# Patient Record
Sex: FEMALE | Race: WHITE | NOT HISPANIC OR LATINO | Employment: FULL TIME | URBAN - METROPOLITAN AREA
[De-identification: names, ages, dates, MRNs, and addresses within clinical notes are randomized per-mention and may not be internally consistent; named-entity substitution may affect disease eponyms.]

---

## 2017-01-10 ENCOUNTER — ALLSCRIPTS OFFICE VISIT (OUTPATIENT)
Dept: OTHER | Facility: OTHER | Age: 52
End: 2017-01-10

## 2017-01-19 ENCOUNTER — GENERIC CONVERSION - ENCOUNTER (OUTPATIENT)
Dept: OTHER | Facility: OTHER | Age: 52
End: 2017-01-19

## 2017-02-07 ENCOUNTER — HOSPITAL ENCOUNTER (OUTPATIENT)
Dept: RADIOLOGY | Facility: MEDICAL CENTER | Age: 52
Discharge: HOME | End: 2017-02-07
Payer: COMMERCIAL

## 2017-02-07 ENCOUNTER — TRANSCRIBE ORDERS (OUTPATIENT)
Dept: URGENT CARE | Facility: MEDICAL CENTER | Age: 52
End: 2017-02-07

## 2017-02-07 DIAGNOSIS — R05.9 COUGH: ICD-10-CM

## 2017-02-07 DIAGNOSIS — R05.9 COUGH: Primary | ICD-10-CM

## 2017-02-07 PROCEDURE — 71020 HB CHEST X-RAY 2VW FRONTAL&LATL: CPT

## 2017-03-08 DIAGNOSIS — Z12.31 ENCOUNTER FOR SCREENING MAMMOGRAM FOR MALIGNANT NEOPLASM OF BREAST: ICD-10-CM

## 2017-05-31 ENCOUNTER — GENERIC CONVERSION - ENCOUNTER (OUTPATIENT)
Dept: OTHER | Facility: OTHER | Age: 52
End: 2017-05-31

## 2017-06-19 DIAGNOSIS — Z12.31 ENCOUNTER FOR SCREENING MAMMOGRAM FOR MALIGNANT NEOPLASM OF BREAST: ICD-10-CM

## 2017-06-19 DIAGNOSIS — I82.531 CHRONIC EMBOLISM AND THROMBOSIS OF RIGHT POPLITEAL VEIN (HCC): ICD-10-CM

## 2017-07-03 ENCOUNTER — APPOINTMENT (OUTPATIENT)
Dept: LAB | Facility: MEDICAL CENTER | Age: 52
End: 2017-07-03
Payer: COMMERCIAL

## 2017-07-03 DIAGNOSIS — I82.531 CHRONIC EMBOLISM AND THROMBOSIS OF RIGHT POPLITEAL VEIN (HCC): ICD-10-CM

## 2017-07-03 LAB
BASOPHILS # BLD AUTO: 0.02 THOUSANDS/ΜL (ref 0–0.1)
BASOPHILS NFR BLD AUTO: 0 % (ref 0–1)
EOSINOPHIL # BLD AUTO: 0.17 THOUSAND/ΜL (ref 0–0.61)
EOSINOPHIL NFR BLD AUTO: 2 % (ref 0–6)
ERYTHROCYTE [DISTWIDTH] IN BLOOD BY AUTOMATED COUNT: 13.2 % (ref 11.6–15.1)
HCT VFR BLD AUTO: 39.9 % (ref 34.8–46.1)
HGB BLD-MCNC: 13.3 G/DL (ref 11.5–15.4)
LYMPHOCYTES # BLD AUTO: 1.98 THOUSANDS/ΜL (ref 0.6–4.47)
LYMPHOCYTES NFR BLD AUTO: 26 % (ref 14–44)
MCH RBC QN AUTO: 31.2 PG (ref 26.8–34.3)
MCHC RBC AUTO-ENTMCNC: 33.3 G/DL (ref 31.4–37.4)
MCV RBC AUTO: 94 FL (ref 82–98)
MONOCYTES # BLD AUTO: 0.61 THOUSAND/ΜL (ref 0.17–1.22)
MONOCYTES NFR BLD AUTO: 8 % (ref 4–12)
NEUTROPHILS # BLD AUTO: 4.97 THOUSANDS/ΜL (ref 1.85–7.62)
NEUTS SEG NFR BLD AUTO: 64 % (ref 43–75)
NRBC BLD AUTO-RTO: 0 /100 WBCS
PLATELET # BLD AUTO: 286 THOUSANDS/UL (ref 149–390)
PMV BLD AUTO: 9.8 FL (ref 8.9–12.7)
RBC # BLD AUTO: 4.26 MILLION/UL (ref 3.81–5.12)
WBC # BLD AUTO: 7.76 THOUSAND/UL (ref 4.31–10.16)

## 2017-07-03 PROCEDURE — 85705 THROMBOPLASTIN INHIBITION: CPT

## 2017-07-03 PROCEDURE — 85613 RUSSELL VIPER VENOM DILUTED: CPT

## 2017-07-03 PROCEDURE — 85025 COMPLETE CBC W/AUTO DIFF WBC: CPT

## 2017-07-03 PROCEDURE — 85670 THROMBIN TIME PLASMA: CPT

## 2017-07-03 PROCEDURE — 36415 COLL VENOUS BLD VENIPUNCTURE: CPT

## 2017-07-03 PROCEDURE — 85732 THROMBOPLASTIN TIME PARTIAL: CPT

## 2017-07-06 ENCOUNTER — ALLSCRIPTS OFFICE VISIT (OUTPATIENT)
Dept: OTHER | Facility: OTHER | Age: 52
End: 2017-07-06

## 2017-07-06 ENCOUNTER — LAB REQUISITION (OUTPATIENT)
Dept: LAB | Facility: HOSPITAL | Age: 52
End: 2017-07-06
Payer: COMMERCIAL

## 2017-07-06 ENCOUNTER — TRANSCRIBE ORDERS (OUTPATIENT)
Dept: ADMINISTRATIVE | Facility: HOSPITAL | Age: 52
End: 2017-07-06

## 2017-07-06 ENCOUNTER — APPOINTMENT (OUTPATIENT)
Dept: LAB | Facility: MEDICAL CENTER | Age: 52
End: 2017-07-06
Payer: COMMERCIAL

## 2017-07-06 DIAGNOSIS — I82.531 CHRONIC EMBOLISM AND THROMBOSIS OF RIGHT POPLITEAL VEIN (HCC): ICD-10-CM

## 2017-07-06 DIAGNOSIS — Z11.51 ENCOUNTER FOR SCREENING FOR HUMAN PAPILLOMAVIRUS (HPV): ICD-10-CM

## 2017-07-06 DIAGNOSIS — I82.531 CHRONIC EMBOLISM AND THROMBOSIS OF RIGHT POPLITEAL VEIN (HCC): Primary | ICD-10-CM

## 2017-07-06 DIAGNOSIS — Z12.4 ENCOUNTER FOR SCREENING FOR MALIGNANT NEOPLASM OF CERVIX: ICD-10-CM

## 2017-07-06 LAB — DEPRECATED D DIMER PPP: 438 NG/ML (FEU) (ref 0–424)

## 2017-07-06 PROCEDURE — 85379 FIBRIN DEGRADATION QUANT: CPT

## 2017-07-06 PROCEDURE — 87624 HPV HI-RISK TYP POOLED RSLT: CPT | Performed by: NURSE PRACTITIONER

## 2017-07-06 PROCEDURE — 36415 COLL VENOUS BLD VENIPUNCTURE: CPT

## 2017-07-06 PROCEDURE — G0145 SCR C/V CYTO,THINLAYER,RESCR: HCPCS | Performed by: NURSE PRACTITIONER

## 2017-07-07 LAB
APTT SCREEN TO CONFIRM RATIO: 1.15 RATIO (ref 0–1.4)
CONFIRM APTT/NORMAL: 49.1 SEC (ref 0–55)
LA PPP-IMP: NORMAL
SCREEN APTT: 28 SEC (ref 0–51.9)
SCREEN DRVVT: 39.8 SEC (ref 0–47)
THROMBIN TIME: 19.8 SEC (ref 0–20.9)

## 2017-07-11 ENCOUNTER — ALLSCRIPTS OFFICE VISIT (OUTPATIENT)
Dept: OTHER | Facility: OTHER | Age: 52
End: 2017-07-11

## 2017-07-11 LAB — HPV RRNA GENITAL QL NAA+PROBE: NORMAL

## 2017-07-13 LAB
LAB AP GYN PRIMARY INTERPRETATION: NORMAL
LAB AP LMP: NORMAL
Lab: NORMAL

## 2017-12-07 ENCOUNTER — TRANSCRIBE ORDERS (OUTPATIENT)
Dept: ADMINISTRATIVE | Facility: HOSPITAL | Age: 52
End: 2017-12-07

## 2017-12-07 DIAGNOSIS — M79.661 PAIN AND SWELLING OF RIGHT LOWER LEG: Primary | ICD-10-CM

## 2017-12-07 DIAGNOSIS — M79.89 PAIN AND SWELLING OF RIGHT LOWER LEG: Primary | ICD-10-CM

## 2017-12-08 ENCOUNTER — HOSPITAL ENCOUNTER (OUTPATIENT)
Dept: NON INVASIVE DIAGNOSTICS | Facility: CLINIC | Age: 52
Discharge: HOME | End: 2017-12-08
Payer: COMMERCIAL

## 2017-12-08 ENCOUNTER — GENERIC CONVERSION - ENCOUNTER (OUTPATIENT)
Dept: OTHER | Facility: OTHER | Age: 52
End: 2017-12-08

## 2017-12-08 DIAGNOSIS — M79.89 PAIN AND SWELLING OF RIGHT LOWER LEG: ICD-10-CM

## 2017-12-08 DIAGNOSIS — M79.661 PAIN AND SWELLING OF RIGHT LOWER LEG: ICD-10-CM

## 2017-12-08 PROCEDURE — 93971 EXTREMITY STUDY: CPT

## 2018-01-10 NOTE — MISCELLANEOUS
Message  patient called for clearance from Dr Gianna Bassett for cool sculpting procedure by [facility]([phone])  called office to obtain details of procedure per dr Jose Ross  doctor will call dr Jose Ross to discuss       Active Problems    1  Candidiasis (112 9) (B37 9)   2  Chronic deep vein thrombosis (DVT) of popliteal vein of right lower extremity (453 51)   (I82 531)   3  Dysmenorrhea (625 3) (N94 6)   4  Encounter for gynecological examination with abnormal finding (V72 31) (Z01 411)   5  Encounter for routine gynecological examination (V72 31) (Z01 419)   6  Encounter for screening mammogram for malignant neoplasm of breast (V76 12)   (Z12 31)   7  Screening for human papillomavirus (HPV) (V73 81) (Z11 51)   8  Urinary frequency (788 41) (R35 0)   9  Urinary tract infection (599 0) (N39 0)    Current Meds   1  Aspirin 81 MG TABS; Therapy: (Recorded:25Oct2016) to Recorded   2  Naproxen 500 MG Oral Tablet; TAKE 1 TABLET EVERY 12 HOURS AS NEEDED; Therapy: 08Apr2016 to (Evaluate:07Jun2016)  Requested for: 08Apr2016; Last   Rx:08Apr2016 Ordered    Allergies    1   No Known Drug Allergies    Signatures   Electronically signed by : Gurdeep Saez, ; Nov 11 2016  3:14PM EST                       (Author)

## 2018-01-10 NOTE — MISCELLANEOUS
Message   Recorded as Task   Date: 05/30/2017 09:06 AM, Created By: Deepthi Ho   Task Name: Med Renewal Request   Assigned To: Ryan Patterson   Regarding Patient: Eleazar Recio, Status: In Progress   Comment:    Aura Zepeda - 30 May 2017 9:06 AM     TASK CREATED  Caller: Self; Renew Medication; [phone] (Work)  pt had to cx her yly today -is requesting Naproxen refill be sent to [facility]  r/s apt to 9/2 in [address] [place] [address]    call today @ work #[phone] ext [phone]  Edgar Olguin - 30 May 2017 9:50 AM     TASK EDITED  ok to refill naproxen? r/s yrly apt for sept,please advise   Hever Souza - 30 May 2017 10:23 AM     TASK REPLIED TO: Previously Assigned To Hever Souza  yes   thank you   Edgar Olguin - 30 May 2017 10:42 AM     TASK EDITED  called 1 mo to pharm, rest to mail order   Edgar Olguin - 30 May 2017 10:42 AM     TASK IN [facility] - 31 May 2017 10:56 AM     TASK EDITED  pt needed a 90 day,called to mail order        Active Problems    1  Candidiasis (112 9) (B37 9)   2  Chronic deep vein thrombosis (DVT) of popliteal vein of right lower extremity (453 51)   (I82 531)   3  Dysmenorrhea (625 3) (N94 6)   4  Encounter for gynecological examination with abnormal finding (V72 31) (Z01 411)   5  Encounter for routine gynecological examination (V72 31) (Z01 419)   6  Encounter for screening mammogram for malignant neoplasm of breast (V76 12)   (Z12 31)   7  Screening for human papillomavirus (HPV) (V73 81) (Z11 51)   8  Urinary frequency (788 41) (R35 0)   9  Urinary tract infection (599 0) (N39 0)    Current Meds   1  Aspirin 81 MG TABS; Therapy: (Recorded:25Oct2016) to Recorded   2  Magnesium Oxide 500 MG Oral Tablet; Take 1 tablet daily Recorded   3  Naproxen 500 MG Oral Tablet; TAKE 1 TABLET EVERY 12 HOURS AS NEEDED; Therapy: 08Apr2016 to (Evaluate:07Jun2016)  Requested for: 08Apr2016; Last   Rx:08Apr2016 Ordered    Allergies    1   No Known Drug Allergies    Signatures Electronically signed by : Prem Rendon, ; May 31 2017 10:56AM EST                       (Author)

## 2018-01-12 VITALS
RESPIRATION RATE: 16 BRPM | HEIGHT: 65 IN | SYSTOLIC BLOOD PRESSURE: 116 MMHG | WEIGHT: 180.31 LBS | BODY MASS INDEX: 30.04 KG/M2 | DIASTOLIC BLOOD PRESSURE: 82 MMHG | OXYGEN SATURATION: 97 % | TEMPERATURE: 98.1 F | HEART RATE: 69 BPM

## 2018-01-13 VITALS
HEIGHT: 65 IN | BODY MASS INDEX: 29.87 KG/M2 | WEIGHT: 179.31 LBS | SYSTOLIC BLOOD PRESSURE: 122 MMHG | DIASTOLIC BLOOD PRESSURE: 76 MMHG

## 2018-01-14 VITALS
BODY MASS INDEX: 29.49 KG/M2 | SYSTOLIC BLOOD PRESSURE: 122 MMHG | HEIGHT: 65 IN | WEIGHT: 177 LBS | TEMPERATURE: 97.5 F | HEART RATE: 72 BPM | OXYGEN SATURATION: 93 % | RESPIRATION RATE: 18 BRPM | DIASTOLIC BLOOD PRESSURE: 80 MMHG

## 2018-01-24 ENCOUNTER — APPOINTMENT (OUTPATIENT)
Dept: RADIOLOGY | Facility: MEDICAL CENTER | Age: 53
End: 2018-01-24
Payer: COMMERCIAL

## 2018-01-24 ENCOUNTER — TRANSCRIBE ORDERS (OUTPATIENT)
Dept: ADMINISTRATIVE | Facility: HOSPITAL | Age: 53
End: 2018-01-24

## 2018-01-24 DIAGNOSIS — R05.9 COUGH: Primary | ICD-10-CM

## 2018-01-24 DIAGNOSIS — R52 PAIN: ICD-10-CM

## 2018-01-24 PROCEDURE — 71046 X-RAY EXAM CHEST 2 VIEWS: CPT

## 2018-01-24 PROCEDURE — 70220 X-RAY EXAM OF SINUSES: CPT

## 2018-01-30 ENCOUNTER — HOSPITAL ENCOUNTER (OUTPATIENT)
Dept: NON INVASIVE DIAGNOSTICS | Facility: CLINIC | Age: 53
Discharge: HOME | End: 2018-01-30
Payer: COMMERCIAL

## 2018-01-30 DIAGNOSIS — R52 PAIN: ICD-10-CM

## 2018-01-30 PROCEDURE — 93970 EXTREMITY STUDY: CPT

## 2018-01-30 PROCEDURE — 93970 EXTREMITY STUDY: CPT | Performed by: SURGERY

## 2018-02-05 ENCOUNTER — TELEPHONE (OUTPATIENT)
Dept: HEMATOLOGY ONCOLOGY | Facility: CLINIC | Age: 53
End: 2018-02-05

## 2018-02-05 NOTE — TELEPHONE ENCOUNTER
Pt seen Dr Ilan Rose in June of 2016 due to a blood clot in her RL  Her labs were fine in 07/2017  Pt had appt for 1 yr f/u for 7/2018  Pt went to [facility]; PCP on 1/24/18  She was coughing and a head cold for 3 days  Her left calf was also bothering her with some swelling so Dr Khushboo France sent her for an ultrasound and they found her had a blood clot in her LL now  She has not been on xarelto in about 2 years  But she is currently on xarelto 15mg twice daily for 2-3 weeks, that she started on 1/30/18   She wants to know if she should f/u with us or her PCP

## 2018-02-06 NOTE — TELEPHONE ENCOUNTER
Called patient and left message that she can follow up with Leann within 4-6 weeks if she decided that's what she chose to do over following up with her PCP

## 2018-02-06 NOTE — TELEPHONE ENCOUNTER
Please call pt and inform she can follow with Dr Pretty Carter  And if she wants then schedule follow up in 4 to 6 weeks

## 2018-02-20 ENCOUNTER — TRANSCRIBE ORDERS (OUTPATIENT)
Dept: ADMINISTRATIVE | Facility: HOSPITAL | Age: 53
End: 2018-02-20

## 2018-02-20 DIAGNOSIS — M79.605 LEFT LEG PAIN: Primary | ICD-10-CM

## 2018-03-06 ENCOUNTER — HOSPITAL ENCOUNTER (OUTPATIENT)
Dept: RADIOLOGY | Facility: MEDICAL CENTER | Age: 53
Discharge: HOME | End: 2018-03-06
Payer: COMMERCIAL

## 2018-03-06 DIAGNOSIS — M79.605 LEFT LEG PAIN: ICD-10-CM

## 2018-03-06 PROCEDURE — 93971 EXTREMITY STUDY: CPT

## 2018-03-06 PROCEDURE — 93971 EXTREMITY STUDY: CPT | Performed by: SURGERY

## 2018-03-17 ENCOUNTER — TRANSCRIBE ORDERS (OUTPATIENT)
Dept: ADMINISTRATIVE | Facility: HOSPITAL | Age: 53
End: 2018-03-17

## 2018-04-10 ENCOUNTER — TELEPHONE (OUTPATIENT)
Dept: OBGYN CLINIC | Facility: CLINIC | Age: 53
End: 2018-04-10

## 2018-04-12 ENCOUNTER — TELEPHONE (OUTPATIENT)
Dept: OBGYN CLINIC | Facility: CLINIC | Age: 53
End: 2018-04-12

## 2018-04-12 NOTE — TELEPHONE ENCOUNTER
Pt had to cancel appt yesterday she was sick  She will go to any office any time of the day to be seen for consult for hysterectomy, said she's in unbearable pain  First appt is may 21 which she took but wanted to see if there's anywhere sooner you can put her  Thanks

## 2018-04-13 NOTE — TELEPHONE ENCOUNTER
Pt called again in regards to sced an appt w/ yoli for [facility] consult please advise

## 2018-04-18 ENCOUNTER — OFFICE VISIT (OUTPATIENT)
Dept: OBGYN CLINIC | Facility: CLINIC | Age: 53
End: 2018-04-18
Payer: COMMERCIAL

## 2018-04-18 ENCOUNTER — HOSPITAL ENCOUNTER (OUTPATIENT)
Dept: RADIOLOGY | Facility: MEDICAL CENTER | Age: 53
Discharge: HOME | End: 2018-04-18
Payer: COMMERCIAL

## 2018-04-18 VITALS — SYSTOLIC BLOOD PRESSURE: 126 MMHG | BODY MASS INDEX: 29.09 KG/M2 | DIASTOLIC BLOOD PRESSURE: 80 MMHG | WEIGHT: 174.8 LBS

## 2018-04-18 DIAGNOSIS — R10.2 PELVIC PAIN: ICD-10-CM

## 2018-04-18 DIAGNOSIS — N94.6 DYSMENORRHEA: Primary | ICD-10-CM

## 2018-04-18 PROCEDURE — 76830 TRANSVAGINAL US NON-OB: CPT

## 2018-04-18 PROCEDURE — 76856 US EXAM PELVIC COMPLETE: CPT

## 2018-04-18 PROCEDURE — 99214 OFFICE O/P EST MOD 30 MIN: CPT | Performed by: OBSTETRICS & GYNECOLOGY

## 2018-04-18 RX ORDER — NAPROXEN 500 MG/1
1 TABLET ORAL EVERY 12 HOURS PRN
COMMUNITY
Start: 2016-04-08 | End: 2018-05-09

## 2018-04-18 RX ORDER — RIVAROXABAN 20 MG/1
20 TABLET, FILM COATED ORAL DAILY
Refills: 2 | COMMUNITY
Start: 2018-03-23 | End: 2018-06-04 | Stop reason: SDUPTHER

## 2018-04-18 NOTE — PROGRESS NOTES
Assessment/Plan:      Diagnoses and all orders for this visit:    Pelvic pain  -     US pelvis complete non OB; Future    Dysmenorrhea    Other orders  -     naproxen (NAPROSYN) 500 mg tablet; Take 1 tablet by mouth every 12 (twelve) hours as needed  -     XARELTO 20 MG tablet; Take 20 mg by mouth daily        Discussion was had with patient regarding her options at this point  We reviewed the fact that she is not a candidate for any hormonal medications given her history of DVT as well as her current active DVT  She has already had endometrial ablation  We discussed the options of surgical intervention which the patient has presented today with desire for definitive therapy  The procedure of probable LAVH with bilateral salpingectomy was reviewed  I have scheduled the patient for a pelvic ultrasound as her uterus is top-normal in size  We discussed the need for co management with Hematology for bridging of her anticoagulation to Lovenox or heparin  The risks and benefits were fully reviewed  Including the expected outcomes  She would like to planned this surgery for May if possible  This visit was for 30 min with greater than 50% of that of forwarded to counseling and coordination of care  I have asked the patient to return to the office in 2 days after she has completed her pelvic ultrasound for further surgical planning  Subjective:     Patient ID: Eliana Mattson is a 50 y o  female  Patient returns for evaluation pelvic pain  Patient has pain primarily on day 2 and 3 of her menses  It is been debilitating such that she is unable to perform her typical daily activities  This has been getting progressively worse  Patient has a new history of lower extremity DVTs  She has had a previous right DVT and now in January was diagnosed with a left DVT  She is currently on anticoagulation and has follow-up scheduled with her hematologist   She has no other new medical or surgical history    She has tried numerous medications to help with her pain and they have been ineffective  Pelvic Pain   The patient's primary symptoms include pelvic pain  Review of Systems   Genitourinary: Positive for pelvic pain  All other systems reviewed and are negative  Objective:     Physical Exam   Constitutional: She appears well-developed and well-nourished  Neck: Neck supple  Pulmonary/Chest: Effort normal    Abdominal: Soft  She exhibits no mass  There is no tenderness     Genitourinary:   Genitourinary Comments: External genitalia normal female no lesions  Vagina parous without obvious lesions  Cervix parous and posteriorly deviated, no lesions or discharge noted  Uterus anteverted top-normal in size, slightly tender  Adnexal nontender without obvious mass

## 2018-04-18 NOTE — PROGRESS NOTES
Assessment/Plan:    No problem-specific Assessment & Plan notes found for this encounter  Diagnoses and all orders for this visit:    Dysmenorrhea    Pelvic pain  -     US pelvis complete non OB; Future    Other orders  -     naproxen (NAPROSYN) 500 mg tablet; Take 1 tablet by mouth every 12 (twelve) hours as needed  -     XARELTO 20 MG tablet; Take 20 mg by mouth daily          Subjective:      Patient ID: Jenni Kessler is a 50 y o  female      HPI    The following portions of the patient's history were reviewed and updated as appropriate: allergies, current medications, past family history, past medical history, past social history, past surgical history and problem list     Review of Systems      Objective:      /80 (BP Location: Left arm, Patient Position: Sitting, Cuff Size: Standard)   Wt 79 3 kg (174 lb 12 8 oz)   LMP 01/14/2018   BMI 29 09 kg/m²          Physical Exam

## 2018-04-20 ENCOUNTER — OFFICE VISIT (OUTPATIENT)
Dept: OBGYN CLINIC | Facility: MEDICAL CENTER | Age: 53
End: 2018-04-20
Payer: COMMERCIAL

## 2018-04-20 VITALS — WEIGHT: 174 LBS | DIASTOLIC BLOOD PRESSURE: 70 MMHG | SYSTOLIC BLOOD PRESSURE: 122 MMHG | BODY MASS INDEX: 28.96 KG/M2

## 2018-04-20 DIAGNOSIS — N94.6 DYSMENORRHEA: Primary | ICD-10-CM

## 2018-04-20 PROCEDURE — 99214 OFFICE O/P EST MOD 30 MIN: CPT | Performed by: OBSTETRICS & GYNECOLOGY

## 2018-04-20 RX ORDER — OXYCODONE HYDROCHLORIDE AND ACETAMINOPHEN 5; 325 MG/1; MG/1
1 TABLET ORAL EVERY 4 HOURS PRN
Qty: 20 TABLET | Refills: 0 | Status: SHIPPED | OUTPATIENT
Start: 2018-04-20 | End: 2018-05-09

## 2018-04-20 NOTE — LETTER
April 20, 2018     Joon Brunner MD  [address] [place] [address]    Patient: Jonah Cadena   YOB: 1965   Date of Visit: 4/20/2018       Dear Dr Perez Heads:    Thank you for referring Michelle Valdez to me for evaluation  Below are my notes for this consultation  If you have questions, please do not hesitate to call me  I look forward to following your patient along with you  Sincerely,        Godwin Wiseman DO        CC: No Recipients  Godwin Wiseman DO  4/20/2018  9:43 AM  Sign at close encounter  Assessment/Plan:      Diagnoses and all orders for this visit:    Dysmenorrhea  -     oxyCODONE-acetaminophen (PERCOCET) 5-325 mg per tablet; Take 1 tablet by mouth every 4 (four) hours as needed for moderate pain Max Daily Amount: 6 tablets        The entirety of this visit was spent on counseling and coordination of care for surgery  We will plan on a laparoscopic-assisted vaginal hysterectomy with bilateral salpingectomy  The procedure was fully reviewed in detail with the patient including the risks and benefits  Written informed consent was obtained  The patient had a prescription for perioperative Percocet sent  She was given a sample of Hibiclens scrub to be utilized preoperatively  Once we have a surgical date I have asked that she contact her hematologist to begin planning for bridging to Lovenox from her Xarelto  All of her questions were answered to her satisfaction and we will proceed with surgical planning  Total time for this visit was 35 minutes with greater than 50% of that afforded to counseling and coordination of care  Subjective:     Patient ID: Jonah Cadena is a 50 y o  female  Patient returns for surgical planning  She had ultrasound performed 2 days ago  The images were reviewed by myself demonstrating some irregular fluid collection within the endometrium as well as some irregularity to the myometrium adjacent to this    There was no significant uterine abnormality noted greatest dimension was 11 cm longitudinally  There were no adnexal abnormalities noted  Patient continues to desire surgical intervention  Review of Systems   All other systems reviewed and are negative          Objective:     Physical Exam

## 2018-04-20 NOTE — PROGRESS NOTES
Assessment/Plan:      Diagnoses and all orders for this visit:    Dysmenorrhea  -     oxyCODONE-acetaminophen (PERCOCET) 5-325 mg per tablet; Take 1 tablet by mouth every 4 (four) hours as needed for moderate pain Max Daily Amount: 6 tablets        The entirety of this visit was spent on counseling and coordination of care for surgery  We will plan on a laparoscopic-assisted vaginal hysterectomy with bilateral salpingectomy  The procedure was fully reviewed in detail with the patient including the risks and benefits  Written informed consent was obtained  The patient had a prescription for perioperative Percocet sent  She was given a sample of Hibiclens scrub to be utilized preoperatively  Once we have a surgical date I have asked that she contact her hematologist to begin planning for bridging to Lovenox from her Xarelto  All of her questions were answered to her satisfaction and we will proceed with surgical planning  Total time for this visit was 35 minutes with greater than 50% of that afforded to counseling and coordination of care  Subjective:     Patient ID: Destinee Mercer is a 50 y o  female  Patient returns for surgical planning  She had ultrasound performed 2 days ago  The images were reviewed by myself demonstrating some irregular fluid collection within the endometrium as well as some irregularity to the myometrium adjacent to this  There was no significant uterine abnormality noted greatest dimension was 11 cm longitudinally  There were no adnexal abnormalities noted  Patient continues to desire surgical intervention  Review of Systems   All other systems reviewed and are negative          Objective:     Physical Exam

## 2018-05-07 ENCOUNTER — OFFICE VISIT (OUTPATIENT)
Dept: HEMATOLOGY ONCOLOGY | Facility: CLINIC | Age: 53
End: 2018-05-07
Payer: COMMERCIAL

## 2018-05-07 VITALS
OXYGEN SATURATION: 94 % | SYSTOLIC BLOOD PRESSURE: 122 MMHG | DIASTOLIC BLOOD PRESSURE: 74 MMHG | HEART RATE: 80 BPM | BODY MASS INDEX: 28.7 KG/M2 | TEMPERATURE: 97.6 F | RESPIRATION RATE: 18 BRPM | HEIGHT: 66 IN | WEIGHT: 178.6 LBS

## 2018-05-07 DIAGNOSIS — I82.532 CHRONIC DEEP VEIN THROMBOSIS (DVT) OF POPLITEAL VEIN OF LEFT LOWER EXTREMITY (HCC): Primary | ICD-10-CM

## 2018-05-07 PROBLEM — I82.431 DEEP VEIN THROMBOSIS (DVT) OF POPLITEAL VEIN OF RIGHT LOWER EXTREMITY (HCC): Status: ACTIVE | Noted: 2018-05-07

## 2018-05-07 PROBLEM — I82.432 DEEP VEIN THROMBOSIS (DVT) OF POPLITEAL VEIN OF LEFT LOWER EXTREMITY (HCC): Status: ACTIVE | Noted: 2018-05-07

## 2018-05-07 PROCEDURE — 99214 OFFICE O/P EST MOD 30 MIN: CPT | Performed by: INTERNAL MEDICINE

## 2018-05-07 NOTE — PROGRESS NOTES
Follow-up office visit  HPI:   Claudean Robertson is a 50 y o  female with history of DVT right lower leg below the knee in May 2016 and that happened after taking hormonal therapy in April 2016  She stayed on Xarelto until end of September 2016  She had heavy periods in in January 2018 and she was in bed for 3 days and she developed a clot in the left lower leg distally below the knee  In 2016 she tested positive once for lupus anticoagulant and that was negative on repeat test   Patient will be going for hysterectomy on 05/21 /18  She will continue to take Xarelto until morning of 05/19/2018 and will start after surgery when okay with her surgeon Dr Jose Gillespie hopefully on 05/22/2018  She has instructions to report to our office and be taken to the emergency room in case of bleeding and blood clot  She is asymptomatic at present  No previous history of blood clot  prior to 2016 episode not even during pregnancy  Patient's father had blood clot post surgery  Current Outpatient Prescriptions:     naproxen (NAPROSYN) 500 mg tablet, Take 1 tablet by mouth every 12 (twelve) hours as needed, Disp: , Rfl:     oxyCODONE-acetaminophen (PERCOCET) 5-325 mg per tablet, Take 1 tablet by mouth every 4 (four) hours as needed for moderate pain Max Daily Amount: 6 tablets, Disp: 20 tablet, Rfl: 0    XARELTO 20 MG tablet, Take 20 mg by mouth daily, Disp: , Rfl: 2    No Known Allergies    ROS:  05/07/18 Reviewed 13 systems:  Presently no headaches, seizures, dizziness, diplopia, dysphagia, hoarseness, chest pain, palpitations, shortness of breath, cough, hemoptysis, abdominal pain, nausea, vomiting, change in bowel habits, melena, hematuria, fever, chills, bleeding, bone pains, skin rash, weight loss, arthritic symptoms, tiredness ,  weakness, numbness,  claudication and gait problem  No frequent infections  Not unusually sensitive to heat or cold  No swelling of the ankles  No swollen glands  Patient is anxious   Other symptoms are in HPI        /74   Pulse 80   Temp 97 6 °F (36 4 °C)   Resp 18   Ht 5' 5 8" (1 671 m)   Wt 81 kg (178 lb 9 6 oz)   SpO2 94%   BMI 29 00 kg/m²     Physical Exam:  Alert, oriented, not in distress, no icterus, no oral thrush, no palpable neck mass, clear lung fields, regular heart rate, abdomen  soft and non tender, no palpable abdominal mass, no ascites, no edema of ankles, no calf tenderness, no focal neurological deficit, no skin rash, no palpable lymphadenopathy, good arterial pulses, no clubbing  Patient is anxious  Performance status 0  IMAGING:  VAS lower limb venous duplex study, complete bilateral    (Results Pending)         Labs, Imaging, & Other studies:  Ordered blood tests and venous Doppler study          Reviewed and discussed with patient  Assessment and plan:  Kaleb Hauser is a 50 y o  female with history of DVT right lower leg below the knee in May 2016 and that happened after taking hormonal therapy in April 2016  She stayed on Xarelto until end of September 2016  She had heavy periods in in January 2018 and she was in bed for 3 days and she developed a clot in the left lower leg distally below the knee  In 2016 she tested positive once for lupus anticoagulant and that was negative on repeat test   Patient will be going for hysterectomy on 05/21 /18  She will continue to take Xarelto until morning of 05/19/2018 and will start after surgery when okay with her surgeon Dr Melisa Hensley hopefully on 05/22/2018  She has instructions to report to our office and be taken to the emergency room in case of bleeding and blood clot  She is asymptomatic at present  No previous history of blood clot  prior to 2016 episode not even during pregnancy  Patient's father had blood clot post surgery  Physical examination  as recorded and discussed  Plan is to continue her on  20 mg Xarelto daily and  instructions in relation to surgery as above   She has instructions how to avoid blood clots and she will let the attending physician know of her history of blood clot anytime she went into the hospital for admission or any procedure  She states she is up-to-date with her medical and GYN checkups and mammography  Discussed in detail  Questions answered  No more hormone replacement therapy for her  1  Chronic deep vein thrombosis (DVT) of popliteal vein of left lower extremity (HCC)    - D-dimer, quantitative; Future  - Cardiolipin antibody; Future  - CBC and differential; Future  - Comprehensive metabolic panel; Future  - VAS lower limb venous duplex study, complete bilateral; Future          Patient voiced understanding and agreement in the discussion  Counseling / Coordination of Care   Greater than 50% of total time was spent with the patient and / or family counseling and / or coordination of care

## 2018-05-08 ENCOUNTER — APPOINTMENT (OUTPATIENT)
Dept: LAB | Facility: MEDICAL CENTER | Age: 53
End: 2018-05-08
Payer: COMMERCIAL

## 2018-05-08 ENCOUNTER — APPOINTMENT (OUTPATIENT)
Dept: URGENT CARE | Facility: MEDICAL CENTER | Age: 53
End: 2018-05-08

## 2018-05-08 ENCOUNTER — TRANSCRIBE ORDERS (OUTPATIENT)
Dept: ADMINISTRATIVE | Facility: HOSPITAL | Age: 53
End: 2018-05-08

## 2018-05-08 ENCOUNTER — HOSPITAL ENCOUNTER (OUTPATIENT)
Dept: NON INVASIVE DIAGNOSTICS | Facility: CLINIC | Age: 53
Discharge: HOME | End: 2018-05-08
Payer: COMMERCIAL

## 2018-05-08 ENCOUNTER — TRANSCRIBE ORDERS (OUTPATIENT)
Dept: URGENT CARE | Facility: MEDICAL CENTER | Age: 53
End: 2018-05-08

## 2018-05-08 DIAGNOSIS — Z00.8 ENCOUNTER FOR BIOMETRIC SCREENING: Primary | ICD-10-CM

## 2018-05-08 DIAGNOSIS — Z01.818 PRE-OP TESTING: ICD-10-CM

## 2018-05-08 DIAGNOSIS — Z00.8 ENCOUNTER FOR BIOMETRIC SCREENING: ICD-10-CM

## 2018-05-08 DIAGNOSIS — Z01.818 PRE-OP TESTING: Primary | ICD-10-CM

## 2018-05-08 DIAGNOSIS — I82.532 CHRONIC DEEP VEIN THROMBOSIS (DVT) OF POPLITEAL VEIN OF LEFT LOWER EXTREMITY (HCC): ICD-10-CM

## 2018-05-08 LAB
ABO GROUP BLD: NORMAL
ALBUMIN SERPL BCP-MCNC: 3.5 G/DL (ref 3.5–5)
ALP SERPL-CCNC: 45 U/L (ref 46–116)
ALT SERPL W P-5'-P-CCNC: 20 U/L (ref 12–78)
ANION GAP SERPL CALCULATED.3IONS-SCNC: 9 MMOL/L (ref 4–13)
AST SERPL W P-5'-P-CCNC: 13 U/L (ref 5–45)
BASOPHILS # BLD AUTO: 0.03 THOUSANDS/ΜL (ref 0–0.1)
BASOPHILS NFR BLD AUTO: 1 % (ref 0–1)
BILIRUB SERPL-MCNC: 0.44 MG/DL (ref 0.2–1)
BLD GP AB SCN SERPL QL: NEGATIVE
BUN SERPL-MCNC: 9 MG/DL (ref 5–25)
CALCIUM SERPL-MCNC: 9 MG/DL (ref 8.3–10.1)
CHLORIDE SERPL-SCNC: 106 MMOL/L (ref 100–108)
CHOLEST SERPL-MCNC: 194 MG/DL (ref 50–200)
CO2 SERPL-SCNC: 24 MMOL/L (ref 21–32)
CREAT SERPL-MCNC: 0.93 MG/DL (ref 0.6–1.3)
DEPRECATED D DIMER PPP: 360 NG/ML (FEU) (ref 0–424)
EOSINOPHIL # BLD AUTO: 0.16 THOUSAND/ΜL (ref 0–0.61)
EOSINOPHIL NFR BLD AUTO: 3 % (ref 0–6)
ERYTHROCYTE [DISTWIDTH] IN BLOOD BY AUTOMATED COUNT: 12.9 % (ref 11.6–15.1)
EST. AVERAGE GLUCOSE BLD GHB EST-MCNC: 103 MG/DL
EST. AVERAGE GLUCOSE BLD GHB EST-MCNC: 117 MG/DL
GFR SERPL CREATININE-BSD FRML MDRD: 72 ML/MIN/1.73SQ M
GLUCOSE P FAST SERPL-MCNC: 93 MG/DL (ref 65–99)
HBA1C MFR BLD: 5.2 % (ref 4.2–6.3)
HBA1C MFR BLD: 5.7 % (ref 4.2–6.3)
HCT VFR BLD AUTO: 39 % (ref 34.8–46.1)
HDLC SERPL-MCNC: 63 MG/DL (ref 40–60)
HGB BLD-MCNC: 12.6 G/DL (ref 11.5–15.4)
LDLC SERPL CALC-MCNC: 117 MG/DL (ref 0–100)
LYMPHOCYTES # BLD AUTO: 1.27 THOUSANDS/ΜL (ref 0.6–4.47)
LYMPHOCYTES NFR BLD AUTO: 23 % (ref 14–44)
MCH RBC QN AUTO: 30.1 PG (ref 26.8–34.3)
MCHC RBC AUTO-ENTMCNC: 32.3 G/DL (ref 31.4–37.4)
MCV RBC AUTO: 93 FL (ref 82–98)
MONOCYTES # BLD AUTO: 0.49 THOUSAND/ΜL (ref 0.17–1.22)
MONOCYTES NFR BLD AUTO: 9 % (ref 4–12)
NEUTROPHILS # BLD AUTO: 3.55 THOUSANDS/ΜL (ref 1.85–7.62)
NEUTS SEG NFR BLD AUTO: 64 % (ref 43–75)
NONHDLC SERPL-MCNC: 131 MG/DL
NRBC BLD AUTO-RTO: 0 /100 WBCS
PLATELET # BLD AUTO: 257 THOUSANDS/UL (ref 149–390)
PMV BLD AUTO: 9.7 FL (ref 8.9–12.7)
POTASSIUM SERPL-SCNC: 4.5 MMOL/L (ref 3.5–5.3)
PROT SERPL-MCNC: 6.9 G/DL (ref 6.4–8.2)
RBC # BLD AUTO: 4.18 MILLION/UL (ref 3.81–5.12)
RH BLD: POSITIVE
SODIUM SERPL-SCNC: 139 MMOL/L (ref 136–145)
SPECIMEN EXPIRATION DATE: NORMAL
TRIGL SERPL-MCNC: 70 MG/DL
WBC # BLD AUTO: 5.51 THOUSAND/UL (ref 4.31–10.16)

## 2018-05-08 PROCEDURE — 86900 BLOOD TYPING SEROLOGIC ABO: CPT

## 2018-05-08 PROCEDURE — 93970 EXTREMITY STUDY: CPT

## 2018-05-08 PROCEDURE — 36415 COLL VENOUS BLD VENIPUNCTURE: CPT | Performed by: OBSTETRICS & GYNECOLOGY

## 2018-05-08 PROCEDURE — 86850 RBC ANTIBODY SCREEN: CPT

## 2018-05-08 PROCEDURE — 80053 COMPREHEN METABOLIC PANEL: CPT

## 2018-05-08 PROCEDURE — 85379 FIBRIN DEGRADATION QUANT: CPT

## 2018-05-08 PROCEDURE — 86147 CARDIOLIPIN ANTIBODY EA IG: CPT

## 2018-05-08 PROCEDURE — 80061 LIPID PANEL: CPT | Performed by: PHYSICIAN ASSISTANT

## 2018-05-08 PROCEDURE — 93970 EXTREMITY STUDY: CPT | Performed by: SURGERY

## 2018-05-08 PROCEDURE — 85025 COMPLETE CBC W/AUTO DIFF WBC: CPT

## 2018-05-08 PROCEDURE — 86901 BLOOD TYPING SEROLOGIC RH(D): CPT

## 2018-05-08 PROCEDURE — 83036 HEMOGLOBIN GLYCOSYLATED A1C: CPT | Performed by: PHYSICIAN ASSISTANT

## 2018-05-09 LAB
CARDIOLIPIN IGA SER IA-ACNC: <9 APL U/ML (ref 0–11)
CARDIOLIPIN IGG SER IA-ACNC: <9 GPL U/ML (ref 0–14)
CARDIOLIPIN IGM SER IA-ACNC: <9 MPL U/ML (ref 0–12)

## 2018-05-18 NOTE — H&P
H&P Exam - Gynecology   Claudean Robertson 50 y o  female MRN: [id number]  Unit/Bed#:  Encounter: [id number]    Assessment/Plan     Assessment:  Pelvic pain  Plan:  LAV-LAZARO    History of Present Illness   HPI:  Claudean Robertson is a 50 y o  female who presents with a history of pelvic pain  She has tried conservative measures and these have been ineffective  Pain is crampy and sharp at times  It has impacted her quality of life  She desires definitive surgery    Patient has active left DVT  She discontinued Xarelto 2 days pre-op  She will restart Xarelto 24 hours postop  Review of Systems   All other systems reviewed and are negative  Historical Information   Past Medical History:   Diagnosis Date    [facility]deep venous thrombosis) ([facility] [address] )     Kidney disease     Menorrhalgia     RESOLVED     Migraine     Postcoital bleeding     LAST ASSESSED [id number]     Past Surgical History:   Procedure Laterality Date    DILATION AND CURETTAGE OF UTERUS      RESOLVED     ENDOMETRIAL ABLATION      THERMAL     [place]  RESOLVED     ENDOMETRIAL BIOPSY      BY SUCTION   DONE 2008     OB/GYN History:   Family History   Problem Relation Age of Onset   Keerthi Marin Migraines Mother     Heart disease Mother     Anemia Father     Arthritis Father     Other Father      BLOOD TRANSFUSION    Migraines Daughter     Heart disease Family      Social History   History   Alcohol Use    1 8 oz/week    3 Glasses of wine per week     History   Drug Use No     History   Smoking Status    Never Smoker   Smokeless Tobacco    Never Used       Meds/Allergies     No Known Allergies    Objective   Vitals: There were no vitals taken for this visit  No intake or output data in the 24 hours ending 18    Invasive Devices: Invasive Devices          No matching active lines, drains, or airways          Physical Exam   Constitutional: She is oriented to person, place, and time   She appears well-developed and well-nourished  Neck: Neck supple  Cardiovascular: Normal rate and regular rhythm  Pulmonary/Chest: Effort normal and breath sounds normal    Abdominal: Soft  She exhibits no mass  There is no tenderness  Genitourinary: Vagina normal and uterus normal    Neurological: She is alert and oriented to person, place, and time  Skin: Skin is dry  Psychiatric: She has a normal mood and affect  Her behavior is normal  Thought content normal        Lab Results: I have personally reviewed pertinent reports  Imaging: I have personally reviewed pertinent reports  EKG, Pathology, and Other Studies: I have personally reviewed pertinent reports

## 2018-05-20 ENCOUNTER — ANESTHESIA EVENT (OUTPATIENT)
Dept: PERIOP | Facility: HOSPITAL | Age: 53
End: 2018-05-20
Payer: COMMERCIAL

## 2018-05-20 NOTE — ANESTHESIA PREPROCEDURE EVALUATION
Review of Systems/Medical History          Cardiovascular  EKG reviewed, DVT (Last Jan /18)  Comment: On Xarelto to be stopped after 5/19 dose  Off X 48 hours  ,  Pulmonary  Not a smoker , No recent URI ,        GI/Hepatic    No PUD,             Endo/Other     GYN      Comment: Active Painful Menses     Hematology   Musculoskeletal       Neurology    Headaches,   Comment: Migraines Psychology           Physical Exam    Airway    Mallampati score: I  TM Distance: >3 FB  Neck ROM: full     Dental   No notable dental hx     Cardiovascular      Pulmonary      Other Findings        Anesthesia Plan  ASA Score- 2     Anesthesia Type- general with ASA Monitors  Additional Monitors:   Airway Plan: ETT  Plan Factors-Patient not instructed to abstain from smoking on day of procedure  Patient did not smoke on day of surgery  Induction- intravenous  Postoperative Plan-     Informed Consent- Anesthetic plan and risks discussed with patient and spouse  I personally reviewed this patient with the CRNA  Discussed and agreed on the Anesthesia Plan with the CRNA             Lab Results   Component Value Date    GLUCOSE 95 12/22/2016    ALT 20 05/08/2018    AST 13 05/08/2018    BUN 9 05/08/2018    CALCIUM 9 0 05/08/2018     05/08/2018    CHOL 194 05/08/2018    CO2 24 05/08/2018    CREATININE 0 93 05/08/2018    HDL 63 (H) 05/08/2018    INR 0 98 10/06/2016    HCT 39 0 05/08/2018    HGB 12 6 05/08/2018    PROT 6 9 05/08/2018    HGBA1C 5 7 05/08/2018     05/08/2018    K 4 5 05/08/2018     05/08/2018    TRIG 70 05/08/2018    WBC 5 51 05/08/2018

## 2018-05-21 ENCOUNTER — HOSPITAL ENCOUNTER (OUTPATIENT)
Facility: HOSPITAL | Age: 53
Setting detail: OUTPATIENT SURGERY
Discharge: HOME | End: 2018-05-21
Attending: OBSTETRICS & GYNECOLOGY | Admitting: OBSTETRICS & GYNECOLOGY
Payer: COMMERCIAL

## 2018-05-21 ENCOUNTER — ANESTHESIA (OUTPATIENT)
Dept: PERIOP | Facility: HOSPITAL | Age: 53
End: 2018-05-21
Payer: COMMERCIAL

## 2018-05-21 VITALS
RESPIRATION RATE: 16 BRPM | TEMPERATURE: 100 F | OXYGEN SATURATION: 95 % | HEIGHT: 66 IN | HEART RATE: 82 BPM | BODY MASS INDEX: 27.32 KG/M2 | SYSTOLIC BLOOD PRESSURE: 143 MMHG | DIASTOLIC BLOOD PRESSURE: 74 MMHG | WEIGHT: 170 LBS

## 2018-05-21 DIAGNOSIS — N94.6 PAINFUL MENSTRUATION: ICD-10-CM

## 2018-05-21 DIAGNOSIS — R10.2 PELVIC PAIN: ICD-10-CM

## 2018-05-21 LAB
ATRIAL RATE: 80 BPM
EXT PREGNANCY TEST URINE: NEGATIVE
GLUCOSE SERPL-MCNC: 155 MG/DL (ref 65–140)
P AXIS: 50 DEGREES
PR INTERVAL: 158 MS
QRS AXIS: -24 DEGREES
QRSD INTERVAL: 82 MS
QT INTERVAL: 374 MS
QTC INTERVAL: 431 MS
T WAVE AXIS: 41 DEGREES
VENTRICULAR RATE: 80 BPM

## 2018-05-21 PROCEDURE — 58554 LAPARO-VAG HYST W/T/O COMPL: CPT | Performed by: OBSTETRICS & GYNECOLOGY

## 2018-05-21 PROCEDURE — 88342 IMHCHEM/IMCYTCHM 1ST ANTB: CPT | Performed by: PATHOLOGY

## 2018-05-21 PROCEDURE — 88361 TUMOR IMMUNOHISTOCHEM/COMPUT: CPT | Performed by: PATHOLOGY

## 2018-05-21 PROCEDURE — 82948 REAGENT STRIP/BLOOD GLUCOSE: CPT

## 2018-05-21 PROCEDURE — 93010 ELECTROCARDIOGRAM REPORT: CPT | Performed by: INTERNAL MEDICINE

## 2018-05-21 PROCEDURE — 88307 TISSUE EXAM BY PATHOLOGIST: CPT | Performed by: PATHOLOGY

## 2018-05-21 PROCEDURE — 93005 ELECTROCARDIOGRAM TRACING: CPT

## 2018-05-21 PROCEDURE — 88341 IMHCHEM/IMCYTCHM EA ADD ANTB: CPT | Performed by: PATHOLOGY

## 2018-05-21 PROCEDURE — 81025 URINE PREGNANCY TEST: CPT | Performed by: OBSTETRICS & GYNECOLOGY

## 2018-05-21 RX ORDER — OXYCODONE HYDROCHLORIDE 5 MG/1
5 TABLET ORAL EVERY 4 HOURS PRN
Status: DISCONTINUED | OUTPATIENT
Start: 2018-05-21 | End: 2018-05-21 | Stop reason: HOSPADM

## 2018-05-21 RX ORDER — GLYCOPYRROLATE 0.2 MG/ML
INJECTION INTRAMUSCULAR; INTRAVENOUS AS NEEDED
Status: DISCONTINUED | OUTPATIENT
Start: 2018-05-21 | End: 2018-05-21 | Stop reason: SURG

## 2018-05-21 RX ORDER — SODIUM CHLORIDE, SODIUM LACTATE, POTASSIUM CHLORIDE, AND CALCIUM CHLORIDE .6; .31; .03; .02 G/100ML; G/100ML; G/100ML; G/100ML
IRRIGANT IRRIGATION AS NEEDED
Status: DISCONTINUED | OUTPATIENT
Start: 2018-05-21 | End: 2018-05-21 | Stop reason: HOSPADM

## 2018-05-21 RX ORDER — LIDOCAINE HYDROCHLORIDE 10 MG/ML
INJECTION, SOLUTION INFILTRATION; PERINEURAL AS NEEDED
Status: DISCONTINUED | OUTPATIENT
Start: 2018-05-21 | End: 2018-05-21 | Stop reason: SURG

## 2018-05-21 RX ORDER — FENTANYL CITRATE/PF 50 MCG/ML
25 SYRINGE (ML) INJECTION
Status: DISCONTINUED | OUTPATIENT
Start: 2018-05-21 | End: 2018-05-21 | Stop reason: HOSPADM

## 2018-05-21 RX ORDER — OXYCODONE HYDROCHLORIDE 5 MG/1
10 TABLET ORAL EVERY 4 HOURS PRN
Status: DISCONTINUED | OUTPATIENT
Start: 2018-05-21 | End: 2018-05-21 | Stop reason: HOSPADM

## 2018-05-21 RX ORDER — FENTANYL CITRATE 50 UG/ML
INJECTION, SOLUTION INTRAMUSCULAR; INTRAVENOUS AS NEEDED
Status: DISCONTINUED | OUTPATIENT
Start: 2018-05-21 | End: 2018-05-21 | Stop reason: SURG

## 2018-05-21 RX ORDER — ONDANSETRON 2 MG/ML
4 INJECTION INTRAMUSCULAR; INTRAVENOUS ONCE AS NEEDED
Status: DISCONTINUED | OUTPATIENT
Start: 2018-05-21 | End: 2018-05-21 | Stop reason: HOSPADM

## 2018-05-21 RX ORDER — ACETAMINOPHEN 500 MG
1000 TABLET ORAL EVERY 6 HOURS PRN
COMMUNITY

## 2018-05-21 RX ORDER — SODIUM CHLORIDE, SODIUM LACTATE, POTASSIUM CHLORIDE, CALCIUM CHLORIDE 600; 310; 30; 20 MG/100ML; MG/100ML; MG/100ML; MG/100ML
250 INJECTION, SOLUTION INTRAVENOUS CONTINUOUS
Status: DISCONTINUED | OUTPATIENT
Start: 2018-05-21 | End: 2018-05-21

## 2018-05-21 RX ORDER — ONDANSETRON 2 MG/ML
4 INJECTION INTRAMUSCULAR; INTRAVENOUS EVERY 6 HOURS PRN
Status: DISCONTINUED | OUTPATIENT
Start: 2018-05-21 | End: 2018-05-21 | Stop reason: HOSPADM

## 2018-05-21 RX ORDER — ALBUTEROL SULFATE 2.5 MG/3ML
2.5 SOLUTION RESPIRATORY (INHALATION) EVERY 4 HOURS PRN
Status: DISCONTINUED | OUTPATIENT
Start: 2018-05-21 | End: 2018-05-21 | Stop reason: HOSPADM

## 2018-05-21 RX ORDER — BUPIVACAINE HYDROCHLORIDE AND EPINEPHRINE 5; 5 MG/ML; UG/ML
INJECTION, SOLUTION PERINEURAL AS NEEDED
Status: DISCONTINUED | OUTPATIENT
Start: 2018-05-21 | End: 2018-05-21 | Stop reason: HOSPADM

## 2018-05-21 RX ORDER — PROPOFOL 10 MG/ML
INJECTION, EMULSION INTRAVENOUS AS NEEDED
Status: DISCONTINUED | OUTPATIENT
Start: 2018-05-21 | End: 2018-05-21 | Stop reason: SURG

## 2018-05-21 RX ORDER — ALBUMIN, HUMAN INJ 5% 5 %
SOLUTION INTRAVENOUS CONTINUOUS PRN
Status: DISCONTINUED | OUTPATIENT
Start: 2018-05-21 | End: 2018-05-21 | Stop reason: SURG

## 2018-05-21 RX ORDER — ACETAMINOPHEN 325 MG/1
650 TABLET ORAL EVERY 6 HOURS PRN
Status: DISCONTINUED | OUTPATIENT
Start: 2018-05-21 | End: 2018-05-21 | Stop reason: HOSPADM

## 2018-05-21 RX ORDER — SODIUM CHLORIDE 9 MG/ML
125 INJECTION, SOLUTION INTRAVENOUS CONTINUOUS
Status: CANCELLED | OUTPATIENT
Start: 2018-05-21

## 2018-05-21 RX ORDER — SODIUM CHLORIDE, SODIUM LACTATE, POTASSIUM CHLORIDE, CALCIUM CHLORIDE 600; 310; 30; 20 MG/100ML; MG/100ML; MG/100ML; MG/100ML
125 INJECTION, SOLUTION INTRAVENOUS CONTINUOUS
Status: DISCONTINUED | OUTPATIENT
Start: 2018-05-21 | End: 2018-05-21 | Stop reason: HOSPADM

## 2018-05-21 RX ORDER — ONDANSETRON 2 MG/ML
INJECTION INTRAMUSCULAR; INTRAVENOUS AS NEEDED
Status: DISCONTINUED | OUTPATIENT
Start: 2018-05-21 | End: 2018-05-21 | Stop reason: SURG

## 2018-05-21 RX ORDER — EPHEDRINE SULFATE 50 MG/ML
INJECTION, SOLUTION INTRAVENOUS AS NEEDED
Status: DISCONTINUED | OUTPATIENT
Start: 2018-05-21 | End: 2018-05-21 | Stop reason: SURG

## 2018-05-21 RX ORDER — BUPIVACAINE HYDROCHLORIDE 5 MG/ML
INJECTION, SOLUTION PERINEURAL AS NEEDED
Status: DISCONTINUED | OUTPATIENT
Start: 2018-05-21 | End: 2018-05-21 | Stop reason: HOSPADM

## 2018-05-21 RX ORDER — IBUPROFEN 600 MG/1
600 TABLET ORAL EVERY 6 HOURS PRN
Status: DISCONTINUED | OUTPATIENT
Start: 2018-05-21 | End: 2018-05-21 | Stop reason: HOSPADM

## 2018-05-21 RX ORDER — SODIUM CHLORIDE 9 MG/ML
INJECTION, SOLUTION INTRAVENOUS CONTINUOUS PRN
Status: DISCONTINUED | OUTPATIENT
Start: 2018-05-21 | End: 2018-05-21 | Stop reason: SURG

## 2018-05-21 RX ORDER — ROCURONIUM BROMIDE 10 MG/ML
INJECTION, SOLUTION INTRAVENOUS AS NEEDED
Status: DISCONTINUED | OUTPATIENT
Start: 2018-05-21 | End: 2018-05-21 | Stop reason: SURG

## 2018-05-21 RX ORDER — MIDAZOLAM HYDROCHLORIDE 1 MG/ML
INJECTION INTRAMUSCULAR; INTRAVENOUS AS NEEDED
Status: DISCONTINUED | OUTPATIENT
Start: 2018-05-21 | End: 2018-05-21 | Stop reason: SURG

## 2018-05-21 RX ADMIN — FENTANYL CITRATE 25 MCG: 50 INJECTION INTRAMUSCULAR; INTRAVENOUS at 15:06

## 2018-05-21 RX ADMIN — GLYCOPYRROLATE 0.4 MG: 0.2 INJECTION, SOLUTION INTRAMUSCULAR; INTRAVENOUS at 14:09

## 2018-05-21 RX ADMIN — ROCURONIUM BROMIDE 40 MG: 10 INJECTION INTRAVENOUS at 11:53

## 2018-05-21 RX ADMIN — EPHEDRINE SULFATE 5 MG: 50 INJECTION, SOLUTION INTRAMUSCULAR; INTRAVENOUS; SUBCUTANEOUS at 13:25

## 2018-05-21 RX ADMIN — SODIUM CHLORIDE, SODIUM LACTATE, POTASSIUM CHLORIDE, AND CALCIUM CHLORIDE 125 ML/HR: .6; .31; .03; .02 INJECTION, SOLUTION INTRAVENOUS at 11:09

## 2018-05-21 RX ADMIN — LIDOCAINE HYDROCHLORIDE 50 MG: 10 INJECTION, SOLUTION INFILTRATION; PERINEURAL at 11:53

## 2018-05-21 RX ADMIN — SODIUM CHLORIDE, SODIUM LACTATE, POTASSIUM CHLORIDE, AND CALCIUM CHLORIDE 125 ML/HR: .6; .31; .03; .02 INJECTION, SOLUTION INTRAVENOUS at 15:18

## 2018-05-21 RX ADMIN — SODIUM CHLORIDE, SODIUM LACTATE, POTASSIUM CHLORIDE, AND CALCIUM CHLORIDE: .6; .31; .03; .02 INJECTION, SOLUTION INTRAVENOUS at 11:14

## 2018-05-21 RX ADMIN — Medication 2000 MG: at 11:45

## 2018-05-21 RX ADMIN — DEXAMETHASONE SODIUM PHOSPHATE 10 MG: 10 INJECTION INTRAMUSCULAR; INTRAVENOUS at 12:02

## 2018-05-21 RX ADMIN — NEOSTIGMINE METHYLSULFATE 2 MG: 1 INJECTION, SOLUTION INTRAMUSCULAR; INTRAVENOUS; SUBCUTANEOUS at 14:09

## 2018-05-21 RX ADMIN — FENTANYL CITRATE 50 MCG: 50 INJECTION, SOLUTION INTRAMUSCULAR; INTRAVENOUS at 12:05

## 2018-05-21 RX ADMIN — FENTANYL CITRATE 50 MCG: 50 INJECTION, SOLUTION INTRAMUSCULAR; INTRAVENOUS at 12:22

## 2018-05-21 RX ADMIN — MIDAZOLAM 2 MG: 1 INJECTION INTRAMUSCULAR; INTRAVENOUS at 11:41

## 2018-05-21 RX ADMIN — ONDANSETRON 4 MG: 2 INJECTION INTRAMUSCULAR; INTRAVENOUS at 11:52

## 2018-05-21 RX ADMIN — SODIUM CHLORIDE, SODIUM LACTATE, POTASSIUM CHLORIDE, AND CALCIUM CHLORIDE: .6; .31; .03; .02 INJECTION, SOLUTION INTRAVENOUS at 13:41

## 2018-05-21 RX ADMIN — FENTANYL CITRATE 25 MCG: 50 INJECTION INTRAMUSCULAR; INTRAVENOUS at 14:50

## 2018-05-21 RX ADMIN — FENTANYL CITRATE 25 MCG: 50 INJECTION INTRAMUSCULAR; INTRAVENOUS at 15:00

## 2018-05-21 RX ADMIN — FENTANYL CITRATE 25 MCG: 50 INJECTION INTRAMUSCULAR; INTRAVENOUS at 14:31

## 2018-05-21 RX ADMIN — FENTANYL CITRATE 25 MCG: 50 INJECTION INTRAMUSCULAR; INTRAVENOUS at 15:14

## 2018-05-21 RX ADMIN — PROPOFOL 170 MG: 10 INJECTION, EMULSION INTRAVENOUS at 11:53

## 2018-05-21 RX ADMIN — FENTANYL CITRATE 25 MCG: 50 INJECTION INTRAMUSCULAR; INTRAVENOUS at 14:40

## 2018-05-21 RX ADMIN — FENTANYL CITRATE 50 MCG: 50 INJECTION, SOLUTION INTRAMUSCULAR; INTRAVENOUS at 13:11

## 2018-05-21 RX ADMIN — SODIUM CHLORIDE: 0.9 INJECTION, SOLUTION INTRAVENOUS at 11:57

## 2018-05-21 RX ADMIN — EPHEDRINE SULFATE 5 MG: 50 INJECTION, SOLUTION INTRAMUSCULAR; INTRAVENOUS; SUBCUTANEOUS at 13:24

## 2018-05-21 RX ADMIN — ALBUMIN HUMAN: 0.05 INJECTION, SOLUTION INTRAVENOUS at 13:52

## 2018-05-21 RX ADMIN — FENTANYL CITRATE 50 MCG: 50 INJECTION, SOLUTION INTRAMUSCULAR; INTRAVENOUS at 11:53

## 2018-05-21 NOTE — OP NOTE
OPERATIVE REPORT  PATIENT NAME: Isaiah Wade    :  1965  MRN: [id number]  Pt Location: [facility] OR ROOM 03    SURGERY DATE: 2018    Surgeon(s) and Role:     * Kaelyn Yoder DO - Primary     * Donita Alejo - Assisting    Preop Diagnosis:  Pelvic pain [R10 2]  Painful menstruation [N94 6]    Post-Op Diagnosis Codes:     * Pelvic pain [R10 2]     * Painful menstruation [N94 6]    Procedure(s) (LRB):  HYSTERECTOMY LAPAROSCOPIC ASSISTED VAGINAL (LAVH) , BILATERAL SALPINGECTOMY (Bilateral)    Specimen(s):  ID Type Source Tests Collected by Time Destination   1 : with cervix, bilateral fallopian tubes Tissue Uterus TISSUE EXAM Kaelyn Yoder DO 2018 1337        Estimated Blood Loss:   700 mL    Drains:  [REMOVED] Urethral Catheter Latex 16 Fr  (Removed)   Number of days: 0       Anesthesia Type:   General    Operative Indications:  Pelvic pain [R10 2]  Painful menstruation [N94 6]      Operative Findings:  Hyperemic pelvis with enlarged uterus, dense adhesions bilateral parametrial region  Normal tubes and ovaries bilateral    Complications:   None    Procedure and Technique:  Patient was identified in the preoperative holding area as well as the operating suite by myself and the anesthesia team   We reviewed the procedure once again  Patient has active left lower extremity DVT and had withheld Xarelto x2 days  Patient desired to proceed with surgery  Her most recent weekend she had a significant increase in her pelvic discomfort  Patient was taken the operating suite after successful induction of general endotracheal anesthesia patient was placed in the dorsal lithotomy position using Yellofin stirrups  Pneumatic compression boots placed on the right lower extremity  Arms were padded and tucked to the sides  She underwent chlorhexidine prep in the vagina and ChloraPrep on the abdomen  A Humphries catheter was aseptically placed  A operative time-out was accomplished    The patient had received Ancef 2 g intravenous  Exam under anesthesia revealed a parous vagina as well as parous cervix with an enlarged uterus that was mobile  No obvious adnexal masses were noted  A single-tooth tenaculum and Chambers dilator were placed into the cervix  Gloves were then changed and attention was drawn to the patient's abdomen  Infraumbilical skin fold was infiltrated using Marcaine and a 10 mm stab incision was then made  A 10 mm laparoscoped was then placed under direct visualization with trocar and sheath  Intraperitoneal placement was confirmed  Bilateral mid abdominal port sites were then localized and infiltrated and 5 mm port sites were placed in the bilateral mid abdomen  Inspection of the abdomen and pelvis was undertaken demonstrating the upper abdomen to be free of disease the liver edge was noted be normal gallbladder was not visualized greater curvature of the stomach was visualized noted be normal   Visualized intestinal and colonic surfaces appeared normal   The appendix was visualized noted be normal there were some periappendiceal adhesions  The uterus was noted be globular and enlarged to approximately 8 week size  There were some thin filmy adhesions adherent to the left adnexal region and the sigmoid colon  The right and left parametrial tissues had dense adhesions as well  Anterior cul-de-sac was free of disease  The pelvis overall appeared generally hyperemic with peritoneal pseudocyst throughout  With this the EnSeal device was then utilized to take a line of dissection through the bilateral mesial salpinx  This line of dissection was then carried through the utero-ovarian ligaments bilaterally the broad ligament was then  and the uterine vascular pedicle was identified bilaterally  This pedicle was then doubly cauterized and transected  The vesicouterine flexion was easily dissected and the endopelvic fascia anteriorly was readily identifiable    There were dense adhesions at the level of the parametrium and the uterus sacral ligaments bilaterally therefore decision was made to proceed to the vaginal portion at this time  A weighted speculum was placed in the vagina  The cervix was grasped using double tooth tenacula  And the cervical vaginal junction was then infiltrated using 0 5% Marcaine with epinephrine  A circumferential incision was then made using lateral cautery and these tissues were then dissected off of the cervix  Anteriorly was entered easily with sharp dissection  There was initial challenge with entering posteriorly given some of the dense tissue  The posterior cul-de-sac was then eventually identified  She had a moderate amount of oozing from this dissection  The uterosacral ligaments were identified bilaterally these were then clamped cut and suture ligated  Serial bites were then taken in the cardinal ligament complex bilaterally and we then proceeded in the parametrial tissues  The uterine fundus was able to be delivered into the vagina identifying to remaining pedicles each of which were then clamped and cut and suture ligated  The specimen was then removed consisting of uterine corpus cervix as well as bilateral fallopian tubes  It was sent for routine pathology  With this the vaginal cuff was approximated in interrupted fashion using 0 Vicryl suture  Humphries catheter was draining clear urine at this time  Gloves were changed and attention was drawn back to the patient's abdomen  The pneumoperitoneum was recreated  And the pelvis was Re visualize  The tissue pedicles were noted be hemostatic  There was some oozing that was noted at the level of the posterior peritoneum and posterior cul-de-sac and this was controlled with pressure  The pelvis was copiously irrigated using warm saline solution and no further active bleeding was seen  With this the procedure was ended    The instruments were removed as were the trocars after the pneumoperitoneum had been completely desufflated  Skin incisions were then closed using 4 0 Monocryl and Histoacryl was then applied  Patient was cleansed all sponge needle and instrument counts found to be correct x2  The Wand was also utilized and there was no evidence of response retained sponges x2  The patient was taken to the recovery room in satisfactory condition       I was present for the entire procedure    Patient Disposition:  PACU     SIGNATURE: Kayla Merino DO  DATE: May 21, 2018  TIME: 2:12 PM

## 2018-05-21 NOTE — DISCHARGE INSTRUCTIONS
DO NOT START Ramu Matthews Wednesday  Deep Venous Thrombosis   WHAT YOU NEED TO KNOW:   Deep venous thrombosis (DVT) is a blood clot that forms in a deep vein of the body  The deep veins in the legs, thighs, and hips are the most common sites for DVT  DVT can also occur in a deep vein within your arms  The clot prevents the normal flow of blood in the vein  The blood backs up and causes pain and swelling  The DVT can break into smaller pieces and travel to your lungs and cause a blockage called a pulmonary embolism  A pulmonary embolism can become life-threatening  DISCHARGE INSTRUCTIONS:   Call 911 for any of the following:   · You feel lightheaded, short of breath, and have chest pain  · You cough up blood  Seek care immediately if:   · Your symptoms get worse  · You develop new DVT symptoms in another leg or arm  Contact your healthcare provider if:   · Your gums or nose bleed  · You see blood in your urine or bowel movements  · Your bowel movements are black or darker than normal     · You have questions or concerns about your conditions or care  Medicines:   · Blood thinners  help prevent the DVT from getting bigger and prevent new clots from forming  Examples of blood thinners include heparin, rivaroxaban, apixiban, and warfarin  The following are general safety guidelines to follow while you are taking a blood thinner:     ¨ Watch for bleeding and bruising  Watch for bleeding from your gums or nose  Watch for blood in your urine and bowel movements  Use a soft washcloth on your skin, and a soft toothbrush to brush your teeth  This can keep your skin and gums from bleeding  If you shave, use an electric shaver  Do not play contact sports  ¨ Tell your dentist and other healthcare providers that you take a blood thinner  Wear a bracelet or necklace that says you take this medicine  ¨ Do not start or stop any medicines unless your healthcare provider tells you to   Many medicines cannot be used with blood thinners  ¨ Tell your healthcare provider right away if you forget to take the blood thinner , or if you take too much  ¨ Warfarin  is a blood thinner that you may need to take  The following are additional things you should be aware of if you take warfarin:    § Foods and medicines can affect the amount of warfarin in your blood  Do not make major changes to your diet  Warfarin works best when you eat about the same amount of vitamin K every day  Vitamin K is found in green leafy vegetables and certain other foods  Ask for more information about what to eat or not to eat  § You will need to see your healthcare provider for follow-up visits  You will need regular blood tests to decide how much warfarin you need  · Take your medicine as directed  Contact your healthcare provider if you think your medicine is not helping or if you have side effects  Tell him or her if you are allergic to any medicine  Keep a list of the medicines, vitamins, and herbs you take  Include the amounts, and when and why you take them  Bring the list or the pill bottles to follow-up visits  Carry your medicine list with you in case of an emergency  Manage your DVT:   · Wear pressure stockings  The stockings are tight and put pressure on your legs  This improves blood flow and helps prevent clots  Wear the stockings during the day  Do not wear them when you sleep  · Elevate your legs  above the level of your heart as often as you can  This will help decrease swelling and pain  Prop your legs on pillows or blankets to keep them elevated comfortably  · Exercise regularly  to help increase your blood flow  Walking is a good low-impact exercise  Talk to your healthcare provider about the best exercise plan for you  · Change body positions often  If you travel by car or work at a desk, move and stretch in your seat several times each hour  In an airplane, get up and walk every hour   If you are bedridden, ask for help to change your position every 1 to 2 hours  · Maintain a healthy weight  Ask your healthcare provider how much you should weigh  Ask him to help you create a weight loss plan if you are overweight  · Do not smoke  Nicotine and other chemicals in cigarettes and cigars can damage blood vessels and make it more difficult to manage your DVT  Ask your healthcare provider for information if you currently smoke and need help to quit  E-cigarettes or smokeless tobacco still contain nicotine  Talk to your healthcare provider before you use these products  Follow up with your healthcare provider as directed:  Write down your questions so you remember to ask them during your visits  © 2017 [address] Information is for End User's use only and may not be sold, redistributed or otherwise used for commercial purposes  All illustrations and images included in CareNotes® are the copyrighted property of A D [employer] M [employer]  or Angelito Kerr  The above information is an  only  It is not intended as medical advice for individual conditions or treatments  Talk to your doctor, nurse or pharmacist before following any medical regimen to see if it is safe and effective for you  You will have pain post-operatively while you recover  Please take the pain meds as needed  Nothing in the vagina for 4-6 weeks until cleared by your physician  This includes no intercourse or sexual activity  Do not carry anything heavier than a gallon of a milk for 1-2 weeks  No driving while requiring pain meds  You may have light spotting, but any heavy bleeding requiring 1 pad/hour is not normal   You have multiple small incisions on your abdomen  Daily soap and water will suffice for cleaning  Please monitor signs of infection like redness, pain, white discharge, bleeding from incision sites    Please call your Ob/Gyn for any worsening pain, fevers, nausea/vomiting, or signs of infection  Thank you  Laparoscopically Assisted Vaginal Hysterectomy   WHAT YOU SHOULD KNOW:   Laparoscopically assisted vaginal hysterectomy (LAVH) is surgery to remove your uterus  Other organs, such as your ovaries and fallopian tubes, may also be removed  AFTER YOU LEAVE:   Medicines:   · NSAIDs  help decrease swelling, pain, and fever  This medicine is available with or without a doctor's order  NSAIDs can cause stomach bleeding or kidney problems in certain people  If you take blood thinner medicine, always ask your primary healthcare provider (PHP) if NSAIDs are safe for you  Always read the medicine label and follow directions  · Acetaminophen  decreases pain and fever  It is available without a doctor's order  Ask how much to take and how often to take it  Follow directions  Acetaminophen can cause liver damage if not taken correctly  · Prescription pain medicine  may be given  Ask your PHP how to take this medicine safely  · Take your medicine as directed  Contact your PHP if you think your medicine is not helping or if you have side effects  Tell him if you are allergic to any medicine  Keep a list of the medicines, vitamins, and herbs you take  Include the amounts, and when and why you take them  Bring the list or the pill bottles to follow-up visits  Carry your medicine list with you in case of an emergency  Follow up with your PHP or gynecologist as directed: You may need to return for more tests  Write down your questions so you remember to ask them during your visits  Rest as needed: You may feel like resting more after surgery  Slowly start to do more each day  Ask when you can return to your usual activities  Contact your PHP or gynecologist if:   · You have heavy vaginal bleeding that fills 1 or more sanitary pads in 1 hour  · You have a fever  · You have new or increasing bright red blood coming from your vagina or your incisions      · You have trouble urinating, burning when you urinate, or feel a need to urinate often  · You have trouble having a bowel movement  · You have yellow, green, or foul-smelling discharge coming from your vagina  · Your incision is red, swollen, or has pus or foul-smelling drainage coming from it  · You have pain that gets worse instead of better, or that is not controlled with your pain medicine  · Your skin is itchy, swollen, or has a rash  · You have questions or concerns about your condition or care  Seek care immediately or call 911 if:   · Your arm or leg feels warm, tender, and painful  It may look swollen and red  · You feel lightheaded, short of breath, and have chest pain  · You cough up blood  © 2014 [address] is for End User's use only and may not be sold, redistributed or otherwise used for commercial purposes  All illustrations and images included in CareNotes® are the copyrighted property of A D A M , Inc  or Angelito Kerr  The above information is an  only  It is not intended as medical advice for individual conditions or treatments  Talk to your doctor, nurse or pharmacist before following any medical regimen to see if it is safe and effective for you  Laparoscopic Hysterectomy   WHAT YOU NEED TO KNOW:   A hysterectomy is surgery to remove your uterus  Your ovaries, fallopian tubes, cervix, or part of your vagina may also need to be removed  The organs and tissue that will be removed depends on your medical condition  DISCHARGE INSTRUCTIONS:   Call 911 for any of the following:   · You feel lightheaded, short of breath, and have chest pain  · You cough up blood  Seek care immediately:   · Your arm or leg feels warm, tender, and painful  It may look swollen and red  · You have increasing abdominal or pelvic pain  · You have heavy vaginal bleeding that fills 1 or more sanitary pads in 1 hour    Contact your healthcare provider or gynecologist if:   · You have a fever  · You have nausea or are vomiting  · You feel pain or burning when you urinate, or you have trouble urinating  · You have pus or a foul-smelling odor coming from your vagina  · Your wound is red, swollen, or draining pus  · You feel pressure in your rectum  · You have questions or concerns about your condition or care  Medicines: You may  need any of the following:  · Prescription pain medicine  may be given  Ask your healthcare provider how to take this medicine safely  · NSAIDs , such as ibuprofen, help decrease swelling, pain, and fever  NSAIDs can cause stomach bleeding or kidney problems in certain people  If you take blood thinner medicine, always ask your healthcare provider if NSAIDs are safe for you  Always read the medicine label and follow directions  · Stool softeners  help treat or prevent constipation  · Take your medicine as directed  Contact your healthcare provider if you think your medicine is not helping or if you have side effects  Tell him or her if you are allergic to any medicine  Keep a list of the medicines, vitamins, and herbs you take  Include the amounts, and when and why you take them  Bring the list or the pill bottles to follow-up visits  Carry your medicine list with you in case of an emergency  Activity:   · Wear an abdominal binder as directed  An abdominal binder will decrease pain when you move or cough  · Rest as needed  Get up and move around as directed to help prevent blood clots  Start with short walks and slowly increase the distance every day  Limit the number of times you climb stairs to 2 times each day  Plan most of your daily activities on one level of your home  · Do not lift objects heavier than 10 pounds for 6 weeks  Avoid strenuous activity for 2 weeks  · Do not strain during bowel movements   High-fiber foods and extra liquids can help you prevent constipation  Examples of high-fiber foods are fruit and bran  Prune juice and water are good liquids to drink  · Do not have sex, use tampons, or douche for up to 8 weeks  Ask your healthcare provider if it is okay to take a tub bath  · Do not go into pools or hot tubs for 6 weeks or as directed  · Ask when it is safe for you to drive, return to work, and return to other regular activities  Wound care:  Care for your abdominal incisions as directed  Carefully wash around the wound with soap and water  It is okay to let the soap and water run over your incision  Do not  scrub your incision  Dry the area and put on new, clean bandages as directed  Change your bandages when they get wet or dirty  If you have strips of medical tape, let them fall off on their own  It may take 7 to 14 days for them to fall off  Check your incision every day for redness, swelling, or pus  Deep breathing:  Take deep breaths and cough 10 times each hour  This will decrease your risk for a lung infection  Take a deep breath and hold it for as long as you can  Let the air out and then cough strongly  Deep breaths help open your airway  You may be given an incentive spirometer to help you take deep breaths  Put the plastic piece in your mouth and take a slow, deep breath, then let the air out and cough  Repeat these steps 10 times every hour  Get support: This surgery may be life-changing for you and your family  You will no longer be able to get pregnant  Sudden changes in the levels of your hormones may occur and cause mood swings and depression  You may feel angry, sad, or frightened, or cry frequently and unexpectedly  These feelings are normal  Talk to your healthcare provider about where you can get support  You can also ask if hormone replacement medicine is right for you  Follow up with your healthcare provider or gynecologist as directed: You may need to return to have stitches removed, and for other tests   Write down your questions so you remember to ask them during your visits  © 2017 [address] Information is for End User's use only and may not be sold, redistributed or otherwise used for commercial purposes  All illustrations and images included in CareNotes® are the copyrighted property of [employer] , [employer]  or Angelito Kerr  The above information is an  only  It is not intended as medical advice for individual conditions or treatments  Talk to your doctor, nurse or pharmacist before following any medical regimen to see if it is safe and effective for you

## 2018-05-22 ENCOUNTER — TELEPHONE (OUTPATIENT)
Dept: OBGYN CLINIC | Facility: CLINIC | Age: 53
End: 2018-05-22

## 2018-05-22 NOTE — TELEPHONE ENCOUNTER
Spoke to the patient this evening  She was discharged by 5:00 p m  last night  She required some narcotic pain medication through the night  She describes only pelvic pressure today she has no other discomfort  She is eating regular food and has been ambulatory  She has been walking outside shopping with her  today  She notes scant vaginal spotting  She noted some mild burning with urination but this has resolved  Patient is anxious to increase her activity and is curious about when she can resume driving  We discussed her intraoperative findings and events  The patient was in contact with her hematologist and will restart the Xarelto tomorrow  We discussed that she may resume driving when she is using nothing for pain  The patient will schedule postoperative visit in 10 days from today

## 2018-05-23 ENCOUNTER — TELEPHONE (OUTPATIENT)
Dept: OBGYN CLINIC | Facility: CLINIC | Age: 53
End: 2018-05-23

## 2018-05-23 NOTE — TELEPHONE ENCOUNTER
YONG CALLED    SHE SAID SHE HAS AN APPOINTMENT THIS Friday IN [facility] 5/25  Gerard Medrano SAID [address] 88 HER LATE Tuesday EVENING AND TOLD HER TO COME IN THE FOLLOWING Friday IN [place] 6/1    Gerard Medrano COULD YOU PLEASE CALL HER AND RESCHEDULE HER APPOINTMENT     THANKS

## 2018-05-24 NOTE — TELEPHONE ENCOUNTER
Called pt today to see how she was feeling since her surgery on Monday 5/21/18  Pt is sore, she thinks she might have done to much yesterday  She is taking pain meds as needed through out the day but only using tylenol at night  She is drinking plenty of fluids, urinating ok  No BM yet but pt isn't eating much   Post op appt given to the pt for 6/1/18 at 12:00 in [place]  Pt to call with any questions or problems

## 2018-06-01 ENCOUNTER — OFFICE VISIT (OUTPATIENT)
Dept: OBGYN CLINIC | Facility: MEDICAL CENTER | Age: 53
End: 2018-06-01

## 2018-06-01 VITALS — BODY MASS INDEX: 26.5 KG/M2 | WEIGHT: 164.2 LBS | SYSTOLIC BLOOD PRESSURE: 106 MMHG | DIASTOLIC BLOOD PRESSURE: 62 MMHG

## 2018-06-01 DIAGNOSIS — N94.6 DYSMENORRHEA: Primary | ICD-10-CM

## 2018-06-01 PROCEDURE — 99024 POSTOP FOLLOW-UP VISIT: CPT | Performed by: OBSTETRICS & GYNECOLOGY

## 2018-06-01 NOTE — PROGRESS NOTES
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  We discussed the intraoperative findings as well as her pathology report and potential etiologies including the endometrium and colon as sites of origin  While we await the final pathology report I have taken the liberty of referring the patient to gyn Oncology for further consultation  Patient was provided a copy of her pathology report for review and she was cautioned that this is preliminary and final treatment plan will be dependent upon the completed pathology report  Patient had her questions answered and voiced understanding  She was doing well emotionally at the end of the visit  I will see her in follow-up on Friday July 6  for her final postoperative visit  We have decided that we will extend her postoperative recovery to this time line as we are uncertain as to what additional evaluations and consultations will be necessary  Subjective:     Patient ID: Silverio Loera is a 50 y o  female  Patient returns 11 days status post LAVH and bilateral salpingectomy  She had uneventful postoperative course  She relates that she has been using nothing for pain over this last week  She relates scant vaginal spotting  She denies any fever or chills  She did have some mild dysuria which is resolving  She continues on her anticoagulation  Review of Systems   All other systems reviewed and are negative  Objective:     Physical Exam   Constitutional: She appears well-developed and well-nourished  Pulmonary/Chest: Effort normal    Abdominal: Soft     Incisions clean and dry; no erythema or discharge noted  No tenderness or rebound   Skin: Skin is warm and dry  Psychiatric: She has a normal mood and affect   Her behavior is normal  Judgment and thought content normal

## 2018-06-03 ENCOUNTER — TELEPHONE (OUTPATIENT)
Dept: OBGYN CLINIC | Facility: CLINIC | Age: 53
End: 2018-06-03

## 2018-06-03 NOTE — TELEPHONE ENCOUNTER
Called patient to see how she was doing since her visit on Friday  She is doing well physically but has her ups and downs emotionally  We discussed her pathology report in further detail  And it does appear that   [facility] has rendered a final report  We discussed the concern that it is unlikely that the adenocarcinoma is arising from female reproductive organs and that there may be a gastrointestinal site of origin  I have advised the patient to keep her visit with Dr Patti Trejo of gyn Oncology, but did caution her that imaging studies and referral to other surgical oncologist may be the next prudent step  Patient's questions were answered  I will continue to follow her clinical progress

## 2018-06-04 ENCOUNTER — TELEPHONE (OUTPATIENT)
Dept: GYNECOLOGIC ONCOLOGY | Facility: CLINIC | Age: 53
End: 2018-06-04

## 2018-06-04 ENCOUNTER — TELEPHONE (OUTPATIENT)
Dept: OBGYN CLINIC | Facility: CLINIC | Age: 53
End: 2018-06-04

## 2018-06-04 ENCOUNTER — TRANSCRIBE ORDERS (OUTPATIENT)
Dept: ADMINISTRATIVE | Facility: HOSPITAL | Age: 53
End: 2018-06-04

## 2018-06-04 ENCOUNTER — LAB (OUTPATIENT)
Dept: LAB | Facility: MEDICAL CENTER | Age: 53
End: 2018-06-04
Payer: COMMERCIAL

## 2018-06-04 DIAGNOSIS — C80.1 ADENOCARCINOMA OF UNKNOWN PRIMARY (HCC): ICD-10-CM

## 2018-06-04 DIAGNOSIS — I82.4Y1 DEEP VEIN THROMBOSIS (DVT) OF PROXIMAL VEIN OF RIGHT LOWER EXTREMITY, UNSPECIFIED CHRONICITY (HCC): Primary | ICD-10-CM

## 2018-06-04 DIAGNOSIS — C80.1 ADENOCARCINOMA OF UNKNOWN PRIMARY (HCC): Primary | ICD-10-CM

## 2018-06-04 LAB
CANCER AG125 SERPL-ACNC: 34.1 U/ML (ref 0–30)
CEA SERPL-MCNC: 2.5 NG/ML (ref 0–3)

## 2018-06-04 PROCEDURE — 36415 COLL VENOUS BLD VENIPUNCTURE: CPT

## 2018-06-04 PROCEDURE — 86301 IMMUNOASSAY TUMOR CA 19-9: CPT

## 2018-06-04 PROCEDURE — 86304 IMMUNOASSAY TUMOR CA 125: CPT

## 2018-06-04 PROCEDURE — 82378 CARCINOEMBRYONIC ANTIGEN: CPT

## 2018-06-04 RX ORDER — RIVAROXABAN 20 MG/1
20 TABLET, FILM COATED ORAL DAILY
Qty: 30 TABLET | Refills: 2 | Status: SHIPPED | OUTPATIENT
Start: 2018-06-04 | End: 2018-08-16 | Stop reason: SDUPTHER

## 2018-06-04 NOTE — TELEPHONE ENCOUNTER
Pt wants to let you know the oncology office could not get her in w/Dr Genny Rivera  She is scheduled 6/7 with  Dr Enoch Styles  Can you do anything or is this ok for her to see Dr Enoch Styles?

## 2018-06-04 NOTE — TELEPHONE ENCOUNTER
Calling saying she needs refill of Xarelto  She is not sure if she is to stay on 20 mg dose or if she is to drop to 10 mg dose  She did have her hysterectomy last week

## 2018-06-04 NOTE — TELEPHONE ENCOUNTER
fyi-pt states Dr Ashley Pritchard called her and ordered her some tests and cancelled her pending appt there for now

## 2018-06-04 NOTE — TELEPHONE ENCOUNTER
Called patient and her aware that Dr Terrance Campos wants the patient to continue taking 20 mg Xarelto daily  Script to be sent to [facility] in [place]  Patient stated that she restarted her Xarelto 2 days after her hysterectomy

## 2018-06-04 NOTE — TELEPHONE ENCOUNTER
I discussed that IHC supports a GI primary and may not be related to Gyn  I have forwarded her information to our GI nurse navigator, ordered a CT of the chest, abdomen, pelvis, ordered tumor markers including CEA, CA-125, , and referred her for colonoscopy +/- EGD  Her appointment at our office will be deferred until this initial workup is completed  The patient's questions were answered and she is in agreement with this plan

## 2018-06-05 LAB — CANCER AG19-9 SERPL-ACNC: 17 U/ML (ref 0–35)

## 2018-06-06 ENCOUNTER — HOSPITAL ENCOUNTER (OUTPATIENT)
Dept: RADIOLOGY | Facility: MEDICAL CENTER | Age: 53
Discharge: HOME | End: 2018-06-06
Payer: COMMERCIAL

## 2018-06-06 DIAGNOSIS — C80.1 ADENOCARCINOMA OF UNKNOWN PRIMARY (HCC): ICD-10-CM

## 2018-06-06 PROCEDURE — 71260 CT THORAX DX C+: CPT

## 2018-06-06 PROCEDURE — 74177 CT ABD & PELVIS W/CONTRAST: CPT

## 2018-06-06 RX ADMIN — IOHEXOL 100 ML: 350 INJECTION, SOLUTION INTRAVENOUS at 16:43

## 2018-06-12 ENCOUNTER — ANESTHESIA EVENT (OUTPATIENT)
Dept: PERIOP | Facility: AMBULARY SURGERY CENTER | Age: 53
End: 2018-06-12
Payer: COMMERCIAL

## 2018-06-12 ENCOUNTER — PREP FOR PROCEDURE (OUTPATIENT)
Dept: GASTROENTEROLOGY | Facility: AMBULARY SURGERY CENTER | Age: 53
End: 2018-06-12

## 2018-06-12 DIAGNOSIS — R93.5 ABNORMAL FINDINGS ON DIAGNOSTIC IMAGING OF OTHER ABDOMINAL REGIONS, INCLUDING RETROPERITONEUM: Primary | ICD-10-CM

## 2018-06-13 ENCOUNTER — ANESTHESIA EVENT (OUTPATIENT)
Dept: PERIOP | Facility: HOSPITAL | Age: 53
DRG: 330 | End: 2018-06-13
Payer: COMMERCIAL

## 2018-06-13 ENCOUNTER — ANESTHESIA (OUTPATIENT)
Dept: PERIOP | Facility: AMBULARY SURGERY CENTER | Age: 53
End: 2018-06-13
Payer: COMMERCIAL

## 2018-06-13 ENCOUNTER — HOSPITAL ENCOUNTER (OUTPATIENT)
Facility: AMBULARY SURGERY CENTER | Age: 53
Setting detail: OUTPATIENT SURGERY
Discharge: HOME | End: 2018-06-13
Attending: COLON & RECTAL SURGERY | Admitting: COLON & RECTAL SURGERY
Payer: COMMERCIAL

## 2018-06-13 VITALS
RESPIRATION RATE: 16 BRPM | TEMPERATURE: 96 F | DIASTOLIC BLOOD PRESSURE: 58 MMHG | BODY MASS INDEX: 26.16 KG/M2 | HEART RATE: 83 BPM | HEIGHT: 65 IN | OXYGEN SATURATION: 99 % | SYSTOLIC BLOOD PRESSURE: 97 MMHG | WEIGHT: 157 LBS

## 2018-06-13 DIAGNOSIS — L29.0 PRURITUS ANI: ICD-10-CM

## 2018-06-13 DIAGNOSIS — R93.5 ABNORMAL FINDINGS ON DIAGNOSTIC IMAGING OF OTHER ABDOMINAL REGIONS, INCLUDING RETROPERITONEUM: ICD-10-CM

## 2018-06-13 DIAGNOSIS — C80.1 PRIMARY MALIGNANT NEOPLASM (HCC): ICD-10-CM

## 2018-06-13 PROCEDURE — 88305 TISSUE EXAM BY PATHOLOGIST: CPT | Performed by: PATHOLOGY

## 2018-06-13 PROCEDURE — 43235 EGD DIAGNOSTIC BRUSH WASH: CPT | Performed by: INTERNAL MEDICINE

## 2018-06-13 RX ORDER — SODIUM CHLORIDE 9 MG/ML
125 INJECTION, SOLUTION INTRAVENOUS CONTINUOUS
Status: DISCONTINUED | OUTPATIENT
Start: 2018-06-13 | End: 2018-06-13 | Stop reason: HOSPADM

## 2018-06-13 RX ORDER — PROPOFOL 10 MG/ML
INJECTION, EMULSION INTRAVENOUS CONTINUOUS PRN
Status: DISCONTINUED | OUTPATIENT
Start: 2018-06-13 | End: 2018-06-13 | Stop reason: SURG

## 2018-06-13 RX ORDER — PROPOFOL 10 MG/ML
INJECTION, EMULSION INTRAVENOUS AS NEEDED
Status: DISCONTINUED | OUTPATIENT
Start: 2018-06-13 | End: 2018-06-13 | Stop reason: SURG

## 2018-06-13 RX ADMIN — PROPOFOL 100 MCG/KG/MIN: 10 INJECTION, EMULSION INTRAVENOUS at 09:00

## 2018-06-13 RX ADMIN — PROPOFOL 50 MG: 10 INJECTION, EMULSION INTRAVENOUS at 08:54

## 2018-06-13 RX ADMIN — SODIUM CHLORIDE 125 ML/HR: 0.9 INJECTION, SOLUTION INTRAVENOUS at 07:58

## 2018-06-13 RX ADMIN — PROPOFOL 50 MG: 10 INJECTION, EMULSION INTRAVENOUS at 08:56

## 2018-06-13 RX ADMIN — PROPOFOL 50 MG: 10 INJECTION, EMULSION INTRAVENOUS at 09:05

## 2018-06-13 RX ADMIN — PROPOFOL 50 MG: 10 INJECTION, EMULSION INTRAVENOUS at 09:11

## 2018-06-13 RX ADMIN — PROPOFOL 50 MG: 10 INJECTION, EMULSION INTRAVENOUS at 08:58

## 2018-06-13 RX ADMIN — PROPOFOL 50 MG: 10 INJECTION, EMULSION INTRAVENOUS at 09:00

## 2018-06-13 NOTE — OP NOTE
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  CONSENT:  Informed consent was obtained for the procedure, including sedation after explaining the risks and benefits of the procedure  Risks including but not limited to bleeding, perforation, infection, and missed lesion  PREPARATION:   Telemetry, pulse oximetry, blood pressure were monitored throughout the procedure  Patient was identified by myself both verbally and by visual inspection of ID band  DESCRIPTION:   Patient was placed in the left lateral decubitus position and was sedated with the above medication  The gastroscope was introduced in to the oropharynx and the esophagus was intubated under direct visualization  Scope was passed down the esophagus up to 2nd part of the duodenum  A careful inspection was made as the gastroscope was withdrawn, including a retroflexed view of the stomach; findings and interventions are described below  FINDINGS:    #1  Esophagus-normal appearing esophageal mucosa noted, regular Z-line was noted at 38 cm  There was a diminutive hiatal hernia below  #2  Stomach-gastric mucosa appeared unremarkable within the fundus, body, antrum and pylorus  Retroflexed view revealed diminutive sliding hiatal hernia  #3  Duodenum-small bowel was examined upto the proximal jejunum and no luminal lesions were noted, small bowel mucosa appeared unremarkable within the duodenum and proximal portion of the jejunum  IMPRESSIONS:      1  See above  RECOMMENDATIONS:     1  Will proceed with colonoscopy  COMPLICATIONS:  None; patient tolerated the procedure well            DISPOSITION: PACU           CONDITION: Stable

## 2018-06-13 NOTE — ANESTHESIA PREPROCEDURE EVALUATION
Review of Systems/Medical History  Patient summary reviewed  Chart reviewed  No history of anesthetic complications     Cardiovascular  DVT (on xarelto stopped 6/10; last DVT 1/2018)   Pulmonary  Negative pulmonary ROS        GI/Hepatic  Negative GI/hepatic ROS          Negative  ROS        Endo/Other  Negative endo/other ROS      GYN    Hysterectomy,        Hematology  Negative hematology ROS      Musculoskeletal  Negative musculoskeletal ROS        Neurology    Headaches,    Psychology   Negative psychology ROS              Physical Exam    Airway    Mallampati score: II  TM Distance: >3 FB  Neck ROM: full     Dental   No notable dental hx     Cardiovascular      Pulmonary      Other Findings        Anesthesia Plan  ASA Score- 2     Anesthesia Type- IV sedation with anesthesia with ASA Monitors  Additional Monitors:   Airway Plan:         Plan Factors-    Induction- intravenous  Postoperative Plan-     Informed Consent- Anesthetic plan and risks discussed with patient  I personally reviewed this patient with the CRNA  Discussed and agreed on the Anesthesia Plan with the CRNA  Marissa Salgado

## 2018-06-13 NOTE — ANESTHESIA PREPROCEDURE EVALUATION
Review of Systems/Medical History  Patient summary reviewed  Chart reviewed  No history of anesthetic complications     Cardiovascular  DVT (Bilat LE)  Comment: EKG -SR 80 (5/2018),  Pulmonary  Negative pulmonary ROS        GI/Hepatic      Comment: CT scan/Colonoscopy--transverse colon tumor     Negative  ROS        Endo/Other  Negative endo/other ROS      GYN      Comment: 5/20/2018--s/p Lap vag hysterectomy and BSO--initial path report positive for adenocarcinoma,primary unknown--CT scan--transverse colon tumor     Hematology  Negative hematology ROS      Musculoskeletal  Negative musculoskeletal ROS        Neurology    Headaches (MIgraine),    Psychology   Negative psychology ROS              Physical Exam    Airway    Mallampati score: II  TM Distance: >3 FB       Dental   No notable dental hx     Cardiovascular  Rhythm: regular,     Pulmonary  Breath sounds clear to auscultation,     Other Findings        Anesthesia Plan  ASA Score- 2     Anesthesia Type- general with ASA Monitors  Additional Monitors:   Airway Plan: ETT  Plan Factors-    Induction- intravenous  Postoperative Plan- Plan for postoperative opioid use  Planned trial extubation    Informed Consent- Anesthetic plan and risks discussed with patient  I personally reviewed this patient with the CRNA  Discussed and agreed on the Anesthesia Plan with the CRNA  Toño Deleon

## 2018-06-13 NOTE — OP NOTE
OPERATIVE REPORT  PATIENT NAME: Criss Cespedes    :  1965  MRN: [id number]  Pt Location: [facility] ROOM 01    SURGERY DATE: 2018    Operative Findings:  Lumen occupying transverse colon bulky tumor, multiple cold biopsies, not passable by colonoscope given bulk, pinpoint lumen though clinically no obstructive symptoms  Recommendations: Will schedule for laparoscopic possible open extended right hemicolectomy(possible left pending location at surgery) within the coming week  Surgeon(s) and Role:  Panel 1:     * Aliya Tran MD - Primary    Panel 2:     * Cyndy Vee MD - Primary    Preop Diagnosis:  Abnormal findings on diagnostic imaging of other abdominal regions, including retroperitoneum [R93 5, no prior screening  Pruritus ani [L29 0]  Primary malignant neoplasm ([address] ) [C80 1]    Post-Op Diagnosis Codes:     * Abnormal findings on diagnostic imaging of other abdominal regions, including retroperitoneum [R93 5]     * Primary malignant neoplasm (HCC) [C80 1]    Procedure(s) (LRB):  COLONOSCOPY (N/A)  ESOPHAGOGASTRODUODENOSCOPY (EGD) (N/A)    Specimen(s):  ID Type Source Tests Collected by Time Destination   1 : transverse colon mass Tissue Colon TISSUE EXAM Aliya Tran MD 2018 [id number]        Estimated Blood Loss:   Minimal    Anesthesia Type:   IV Sedation with Anesthesia    Operative Indications:  Abnormal findings on diagnostic imaging of other abdominal regions, including retroperitoneum [R93 5]  Pruritus ani [L29 0]  Primary malignant neoplasm ([address] ) [R34 8]    Complications:   None    Procedure and Technique:  After appropriate identification, patient was placed in left lateral decubitus position, timeout was taken per protocol and after mac sedation was induced digital rectal examination revealed normal rectal examination  Pediatric colonoscope was introduced and advanced without difficulty to transverse colon tumor  Scope was then removed with careful mucosal evaluation and visualization  Prep was adequate with moderate amounts of suctionable fluid   I was present for the entire procedure     I was present for the entire procedure    Patient Disposition:  PACU     SIGNATURE: Milan Hoskins MD  DATE: June 13, 2018  TIME: 9:24 AM

## 2018-06-13 NOTE — ANESTHESIA POSTPROCEDURE EVALUATION
Post-Op Assessment Note      CV Status:  Stable    Mental Status:  Awake    Hydration Status:  Euvolemic    PONV Controlled:  Controlled    Airway Patency:  Patent    Post Op Vitals Reviewed: Yes          Staff: CRNA           BP   102/64   Temp     Pulse     Resp   21   SpO2   96 RA

## 2018-06-13 NOTE — H&P
Discussed transverse colon tumor, near obstructing and need for resection, already on Xarelto hold and bowel prepped, will plan for laparoscopic possible open right hemicolectomy, discussed possible left if more distal transverse, possible liver biopsy  Discussed benefits, alternatives, risks including not limited to bleeding, infection, risks of anesthesia, open surgery, DVT/PE, heart attack, stroke, death, damage to local structures including ureter and duodenum, and anastomotic leak requiring reoperation, temporary versus permanent stoma  She understood these risks, wished to proceed  [address] [place] [address]  Procedure and/or Co-Surgery: laparoscopic possible open right hemicolectomy, possible liver biopsy  Diagnosis: near obstructing colon mass  Position: Modified Lithotomy  Surgery Date: 06/14/2018  Admission Type: Inpatient  Admit On: Day of Surgery  Anesthesia: General  Allotted Surgery Time: 2 hours  Antibiotics Flagyl  mg, Cefazolin IV 1 gr (less then 176 lbs), Bilateral Calf/Foot Compression Pump, Heparin 5000 units SC, Flagyl 500 mg PO day prior to surgery, Neomycin 1 gr PO  Other: staying on clears after day prior prep  add PO abx         6/8/18 H&P Reviewed updated, no changes to plan as below  Vitals:    06/13/18 0750   BP: 120/77   Pulse: 99   Resp: 18   Temp: (!) 97 °F (36 1 °C)   SpO2: 99%             Allergy Medication Problem Directive Form     Allergies   Allergies Reviewed  Medications   Medications Reviewed   PRENATAL VITAMIN TABLET (PRENATAL VIT-FE FUMARATE-FA TABS)   XARELTO 20 MG ORAL TABLET (RIVAROXABAN)       Problems   Problems Reviewed   Abnormal abdominal imaging (ICD-793 6) (DFG32-S09 5)  Anal itching (ICD-698 0) (IYZ82-N21 0)  Hx of DVT (ICD-V12 51) (ISO48-H86 718)  Adenocarcinoma (ICD-199 1) (TES00-D62 1)      Directives   Directives Reviewed   Vital Signs   Ht: 65 5 in  Wt: 161 lbs    BP: 152/ 80    Calculations   Body Mass Index: 26 48  Medications:   06/08/2018 PRENATAL VITAMIN TABLET (PRENATAL VIT-FE FUMARATE-FA TABS)   06/08/2018 XARELTO 20 MG ORAL TABLET (RIVAROXABAN)     Allergies:   NKA  Problems:   Abnormal abdominal imaging (ICD-793 6) (ZKN88-G12 5)  Anal itching (ICD-698 0) (XWS81-J49 0)  Hx of DVT (ICD-V12 51) (LFD23-P72 568)  Adenocarcinoma (ICD-199 1) (BVM07-C74 1)    Signed by: Marie Jordan [facility]  June 8, 2018 1:46 PM      History from: patient  Chief Complaint:   Phoebe Neves is here today for consultation and evaluation of a colon mass  History of Present Illness   Navarro Hare is a pleasant 56yo female, history of DVT the past 2 years treated with xarelto  She is status post laparoscopic assisted vaginal hysterectomy, bilateral salpingectomy by Dr Tanja Gallardo on 5/21/2018  Pathology showed invasive adenocarcinoma, of enteric immunophenotype, present in both fallopian tubes referred on pathology/CT results by Dr Destinee Hess  She had a CT scan on 6/6/2018 which showed a suspicious lesion in the right abdomen, most likely colon malignancy, proximal transverse colon, with less likely possibilities including colon malignancy with fistula to the small bowel or even less likely, small bowel malignancy with fistula to the colon, multiple hepatic lesions compatible with metastasis, mild left iliac adenopathy and borderline interaortocaval adenopathy, no evidence of metastatic disease within the chest      Her CEA level on 6/4/2018 was 2 5, CA-125 34 1  She has not had a screening colonoscopy and does not have any GI symptoms  Denies nausea/vomiting/abdominal pain or rectal bleeding, stools have been more frequent since surgery however previously typically with every other day bowel movement  Review of Systems   General         Complains of: Sweats  Denies: Fever, Chills, Fatigue, Unexpected Weight Loss, Weakness     Eyes         Denies: Blurring, Double Vision, Irritation, Discharge, Vision Loss, Eye Pain, Intolerance to Light, Blindness  Ears/Nose/Throat         Denies: Earache, Ear Discharge, Ringing in Ears, Decreased Hearing, Nasal Congestion, Nosebleeds, Sore Throat, Hoarseness, Difficulty Swallowing, Nasal Drainage  Cardiovascular         Denies: Chest Pains, Palpitations, Fainting, Swollen Ankles  Respiratory         Denies: Cough, Difficulty Breathing, Excessive Sputum, Wheezing, Shortness of Breath, Awakening Short of Breath, Cannot Breathe When Lying Flat, Cough Up Blood, Painful Breathing  Gastrointestinal         Complains of: Anal Itching  Denies: Nausea, Vomiting, Diarrhea, Constipation, Change in Bowel Habits, Abdominal Pain, Stool w/Bright Red Blood, Tarry Bowel Movements, Anal Burning, Anal Pain, Vomit Blood, Frequent Heartburn, Heartburn Awakens You, Rectal Bleeding, Loss of Bowel Control/Soiling, Unpredictable Bowel Habits  Genitourinary         Denies: Discharge, Urinary Hesitancy, Incontinence, Genital Sores, Urgent Urination, Frequent Urination, Urinate During the Night, Blood in Urine, Painful Uriniation, Leakage of Urine, Pelvic Pain, Planning Pregnancy, Nipple Discharge, Lump in Breast, Vaginal Discharge, Non-Menstrual Bleeding, Painful [place], Decreased Libido  Musculoskeletal         Denies: Back Pain, Joint Pain, Joint Swelling, Muscle Cramps, Muscle Weakness, Stiffness, Arthritis  Skin         Denies: Rash, Itching, Dryness, Suspicious Lesions  Neurologic         Denies: Weakness, Seizures, Tremors, Dizziness, Fainting   Psychiatric         Denies: Depression, Anxiety, Memory Loss, Mental Disturbance, Suicidal Ideation, Hallucinations, Sudden Agitation, Lethargy  Endocrine         Denies: Cold Intolerance, Heat Intolerance, Eating Abnormal Amounts of Food, Excessive Urination, Weight Change, Flushing, Swollen Glands, Excessive Thirst    Heme/Lymphatic         Denies: Abnormal Bruising, Bleeding     Allergic/Immunologic         Denies: Sudden Eruption of Blisters, Persistent Infections, HIV Exposure, Severe Itching / Hives  Past History  Past Medical History (reviewed - no changes required): Patient indicates medical history of 3 pregnancy(s), 2 vaginal delivery(s)  Other medical history includes: DVT  Patient denies any medical history of colon cancer, lung cancer, pancreatic cancer, breast cancer, arthritis, chronic back pain, development/growth disorders, ovarian cancer, brain cancer, cancerous growth/tumor/cyst, chemotherapy, endocrine disorders, diabetes, autoimmune disorders, hypertension, hyperlipidemia, heart attack (MI), heart disease, stroke, respiratory disease, chronic lung disease, tuberculosis,  conditions, Kidney stones,  Female,  disease, asthma, bleeding disease, thyroid disease, anemia, osteoporosis, ENT disease, eye problems, hearing impaired, GI conditions, GI disease, neurological disorders, epilepsy, chronic headaches, psychiatric illness, depression, suicide attempts, blood transfusion  Surgical History (reviewed - no changes required): Patient reports surgical history to include: hysterectomy/b-l salpingectomy  Patient denies any surgical history of  Gu Surgery, GI surgery, GYN surgery, ENT surgery, cardiovascular surgery,  hematologic-oncologic surgery, hernia surgery, lumbar laminectomy, neurologic surgery, orthopedic surgery,  peripheral vascular surgery, arthroscopic shoulder surgery, back surgery, cateract surgery, cervical laminectomy, eye surgery, foot surgery, knee surgery, lung surgery, septoplasty, thyroid surgery, uterine surgery, sinus surgery, renal surgery, ulcer surgery, appendectomy, toncillectomy, valvular surgery, hemorrhoidectomy, colectomy, cholecystectomy, lumpectomy, mastectomy, breast reduction, ovary removal, tubal ligation, heart valve replacement, lobectomy, hip surgery, anal surgery, rectal surgery, pacemaker surgery, colon surgery    Family History: (reviewed - no changes required)Father: Other Medical Problems-muscular dystrophy (6/8/2018)Social History (reviewed - no changes required): Gualberto Quintero has never smoked  She uses about 2 caffeine drinks per day  She drinks alcohol  Gualberto Quintero does not exercise on a regular basis  She has never experienced a reaction to anesthesia  She had a flu shot this season  She did not have a mammogram   She had a pap smear        Physical Exam   General appearance: well nourished, well hydrated, no acute distress    Eyes   External: conjunctivae and lids normal  Pupils: equal, round, reactive to light and accommodation    Ears, Nose and Throat   Dental: good dentition  Pharynx: tongue normal, posterior pharynx without erythema or exudate    Neck   Neck: supple, no masses, trachea midline  Thyroid: no nodules, masses, tenderness, or enlargement    Respiratory   Respiratory effort: lungs clear bilateral  Auscultation: no rales, rhonchi, or wheezes    Cardiovascular   Auscultation: sinus rhythm  Periph  circulation: no cyanosis, clubbing, edema, or varicosities    Gastrointestinal   Abdomen: soft, non-tender,mild RUQ fullness  Liver and spleen: no enlargement or nodularity  Rectal: deferred until colonoscopy     Lymphatic   Neck: no cervical adenopathy  Groin: no inguinal adenopathy    Musculoskeletal   Gait and station: normal  Head and neck: normal alignment and mobility    Skin   Inspection: no rashes, lesions, or ulcerations  Palpation: no subcutaneous nodules or induration    Mental Status Exam   Judgment, insight: intact  Orientation: oriented to time, place, and person  Memory: intact for recent and remote events  Mood and affect: no depression, anxiety, or agitation        Medical Decision-Making     Amount/complexity of data to be reviewed   * review/order other diagnostic or tx interventions  * discussion of test results with performing MD  * review and summarization of old records    Risk of complications: high      Assessment   Problems updated this visit:   Added new problem of Adenocarcinoma (ICD-199 1) (EHJ15-D82 1)  Added new problem of Hx of DVT (ICD-V12 51) (MIW58-N32 718)  Added new problem of Anal itching (ICD-698 0) (ZCP68-P28 0)  Added new problem of Abnormal abdominal imaging (ICD-793 6) (PMH85-Q51 7)  Additional Assessment Comments: Denis Lainez is a pleasant 54yo female, history of DVT the past 2 years treated with xarelto  She is status post laparoscopic assisted vaginal hysterectomy, bilateral salpingectomy by Dr Juhi Escobedo on 5/21/2018  Pathology showed invasive adenocarcinoma, of enteric immunophenotype, present in both fallopian tubes referred on pathology/CT results by Dr Addi Carrion  She had a CT scan on 6/6/2018 which showed a suspicious lesion in the right abdomen, most likely colon malignancy, proximal transverse colon, with less likely possibilities including colon malignancy with fistula to the small bowel or even less likely, small bowel malignancy with fistula to the colon, multiple hepatic lesions compatible with metastasis, mild left iliac adenopathy and borderline interaortocaval adenopathy, no evidence of metastatic disease within the chest      Her CEA level on 6/4/2018 was 2 5, CA-125 34 1  She has not had a screening colonoscopy and does not have any GI symptoms  Denies nausea/vomiting/abdominal pain or rectal bleeding, stools have been more frequent since surgery however previously typically with every other day bowel movement  We discussed concern for a liver metastatic GI malignancy given, yet to be confirmed, and workup for tissue biopsy and possibility of upfront operation versus chemotherapy pending luminal compromise, which I also discussed today by phone with Que Carrion and Leann  We discussed EGD/colonoscopy as well as risks including not limited to bleeding,missed lesion, perforation requiring surgery, she understood these risks and wishes to proceed      Plan   Medications updated this visit:   Added new medication of Elois Littler 20 MG ORAL TABLET (RIVAROXABAN); Route: ORAL  Added new medication of PRENATAL VITAMIN TABLET (PRENATAL VIT-FE FUMARATE-FA TABS)  Orders for today's visit:   Added new Service order of SNOMED-CT: [id number] Current Medications Documented ([id number]) - Signed  Added new Service order of [address], Level V ([id number]) - Signed  Added new Test order of Colonoscopy 1 Week ([id number]) - Signed  Added new Test order of EGD ([id number]) - Signed  Additional Plan: Plan:  -EGD/Colonoscopy next week scheduled, surgery versus liver biopsy and initial chemotherapy decisions pending scope findings    CC:  Dr Elgin Powell

## 2018-06-14 ENCOUNTER — ANESTHESIA (OUTPATIENT)
Dept: PERIOP | Facility: HOSPITAL | Age: 53
DRG: 330 | End: 2018-06-14
Payer: COMMERCIAL

## 2018-06-14 ENCOUNTER — HOSPITAL ENCOUNTER (INPATIENT)
Facility: HOSPITAL | Age: 53
LOS: 4 days | Discharge: HOME | DRG: 330 | End: 2018-06-18
Attending: COLON & RECTAL SURGERY | Admitting: COLON & RECTAL SURGERY
Payer: COMMERCIAL

## 2018-06-14 DIAGNOSIS — C79.9 ADENOCARCINOMA, METASTATIC (HCC): Primary | ICD-10-CM

## 2018-06-14 DIAGNOSIS — K63.89 OTHER SPECIFIED DISEASES OF INTESTINE: ICD-10-CM

## 2018-06-14 DIAGNOSIS — C80.1 PRIMARY MALIGNANT NEOPLASM (HCC): ICD-10-CM

## 2018-06-14 LAB
ABO GROUP BLD: NORMAL
BLD GP AB SCN SERPL QL: NEGATIVE
RH BLD: POSITIVE
SPECIMEN EXPIRATION DATE: NORMAL

## 2018-06-14 PROCEDURE — 88331 PATH CONSLTJ SURG 1 BLK 1SPC: CPT | Performed by: PATHOLOGY

## 2018-06-14 PROCEDURE — 0DNL4ZZ RELEASE TRANSVERSE COLON, PERCUTANEOUS ENDOSCOPIC APPROACH: ICD-10-PCS | Performed by: COLON & RECTAL SURGERY

## 2018-06-14 PROCEDURE — 0DBU4ZZ EXCISION OF OMENTUM, PERCUTANEOUS ENDOSCOPIC APPROACH: ICD-10-PCS | Performed by: COLON & RECTAL SURGERY

## 2018-06-14 PROCEDURE — 82948 REAGENT STRIP/BLOOD GLUCOSE: CPT

## 2018-06-14 PROCEDURE — 88305 TISSUE EXAM BY PATHOLOGIST: CPT | Performed by: PATHOLOGY

## 2018-06-14 PROCEDURE — 0DTF4ZZ RESECTION OF RIGHT LARGE INTESTINE, PERCUTANEOUS ENDOSCOPIC APPROACH: ICD-10-PCS | Performed by: COLON & RECTAL SURGERY

## 2018-06-14 PROCEDURE — 86900 BLOOD TYPING SEROLOGIC ABO: CPT | Performed by: COLON & RECTAL SURGERY

## 2018-06-14 PROCEDURE — 88341 IMHCHEM/IMCYTCHM EA ADD ANTB: CPT | Performed by: PATHOLOGY

## 2018-06-14 PROCEDURE — 88342 IMHCHEM/IMCYTCHM 1ST ANTB: CPT | Performed by: PATHOLOGY

## 2018-06-14 PROCEDURE — 86901 BLOOD TYPING SEROLOGIC RH(D): CPT | Performed by: COLON & RECTAL SURGERY

## 2018-06-14 PROCEDURE — 88309 TISSUE EXAM BY PATHOLOGIST: CPT | Performed by: PATHOLOGY

## 2018-06-14 PROCEDURE — 88363 XM ARCHIVE TISSUE MOLEC ANAL: CPT | Performed by: PATHOLOGY

## 2018-06-14 PROCEDURE — 86850 RBC ANTIBODY SCREEN: CPT | Performed by: COLON & RECTAL SURGERY

## 2018-06-14 PROCEDURE — 94762 N-INVAS EAR/PLS OXIMTRY CONT: CPT

## 2018-06-14 PROCEDURE — 0WBH4ZX EXCISION OF RETROPERITONEUM, PERCUTANEOUS ENDOSCOPIC APPROACH, DIAGNOSTIC: ICD-10-PCS | Performed by: COLON & RECTAL SURGERY

## 2018-06-14 RX ORDER — PROPOFOL 10 MG/ML
INJECTION, EMULSION INTRAVENOUS AS NEEDED
Status: DISCONTINUED | OUTPATIENT
Start: 2018-06-14 | End: 2018-06-14 | Stop reason: SURG

## 2018-06-14 RX ORDER — SODIUM CHLORIDE, SODIUM LACTATE, POTASSIUM CHLORIDE, CALCIUM CHLORIDE 600; 310; 30; 20 MG/100ML; MG/100ML; MG/100ML; MG/100ML
125 INJECTION, SOLUTION INTRAVENOUS CONTINUOUS
Status: DISCONTINUED | OUTPATIENT
Start: 2018-06-14 | End: 2018-06-14

## 2018-06-14 RX ORDER — SODIUM CHLORIDE 9 MG/ML
INJECTION, SOLUTION INTRAVENOUS CONTINUOUS PRN
Status: DISCONTINUED | OUTPATIENT
Start: 2018-06-14 | End: 2018-06-14 | Stop reason: SURG

## 2018-06-14 RX ORDER — DEXMEDETOMIDINE HYDROCHLORIDE 100 UG/ML
INJECTION, SOLUTION INTRAVENOUS AS NEEDED
Status: DISCONTINUED | OUTPATIENT
Start: 2018-06-14 | End: 2018-06-14 | Stop reason: SURG

## 2018-06-14 RX ORDER — METOCLOPRAMIDE HYDROCHLORIDE 5 MG/ML
10 INJECTION INTRAMUSCULAR; INTRAVENOUS ONCE AS NEEDED
Status: DISCONTINUED | OUTPATIENT
Start: 2018-06-14 | End: 2018-06-14 | Stop reason: HOSPADM

## 2018-06-14 RX ORDER — MIDAZOLAM HYDROCHLORIDE 1 MG/ML
INJECTION INTRAMUSCULAR; INTRAVENOUS AS NEEDED
Status: DISCONTINUED | OUTPATIENT
Start: 2018-06-14 | End: 2018-06-14 | Stop reason: SURG

## 2018-06-14 RX ORDER — ACETAMINOPHEN 325 MG/1
650 TABLET ORAL EVERY 6 HOURS PRN
Status: DISCONTINUED | OUTPATIENT
Start: 2018-06-14 | End: 2018-06-18 | Stop reason: HOSPADM

## 2018-06-14 RX ORDER — SODIUM CHLORIDE 9 MG/ML
125 INJECTION, SOLUTION INTRAVENOUS CONTINUOUS
Status: DISCONTINUED | OUTPATIENT
Start: 2018-06-14 | End: 2018-06-15

## 2018-06-14 RX ORDER — ONDANSETRON 2 MG/ML
4 INJECTION INTRAMUSCULAR; INTRAVENOUS ONCE AS NEEDED
Status: DISCONTINUED | OUTPATIENT
Start: 2018-06-14 | End: 2018-06-14 | Stop reason: HOSPADM

## 2018-06-14 RX ORDER — ONDANSETRON 2 MG/ML
INJECTION INTRAMUSCULAR; INTRAVENOUS AS NEEDED
Status: DISCONTINUED | OUTPATIENT
Start: 2018-06-14 | End: 2018-06-14 | Stop reason: SURG

## 2018-06-14 RX ORDER — GLYCOPYRROLATE 0.2 MG/ML
INJECTION INTRAMUSCULAR; INTRAVENOUS AS NEEDED
Status: DISCONTINUED | OUTPATIENT
Start: 2018-06-14 | End: 2018-06-14 | Stop reason: SURG

## 2018-06-14 RX ORDER — ONDANSETRON 2 MG/ML
4 INJECTION INTRAMUSCULAR; INTRAVENOUS EVERY 4 HOURS PRN
Status: DISCONTINUED | OUTPATIENT
Start: 2018-06-14 | End: 2018-06-18 | Stop reason: HOSPADM

## 2018-06-14 RX ORDER — ROCURONIUM BROMIDE 10 MG/ML
INJECTION, SOLUTION INTRAVENOUS AS NEEDED
Status: DISCONTINUED | OUTPATIENT
Start: 2018-06-14 | End: 2018-06-14 | Stop reason: SURG

## 2018-06-14 RX ORDER — SODIUM CHLORIDE, SODIUM LACTATE, POTASSIUM CHLORIDE, CALCIUM CHLORIDE 600; 310; 30; 20 MG/100ML; MG/100ML; MG/100ML; MG/100ML
50 INJECTION, SOLUTION INTRAVENOUS CONTINUOUS
Status: DISCONTINUED | OUTPATIENT
Start: 2018-06-14 | End: 2018-06-14

## 2018-06-14 RX ORDER — DOCUSATE SODIUM 100 MG/1
100 CAPSULE, LIQUID FILLED ORAL 2 TIMES DAILY PRN
Status: DISCONTINUED | OUTPATIENT
Start: 2018-06-14 | End: 2018-06-18 | Stop reason: HOSPADM

## 2018-06-14 RX ORDER — ALBUMIN, HUMAN INJ 5% 5 %
SOLUTION INTRAVENOUS CONTINUOUS PRN
Status: DISCONTINUED | OUTPATIENT
Start: 2018-06-14 | End: 2018-06-14 | Stop reason: SURG

## 2018-06-14 RX ORDER — FENTANYL CITRATE/PF 50 MCG/ML
50 SYRINGE (ML) INJECTION
Status: DISCONTINUED | OUTPATIENT
Start: 2018-06-14 | End: 2018-06-14 | Stop reason: HOSPADM

## 2018-06-14 RX ORDER — OXYCODONE HYDROCHLORIDE AND ACETAMINOPHEN 5; 325 MG/1; MG/1
1 TABLET ORAL EVERY 4 HOURS PRN
Status: CANCELLED | OUTPATIENT
Start: 2018-06-14

## 2018-06-14 RX ORDER — FENTANYL CITRATE 50 UG/ML
INJECTION, SOLUTION INTRAMUSCULAR; INTRAVENOUS AS NEEDED
Status: DISCONTINUED | OUTPATIENT
Start: 2018-06-14 | End: 2018-06-14 | Stop reason: SURG

## 2018-06-14 RX ORDER — MEPERIDINE HYDROCHLORIDE 25 MG/ML
12.5 INJECTION INTRAMUSCULAR; INTRAVENOUS; SUBCUTANEOUS ONCE AS NEEDED
Status: DISCONTINUED | OUTPATIENT
Start: 2018-06-14 | End: 2018-06-14 | Stop reason: HOSPADM

## 2018-06-14 RX ORDER — HYDROMORPHONE HYDROCHLORIDE 2 MG/ML
INJECTION, SOLUTION INTRAMUSCULAR; INTRAVENOUS; SUBCUTANEOUS AS NEEDED
Status: DISCONTINUED | OUTPATIENT
Start: 2018-06-14 | End: 2018-06-14 | Stop reason: SURG

## 2018-06-14 RX ORDER — OXYCODONE HYDROCHLORIDE AND ACETAMINOPHEN 5; 325 MG/1; MG/1
2 TABLET ORAL EVERY 4 HOURS PRN
Status: CANCELLED | OUTPATIENT
Start: 2018-06-14

## 2018-06-14 RX ORDER — HEPARIN SODIUM 5000 [USP'U]/ML
5000 INJECTION, SOLUTION INTRAVENOUS; SUBCUTANEOUS EVERY 8 HOURS SCHEDULED
Status: DISCONTINUED | OUTPATIENT
Start: 2018-06-15 | End: 2018-06-18 | Stop reason: HOSPADM

## 2018-06-14 RX ADMIN — ROCURONIUM BROMIDE 20 MG: 10 INJECTION INTRAVENOUS at 17:07

## 2018-06-14 RX ADMIN — FENTANYL CITRATE 50 MCG: 50 INJECTION, SOLUTION INTRAMUSCULAR; INTRAVENOUS at 16:00

## 2018-06-14 RX ADMIN — FENTANYL CITRATE 50 MCG: 50 INJECTION, SOLUTION INTRAMUSCULAR; INTRAVENOUS at 16:35

## 2018-06-14 RX ADMIN — SODIUM CHLORIDE: 0.9 INJECTION, SOLUTION INTRAVENOUS at 17:03

## 2018-06-14 RX ADMIN — MIDAZOLAM 2 MG: 1 INJECTION INTRAMUSCULAR; INTRAVENOUS at 14:53

## 2018-06-14 RX ADMIN — SODIUM CHLORIDE, SODIUM LACTATE, POTASSIUM CHLORIDE, AND CALCIUM CHLORIDE 125 ML/HR: .6; .31; .03; .02 INJECTION, SOLUTION INTRAVENOUS at 11:48

## 2018-06-14 RX ADMIN — SODIUM CHLORIDE, SODIUM LACTATE, POTASSIUM CHLORIDE, AND CALCIUM CHLORIDE: .6; .31; .03; .02 INJECTION, SOLUTION INTRAVENOUS at 15:53

## 2018-06-14 RX ADMIN — GLYCOPYRROLATE 0.3 MG: 0.2 INJECTION, SOLUTION INTRAMUSCULAR; INTRAVENOUS at 17:50

## 2018-06-14 RX ADMIN — ALBUMIN (HUMAN): 12.5 SOLUTION INTRAVENOUS at 16:27

## 2018-06-14 RX ADMIN — METRONIDAZOLE 500 MG: 500 INJECTION, SOLUTION INTRAVENOUS at 15:15

## 2018-06-14 RX ADMIN — ROCURONIUM BROMIDE 10 MG: 10 INJECTION INTRAVENOUS at 15:55

## 2018-06-14 RX ADMIN — HYDROMORPHONE HYDROCHLORIDE 0.5 MG: 2 INJECTION, SOLUTION INTRAMUSCULAR; INTRAVENOUS; SUBCUTANEOUS at 17:09

## 2018-06-14 RX ADMIN — HYDROMORPHONE HYDROCHLORIDE 0.2 MG: 1 INJECTION, SOLUTION INTRAMUSCULAR; INTRAVENOUS; SUBCUTANEOUS at 18:33

## 2018-06-14 RX ADMIN — ALBUMIN (HUMAN): 12.5 SOLUTION INTRAVENOUS at 15:55

## 2018-06-14 RX ADMIN — HYDROMORPHONE HYDROCHLORIDE: 10 INJECTION INTRAMUSCULAR; INTRAVENOUS; SUBCUTANEOUS at 18:45

## 2018-06-14 RX ADMIN — LIDOCAINE HYDROCHLORIDE 100 MG: 20 INJECTION, SOLUTION INTRAVENOUS at 15:03

## 2018-06-14 RX ADMIN — FENTANYL CITRATE 50 MCG: 50 INJECTION, SOLUTION INTRAMUSCULAR; INTRAVENOUS at 15:20

## 2018-06-14 RX ADMIN — ONDANSETRON 4 MG: 2 INJECTION INTRAMUSCULAR; INTRAVENOUS at 15:03

## 2018-06-14 RX ADMIN — ROCURONIUM BROMIDE 10 MG: 10 INJECTION INTRAVENOUS at 16:35

## 2018-06-14 RX ADMIN — NEOSTIGMINE METHYLSULFATE 4 MG: 1 INJECTION, SOLUTION INTRAMUSCULAR; INTRAVENOUS; SUBCUTANEOUS at 17:50

## 2018-06-14 RX ADMIN — SODIUM CHLORIDE, SODIUM LACTATE, POTASSIUM CHLORIDE, AND CALCIUM CHLORIDE: .6; .31; .03; .02 INJECTION, SOLUTION INTRAVENOUS at 17:03

## 2018-06-14 RX ADMIN — PROPOFOL 200 MG: 10 INJECTION, EMULSION INTRAVENOUS at 15:03

## 2018-06-14 RX ADMIN — HYDROMORPHONE HYDROCHLORIDE 0.5 MG: 2 INJECTION, SOLUTION INTRAMUSCULAR; INTRAVENOUS; SUBCUTANEOUS at 18:02

## 2018-06-14 RX ADMIN — DEXMEDETOMIDINE HYDROCHLORIDE 12 MCG: 100 INJECTION, SOLUTION INTRAVENOUS at 15:12

## 2018-06-14 RX ADMIN — HYDROMORPHONE HYDROCHLORIDE 0.2 MG: 1 INJECTION, SOLUTION INTRAMUSCULAR; INTRAVENOUS; SUBCUTANEOUS at 18:20

## 2018-06-14 RX ADMIN — HYDROMORPHONE HYDROCHLORIDE 0.5 MG: 2 INJECTION, SOLUTION INTRAMUSCULAR; INTRAVENOUS; SUBCUTANEOUS at 17:40

## 2018-06-14 RX ADMIN — HYDROMORPHONE HYDROCHLORIDE 0.2 MG: 1 INJECTION, SOLUTION INTRAMUSCULAR; INTRAVENOUS; SUBCUTANEOUS at 18:46

## 2018-06-14 RX ADMIN — HYDROMORPHONE HYDROCHLORIDE 0.2 MG: 1 INJECTION, SOLUTION INTRAMUSCULAR; INTRAVENOUS; SUBCUTANEOUS at 18:40

## 2018-06-14 RX ADMIN — ROCURONIUM BROMIDE 50 MG: 10 INJECTION INTRAVENOUS at 15:03

## 2018-06-14 RX ADMIN — DEXAMETHASONE SODIUM PHOSPHATE 5 MG: 10 INJECTION INTRAMUSCULAR; INTRAVENOUS at 15:03

## 2018-06-14 RX ADMIN — DEXMEDETOMIDINE HYDROCHLORIDE 8 MCG: 100 INJECTION, SOLUTION INTRAVENOUS at 15:30

## 2018-06-14 RX ADMIN — SODIUM CHLORIDE 125 ML/HR: 0.9 INJECTION, SOLUTION INTRAVENOUS at 19:00

## 2018-06-14 RX ADMIN — Medication 2000 MG: at 15:10

## 2018-06-14 RX ADMIN — HYDROMORPHONE HYDROCHLORIDE 0.2 MG: 1 INJECTION, SOLUTION INTRAMUSCULAR; INTRAVENOUS; SUBCUTANEOUS at 18:25

## 2018-06-14 RX ADMIN — FENTANYL CITRATE 50 MCG: 50 INJECTION, SOLUTION INTRAMUSCULAR; INTRAVENOUS at 15:03

## 2018-06-14 NOTE — H&P
H&P Copied/reviewed from 6/13, no changes to plans as below  H&P        []Hide copied text  Discussed transverse colon tumor, near obstructing and need for resection, already on Xarelto hold and bowel prepped, will plan for laparoscopic possible open right hemicolectomy, discussed possible left if more distal transverse, possible liver biopsy      Discussed benefits, alternatives, risks including not limited to bleeding, infection, risks of anesthesia, open surgery, DVT/PE, heart attack, stroke, death, damage to local structures including ureter and duodenum, and anastomotic leak requiring reoperation, temporary versus permanent stoma  She understood these risks, wished to proceed         Surgery [address] [place] [address]  Procedure and/or Co-Surgery: laparoscopic possible open right hemicolectomy, possible liver biopsy  Diagnosis: near obstructing colon mass  Position: Modified Lithotomy  Surgery Date: 06/14/2018  Admission Type: Inpatient  Admit On: Day of Surgery  Anesthesia: General  Allotted Surgery Time: 2 hours  Antibiotics Flagyl  mg, Cefazolin IV 1 gr (less then 176 lbs), Bilateral Calf/Foot Compression Pump, Heparin 5000 units SC, Flagyl 500 mg PO day prior to surgery, Neomycin 1 gr PO  Other: staying on clears after day prior prep  add PO abx           6/8/18 H&P Reviewed updated, no changes to plan as below        Vitals:     06/13/18 0750   BP: 120/77   Pulse: 99   Resp: 18   Temp: (!) 97 °F (36 1 °C)   SpO2: 99%                  Allergy Medication Problem Directive Form      Allergies   Allergies Reviewed  Medications   Medications Reviewed   PRENATAL VITAMIN TABLET (PRENATAL VIT-FE FUMARATE-FA TABS)   XARELTO 20 MG ORAL TABLET (RIVAROXABAN)         Problems   Problems Reviewed   Abnormal abdominal imaging (ICD-793 6) (XEX62-O92 5)  Anal itching (ICD-698 0) (DOF78-L20 0)  Hx of DVT (ICD-V12 51) (QIO68-Y50 718)  Adenocarcinoma (ICD-199 1) (ZLH15-U86 1)        Directives   Directives Reviewed   Vital Signs   Ht: 65 5 in  Wt: 161 lbs  BP: 152/ 80     Calculations   Body Mass Index: 26 48  Medications:   06/08/2018 PRENATAL VITAMIN TABLET (PRENATAL VIT-FE FUMARATE-FA TABS)   06/08/2018 XARELTO 20 MG ORAL TABLET (RIVAROXABAN)      Allergies:   NKA  Problems:   Abnormal abdominal imaging (ICD-793 6) (SBX88-B87 5)  Anal itching (ICD-698 0) (BIY35-W78 0)  Hx of DVT (ICD-V12 51) (KPY43-Q64 963)  Adenocarcinoma (ICD-199 1) (AHX61-B56 1)     Signed by: Daly Perkins CMA  June 8, 2018 1:46 PM        History from: patient  Chief Complaint:   Michelle Valdez is here today for consultation and evaluation of a colon mass        History of Present Illness   Roxy Wren is a pleasant 56yo female, history of DVT the past 2 years treated with xarelto  She is status post laparoscopic assisted vaginal hysterectomy, bilateral salpingectomy by Dr Shaista Ng on 5/21/2018  Pathology showed invasive adenocarcinoma, of enteric immunophenotype, present in both fallopian tubes referred on pathology/CT results by Dr Adrián Holly      She had a CT scan on 6/6/2018 which showed a suspicious lesion in the right abdomen, most likely colon malignancy, proximal transverse colon, with less likely possibilities including colon malignancy with fistula to the small bowel or even less likely, small bowel malignancy with fistula to the colon, multiple hepatic lesions compatible with metastasis, mild left iliac adenopathy and borderline interaortocaval adenopathy, no evidence of metastatic disease within the chest       Her CEA level on 6/4/2018 was 2 5, CA-125 34  1      She has not had a screening colonoscopy and does not have any GI symptoms       Denies nausea/vomiting/abdominal pain or rectal bleeding, stools have been more frequent since surgery however previously typically with every other day bowel movement      Review of Systems   General         Complains of: Sweats           Denies: Fever, Chills, Fatigue, Unexpected Weight Loss, Weakness  Eyes         Denies: Blurring, Double Vision, Irritation, Discharge, Vision Loss, Eye Pain, Intolerance to Light, Blindness  Ears/Nose/Throat         Denies: Earache, Ear Discharge, Ringing in Ears, Decreased Hearing, Nasal Congestion, Nosebleeds, Sore Throat, Hoarseness, Difficulty Swallowing, Nasal Drainage  Cardiovascular         Denies: Chest Pains, Palpitations, Fainting, Swollen Ankles  Respiratory         Denies: Cough, Difficulty Breathing, Excessive Sputum, Wheezing, Shortness of Breath, Awakening Short of Breath, Cannot Breathe When Lying Flat, Cough Up Blood, Painful Breathing  Gastrointestinal         Complains of: Anal Itching  Denies: Nausea, Vomiting, Diarrhea, Constipation, Change in Bowel Habits, Abdominal Pain, Stool w/Bright Red Blood, Tarry Bowel Movements, Anal Burning, Anal Pain, Vomit Blood, Frequent Heartburn, Heartburn Awakens You, Rectal Bleeding, Loss of Bowel Control/Soiling, Unpredictable Bowel Habits  Genitourinary         Denies: Discharge, Urinary Hesitancy, Incontinence, Genital Sores, Urgent Urination, Frequent Urination, Urinate During the Night, Blood in Urine, Painful Uriniation, Leakage of Urine, Pelvic Pain, Planning Pregnancy, Nipple Discharge, Lump in Breast, Vaginal Discharge, Non-Menstrual Bleeding, Painful [place], Decreased Libido  Musculoskeletal         Denies: Back Pain, Joint Pain, Joint Swelling, Muscle Cramps, Muscle Weakness, Stiffness, Arthritis  Skin         Denies: Rash, Itching, Dryness, Suspicious Lesions  Neurologic         Denies: Weakness, Seizures, Tremors, Dizziness, Fainting   Psychiatric         Denies: Depression, Anxiety, Memory Loss, Mental Disturbance, Suicidal Ideation, Hallucinations, Sudden Agitation, Lethargy     Endocrine         Denies: Cold Intolerance, Heat Intolerance, Eating Abnormal Amounts of Food, Excessive Urination, Weight Change, Flushing, Swollen Glands, Excessive Thirst    Heme/Lymphatic         Denies: Abnormal Bruising, Bleeding  Allergic/Immunologic         Denies: Sudden Eruption of Blisters, Persistent Infections, HIV Exposure, Severe Itching / Hives       Past History  Past Medical History (reviewed - no changes required): Patient indicates medical history of 3 pregnancy(s), 2 vaginal delivery(s)  Other medical history includes: DVT  Patient denies any medical history of colon cancer, lung cancer, pancreatic cancer, breast cancer, arthritis, chronic back pain, development/growth disorders, ovarian cancer, brain cancer, cancerous growth/tumor/cyst, chemotherapy, endocrine disorders, diabetes, autoimmune disorders, hypertension, hyperlipidemia, heart attack (MI), heart disease, stroke, respiratory disease, chronic lung disease, tuberculosis,  conditions, Kidney stones,  Female,  disease, asthma, bleeding disease, thyroid disease, anemia, osteoporosis, ENT disease, eye problems, hearing impaired, GI conditions, GI disease, neurological disorders, epilepsy, chronic headaches, psychiatric illness, depression, suicide attempts, blood transfusion  Surgical History (reviewed - no changes required): Patient reports surgical history to include: hysterectomy/b-l salpingectomy     Patient denies any surgical history of  Gu Surgery, GI surgery, GYN surgery, ENT surgery, cardiovascular surgery,  hematologic-oncologic surgery, hernia surgery, lumbar laminectomy, neurologic surgery, orthopedic surgery,  peripheral vascular surgery, arthroscopic shoulder surgery, back surgery, cateract surgery, cervical laminectomy, eye surgery, foot surgery, knee surgery, lung surgery, septoplasty, thyroid surgery, uterine surgery, sinus surgery, renal surgery, ulcer surgery, appendectomy, toncillectomy, valvular surgery, hemorrhoidectomy, colectomy, cholecystectomy, lumpectomy, mastectomy, breast reduction, ovary removal, tubal ligation, heart valve replacement, lobectomy, hip surgery, anal surgery, rectal surgery, pacemaker surgery, colon surgery  Family History: (reviewed - no changes required)Father: Other Medical Problems-muscular dystrophy (6/8/2018)Social History (reviewed - no changes required): Abimbola Mason has never smoked  She uses about 2 caffeine drinks per day  She drinks alcohol  Abimbola Mason does not exercise on a regular basis  She has never experienced a reaction to anesthesia  She had a flu shot this season    She did not have a mammogram   She had a pap smear        Physical Exam   General appearance: well nourished, well hydrated, no acute distress     Eyes   External: conjunctivae and lids normal  Pupils: equal, round, reactive to light and accommodation     Ears, Nose and Throat   Dental: good dentition  Pharynx: tongue normal, posterior pharynx without erythema or exudate     Neck   Neck: supple, no masses, trachea midline  Thyroid: no nodules, masses, tenderness, or enlargement     Respiratory   Respiratory effort: lungs clear bilateral  Auscultation: no rales, rhonchi, or wheezes     Cardiovascular   Auscultation: sinus rhythm  Periph  circulation: no cyanosis, clubbing, edema, or varicosities     Gastrointestinal   Abdomen: soft, non-tender,mild RUQ fullness  Liver and spleen: no enlargement or nodularity  Rectal: deferred until colonoscopy      Lymphatic   Neck: no cervical adenopathy  Groin: no inguinal adenopathy     Musculoskeletal   Gait and station: normal  Head and neck: normal alignment and mobility     Skin   Inspection: no rashes, lesions, or ulcerations  Palpation: no subcutaneous nodules or induration     Mental Status Exam   Judgment, insight: intact  Orientation: oriented to time, place, and person  Memory: intact for recent and remote events  Mood and affect: no depression, anxiety, or agitation           Medical Decision-Making      Amount/complexity of data to be reviewed   * review/order other diagnostic or tx interventions  * discussion of test results with performing MD  * review and summarization of old records     Risk of complications: high        Assessment   Problems updated this visit:   Added new problem of Adenocarcinoma (ICD-199 1) (OTP26-H95 1)  Added new problem of Hx of DVT (ICD-V12 51) (BGT74-W83 718)  Added new problem of Anal itching (ICD-698 0) (DIS26-Y93 0)  Added new problem of Abnormal abdominal imaging (ICD-793 6) (ODM66-F48 7)  Additional Assessment Comments: Liz Harrell is a pleasant 56yo female, history of DVT the past 2 years treated with xarelto  She is status post laparoscopic assisted vaginal hysterectomy, bilateral salpingectomy by Dr Shabnam Montaño on 5/21/2018  Pathology showed invasive adenocarcinoma, of enteric immunophenotype, present in both fallopian tubes referred on pathology/CT results by Dr José Luis Campbell      She had a CT scan on 6/6/2018 which showed a suspicious lesion in the right abdomen, most likely colon malignancy, proximal transverse colon, with less likely possibilities including colon malignancy with fistula to the small bowel or even less likely, small bowel malignancy with fistula to the colon, multiple hepatic lesions compatible with metastasis, mild left iliac adenopathy and borderline interaortocaval adenopathy, no evidence of metastatic disease within the chest       Her CEA level on 6/4/2018 was 2 5, CA-125 34  1      She has not had a screening colonoscopy and does not have any GI symptoms       Denies nausea/vomiting/abdominal pain or rectal bleeding, stools have been more frequent since surgery however previously typically with every other day bowel movement      We discussed concern for a liver metastatic GI malignancy given, yet to be confirmed, and workup for tissue biopsy and possibility of upfront operation versus chemotherapy pending luminal compromise, which I also discussed today by phone with Que Campbell and Leann      We discussed EGD/colonoscopy as well as risks including not limited to bleeding,missed lesion, perforation requiring surgery, she understood these risks and wishes to proceed      Plan   Medications updated this visit:   Added new medication of XARELTO 20 MG ORAL TABLET (RIVAROXABAN); Route: ORAL  Added new medication of PRENATAL VITAMIN TABLET (PRENATAL VIT-FE FUMARATE-FA TABS)  Orders for today's visit:   Added new Service order of SNOMED-CT: [id number] Current Medications Documented ([id number]) - Signed  Added new Service order of [address], Level V ([id number]) - Signed  Added new Test order of Colonoscopy 1 Week ([id number]) - Signed  Added new Test order of EGD ([id number]) - Signed  Additional Plan: Plan:  -EGD/Colonoscopy next week scheduled, surgery versus liver biopsy and initial chemotherapy decisions pending scope findings    CC:  Dr Benny Christensen

## 2018-06-14 NOTE — ANESTHESIA POSTPROCEDURE EVALUATION
Post-Op Assessment Note      CV Status:  Stable    Mental Status:  Alert and awake    Hydration Status:  Euvolemic    PONV Controlled:  Controlled    Airway Patency:  Patent    Post Op Vitals Reviewed: Yes          Staff: Anesthesiologist           /76 (06/14/18 1811)    Temp 99 2 °F (37 3 °C) (06/14/18 1811)    Pulse 70 (06/14/18 1811)   Resp 16 (06/14/18 1808)    SpO2 100 % (06/14/18 1811)

## 2018-06-14 NOTE — OP NOTE
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    Procedure(s) (LRB):  LAPAROSCOPIC EXTENDED RIGHT HEMICOLECTOMY ; PERITONEAL BX (Right)  LYSIS ADHESIONS OF COLON (N/A)  LAPAROSCOPY DIAGNOSTIC (N/A)  PARTIAL OMENTECTOMY (Right)    Specimen(s):  ID Type Source Tests Collected by Time Destination   1 : Peritoneal Deposit  Tissue Peritoneum TISSUE EXAM Jorje Limon MD 2018 1540    2 : extended right hemicolectomy  Tissue Colon TISSUE EXAM Jorje Limon MD 2018 1732        Estimated Blood Loss:   125 mL    Drains:  Urethral Catheter Latex 16 Fr  (Active)   Number of days: 0       Anesthesia Type:   General    Operative Indications:  Primary malignant neoplasm ([facility] [address] ) [C80 1]  Other specified diseases of intestine [K63 89]  *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 disease  Bilateral fallopian tubes:   * invasive adenocarcinoma, of enteric immunophenotype, present in both fallopian tubes     Operative Findings:  -Proximal transverse colon tumor, miliary peritoneal deposits pelvic and right diaphragm, palpable liver metastatic disease diffuse, peritoneal biopsies +adenocarcinoma frozen  -Extended right hemicolectomy with ileocolic anastomosis, stapled side to side functional end to end  Transverse colon tumor under omentum    Palpable liver metastatic disease    Miliary peritoneal deposits, +frozen adenoca          Complications:   None    Procedure and Technique:  Danielle Gallego is a 62-year-old female with recent hysterectomy and incidental pathology Bilateral fallopian tubes invasive adenocarcinoma, of enteric immunophenotype, CT scan workup showing liver metastatic disease with bulky transverse colon tumor, confirmed by colonoscopy, negative EGD  We discussed laparoscopic right hemicolectomy and risks including not limited to bleeding, infection, risks of anesthesia, open surgery, DVT/PE, heart attack, stroke, death, damage to local structures including ureter and duodenum, and anastomotic leak requiring reoperation, temporary versus permanent stoma  She understood these risks today, signed informed consent wished to proceed  She was brought to the operating room where general endotracheal anesthesia was induced without event  Humphries was placed under sterile conditions, placed in modified lithotomy position with attention to joints and bony extremities, received 5000 units of subcutaneous heparin prior to operation, Venodynes were on and running throughout the procedure on RLE given active left DVT  She received cefazolin and Flagyl prior to skin incision  She was chlorhexidine sterile prepped and after appropriate drying time Ioban and sterile draped  After timeout was taken per protocol procedure began  Incision was made for a 12 mm Chavira trocar infraumbilical  This was carried through with electrocautery and using S retractors the fascia was exposed and incised  Once the peritoneal cavity was entered 0 Vicryl UR 6 sutures were placed on either side and secured the 12 mm Chavira trocar, 15 mm CO2 pneumoperitoneum was introduced  Diagnostic laparoscopy revealed miliary peritoneal deposits, less than 20, pelvic and right pericolic gutter and over right diaphragm, grasper used for biopsy sent for frozen, positive for adenocarcinoma  Raising the omental cephalad showed a transverse colon puckering/tumor that was covered under the omental flap      2 additional 5 mm trocars were placed in the left upper quadrant and left lower quadrant     The ileocolic pedicle was easily visualized and skeletonized sweeping down the retroperitoneal structures  This was ligated high between triple burns of the Enseal energy device ×2  The medial lateral dissection was then continued in this plane sweeping all retroperitoneal structures down and away until hepatic flexure was encountered and the duodenum was swept laterally  The mesentery to this area was also taken between double burns of the Enseal energy device  The lateral dissection was undertaken on the white line of [place]t and medializing the entire right colon until it was ready for anastomosis  The omentum that was adherent to the colonic tumor was kept on the specimen side while leaving the left-sided omentum free with EnSeal energy device dissection  The cecum was tethered to vaginal cuff after hysterectomy, intraoperatively I discussed with Dr Elma Crespo who placed sponge stick to help identify the planes, and cold Metzenbaum scissors were used to easily dissect free the cecum leaving the vaginal cuff intact  We had discussion regarding ovaries given dropped metastasis risk and given the recent hysterectomy, inflammation and scarring of the adnexal were left in situ to avoid risk of vaginal cuff leak or other injury  A 6cm extraction incision was made extending from the 12 mm trocar  An Rene wound retractor was immediately placed to protect the wound and the previously grasped and marked small bowel was brought into the extraction incision  The then grasped cecum was brought into the incision and inspected  The ileum and transverse colon were lined up on the antimesenteric side of the small bowel and on the antimesenteric taenia of the large bowel  With a 3-0 Vicryl suture  Enterotomies were made and HERBERT blue load 100 was placed and after orientation and appropriate compression time fired   This was opened showing good common channel and no hemorrhage  The Allis clamps were then used to close this open end and bring it through a TA 90 stapler for firing after appropriate compression time  This was removed passed off as specimen the corners and crossing staple lines and crotch stitch were also placed in the similar fashion with 3-0 Vicryl suture on this anastomosis  Irrigation was undertaken and ran clean  The colon bundle closing technique was used to change all gloves and provide a clean field for closure which was undertaken with fascial #1 PDS, again glove changed and skin was closed after irrigation with 4-0 Monocryl and Histoacryl glue at the incision and the 2 remaining 5 mm port sites      All sponge needle instrument, RF Wand counts were correct I was present and scrubbed for the entirety of the procedure    Patient Disposition:  PACU    SIGNATURE: Vanessa Terrell MD  DATE: June 14, 2018  TIME: 6:05 PM

## 2018-06-15 LAB
ANION GAP SERPL CALCULATED.3IONS-SCNC: 7 MMOL/L (ref 4–13)
BASOPHILS # BLD AUTO: 0 THOUSANDS/ΜL (ref 0–0.1)
BASOPHILS NFR BLD AUTO: 0 % (ref 0–1)
BUN SERPL-MCNC: 6 MG/DL (ref 5–25)
CALCIUM SERPL-MCNC: 8.4 MG/DL (ref 8.3–10.1)
CHLORIDE SERPL-SCNC: 107 MMOL/L (ref 100–108)
CO2 SERPL-SCNC: 25 MMOL/L (ref 21–32)
CREAT SERPL-MCNC: 0.76 MG/DL (ref 0.6–1.3)
EOSINOPHIL # BLD AUTO: 0 THOUSAND/ΜL (ref 0–0.61)
EOSINOPHIL NFR BLD AUTO: 0 % (ref 0–6)
ERYTHROCYTE [DISTWIDTH] IN BLOOD BY AUTOMATED COUNT: 12.5 % (ref 11.6–15.1)
GFR SERPL CREATININE-BSD FRML MDRD: 91 ML/MIN/1.73SQ M
GLUCOSE SERPL-MCNC: 126 MG/DL (ref 65–140)
GLUCOSE SERPL-MCNC: 144 MG/DL (ref 65–140)
HCT VFR BLD AUTO: 31.5 % (ref 34.8–46.1)
HGB BLD-MCNC: 9.6 G/DL (ref 11.5–15.4)
IMM GRANULOCYTES # BLD AUTO: 0.03 THOUSAND/UL (ref 0–0.2)
IMM GRANULOCYTES NFR BLD AUTO: 0 % (ref 0–2)
LYMPHOCYTES # BLD AUTO: 0.34 THOUSANDS/ΜL (ref 0.6–4.47)
LYMPHOCYTES NFR BLD AUTO: 4 % (ref 14–44)
MAGNESIUM SERPL-MCNC: 2 MG/DL (ref 1.6–2.6)
MCH RBC QN AUTO: 27.8 PG (ref 26.8–34.3)
MCHC RBC AUTO-ENTMCNC: 30.5 G/DL (ref 31.4–37.4)
MCV RBC AUTO: 91 FL (ref 82–98)
MONOCYTES # BLD AUTO: 0.83 THOUSAND/ΜL (ref 0.17–1.22)
MONOCYTES NFR BLD AUTO: 9 % (ref 4–12)
NEUTROPHILS # BLD AUTO: 7.62 THOUSANDS/ΜL (ref 1.85–7.62)
NEUTS SEG NFR BLD AUTO: 87 % (ref 43–75)
NRBC BLD AUTO-RTO: 0 /100 WBCS
PLATELET # BLD AUTO: 310 THOUSANDS/UL (ref 149–390)
PMV BLD AUTO: 8.9 FL (ref 8.9–12.7)
POTASSIUM SERPL-SCNC: 4.6 MMOL/L (ref 3.5–5.3)
RBC # BLD AUTO: 3.45 MILLION/UL (ref 3.81–5.12)
SODIUM SERPL-SCNC: 139 MMOL/L (ref 136–145)
WBC # BLD AUTO: 8.82 THOUSAND/UL (ref 4.31–10.16)

## 2018-06-15 PROCEDURE — 83735 ASSAY OF MAGNESIUM: CPT | Performed by: SURGERY

## 2018-06-15 PROCEDURE — 85025 COMPLETE CBC W/AUTO DIFF WBC: CPT | Performed by: SURGERY

## 2018-06-15 PROCEDURE — 94760 N-INVAS EAR/PLS OXIMETRY 1: CPT

## 2018-06-15 PROCEDURE — G8989 SELF CARE D/C STATUS: HCPCS

## 2018-06-15 PROCEDURE — 80048 BASIC METABOLIC PNL TOTAL CA: CPT | Performed by: SURGERY

## 2018-06-15 PROCEDURE — 97166 OT EVAL MOD COMPLEX 45 MIN: CPT

## 2018-06-15 PROCEDURE — G8988 SELF CARE GOAL STATUS: HCPCS

## 2018-06-15 PROCEDURE — 97163 PT EVAL HIGH COMPLEX 45 MIN: CPT

## 2018-06-15 PROCEDURE — G8979 MOBILITY GOAL STATUS: HCPCS

## 2018-06-15 PROCEDURE — G8978 MOBILITY CURRENT STATUS: HCPCS

## 2018-06-15 PROCEDURE — G8987 SELF CARE CURRENT STATUS: HCPCS

## 2018-06-15 RX ORDER — DEXTROSE, SODIUM CHLORIDE, AND POTASSIUM CHLORIDE 5; .45; .15 G/100ML; G/100ML; G/100ML
100 INJECTION INTRAVENOUS CONTINUOUS
Status: DISCONTINUED | OUTPATIENT
Start: 2018-06-15 | End: 2018-06-16

## 2018-06-15 RX ADMIN — DEXTROSE, SODIUM CHLORIDE, AND POTASSIUM CHLORIDE 100 ML/HR: 5; .45; .15 INJECTION INTRAVENOUS at 06:43

## 2018-06-15 RX ADMIN — HEPARIN SODIUM 5000 UNITS: 5000 INJECTION, SOLUTION INTRAVENOUS; SUBCUTANEOUS at 16:57

## 2018-06-15 RX ADMIN — HEPARIN SODIUM 5000 UNITS: 5000 INJECTION, SOLUTION INTRAVENOUS; SUBCUTANEOUS at 10:00

## 2018-06-15 RX ADMIN — DEXTROSE, SODIUM CHLORIDE, AND POTASSIUM CHLORIDE 100 ML/HR: 5; .45; .15 INJECTION INTRAVENOUS at 16:57

## 2018-06-15 RX ADMIN — SODIUM CHLORIDE 125 ML/HR: 0.9 INJECTION, SOLUTION INTRAVENOUS at 03:13

## 2018-06-15 NOTE — PLAN OF CARE
Problem: DISCHARGE PLANNING - CARE MANAGEMENT  Goal: Discharge to post-acute care or home with appropriate resources  INTERVENTIONS:  - Conduct assessment to determine patient/family and health care team treatment goals, and need for post-acute services based on payer coverage, community resources, and patient preferences, and barriers to discharge  - Address psychosocial, clinical, and financial barriers to discharge as identified in assessment in conjunction with the patient/family and health care team  - Arrange appropriate level of post-acute services according to patient's   needs and preference and payer coverage in collaboration with the physician and health care team  - Communicate with and update the patient/family, physician, and health care team regarding progress on the discharge plan  - Arrange appropriate transportation to post-acute venues  Home when medically clear     Outcome: Progressing

## 2018-06-15 NOTE — SOCIAL WORK
CM met with Pt  And aware cm role at discharge   Pt reported residing with her  and kids in a bilevel   home    Pt denies having any  DME's Pt reported being independent with ADLs, pt denies any DME's  with no hx of VNA, SNF, mental health or drug/alcohol placements  Pt denied having a living will and reported using [facility]  For all medication      When medically clear family will transport home  CM reviewed d/c planning process including the following: identifying help at home, patient preference for d/c planning needs, Discharge Lounge, [facility] Meds to Bed program, availability of treatment team to discuss questions or concerns patient and/or family may have regarding understanding medications and recognizing signs and symptoms once discharged   CM also encouraged patient to follow up with all recommended appointments after discharge  Patient advised of importance for patient and family to participate in managing patients medical well being  Discharge checklist discussed with patient and family

## 2018-06-15 NOTE — CASE MANAGEMENT
[address] [place] [address] in the [facility] for 2017  Network Utilization Review Department  Phone: [phone]; Fax [phone]  ATTENTION: The Network Utilization Review Department is now centralized for our 7 Facilities  Please call with any questions or concerns to [phone] and carefully follow the prompts so that you are directed to the right person  All voicemails are confidential  Fax any determinations, approvals, denials, and requests for initial or continue stay review clinical to [phone]  Due to HIGH CALL volume, it would be easier if you could please send faxed requests to expedite your requests and in part, help us provide discharge notifications faster   /////////////////////////////////////////////////////////////////////////////////////////////////////////////////    Initial Clinical Review    Age/Sex: 48 y o  female    Surgery Date:  6/14    Procedure: Diagnostic laparoscopy, extended right hemicolectomy, partial omentectomy, lysis of adhesions, peritoneal biopsy     Operative Findings:  -Proximal transverse colon tumor, miliary peritoneal deposits pelvic and right diaphragm, palpable liver metastatic disease diffuse, peritoneal biopsies +adenocarcinoma frozen    -Extended right hemicolectomy with ileocolic anastomosis, stapled side to side functional end to end    Anesthesia: general    Admission Orders: Date/Time/Statement: 6/14/18 @ 1815     Orders Placed This Encounter   Procedures    Inpatient Admission     Standing Status:   Standing     Number of Occurrences:   1     Order Specific Question:   Admitting Physician     Answer:   Ebony Coleman [[id number]]     Order Specific Question:   Level of Care     Answer:   Med Surg [[id number]]     Order Specific Question:   Estimated length of stay     Answer:   More than 2 Midnights     Order Specific Question:   Certification     Answer:   I certify that inpatient services are medically necessary for this patient for a duration of greater than two midnights  See H&P and MD Progress Notes for additional information about the patient's course of treatment  Vital Signs: /75 (BP Location: Right arm)   Pulse 68   Temp 98 1 °F (36 7 °C) (Oral)   Resp 14   Ht 5' 5 5" (1 664 m)   Wt 72 1 kg (159 lb)   LMP 05/16/2018 (Exact Date)   SpO2 100%   BMI 26 06 kg/m²     Diet:        Diet Orders            Start     Ordered    06/15/18 0811  Diet Surgical; Clear Liquid; No Carbonation  Diet effective now     Comments:  May have coffee w/ cream/sugar   Question Answer Comment   Diet Type Surgical    Surgical Clear Liquid    Other Restriction(s): No Carbonation    RD to adjust diet per protocol? Yes        06/15/18 0811          Mobility:  ambulate    DVT Prophylaxis:  Sequential compression device to b/l LE     Pain Control:   Pain Medications             acetaminophen (TYLENOL) 500 mg tablet Take 1,000 mg by mouth every 6 (six) hours as needed for mild pain        POSTOP NOTE  Plan:  NPO  Clears tomorrow  PCA-D  [email or web address]  xeralto on hold  IS/OOB/ambulate  SQH/SCD    6/15/2018  98 1   68    14    142/75    Sat 100% on 2L oxygen per nc  Doing extremely well post operative day one  No nausea, some incisional pain, no emesis, using PCA of dilaudid  Hungry  No flatus  795 agarwal   Plan:  1  OOB today and ambulating halls  2  D/C agarwal catheter today  3  Clears/[place]  may have coffee  4  Incentive spirometry  5  Continue to monitor urine output  6   PT/OT

## 2018-06-15 NOTE — PHYSICAL THERAPY NOTE
Physical Therapy Evaluation     06/15/18 0850   Note Type   Note type Eval only   Pain Assessment   Pain Assessment 0-10   Pain Score 3   Pain Type Surgical pain   Pain Location Abdomen   Hospital Pain Intervention(s) Repositioned   Response to Interventions tolerated   Home Living   Type of Home House   Home Layout Multi-level  (3 levels w/ bedroom on the 3rd floor)   Additional Comments Pt currently lives with her  and children in a multi-level home  Prior Function   Level of [place] Independent with ADLs and functional mobility   Lives With Spouse;Son;Daughter   ADL Assistance Independent   IADLs Independent   Falls in the last 6 months 0   Vocational On disability   Comments PTA, pt was indepedent with all ADL's and IADL's   works from home and will be around for assistance at d/c  Restrictions/Precautions   Weight Bearing Precautions Per Order No   Other Precautions Chair Alarm; Bed Alarm;Multiple lines;Telemetry;O2;Fall Risk;Pain   General   Family/Caregiver Present No   Cognition   Overall Cognitive Status WFL   Arousal/Participation Cooperative   Attention Within functional limits   Orientation Level Oriented X4   Memory Unable to assess   Following Commands Follows all commands and directions without difficulty   Comments Patient was pleasant and willing to participate in therapy  RLE Assessment   RLE Assessment (strength grossly assessed with mobility 4+/5)   LLE Assessment   LLE Assessment (strength grossly assessed with mobility 4+/5)   Coordination   Movements are Fluid and Coordinated 1   Bed Mobility   Rolling R 4  Minimal assistance   Additional items Assist x 1;Verbal cues   Supine to Sit 5  Supervision   Additional items Assist x 1;Verbal cues   Additional items (for hand placement)   Additional Comments Patient left in chair with chair alarm on, all lines and tubes connected and call bell in reach      Transfers   Sit to Stand 5  Supervision   Additional items Assist x 1   Stand to Sit 5  Supervision   Additional items Assist x 1;Verbal cues  (for hand placement)   Ambulation/Elevation   Gait pattern Short stride; Antalgic   Gait Assistance 5  Supervision   Additional items Assist x 1   Assistive Device None   Distance 5' to BR; 30'x2   Balance   Static Sitting Good   Dynamic Sitting Fair +   Static Standing Good   Dynamic Standing Fair +   Ambulatory Fair +   Endurance Deficit   Endurance Deficit No   Activity Tolerance   Activity Tolerance Patient tolerated treatment well;Patient limited by fatigue;Treatment limited secondary to medical complications (Comment)   Nurse Made Aware Yes, RN Prince Haley   Assessment   Prognosis Good   Problem List Decreased strength; Impaired balance;Decreased mobility;Pain   Assessment Patient was seen today for a high complexity physical therapy evaluation  Patient is a 48year old female with a PMH including cancer, DVT, kidney disease, and migraines who was admitted to [facility] on 6/14/18 to undergo a laparoscopic procedure to remove a malignant neoplasm of the colon  Physical therapy was consulted to assess mobility and discharge recommendation  Upon arrival, patient was p/o day 1 and complained of 3/10 in the abdomen  Patient agreed to therapy session and stated she would like to get up to go to the bathroom  Supine to sit transfer completed with min A x1 for education using log roll technique  Sit to stand transfer required S x1  Patient ambulated 30'x2 with S  Patient presents with decreased strength, balance and functional mobility due to pain and recent surgery  Due to current medical status and decline in functional mobility, patient will benefit from skilled physical therapy to address these problems and ensure patient can mobilize in a safe manner  Recommend d/c to home with family when medically stable      Goals   Patient Goals to go to the bathroom   STG Expiration Date 06/29/18   Short Term Goal #1 1-2 wks: (1) bed mobility and transfers with indepenence  (2) improve dynamic sitting/standing balance to good  (3) ambulate 300-400 ft  with independence (4) ascend/descend 1 flight of stairs with independence (5) improve BLE strength by 1/2-1 grade   Treatment Day 0   Plan   Treatment/Interventions Functional transfer training;LE strengthening/ROM; Elevations; Therapeutic exercise; Endurance training;Bed mobility;Gait training   PT Frequency Other (Comment)  (3-5x/wk)   Recommendation   Recommendation Home with family support   PT - OK to Discharge Yes  (to home when medically stable )   Modified [place] Scale   Modified [place] Scale 2   Barthel Index   Feeding 10   Bathing 5   Grooming Score 5   Dressing Score 5   Bladder Score 10   Bowels Score 10   Toilet Use Score 10   Transfers (Bed/Chair) Score 10   Mobility (Level Surface) Score 0   Stairs Score 0   Barthel Index Score 65     Eunice Aguilar, SPT

## 2018-06-15 NOTE — POST OP PROGRESS NOTES
Progress Note - Colorectal   Toshia Stai 48 y o  female MRN: [id number]  Unit/Bed#: [facility] 813-01 Encounter: [id number]      Objective: Doing extremely well post operative day one  No nausea, some incisional pain, no emesis, using PCA of dilaudid  Hungry, coffee drinker  Venadyne on right leg and functioning  No flatus  795 agarwal    Blood pressure 144/83, pulse 70, temperature 98 °F (36 7 °C), temperature source Oral, resp  rate 14, height 5' 5 5" (1 664 m), weight 72 1 kg (159 lb), last menstrual period 05/16/2018, SpO2 100 %  ,Body mass index is 26 06 kg/m²  Intake/Output Summary (Last 24 hours) at 06/15/18 0530  Last data filed at 06/15/18 2935   Gross per 24 hour   Intake           4152 2 ml   Output              995 ml   Net           3157 2 ml       Invasive Devices     Peripheral Intravenous Line            Peripheral IV 06/06/18 Right Antecubital 8 days    Peripheral IV 06/14/18 Left Antecubital less than 1 day          Drain            Urethral Catheter Latex 16 Fr  less than 1 day                Physical Exam:   Abdomen: soft, non distended, midline wound and trochar sites clean and dry, incisional tenderness  Extremities: no calf tenderness, no pedal edema    Lab, Imaging and other studies:  Hgb 9 6; Creat 0 76; Mag 2 0  VTE Pharmacologic Prophylaxis: Heparin  VTE Mechanical Prophylaxis: sequential compression device  Right leg      Assessment:  POD # 1 Diagnostic laparoscopy, extended right hemicolectomy, partial omentectomy, lysis of adhesions, peritoneal biopsy    Plan:  1  OOB today and ambulating halls  2  D/C agarwal catheter today  3  Clears/[place]  may have coffee  4  Incentive spirometry  5  Continue to monitor urine output  6   PT/OT

## 2018-06-15 NOTE — PLAN OF CARE
Problem: PHYSICAL THERAPY ADULT  Goal: Performs mobility at highest level of function for planned discharge setting  See evaluation for individualized goals  Treatment/Interventions: Functional transfer training, LE strengthening/ROM, Elevations, Therapeutic exercise, Endurance training, Bed mobility, Gait training          See flowsheet documentation for full assessment, interventions and recommendations  Prognosis: Good  Problem List: Decreased strength, Impaired balance, Decreased mobility, Pain  Assessment: Patient was seen today for a high complexity physical therapy evaluation  Patient is a 48year old female with a PMH including cancer, DVT, kidney disease, and migraines who was admitted to [facility] on 6/14/18 to undergo a laparoscopic procedure to remove a malignant neoplasm of the colon  Physical therapy was consulted to assess mobility and discharge recommendation  Upon arrival, patient was p/o day 1 and complained of 3/10 in the abdomen  Patient agreed to therapy session and stated she would like to get up to go to the bathroom  Supine to sit transfer completed with min A x1 for education using log roll technique  Sit to stand transfer required S x1  Patient ambulated 30'x2 with S  Patient presents with decreased strength, balance and functional mobility due to pain and recent surgery  Due to current medical status and decline in functional mobility, patient will benefit from skilled physical therapy to address these problems and ensure patient can mobilize in a safe manner  Recommend d/c to home with family when medically stable  Recommendation: Home with family support     PT - OK to Discharge: (S) Yes (to home when medically stable )    See flowsheet documentation for full assessment

## 2018-06-15 NOTE — RESPIRATORY THERAPY NOTE
RT Protocol Note  Marilee Stapleton 48 y o  female MRN: [id number]  Unit/Bed#: [facility] 813-01 Encounter: [id number]    Assessment    Principal Problem:    Adenocarcinoma, metastatic ([facility] )      Home Pulmonary Medications:  none       Past Medical History:   Diagnosis Date    Cancer ([facility] )     DVT (deep venous thrombosis) ([place] [address] )     Kidney disease     Menorrhalgia     RESOLVED 2008    Migraine     Postcoital bleeding     LAST ASSESSED [id number]     Social History     Social History    Marital status: /Civil Union     Spouse name: N/A    Number of children: 2    Years of education: N/A     Social History Main Topics    Smoking status: Never Smoker    Smokeless tobacco: Never Used    Alcohol use 1 8 oz/week     3 Glasses of wine per week    Drug use: No    Sexual activity: Yes     Partners: Male      Comment: PARTNER HAD VASECTOMY      Other Topics Concern    None     Social History Narrative    ALWAYS USES [employer]     CAFFEINE USE     CURRENTLY WORKS FULL TIME     DAILY COFFEE CONSUMPTION    EXERCISE SPORADICALLY     FEELS SAFE AT HOME     LIVES WITH SPOUSE            Subjective         Objective    Physical Exam:   Assessment Type: (P) Assess only  General Appearance: (P) Drowsy  Respiratory Pattern: (P) Normal  Chest Assessment: (P) Chest expansion symmetrical  Bilateral Breath Sounds: (P) Clear  O2 Device: (P) NC    Vitals:  Blood pressure 134/75, pulse 76, temperature 97 9 °F (36 6 °C), temperature source Oral, resp  rate 14, height 5' 5 5" (1 664 m), weight 72 1 kg (159 lb), last menstrual period 05/16/2018, SpO2 99 %  Imaging and other studies: I have personally reviewed pertinent reports  O2 Device: (P) NC     Plan    Respiratory Plan: (P) Discontinue Protocol        Resp Comments: (P) Patient was assessed for Respiratory protocol  Patient has no pulmonary history and takes no respiratory medications at home  BS are clear  I S  is ordered Q1 HR WA per nursing protocol    Respiratory protocol will be D/C'd at this time as no further respiratory intervention is needed

## 2018-06-15 NOTE — OCCUPATIONAL THERAPY NOTE
[address] Evaluation     Patient Name: Stanley Saint Today's Date: 6/15/2018  Problem List  Patient Active Problem List   Diagnosis    Dysmenorrhea    Deep vein thrombosis (DVT) of popliteal vein of right lower extremity (HCC)    Abnormal findings on diagnostic imaging of other abdominal regions, including retroperitoneum    Adenocarcinoma, metastatic ([facility] )     Past Medical History  Past Medical History:   Diagnosis Date    Cancer ([facility] )     DVT (deep venous thrombosis) ([facility] )     Kidney disease     Menorrhalgia     RESOLVED 2008    Migraine     Postcoital bleeding     LAST ASSESSED [id number]     Past Surgical History  Past Surgical History:   Procedure Laterality Date    DILATION AND CURETTAGE OF UTERUS      RESOLVED 2008    ENDOMETRIAL ABLATION      THERMAL     NOVASURE  RESOLVED 2008    ENDOMETRIAL BIOPSY      BY SUCTION   DONE 12/13/2008    OH COLONOSCOPY FLX DX W/COLLJ SPEC WHEN PFRMD N/A 6/13/2018    Procedure: COLONOSCOPY;  Surgeon: Michael Klein MD;  [facility]; Service: Colorectal    OH ESOPHAGOGASTRODUODENOSCOPY TRANSORAL DIAGNOSTIC N/A 6/13/2018    Procedure: ESOPHAGOGASTRODUODENOSCOPY (EGD); Surgeon: Sp Solitario MD;  [facility];   Service: Gastroenterology    OH LAP,VAG HYST,UTERUS 250GMS/<,SALP-OOPH Bilateral 5/21/2018    Procedure: HYSTERECTOMY LAPAROSCOPIC ASSISTED VAGINAL (LAVH) , BILATERAL SALPINGECTOMY;  Surgeon: Salvatore Back DO;  Location:  MAIN OR;  Service: Gynecology         06/15/18 0848   Note Type   Note type Eval only   Restrictions/Precautions   Weight Bearing Precautions Per Order No   Other Precautions Multiple lines;O2;Telemetry  (PCA, 2L O2, No lifting)   Pain Assessment   Pain Score 3   Pain Type Surgical pain   Pain Location Abdomen   Home Living   Type of Home House   Home Layout Multi-level  (bed/master bath 3rd fl, 8 steps to main fl, another 8 to 3rd)   Bathroom Shower/Tub Walk-in shower   Bathroom Toilet Standard   Bathroom Equipment Built-in shower seat   Bathroom Accessibility Accessible   Home Equipment (none)   Additional Comments Able to make accomodations main fl with bed and bath prn   Prior Function   Level of [place] Independent with ADLs and functional mobility   Lives With Spouse  (2 HS aged children)   ADL Assistance Independent   IADLs Independent   Falls in the last 6 months 0   Vocational On disability   Lifestyle   Autonomy I ADL, IADL, no AD, DME needs s/p recent hysterectomy  Psychosocial   Psychosocial (WDL) WDL   ADL   Where Assessed Chair   Eating Assistance (NPO,  no concerns for function)   Grooming Assistance 5  Supervision/Setup  (stand at sink )   [address] 5  Supervision/Setup   LB Bathing Assistance 5  Supervision/Setup   UB Dressing Assistance 5  Supervision/Setup   LB Dressing Assistance 5  [address]  5  Supervision/Setup   Additional Comments overall S and setup primarily due to multiple lines   Bed Mobility   Additional Comments NT  Pt verbalizes roll to sit  Educated pillow support for pain management prn   Transfers   Sit to Stand 5  Supervision   Stand to Sit 5  Supervision   Stand pivot 5  Supervision   Additional Comments No device, lines limit I   Functional Mobility   Functional Mobility 5  Supervision   Additional Comments no device   Balance   Static Sitting Good   Dynamic Sitting Good   Static Standing Fair +  (unsupported)   Dynamic Standing Fair  (unsupported)   Activity Tolerance   Activity Tolerance Patient tolerated treatment well   Nurse Made Aware yes   RUE Assessment   RUE Assessment WNL   LUE Assessment   LUE Assessment WNL   Hand Function   Gross Motor Coordination Functional   Fine Motor Coordination Functional   Sensation   Light Touch No apparent deficits   Vision-Basic Assessment   Current Vision No visual deficits   Cognition   Overall Cognitive Status WFL   Arousal/Participation Alert; Cooperative   Attention Within functional limits   Orientation Level Oriented X4   Memory Within functional limits   Following Commands Follows all commands and directions without difficulty   Comments pleasant, receptive, good understanding precautions, activity guidelines    Assessment   Assessment Pt is a 48 y o  female seen for OT evaluation s/p admit to [facility] on 6/14/2018 w/ Adenocarcinoma, metastatic ([facility] )  Pt is s/p hysterectomy in May, imaging suspicious for abdominal lesion  S/P elective hemicolectomy and peritoneal bx on 6/14  Comorbidities affecting pt's functional performance at time of assessment include: dymenorrhea s/p hysterectomy, recent R DVT  Personal factors affecting pt at time of IE include:steps to enter environment and difficulty performing IADLS   Prior to admission, pt was I ADL, IADL no DME, equip needs s/p hysterectomy  Upon evaluation: Pt requires S and setup ADL and associated functional mobility primarily due to limit of multiple lines  On PCA with good pain control and activity tolerance  Vitals stable, remains on 2 L O2 with plan to wean  Good understanding precautions, activity guidelines  Educated in body mechanics for pain management, lifting restrictions  Recommend LBS for shower, demonstrates safe standing shower with simulation  No further equip needs  From OT standpoint, recommendation at time of d/c would be home Independent  No further OT needs   DC OT     Goals   Patient Goals home   Plan   OT Frequency Eval only   Recommendation   OT Discharge Recommendation Home independent   OT - OK to Discharge Yes  (when medically stable)   Barthel Index   Feeding 10   Bathing 5   Grooming Score 5   Dressing Score 10   Bladder Score 10   Bowels Score 0   Toilet Use Score 10   Transfers (Bed/Chair) Score 15   Mobility (Level Surface) Score 10   Stairs Score 0   Barthel Index Score 75   Modified [place] Scale   Modified [place] Scale 2     MICHELLE Young/ENMANUEL

## 2018-06-15 NOTE — PROGRESS NOTES
Post OP Check - Colorectal Surgery   Doris Meier 48 y o  female MRN: [id number]  Unit/Bed#: [facility] 813-01 Encounter: [id number]    Assessment:  Pt is a 53y o  F with metastatic colon cancer s/p lap extended right hemicolectomy     Plan:  NPO  Clears tomorrow  PCA-D  [email or web address]  xeralto on hold  IS/OOB/ambulate  SQH/SCD    Subjective/Objective   Chief Complaint:     Subjective: Pain controlled  No N/V  Incision c/d/i    Objective:     Blood pressure 121/75, pulse 82, temperature 97 9 °F (36 6 °C), temperature source Oral, resp  rate 14, height 5' 5 5" (1 664 m), weight 72 1 kg (159 lb), last menstrual period 05/16/2018, SpO2 100 %  ,Body mass index is 26 06 kg/m²  Intake/Output Summary (Last 24 hours) at 06/14/18 2238  Last data filed at 06/14/18 2212   Gross per 24 hour   Intake           3152 2 ml   Output              695 ml   Net           2457 2 ml       Invasive Devices     Peripheral Intravenous Line            Peripheral IV 06/06/18 Right Antecubital 8 days    Peripheral IV 06/14/18 Left Antecubital less than 1 day          Drain            Urethral Catheter Latex 16 Fr  less than 1 day                Physical Exam: NAD  AAOx3  Normal respiratory effort  Soft, TTP over incision, ND  No c/c/e    Lab, Imaging and other studies:I have personally reviewed pertinent lab results    , CBC: No results found for: WBC, HGB, HCT, MCV, PLT, ADJUSTEDWBC, MCH, MCHC, RDW, MPV, NRBC, CMP: No results found for: NA, K, CL, CO2, ANIONGAP, BUN, CREATININE, GLUCOSE, CALCIUM, AST, ALT, ALKPHOS, PROT, ALBUMIN, BILITOT, EGFR  VTE Pharmacologic Prophylaxis: Heparin  VTE Mechanical Prophylaxis: sequential compression device

## 2018-06-15 NOTE — PLAN OF CARE
DISCHARGE PLANNING     Discharge to home or other facility with appropriate resources Progressing        INFECTION - ADULT     Absence or prevention of progression during hospitalization Progressing        PAIN - ADULT     Verbalizes/displays adequate comfort level or baseline comfort level Progressing        Potential for Falls     Patient will remain free of falls Progressing        SAFETY ADULT     Maintain or return to baseline ADL function Progressing     Maintain or return mobility status to optimal level Progressing     Patient will remain free of falls Progressing

## 2018-06-16 LAB
ANION GAP SERPL CALCULATED.3IONS-SCNC: 6 MMOL/L (ref 4–13)
BASOPHILS # BLD AUTO: 0.03 THOUSANDS/ΜL (ref 0–0.1)
BASOPHILS NFR BLD AUTO: 0 % (ref 0–1)
BUN SERPL-MCNC: 2 MG/DL (ref 5–25)
CALCIUM SERPL-MCNC: 8.1 MG/DL (ref 8.3–10.1)
CHLORIDE SERPL-SCNC: 106 MMOL/L (ref 100–108)
CO2 SERPL-SCNC: 28 MMOL/L (ref 21–32)
CREAT SERPL-MCNC: 0.71 MG/DL (ref 0.6–1.3)
EOSINOPHIL # BLD AUTO: 0.06 THOUSAND/ΜL (ref 0–0.61)
EOSINOPHIL NFR BLD AUTO: 1 % (ref 0–6)
ERYTHROCYTE [DISTWIDTH] IN BLOOD BY AUTOMATED COUNT: 12.8 % (ref 11.6–15.1)
GFR SERPL CREATININE-BSD FRML MDRD: 99 ML/MIN/1.73SQ M
GLUCOSE SERPL-MCNC: 122 MG/DL (ref 65–140)
HCT VFR BLD AUTO: 26.9 % (ref 34.8–46.1)
HGB BLD-MCNC: 8.3 G/DL (ref 11.5–15.4)
IMM GRANULOCYTES # BLD AUTO: 0.04 THOUSAND/UL (ref 0–0.2)
IMM GRANULOCYTES NFR BLD AUTO: 1 % (ref 0–2)
LYMPHOCYTES # BLD AUTO: 0.94 THOUSANDS/ΜL (ref 0.6–4.47)
LYMPHOCYTES NFR BLD AUTO: 13 % (ref 14–44)
MCH RBC QN AUTO: 28.4 PG (ref 26.8–34.3)
MCHC RBC AUTO-ENTMCNC: 30.9 G/DL (ref 31.4–37.4)
MCV RBC AUTO: 92 FL (ref 82–98)
MONOCYTES # BLD AUTO: 0.84 THOUSAND/ΜL (ref 0.17–1.22)
MONOCYTES NFR BLD AUTO: 12 % (ref 4–12)
NEUTROPHILS # BLD AUTO: 5.42 THOUSANDS/ΜL (ref 1.85–7.62)
NEUTS SEG NFR BLD AUTO: 73 % (ref 43–75)
NRBC BLD AUTO-RTO: 0 /100 WBCS
PLATELET # BLD AUTO: 269 THOUSANDS/UL (ref 149–390)
PMV BLD AUTO: 9.3 FL (ref 8.9–12.7)
POTASSIUM SERPL-SCNC: 3.7 MMOL/L (ref 3.5–5.3)
RBC # BLD AUTO: 2.92 MILLION/UL (ref 3.81–5.12)
SODIUM SERPL-SCNC: 140 MMOL/L (ref 136–145)
WBC # BLD AUTO: 7.33 THOUSAND/UL (ref 4.31–10.16)

## 2018-06-16 PROCEDURE — 80048 BASIC METABOLIC PNL TOTAL CA: CPT | Performed by: PHYSICIAN ASSISTANT

## 2018-06-16 PROCEDURE — 85025 COMPLETE CBC W/AUTO DIFF WBC: CPT | Performed by: PHYSICIAN ASSISTANT

## 2018-06-16 RX ORDER — OXYCODONE HYDROCHLORIDE 10 MG/1
10 TABLET ORAL EVERY 4 HOURS PRN
Status: DISCONTINUED | OUTPATIENT
Start: 2018-06-16 | End: 2018-06-18 | Stop reason: HOSPADM

## 2018-06-16 RX ORDER — OXYCODONE HYDROCHLORIDE 5 MG/1
5 TABLET ORAL EVERY 4 HOURS PRN
Status: DISCONTINUED | OUTPATIENT
Start: 2018-06-16 | End: 2018-06-18 | Stop reason: HOSPADM

## 2018-06-16 RX ADMIN — HEPARIN SODIUM 5000 UNITS: 5000 INJECTION, SOLUTION INTRAVENOUS; SUBCUTANEOUS at 17:46

## 2018-06-16 RX ADMIN — HEPARIN SODIUM 5000 UNITS: 5000 INJECTION, SOLUTION INTRAVENOUS; SUBCUTANEOUS at 00:33

## 2018-06-16 RX ADMIN — OXYCODONE HYDROCHLORIDE 10 MG: 10 TABLET ORAL at 17:44

## 2018-06-16 RX ADMIN — OXYCODONE HYDROCHLORIDE 10 MG: 10 TABLET ORAL at 21:48

## 2018-06-16 RX ADMIN — OXYCODONE HYDROCHLORIDE 10 MG: 10 TABLET ORAL at 11:53

## 2018-06-16 RX ADMIN — DEXTROSE, SODIUM CHLORIDE, AND POTASSIUM CHLORIDE 100 ML/HR: 5; .45; .15 INJECTION INTRAVENOUS at 03:02

## 2018-06-16 RX ADMIN — HEPARIN SODIUM 5000 UNITS: 5000 INJECTION, SOLUTION INTRAVENOUS; SUBCUTANEOUS at 08:22

## 2018-06-16 NOTE — PROGRESS NOTES
Post OP Check - Colorectal Surgery   Stanley Saint 48 y o  female MRN: [id number]  Unit/Bed#: [facility] 813-01 Encounter: [id number]    Assessment:  Pt is a 53y o  F with metastatic colon cancer s/p lap extended right hemicolectomy      - Tolerated clears  No bowel function  No nausea    Plan:  Cont clears  D/c IVF  D/c PCA  xeralto on hold - will consider restarting tomorrow  IS/OOB/ambulate  SQH/SCD    Subjective/Objective   Chief Complaint:     Subjective: No acute events  Objective:     Blood pressure 122/75, pulse (!) 111, temperature 99 6 °F (37 6 °C), temperature source Oral, resp  rate 16, height 5' 5 5" (1 664 m), weight 72 1 kg (159 lb), last menstrual period 05/16/2018, SpO2 93 %  ,Body mass index is 26 06 kg/m²  Intake/Output Summary (Last 24 hours) at 06/16/18 0848  Last data filed at 06/16/18 0745   Gross per 24 hour   Intake           1756 2 ml   Output             3525 ml   Net          -1768 8 ml       Invasive Devices     Peripheral Intravenous Line            Peripheral IV 06/14/18 Left Antecubital 1 day                Physical Exam:   Gen: NAD, AAOx3  CV: RRR  Pulm: no resp distress  Abd: Soft, non-distended, appropriately tender    Lab, Imaging and other studies:I have personally reviewed pertinent lab results    , CBC:   Lab Results   Component Value Date    WBC 7 33 06/16/2018    HGB 8 3 (L) 06/16/2018    HCT 26 9 (L) 06/16/2018    MCV 92 06/16/2018     06/16/2018    MCH 28 4 06/16/2018    MCHC 30 9 (L) 06/16/2018    RDW 12 8 06/16/2018    MPV 9 3 06/16/2018    NRBC 0 06/16/2018   , CMP:   Lab Results   Component Value Date     06/16/2018    K 3 7 06/16/2018     06/16/2018    CO2 28 06/16/2018    ANIONGAP 6 06/16/2018    BUN 2 (L) 06/16/2018    CREATININE 0 71 06/16/2018    GLUCOSE 122 06/16/2018    CALCIUM 8 1 (L) 06/16/2018    EGFR 99 06/16/2018     VTE Pharmacologic Prophylaxis: Heparin  VTE Mechanical Prophylaxis: sequential compression device

## 2018-06-16 NOTE — PLAN OF CARE
DISCHARGE PLANNING     Discharge to home or other facility with appropriate resources Progressing        DISCHARGE PLANNING - CARE MANAGEMENT     Discharge to post-acute care or home with appropriate resources Progressing        INFECTION - ADULT     Absence or prevention of progression during hospitalization Progressing        PAIN - ADULT     Verbalizes/displays adequate comfort level or baseline comfort level Progressing        Potential for Falls     Patient will remain free of falls Progressing        SAFETY ADULT     Maintain or return to baseline ADL function Progressing     Maintain or return mobility status to optimal level Progressing     Patient will remain free of falls Progressing

## 2018-06-17 RX ADMIN — HEPARIN SODIUM 5000 UNITS: 5000 INJECTION, SOLUTION INTRAVENOUS; SUBCUTANEOUS at 08:07

## 2018-06-17 RX ADMIN — OXYCODONE HYDROCHLORIDE 5 MG: 5 TABLET ORAL at 05:54

## 2018-06-17 RX ADMIN — OXYCODONE HYDROCHLORIDE 5 MG: 5 TABLET ORAL at 02:06

## 2018-06-17 RX ADMIN — HEPARIN SODIUM 5000 UNITS: 5000 INJECTION, SOLUTION INTRAVENOUS; SUBCUTANEOUS at 18:38

## 2018-06-17 RX ADMIN — ACETAMINOPHEN 650 MG: 325 TABLET, FILM COATED ORAL at 16:24

## 2018-06-17 RX ADMIN — HEPARIN SODIUM 5000 UNITS: 5000 INJECTION, SOLUTION INTRAVENOUS; SUBCUTANEOUS at 00:13

## 2018-06-17 NOTE — DISCHARGE SUMMARY
Discharge Summary - Colorectal Surgery   Paresh Hauser 48 y o  female MRN: [id number]  Unit/Bed#: [facility] 597-55 Encounter: [id number]    Admission Date: 6/14/18    Admitting Diagnosis: Primary malignant neoplasm ([address] ) [C80 1]  Other specified diseases of intestine [K63 89]    HPI: Francis Styles is a pleasant 56yo female, history of DVT the past 2 years treated with xarelto  She is status post laparoscopic assisted vaginal hysterectomy, bilateral salpingectomy by Dr Katharine Vaca on 5/21/2018  Pathology showed invasive adenocarcinoma, of enteric immunophenotype, present in both fallopian tubes referred on pathology/CT results by Dr Gilbert Osborne      She had a CT scan on 6/6/2018 which showed a suspicious lesion in the right abdomen, most likely colon malignancy, proximal transverse colon, with less likely possibilities including colon malignancy with fistula to the small bowel or even less likely, small bowel malignancy with fistula to the colon, multiple hepatic lesions compatible with metastasis, mild left iliac adenopathy and borderline interaortocaval adenopathy, no evidence of metastatic disease within the chest       Her CEA level on 6/4/2018 was 2 5, CA-125 34  1      She has not had a screening colonoscopy and does not have any GI symptoms       Denies nausea/vomiting/abdominal pain or rectal bleeding, stools have been more frequent since surgery however previously typically with every other day bowel movement  48year old female with 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    Procedures Performed: 6/14/18 Extended right hemicolectomy, peritoneal implant biopsy -   [facility] Course: The patient was brought into the hospital on 6/14 and underwent a diagnostic laparoscopy followed by right hemicolectomy with biopsy of peritoneal metastasis  Her liver had palpable metastatic disease  Her diet was advanced which she tolerated   Her Xarelto was held during her admission and restarted on discharge  Patient's abdominal wound has stayed clean and dry  She has been pain controlled throughout the admission  Patient is tolerating a house diet  She is ambulating the halls well  Dr Kristan Bishop is aware of the peritoneal biopsy result  She will follow with them on discharge  Patient will be discharged home today and Dr Inge Garland will schedule the patient for a chanelle-cath insertion 6/28/18 Patient to hold her Xarelto after 6/25/18 dose  Complications: None    Discharge Diagnosis: Transverse colon cancer    Condition at Discharge: Good    Discharge instructions/Information to patient and family:   See after visit summary for information provided to patient and family  Provisions for Follow-Up Care:  See after visit summary for information related to follow-up care and any pertinent home health orders  Disposition: Home    Planned Readmission: No    Discharge Statement   I spent 30 minutes discharging the patient  This time was spent on the day of discharge  I had direct contact with the patient on the day of discharge  Additional documentation is required if more than 30 minutes were spent on discharge  Discharge Medications:  See after visit summary for reconciled discharge medications provided to patient and family

## 2018-06-17 NOTE — PROGRESS NOTES
Progress Note - Colorectal Surgery   Sheela Grandchild 48 y o  female MRN: [id number]  Unit/Bed#: [facility] 813-01 Encounter: [id number]    Assessment:  Pt is a 53y o  F with metastatic colon cancer s/p lap extended right hemicolectomy     Plan:  Regular diet as tolerated  Encourage ambulation  Analgesia  Pulm toilet/IS  DVT ppx  Dispo: Home tomorrow  Resume xarelto today  Will need outpatient follow up for port placement    Subjective/Objective   Chief Complaint:     Subjective: No acute events  Tolerating diet  Denies flatus    Objective:     Blood pressure 108/65, pulse 95, temperature 98 6 °F (37 °C), temperature source Oral, resp  rate 16, height 5' 5 5" (1 664 m), weight 72 1 kg (159 lb), last menstrual period 05/16/2018, SpO2 95 %  ,Body mass index is 26 06 kg/m²  Intake/Output Summary (Last 24 hours) at 06/17/18 0447  Last data filed at 06/16/18 2043   Gross per 24 hour   Intake          1298 67 ml   Output             3925 ml   Net         -2626 33 ml       Invasive Devices     Peripheral Intravenous Line            Peripheral IV 06/14/18 Left Antecubital 2 days                Physical Exam:   Gen: NAD, AAOx3  CV: RRR  Pulm: nonlabored respirations  Abd: Soft, non-distended, appropriately tender  Incision c/d/i    Lab, Imaging and other studies:I have personally reviewed pertinent lab results    , CBC:   Lab Results   Component Value Date    WBC 7 33 06/16/2018    HGB 8 3 (L) 06/16/2018    HCT 26 9 (L) 06/16/2018    MCV 92 06/16/2018     06/16/2018    MCH 28 4 06/16/2018    MCHC 30 9 (L) 06/16/2018    RDW 12 8 06/16/2018    MPV 9 3 06/16/2018    NRBC 0 06/16/2018   , CMP:   Lab Results   Component Value Date     06/16/2018    K 3 7 06/16/2018     06/16/2018    CO2 28 06/16/2018    ANIONGAP 6 06/16/2018    BUN 2 (L) 06/16/2018    CREATININE 0 71 06/16/2018    GLUCOSE 122 06/16/2018    CALCIUM 8 1 (L) 06/16/2018    EGFR 99 06/16/2018     VTE Pharmacologic Prophylaxis: Heparin  VTE Mechanical Prophylaxis: sequential compression device

## 2018-06-18 VITALS
WEIGHT: 159 LBS | RESPIRATION RATE: 16 BRPM | TEMPERATURE: 97.9 F | BODY MASS INDEX: 25.55 KG/M2 | HEART RATE: 99 BPM | OXYGEN SATURATION: 97 % | DIASTOLIC BLOOD PRESSURE: 71 MMHG | SYSTOLIC BLOOD PRESSURE: 125 MMHG | HEIGHT: 66 IN

## 2018-06-18 LAB
ABO GROUP BLD: NORMAL
ANION GAP SERPL CALCULATED.3IONS-SCNC: 9 MMOL/L (ref 4–13)
BASOPHILS # BLD AUTO: 0.03 THOUSANDS/ΜL (ref 0–0.1)
BASOPHILS NFR BLD AUTO: 1 % (ref 0–1)
BLD GP AB SCN SERPL QL: NEGATIVE
BUN SERPL-MCNC: 6 MG/DL (ref 5–25)
CALCIUM SERPL-MCNC: 8.7 MG/DL (ref 8.3–10.1)
CHLORIDE SERPL-SCNC: 104 MMOL/L (ref 100–108)
CO2 SERPL-SCNC: 26 MMOL/L (ref 21–32)
CREAT SERPL-MCNC: 0.67 MG/DL (ref 0.6–1.3)
EOSINOPHIL # BLD AUTO: 0.26 THOUSAND/ΜL (ref 0–0.61)
EOSINOPHIL NFR BLD AUTO: 4 % (ref 0–6)
ERYTHROCYTE [DISTWIDTH] IN BLOOD BY AUTOMATED COUNT: 13.1 % (ref 11.6–15.1)
GFR SERPL CREATININE-BSD FRML MDRD: 102 ML/MIN/1.73SQ M
GLUCOSE SERPL-MCNC: 96 MG/DL (ref 65–140)
HCT VFR BLD AUTO: 31.1 % (ref 34.8–46.1)
HGB BLD-MCNC: 9.7 G/DL (ref 11.5–15.4)
IMM GRANULOCYTES # BLD AUTO: 0.01 THOUSAND/UL (ref 0–0.2)
IMM GRANULOCYTES NFR BLD AUTO: 0 % (ref 0–2)
LYMPHOCYTES # BLD AUTO: 1.14 THOUSANDS/ΜL (ref 0.6–4.47)
LYMPHOCYTES NFR BLD AUTO: 19 % (ref 14–44)
MCH RBC QN AUTO: 28.2 PG (ref 26.8–34.3)
MCHC RBC AUTO-ENTMCNC: 31.2 G/DL (ref 31.4–37.4)
MCV RBC AUTO: 90 FL (ref 82–98)
MONOCYTES # BLD AUTO: 0.64 THOUSAND/ΜL (ref 0.17–1.22)
MONOCYTES NFR BLD AUTO: 11 % (ref 4–12)
NEUTROPHILS # BLD AUTO: 3.85 THOUSANDS/ΜL (ref 1.85–7.62)
NEUTS SEG NFR BLD AUTO: 65 % (ref 43–75)
NRBC BLD AUTO-RTO: 0 /100 WBCS
PLATELET # BLD AUTO: 297 THOUSANDS/UL (ref 149–390)
PMV BLD AUTO: 9.5 FL (ref 8.9–12.7)
POTASSIUM SERPL-SCNC: 3.5 MMOL/L (ref 3.5–5.3)
RBC # BLD AUTO: 3.44 MILLION/UL (ref 3.81–5.12)
RH BLD: POSITIVE
SODIUM SERPL-SCNC: 139 MMOL/L (ref 136–145)
SPECIMEN EXPIRATION DATE: NORMAL
WBC # BLD AUTO: 5.93 THOUSAND/UL (ref 4.31–10.16)

## 2018-06-18 PROCEDURE — 86850 RBC ANTIBODY SCREEN: CPT | Performed by: SURGERY

## 2018-06-18 PROCEDURE — 80048 BASIC METABOLIC PNL TOTAL CA: CPT | Performed by: SURGERY

## 2018-06-18 PROCEDURE — 85025 COMPLETE CBC W/AUTO DIFF WBC: CPT | Performed by: SURGERY

## 2018-06-18 PROCEDURE — 86900 BLOOD TYPING SEROLOGIC ABO: CPT | Performed by: SURGERY

## 2018-06-18 PROCEDURE — 86901 BLOOD TYPING SEROLOGIC RH(D): CPT | Performed by: SURGERY

## 2018-06-18 RX ORDER — POTASSIUM CHLORIDE 14.9 MG/ML
20 INJECTION INTRAVENOUS ONCE
Status: DISCONTINUED | OUTPATIENT
Start: 2018-06-18 | End: 2018-06-18

## 2018-06-18 RX ORDER — POTASSIUM CHLORIDE 20 MEQ/1
20 TABLET, EXTENDED RELEASE ORAL ONCE
Status: COMPLETED | OUTPATIENT
Start: 2018-06-18 | End: 2018-06-18

## 2018-06-18 RX ORDER — POTASSIUM CHLORIDE 14.9 MG/ML
20 INJECTION INTRAVENOUS ONCE
Status: COMPLETED | OUTPATIENT
Start: 2018-06-18 | End: 2018-06-18

## 2018-06-18 RX ORDER — OXYCODONE HYDROCHLORIDE 5 MG/1
5 TABLET ORAL EVERY 4 HOURS PRN
Qty: 20 TABLET | Refills: 0 | Status: SHIPPED | OUTPATIENT
Start: 2018-06-18 | End: 2018-06-28

## 2018-06-18 RX ADMIN — POTASSIUM CHLORIDE 20 MEQ: 1500 TABLET, EXTENDED RELEASE ORAL at 10:13

## 2018-06-18 RX ADMIN — POTASSIUM CHLORIDE 20 MEQ: 200 INJECTION, SOLUTION INTRAVENOUS at 08:15

## 2018-06-18 RX ADMIN — HEPARIN SODIUM 5000 UNITS: 5000 INJECTION, SOLUTION INTRAVENOUS; SUBCUTANEOUS at 02:30

## 2018-06-18 RX ADMIN — ACETAMINOPHEN 650 MG: 325 TABLET, FILM COATED ORAL at 06:25

## 2018-06-18 NOTE — CASE MANAGEMENT
Continued Stay Review    Date/POD#: 6/15/18,   POD #1     Vital Signs:      06/15/18  0700    Temp   98 1 °F (36 7  °C)    Temp src   Oral    Pulse   68    Heart Rate Source       Resp   14    BP   142/75    BP Location   Right arm    BP Method   Automatic    Patient Position - Orthostatic VS   Lying      Medication:   Scheduled Meds:   Current Facility-Administered Medications:  acetaminophen 650 mg Oral Q6H PRN   docusate sodium 100 mg Oral BID PRN   heparin (porcine) 5,000 Units Subcutaneous Q8H [facility]   HYDROmorphone 0 5 mg Intravenous Q3H PRN   ondansetron 4 mg Intravenous Q4H PRN   oxyCODONE 10 mg Oral Q4H PRN   oxyCODONE 5 mg Oral Q4H PRN     Continuous Infusions:  Hydrocodone PCA in place until stop on 6/16 @ 0927    PRN Meds:   Acetaminophen x2    docusate sodium    HYDROmorphone    ondansetron    oxyCODONE  5 mg x2 6/17    oxyCODONE  10 mg x 3     Abnormal Labs/Diagnostic Results:   RBC 2 92, 3 44  H/H 8 3/26 9, 9 7/31 1  WBC WNL       Age/Sex: 48 y o  female     Assessment/Plan:   6/15 Colorectal Surgery Progress Note  POD # 1 Diagnostic laparoscopy, extended right hemicolectomy, partial omentectomy, lysis of adhesions, peritoneal biopsy     Plan:  1  OOB today and ambulating halls  2  D/C agarwal catheter today  3  Clears/Caban  may have coffee  4  Incentive spirometry  5  Continue to monitor urine output  6  PT/OT  _________________  6/16 Colorectal Surgery Progress Note  Pt is a 53y o  F with metastatic colon cancer s/p lap extended right hemicolectomy       - Tolerated clears  No bowel function  No nausea     Plan:  Cont clears  D/c IVF  D/c PCA  xeralto on hold - will consider restarting tomorrow  IS/OOB/ambulate  SQH/SCD  ___________________  6/17 Colorectal Surgery Progress Note  Pt is a 53y o  F with metastatic colon cancer s/p lap extended right hemicolectomy      Plan:  Regular diet as tolerated  Encourage ambulation  Analgesia  Pulm toilet/IS  DVT ppx  Dispo: Home tomorrow   Resume xarelto today  Will need outpatient follow up for port placement      Discharge Plan: home           Thank you,  [address] [facility] in the [facility] by Angelito Kerr for 2017  Network Utilization Review Department  Phone: [phone]; Fax [phone]  ATTENTION: The Network Utilization Review Department is now centralized for our 7 Facilities  Make a note that we have a new phone and fax numbers for our Department  Please call with any questions or concerns to [phone] and carefully follow the prompts so that you are directed to the right person  All voicemails are confidential  Fax any determinations, approvals, denials, and requests for initial or continue stay review clinical to [phone]  Due to HIGH CALL volume, it would be easier if you could please send faxed requests to expedite your requests and in part, help us provide discharge notifications faster

## 2018-06-18 NOTE — PLAN OF CARE
DISCHARGE PLANNING     Discharge to home or other facility with appropriate resources Progressing        DISCHARGE PLANNING - CARE MANAGEMENT     Discharge to post-acute care or home with appropriate resources Progressing        GASTROINTESTINAL - ADULT     Maintains or returns to baseline bowel function Progressing     Maintains adequate nutritional intake Progressing        INFECTION - ADULT     Absence or prevention of progression during hospitalization Progressing        PAIN - ADULT     Verbalizes/displays adequate comfort level or baseline comfort level Progressing        Potential for Falls     Patient will remain free of falls Progressing        SAFETY ADULT     Maintain or return to baseline ADL function Progressing     Maintain or return mobility status to optimal level Progressing     Patient will remain free of falls Progressing

## 2018-06-18 NOTE — POST OP PROGRESS NOTES
Progress Note - Colorectal   Sheela Grandchild 48 y o  female MRN: [id number]  Unit/Bed#: [facility] 813-01 Encounter: [id number]      Objective: Doing well, tolerating small amounts of a house diet  HAving bowel movements   questioning why chanelle-cath before official pathology back  Patient not on her Xarelto yet  No nausea, no vomiting  Ambulating halls well  Incisional pain controlled  2975 UA  2 BM's    Blood pressure 135/81, pulse (!) 106, temperature 98 7 °F (37 1 °C), temperature source Oral, resp  rate 16, height 5' 5 5" (1 664 m), weight 72 1 kg (159 lb), last menstrual period 05/16/2018, SpO2 99 %  ,Body mass index is 26 06 kg/m²  Intake/Output Summary (Last 24 hours) at 06/18/18 0536  Last data filed at 06/17/18 2018   Gross per 24 hour   Intake              210 ml   Output             3825 ml   Net            -3615 ml       Invasive Devices     Peripheral Intravenous Line            Peripheral IV 06/14/18 Left Antecubital 3 days                Physical Exam:   Abdomen: soft, non distended, midline wound clean and dry, incisional tenderness  Extremities: no pedal edema, no calf tenderness, venadyne on right LE    Lab, Imaging and other studies:    VTE Pharmacologic Prophylaxis: Heparin  VTE Mechanical Prophylaxis: sequential compression device    Assessment:  POD # 4 Diagnostic laparoscopy, extended right hemicolectomy, partial omentectomy, lysis of adhesions, peritoneal biopsy    Plan:  1  Patient is NPO for possible chanelle-cath insertion today  2   Home after chanelle-cath or home today

## 2018-06-21 ENCOUNTER — TELEPHONE (OUTPATIENT)
Dept: HEMATOLOGY ONCOLOGY | Facility: CLINIC | Age: 53
End: 2018-06-21

## 2018-06-21 NOTE — TELEPHONE ENCOUNTER
PT called and wanted to talk to the nurse she had a hysterectomy on 5/21/18 also had Pathology report that came back abnormal   Last Thursday they took out a mass out of the colon    And she has a port placement for 06/28/18   She has appt with us 07/10/18 and wanted to know if she has to come in at a earlier date which was suggested by her

## 2018-06-21 NOTE — TELEPHONE ENCOUNTER
Called patient and made her aware that she was rescheduled for 6/26/18 @ [id number] at our [facility] office  Patient was agreeable

## 2018-06-26 ENCOUNTER — OFFICE VISIT (OUTPATIENT)
Dept: HEMATOLOGY ONCOLOGY | Facility: CLINIC | Age: 53
End: 2018-06-26
Payer: COMMERCIAL

## 2018-06-26 VITALS
HEIGHT: 66 IN | DIASTOLIC BLOOD PRESSURE: 70 MMHG | SYSTOLIC BLOOD PRESSURE: 108 MMHG | RESPIRATION RATE: 16 BRPM | TEMPERATURE: 98.3 F | BODY MASS INDEX: 24.91 KG/M2 | WEIGHT: 155 LBS | OXYGEN SATURATION: 92 % | HEART RATE: 102 BPM

## 2018-06-26 DIAGNOSIS — C77.2 METASTASIS TO RETROPERITONEAL LYMPH NODE (HCC): ICD-10-CM

## 2018-06-26 DIAGNOSIS — C78.6 PERITONEAL METASTASES (HCC): ICD-10-CM

## 2018-06-26 DIAGNOSIS — C78.7 LIVER METASTASES (HCC): ICD-10-CM

## 2018-06-26 DIAGNOSIS — C79.9 ADENOCARCINOMA, METASTATIC (HCC): ICD-10-CM

## 2018-06-26 DIAGNOSIS — T45.1X5A CHEMOTHERAPY INDUCED NAUSEA AND VOMITING: Primary | ICD-10-CM

## 2018-06-26 DIAGNOSIS — C18.2 PRIMARY ADENOCARCINOMA OF ASCENDING COLON (HCC): Primary | ICD-10-CM

## 2018-06-26 DIAGNOSIS — R11.2 CHEMOTHERAPY INDUCED NAUSEA AND VOMITING: Primary | ICD-10-CM

## 2018-06-26 DIAGNOSIS — C18.2 MALIGNANT NEOPLASM OF ASCENDING COLON (HCC): ICD-10-CM

## 2018-06-26 PROCEDURE — 99215 OFFICE O/P EST HI 40 MIN: CPT | Performed by: INTERNAL MEDICINE

## 2018-06-26 RX ORDER — LIDOCAINE AND PRILOCAINE 25; 25 MG/G; MG/G
CREAM TOPICAL
Qty: 30 G | Refills: 0 | Status: SHIPPED | OUTPATIENT
Start: 2018-06-26 | End: 2019-03-07 | Stop reason: SDUPTHER

## 2018-06-26 RX ORDER — ONDANSETRON HYDROCHLORIDE 8 MG/1
8 TABLET, FILM COATED ORAL EVERY 8 HOURS PRN
Qty: 20 TABLET | Refills: 1 | Status: SHIPPED | OUTPATIENT
Start: 2018-06-26 | End: 2018-11-09 | Stop reason: ALTCHOICE

## 2018-06-26 NOTE — LETTER
June 27, 2018     Tacho Rader MD  [address] [facility]  [address]  [address] [place] [address] [facility]    Patient: Kahlil Jeter   YOB: 1965   Date of Visit: 6/26/2018       Dear Dr Inocencia Aleman: Thank you for referring Patria Galdamez to me for evaluation  Below are my notes for this consultation  If you have questions, please do not hesitate to call me  I look forward to following your patient along with you  Sincerely,        Linnea Ramirez MD        CC: No Recipients  Linnea Ramirez MD  6/27/2018 12:14 AM  Sign at close encounter    HPI:   Kahlil Jeter is a 48 y o  female She is here with her   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 and patient has been on Xarelto  1 time she had positive lupus anticoagulant but that was negative the next time  Patient had DVT right lower leg below the knee in May 2016 and that happened after taking hormonal therapy in April 2016  She stayed on Xarelto until end of September 2016  She had heavy periods in  January 2018 and she was in bed for 3 days and she developed a clot in the left lower leg distally below the knee  In 2016 she tested positive once for lupus anticoagulant and that was negative on repeat test   Patient  underwent  hysterectomy, GALI and BSO on 05/21 /18  There were cancer cells and fallopian tubes  Further workup showed mass in the right colon and liver metastasis  Also Abdominal/retroperitoneal lymphadenopathy on CT scan  On 6/14/18 patient underwent extended right hemicolectomy, partial omentectomy and biopsy of the peritoneal nodule  Peritoneal nodule came back  positive for metastatic adenocarcinoma from colon  Patient has almost recovered from surgery     Has some tiredness  She has regular bowel movements now  She will be going for Port-A-Cath this week    She wants chemotherapy to be started on 07/16/2018 to  schedule in relation to her vacations in August 6 cm T3 G2 right colon cancer with positive margins, lymphovascular invasion and perineural invasion and positive 3 of 27 lymph nodes with extranodal extension and positive peritoneal nodule biopsy  Stage IV disease because of liver metastases  Current Outpatient Prescriptions:     acetaminophen (TYLENOL) 500 mg tablet, Take 1,000 mg by mouth every 6 (six) hours as needed for mild pain, Disp: , Rfl:     lidocaine-prilocaine (EMLA) cream, Apply 1 hour prior to chemo, cover in wrap, Disp: 30 g, Rfl: 0    ondansetron (ZOFRAN) 8 mg tablet, Take 1 tablet (8 mg total) by mouth every 8 (eight) hours as needed for nausea or vomiting, Disp: 20 tablet, Rfl: 1    oxyCODONE (ROXICODONE) 5 mg immediate release tablet, Take 1 tablet (5 mg total) by mouth every 4 (four) hours as needed for moderate pain for up to 10 days Earliest Fill Date: 6/18/18 Max Daily Amount: 30 mg, Disp: 20 tablet, Rfl: 0    XARELTO 20 MG tablet, Take 1 tablet (20 mg total) by mouth daily, Disp: 30 tablet, Rfl: 2    No Known Allergies     No history exists  ROS:  06/26/18 Reviewed 13 systems:  Presently no headaches, seizures, dizziness, diplopia, dysphagia, hoarseness, chest pain, palpitations, shortness of breath, cough, hemoptysis, abdominal pain, nausea, vomiting, change in bowel habits, melena, hematuria, fever, chills, bleeding, bone pains, skin rash, weight loss, arthritic symptoms,  weakness, numbness,  claudication and gait problem  No frequent infections  Not unusually sensitive to heat or cold  No swelling of the ankles  No swollen glands  Patient is anxious   Other symptoms are in HPI        /70 (BP Location: Right arm, Cuff Size: Adult)   Pulse 102   Temp 98 3 °F (36 8 °C) (Oral)   Resp 16   Ht 5' 5 5" (1 664 m)   Wt 70 3 kg (155 lb)   LMP 05/16/2018 (Exact Date)   SpO2 92%   BMI 25 40 kg/m²      Physical Exam:  Alert, oriented, not in distress, no icterus, no oral thrush, no palpable neck mass, clear lung fields, regular heart rate, abdomen soft and non tender, healing midline scar , no palpable abdominal mass, no ascites, no edema of ankles, no calf tenderness, no focal neurological deficit, no skin rash, no palpable lymphadenopathy, good arterial pulses, no clubbing  Patient is anxious  Performance status 1      IMAGING:  Follow-up CT scan findings as in HPI    LABS:  Results for orders placed or performed during the hospital encounter of 06/14/18   CBC and differential   Result Value Ref Range    WBC 8 82 4 31 - 10 16 Thousand/uL    RBC 3 45 (L) 3 81 - 5 12 Million/uL    Hemoglobin 9 6 (L) 11 5 - 15 4 g/dL    Hematocrit 31 5 (L) 34 8 - 46 1 %    MCV 91 82 - 98 fL    MCH 27 8 26 8 - 34 3 pg    MCHC 30 5 (L) 31 4 - 37 4 g/dL    RDW 12 5 11 6 - 15 1 %    MPV 8 9 8 9 - 12 7 fL    Platelets 827 743 - 677 Thousands/uL    nRBC 0 /100 WBCs    Neutrophils Relative 87 (H) 43 - 75 %    Immat GRANS % 0 0 - 2 %    Lymphocytes Relative 4 (L) 14 - 44 %    Monocytes Relative 9 4 - 12 %    Eosinophils Relative 0 0 - 6 %    Basophils Relative 0 0 - 1 %    Neutrophils Absolute 7 62 1 85 - 7 62 Thousands/µL    Immature Grans Absolute 0 03 0 00 - 0 20 Thousand/uL    Lymphocytes Absolute 0 34 (L) 0 60 - 4 47 Thousands/µL    Monocytes Absolute 0 83 0 17 - 1 22 Thousand/µL    Eosinophils Absolute 0 00 0 00 - 0 61 Thousand/µL    Basophils Absolute 0 00 0 00 - 0 10 Thousands/µL   Basic metabolic panel   Result Value Ref Range    Sodium 139 136 - 145 mmol/L    Potassium 4 6 3 5 - 5 3 mmol/L    Chloride 107 100 - 108 mmol/L    CO2 25 21 - 32 mmol/L    Anion Gap 7 4 - 13 mmol/L    BUN 6 5 - 25 mg/dL    Creatinine 0 76 0 60 - 1 30 mg/dL    Glucose 126 65 - 140 mg/dL    Calcium 8 4 8 3 - 10 1 mg/dL    eGFR 91 ml/min/1 73sq m   Magnesium   Result Value Ref Range    Magnesium 2 0 1 6 - 2 6 mg/dL   CBC and differential   Result Value Ref Range    WBC 7 33 4 31 - 10 16 Thousand/uL    RBC 2 92 (L) 3 81 - 5 12 Million/uL    Hemoglobin 8 3 (L) 11 5 - 15 4 g/dL    Hematocrit 26 9 (L) 34 8 - 46 1 %    MCV 92 82 - 98 fL    MCH 28 4 26 8 - 34 3 pg    MCHC 30 9 (L) 31 4 - 37 4 g/dL    RDW 12 8 11 6 - 15 1 %    MPV 9 3 8 9 - 12 7 fL    Platelets 824 226 - 026 Thousands/uL    nRBC 0 /100 WBCs    Neutrophils Relative 73 43 - 75 %    Immat GRANS % 1 0 - 2 %    Lymphocytes Relative 13 (L) 14 - 44 %    Monocytes Relative 12 4 - 12 %    Eosinophils Relative 1 0 - 6 %    Basophils Relative 0 0 - 1 %    Neutrophils Absolute 5 42 1 85 - 7 62 Thousands/µL    Immature Grans Absolute 0 04 0 00 - 0 20 Thousand/uL    Lymphocytes Absolute 0 94 0 60 - 4 47 Thousands/µL    Monocytes Absolute 0 84 0 17 - 1 22 Thousand/µL    Eosinophils Absolute 0 06 0 00 - 0 61 Thousand/µL    Basophils Absolute 0 03 0 00 - 0 10 Thousands/µL   Basic metabolic panel   Result Value Ref Range    Sodium 140 136 - 145 mmol/L    Potassium 3 7 3 5 - 5 3 mmol/L    Chloride 106 100 - 108 mmol/L    CO2 28 21 - 32 mmol/L    Anion Gap 6 4 - 13 mmol/L    BUN 2 (L) 5 - 25 mg/dL    Creatinine 0 71 0 60 - 1 30 mg/dL    Glucose 122 65 - 140 mg/dL    Calcium 8 1 (L) 8 3 - 10 1 mg/dL    eGFR 99 ml/min/1 73sq m   Basic metabolic panel   Result Value Ref Range    Sodium 139 136 - 145 mmol/L    Potassium 3 5 3 5 - 5 3 mmol/L    Chloride 104 100 - 108 mmol/L    CO2 26 21 - 32 mmol/L    Anion Gap 9 4 - 13 mmol/L    BUN 6 5 - 25 mg/dL    Creatinine 0 67 0 60 - 1 30 mg/dL    Glucose 96 65 - 140 mg/dL    Calcium 8 7 8 3 - 10 1 mg/dL    eGFR 102 ml/min/1 73sq m   CBC and differential   Result Value Ref Range    WBC 5 93 4 31 - 10 16 Thousand/uL    RBC 3 44 (L) 3 81 - 5 12 Million/uL    Hemoglobin 9 7 (L) 11 5 - 15 4 g/dL    Hematocrit 31 1 (L) 34 8 - 46 1 %    MCV 90 82 - 98 fL    MCH 28 2 26 8 - 34 3 pg    MCHC 31 2 (L) 31 4 - 37 4 g/dL    RDW 13 1 11 6 - 15 1 %    MPV 9 5 8 9 - 12 7 fL    Platelets 240 864 - 074 Thousands/uL    nRBC 0 /100 WBCs    Neutrophils Relative 65 43 - 75 %    Immat GRANS % 0 0 - 2 %    Lymphocytes Relative 19 14 - 44 %    Monocytes Relative 11 4 - 12 %    Eosinophils Relative 4 0 - 6 %    Basophils Relative 1 0 - 1 %    Neutrophils Absolute 3 85 1 85 - 7 62 Thousands/µL    Immature Grans Absolute 0 01 0 00 - 0 20 Thousand/uL    Lymphocytes Absolute 1 14 0 60 - 4 47 Thousands/µL    Monocytes Absolute 0 64 0 17 - 1 22 Thousand/µL    Eosinophils Absolute 0 26 0 00 - 0 61 Thousand/µL    Basophils Absolute 0 03 0 00 - 0 10 Thousands/µL   Type and screen   Result Value Ref Range    ABO Grouping A     Rh Factor Positive     Antibody Screen Negative     Specimen Expiration Date [id number]    Type and screen   Result Value Ref Range    ABO Grouping A     Rh Factor Positive     Antibody Screen Negative     Specimen Expiration Date [id number]    Tissue Exam   Result Value Ref Range    Case Report       Surgical Pathology Report                         Case: [id number]                                   Authorizing Provider:  Dylon Chavez MD      Collected:           06/14/2018 1540              Ordering Location:     [address] [place] [address]      Received:            06/14/2018 SYLVIA Kevin [facility] Operating Room                                                      Pathologist:           Rosemary Diaz MD                                                               Intraop:               Rafael Rodriguez MD                                                         Specimens:   A) - Peritoneum, Peritoneal Deposit                                                                 B) - Colon, extended right hemicolectomy                                                   Addendum       RESULTS OF IMMUNOHISTOCHEMICAL ANALYSIS FOR MISMATCH REPAIR PROTEIN LOSS     INTERPRETATION: No loss of nuclear expression of MMR proteins: Low probability of MSI-H    Note: Background non-neoplastic tissue and/or internal control with intact nuclear expression     RESULTS:  Antibody          Clone               Description                           Results  MLH1               [id number]        Mismatch repair protein       Intact nuclear expression  MSH2              [id number]       Mismatch repair protein       Intact nuclear expression  XTH4              38                     Mismatch repair protein       Intact nuclear expression  PMS2              MRQ-28           Mismatch repair protein       Intact nuclear expression     Comment:   A negative control and a positive control for each antibody have been reviewed and accepted      [facility] Specimen ID: [id number]  Evaluator: Vincenzo Marino MD     These tests were developed and their performance characteristics determined by [facility]  [facility] may not be cleared or approved by the [place]  Food and Drug Administration   The FDA determined that such clearance or approval is not necessary   These tests are used for clinical purposes  [facility] should not be regarded as investigational or for research   This laboratory has been approved by [place] [address], designated as a high-complexity laboratory and is qualified to perform these tests  Comments: Patients whose tumors demonstrate lack of expression of one or more DNA mismatch repair proteins might be at risk for Elkins Syndrome  This cancer susceptibility syndrome greatly increases the risk of synchronous and/or metachronous cancers in the affected patients and their family members  Normal expression of all proteins does not completely rule out familial cancer predisposition      The [facility] Elkins Syndrome Surveillance Program Task Forces recommends that all patients with lack of expression of one or more DNA mismatch repair proteins and those with concerning personal or family history should undergo thorough evaluation, counseling and possibly genetic testing    Please contact Moustapha Quiroz, [facility] Counts include 234 beds at the [facility] ([facility] Certified Genetic Counselor) at [phone] if you have questions or wish to schedule an appointment for this patient  Final Diagnosis       COLON CANCER STAGING REPORT     1  Specimen identification:     - Specimen: Terminal ileum, appendix, cecum, right colon and proximal transverse colon     - Procedure: Right hemicolectomy    2  Tumor:     - Tumor site: Proximal transverse colon      - Tumor size: Greatest dimension: 6 5cm     - Macroscopic tumor perforation: Not identified      - Tumor location:   Not applicable      - Macroscopic intactness of mesorectum:  Not applicable      - Histologic Type:  Adenocarcinoma      - Histologic Grade: G2: Moderately differentiated      - Microscopic Tumor Extension (pT3): Tumor invades through the muscularis propria into pericolorectal tissues     3  Margins: All margins are uninvolved by invasive carcinoma    4  Treatment effect (if neoadjuvant therapy): No known presurgical therapy   5  Lymph-vascular invasion: Present    6  Perineural invasion: Present    7  Tumor deposits (discontinuous extramural extension): Present, four (4) deposits   8  Type of polyp in which invasive carcinoma arose: Tubular adenoma    9  Regional lymph nodes (pN1b): Metastatic carcinoma involving three (3) of twenty-seven (27) lymph nodes (3/27)   - Extranodal extension is present, 1mm  10  Additional pathologic findings: Sessile serrated adenoma, no high grade dysplasia   11  Ancillary Studies: MMR IHC is ordered on block B5 and results will be reported in an addendum  12  8th Ed AJCC Stage:  at least Stage IVC - pT3, pN1b, pM1c, G2    A  Peritoneum (biopsy):  - Metastatic adenocarcinoma    B  Terminal ileum, appendix, cecum, ascending and transverse colon (right hemicolectomy):  - Moderately differentiated adenocarcinoma (6 5 cm)  - See synoptic report above ([id number])    Comment:   - One (1) lymph node contains intracapsular lymph-vascular invasion which is not included in the total positive lymph node count     - Intradepartmental consultation concurs with the diagnosis of adenocarcinoma  MOLECULAR TESTING AND CONSULT SLIDES:     Molecular testing:  Blocks B5-B10 are available for molecular testing  Block B1 contains normal tissue for MSI testing  Consult slides:  Slides A1, B5, B16, B34, B36 are available for consultation  Additional Information       All controls performed with the immunohistochemical stains reported above reacted appropriately  These tests were developed and their performance characteristics determined by [facility] or [facility]  They may not be cleared or approved by the [place]  Food and Drug Administration  The FDA has determined that such clearance or approval is not necessary  These tests are used for clinical purposes  They should not be regarded as investigational or for research  This laboratory has been approved by [place] [address], designated as a high-complexity laboratory and is qualified to perform these tests  Intraoperative Consultation       A  FSDx A: Adenocarcinoma  FSDx A called by Dr Marylene Alstrom to Dr Tiesha Lala @ 4:00 PM, 6/14/2018  *Electronic SignatureNicolette Lax  Marylene Alstrom, MD       Gross Description       A  Received fresh for frozen section, labeled with patient's name and medical record number, and designated "peritoneal deposit" are three small nodules of light tan soft tissue, varying between 0 2 and 0 3 cm diameter, entirely submitted in A1 for frozen section   dwa  B  The specimen is received in formalin, labeled with the patient's name and medical record number, and is designated "extended right hemicolectomy, is an ileocolectomy specimen including 6 8 cm of terminal ileum and 34 6 cm of cecum, ascending colon, and proximal transverse colon  The margin circumferences are 4 3 cm proximally and 6 5 cm distally  There is an abundant amount of attached mesenteric fat  The appendix is present and measures 4 6 cm in length by 0 6 cm in diameter    The appendix, which is uninvolved by tumor, has a tan, smooth, glistening, and focally hemorrhagic serosal surface and patent lumen  The serosa of the bowel is tan gray, smooth, glistening, and contains a focally puckered area located 5 0 cm away from the distal margin  There is a 6 5 x 6 5 x 1 5 cm tan-brown, polypoid, and centrally necrotic tumor mass located in the proximal transverse colon, 3 5 cm away from the distal margin, 25 0 cm away from proximal margin and 19 5 cm away from the ileocecal valve  The mass correlates with the focally puckered area on the serosal surface  The mass is circumferential and there is no dilation of the adjacent proximal bowel  The mass invades into the bowel wall and is possibly present in the pericolonic adipose tissue  The mass comes to within 4 7 cm of the mesenteric/vascular margin which appear to be the same  In addition, there is a tan polypoid structure (1 2 cm in greatest dimension), located 14 1 cm proximal to the mass  The uninvolved mucosa is tan and glistening with normal-appearing folds  32 possible lymph nodes are identified in the pericolonic adipose tissue, ranging in size from 0 1 to 1 5 cm in maximal dimension  Photographs are taken  Representative sections are submitted as follows:    1:  Proximal ileal margin, shaved  2:  Distal margin, shaved  3:  Mesenteric/vascular margin, shaved  4:  Appendix  5-9:  Mass to include greatest depth of invasion  10:   Mass and adjacent mucosa  11:  Polypoid structure, bisected  12:  Representative section of ileum  13:  Ileocecal valve, perpendicular section  14:  Representative sections of large bowel  15-18:  Each cassette contains 4 possible lymph nodes, entirely submitted  19-30:  Each cassette contains 1 possible lymph node, bisected, entirely submitted  31-32:  Each cassette contains 1 possible lymph node, trisected, entirely submitted  33-34:  1 possible lymph node, trisected, entirely submitted  35-36:  1 possible lymph node, quadrisected, entirely submitted    Note: The estimated total formalin fixation time based upon information provided by the submitting clinician and the standard processing schedule is over 72 hours  RRavotti          Fingerstick Glucose (POCT)   Result Value Ref Range    POC Glucose 144 (H) 65 - 140 mg/dl     Labs, Imaging, & Other studies:   All pertinent labs and imaging studies were personally reviewed    Lab Results   Component Value Date     06/18/2018    K 3 5 06/18/2018     06/18/2018    CO2 26 06/18/2018    ANIONGAP 9 06/18/2018    BUN 6 06/18/2018    CREATININE 0 67 06/18/2018    GLUCOSE 96 06/18/2018    GLUF 93 05/08/2018    CALCIUM 8 7 06/18/2018    AST 13 05/08/2018    ALT 20 05/08/2018    ALKPHOS 45 (L) 05/08/2018    PROT 6 9 05/08/2018    BILITOT 0 44 05/08/2018    EGFR 102 06/18/2018     Lab Results   Component Value Date    WBC 5 93 06/18/2018    HGB 9 7 (L) 06/18/2018    HCT 31 1 (L) 06/18/2018    MCV 90 06/18/2018     06/18/2018       Reviewed and discussed with patient  Assessment and plan:  Ely Santos is a 48 y o  female She is here with her   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 and patient has been on Xarelto  1 time she had positive lupus anticoagulant but that was negative the next time  Patient had DVT right lower leg below the knee in May 2016 and that happened after taking hormonal therapy in April 2016  She stayed on Xarelto until end of September 2016  She had heavy periods in  January 2018 and she was in bed for 3 days and she developed a clot in the left lower leg distally below the knee  In 2016 she tested positive once for lupus anticoagulant and that was negative on repeat test   Patient  underwent  hysterectomy, GALI and BSO on 05/21 /18  There were cancer cells and fallopian tubes  Further workup showed mass in the right colon and liver metastasis  Also Abdominal/retroperitoneal lymphadenopathy on CT scan     On 6/14/18 patient underwent extended right hemicolectomy, partial omentectomy and biopsy of the peritoneal nodule  Peritoneal nodule came back  positive for metastatic adenocarcinoma from colon  Patient has almost recovered from surgery     Has some tiredness  She has regular bowel movements now  She will be going for Port-A-Cath this week  She wants chemotherapy to be started on 07/16/2018 to  schedule in relation to her vacations in August   6 cm T3 G2 right colon cancer with positive margins, lymphovascular invasion and perineural invasion and positive 3 of 27 lymph nodes with extranodal extension and positive peritoneal nodule biopsy  Stage IV disease because of liver metastases  Physical examination and test results especially pathological diagnosis and CT scan findings are as recorded and discussed in very much detail  Suggested chemotherapy, combination of Avastin plus either FOLFIRI or FOLFOX  Also to send specimen for JANE family and B Kirby     Myself and my nurse discussed chemotherapy plus Avastin with patient and her  in detail  Discussed these chemotherapy medications in detail with patient and her   She was leaning towards Avastin plus FOLFIRI to start with  Patient signed informed consent for Avastin plus FOLFIRI  See orders below  1  Primary adenocarcinoma of ascending colon ([facility] [address] )     - MRI brain w wo contrast; Future  - CBC and differential; Standing  - Comprehensive metabolic panel; Standing  - UA (URINE) with reflex to Microscopic; Standing  - CBC and differential  - Comprehensive metabolic panel  - UA (URINE) with reflex to Microscopic  - MISCELLANEOUS LAB TEST; Future    2   Adenocarcinoma, metastatic ([facility] [address] )    - MRI brain w wo contrast; Future  - CBC and differential; Standing  - Comprehensive metabolic panel; Standing  - UA (URINE) with reflex to Microscopic; Standing  - CBC and differential  - Comprehensive metabolic panel  - UA (URINE) with reflex to Microscopic  - MISCELLANEOUS LAB TEST; Future    3  Liver metastases ([facility] [address] )    - MRI brain w wo contrast; Future  - CBC and differential; Standing  - Comprehensive metabolic panel; Standing  - UA (URINE) with reflex to Microscopic; Standing  - CBC and differential  - Comprehensive metabolic panel  - UA (URINE) with reflex to Microscopic  - MISCELLANEOUS LAB TEST; Future    4  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[facility] [address] )    - MRI brain w wo contrast; Future  - CBC and differential; Standing  - Comprehensive metabolic panel; Standing  - UA (URINE) with reflex to Microscopic; Standing  - CBC and differential  - Comprehensive metabolic panel  - UA (URINE) with reflex to Microscopic  - MISCELLANEOUS LAB TEST; Future    5  Peritoneal metastases ([facility] [address] )    - MRI brain w wo contrast; Future  - CBC and differential; Standing  - Comprehensive metabolic panel; Standing  - UA (URINE) with reflex to Microscopic; Standing  - CBC and differential  - Comprehensive metabolic panel  - UA (URINE) with reflex to Microscopic  - MISCELLANEOUS LAB TEST; Future            Patient voiced understanding and agreement in the discussion  Counseling / Coordination of Care:  50 min   Greater than 50% of total time was spent with the patient and / or family counseling and / or coordination of care

## 2018-06-27 NOTE — PROGRESS NOTES
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 and patient has been on Xarelto  1 time she had positive lupus anticoagulant but that was negative the next time  Patient had DVT right lower leg below the knee in May 2016 and that happened after taking hormonal therapy in April 2016  She stayed on Xarelto until end of September 2016  She had heavy periods in  January 2018 and she was in bed for 3 days and she developed a clot in the left lower leg distally below the knee  In 2016 she tested positive once for lupus anticoagulant and that was negative on repeat test   Patient underwent  hysterectomy, GALI and BSO on 05/21 /18  There were cancer cells and fallopian tubes  Further workup showed mass in the right colon and liver metastasis  Also Abdominal/retroperitoneal lymphadenopathy on CT scan  On 6/14/18 patient underwent extended right hemicolectomy, partial omentectomy and biopsy of the peritoneal nodule  Peritoneal nodule came back  positive for metastatic adenocarcinoma from colon  Patient has almost recovered from surgery     Has some tiredness  She has regular bowel movements now  She will be going for Port-A-Cath this week  She wants chemotherapy to be started on 07/16/2018 to  schedule in relation to her vacations in August   6 cm T3 G2 right colon cancer with positive margins, lymphovascular invasion and perineural invasion and positive 3 of 27 lymph nodes with extranodal extension and positive peritoneal nodule biopsy  Stage IV disease because of liver metastases      Current Outpatient Prescriptions:     acetaminophen (TYLENOL) 500 mg tablet, Take 1,000 mg by mouth every 6 (six) hours as needed for mild pain, Disp: , Rfl:     lidocaine-prilocaine (EMLA) cream, Apply 1 hour prior to chemo, cover in wrap, Disp: 30 g, Rfl: 0    ondansetron (ZOFRAN) 8 mg tablet, Take 1 tablet (8 mg total) by mouth every 8 (eight) hours as needed for nausea or vomiting, Disp: 20 tablet, Rfl: 1    oxyCODONE (ROXICODONE) 5 mg immediate release tablet, Take 1 tablet (5 mg total) by mouth every 4 (four) hours as needed for moderate pain for up to 10 days Earliest Fill Date: 6/18/18 Max Daily Amount: 30 mg, Disp: 20 tablet, Rfl: 0    XARELTO 20 MG tablet, Take 1 tablet (20 mg total) by mouth daily, Disp: 30 tablet, Rfl: 2    No Known Allergies     No history exists  ROS:  06/26/18 Reviewed 13 systems:  Presently no headaches, seizures, dizziness, diplopia, dysphagia, hoarseness, chest pain, palpitations, shortness of breath, cough, hemoptysis, abdominal pain, nausea, vomiting, change in bowel habits, melena, hematuria, fever, chills, bleeding, bone pains, skin rash, weight loss, arthritic symptoms,  weakness, numbness,  claudication and gait problem  No frequent infections  Not unusually sensitive to heat or cold  No swelling of the ankles  No swollen glands  Patient is anxious  Other symptoms are in HPI        /70 (BP Location: Right arm, Cuff Size: Adult)   Pulse 102   Temp 98 3 °F (36 8 °C) (Oral)   Resp 16   Ht 5' 5 5" (1 664 m)   Wt 70 3 kg (155 lb)   LMP 05/16/2018 (Exact Date)   SpO2 92%   BMI 25 40 kg/m²     Physical Exam:  Alert, oriented, not in distress, no icterus, no oral thrush, no palpable neck mass, clear lung fields, regular heart rate, abdomen  soft and non tender, healing midline scar , no palpable abdominal mass, no ascites, no edema of ankles, no calf tenderness, no focal neurological deficit, no skin rash, no palpable lymphadenopathy, good arterial pulses, no clubbing  Patient is anxious  Performance status 1      IMAGING:  Follow-up CT scan findings as in HPI    LABS:  Results for orders placed or performed during the hospital encounter of 06/14/18   CBC and differential   Result Value Ref Range    WBC 8 82 4 31 - 10 16 Thousand/uL    RBC 3 45 (L) 3 81 - 5 12 Million/uL    Hemoglobin 9 6 (L) 11 5 - 15 4 g/dL    Hematocrit 31 5 (L) 34 8 - 46 1 %    MCV 91 82 - 98 fL    MCH 27 8 26 8 - 34 3 pg    MCHC 30 5 (L) 31 4 - 37 4 g/dL    RDW 12 5 11 6 - 15 1 %    MPV 8 9 8 9 - 12 7 fL    Platelets 343 441 - 612 Thousands/uL    nRBC 0 /100 WBCs    Neutrophils Relative 87 (H) 43 - 75 %    Immat GRANS % 0 0 - 2 %    Lymphocytes Relative 4 (L) 14 - 44 %    Monocytes Relative 9 4 - 12 %    Eosinophils Relative 0 0 - 6 %    Basophils Relative 0 0 - 1 %    Neutrophils Absolute 7 62 1 85 - 7 62 Thousands/µL    Immature Grans Absolute 0 03 0 00 - 0 20 Thousand/uL    Lymphocytes Absolute 0 34 (L) 0 60 - 4 47 Thousands/µL    Monocytes Absolute 0 83 0 17 - 1 22 Thousand/µL    Eosinophils Absolute 0 00 0 00 - 0 61 Thousand/µL    Basophils Absolute 0 00 0 00 - 0 10 Thousands/µL   Basic metabolic panel   Result Value Ref Range    Sodium 139 136 - 145 mmol/L    Potassium 4 6 3 5 - 5 3 mmol/L    Chloride 107 100 - 108 mmol/L    CO2 25 21 - 32 mmol/L    Anion Gap 7 4 - 13 mmol/L    BUN 6 5 - 25 mg/dL    Creatinine 0 76 0 60 - 1 30 mg/dL    Glucose 126 65 - 140 mg/dL    Calcium 8 4 8 3 - 10 1 mg/dL    eGFR 91 ml/min/1 73sq m   Magnesium   Result Value Ref Range    Magnesium 2 0 1 6 - 2 6 mg/dL   CBC and differential   Result Value Ref Range    WBC 7 33 4 31 - 10 16 Thousand/uL    RBC 2 92 (L) 3 81 - 5 12 Million/uL    Hemoglobin 8 3 (L) 11 5 - 15 4 g/dL    Hematocrit 26 9 (L) 34 8 - 46 1 %    MCV 92 82 - 98 fL    MCH 28 4 26 8 - 34 3 pg    MCHC 30 9 (L) 31 4 - 37 4 g/dL    RDW 12 8 11 6 - 15 1 %    MPV 9 3 8 9 - 12 7 fL    Platelets 667 113 - 238 Thousands/uL    nRBC 0 /100 WBCs    Neutrophils Relative 73 43 - 75 %    Immat GRANS % 1 0 - 2 %    Lymphocytes Relative 13 (L) 14 - 44 %    Monocytes Relative 12 4 - 12 %    Eosinophils Relative 1 0 - 6 %    Basophils Relative 0 0 - 1 %    Neutrophils Absolute 5 42 1 85 - 7 62 Thousands/µL    Immature Grans Absolute 0 04 0 00 - 0 20 Thousand/uL    Lymphocytes Absolute 0 94 0 60 - 4 47 Thousands/µL    Monocytes Absolute 0 84 0 17 - 1 22 Thousand/µL    Eosinophils Absolute 0 06 0 00 - 0 61 Thousand/µL    Basophils Absolute 0 03 0 00 - 0 10 Thousands/µL   Basic metabolic panel   Result Value Ref Range    Sodium 140 136 - 145 mmol/L    Potassium 3 7 3 5 - 5 3 mmol/L    Chloride 106 100 - 108 mmol/L    CO2 28 21 - 32 mmol/L    Anion Gap 6 4 - 13 mmol/L    BUN 2 (L) 5 - 25 mg/dL    Creatinine 0 71 0 60 - 1 30 mg/dL    Glucose 122 65 - 140 mg/dL    Calcium 8 1 (L) 8 3 - 10 1 mg/dL    eGFR 99 ml/min/1 73sq m   Basic metabolic panel   Result Value Ref Range    Sodium 139 136 - 145 mmol/L    Potassium 3 5 3 5 - 5 3 mmol/L    Chloride 104 100 - 108 mmol/L    CO2 26 21 - 32 mmol/L    Anion Gap 9 4 - 13 mmol/L    BUN 6 5 - 25 mg/dL    Creatinine 0 67 0 60 - 1 30 mg/dL    Glucose 96 65 - 140 mg/dL    Calcium 8 7 8 3 - 10 1 mg/dL    eGFR 102 ml/min/1 73sq m   CBC and differential   Result Value Ref Range    WBC 5 93 4 31 - 10 16 Thousand/uL    RBC 3 44 (L) 3 81 - 5 12 Million/uL    Hemoglobin 9 7 (L) 11 5 - 15 4 g/dL    Hematocrit 31 1 (L) 34 8 - 46 1 %    MCV 90 82 - 98 fL    MCH 28 2 26 8 - 34 3 pg    MCHC 31 2 (L) 31 4 - 37 4 g/dL    RDW 13 1 11 6 - 15 1 %    MPV 9 5 8 9 - 12 7 fL    Platelets 962 205 - 943 Thousands/uL    nRBC 0 /100 WBCs    Neutrophils Relative 65 43 - 75 %    Immat GRANS % 0 0 - 2 %    Lymphocytes Relative 19 14 - 44 %    Monocytes Relative 11 4 - 12 %    Eosinophils Relative 4 0 - 6 %    Basophils Relative 1 0 - 1 %    Neutrophils Absolute 3 85 1 85 - 7 62 Thousands/µL    Immature Grans Absolute 0 01 0 00 - 0 20 Thousand/uL    Lymphocytes Absolute 1 14 0 60 - 4 47 Thousands/µL    Monocytes Absolute 0 64 0 17 - 1 22 Thousand/µL    Eosinophils Absolute 0 26 0 00 - 0 61 Thousand/µL    Basophils Absolute 0 03 0 00 - 0 10 Thousands/µL   Type and screen   Result Value Ref Range    ABO Grouping A     Rh Factor Positive     Antibody Screen Negative     Specimen Expiration Date [id number]    Type and screen   Result Value Ref Range    ABO Grouping A     Rh Factor Positive     Antibody Screen Negative     Specimen Expiration Date [id number]    Tissue Exam   Result Value Ref Range    Case Report       Surgical Pathology Report                         Case: [id number]                                   Authorizing Provider:  Nathaniel Mariano MD      Collected:           06/14/2018 1540              Ordering Location:     [address] [place] [address]      Received:            06/14/2018 [address]  [facility]                                                      Pathologist:           Faby Wells MD                                                               Intraop:               Alexis Powell MD                                                         Specimens:   A) - Peritoneum, Peritoneal Deposit                                                                 B) - Colon, extended right hemicolectomy                                                   Addendum       RESULTS OF IMMUNOHISTOCHEMICAL ANALYSIS FOR MISMATCH REPAIR PROTEIN LOSS     INTERPRETATION: No loss of nuclear expression of MMR proteins: Low probability of MSI-H    Note: Background non-neoplastic tissue and/or internal control with intact nuclear expression       RESULTS:  Antibody          Clone               Description                           Results  MLH1               [id number]        Mismatch repair protein       Intact nuclear expression  MSH2              [id number]       Mismatch repair protein       Intact nuclear expression  MWI1              94                     Mismatch repair protein       Intact nuclear expression  PMS2              MRQ-28           Mismatch repair protein       Intact nuclear expression     Comment:   A negative control and a positive control for each antibody have been reviewed and accepted      [facility] Specimen ID: [id number]  Evaluator: Charlott Hodgkins, MD     These tests were developed and their performance characteristics determined by [facility]  [facility] may not be cleared or approved by the [place]  Food and Drug Administration   The FDA determined that such clearance or approval is not necessary   These tests are used for clinical purposes  [facility] should not be regarded as investigational or for research   This laboratory has been approved by [place] 88, designated as a high-complexity laboratory and is qualified to perform these tests  Comments: Patients whose tumors demonstrate lack of expression of one or more DNA mismatch repair proteins might be at risk for Elkins Syndrome  This cancer susceptibility syndrome greatly increases the risk of synchronous and/or metachronous cancers in the affected patients and their family members  Normal expression of all proteins does not completely rule out familial cancer predisposition      The [facility]'s Elkins Syndrome Surveillance Program Task Forces recommends that all patients with lack of expression of one or more DNA mismatch repair proteins and those with concerning personal or family history should undergo thorough evaluation, counseling and possibly genetic testing  Please contact Stacie Eason, [address] [place] [address] ([address]'s Certified Genetic Counselor) at [phone] if you have questions or wish to schedule an appointment for this patient  Final Diagnosis       COLON CANCER STAGING REPORT     1  Specimen identification:     - Specimen: Terminal ileum, appendix, cecum, right colon and proximal transverse colon     - Procedure: Right hemicolectomy    2   Tumor:     - Tumor site: Proximal transverse colon      - Tumor size: Greatest dimension: 6 5cm     - Macroscopic tumor perforation: Not identified      - Tumor location:   Not applicable      - Macroscopic intactness of mesorectum:  Not applicable      - Histologic Type:  Adenocarcinoma      - Histologic Grade: G2: Moderately differentiated      - Microscopic Tumor Extension (pT3): Tumor invades through the muscularis propria into pericolorectal tissues     3  Margins: All margins are uninvolved by invasive carcinoma    4  Treatment effect (if neoadjuvant therapy): No known presurgical therapy   5  Lymph-vascular invasion: Present    6  Perineural invasion: Present    7  Tumor deposits (discontinuous extramural extension): Present, four (4) deposits   8  Type of polyp in which invasive carcinoma arose: Tubular adenoma    9  Regional lymph nodes (pN1b): Metastatic carcinoma involving three (3) of twenty-seven (27) lymph nodes (3/27)   - Extranodal extension is present, 1mm  10  Additional pathologic findings: Sessile serrated adenoma, no high grade dysplasia   11  Ancillary Studies: MMR IHC is ordered on block B5 and results will be reported in an addendum  12  8th Ed AJCC Stage:  at least Stage IVC - pT3, pN1b, pM1c, G2    A  Peritoneum (biopsy):  - Metastatic adenocarcinoma    B  Terminal ileum, appendix, cecum, ascending and transverse colon (right hemicolectomy):  - Moderately differentiated adenocarcinoma (6 5 cm)  - See synoptic report above ([id number])    Comment:   - One (1) lymph node contains intracapsular lymph-vascular invasion which is not included in the total positive lymph node count  - Intradepartmental consultation concurs with the diagnosis of adenocarcinoma  MOLECULAR TESTING AND CONSULT SLIDES:     Molecular testing:  Blocks B5-B10 are available for molecular testing  Block B1 contains normal tissue for MSI testing  Consult slides:  Slides A1, B5, B16, B34, B36 are available for consultation  Additional Information       All controls performed with the immunohistochemical stains reported above reacted appropriately  These tests were developed and their performance characteristics determined by [facility] or [facility]  They may not be cleared or approved by the [place]  Food and Drug Administration   The FDA has determined that such clearance or approval is not necessary  These tests are used for clinical purposes  They should not be regarded as investigational or for research  This laboratory has been approved by [place] 88, designated as a high-complexity laboratory and is qualified to perform these tests  Intraoperative Consultation       A  FSDx A: Adenocarcinoma  FSDx A called by Dr García to Dr Etienne Gould @ 4:00 PM, 6/14/2018  *Electronic SignatureDesma Crosser  MD Ricky       Gross Description       A  Received fresh for frozen section, labeled with patient's name and medical record number, and designated "peritoneal deposit" are three small nodules of light tan soft tissue, varying between 0 2 and 0 3 cm diameter, entirely submitted in A1 for frozen section   dwa  B  The specimen is received in formalin, labeled with the patient's name and medical record number, and is designated "extended right hemicolectomy, is an ileocolectomy specimen including 6 8 cm of terminal ileum and 34 6 cm of cecum, ascending colon, and proximal transverse colon  The margin circumferences are 4 3 cm proximally and 6 5 cm distally  There is an abundant amount of attached mesenteric fat  The appendix is present and measures 4 6 cm in length by 0 6 cm in diameter  The appendix, which is uninvolved by tumor, has a tan, smooth, glistening, and focally hemorrhagic serosal surface and patent lumen  The serosa of the bowel is tan gray, smooth, glistening, and contains a focally puckered area located 5 0 cm away from the distal margin  There is a 6 5 x 6 5 x 1 5 cm tan-brown, polypoid, and centrally necrotic tumor mass located in the proximal transverse colon, 3 5 cm away from the distal margin, 25 0 cm away from proximal margin and 19 5 cm away from the ileocecal valve  The mass correlates with the focally puckered area on the serosal surface    The mass is circumferential and there is no dilation of the adjacent proximal bowel   The mass invades into the bowel wall and is possibly present in the pericolonic adipose tissue  The mass comes to within 4 7 cm of the mesenteric/vascular margin which appear to be the same  In addition, there is a tan polypoid structure (1 2 cm in greatest dimension), located 14 1 cm proximal to the mass  The uninvolved mucosa is tan and glistening with normal-appearing folds  32 possible lymph nodes are identified in the pericolonic adipose tissue, ranging in size from 0 1 to 1 5 cm in maximal dimension  Photographs are taken  Representative sections are submitted as follows:    1:  Proximal ileal margin, shaved  2:  Distal margin, shaved  3:  Mesenteric/vascular margin, shaved  4:  Appendix  5-9:  Mass to include greatest depth of invasion  10: Mass and adjacent mucosa  11:  Polypoid structure, bisected  12:  Representative section of ileum  13:  Ileocecal valve, perpendicular section  14:  Representative sections of large bowel  15-18:  Each cassette contains 4 possible lymph nodes, entirely submitted  19-30:  Each cassette contains 1 possible lymph node, bisected, entirely submitted  31-32:  Each cassette contains 1 possible lymph node, trisected, entirely submitted  33-34:  1 possible lymph node, trisected, entirely submitted  35-36:  1 possible lymph node, quadrisected, entirely submitted    Note: The estimated total formalin fixation time based upon information provided by the submitting clinician and the standard processing schedule is over 72 hours    RRavotti          Fingerstick Glucose (POCT)   Result Value Ref Range    POC Glucose 144 (H) 65 - 140 mg/dl     Labs, Imaging, & Other studies:   All pertinent labs and imaging studies were personally reviewed    Lab Results   Component Value Date     06/18/2018    K 3 5 06/18/2018     06/18/2018    CO2 26 06/18/2018    ANIONGAP 9 06/18/2018    BUN 6 06/18/2018    CREATININE 0 67 06/18/2018    GLUCOSE 96 06/18/2018    GLUF 93 05/08/2018    CALCIUM 8 7 06/18/2018    AST 13 05/08/2018    ALT 20 05/08/2018    ALKPHOS 45 (L) 05/08/2018    PROT 6 9 05/08/2018    BILITOT 0 44 05/08/2018    EGFR 102 06/18/2018     Lab Results   Component Value Date    WBC 5 93 06/18/2018    HGB 9 7 (L) 06/18/2018    HCT 31 1 (L) 06/18/2018    MCV 90 06/18/2018     06/18/2018       Reviewed and discussed with patient  Assessment and plan:  Eliana Mattson is a 48 y o  female She is here with her   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 and patient has been on Xarelto  1 time she had positive lupus anticoagulant but that was negative the next time  Patient had DVT right lower leg below the knee in May 2016 and that happened after taking hormonal therapy in April 2016  She stayed on Xarelto until end of September 2016  She had heavy periods in  January 2018 and she was in bed for 3 days and she developed a clot in the left lower leg distally below the knee  In 2016 she tested positive once for lupus anticoagulant and that was negative on repeat test   Patient underwent  hysterectomy, GALI and BSO on 05/21 /18  There were cancer cells and fallopian tubes  Further workup showed mass in the right colon and liver metastasis  Also Abdominal/retroperitoneal lymphadenopathy on CT scan  On 6/14/18 patient underwent extended right hemicolectomy, partial omentectomy and biopsy of the peritoneal nodule  Peritoneal nodule came back  positive for metastatic adenocarcinoma from colon  Patient has almost recovered from surgery     Has some tiredness  She has regular bowel movements now  She will be going for Port-A-Cath this week  She wants chemotherapy to be started on 07/16/2018 to  schedule in relation to her vacations in August   6 cm T3 G2 right colon cancer with positive margins, lymphovascular invasion and perineural invasion and positive 3 of 27 lymph nodes with extranodal extension and positive peritoneal nodule biopsy  Stage IV disease because of liver metastases  Physical examination and test results especially pathological diagnosis and CT scan findings are as recorded and discussed in very much detail  Suggested chemotherapy, combination of Avastin plus either FOLFIRI or FOLFOX  Also to send specimen for JANE family and B Kirby     Myself and my nurse discussed chemotherapy plus Avastin with patient and her  in detail  Discussed these chemotherapy medications in detail with patient and her   She was leaning towards Avastin plus FOLFIRI to start with  Patient signed informed consent for Avastin plus FOLFIRI  See orders below  1  Primary adenocarcinoma of ascending colon ([facility] )     - MRI brain w wo contrast; Future  - CBC and differential; Standing  - Comprehensive metabolic panel; Standing  - UA (URINE) with reflex to Microscopic; Standing  - CBC and differential  - Comprehensive metabolic panel  - UA (URINE) with reflex to Microscopic  - MISCELLANEOUS LAB TEST; Future    2  Adenocarcinoma, metastatic ([facility] )    - MRI brain w wo contrast; Future  - CBC and differential; Standing  - Comprehensive metabolic panel; Standing  - UA (URINE) with reflex to Microscopic; Standing  - CBC and differential  - Comprehensive metabolic panel  - UA (URINE) with reflex to Microscopic  - MISCELLANEOUS LAB TEST; Future    3  Liver metastases ([facility] )    - MRI brain w wo contrast; Future  - CBC and differential; Standing  - Comprehensive metabolic panel; Standing  - UA (URINE) with reflex to Microscopic; Standing  - CBC and differential  - Comprehensive metabolic panel  - UA (URINE) with reflex to Microscopic  - MISCELLANEOUS LAB TEST; Future    4   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[facility] )    - MRI brain w wo contrast; Future  - CBC and differential; Standing  - Comprehensive metabolic panel; Standing  - UA (URINE) with reflex to Microscopic; Standing  - CBC and differential  - Comprehensive metabolic panel  - UA (URINE) with reflex to Microscopic  - MISCELLANEOUS LAB TEST; Future    5  Peritoneal metastases ([address] )    - MRI brain w wo contrast; Future  - CBC and differential; Standing  - Comprehensive metabolic panel; Standing  - UA (URINE) with reflex to Microscopic; Standing  - CBC and differential  - Comprehensive metabolic panel  - UA (URINE) with reflex to Microscopic  - MISCELLANEOUS LAB TEST; Future            Patient voiced understanding and agreement in the discussion  Counseling / Coordination of Care:  50 min   Greater than 50% of total time was spent with the patient and / or family counseling and / or coordination of care

## 2018-06-28 ENCOUNTER — HOSPITAL ENCOUNTER (OUTPATIENT)
Dept: RADIOLOGY | Facility: HOSPITAL | Age: 53
Setting detail: OUTPATIENT SURGERY
Discharge: HOME | End: 2018-06-28
Payer: COMMERCIAL

## 2018-06-28 ENCOUNTER — ANESTHESIA EVENT (OUTPATIENT)
Dept: PERIOP | Facility: HOSPITAL | Age: 53
End: 2018-06-28
Payer: COMMERCIAL

## 2018-06-28 ENCOUNTER — ANESTHESIA (OUTPATIENT)
Dept: PERIOP | Facility: HOSPITAL | Age: 53
End: 2018-06-28
Payer: COMMERCIAL

## 2018-06-28 ENCOUNTER — HOSPITAL ENCOUNTER (OUTPATIENT)
Facility: HOSPITAL | Age: 53
Setting detail: OUTPATIENT SURGERY
Discharge: HOME | End: 2018-06-28
Attending: COLON & RECTAL SURGERY | Admitting: COLON & RECTAL SURGERY
Payer: COMMERCIAL

## 2018-06-28 ENCOUNTER — APPOINTMENT (OUTPATIENT)
Dept: RADIOLOGY | Facility: HOSPITAL | Age: 53
End: 2018-06-28
Attending: COLON & RECTAL SURGERY
Payer: COMMERCIAL

## 2018-06-28 VITALS
DIASTOLIC BLOOD PRESSURE: 64 MMHG | HEART RATE: 81 BPM | SYSTOLIC BLOOD PRESSURE: 116 MMHG | TEMPERATURE: 99.1 F | WEIGHT: 155 LBS | BODY MASS INDEX: 24.91 KG/M2 | HEIGHT: 66 IN | RESPIRATION RATE: 16 BRPM | OXYGEN SATURATION: 97 %

## 2018-06-28 DIAGNOSIS — C80.1 PRIMARY MALIGNANT NEOPLASM (HCC): ICD-10-CM

## 2018-06-28 PROCEDURE — 77001 FLUOROGUIDE FOR VEIN DEVICE: CPT

## 2018-06-28 PROCEDURE — C1769 GUIDE WIRE: HCPCS | Performed by: COLON & RECTAL SURGERY

## 2018-06-28 PROCEDURE — C1894 INTRO/SHEATH, NON-LASER: HCPCS | Performed by: COLON & RECTAL SURGERY

## 2018-06-28 PROCEDURE — 71045 X-RAY EXAM CHEST 1 VIEW: CPT

## 2018-06-28 PROCEDURE — C1788 PORT, INDWELLING, IMP: HCPCS | Performed by: COLON & RECTAL SURGERY

## 2018-06-28 DEVICE — PORT HEMODIAL INTERMED PROFILE 8FR KIT: Type: IMPLANTABLE DEVICE | Site: VEIN | Status: FUNCTIONAL

## 2018-06-28 RX ORDER — FENTANYL CITRATE/PF 50 MCG/ML
25 SYRINGE (ML) INJECTION
Status: DISCONTINUED | OUTPATIENT
Start: 2018-06-28 | End: 2018-06-28 | Stop reason: HOSPADM

## 2018-06-28 RX ORDER — SODIUM CHLORIDE, SODIUM LACTATE, POTASSIUM CHLORIDE, CALCIUM CHLORIDE 600; 310; 30; 20 MG/100ML; MG/100ML; MG/100ML; MG/100ML
75 INJECTION, SOLUTION INTRAVENOUS CONTINUOUS
Status: DISCONTINUED | OUTPATIENT
Start: 2018-06-28 | End: 2018-06-28 | Stop reason: HOSPADM

## 2018-06-28 RX ORDER — SODIUM CHLORIDE, SODIUM LACTATE, POTASSIUM CHLORIDE, CALCIUM CHLORIDE 600; 310; 30; 20 MG/100ML; MG/100ML; MG/100ML; MG/100ML
125 INJECTION, SOLUTION INTRAVENOUS CONTINUOUS
Status: DISCONTINUED | OUTPATIENT
Start: 2018-06-28 | End: 2018-06-28

## 2018-06-28 RX ORDER — MIDAZOLAM HYDROCHLORIDE 1 MG/ML
INJECTION INTRAMUSCULAR; INTRAVENOUS AS NEEDED
Status: DISCONTINUED | OUTPATIENT
Start: 2018-06-28 | End: 2018-06-28 | Stop reason: SURG

## 2018-06-28 RX ORDER — MAGNESIUM HYDROXIDE 1200 MG/15ML
LIQUID ORAL AS NEEDED
Status: DISCONTINUED | OUTPATIENT
Start: 2018-06-28 | End: 2018-06-28 | Stop reason: HOSPADM

## 2018-06-28 RX ORDER — SODIUM CHLORIDE 9 MG/ML
INJECTION, SOLUTION INTRAVENOUS
Status: COMPLETED | OUTPATIENT
Start: 2018-06-28 | End: 2018-06-28

## 2018-06-28 RX ORDER — LIDOCAINE HYDROCHLORIDE 10 MG/ML
INJECTION, SOLUTION INFILTRATION; PERINEURAL AS NEEDED
Status: DISCONTINUED | OUTPATIENT
Start: 2018-06-28 | End: 2018-06-28 | Stop reason: HOSPADM

## 2018-06-28 RX ORDER — DIPHENHYDRAMINE HYDROCHLORIDE 50 MG/ML
12.5 INJECTION INTRAMUSCULAR; INTRAVENOUS ONCE AS NEEDED
Status: DISCONTINUED | OUTPATIENT
Start: 2018-06-28 | End: 2018-06-28 | Stop reason: HOSPADM

## 2018-06-28 RX ORDER — ONDANSETRON 2 MG/ML
INJECTION INTRAMUSCULAR; INTRAVENOUS AS NEEDED
Status: DISCONTINUED | OUTPATIENT
Start: 2018-06-28 | End: 2018-06-28 | Stop reason: SURG

## 2018-06-28 RX ORDER — FENTANYL CITRATE 50 UG/ML
INJECTION, SOLUTION INTRAMUSCULAR; INTRAVENOUS AS NEEDED
Status: DISCONTINUED | OUTPATIENT
Start: 2018-06-28 | End: 2018-06-28 | Stop reason: SURG

## 2018-06-28 RX ORDER — ONDANSETRON 2 MG/ML
4 INJECTION INTRAMUSCULAR; INTRAVENOUS ONCE AS NEEDED
Status: DISCONTINUED | OUTPATIENT
Start: 2018-06-28 | End: 2018-06-28 | Stop reason: HOSPADM

## 2018-06-28 RX ORDER — OXYCODONE HYDROCHLORIDE AND ACETAMINOPHEN 5; 325 MG/1; MG/1
1 TABLET ORAL EVERY 4 HOURS PRN
Status: DISCONTINUED | OUTPATIENT
Start: 2018-06-28 | End: 2018-06-28 | Stop reason: HOSPADM

## 2018-06-28 RX ORDER — PROPOFOL 10 MG/ML
INJECTION, EMULSION INTRAVENOUS CONTINUOUS PRN
Status: DISCONTINUED | OUTPATIENT
Start: 2018-06-28 | End: 2018-06-28 | Stop reason: SURG

## 2018-06-28 RX ADMIN — OXYCODONE HYDROCHLORIDE AND ACETAMINOPHEN 1 TABLET: 5; 325 TABLET ORAL at 15:36

## 2018-06-28 RX ADMIN — SODIUM CHLORIDE, SODIUM LACTATE, POTASSIUM CHLORIDE, AND CALCIUM CHLORIDE 125 ML/HR: .6; .31; .03; .02 INJECTION, SOLUTION INTRAVENOUS at 09:05

## 2018-06-28 RX ADMIN — SODIUM CHLORIDE, SODIUM LACTATE, POTASSIUM CHLORIDE, AND CALCIUM CHLORIDE 75 ML/HR: .6; .31; .03; .02 INJECTION, SOLUTION INTRAVENOUS at 12:55

## 2018-06-28 RX ADMIN — ONDANSETRON 4 MG: 2 INJECTION INTRAMUSCULAR; INTRAVENOUS at 12:07

## 2018-06-28 RX ADMIN — HYDROMORPHONE HYDROCHLORIDE 0.5 MG: 1 INJECTION, SOLUTION INTRAMUSCULAR; INTRAVENOUS; SUBCUTANEOUS at 14:07

## 2018-06-28 RX ADMIN — FENTANYL CITRATE 50 MCG: 50 INJECTION, SOLUTION INTRAMUSCULAR; INTRAVENOUS at 10:05

## 2018-06-28 RX ADMIN — MIDAZOLAM 2 MG: 1 INJECTION INTRAMUSCULAR; INTRAVENOUS at 09:59

## 2018-06-28 RX ADMIN — FENTANYL CITRATE 25 MCG: 50 INJECTION INTRAMUSCULAR; INTRAVENOUS at 12:52

## 2018-06-28 RX ADMIN — SODIUM CHLORIDE, SODIUM LACTATE, POTASSIUM CHLORIDE, AND CALCIUM CHLORIDE: .6; .31; .03; .02 INJECTION, SOLUTION INTRAVENOUS at 09:59

## 2018-06-28 RX ADMIN — FENTANYL CITRATE 25 MCG: 50 INJECTION INTRAMUSCULAR; INTRAVENOUS at 12:46

## 2018-06-28 RX ADMIN — Medication 2000 MG: at 10:06

## 2018-06-28 RX ADMIN — PROPOFOL 120 MCG/KG/MIN: 10 INJECTION, EMULSION INTRAVENOUS at 10:05

## 2018-06-28 RX ADMIN — FENTANYL CITRATE 25 MCG: 50 INJECTION, SOLUTION INTRAMUSCULAR; INTRAVENOUS at 11:20

## 2018-06-28 RX ADMIN — FENTANYL CITRATE 25 MCG: 50 INJECTION, SOLUTION INTRAMUSCULAR; INTRAVENOUS at 10:35

## 2018-06-28 NOTE — OP NOTE
OPERATIVE REPORT  PATIENT NAME: Washington Bae    :  1965  MRN: [id number]  Pt Location: [facility] OR ROOM 06    SURGERY DATE: 2018    Surgeon(s) and Role:     * Jorje Limon MD - Primary     * Nallely Harmon MD - Assisting    Preop Diagnosis:  Primary malignant neoplasm ([facility] [address] ) [C80 1]  Stage IV colon cancer  Post-Op Diagnosis Codes:     * Primary malignant neoplasm ([facility] [address] ) [C80 1]  Stage IV colon cancer  Procedure(s) (LRB):  INSERTION VENOUS PORT (PORT-A-CATH) VIA RIGHT SUBCLAVIAN VEIN (Right)   Fluoroscopy for performance/interpretation    Specimen(s):  * No specimens in log *    Estimated Blood Loss:   25 mL    Anesthesia Type:   IV Sedation with Anesthesia    Operative Indications:  Primary malignant neoplasm ([facility] [address] ) [C80 1]  Stage IV colon cancer    Operative Findings:  8F Dignity mid-sized CT port right subclavian access  Requested intraop assistance Dr Krystian Geogre IR given difficult access despite ultrasound guidance at IJ and venous sticks at both positions that failed to thread  Complications:   None    Procedure and Technique:  Steve Kc is a 51-year-old female recent diagnosis stage IV colon cancer, she discussed with Dr Chioma Evans therapy choices and has elected for infusional chemotherapy requiring Port-A-Cath placement  We discussed Port-A-Cath and risks including not limited to bleeding, infection/bacteremia requiring removal/delay of therapy, risks of anesthesia, damage to great vessels, pneumothorax requiring chest tube she understood these risks and wishes to proceed  She signed informed consent  She was brought to the operating room where Mac anesthesia was induced without event  She received cefazolin prior to skin incision  She was placed in Trendelenburg position with right arm tucked and additional skin folds taped down and away  She was chlorhexidine sterile prepped  After a full and appropriate drying time she was sterile draped and with [place] as well   After timeout was taken per protocol procedure began with local infiltration with 1% lidocaine at the periosteum of the right clavicle as well as over the proposed port site and tunneling area  The large gauge finder needle was then used to access the right subclavian vein at the lateral genu of clavicle towards the sternal notch and a single venous flash was appreciated on multiple sticks however neither of these would thread, additional stick yielded arterial blood and finger clamp was held on clavicle for 2 minutes time, no hematoma obvious  Switched to IJ technique, with ultrasound guidance IJ venous flash without arterial blood, was accessed however would not thread  Requested intraop assistance Dr Kristine Garcia IR given difficult access despite ultrasound guidance at IJ and venous sticks at both positions that failed to thread, he using advanced ultrasound machine/guidance was able to obtain subclavian venous access and wire placement confirmed by fluoroscopy,I continued the case after this  After this the wire was taped off to the side the sternal angle had been previously marked with x-ray for the length of tubing  The proposed Port-A-Cath site over the right chest wall was incised with 15 blade and using electrocautery to proceed through the subcutaneous tissues to the clavipectoral fascia  Weitlaner retraction was used for exposure  Hemostasis was assured  3 x 2-0 Prolene sutures were placed for later securing of the port  The tubing was tunneled from that pocket to the site of stick  Immediately prior to this a dilator had been placed over top with dilating sheath which was easily placed and dilating the vein under fluoroscopic views  The tubing was measured out to approximately 22 cm  The proximal end of the tubing was attached with locking clip to port and the port was secured with the Prolene sutures and secured off to the side prior to tying them down  The port was flushed through to eliminate any bubbles  The wire was removed engaging the one-way valve to prevent any air embolus  The tubing was then placed with 2 DeBakey forceps through the crack away sheath and once this was through without redundancy the crack away sheath was then engaged and pulled away  There was no kinking or additional slack in the tubing  A final shot showed good placement without any kinking of the tube and the distal end at the cavoatrial junction  The Prolene sutures were tied down a final aspiration of venous blood and easy flush through a 10 mL of saline was performed with Linh Bingham needle  Wound was irrigated hemostasis was assured and the wound was closed with interrupted 3-0 Vicryl ×3 followed by 4-0 Monocryl and histocryl glue  The stick site was also closed with a U stitch 4-0 Monocryl avoiding the tubing  This was also dressed with histocryl glue and both of these incisions were covered with Tegaderm  All sponge needle and instrument counts were correct I was present and scrubbed for the entirety of the procedure  Patient was awakened and returned to ambulatory surgery with chest x-ray pending prior to discharge      I was present for the entire procedure    Patient Disposition:  PACU     SIGNATURE: Liza Martini MD  DATE: June 28, 2018  TIME: 12:42 PM

## 2018-06-28 NOTE — INTERVAL H&P NOTE
H&P reviewed  After examining the patient I find no changes in the patients condition since the H&P had been written  Port risks discussed in office visit last week We discussed Port-A-Cath and in a personal,face to face, informed consent discussion the benefits,alternatives,risks including not limited to bleeding infection/bacteremia requiring removal/delay of therapy, risks of anesthesia, damage to great vessels, pneumothorax requiring chest tube she understood these risks and wishes to proceed  She signed informed consent

## 2018-06-28 NOTE — DISCHARGE INSTRUCTIONS
May shower beginning Sunday, leave plastic dressings on until curl up or fall off, no tub bathing  Call if any fevers, redness, increasing pain, drainage or other concerns  Pain medication as prescribed for pain not to be combined with Tylenol  Continue oncology follow-up as instructed  Office followup Dr Deepthi Leon [phone], 6 weeks  May begin Xarelto Sunday evening  How to Care for Your Implanted Venous Access Port   WHAT YOU NEED TO KNOW:   An implanted venous access port is a device used to give treatments and take blood  It may also be called a central venous access device (CVAD)  The port is a small container that is placed under your skin, usually in your upper chest  A port can also be placed in your arm or abdomen  The port is attached to a catheter that enters a large vein  DISCHARGE INSTRUCTIONS:   Prevent an infection:   · Wash your hands often  Use soap and water  Clean your hands before and after you care for your port  Remind everyone who cares for your port to wash their hands  · Wear clean medical gloves when you care for your port  Do not touch or handle your port unless you need to care for it  · Clean the skin around your port every day  Ask your healthcare provider what to use to clean your skin  · Check your skin for infection every day  Look for redness, swelling, or fluid oozing from the port site  Medicines:   · Topical medicine  may be needed to numb your skin before your port is accessed with a needle  Ask your healthcare provider if and when you should use the medicine  · Take your medicine as directed  Contact your healthcare provider if you think your medicine is not helping or if you have side effects  Tell him if you are allergic to any medicine  Keep a list of the medicines, vitamins, and herbs you take  Include the amounts, and when and why you take them  Bring the list or the pill bottles to follow-up visits   Carry your medicine list with you in case of an emergency  How to access your port: Your healthcare provider will show you or a family member how to access your port with a needle  Never  try to use your port without proper training  To access your port:  · Gather your supplies  If you have a cough, wear a mask while you prepare and access your port  · You may need to attach tubing to the needle  The tubing has a clamp that must stay closed when not in use  You will attach a syringe that contains saline to the tubing  Open the clamp and slowly push saline through the tubing and needle  Then close the clamp to remove air from the tubing before you access your port  Leave the syringe attached to the tubing  · Clean your port site and the skin around it  Ask your healthcare provider what solution to use  Clean your skin for 90 seconds or as directed  Allow the  to dry completely  Do not blow on the site to dry the area  · You may need to apply topical medicine to numb the port area  Use the numbing medicine as directed  The more your port is used, the less it will hurt  · With one hand, feel for the edges of your port  Use this hand to stretch the skin across your port, to help hold the port in place  With your other hand, insert the needle through your skin and into the center of the port  Push the needle in until you hit the back wall of the port  Once the needle is in place, open the tubing clamp, and slowly pull back on the syringe  If blood flows back into the tubing and syringe, the needle is in the proper place  If you do not see blood, you will need to change the position of the needle  Close the clamp on the tubing  Ask your healthcare provider how to throw away the blood-filled syringe correctly  · You may need to cover the site with a gauze bandage while the needle is in place  This keeps air, dirt, and germs from entering the port   Ask your healthcare provider how often you should change the bandage if you leave the needle in place  When to flush your port:  Flush your port with saline (salt water) before, after, and between medicines and treatments  Flush your port with heparin (a blood thinner) between each port use  Your port also needs to be flushed with heparin every 4 weeks when it is not being used regularly  You will use a syringe to push a small amount of saline or heparin into the port and catheter  Flush your port to keep the catheter from getting blocked and to prevent medicines from mixing in the tubing  How to give medicines through your port: Your healthcare provider will show you or a family member how to give medicines or liquids through your port  Medicines and treatments enter your port through tubing attached to the needle  How to remove the needle from your port: Wash your hands and put on clean medical gloves  Flush your port as directed  Remove the needle  Ask how to dispose of your needles if you do not have a needle container  You may need to cover your port site for a short time after you remove the needle  Implanted venous access information card: Your healthcare provider will give you a card with information about your port type  Keep this information in a safe place that is easy to find  Activity:  When your port is not being used, you can do your usual daily activities  You will be able to bathe, shower, or swim  Follow up with your healthcare provider as directed:  Write down your questions so you remember to ask them during your visits  Contact your healthcare provider if:   · You have a fever  · You run out of supplies to care for your skin or port  · Your port site is red, swollen, or draining pus  · Your port site turns cold, changes color, or you cannot feel it  · The veins in your neck or chest bulge  · You have trouble using your port  · You have questions or concerns about your condition or care    Seek care immediately or call 911 if:   · Blood soaks through your bandage  · You hear a bubbling noise when your port is flushed  · The skin over or around your port breaks open  · Your heart is jumping or fluttering  · You have a headache, blurred vision, and feel confused  · You have pain in your arm, neck, shoulder, or chest     · You have trouble breathing that is getting worse over time  © 2017 [facility] Information is for End User's use only and may not be sold, redistributed or otherwise used for commercial purposes  All illustrations and images included in CareNotes® are the copyrighted property of [employer]  or Angelito Kerr  The above information is an  only  It is not intended as medical advice for individual conditions or treatments  Talk to your doctor, nurse or pharmacist before following any medical regimen to see if it is safe and effective for you

## 2018-06-28 NOTE — H&P (VIEW-ONLY)
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 and patient has been on Xarelto  1 time she had positive lupus anticoagulant but that was negative the next time  Patient had DVT right lower leg below the knee in May 2016 and that happened after taking hormonal therapy in April 2016  She stayed on Xarelto until end of September 2016  She had heavy periods in  January 2018 and she was in bed for 3 days and she developed a clot in the left lower leg distally below the knee  In 2016 she tested positive once for lupus anticoagulant and that was negative on repeat test   Patient underwent  hysterectomy, GALI and BSO on 05/21 /18  There were cancer cells and fallopian tubes  Further workup showed mass in the right colon and liver metastasis  Also Abdominal/retroperitoneal lymphadenopathy on CT scan  On 6/14/18 patient underwent extended right hemicolectomy, partial omentectomy and biopsy of the peritoneal nodule  Peritoneal nodule came back  positive for metastatic adenocarcinoma from colon  Patient has almost recovered from surgery     Has some tiredness  She has regular bowel movements now  She will be going for Port-A-Cath this week  She wants chemotherapy to be started on 07/16/2018 to  schedule in relation to her vacations in August   6 cm T3 G2 right colon cancer with positive margins, lymphovascular invasion and perineural invasion and positive 3 of 27 lymph nodes with extranodal extension and positive peritoneal nodule biopsy  Stage IV disease because of liver metastases      Current Outpatient Prescriptions:     acetaminophen (TYLENOL) 500 mg tablet, Take 1,000 mg by mouth every 6 (six) hours as needed for mild pain, Disp: , Rfl:     lidocaine-prilocaine (EMLA) cream, Apply 1 hour prior to chemo, cover in wrap, Disp: 30 g, Rfl: 0    ondansetron (ZOFRAN) 8 mg tablet, Take 1 tablet (8 mg total) by mouth every 8 (eight) hours as needed for nausea or vomiting, Disp: 20 tablet, Rfl: 1    oxyCODONE (ROXICODONE) 5 mg immediate release tablet, Take 1 tablet (5 mg total) by mouth every 4 (four) hours as needed for moderate pain for up to 10 days Earliest Fill Date: 6/18/18 Max Daily Amount: 30 mg, Disp: 20 tablet, Rfl: 0    XARELTO 20 MG tablet, Take 1 tablet (20 mg total) by mouth daily, Disp: 30 tablet, Rfl: 2    No Known Allergies     No history exists  ROS:  06/26/18 Reviewed 13 systems:  Presently no headaches, seizures, dizziness, diplopia, dysphagia, hoarseness, chest pain, palpitations, shortness of breath, cough, hemoptysis, abdominal pain, nausea, vomiting, change in bowel habits, melena, hematuria, fever, chills, bleeding, bone pains, skin rash, weight loss, arthritic symptoms,  weakness, numbness,  claudication and gait problem  No frequent infections  Not unusually sensitive to heat or cold  No swelling of the ankles  No swollen glands  Patient is anxious  Other symptoms are in HPI        /70 (BP Location: Right arm, Cuff Size: Adult)   Pulse 102   Temp 98 3 °F (36 8 °C) (Oral)   Resp 16   Ht 5' 5 5" (1 664 m)   Wt 70 3 kg (155 lb)   LMP 05/16/2018 (Exact Date)   SpO2 92%   BMI 25 40 kg/m²     Physical Exam:  Alert, oriented, not in distress, no icterus, no oral thrush, no palpable neck mass, clear lung fields, regular heart rate, abdomen  soft and non tender, healing midline scar , no palpable abdominal mass, no ascites, no edema of ankles, no calf tenderness, no focal neurological deficit, no skin rash, no palpable lymphadenopathy, good arterial pulses, no clubbing  Patient is anxious  Performance status 1      IMAGING:  Follow-up CT scan findings as in HPI    LABS:  Results for orders placed or performed during the hospital encounter of 06/14/18   CBC and differential   Result Value Ref Range    WBC 8 82 4 31 - 10 16 Thousand/uL    RBC 3 45 (L) 3 81 - 5 12 Million/uL    Hemoglobin 9 6 (L) 11 5 - 15 4 g/dL    Hematocrit 31 5 (L) 34 8 - 46 1 %    MCV 91 82 - 98 fL    MCH 27 8 26 8 - 34 3 pg    MCHC 30 5 (L) 31 4 - 37 4 g/dL    RDW 12 5 11 6 - 15 1 %    MPV 8 9 8 9 - 12 7 fL    Platelets 819 063 - 550 Thousands/uL    nRBC 0 /100 WBCs    Neutrophils Relative 87 (H) 43 - 75 %    Immat GRANS % 0 0 - 2 %    Lymphocytes Relative 4 (L) 14 - 44 %    Monocytes Relative 9 4 - 12 %    Eosinophils Relative 0 0 - 6 %    Basophils Relative 0 0 - 1 %    Neutrophils Absolute 7 62 1 85 - 7 62 Thousands/µL    Immature Grans Absolute 0 03 0 00 - 0 20 Thousand/uL    Lymphocytes Absolute 0 34 (L) 0 60 - 4 47 Thousands/µL    Monocytes Absolute 0 83 0 17 - 1 22 Thousand/µL    Eosinophils Absolute 0 00 0 00 - 0 61 Thousand/µL    Basophils Absolute 0 00 0 00 - 0 10 Thousands/µL   Basic metabolic panel   Result Value Ref Range    Sodium 139 136 - 145 mmol/L    Potassium 4 6 3 5 - 5 3 mmol/L    Chloride 107 100 - 108 mmol/L    CO2 25 21 - 32 mmol/L    Anion Gap 7 4 - 13 mmol/L    BUN 6 5 - 25 mg/dL    Creatinine 0 76 0 60 - 1 30 mg/dL    Glucose 126 65 - 140 mg/dL    Calcium 8 4 8 3 - 10 1 mg/dL    eGFR 91 ml/min/1 73sq m   Magnesium   Result Value Ref Range    Magnesium 2 0 1 6 - 2 6 mg/dL   CBC and differential   Result Value Ref Range    WBC 7 33 4 31 - 10 16 Thousand/uL    RBC 2 92 (L) 3 81 - 5 12 Million/uL    Hemoglobin 8 3 (L) 11 5 - 15 4 g/dL    Hematocrit 26 9 (L) 34 8 - 46 1 %    MCV 92 82 - 98 fL    MCH 28 4 26 8 - 34 3 pg    MCHC 30 9 (L) 31 4 - 37 4 g/dL    RDW 12 8 11 6 - 15 1 %    MPV 9 3 8 9 - 12 7 fL    Platelets 290 460 - 492 Thousands/uL    nRBC 0 /100 WBCs    Neutrophils Relative 73 43 - 75 %    Immat GRANS % 1 0 - 2 %    Lymphocytes Relative 13 (L) 14 - 44 %    Monocytes Relative 12 4 - 12 %    Eosinophils Relative 1 0 - 6 %    Basophils Relative 0 0 - 1 %    Neutrophils Absolute 5 42 1 85 - 7 62 Thousands/µL    Immature Grans Absolute 0 04 0 00 - 0 20 Thousand/uL    Lymphocytes Absolute 0 94 0 60 - 4 47 Thousands/µL    Monocytes Absolute 0 84 0 17 - 1 22 Thousand/µL    Eosinophils Absolute 0 06 0 00 - 0 61 Thousand/µL    Basophils Absolute 0 03 0 00 - 0 10 Thousands/µL   Basic metabolic panel   Result Value Ref Range    Sodium 140 136 - 145 mmol/L    Potassium 3 7 3 5 - 5 3 mmol/L    Chloride 106 100 - 108 mmol/L    CO2 28 21 - 32 mmol/L    Anion Gap 6 4 - 13 mmol/L    BUN 2 (L) 5 - 25 mg/dL    Creatinine 0 71 0 60 - 1 30 mg/dL    Glucose 122 65 - 140 mg/dL    Calcium 8 1 (L) 8 3 - 10 1 mg/dL    eGFR 99 ml/min/1 73sq m   Basic metabolic panel   Result Value Ref Range    Sodium 139 136 - 145 mmol/L    Potassium 3 5 3 5 - 5 3 mmol/L    Chloride 104 100 - 108 mmol/L    CO2 26 21 - 32 mmol/L    Anion Gap 9 4 - 13 mmol/L    BUN 6 5 - 25 mg/dL    Creatinine 0 67 0 60 - 1 30 mg/dL    Glucose 96 65 - 140 mg/dL    Calcium 8 7 8 3 - 10 1 mg/dL    eGFR 102 ml/min/1 73sq m   CBC and differential   Result Value Ref Range    WBC 5 93 4 31 - 10 16 Thousand/uL    RBC 3 44 (L) 3 81 - 5 12 Million/uL    Hemoglobin 9 7 (L) 11 5 - 15 4 g/dL    Hematocrit 31 1 (L) 34 8 - 46 1 %    MCV 90 82 - 98 fL    MCH 28 2 26 8 - 34 3 pg    MCHC 31 2 (L) 31 4 - 37 4 g/dL    RDW 13 1 11 6 - 15 1 %    MPV 9 5 8 9 - 12 7 fL    Platelets 011 363 - 278 Thousands/uL    nRBC 0 /100 WBCs    Neutrophils Relative 65 43 - 75 %    Immat GRANS % 0 0 - 2 %    Lymphocytes Relative 19 14 - 44 %    Monocytes Relative 11 4 - 12 %    Eosinophils Relative 4 0 - 6 %    Basophils Relative 1 0 - 1 %    Neutrophils Absolute 3 85 1 85 - 7 62 Thousands/µL    Immature Grans Absolute 0 01 0 00 - 0 20 Thousand/uL    Lymphocytes Absolute 1 14 0 60 - 4 47 Thousands/µL    Monocytes Absolute 0 64 0 17 - 1 22 Thousand/µL    Eosinophils Absolute 0 26 0 00 - 0 61 Thousand/µL    Basophils Absolute 0 03 0 00 - 0 10 Thousands/µL   Type and screen   Result Value Ref Range    ABO Grouping A     Rh Factor Positive     Antibody Screen Negative     Specimen Expiration Date [id number]    Type and screen   Result Value Ref Range    ABO Grouping A     Rh Factor Positive     Antibody Screen Negative     Specimen Expiration Date [id number]    Tissue Exam   Result Value Ref Range    Case Report       Surgical Pathology Report                         Case: [id number]                                   Authorizing Provider:  Kaleigh Rodriguez MD      Collected:           06/14/2018 1540              Ordering Location:     [address] [place] [address]      Received:            06/14/2018 [facility] Operating Room                                                      Pathologist:           Poa Patel MD                                                               Intraop:               Tabatha Brown MD                                                         Specimens:   A) - Peritoneum, Peritoneal Deposit                                                                 B) - Colon, extended right hemicolectomy                                                   Addendum       RESULTS OF IMMUNOHISTOCHEMICAL ANALYSIS FOR MISMATCH REPAIR PROTEIN LOSS     INTERPRETATION: No loss of nuclear expression of MMR proteins: Low probability of MSI-H    Note: Background non-neoplastic tissue and/or internal control with intact nuclear expression       RESULTS:  Antibody          Clone               Description                           Results  MLH1               [id number]        Mismatch repair protein       Intact nuclear expression  MSH2              [id number]       Mismatch repair protein       Intact nuclear expression  IOU3              27                     Mismatch repair protein       Intact nuclear expression  PMS2              MRQ-28           Mismatch repair protein       Intact nuclear expression     Comment:   A negative control and a positive control for each antibody have been reviewed and accepted      [facility] Specimen ID: [id number]  Evaluator: Piotr Trevino MD     These tests were developed and their performance characteristics determined by [facility]  Chavo Yanes may not be cleared or approved by the [place]  Food and Drug Administration   The FDA determined that such clearance or approval is not necessary   These tests are used for clinical purposes  Chavo Yanes should not be regarded as investigational or for research   This laboratory has been approved by [place] 88, designated as a high-complexity laboratory and is qualified to perform these tests  Comments: Patients whose tumors demonstrate lack of expression of one or more DNA mismatch repair proteins might be at risk for Elkins Syndrome  This cancer susceptibility syndrome greatly increases the risk of synchronous and/or metachronous cancers in the affected patients and their family members  Normal expression of all proteins does not completely rule out familial cancer predisposition      The [facility] Elkins Syndrome Surveillance Program Task Forces recommends that all patients with lack of expression of one or more DNA mismatch repair proteins and those with concerning personal or family history should undergo thorough evaluation, counseling and possibly genetic testing  Please contact Rex Moore, [address] [place] [address] ([facility] Certified Genetic Counselor) at [phone] if you have questions or wish to schedule an appointment for this patient  Final Diagnosis       COLON CANCER STAGING REPORT     1  Specimen identification:     - Specimen: Terminal ileum, appendix, cecum, right colon and proximal transverse colon     - Procedure: Right hemicolectomy    2   Tumor:     - Tumor site: Proximal transverse colon      - Tumor size: Greatest dimension: 6 5cm     - Macroscopic tumor perforation: Not identified      - Tumor location:   Not applicable      - Macroscopic intactness of mesorectum:  Not applicable      - Histologic Type:  Adenocarcinoma      - Histologic Grade: G2: Moderately differentiated      - Microscopic Tumor Extension (pT3): Tumor invades through the muscularis propria into pericolorectal tissues     3  Margins: All margins are uninvolved by invasive carcinoma    4  Treatment effect (if neoadjuvant therapy): No known presurgical therapy   5  Lymph-vascular invasion: Present    6  Perineural invasion: Present    7  Tumor deposits (discontinuous extramural extension): Present, four (4) deposits   8  Type of polyp in which invasive carcinoma arose: Tubular adenoma    9  Regional lymph nodes (pN1b): Metastatic carcinoma involving three (3) of twenty-seven (27) lymph nodes (3/27)   - Extranodal extension is present, 1mm  10  Additional pathologic findings: Sessile serrated adenoma, no high grade dysplasia   11  Ancillary Studies: MMR IHC is ordered on block B5 and results will be reported in an addendum  12  8th Ed AJCC Stage:  at least Stage IVC - pT3, pN1b, pM1c, G2    A  Peritoneum (biopsy):  - Metastatic adenocarcinoma    B  Terminal ileum, appendix, cecum, ascending and transverse colon (right hemicolectomy):  - Moderately differentiated adenocarcinoma (6 5 cm)  - See synoptic report above ([id number])    Comment:   - One (1) lymph node contains intracapsular lymph-vascular invasion which is not included in the total positive lymph node count  - Intradepartmental consultation concurs with the diagnosis of adenocarcinoma  MOLECULAR TESTING AND CONSULT SLIDES:     Molecular testing:  Blocks B5-B10 are available for molecular testing  Block B1 contains normal tissue for MSI testing  Consult slides:  Slides A1, B5, B16, B34, B36 are available for consultation  Additional Information       All controls performed with the immunohistochemical stains reported above reacted appropriately  These tests were developed and their performance characteristics determined by [facility] or [facility]  They may not be cleared or approved by the [place]  Food and Drug Administration   The FDA has determined that such clearance or approval is not necessary  These tests are used for clinical purposes  They should not be regarded as investigational or for research  This laboratory has been approved by CLIA 88, designated as a high-complexity laboratory and is qualified to perform these tests  Intraoperative Consultation       A  FSDx A: Adenocarcinoma  FSDx A called by Dr [place] Clair to Dr Olimpia Trinidad @ 4:00 PM, 6/14/2018  *Electronic SignatureMarinus Eisenmenger [place], MD       Gross Description       A  Received fresh for frozen section, labeled with patient's name and medical record number, and designated "peritoneal deposit" are three small nodules of light tan soft tissue, varying between 0 2 and 0 3 cm diameter, entirely submitted in A1 for frozen section   dwa  B  The specimen is received in formalin, labeled with the patient's name and medical record number, and is designated "extended right hemicolectomy, is an ileocolectomy specimen including 6 8 cm of terminal ileum and 34 6 cm of cecum, ascending colon, and proximal transverse colon  The margin circumferences are 4 3 cm proximally and 6 5 cm distally  There is an abundant amount of attached mesenteric fat  The appendix is present and measures 4 6 cm in length by 0 6 cm in diameter  The appendix, which is uninvolved by tumor, has a tan, smooth, glistening, and focally hemorrhagic serosal surface and patent lumen  The serosa of the bowel is tan gray, smooth, glistening, and contains a focally puckered area located 5 0 cm away from the distal margin  There is a 6 5 x 6 5 x 1 5 cm tan-brown, polypoid, and centrally necrotic tumor mass located in the proximal transverse colon, 3 5 cm away from the distal margin, 25 0 cm away from proximal margin and 19 5 cm away from the ileocecal valve  The mass correlates with the focally puckered area on the serosal surface    The mass is circumferential and there is no dilation of the adjacent proximal bowel   The mass invades into the bowel wall and is possibly present in the pericolonic adipose tissue  The mass comes to within 4 7 cm of the mesenteric/vascular margin which appear to be the same  In addition, there is a tan polypoid structure (1 2 cm in greatest dimension), located 14 1 cm proximal to the mass  The uninvolved mucosa is tan and glistening with normal-appearing folds  32 possible lymph nodes are identified in the pericolonic adipose tissue, ranging in size from 0 1 to 1 5 cm in maximal dimension  Photographs are taken  Representative sections are submitted as follows:    1:  Proximal ileal margin, shaved  2:  Distal margin, shaved  3:  Mesenteric/vascular margin, shaved  4:  Appendix  5-9:  Mass to include greatest depth of invasion  10: Mass and adjacent mucosa  11:  Polypoid structure, bisected  12:  Representative section of ileum  13:  Ileocecal valve, perpendicular section  14:  Representative sections of large bowel  15-18:  Each cassette contains 4 possible lymph nodes, entirely submitted  19-30:  Each cassette contains 1 possible lymph node, bisected, entirely submitted  31-32:  Each cassette contains 1 possible lymph node, trisected, entirely submitted  33-34:  1 possible lymph node, trisected, entirely submitted  35-36:  1 possible lymph node, quadrisected, entirely submitted    Note: The estimated total formalin fixation time based upon information provided by the submitting clinician and the standard processing schedule is over 72 hours    RRavotti          Fingerstick Glucose (POCT)   Result Value Ref Range    POC Glucose 144 (H) 65 - 140 mg/dl     Labs, Imaging, & Other studies:   All pertinent labs and imaging studies were personally reviewed    Lab Results   Component Value Date     06/18/2018    K 3 5 06/18/2018     06/18/2018    CO2 26 06/18/2018    ANIONGAP 9 06/18/2018    BUN 6 06/18/2018    CREATININE 0 67 06/18/2018    GLUCOSE 96 06/18/2018    GLUF 93 05/08/2018    CALCIUM 8 7 06/18/2018    AST 13 05/08/2018    ALT 20 05/08/2018    ALKPHOS 45 (L) 05/08/2018    PROT 6 9 05/08/2018    BILITOT 0 44 05/08/2018    EGFR 102 06/18/2018     Lab Results   Component Value Date    WBC 5 93 06/18/2018    HGB 9 7 (L) 06/18/2018    HCT 31 1 (L) 06/18/2018    MCV 90 06/18/2018     06/18/2018       Reviewed and discussed with patient  Assessment and plan:  Tony Butt is a 48 y o  female She is here with her   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 and patient has been on Xarelto  1 time she had positive lupus anticoagulant but that was negative the next time  Patient had DVT right lower leg below the knee in May 2016 and that happened after taking hormonal therapy in April 2016  She stayed on Xarelto until end of September 2016  She had heavy periods in  January 2018 and she was in bed for 3 days and she developed a clot in the left lower leg distally below the knee  In 2016 she tested positive once for lupus anticoagulant and that was negative on repeat test   Patient underwent  hysterectomy, GALI and BSO on 05/21 /18  There were cancer cells and fallopian tubes  Further workup showed mass in the right colon and liver metastasis  Also Abdominal/retroperitoneal lymphadenopathy on CT scan  On 6/14/18 patient underwent extended right hemicolectomy, partial omentectomy and biopsy of the peritoneal nodule  Peritoneal nodule came back  positive for metastatic adenocarcinoma from colon  Patient has almost recovered from surgery     Has some tiredness  She has regular bowel movements now  She will be going for Port-A-Cath this week  She wants chemotherapy to be started on 07/16/2018 to  schedule in relation to her vacations in August   6 cm T3 G2 right colon cancer with positive margins, lymphovascular invasion and perineural invasion and positive 3 of 27 lymph nodes with extranodal extension and positive peritoneal nodule biopsy  Stage IV disease because of liver metastases  Physical examination and test results especially pathological diagnosis and CT scan findings are as recorded and discussed in very much detail  Suggested chemotherapy, combination of Avastin plus either FOLFIRI or FOLFOX  Also to send specimen for JANE family and B Kirby     Myself and my nurse discussed chemotherapy plus Avastin with patient and her  in detail  Discussed these chemotherapy medications in detail with patient and her   She was leaning towards Avastin plus FOLFIRI to start with  Patient signed informed consent for Avastin plus FOLFIRI  See orders below  1  Primary adenocarcinoma of ascending colon ([facility] )     - MRI brain w wo contrast; Future  - CBC and differential; Standing  - Comprehensive metabolic panel; Standing  - UA (URINE) with reflex to Microscopic; Standing  - CBC and differential  - Comprehensive metabolic panel  - UA (URINE) with reflex to Microscopic  - MISCELLANEOUS LAB TEST; Future    2  Adenocarcinoma, metastatic ([facility] )    - MRI brain w wo contrast; Future  - CBC and differential; Standing  - Comprehensive metabolic panel; Standing  - UA (URINE) with reflex to Microscopic; Standing  - CBC and differential  - Comprehensive metabolic panel  - UA (URINE) with reflex to Microscopic  - MISCELLANEOUS LAB TEST; Future    3  Liver metastases ([facility] )    - MRI brain w wo contrast; Future  - CBC and differential; Standing  - Comprehensive metabolic panel; Standing  - UA (URINE) with reflex to Microscopic; Standing  - CBC and differential  - Comprehensive metabolic panel  - UA (URINE) with reflex to Microscopic  - MISCELLANEOUS LAB TEST; Future    4   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[facility] )    - MRI brain w wo contrast; Future  - CBC and differential; Standing  - Comprehensive metabolic panel; Standing  - UA (URINE) with reflex to Microscopic; Standing  - CBC and differential  - Comprehensive metabolic panel  - UA (URINE) with reflex to Microscopic  - MISCELLANEOUS LAB TEST; Future    5  Peritoneal metastases ([address] )    - MRI brain w wo contrast; Future  - CBC and differential; Standing  - Comprehensive metabolic panel; Standing  - UA (URINE) with reflex to Microscopic; Standing  - CBC and differential  - Comprehensive metabolic panel  - UA (URINE) with reflex to Microscopic  - MISCELLANEOUS LAB TEST; Future            Patient voiced understanding and agreement in the discussion  Counseling / Coordination of Care:  50 min   Greater than 50% of total time was spent with the patient and / or family counseling and / or coordination of care

## 2018-06-28 NOTE — ANESTHESIA POSTPROCEDURE EVALUATION
Post-Op Assessment Note      CV Status:  Stable    Mental Status:  Alert and awake    Hydration Status:  Euvolemic    PONV Controlled:  Controlled    Airway Patency:  Patent    Post Op Vitals Reviewed: Yes          Staff: Anesthesiologist, CRNA           BP   130/79   Temp   97 2   Pulse  80   Resp   16   SpO2   97

## 2018-06-28 NOTE — NURSING NOTE
Dr Albert Castle in to see patient , chest xray confirmed placement per Dr Albert Castle confirmed    Okay to proceed with discharge when ready

## 2018-07-02 DIAGNOSIS — I82.531 CHRONIC EMBOLISM AND THROMBOSIS OF RIGHT POPLITEAL VEIN (HCC): ICD-10-CM

## 2018-07-06 ENCOUNTER — OFFICE VISIT (OUTPATIENT)
Dept: OBGYN CLINIC | Facility: MEDICAL CENTER | Age: 53
End: 2018-07-06

## 2018-07-06 VITALS
WEIGHT: 154 LBS | BODY MASS INDEX: 24.75 KG/M2 | DIASTOLIC BLOOD PRESSURE: 60 MMHG | HEIGHT: 66 IN | SYSTOLIC BLOOD PRESSURE: 100 MMHG

## 2018-07-06 DIAGNOSIS — B37.3 YEAST VAGINITIS: Primary | ICD-10-CM

## 2018-07-06 PROCEDURE — 99024 POSTOP FOLLOW-UP VISIT: CPT | Performed by: OBSTETRICS & GYNECOLOGY

## 2018-07-06 RX ORDER — OXYCODONE HYDROCHLORIDE 5 MG/1
TABLET ORAL
Refills: 0 | COMMUNITY
Start: 2018-06-29 | End: 2018-10-23

## 2018-07-06 NOTE — PROGRESS NOTES
Assessment/Plan:      Diagnoses and all orders for this visit:    Yeast vaginitis  -     terconazole (TERAZOL 3) 0 8 % vaginal cream; Insert 1 applicator into the vagina daily at bedtime    Other orders  -     oxyCODONE (ROXICODONE) 5 mg immediate release tablet; TAKE 1 TABLET BY MOUTH EVERY 4 HOURS AS NEEDED FOR PAIN UP TO 10 DAYS        Patient is released from her post gynecologic surgical care  A prescription for terconazole was sent for her yeast vaginitis  My support was provided as relates to her new diagnosis and her upcoming care  The patient will return in 4 months for annual gynecologic visit  Subjective:     Patient ID: Leila Neville is a 48 y o  female  Patient returns status post LAVH with bilateral salpingectomy  Patient was found to have an incidental metastatic colonic are adenocarcinoma and has subsequently undergone hemicolectomy  She recently had a Port-A-Cath placed and is scheduled to begin chemotherapy for metastatic disease  Patient is overall in good spirits  She has a good support system  She has no complaints referable to her gynecologic surgery or gynecologic structures  Review of Systems   All other systems reviewed and are negative  Objective:     Physical Exam   Constitutional: She appears well-developed and well-nourished  Abdominal: Soft  Bowel sounds are normal  She exhibits no distension and no mass  There is no tenderness  Healing surgical wounds, no evidence of infection   Genitourinary:   Genitourinary Comments: External genitalia normal  Vagina demonstrates thick white discharge consistent with yeast; upper vaginal cuff well supported and well approximated    Bimanual no obvious mass or tenderness

## 2018-07-07 ENCOUNTER — HOSPITAL ENCOUNTER (OUTPATIENT)
Dept: MRI IMAGING | Facility: HOSPITAL | Age: 53
Discharge: HOME | End: 2018-07-07
Attending: INTERNAL MEDICINE
Payer: COMMERCIAL

## 2018-07-07 DIAGNOSIS — C78.6 PERITONEAL METASTASES (HCC): ICD-10-CM

## 2018-07-07 DIAGNOSIS — C77.2 METASTASIS TO RETROPERITONEAL LYMPH NODE (HCC): ICD-10-CM

## 2018-07-07 DIAGNOSIS — C18.2 PRIMARY ADENOCARCINOMA OF ASCENDING COLON (HCC): ICD-10-CM

## 2018-07-07 DIAGNOSIS — C78.7 LIVER METASTASES (HCC): ICD-10-CM

## 2018-07-07 DIAGNOSIS — C79.9 ADENOCARCINOMA, METASTATIC (HCC): ICD-10-CM

## 2018-07-07 PROCEDURE — 70553 MRI BRAIN STEM W/O & W/DYE: CPT

## 2018-07-07 PROCEDURE — A9585 GADOBUTROL INJECTION: HCPCS | Performed by: INTERNAL MEDICINE

## 2018-07-07 RX ADMIN — GADOBUTROL 7 ML: 604.72 INJECTION INTRAVENOUS at 14:26

## 2018-07-09 ENCOUNTER — TELEPHONE (OUTPATIENT)
Dept: HEMATOLOGY ONCOLOGY | Facility: CLINIC | Age: 53
End: 2018-07-09

## 2018-07-09 ENCOUNTER — APPOINTMENT (OUTPATIENT)
Dept: LAB | Facility: MEDICAL CENTER | Age: 53
End: 2018-07-09
Payer: COMMERCIAL

## 2018-07-09 DIAGNOSIS — C77.2 METASTASIS TO RETROPERITONEAL LYMPH NODE (HCC): ICD-10-CM

## 2018-07-09 DIAGNOSIS — C18.2 PRIMARY ADENOCARCINOMA OF ASCENDING COLON (HCC): ICD-10-CM

## 2018-07-09 DIAGNOSIS — C79.9 ADENOCARCINOMA, METASTATIC (HCC): ICD-10-CM

## 2018-07-09 DIAGNOSIS — C78.7 LIVER METASTASES (HCC): ICD-10-CM

## 2018-07-09 DIAGNOSIS — C78.6 PERITONEAL METASTASES (HCC): ICD-10-CM

## 2018-07-09 LAB
BASOPHILS # BLD AUTO: 0.03 THOUSANDS/ΜL (ref 0–0.1)
BASOPHILS NFR BLD AUTO: 1 % (ref 0–1)
EOSINOPHIL # BLD AUTO: 0.14 THOUSAND/ΜL (ref 0–0.61)
EOSINOPHIL NFR BLD AUTO: 3 % (ref 0–6)
ERYTHROCYTE [DISTWIDTH] IN BLOOD BY AUTOMATED COUNT: 13.3 % (ref 11.6–15.1)
HCT VFR BLD AUTO: 31.2 % (ref 34.8–46.1)
HGB BLD-MCNC: 9.4 G/DL (ref 11.5–15.4)
IMM GRANULOCYTES # BLD AUTO: 0.01 THOUSAND/UL (ref 0–0.2)
IMM GRANULOCYTES NFR BLD AUTO: 0 % (ref 0–2)
LYMPHOCYTES # BLD AUTO: 1.28 THOUSANDS/ΜL (ref 0.6–4.47)
LYMPHOCYTES NFR BLD AUTO: 27 % (ref 14–44)
MCH RBC QN AUTO: 26.3 PG (ref 26.8–34.3)
MCHC RBC AUTO-ENTMCNC: 30.1 G/DL (ref 31.4–37.4)
MCV RBC AUTO: 87 FL (ref 82–98)
MONOCYTES # BLD AUTO: 0.43 THOUSAND/ΜL (ref 0.17–1.22)
MONOCYTES NFR BLD AUTO: 9 % (ref 4–12)
NEUTROPHILS # BLD AUTO: 2.83 THOUSANDS/ΜL (ref 1.85–7.62)
NEUTS SEG NFR BLD AUTO: 60 % (ref 43–75)
NRBC BLD AUTO-RTO: 0 /100 WBCS
PLATELET # BLD AUTO: 386 THOUSANDS/UL (ref 149–390)
PMV BLD AUTO: 9.5 FL (ref 8.9–12.7)
RBC # BLD AUTO: 3.57 MILLION/UL (ref 3.81–5.12)
WBC # BLD AUTO: 4.72 THOUSAND/UL (ref 4.31–10.16)

## 2018-07-09 PROCEDURE — 81350 UGT1A1 GENE COMMON VARIANTS: CPT

## 2018-07-09 PROCEDURE — 80053 COMPREHEN METABOLIC PANEL: CPT | Performed by: INTERNAL MEDICINE

## 2018-07-09 PROCEDURE — 85025 COMPLETE CBC W/AUTO DIFF WBC: CPT | Performed by: INTERNAL MEDICINE

## 2018-07-09 PROCEDURE — 81003 URINALYSIS AUTO W/O SCOPE: CPT | Performed by: INTERNAL MEDICINE

## 2018-07-09 PROCEDURE — 36415 COLL VENOUS BLD VENIPUNCTURE: CPT | Performed by: INTERNAL MEDICINE

## 2018-07-09 NOTE — TELEPHONE ENCOUNTER
Called patient left a message inquiring if she had any labs done for her visit the office number was left if she has any questions or needs to reschedule

## 2018-07-09 NOTE — ANESTHESIA PREPROCEDURE EVALUATION
Review of Systems/Medical History  Patient summary reviewed        Cardiovascular   Pulmonary       GI/Hepatic            Endo/Other     GYN       Hematology   Musculoskeletal       Neurology   Psychology                Anesthesia Plan  ASA Score- 2     Anesthesia Type- IV sedation with anesthesia and general with ASA Monitors  Additional Monitors:   Airway Plan:         Plan Factors-    Induction- intravenous  Postoperative Plan-     Informed Consent- Anesthetic plan and risks discussed with patient

## 2018-07-10 ENCOUNTER — OFFICE VISIT (OUTPATIENT)
Dept: HEMATOLOGY ONCOLOGY | Facility: CLINIC | Age: 53
End: 2018-07-10
Payer: COMMERCIAL

## 2018-07-10 VITALS
HEART RATE: 91 BPM | OXYGEN SATURATION: 97 % | HEIGHT: 66 IN | TEMPERATURE: 97.9 F | DIASTOLIC BLOOD PRESSURE: 68 MMHG | SYSTOLIC BLOOD PRESSURE: 112 MMHG | WEIGHT: 154 LBS | BODY MASS INDEX: 24.75 KG/M2 | RESPIRATION RATE: 16 BRPM

## 2018-07-10 DIAGNOSIS — C18.2 PRIMARY ADENOCARCINOMA OF ASCENDING COLON (HCC): Primary | ICD-10-CM

## 2018-07-10 DIAGNOSIS — C78.7 LIVER METASTASES (HCC): ICD-10-CM

## 2018-07-10 DIAGNOSIS — D50.0 IRON DEFICIENCY ANEMIA DUE TO CHRONIC BLOOD LOSS: ICD-10-CM

## 2018-07-10 DIAGNOSIS — C77.2 METASTASIS TO RETROPERITONEAL LYMPH NODE (HCC): ICD-10-CM

## 2018-07-10 DIAGNOSIS — I82.531 CHRONIC DEEP VEIN THROMBOSIS (DVT) OF POPLITEAL VEIN OF RIGHT LOWER EXTREMITY (HCC): ICD-10-CM

## 2018-07-10 LAB
ALBUMIN SERPL BCP-MCNC: 3.8 G/DL (ref 3.5–5)
ALP SERPL-CCNC: 70 U/L (ref 46–116)
ALT SERPL W P-5'-P-CCNC: 27 U/L (ref 12–78)
ANION GAP SERPL CALCULATED.3IONS-SCNC: 8 MMOL/L (ref 4–13)
AST SERPL W P-5'-P-CCNC: 19 U/L (ref 5–45)
BILIRUB SERPL-MCNC: 0.33 MG/DL (ref 0.2–1)
BILIRUB UR QL STRIP: NEGATIVE
BUN SERPL-MCNC: 10 MG/DL (ref 5–25)
CALCIUM SERPL-MCNC: 9.2 MG/DL (ref 8.3–10.1)
CHLORIDE SERPL-SCNC: 106 MMOL/L (ref 100–108)
CLARITY UR: CLEAR
CO2 SERPL-SCNC: 27 MMOL/L (ref 21–32)
COLOR UR: YELLOW
CREAT SERPL-MCNC: 0.84 MG/DL (ref 0.6–1.3)
GFR SERPL CREATININE-BSD FRML MDRD: 81 ML/MIN/1.73SQ M
GLUCOSE SERPL-MCNC: 109 MG/DL (ref 65–140)
GLUCOSE UR STRIP-MCNC: NEGATIVE MG/DL
HGB UR QL STRIP.AUTO: NEGATIVE
KETONES UR STRIP-MCNC: NEGATIVE MG/DL
LEUKOCYTE ESTERASE UR QL STRIP: NEGATIVE
NITRITE UR QL STRIP: NEGATIVE
PH UR STRIP.AUTO: 7 [PH] (ref 4.5–8)
POTASSIUM SERPL-SCNC: 3.8 MMOL/L (ref 3.5–5.3)
PROT SERPL-MCNC: 7.1 G/DL (ref 6.4–8.2)
PROT UR STRIP-MCNC: NEGATIVE MG/DL
SODIUM SERPL-SCNC: 141 MMOL/L (ref 136–145)
SP GR UR STRIP.AUTO: 1.01 (ref 1–1.03)
UROBILINOGEN UR QL STRIP.AUTO: 0.2 E.U./DL

## 2018-07-10 PROCEDURE — 99214 OFFICE O/P EST MOD 30 MIN: CPT | Performed by: INTERNAL MEDICINE

## 2018-07-10 NOTE — PROGRESS NOTES
HPI:      Abimbola Mason  is here with her   On 05/21/2018 patient underwent GALI and BSO and there were cancer cells in the fallopian tubes  In June 2018 patient   underwent extended right hemicolectomy, partial omentectomy and biopsy of the peritoneal nodule  Peritoneal nodule came back  positive for metastatic adenocarcinoma from colon  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 and metastatic disease to liver  6 cm T3 G2 right colon cancer with positive margins, lymphovascular invasion and perineural invasion and positive 3 of 27 lymph nodes with extranodal extension and positive peritoneal nodule biopsy  Stage IV disease because of liver and peritoneal metastases  Patient has recovered from surgery  She has Port-A-Cath  She noticed  small amount of blood with bowel movement today and if that happened again she is going to call our office and also that her surgeon know  Patient will skip Xarelto today and discuss with me tomorrow  Patient has been on Xarelto for  recurrent DVTs in the legs and one time she had positive lupus anticoagulant but that was negative the next time  She wants chemotherapy to be started on 07/16/2018  and that will be in relation to her  vacations in August  Patient has some weakness and tiredness but is improving overall from her recent surgery          Current Outpatient Prescriptions:     acetaminophen (TYLENOL) 500 mg tablet, Take 1,000 mg by mouth every 6 (six) hours as needed for mild pain, Disp: , Rfl:     lidocaine-prilocaine (EMLA) cream, Apply 1 hour prior to chemo, cover in wrap, Disp: 30 g, Rfl: 0    ondansetron (ZOFRAN) 8 mg tablet, Take 1 tablet (8 mg total) by mouth every 8 (eight) hours as needed for nausea or vomiting, Disp: 20 tablet, Rfl: 1    oxyCODONE (ROXICODONE) 5 mg immediate release tablet, TAKE 1 TABLET BY MOUTH EVERY 4 HOURS AS NEEDED FOR PAIN UP TO 10 DAYS, Disp: , Rfl: 0    XARELTO 20 MG tablet, Take 1 tablet (20 mg total) by mouth daily, Disp: 30 tablet, Rfl: 2    terconazole (TERAZOL 3) 0 8 % vaginal cream, Insert 1 applicator into the vagina daily at bedtime, Disp: 20 g, Rfl: 1    No Known Allergies     No history exists  ROS:  07/10/18 Reviewed 13 systems:  Presently no headaches, seizures, dizziness, diplopia, dysphagia, hoarseness, chest pain, palpitations, shortness of breath, cough, hemoptysis, abdominal pain, nausea, vomiting, change in bowel habits, melena, hematuria, fever, chills,  bone pains, skin rash, weight loss, arthritic symptoms,  numbness,  claudication and gait problem  No frequent infections  Not unusually sensitive to heat or cold  No swelling of the ankles  No swollen glands  Patient is anxious  Other symptoms are in HPI        /68 (BP Location: Right arm, Cuff Size: Adult)   Pulse 91   Temp 97 9 °F (36 6 °C) (Tympanic)   Resp 16   Ht 5' 5 5" (1 664 m)   Wt 69 9 kg (154 lb)   LMP 05/16/2018 (Exact Date)   SpO2 97%   BMI 25 24 kg/m²     Physical Exam:  Alert, oriented, not in distress, no icterus, no oral thrush, no palpable neck mass, clear lung fields, regular heart rate, abdomen  soft and non tender, no palpable abdominal mass, no ascites, no edema of ankles, no calf tenderness, no focal neurological deficit, no skin rash, no palpable lymphadenopathy, good arterial pulses, no clubbing  Patient is anxious  Performance status 1      IMAGING:  No orders to display       LABS:  Results for orders placed or performed in visit on 06/26/18   CBC and differential   Result Value Ref Range    WBC 4 72 4 31 - 10 16 Thousand/uL    RBC 3 57 (L) 3 81 - 5 12 Million/uL    Hemoglobin 9 4 (L) 11 5 - 15 4 g/dL    Hematocrit 31 2 (L) 34 8 - 46 1 %    MCV 87 82 - 98 fL    MCH 26 3 (L) 26 8 - 34 3 pg    MCHC 30 1 (L) 31 4 - 37 4 g/dL    RDW 13 3 11 6 - 15 1 %    MPV 9 5 8 9 - 12 7 fL    Platelets 703 130 - 034 Thousands/uL    nRBC 0 /100 WBCs    Neutrophils Relative 60 43 - 75 %    Immat GRANS % 0 0 - 2 % Lymphocytes Relative 27 14 - 44 %    Monocytes Relative 9 4 - 12 %    Eosinophils Relative 3 0 - 6 %    Basophils Relative 1 0 - 1 %    Neutrophils Absolute 2 83 1 85 - 7 62 Thousands/µL    Immature Grans Absolute 0 01 0 00 - 0 20 Thousand/uL    Lymphocytes Absolute 1 28 0 60 - 4 47 Thousands/µL    Monocytes Absolute 0 43 0 17 - 1 22 Thousand/µL    Eosinophils Absolute 0 14 0 00 - 0 61 Thousand/µL    Basophils Absolute 0 03 0 00 - 0 10 Thousands/µL   Comprehensive metabolic panel   Result Value Ref Range    Sodium 141 136 - 145 mmol/L    Potassium 3 8 3 5 - 5 3 mmol/L    Chloride 106 100 - 108 mmol/L    CO2 27 21 - 32 mmol/L    Anion Gap 8 4 - 13 mmol/L    BUN 10 5 - 25 mg/dL    Creatinine 0 84 0 60 - 1 30 mg/dL    Glucose 109 65 - 140 mg/dL    Calcium 9 2 8 3 - 10 1 mg/dL    AST 19 5 - 45 U/L    ALT 27 12 - 78 U/L    Alkaline Phosphatase 70 46 - 116 U/L    Total Protein 7 1 6 4 - 8 2 g/dL    Albumin 3 8 3 5 - 5 0 g/dL    Total Bilirubin 0 33 0 20 - 1 00 mg/dL    eGFR 81 ml/min/1 73sq m   UA (URINE) with reflex to Microscopic   Result Value Ref Range    Color, UA Yellow     Clarity, UA Clear     Specific Gravity, UA 1 007 1 003 - 1 030    pH, UA 7 0 4 5 - 8 0    Leukocytes, UA Negative Negative    Nitrite, UA Negative Negative    Protein, UA Negative Negative mg/dl    Glucose, UA Negative Negative mg/dl    Ketones, UA Negative Negative mg/dl    Urobilinogen, UA 0 2 0 2, 1 0 E U /dl E U /dl    Bilirubin, UA Negative Negative    Blood, UA Negative Negative     Labs, Imaging, & Other studies:   All pertinent labs and imaging studies were personally reviewed    Lab Results   Component Value Date     07/09/2018    K 3 8 07/09/2018     07/09/2018    CO2 27 07/09/2018    ANIONGAP 8 07/09/2018    BUN 10 07/09/2018    CREATININE 0 84 07/09/2018    GLUCOSE 109 07/09/2018    GLUF 93 05/08/2018    CALCIUM 9 2 07/09/2018    AST 19 07/09/2018    ALT 27 07/09/2018    ALKPHOS 70 07/09/2018    PROT 7 1 07/09/2018 BILITOT 0 33 07/09/2018    EGFR 81 07/09/2018     Lab Results   Component Value Date    WBC 4 72 07/09/2018    HGB 9 4 (L) 07/09/2018    HCT 31 2 (L) 07/09/2018    MCV 87 07/09/2018     07/09/2018   No results found for: [address] [place] [address] [place] and discussed with patient  Assessment and plan:  Dano Parent  is here with her   On 05/21/2018 patient underwent GALI and BSO and there were cancer cells in the fallopian tubes  In June 2018 patient   underwent extended right hemicolectomy, partial omentectomy and biopsy of the peritoneal nodule  Peritoneal nodule came back  positive for metastatic adenocarcinoma from colon  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 and metastatic disease to liver  6 cm T3 G2 right colon cancer with positive margins, lymphovascular invasion and perineural invasion and positive 3 of 27 lymph nodes with extranodal extension and positive peritoneal nodule biopsy  Stage IV disease because of liver and peritoneal metastases  Patient has recovered from surgery  She has Port-A-Cath  She noticed  small amount of blood with bowel movement today and if that happened again she is going to call our office and also that her surgeon know  Patient will skip Xarelto today and discuss with me tomorrow  Patient has been on Xarelto for  recurrent DVTs in the legs and one time she had positive lupus anticoagulant but that was negative the next time  She wants chemotherapy to be started on 07/16/2018  and that will be in relation to her  vacations in August      Physical examination and test results are as recorded and discussed  Plan is as above   1St 2 treatments will be with FOLFIRI without Avastin and there is no further bleeding Avastin will be added to FOLFIRI  She will stay in touch  All discussed in detail  Questions answered  Goal is response and prolongation of survival    1  Primary adenocarcinoma of ascending colon ([facility] [address] )      2   Liver metastases ([facility] [address] )      3  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

## 2018-07-10 NOTE — LETTER
July 10, 2018     Dale Meza MD  [facility]  [address]  [address] [place] [address] [facility]    Patient: Bonnie rBiceno   YOB: 1965   Date of Visit: 7/10/2018       Dear Dr Vides Greek: Thank you for referring Juan Huang to me for evaluation  Below are my notes for this consultation  If you have questions, please do not hesitate to call me  I look forward to following your patient along with you  Sincerely,        Jayla Orosco MD        CC: No Recipients  Jayla Orosco MD  7/10/2018  6:14 PM  Sign at close encounter    HPI:      Suze Kang  is here with her   On 05/21/2018 patient underwent GALI and BSO and there were cancer cells in the fallopian tubes  In June 2018 patient   underwent extended right hemicolectomy, partial omentectomy and biopsy of the peritoneal nodule  Peritoneal nodule came back  positive for metastatic adenocarcinoma from colon  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 and metastatic disease to liver  6 cm T3 G2 right colon cancer with positive margins, lymphovascular invasion and perineural invasion and positive 3 of 27 lymph nodes with extranodal extension and positive peritoneal nodule biopsy  Stage IV disease because of liver and peritoneal metastases  Patient has recovered from surgery  She has Port-A-Cath  She noticed  small amount of blood with bowel movement today and if that happened again she is going to call our office and also that her surgeon know  Patient will skip Xarelto today and discuss with me tomorrow  Patient has been on Xarelto for  recurrent DVTs in the legs and one time she had positive lupus anticoagulant but that was negative the next time  She wants chemotherapy to be started on 07/16/2018  and that will be in relation to her  vacations in August  Patient has some weakness and tiredness but is improving overall from her recent surgery          Current Outpatient Prescriptions:    acetaminophen (TYLENOL) 500 mg tablet, Take 1,000 mg by mouth every 6 (six) hours as needed for mild pain, Disp: , Rfl:     lidocaine-prilocaine (EMLA) cream, Apply 1 hour prior to chemo, cover in wrap, Disp: 30 g, Rfl: 0    ondansetron (ZOFRAN) 8 mg tablet, Take 1 tablet (8 mg total) by mouth every 8 (eight) hours as needed for nausea or vomiting, Disp: 20 tablet, Rfl: 1    oxyCODONE (ROXICODONE) 5 mg immediate release tablet, TAKE 1 TABLET BY MOUTH EVERY 4 HOURS AS NEEDED FOR PAIN UP TO 10 DAYS, Disp: , Rfl: 0    XARELTO 20 MG tablet, Take 1 tablet (20 mg total) by mouth daily, Disp: 30 tablet, Rfl: 2    terconazole (TERAZOL 3) 0 8 % vaginal cream, Insert 1 applicator into the vagina daily at bedtime, Disp: 20 g, Rfl: 1    No Known Allergies     No history exists  ROS:  07/10/18 Reviewed 13 systems:  Presently no headaches, seizures, dizziness, diplopia, dysphagia, hoarseness, chest pain, palpitations, shortness of breath, cough, hemoptysis, abdominal pain, nausea, vomiting, change in bowel habits, melena, hematuria, fever, chills,  bone pains, skin rash, weight loss, arthritic symptoms,  numbness,  claudication and gait problem  No frequent infections  Not unusually sensitive to heat or cold  No swelling of the ankles  No swollen glands  Patient is anxious  Other symptoms are in HPI        /68 (BP Location: Right arm, Cuff Size: Adult)   Pulse 91   Temp 97 9 °F (36 6 °C) (Tympanic)   Resp 16   Ht 5' 5 5" (1 664 m)   Wt 69 9 kg (154 lb)   LMP 05/16/2018 (Exact Date)   SpO2 97%   BMI 25 24 kg/m²      Physical Exam:  Alert, oriented, not in distress, no icterus, no oral thrush, no palpable neck mass, clear lung fields, regular heart rate, abdomen  soft and non tender, no palpable abdominal mass, no ascites, no edema of ankles, no calf tenderness, no focal neurological deficit, no skin rash, no palpable lymphadenopathy, good arterial pulses, no clubbing  Patient is anxious    Performance status 1      IMAGING:  No orders to display       LABS:  Results for orders placed or performed in visit on 06/26/18   CBC and differential   Result Value Ref Range    WBC 4 72 4 31 - 10 16 Thousand/uL    RBC 3 57 (L) 3 81 - 5 12 Million/uL    Hemoglobin 9 4 (L) 11 5 - 15 4 g/dL    Hematocrit 31 2 (L) 34 8 - 46 1 %    MCV 87 82 - 98 fL    MCH 26 3 (L) 26 8 - 34 3 pg    MCHC 30 1 (L) 31 4 - 37 4 g/dL    RDW 13 3 11 6 - 15 1 %    MPV 9 5 8 9 - 12 7 fL    Platelets 583 512 - 403 Thousands/uL    nRBC 0 /100 WBCs    Neutrophils Relative 60 43 - 75 %    Immat GRANS % 0 0 - 2 %    Lymphocytes Relative 27 14 - 44 %    Monocytes Relative 9 4 - 12 %    Eosinophils Relative 3 0 - 6 %    Basophils Relative 1 0 - 1 %    Neutrophils Absolute 2 83 1 85 - 7 62 Thousands/µL    Immature Grans Absolute 0 01 0 00 - 0 20 Thousand/uL    Lymphocytes Absolute 1 28 0 60 - 4 47 Thousands/µL    Monocytes Absolute 0 43 0 17 - 1 22 Thousand/µL    Eosinophils Absolute 0 14 0 00 - 0 61 Thousand/µL    Basophils Absolute 0 03 0 00 - 0 10 Thousands/µL   Comprehensive metabolic panel   Result Value Ref Range    Sodium 141 136 - 145 mmol/L    Potassium 3 8 3 5 - 5 3 mmol/L    Chloride 106 100 - 108 mmol/L    CO2 27 21 - 32 mmol/L    Anion Gap 8 4 - 13 mmol/L    BUN 10 5 - 25 mg/dL    Creatinine 0 84 0 60 - 1 30 mg/dL    Glucose 109 65 - 140 mg/dL    Calcium 9 2 8 3 - 10 1 mg/dL    AST 19 5 - 45 U/L    ALT 27 12 - 78 U/L    Alkaline Phosphatase 70 46 - 116 U/L    Total Protein 7 1 6 4 - 8 2 g/dL    Albumin 3 8 3 5 - 5 0 g/dL    Total Bilirubin 0 33 0 20 - 1 00 mg/dL    eGFR 81 ml/min/1 73sq m   UA (URINE) with reflex to Microscopic   Result Value Ref Range    Color, UA Yellow     Clarity, UA Clear     Specific Gravity, UA 1 007 1 003 - 1 030    pH, UA 7 0 4 5 - 8 0    Leukocytes, UA Negative Negative    Nitrite, UA Negative Negative    Protein, UA Negative Negative mg/dl    Glucose, UA Negative Negative mg/dl    Ketones, UA Negative Negative mg/dl Urobilinogen, UA 0 2 0 2, 1 0 E U /dl E U /dl    Bilirubin, UA Negative Negative    Blood, UA Negative Negative     Labs, Imaging, & Other studies:   All pertinent labs and imaging studies were personally reviewed    Lab Results   Component Value Date     07/09/2018    K 3 8 07/09/2018     07/09/2018    CO2 27 07/09/2018    ANIONGAP 8 07/09/2018    BUN 10 07/09/2018    CREATININE 0 84 07/09/2018    GLUCOSE 109 07/09/2018    GLUF 93 05/08/2018    CALCIUM 9 2 07/09/2018    AST 19 07/09/2018    ALT 27 07/09/2018    ALKPHOS 70 07/09/2018    PROT 7 1 07/09/2018    BILITOT 0 33 07/09/2018    EGFR 81 07/09/2018     Lab Results   Component Value Date    WBC 4 72 07/09/2018    HGB 9 4 (L) 07/09/2018    HCT 31 2 (L) 07/09/2018    MCV 87 07/09/2018     07/09/2018   No results found for: SEDRATE    Reviewed and discussed with patient  Assessment and plan:  Jesse Recio  is here with her   On 05/21/2018 patient underwent GALI and BSO and there were cancer cells in the fallopian tubes  In June 2018 patient   underwent extended right hemicolectomy, partial omentectomy and biopsy of the peritoneal nodule  Peritoneal nodule came back  positive for metastatic adenocarcinoma from colon  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 and metastatic disease to liver  6 cm T3 G2 right colon cancer with positive margins, lymphovascular invasion and perineural invasion and positive 3 of 27 lymph nodes with extranodal extension and positive peritoneal nodule biopsy  Stage IV disease because of liver and peritoneal metastases  Patient has recovered from surgery  She has Port-A-Cath  She noticed  small amount of blood with bowel movement today and if that happened again she is going to call our office and also that her surgeon know  Patient will skip Xarelto today and discuss with me tomorrow    Patient has been on Xarelto for  recurrent DVTs in the legs and one time she had positive lupus anticoagulant but that was negative the next time  She wants chemotherapy to be started on 07/16/2018  and that will be in relation to her  vacations in August      Physical examination and test results are as recorded and discussed  Plan is as above   1St 2 treatments will be with FOLFIRI without Avastin and there is no further bleeding Avastin will be added to FOLFIRI  She will stay in touch  All discussed in detail  Questions answered  Goal is response and prolongation of survival    1  Primary adenocarcinoma of ascending colon ([facility] [address] )      2  Liver metastases ([facility] [address] )      3  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

## 2018-07-10 NOTE — PATIENT INSTRUCTIONS
Please do not take Xarelto today and call me tomorrow  Please call me sooner and also call your surgeon if you see any more bleeding with bowel movement  Yes

## 2018-07-16 ENCOUNTER — TELEPHONE (OUTPATIENT)
Dept: HEMATOLOGY ONCOLOGY | Facility: CLINIC | Age: 53
End: 2018-07-16

## 2018-07-16 ENCOUNTER — HOSPITAL ENCOUNTER (OUTPATIENT)
Dept: INFUSION CENTER | Facility: CLINIC | Age: 53
Discharge: HOME | End: 2018-07-16
Payer: COMMERCIAL

## 2018-07-16 DIAGNOSIS — C78.6 PERITONEAL METASTASES (HCC): ICD-10-CM

## 2018-07-16 DIAGNOSIS — C77.2 METASTASIS TO RETROPERITONEAL LYMPH NODE (HCC): ICD-10-CM

## 2018-07-16 DIAGNOSIS — C78.7 LIVER METASTASES (HCC): ICD-10-CM

## 2018-07-16 DIAGNOSIS — D50.0 IRON DEFICIENCY ANEMIA DUE TO CHRONIC BLOOD LOSS: ICD-10-CM

## 2018-07-16 DIAGNOSIS — C79.9 ADENOCARCINOMA, METASTATIC (HCC): ICD-10-CM

## 2018-07-16 DIAGNOSIS — C18.2 PRIMARY ADENOCARCINOMA OF ASCENDING COLON (HCC): ICD-10-CM

## 2018-07-16 LAB
ALBUMIN SERPL BCP-MCNC: 3.5 G/DL (ref 3.5–5)
ALP SERPL-CCNC: 76 U/L (ref 46–116)
ALT SERPL W P-5'-P-CCNC: 27 U/L (ref 12–78)
ANION GAP SERPL CALCULATED.3IONS-SCNC: 6 MMOL/L (ref 4–13)
AST SERPL W P-5'-P-CCNC: 24 U/L (ref 5–45)
BASOPHILS # BLD AUTO: 0.04 THOUSANDS/ΜL (ref 0–0.1)
BASOPHILS NFR BLD AUTO: 1 % (ref 0–1)
BILIRUB SERPL-MCNC: 0.5 MG/DL (ref 0.2–1)
BUN SERPL-MCNC: 9 MG/DL (ref 5–25)
CALCIUM SERPL-MCNC: 8.9 MG/DL (ref 8.3–10.1)
CHLORIDE SERPL-SCNC: 105 MMOL/L (ref 100–108)
CO2 SERPL-SCNC: 28 MMOL/L (ref 21–32)
CREAT SERPL-MCNC: 0.82 MG/DL (ref 0.6–1.3)
EOSINOPHIL # BLD AUTO: 0.14 THOUSAND/ΜL (ref 0–0.61)
EOSINOPHIL NFR BLD AUTO: 3 % (ref 0–6)
ERYTHROCYTE [DISTWIDTH] IN BLOOD BY AUTOMATED COUNT: 13.7 % (ref 11.6–15.1)
FERRITIN SERPL-MCNC: 7 NG/ML (ref 8–388)
GFR SERPL CREATININE-BSD FRML MDRD: 83 ML/MIN/1.73SQ M
GLUCOSE SERPL-MCNC: 94 MG/DL (ref 65–140)
HCT VFR BLD AUTO: 28.8 % (ref 34.8–46.1)
HGB BLD-MCNC: 8.8 G/DL (ref 11.5–15.4)
IRON SATN MFR SERPL: 6 %
IRON SERPL-MCNC: 20 UG/DL (ref 50–170)
LYMPHOCYTES # BLD AUTO: 1.15 THOUSANDS/ΜL (ref 0.6–4.47)
LYMPHOCYTES NFR BLD AUTO: 23 % (ref 14–44)
MCH RBC QN AUTO: 25.7 PG (ref 26.8–34.3)
MCHC RBC AUTO-ENTMCNC: 30.6 G/DL (ref 31.4–37.4)
MCV RBC AUTO: 84 FL (ref 82–98)
MONOCYTES # BLD AUTO: 0.58 THOUSAND/ΜL (ref 0.17–1.22)
MONOCYTES NFR BLD AUTO: 11 % (ref 4–12)
NEUTROPHILS # BLD AUTO: 3.21 THOUSANDS/ΜL (ref 1.85–7.62)
NEUTS SEG NFR BLD AUTO: 63 % (ref 43–75)
PLATELET # BLD AUTO: 333 THOUSANDS/UL (ref 149–390)
PMV BLD AUTO: 8.7 FL (ref 8.9–12.7)
POTASSIUM SERPL-SCNC: 3.8 MMOL/L (ref 3.5–5.3)
PROT SERPL-MCNC: 6.8 G/DL (ref 6.4–8.2)
RBC # BLD AUTO: 3.42 MILLION/UL (ref 3.81–5.12)
SODIUM SERPL-SCNC: 139 MMOL/L (ref 136–145)
TIBC SERPL-MCNC: 352 UG/DL (ref 250–450)
WBC # BLD AUTO: 5.12 THOUSAND/UL (ref 4.31–10.16)

## 2018-07-16 PROCEDURE — 85025 COMPLETE CBC W/AUTO DIFF WBC: CPT

## 2018-07-16 PROCEDURE — 82728 ASSAY OF FERRITIN: CPT

## 2018-07-16 PROCEDURE — 80053 COMPREHEN METABOLIC PANEL: CPT

## 2018-07-16 PROCEDURE — 83540 ASSAY OF IRON: CPT

## 2018-07-16 PROCEDURE — 83550 IRON BINDING TEST: CPT

## 2018-07-16 RX ORDER — FLUOROURACIL 50 MG/ML
716 INJECTION, SOLUTION INTRAVENOUS ONCE
Status: COMPLETED | OUTPATIENT
Start: 2018-07-17 | End: 2018-07-17

## 2018-07-16 RX ORDER — SODIUM CHLORIDE 9 MG/ML
20 INJECTION, SOLUTION INTRAVENOUS CONTINUOUS
Status: DISCONTINUED | OUTPATIENT
Start: 2018-07-17 | End: 2018-07-20 | Stop reason: HOSPADM

## 2018-07-16 RX ORDER — ATROPINE SULFATE 0.4 MG/ML
0.25 INJECTION, SOLUTION ENDOTRACHEAL; INTRAMEDULLARY; INTRAMUSCULAR; INTRAVENOUS; SUBCUTANEOUS ONCE
Status: COMPLETED | OUTPATIENT
Start: 2018-07-17 | End: 2018-07-17

## 2018-07-16 RX ADMIN — Medication 300 UNITS: at 09:32

## 2018-07-17 ENCOUNTER — DOCUMENTATION (OUTPATIENT)
Dept: HEMATOLOGY ONCOLOGY | Facility: CLINIC | Age: 53
End: 2018-07-17

## 2018-07-17 ENCOUNTER — HOSPITAL ENCOUNTER (OUTPATIENT)
Dept: INFUSION CENTER | Facility: CLINIC | Age: 53
Discharge: HOME | End: 2018-07-17
Payer: COMMERCIAL

## 2018-07-17 VITALS
HEART RATE: 68 BPM | RESPIRATION RATE: 20 BRPM | DIASTOLIC BLOOD PRESSURE: 68 MMHG | SYSTOLIC BLOOD PRESSURE: 112 MMHG | HEIGHT: 66 IN | OXYGEN SATURATION: 97 % | BODY MASS INDEX: 24.91 KG/M2 | WEIGHT: 155 LBS | TEMPERATURE: 97.9 F

## 2018-07-17 PROCEDURE — 96368 THER/DIAG CONCURRENT INF: CPT

## 2018-07-17 PROCEDURE — 96413 CHEMO IV INFUSION 1 HR: CPT

## 2018-07-17 PROCEDURE — G0498 CHEMO EXTEND IV INFUS W/PUMP: HCPCS

## 2018-07-17 PROCEDURE — 96415 CHEMO IV INFUSION ADDL HR: CPT

## 2018-07-17 PROCEDURE — 96375 TX/PRO/DX INJ NEW DRUG ADDON: CPT

## 2018-07-17 PROCEDURE — 96411 CHEMO IV PUSH ADDL DRUG: CPT

## 2018-07-17 PROCEDURE — 96367 TX/PROPH/DG ADDL SEQ IV INF: CPT

## 2018-07-17 RX ADMIN — SODIUM CHLORIDE 20 ML/HR: 0.9 INJECTION, SOLUTION INTRAVENOUS at 09:06

## 2018-07-17 RX ADMIN — FLUOROURACIL 716 MG: 50 INJECTION, SOLUTION INTRAVENOUS at 12:34

## 2018-07-17 RX ADMIN — IRINOTECAN HYDROCHLORIDE 320 MG: 100 INJECTION, SOLUTION INTRAVENOUS at 10:53

## 2018-07-17 RX ADMIN — ATROPINE SULFATE 0.25 MG: 0.4 INJECTION INTRAMUSCULAR; INTRAVENOUS; SUBCUTANEOUS at 10:24

## 2018-07-17 RX ADMIN — LEUCOVORIN CALCIUM 700 MG: 500 INJECTION, POWDER, LYOPHILIZED, FOR SOLUTION INTRAMUSCULAR; INTRAVENOUS at 10:50

## 2018-07-17 RX ADMIN — IRON SUCROSE 100 MG: 20 INJECTION, SOLUTION INTRAVENOUS at 09:23

## 2018-07-17 RX ADMIN — DEXAMETHASONE SODIUM PHOSPHATE: 10 INJECTION, SOLUTION INTRAMUSCULAR; INTRAVENOUS at 10:26

## 2018-07-17 NOTE — PROGRESS NOTES
Pt to clinic for first dose of folfiri and venofer, pt offers no complaints at this time, aware of next appointment,

## 2018-07-17 NOTE — PROGRESS NOTES
GI Oncology Nurse Navigator Note    Met with patient and significant other in [address] [place] [address] today for patients first infusion, introduced myself and gave patient information on side effects of chemotherapy, support groups, and various other information, along with business card, reviewed chemotherapy regimen with patient, how to take anti emetic, when to call the dr and got to know pt, instructed patient to call with any questions or concerns, informed pt this RN will call periodically to check in on her, pt agreeable

## 2018-07-19 ENCOUNTER — HOSPITAL ENCOUNTER (OUTPATIENT)
Dept: INFUSION CENTER | Facility: CLINIC | Age: 53
Discharge: HOME | End: 2018-07-19
Payer: COMMERCIAL

## 2018-07-19 RX ADMIN — HEPARIN 300 UNITS: 100 SYRINGE at 10:54

## 2018-07-19 NOTE — PROGRESS NOTES
Patient arrived for CADD pump D/C  Tolerated 46 hour infusion of 5FU without difficulty  Port flushed per protocol  Patient aware of upcoming appointments   Declined AVS

## 2018-07-23 ENCOUNTER — TELEPHONE (OUTPATIENT)
Dept: HEMATOLOGY ONCOLOGY | Facility: CLINIC | Age: 53
End: 2018-07-23

## 2018-07-24 ENCOUNTER — TELEPHONE (OUTPATIENT)
Dept: HEMATOLOGY ONCOLOGY | Facility: CLINIC | Age: 53
End: 2018-07-24

## 2018-07-24 NOTE — TELEPHONE ENCOUNTER
Called and spoke to the patient regarding constipation and fatigue she has been having since her first chemo treatment on 7/17/18  Patient instructed to try taking a Colace and Senna in the morning and at night for the constipation  If she gets no relief from the Colace and the Senna, she was instructed to drink a half bottle of magnesium citrate daily  Patient cautioned to be aware that the magnesium citrate can act quickly when it works  In regards to her fatigue, patient will call the office back if she becomes SOB or her fatigue does not resolve by the end of the week  Looked in the patient's chart and saw no record of Dr Erica Powell ordering a PET scan

## 2018-07-26 ENCOUNTER — TELEPHONE (OUTPATIENT)
Dept: HEMATOLOGY ONCOLOGY | Facility: CLINIC | Age: 53
End: 2018-07-26

## 2018-07-26 ENCOUNTER — DOCUMENTATION (OUTPATIENT)
Dept: HEMATOLOGY ONCOLOGY | Facility: CLINIC | Age: 53
End: 2018-07-26

## 2018-07-26 NOTE — PROGRESS NOTES
GI Oncology Nurse Navigator Note    Spoke to [facility] today to see how she was feeling, stated she was feeling a little better today, stated she was having some problems with constipation and she spoke to [employer] (Dr Colby Lira) who instructed her to start stool softeners (note in chart) and she did have a BM but there was some blood so she spoke to GI Dr and per pt was not concerned, pt stated she has not had any BMs or blood since that one episode, pt stated after her infusion she was taking the zofran around the clock for a few days, reviewed the side effects of zofran and constipation being one of them, pt also stated she took the zofran prior to coming in for infusion, informed her she gets zofran IV prior to her infusion and she did not have to take the PO at home before she gets to the infusion center, pt verbalized understanding, spoke to the patient about possibly keeping a stool softener on board for now to keep her from getting constipated again as pt stated she had to strain (explained to patient how that is unhealthy), told patient if she starts to have diarrhea to cut back on stool softeners to only a few times a week or discontinue, pt verbalized understanding, pt otherwise feeling well, no nausea or fevers, instructed pt to call with any questions or concerns, informed her this RN will call in a few weeks to check in with her, pt agreeable

## 2018-07-26 NOTE — TELEPHONE ENCOUNTER
Patient took a Senna and Colace yesterday  Later in the afternoon the patient had a formed stool followed by a loose stool  Patient states that the loose stool had blood in it the consistently of "ketchup"  Patient has also been experiencing mild abd pain similar to what she felt when she has had fissures in the past  The patient was instructed to call her GI doctor for further evaluation

## 2018-07-30 LAB — MISCELLANEOUS LAB TEST RESULT: NORMAL

## 2018-07-30 NOTE — PROGRESS NOTES
Verified with Camille Estrada RN, per Dr Tawana Maurice, ok to give Avastin with cycle 2, 8/1/18 as ordered

## 2018-07-31 ENCOUNTER — HOSPITAL ENCOUNTER (OUTPATIENT)
Dept: INFUSION CENTER | Facility: CLINIC | Age: 53
Discharge: HOME | End: 2018-07-31
Payer: COMMERCIAL

## 2018-07-31 DIAGNOSIS — C77.2 METASTASIS TO RETROPERITONEAL LYMPH NODE (HCC): ICD-10-CM

## 2018-07-31 DIAGNOSIS — C18.2 PRIMARY ADENOCARCINOMA OF ASCENDING COLON (HCC): ICD-10-CM

## 2018-07-31 DIAGNOSIS — C78.6 PERITONEAL METASTASES (HCC): ICD-10-CM

## 2018-07-31 DIAGNOSIS — C79.9 ADENOCARCINOMA, METASTATIC (HCC): ICD-10-CM

## 2018-07-31 DIAGNOSIS — C78.7 LIVER METASTASES (HCC): ICD-10-CM

## 2018-07-31 LAB
ALBUMIN SERPL BCP-MCNC: 3.8 G/DL (ref 3.5–5)
ALP SERPL-CCNC: 83 U/L (ref 46–116)
ALT SERPL W P-5'-P-CCNC: 22 U/L (ref 12–78)
ANION GAP SERPL CALCULATED.3IONS-SCNC: 9 MMOL/L (ref 4–13)
AST SERPL W P-5'-P-CCNC: 14 U/L (ref 5–45)
BASOPHILS # BLD AUTO: 0.02 THOUSANDS/ΜL (ref 0–0.1)
BASOPHILS NFR BLD AUTO: 1 % (ref 0–1)
BILIRUB SERPL-MCNC: 0.4 MG/DL (ref 0.2–1)
BUN SERPL-MCNC: 10 MG/DL (ref 5–25)
CALCIUM SERPL-MCNC: 9.3 MG/DL (ref 8.3–10.1)
CHLORIDE SERPL-SCNC: 104 MMOL/L (ref 100–108)
CO2 SERPL-SCNC: 26 MMOL/L (ref 21–32)
CREAT SERPL-MCNC: 0.92 MG/DL (ref 0.6–1.3)
EOSINOPHIL # BLD AUTO: 0.14 THOUSAND/ΜL (ref 0–0.61)
EOSINOPHIL NFR BLD AUTO: 5 % (ref 0–6)
ERYTHROCYTE [DISTWIDTH] IN BLOOD BY AUTOMATED COUNT: 14.8 % (ref 11.6–15.1)
GFR SERPL CREATININE-BSD FRML MDRD: 72 ML/MIN/1.73SQ M
GLUCOSE SERPL-MCNC: 113 MG/DL (ref 65–140)
HCT VFR BLD AUTO: 30.9 % (ref 34.8–46.1)
HGB BLD-MCNC: 9.5 G/DL (ref 11.5–15.4)
IMM GRANULOCYTES # BLD AUTO: 0 THOUSAND/UL (ref 0–0.2)
IMM GRANULOCYTES NFR BLD AUTO: 0 % (ref 0–2)
LYMPHOCYTES # BLD AUTO: 1.3 THOUSANDS/ΜL (ref 0.6–4.47)
LYMPHOCYTES NFR BLD AUTO: 46 % (ref 14–44)
MCH RBC QN AUTO: 25.7 PG (ref 26.8–34.3)
MCHC RBC AUTO-ENTMCNC: 30.7 G/DL (ref 31.4–37.4)
MCV RBC AUTO: 84 FL (ref 82–98)
MONOCYTES # BLD AUTO: 0.3 THOUSAND/ΜL (ref 0.17–1.22)
MONOCYTES NFR BLD AUTO: 11 % (ref 4–12)
NEUTROPHILS # BLD AUTO: 1.01 THOUSANDS/ΜL (ref 1.85–7.62)
NEUTS SEG NFR BLD AUTO: 37 % (ref 43–75)
NRBC BLD AUTO-RTO: 0 /100 WBCS
PLATELET # BLD AUTO: 320 THOUSANDS/UL (ref 149–390)
PMV BLD AUTO: 9.1 FL (ref 8.9–12.7)
POTASSIUM SERPL-SCNC: 3.8 MMOL/L (ref 3.5–5.3)
PROT SERPL-MCNC: 7.2 G/DL (ref 6.4–8.2)
RBC # BLD AUTO: 3.7 MILLION/UL (ref 3.81–5.12)
SODIUM SERPL-SCNC: 139 MMOL/L (ref 136–145)
WBC # BLD AUTO: 2.77 THOUSAND/UL (ref 4.31–10.16)

## 2018-07-31 PROCEDURE — 80053 COMPREHEN METABOLIC PANEL: CPT

## 2018-07-31 PROCEDURE — 85025 COMPLETE CBC W/AUTO DIFF WBC: CPT

## 2018-07-31 RX ORDER — ATROPINE SULFATE 0.4 MG/ML
0.25 INJECTION, SOLUTION ENDOTRACHEAL; INTRAMEDULLARY; INTRAMUSCULAR; INTRAVENOUS; SUBCUTANEOUS ONCE
Status: DISCONTINUED | OUTPATIENT
Start: 2018-08-01 | End: 2018-08-01

## 2018-07-31 RX ORDER — SODIUM CHLORIDE 9 MG/ML
20 INJECTION, SOLUTION INTRAVENOUS CONTINUOUS
Status: DISCONTINUED | OUTPATIENT
Start: 2018-08-01 | End: 2018-08-04 | Stop reason: HOSPADM

## 2018-07-31 RX ORDER — FLUOROURACIL 50 MG/ML
716 INJECTION, SOLUTION INTRAVENOUS ONCE
Status: DISCONTINUED | OUTPATIENT
Start: 2018-08-01 | End: 2018-08-01

## 2018-07-31 RX ADMIN — Medication 300 UNITS: at 12:42

## 2018-07-31 NOTE — PROGRESS NOTES
To clinic for port a cath lab draw in prep for cycle 2 FOLFIRI scheduled for 8/1  Labs drawn as per orders  Pt given urine cup to collect urine specimen, as she says she can not produce a sample at the time of her visit

## 2018-08-01 ENCOUNTER — DOCUMENTATION (OUTPATIENT)
Dept: HEMATOLOGY ONCOLOGY | Facility: CLINIC | Age: 53
End: 2018-08-01

## 2018-08-01 ENCOUNTER — HOSPITAL ENCOUNTER (OUTPATIENT)
Dept: INFUSION CENTER | Facility: CLINIC | Age: 53
Discharge: HOME | End: 2018-08-01
Payer: COMMERCIAL

## 2018-08-01 VITALS
HEART RATE: 89 BPM | RESPIRATION RATE: 18 BRPM | TEMPERATURE: 97.6 F | WEIGHT: 153 LBS | BODY MASS INDEX: 24.59 KG/M2 | HEIGHT: 66 IN | DIASTOLIC BLOOD PRESSURE: 62 MMHG | OXYGEN SATURATION: 99 % | SYSTOLIC BLOOD PRESSURE: 104 MMHG

## 2018-08-01 DIAGNOSIS — C18.2 PRIMARY ADENOCARCINOMA OF ASCENDING COLON (HCC): ICD-10-CM

## 2018-08-01 DIAGNOSIS — C77.2 METASTASIS TO RETROPERITONEAL LYMPH NODE (HCC): ICD-10-CM

## 2018-08-01 DIAGNOSIS — C79.9 ADENOCARCINOMA, METASTATIC (HCC): ICD-10-CM

## 2018-08-01 DIAGNOSIS — C78.6 PERITONEAL METASTASES (HCC): ICD-10-CM

## 2018-08-01 DIAGNOSIS — C78.7 LIVER METASTASES (HCC): ICD-10-CM

## 2018-08-01 LAB
BILIRUB UR QL STRIP: NEGATIVE
CLARITY UR: CLEAR
COLOR UR: YELLOW
GLUCOSE UR STRIP-MCNC: NEGATIVE MG/DL
HGB UR QL STRIP.AUTO: NEGATIVE
KETONES UR STRIP-MCNC: NEGATIVE MG/DL
LEUKOCYTE ESTERASE UR QL STRIP: NEGATIVE
NITRITE UR QL STRIP: NEGATIVE
PH UR STRIP.AUTO: 5.5 [PH] (ref 4.5–8)
PROT UR STRIP-MCNC: NEGATIVE MG/DL
SP GR UR STRIP.AUTO: <=1.005 (ref 1–1.03)
UROBILINOGEN UR QL STRIP.AUTO: 0.2 E.U./DL

## 2018-08-01 PROCEDURE — 81003 URINALYSIS AUTO W/O SCOPE: CPT

## 2018-08-01 PROCEDURE — 96372 THER/PROPH/DIAG INJ SC/IM: CPT

## 2018-08-01 PROCEDURE — 96365 THER/PROPH/DIAG IV INF INIT: CPT

## 2018-08-01 RX ADMIN — SODIUM CHLORIDE 20 ML/HR: 0.9 INJECTION, SOLUTION INTRAVENOUS at 09:45

## 2018-08-01 RX ADMIN — IRON SUCROSE: 20 INJECTION, SOLUTION INTRAVENOUS at 09:46

## 2018-08-01 RX ADMIN — TBO-FILGRASTIM 300 MCG: 300 INJECTION, SOLUTION SUBCUTANEOUS at 11:16

## 2018-08-01 NOTE — PROGRESS NOTES
Pt teaching and video reviewed with patient on neulasta injection and granix  Neutropenic precautions reviewed with patient, pt verbalized understanding

## 2018-08-01 NOTE — PROGRESS NOTES
Pt is here for chemotherapy infusion, ANC below perimeter, Allen Leigh made aware and chemotherapy infusions on hold for this week  See timeout order for further details  Pt made aware and her and spouse desire to cancel trip planned for next week  to [place] secondary to low anc level

## 2018-08-01 NOTE — PROGRESS NOTES
GI Oncology Nurse Navigator Note    Went to see patient in infusion center today, when RN arrived patient was upset and crying, sat with patient and asked her what was wrong, patient was upset because her counts were low and she could not receive her chemotherapy treatment today, her treatment dates were then moved and her vacation would have to be cancelled, consoled patient and listened to her, spoke to her about low ANC and WBC count, printed information for her on this and reviewed it with her in great detail and when to call the office, pt verbalized understanding, reviewed neulasta and granix with patient, explained why it is given, what it does and side effects, instructed pt to take claritin for a few days to help with bone pain, after spending time with the patient, call made to Kilo Godwin RN, informed her how upset patient was about schedule change and patient having to miss her second vacation this year, asked if patients schedule could be changed so patient could make her vacation, Maru Mayes stated we could definitely look into this once she is given the dates, informed the patient of this information and  stated he had just cancelled the room but not the flight, stated he was going to call to try to get the room back, will inform Maru Mayes of dates

## 2018-08-03 ENCOUNTER — DOCUMENTATION (OUTPATIENT)
Dept: HEMATOLOGY ONCOLOGY | Facility: CLINIC | Age: 53
End: 2018-08-03

## 2018-08-03 NOTE — PROGRESS NOTES
GI Oncology Nurse Navigator Note    Received a call from [facility] [place] asking if she could take [employer] because of the traveling she will be doing and going on the plane, message sent to Tito George RN asking if they were ok with that, she stated yes, called [facility] [place] and told her yes, she also stated she was having back pain yesterday (pt received granix), but she took Claritin and tylenol and it is feeling much better, instructed her to continue to take the Claritin and to take the Tylenol as needed, reviewed some important items for her to take along on vacation (thermometer, hand , anti emetics, phone numbers, etc) instructed patient to call the office if she should run temperature >100 4, pt verbalized understanding

## 2018-08-13 ENCOUNTER — APPOINTMENT (OUTPATIENT)
Dept: LAB | Facility: MEDICAL CENTER | Age: 53
End: 2018-08-13
Payer: COMMERCIAL

## 2018-08-13 DIAGNOSIS — C77.2 METASTASIS TO RETROPERITONEAL LYMPH NODE (HCC): ICD-10-CM

## 2018-08-13 DIAGNOSIS — C79.9 ADENOCARCINOMA, METASTATIC (HCC): ICD-10-CM

## 2018-08-13 DIAGNOSIS — C78.6 PERITONEAL METASTASES (HCC): ICD-10-CM

## 2018-08-13 DIAGNOSIS — C18.2 PRIMARY ADENOCARCINOMA OF ASCENDING COLON (HCC): ICD-10-CM

## 2018-08-13 DIAGNOSIS — C78.7 LIVER METASTASES (HCC): ICD-10-CM

## 2018-08-13 LAB
ALBUMIN SERPL BCP-MCNC: 3.7 G/DL (ref 3.5–5)
ALP SERPL-CCNC: 74 U/L (ref 46–116)
ALT SERPL W P-5'-P-CCNC: 24 U/L (ref 12–78)
ANION GAP SERPL CALCULATED.3IONS-SCNC: 5 MMOL/L (ref 4–13)
AST SERPL W P-5'-P-CCNC: 26 U/L (ref 5–45)
BASOPHILS # BLD AUTO: 0.05 THOUSANDS/ΜL (ref 0–0.1)
BASOPHILS NFR BLD AUTO: 1 % (ref 0–1)
BILIRUB SERPL-MCNC: 0.82 MG/DL (ref 0.2–1)
BUN SERPL-MCNC: 11 MG/DL (ref 5–25)
CALCIUM SERPL-MCNC: 9.1 MG/DL (ref 8.3–10.1)
CHLORIDE SERPL-SCNC: 108 MMOL/L (ref 100–108)
CO2 SERPL-SCNC: 27 MMOL/L (ref 21–32)
CREAT SERPL-MCNC: 0.84 MG/DL (ref 0.6–1.3)
EOSINOPHIL # BLD AUTO: 0.06 THOUSAND/ΜL (ref 0–0.61)
EOSINOPHIL NFR BLD AUTO: 1 % (ref 0–6)
ERYTHROCYTE [DISTWIDTH] IN BLOOD BY AUTOMATED COUNT: 17.4 % (ref 11.6–15.1)
GFR SERPL CREATININE-BSD FRML MDRD: 81 ML/MIN/1.73SQ M
GLUCOSE SERPL-MCNC: 84 MG/DL (ref 65–140)
HCT VFR BLD AUTO: 34.1 % (ref 34.8–46.1)
HGB BLD-MCNC: 10.2 G/DL (ref 11.5–15.4)
IMM GRANULOCYTES # BLD AUTO: 0.02 THOUSAND/UL (ref 0–0.2)
IMM GRANULOCYTES NFR BLD AUTO: 0 % (ref 0–2)
LYMPHOCYTES # BLD AUTO: 1.3 THOUSANDS/ΜL (ref 0.6–4.47)
LYMPHOCYTES NFR BLD AUTO: 19 % (ref 14–44)
MCH RBC QN AUTO: 25.6 PG (ref 26.8–34.3)
MCHC RBC AUTO-ENTMCNC: 29.9 G/DL (ref 31.4–37.4)
MCV RBC AUTO: 86 FL (ref 82–98)
MONOCYTES # BLD AUTO: 0.41 THOUSAND/ΜL (ref 0.17–1.22)
MONOCYTES NFR BLD AUTO: 6 % (ref 4–12)
NEUTROPHILS # BLD AUTO: 4.93 THOUSANDS/ΜL (ref 1.85–7.62)
NEUTS SEG NFR BLD AUTO: 73 % (ref 43–75)
NRBC BLD AUTO-RTO: 0 /100 WBCS
PLATELET # BLD AUTO: 325 THOUSANDS/UL (ref 149–390)
PMV BLD AUTO: 10.5 FL (ref 8.9–12.7)
POTASSIUM SERPL-SCNC: 3.9 MMOL/L (ref 3.5–5.3)
PROT SERPL-MCNC: 7 G/DL (ref 6.4–8.2)
RBC # BLD AUTO: 3.98 MILLION/UL (ref 3.81–5.12)
SODIUM SERPL-SCNC: 140 MMOL/L (ref 136–145)
WBC # BLD AUTO: 6.77 THOUSAND/UL (ref 4.31–10.16)

## 2018-08-13 PROCEDURE — 36415 COLL VENOUS BLD VENIPUNCTURE: CPT

## 2018-08-13 PROCEDURE — 80053 COMPREHEN METABOLIC PANEL: CPT

## 2018-08-13 PROCEDURE — 85025 COMPLETE CBC W/AUTO DIFF WBC: CPT

## 2018-08-13 RX ORDER — ATROPINE SULFATE 0.4 MG/ML
0.25 VIAL (ML) INJECTION ONCE
Status: COMPLETED | OUTPATIENT
Start: 2018-08-14 | End: 2018-08-14

## 2018-08-13 RX ORDER — FLUOROURACIL 50 MG/ML
716 INJECTION, SOLUTION INTRAVENOUS ONCE
Status: COMPLETED | OUTPATIENT
Start: 2018-08-14 | End: 2018-08-14

## 2018-08-13 RX ORDER — ATROPINE SULFATE 0.4 MG/ML
0.25 VIAL (ML) INJECTION ONCE
Status: DISCONTINUED | OUTPATIENT
Start: 2018-08-14 | End: 2018-08-13

## 2018-08-13 RX ORDER — SODIUM CHLORIDE 9 MG/ML
20 INJECTION, SOLUTION INTRAVENOUS CONTINUOUS
Status: DISCONTINUED | OUTPATIENT
Start: 2018-08-14 | End: 2018-08-17 | Stop reason: HOSPADM

## 2018-08-14 ENCOUNTER — TELEPHONE (OUTPATIENT)
Dept: HEMATOLOGY ONCOLOGY | Facility: CLINIC | Age: 53
End: 2018-08-14

## 2018-08-14 ENCOUNTER — DOCUMENTATION (OUTPATIENT)
Dept: HEMATOLOGY ONCOLOGY | Facility: CLINIC | Age: 53
End: 2018-08-14

## 2018-08-14 ENCOUNTER — OFFICE VISIT (OUTPATIENT)
Dept: HEMATOLOGY ONCOLOGY | Facility: CLINIC | Age: 53
End: 2018-08-14
Payer: COMMERCIAL

## 2018-08-14 ENCOUNTER — HOSPITAL ENCOUNTER (OUTPATIENT)
Dept: INFUSION CENTER | Facility: CLINIC | Age: 53
Discharge: HOME | End: 2018-08-14
Payer: COMMERCIAL

## 2018-08-14 VITALS
DIASTOLIC BLOOD PRESSURE: 78 MMHG | HEIGHT: 66 IN | RESPIRATION RATE: 18 BRPM | SYSTOLIC BLOOD PRESSURE: 106 MMHG | TEMPERATURE: 98 F | BODY MASS INDEX: 25.23 KG/M2 | OXYGEN SATURATION: 99 % | WEIGHT: 157 LBS | HEART RATE: 93 BPM

## 2018-08-14 VITALS
SYSTOLIC BLOOD PRESSURE: 136 MMHG | RESPIRATION RATE: 18 BRPM | HEART RATE: 80 BPM | WEIGHT: 156.5 LBS | DIASTOLIC BLOOD PRESSURE: 78 MMHG | OXYGEN SATURATION: 100 % | TEMPERATURE: 97.6 F | HEIGHT: 66 IN | BODY MASS INDEX: 25.15 KG/M2

## 2018-08-14 DIAGNOSIS — C18.2 PRIMARY ADENOCARCINOMA OF ASCENDING COLON (HCC): Primary | ICD-10-CM

## 2018-08-14 DIAGNOSIS — C78.7 LIVER METASTASES (HCC): ICD-10-CM

## 2018-08-14 PROCEDURE — 96413 CHEMO IV INFUSION 1 HR: CPT

## 2018-08-14 PROCEDURE — 96415 CHEMO IV INFUSION ADDL HR: CPT

## 2018-08-14 PROCEDURE — 96375 TX/PRO/DX INJ NEW DRUG ADDON: CPT

## 2018-08-14 PROCEDURE — 96411 CHEMO IV PUSH ADDL DRUG: CPT

## 2018-08-14 PROCEDURE — 96368 THER/DIAG CONCURRENT INF: CPT

## 2018-08-14 PROCEDURE — 96367 TX/PROPH/DG ADDL SEQ IV INF: CPT

## 2018-08-14 PROCEDURE — 96417 CHEMO IV INFUS EACH ADDL SEQ: CPT

## 2018-08-14 PROCEDURE — 99214 OFFICE O/P EST MOD 30 MIN: CPT | Performed by: PHYSICIAN ASSISTANT

## 2018-08-14 PROCEDURE — G0498 CHEMO EXTEND IV INFUS W/PUMP: HCPCS

## 2018-08-14 RX ADMIN — FLUOROURACIL 716 MG: 50 INJECTION, SOLUTION INTRAVENOUS at 16:58

## 2018-08-14 RX ADMIN — LEUCOVORIN CALCIUM 700 MG: 500 INJECTION, POWDER, LYOPHILIZED, FOR SOLUTION INTRAMUSCULAR; INTRAVENOUS at 15:18

## 2018-08-14 RX ADMIN — BEVACIZUMAB 352 MG: 400 INJECTION, SOLUTION INTRAVENOUS at 13:27

## 2018-08-14 RX ADMIN — ATROPINE SULFATE 0.25 MG: 0.4 INJECTION INTRAMUSCULAR; INTRAVENOUS; SUBCUTANEOUS at 13:25

## 2018-08-14 RX ADMIN — IRINOTECAN HYDROCHLORIDE 322 MG: 100 INJECTION, SOLUTION INTRAVENOUS at 15:16

## 2018-08-14 RX ADMIN — SODIUM CHLORIDE 20 ML/HR: 0.9 INJECTION, SOLUTION INTRAVENOUS at 12:00

## 2018-08-14 RX ADMIN — IRON SUCROSE 100 MG: 20 INJECTION, SOLUTION INTRAVENOUS at 12:02

## 2018-08-14 RX ADMIN — DEXAMETHASONE SODIUM PHOSPHATE: 10 INJECTION, SOLUTION INTRAMUSCULAR; INTRAVENOUS at 13:01

## 2018-08-14 NOTE — PROGRESS NOTES
Pt to clinic for folfuri, avastin, venofer, 5 FU push and pump, pt offers no complaints at this time, aware of next appointment, declines avs

## 2018-08-14 NOTE — PROGRESS NOTES
Hematology/Oncology Outpatient Follow-up  Mike Kong 48 y o  female 1965 [id number]    Date:  8/14/2018      Assessment and Plan:  1  Primary adenocarcinoma of ascending colon ([facility] [address] ), 2  Liver metastases [facility])   79-year-old female with metastatic adenocarcinoma  of the colon  Patient is presently receiving chemotherapy  She is tolerating well  She did have decreased neutrophils following 1st treatment  She was given a granix and counts recovered  Due to this however, patient will be receiving Neulasta with each subsequent cycle of chemotherapy  She did have mild pain  Of the lower back following gram next  She took Claritin and Tylenol with benefit  Recommended patient to take Claritin today and for the next few days to prevent bone pain from Neulasta  Also use Tylenol p r n  If this is not beneficial, call our office for strep group pain medication  She is due for cycle 2 of therapy today  She had some mild constipation following 1st chemotherapy  Emphasized importance of keeping bowels regular  She is aware of bowel regimens to use  HPI:       Primary adenocarcinoma of ascending colon ([facility] [address] )    6/14/2018 Initial Diagnosis     Primary adenocarcinoma of ascending colon ([facility] [address] )         7/17/2018 - 8/1/2018 Chemotherapy     camptosar 180 mg/m2 day 1  5  mg/m2 day 1  5 FU 1200 mg/m2 day 1-2   Leucovorin 400 mg/m2     Venofer 100 mg (first 10 treatments) -- all every 2 weeks             8/1/2018 Adverse Reaction     Needed granix 300 mcg due to [address] of 1 01          8/14/2018 -  Chemotherapy     camptosar 180 mg/m2 day 1  5  mg/m2 day 1  5 FU 1200 mg/m2 day 1-2   Leucovorin 400 mg/m2  Avastin 5 mg/kg day 1   Venofer 100 mg (first 10 treatments)  Neulasta on Pro 6 mg day 3      -- every 2 weeks             48year old female with history of DVT right lower leg below the knee in May 2016 and that happened after taking hormonal therapy in April 2016   In 2016 she tested positive once for lupus anticoagulant and that was negative on repeat test     She stayed on Xarelto until end of September 2016  She had heavy periods in in January 2018 and she was in bed for 3 days and she developed a clot in the left lower leg distally below the knee  She had GALI, BSO in May 2016  Pathology of bilateral fallopian tubes showed invasive adenocarcinoma    She had a CT of the chest, abdomen and pelvis on 06/06/2018 which showed a possible lesion on the right abdomen,  Suspicious for underlying colon mass  Also, multiple hepatic metastases were noted  Also, scattered retroperitoneal nodes also noted; Largest measuring 1 1 x 0 9 cm  Patient brought to the OR on 06/14/2018  Patient was found to have a proximal transverse colon tumor with peritoneal deposits pelvic and right diaphragm, palpable liver metastases  she had a right hemicolectomy with ileocolonic anastomosis  Final pathology showed stage IVC- pT3, pN1b, pM1c, G2        ROS: Review of Systems   Constitutional: Negative for appetite change, chills, fever and unexpected weight change  HENT: Negative for mouth sores and nosebleeds  Respiratory: Negative for cough, chest tightness, shortness of breath and wheezing  Cardiovascular: Negative for chest pain and leg swelling  Gastrointestinal: Positive for constipation (following chemotherapy )  Negative for abdominal pain, blood in stool, diarrhea, nausea and vomiting  Genitourinary: Negative for difficulty urinating, dysuria and hematuria  Musculoskeletal: Negative for arthralgias, joint swelling and myalgias  Skin: Negative  Neurological: Positive for numbness (of right hand 2/2 nerve affected with port placement; improving )  Negative for dizziness, weakness, light-headedness and headaches  Hematological: Negative  Psychiatric/Behavioral: Negative          Past Medical History:   Diagnosis Date    Cancer [facility])     DVT (deep venous thrombosis) (HCC)     Kidney disease  Menorrhalgia     RESOLVED 2008    Migraine     Postcoital bleeding     LAST ASSESSED [id number]       Past Surgical History:   Procedure Laterality Date    ABDOMINAL ADHESION SURGERY N/A 6/14/2018    Procedure: LYSIS ADHESIONS OF COLON;  Surgeon: Nathaniel Mariano MD;  [facility];  Service: Colorectal    DILATION AND CURETTAGE OF UTERUS      RESOLVED 2008    ENDOMETRIAL ABLATION      THERMAL     NOVASURE  RESOLVED 2008    ENDOMETRIAL BIOPSY      BY SUCTION   DONE 12/13/2008    OMENTECTOMY Right 6/14/2018    Procedure: PARTIAL OMENTECTOMY;  Surgeon: Nathaniel Mariano MD;  [facility];  Service: Colorectal    WV COLONOSCOPY FLX DX W/COLLJ SPEC WHEN PFRMD N/A 6/13/2018    Procedure: COLONOSCOPY;  Surgeon: Nathaniel Mariano MD;  [facility]; Service: Colorectal    WV ESOPHAGOGASTRODUODENOSCOPY TRANSORAL DIAGNOSTIC N/A 6/13/2018    Procedure: ESOPHAGOGASTRODUODENOSCOPY (EGD); Surgeon: Nathan Balbuena MD;  [facility];   Service: Gastroenterology    WV INSERT PICC W/ SUB-Q PORT Right 6/28/2018    Procedure: INSERTION VENOUS PORT (PORT-A-CATH) VIA RIGHT SUBCLAVIAN VEIN;  Surgeon: Nathaniel Mariano MD;  [facility];  Service: Colorectal    WV LAP,DIAGNOSTIC ABDOMEN N/A 6/14/2018    Procedure: LAPAROSCOPY DIAGNOSTIC;  Surgeon: Nathaniel Mariano MD;  [facility];  Service: Colorectal    WV LAP,SURG,COLECTOMY, PARTIAL, W/ANAST Right 6/14/2018    Procedure: LAPAROSCOPIC EXTENDED RIGHT HEMICOLECTOMY ; PERITONEAL BX;  Surgeon: Nathaniel Mariano MD;  [facility];  Service: Colorectal    WV LAP,VAG HYST,UTERUS 250GMS/<,SALP-OOPH Bilateral 5/21/2018    Procedure: HYSTERECTOMY LAPAROSCOPIC ASSISTED VAGINAL (LAVH) , BILATERAL SALPINGECTOMY;  Surgeon: Gianna Negro DO;  [facility];  Service: Gynecology       Social History     Social History    Marital status: /Civil Union     Spouse name: N/A    Number of children: 2    Years of education: N/A     Social History Main Topics    Smoking status: Never Smoker    Smokeless tobacco: Never Used    Alcohol use No    Drug use: No    Sexual activity: Yes     Partners: Male      Comment: PARTNER HAD VASECTOMY      Other Topics Concern    None     Social History Narrative    ALWAYS USES SE[employer]ELTS     CAFFEINE USE     CURRENTLY WORKS FULL TIME     DAILY COFFEE CONSUMPTION    EXERCISE SPORADICALLY     FEELS SAFE AT HOME     LIVES WITH SPOUSE            Family History   Problem Relation Age of Onset   [de-identified] Migraines Mother     Heart disease Mother     Anemia Father     Arthritis Father     Other Father         BLOOD TRANSFUSION    Migraines Daughter     Heart disease Family        No Known Allergies      Current Outpatient Prescriptions:     lidocaine-prilocaine (EMLA) cream, Apply 1 hour prior to chemo, cover in wrap, Disp: 30 g, Rfl: 0    ondansetron (ZOFRAN) 8 mg tablet, Take 1 tablet (8 mg total) by mouth every 8 (eight) hours as needed for nausea or vomiting, Disp: 20 tablet, Rfl: 1    XARELTO 20 MG tablet, Take 1 tablet (20 mg total) by mouth daily, Disp: 30 tablet, Rfl: 2    acetaminophen (TYLENOL) 500 mg tablet, Take 1,000 mg by mouth every 6 (six) hours as needed for mild pain, Disp: , Rfl:     oxyCODONE (ROXICODONE) 5 mg immediate release tablet, TAKE 1 TABLET BY MOUTH EVERY 4 HOURS AS NEEDED FOR PAIN UP TO 10 DAYS, Disp: , Rfl: 0  No current facility-administered medications for this visit       Facility-Administered Medications Ordered in Other Visits:     alteplase (CATHFLO) injection 2 mg, 2 mg, Intracatheter, PRN, Gerda Conteh MD    fluorouracil (ADRUCIL) injection 716 mg, 716 mg, Intravenous, Once, Gerda Conteh MD    heparin lock flush 100 units/mL injection 300 Units, 300 Units, Intracatheter, PRN, Gerda Conteh MD    irinotecan (CAMPTOSAR) 322 mg in sodium chloride 0 9 % 500 mL chemo infusion, 322 mg, Intravenous, Once, MD Marely Trevizo leucovorin 700 mg in sodium chloride 0 9 % 250 mL IVPB, 700 mg, Intravenous, Once, Anamika Rod MD    sodium chloride 0 9 % infusion, 20 mL/hr, Intravenous, Continuous, Anamika Rod MD      Physical Exam:  /78 (BP Location: Right arm, Patient Position: Sitting, Cuff Size: Adult)   Pulse 93   Temp 98 °F (36 7 °C)   Resp 18   Ht 5' 6 14" (1 68 m)   Wt 71 2 kg (157 lb)   LMP 05/16/2018 (Exact Date)   SpO2 99%   BMI 25 23 kg/m²     Physical Exam   Constitutional: She is oriented to person, place, and time  She appears well-developed and well-nourished  No distress  HENT:   Head: Normocephalic and atraumatic  Eyes: Conjunctivae are normal  No scleral icterus  Neck: Normal range of motion  Neck supple  Cardiovascular: Normal rate, regular rhythm and normal heart sounds  No murmur heard  Pulmonary/Chest: Effort normal and breath sounds normal  No respiratory distress  Port a cath present   Abdominal: Soft  There is no tenderness  Musculoskeletal: Normal range of motion  She exhibits no edema or tenderness  Lymphadenopathy:     She has no cervical adenopathy  Neurological: She is alert and oriented to person, place, and time  No cranial nerve deficit  Skin: Skin is warm and dry  Psychiatric: She has a normal mood and affect           Labs:  Lab Results   Component Value Date    WBC 6 77 08/13/2018    HGB 10 2 (L) 08/13/2018    HCT 34 1 (L) 08/13/2018    MCV 86 08/13/2018     08/13/2018     Lab Results   Component Value Date     08/13/2018    K 3 9 08/13/2018     08/13/2018    CO2 27 08/13/2018    ANIONGAP 5 08/13/2018    BUN 11 08/13/2018    CREATININE 0 84 08/13/2018    GLUCOSE 84 08/13/2018    GLUF 93 05/08/2018    CALCIUM 9 1 08/13/2018    AST 26 08/13/2018    ALT 24 08/13/2018    ALKPHOS 74 08/13/2018    PROT 7 0 08/13/2018    BILITOT 0 82 08/13/2018    EGFR 81 08/13/2018     @RESULTFAST(TSH)@    Patient voiced understanding and agreement in the above discussion  Aware to contact our office with questions/symptoms in the interim

## 2018-08-14 NOTE — PROGRESS NOTES
Hematology/Oncology Outpatient Follow-up  Yahir Jon 48 y o  female 1965 [id number]    Date:  8/14/2018      Assessment and Plan:      HPI:  48year old female with history of DVT right lower leg below the knee in May 2016 and that happened after taking hormonal therapy in April 2016  In 2016 she tested positive once for lupus anticoagulant and that was negative on repeat test     She stayed on Xarelto until end of September 2016  She had heavy periods in in January 2018 and she was in bed for 3 days and she developed a clot in the left lower leg distally below the knee  She had GALI, BSO in May 2016  Pathology of   Bilateral fallopian tubes showed invasive adenocarcinoma    She had a CT of the chest, abdomen and pelvis on 06/06/2018 which showed a possible lesion on the right abdomen,  Suspicious for underlying colon mass  Also, multiple hepatic metastases were noted  Also, scattered retroperitoneal nodes also noted; Largest measuring 1 1 x 0 9 cm  Patient brought to the OR on 06/14/2018  Patient was found to have a proximal transverse colon tumor with peritoneal deposits pelvic and right diaphragm, palpable liver metastases  she had a right hemicolectomy with ileocolonic anastomosis  Final pathology showed stage IVC- pT3, pN1b, pM1c, G2  Interval history:    ROS: Review of Systems   Constitutional: Negative for appetite change, chills, fever and unexpected weight change  HENT: Negative for mouth sores and nosebleeds  Respiratory: Negative for cough, chest tightness, shortness of breath and wheezing  Cardiovascular: Negative for chest pain, palpitations and leg swelling  Gastrointestinal: Negative for abdominal pain, blood in stool, constipation, diarrhea, nausea and vomiting  Genitourinary: Negative for difficulty urinating, dysuria and hematuria  Musculoskeletal: Negative for arthralgias, joint swelling and myalgias  Skin: Negative      Neurological: Negative for dizziness, weakness, light-headedness, numbness and headaches  Hematological: Negative  Psychiatric/Behavioral: Negative  Past Medical History:   Diagnosis Date    Cancer [facility])     DVT [facility]deep venous thrombosis) ([facility] [address] )     Kidney disease     Menorrhalgia     RESOLVED 2008    Migraine     Postcoital bleeding     LAST ASSESSED [id number]       Past Surgical History:   Procedure Laterality Date    ABDOMINAL ADHESION SURGERY N/A 6/14/2018    Procedure: LYSIS ADHESIONS OF COLON;  Surgeon: Celia Pelaez MD;  [facility];  Service: Colorectal    DILATION AND CURETTAGE OF UTERUS      RESOLVED 2008    ENDOMETRIAL ABLATION      THERMAL     NOVASURE  RESOLVED 2008    ENDOMETRIAL BIOPSY      BY SUCTION   DONE 12/13/2008    OMENTECTOMY Right 6/14/2018    Procedure: PARTIAL OMENTECTOMY;  Surgeon: Celia Pelaez MD;  [facility];  Service: Colorectal    RI COLONOSCOPY FLX DX W/COLLJ SPEC WHEN PFRMD N/A 6/13/2018    Procedure: COLONOSCOPY;  Surgeon: Celia Pelaez MD;  [facility]; Service: Colorectal    RI ESOPHAGOGASTRODUODENOSCOPY TRANSORAL DIAGNOSTIC N/A 6/13/2018    Procedure: ESOPHAGOGASTRODUODENOSCOPY (EGD); Surgeon: Vinnie Torres MD;  [facility];   Service: Gastroenterology    RI INSERT PICC W/ SUB-Q PORT Right 6/28/2018    Procedure: INSERTION VENOUS PORT (PORT-A-CATH) VIA RIGHT SUBCLAVIAN VEIN;  Surgeon: Celia Pelaez MD;  [facility];  Service: Colorectal    RI LAP,DIAGNOSTIC ABDOMEN N/A 6/14/2018    Procedure: LAPAROSCOPY DIAGNOSTIC;  Surgeon: Celia Pelaez MD;  [facility];  Service: Colorectal    RI LAP,SURG,COLECTOMY, PARTIAL, W/ANAST Right 6/14/2018    Procedure: LAPAROSCOPIC EXTENDED RIGHT HEMICOLECTOMY ; PERITONEAL BX;  Surgeon: Celia Pelaez MD;  [facility];  Service: Colorectal    RI LAP,VAG HYST,UTERUS 250GMS/<,SALP-OOPH Bilateral 5/21/2018    Procedure: HYSTERECTOMY LAPAROSCOPIC ASSISTED VAGINAL (LAVH) , BILATERAL SALPINGECTOMY;  Surgeon: Kaelyn Yoder DO;  [facility];  Service: Gynecology       Social History     Social History    Marital status: /Civil Union     Spouse name: N/A    Number of children: 2    Years of education: N/A     Social History Main Topics    Smoking status: Never Smoker    Smokeless tobacco: Never Used    Alcohol use No    Drug use: No    Sexual activity: Yes     Partners: Male      Comment: PARTNER HAD VASECTOMY      Other Topics Concern    Not on file     Social History Narrative    ALWAYS USES [employer]     CAFFEINE USE     CURRENTLY WORKS FULL TIME     DAILY COFFEE CONSUMPTION    EXERCISE SPORADICALLY     FEELS SAFE AT HOME     LIVES WITH SPOUSE            Family History   Problem Relation Age of Onset   [facility] Migraines Mother     Heart disease Mother     Anemia Father     Arthritis Father     Other Father         BLOOD TRANSFUSION    Migraines Daughter     Heart disease Family        No Known Allergies      Current Outpatient Prescriptions:     acetaminophen (TYLENOL) 500 mg tablet, Take 1,000 mg by mouth every 6 (six) hours as needed for mild pain, Disp: , Rfl:     lidocaine-prilocaine (EMLA) cream, Apply 1 hour prior to chemo, cover in wrap, Disp: 30 g, Rfl: 0    ondansetron (ZOFRAN) 8 mg tablet, Take 1 tablet (8 mg total) by mouth every 8 (eight) hours as needed for nausea or vomiting, Disp: 20 tablet, Rfl: 1    oxyCODONE (ROXICODONE) 5 mg immediate release tablet, TAKE 1 TABLET BY MOUTH EVERY 4 HOURS AS NEEDED FOR PAIN UP TO 10 DAYS, Disp: , Rfl: 0    XARELTO 20 MG tablet, Take 1 tablet (20 mg total) by mouth daily, Disp: 30 tablet, Rfl: 2  No current facility-administered medications for this visit       Facility-Administered Medications Ordered in Other Visits:     alteplase (CATHFLO) injection 2 mg, 2 mg, Intracatheter, PRN, Victor Hugo Gomes MD    atropine injection 0 25 mg, 0 25 mg, Intravenous, Once, James Proclement, MD    bevacizumab (AVASTIN) 352 mg in sodium chloride 0 9 % 100 mL IVPB, 352 mg, Intravenous, Once, Jack Woods MD    fluorouracil (ADRUCIL) injection 716 mg, 716 mg, Intravenous, Once, Jack Woods MD    heparin lock flush 100 units/mL injection 300 Units, 300 Units, Intracatheter, PRN, Jack Woods MD    irinotecan (CAMPTOSAR) 322 mg in sodium chloride 0 9 % 500 mL chemo infusion, 322 mg, Intravenous, Once, Jack Woods MD    iron sucrose (VENOFER) 100 mg in sodium chloride 0 9% 100 ml IVPB 100 mg, 100 mg, Intravenous, Once, Jack Woods MD    leucovorin 700 mg in sodium chloride 0 9 % 250 mL IVPB, 700 mg, Intravenous, Once, Jack Woods MD    ondansetron (ZOFRAN) 16 mg, dexamethasone (DECADRON) 10 mg in sodium chloride 0 9 % 50 mL IVPB, , Intravenous, Once, Jack Woods MD    sodium chloride 0 9 % infusion, 20 mL/hr, Intravenous, Continuous, Jack Woods MD      Physical Exam:  LMP 05/16/2018 (Exact Date)     Physical Exam      Labs:  Lab Results   Component Value Date    WBC 6 77 08/13/2018    HGB 10 2 (L) 08/13/2018    HCT 34 1 (L) 08/13/2018    MCV 86 08/13/2018     08/13/2018     Lab Results   Component Value Date     08/13/2018    K 3 9 08/13/2018     08/13/2018    CO2 27 08/13/2018    ANIONGAP 5 08/13/2018    BUN 11 08/13/2018    CREATININE 0 84 08/13/2018    GLUCOSE 84 08/13/2018    GLUF 93 05/08/2018    CALCIUM 9 1 08/13/2018    AST 26 08/13/2018    ALT 24 08/13/2018    ALKPHOS 74 08/13/2018    PROT 7 0 08/13/2018    BILITOT 0 82 08/13/2018    EGFR 81 08/13/2018     @RESULTFAST(TSH)@    Patient voiced understanding and agreement in the above discussion  Aware to contact our office with questions/symptoms in the interim

## 2018-08-14 NOTE — TELEPHONE ENCOUNTER
Pt would prefer to get her treatment at [facility] on 8 28 18 instead of [facility]  Pt is scheduled at [facility] for now  I will follow up with infusion next week to see if there is anything available in [facility]

## 2018-08-14 NOTE — PROGRESS NOTES
GI Oncology Nurse Navigator Note    Saw patient and  in infusion center today, pt stated she is feeling great today, gave this RN a letter she received from her insurance company regarding her granix injection from her last appointment, reviewed this with Denise Vega (financial), it is being worked on, will take it to [facility] ([employer]) to make her aware as well, ensured the patient it is being looked into, pt verbalized understanding, no other questions or concerns at this time

## 2018-08-16 ENCOUNTER — HOSPITAL ENCOUNTER (OUTPATIENT)
Dept: INFUSION CENTER | Facility: CLINIC | Age: 53
Discharge: HOME | End: 2018-08-16
Payer: COMMERCIAL

## 2018-08-16 DIAGNOSIS — I82.4Y1 DEEP VEIN THROMBOSIS (DVT) OF PROXIMAL VEIN OF RIGHT LOWER EXTREMITY, UNSPECIFIED CHRONICITY (HCC): ICD-10-CM

## 2018-08-16 PROCEDURE — 96372 THER/PROPH/DIAG INJ SC/IM: CPT

## 2018-08-16 RX ORDER — RIVAROXABAN 20 MG/1
20 TABLET, FILM COATED ORAL DAILY
Qty: 90 TABLET | Refills: 2 | Status: SHIPPED | OUTPATIENT
Start: 2018-08-16 | End: 2018-11-13 | Stop reason: DRUGHIGH

## 2018-08-16 RX ADMIN — PEGFILGRASTIM 6 MG: KIT SUBCUTANEOUS at 15:22

## 2018-08-16 RX ADMIN — HEPARIN 300 UNITS: 100 SYRINGE at 15:19

## 2018-08-16 NOTE — PROGRESS NOTES
To clinic for CADD pump d/c and neulasta on-pro placement  Pt and  viewed educational video on the on pro and state understanding and comfort with the injector

## 2018-08-17 NOTE — TELEPHONE ENCOUNTER
I spoke with Diana Linares at [facility] which keep an eye out in case there is an opening for the patient in [facility]

## 2018-08-20 ENCOUNTER — TELEPHONE (OUTPATIENT)
Dept: HEMATOLOGY ONCOLOGY | Facility: CLINIC | Age: 53
End: 2018-08-20

## 2018-08-20 NOTE — TELEPHONE ENCOUNTER
Pt needs her chemo appt for 8 28 18 moved to [place]  Tried calling [facility] again to see if there are any cancellations & there is nothing available  Left vm for the pt making her aware    I will call again end of wk

## 2018-08-20 NOTE — TELEPHONE ENCOUNTER
Patient calling back about her CT scheduling  She was told to call her today to see if there were any openings for her CT to be done at [facility]   She can be reached at [phone]

## 2018-08-24 RX ORDER — SODIUM CHLORIDE 9 MG/ML
20 INJECTION, SOLUTION INTRAVENOUS CONTINUOUS
Status: DISCONTINUED | OUTPATIENT
Start: 2018-08-28 | End: 2018-08-31 | Stop reason: HOSPADM

## 2018-08-24 RX ORDER — FLUOROURACIL 50 MG/ML
720 INJECTION, SOLUTION INTRAVENOUS ONCE
Status: COMPLETED | OUTPATIENT
Start: 2018-08-28 | End: 2018-08-28

## 2018-08-24 RX ORDER — ATROPINE SULFATE 0.4 MG/ML
0.25 INJECTION, SOLUTION ENDOTRACHEAL; INTRAMEDULLARY; INTRAMUSCULAR; INTRAVENOUS; SUBCUTANEOUS ONCE
Status: COMPLETED | OUTPATIENT
Start: 2018-08-28 | End: 2018-08-28

## 2018-08-27 ENCOUNTER — HOSPITAL ENCOUNTER (OUTPATIENT)
Dept: INFUSION CENTER | Facility: CLINIC | Age: 53
Discharge: HOME | End: 2018-08-27
Payer: COMMERCIAL

## 2018-08-27 DIAGNOSIS — C79.9 ADENOCARCINOMA, METASTATIC (HCC): ICD-10-CM

## 2018-08-27 DIAGNOSIS — C18.2 PRIMARY ADENOCARCINOMA OF ASCENDING COLON (HCC): ICD-10-CM

## 2018-08-27 DIAGNOSIS — C78.7 LIVER METASTASES (HCC): ICD-10-CM

## 2018-08-27 DIAGNOSIS — C78.6 PERITONEAL METASTASES (HCC): ICD-10-CM

## 2018-08-27 DIAGNOSIS — C77.2 METASTASIS TO RETROPERITONEAL LYMPH NODE (HCC): ICD-10-CM

## 2018-08-27 LAB
ALBUMIN SERPL BCP-MCNC: 3.8 G/DL (ref 3.5–5)
ALP SERPL-CCNC: 119 U/L (ref 46–116)
ALT SERPL W P-5'-P-CCNC: 33 U/L (ref 12–78)
ANION GAP SERPL CALCULATED.3IONS-SCNC: 5 MMOL/L (ref 4–13)
ANISOCYTOSIS BLD QL SMEAR: PRESENT
AST SERPL W P-5'-P-CCNC: 45 U/L (ref 5–45)
BASOPHILS # BLD MANUAL: 0 THOUSAND/UL (ref 0–0.1)
BASOPHILS NFR MAR MANUAL: 0 % (ref 0–1)
BILIRUB SERPL-MCNC: 0.4 MG/DL (ref 0.2–1)
BILIRUB UR QL STRIP: NEGATIVE
BUN SERPL-MCNC: 11 MG/DL (ref 5–25)
CALCIUM SERPL-MCNC: 9.2 MG/DL (ref 8.3–10.1)
CHLORIDE SERPL-SCNC: 106 MMOL/L (ref 100–108)
CLARITY UR: CLEAR
CO2 SERPL-SCNC: 28 MMOL/L (ref 21–32)
COLOR UR: YELLOW
CREAT SERPL-MCNC: 0.93 MG/DL (ref 0.6–1.3)
EOSINOPHIL # BLD MANUAL: 0.25 THOUSAND/UL (ref 0–0.4)
EOSINOPHIL NFR BLD MANUAL: 2 % (ref 0–6)
ERYTHROCYTE [DISTWIDTH] IN BLOOD BY AUTOMATED COUNT: 19.7 % (ref 11.6–15.1)
GFR SERPL CREATININE-BSD FRML MDRD: 71 ML/MIN/1.73SQ M
GLUCOSE SERPL-MCNC: 90 MG/DL (ref 65–140)
GLUCOSE UR STRIP-MCNC: NEGATIVE MG/DL
HCT VFR BLD AUTO: 34.4 % (ref 34.8–46.1)
HGB BLD-MCNC: 10.5 G/DL (ref 11.5–15.4)
HGB UR QL STRIP.AUTO: NEGATIVE
HYPERCHROMIA BLD QL SMEAR: PRESENT
KETONES UR STRIP-MCNC: NEGATIVE MG/DL
LEUKOCYTE ESTERASE UR QL STRIP: NEGATIVE
LYMPHOCYTES # BLD AUTO: 2.77 THOUSAND/UL (ref 0.6–4.47)
LYMPHOCYTES # BLD AUTO: 22 % (ref 14–44)
MCH RBC QN AUTO: 25.8 PG (ref 26.8–34.3)
MCHC RBC AUTO-ENTMCNC: 30.5 G/DL (ref 31.4–37.4)
MCV RBC AUTO: 85 FL (ref 82–98)
METAMYELOCYTES NFR BLD MANUAL: 1 % (ref 0–1)
MONOCYTES # BLD AUTO: 0.25 THOUSAND/UL (ref 0–1.22)
MONOCYTES NFR BLD: 2 % (ref 4–12)
NEUTROPHILS # BLD MANUAL: 9.19 THOUSAND/UL (ref 1.85–7.62)
NEUTS BAND NFR BLD MANUAL: 11 % (ref 0–8)
NEUTS SEG NFR BLD AUTO: 62 % (ref 43–75)
NITRITE UR QL STRIP: NEGATIVE
NRBC BLD AUTO-RTO: 1 /100 WBC (ref 0–2)
NRBC BLD AUTO-RTO: 1 /100 WBCS
PH UR STRIP.AUTO: 5.5 [PH] (ref 4.5–8)
PLATELET # BLD AUTO: 302 THOUSANDS/UL (ref 149–390)
PLATELET BLD QL SMEAR: ADEQUATE
PMV BLD AUTO: 10.2 FL (ref 8.9–12.7)
POIKILOCYTOSIS BLD QL SMEAR: PRESENT
POLYCHROMASIA BLD QL SMEAR: PRESENT
POTASSIUM SERPL-SCNC: 5 MMOL/L (ref 3.5–5.3)
PROT SERPL-MCNC: 7.3 G/DL (ref 6.4–8.2)
PROT UR STRIP-MCNC: NEGATIVE MG/DL
RBC # BLD AUTO: 4.07 MILLION/UL (ref 3.81–5.12)
SODIUM SERPL-SCNC: 139 MMOL/L (ref 136–145)
SP GR UR STRIP.AUTO: <=1.005 (ref 1–1.03)
TOTAL CELLS COUNTED SPEC: 100
UROBILINOGEN UR QL STRIP.AUTO: 0.2 E.U./DL
WBC # BLD AUTO: 12.59 THOUSAND/UL (ref 4.31–10.16)

## 2018-08-27 PROCEDURE — 81003 URINALYSIS AUTO W/O SCOPE: CPT

## 2018-08-27 PROCEDURE — 85027 COMPLETE CBC AUTOMATED: CPT

## 2018-08-27 PROCEDURE — 85007 BL SMEAR W/DIFF WBC COUNT: CPT

## 2018-08-27 PROCEDURE — 80053 COMPREHEN METABOLIC PANEL: CPT

## 2018-08-27 RX ADMIN — HEPARIN 300 UNITS: 100 SYRINGE at 11:19

## 2018-08-27 NOTE — PROGRESS NOTES
Pt tolerated lab draw well without any complaints  Results are pending of urine and blood work   Pt declined avs

## 2018-08-28 ENCOUNTER — HOSPITAL ENCOUNTER (OUTPATIENT)
Dept: INFUSION CENTER | Facility: HOSPITAL | Age: 53
End: 2018-08-28

## 2018-08-28 ENCOUNTER — HOSPITAL ENCOUNTER (OUTPATIENT)
Dept: INFUSION CENTER | Facility: CLINIC | Age: 53
Discharge: HOME | End: 2018-08-28
Payer: COMMERCIAL

## 2018-08-28 VITALS
WEIGHT: 155 LBS | SYSTOLIC BLOOD PRESSURE: 116 MMHG | OXYGEN SATURATION: 97 % | RESPIRATION RATE: 18 BRPM | HEIGHT: 66 IN | BODY MASS INDEX: 24.91 KG/M2 | TEMPERATURE: 97.6 F | DIASTOLIC BLOOD PRESSURE: 86 MMHG | HEART RATE: 67 BPM

## 2018-08-28 PROCEDURE — G0498 CHEMO EXTEND IV INFUS W/PUMP: HCPCS

## 2018-08-28 PROCEDURE — 96368 THER/DIAG CONCURRENT INF: CPT

## 2018-08-28 PROCEDURE — 96415 CHEMO IV INFUSION ADDL HR: CPT

## 2018-08-28 PROCEDURE — 96411 CHEMO IV PUSH ADDL DRUG: CPT

## 2018-08-28 PROCEDURE — 96417 CHEMO IV INFUS EACH ADDL SEQ: CPT

## 2018-08-28 PROCEDURE — 96375 TX/PRO/DX INJ NEW DRUG ADDON: CPT

## 2018-08-28 PROCEDURE — 96367 TX/PROPH/DG ADDL SEQ IV INF: CPT

## 2018-08-28 PROCEDURE — 96413 CHEMO IV INFUSION 1 HR: CPT

## 2018-08-28 RX ADMIN — LEUCOVORIN CALCIUM 700 MG: 500 INJECTION, POWDER, LYOPHILIZED, FOR SOLUTION INTRAMUSCULAR; INTRAVENOUS at 11:54

## 2018-08-28 RX ADMIN — BEVACIZUMAB 355 MG: 400 INJECTION, SOLUTION INTRAVENOUS at 10:27

## 2018-08-28 RX ADMIN — FLUOROURACIL 720 MG: 50 INJECTION, SOLUTION INTRAVENOUS at 13:42

## 2018-08-28 RX ADMIN — DEXAMETHASONE SODIUM PHOSPHATE: 10 INJECTION, SOLUTION INTRAMUSCULAR; INTRAVENOUS at 10:00

## 2018-08-28 RX ADMIN — SODIUM CHLORIDE 20 ML/HR: 0.9 INJECTION, SOLUTION INTRAVENOUS at 08:52

## 2018-08-28 RX ADMIN — IRINOTECAN HYDROCHLORIDE 340 MG: 100 INJECTION, SOLUTION INTRAVENOUS at 11:54

## 2018-08-28 RX ADMIN — IRON SUCROSE 100 MG: 20 INJECTION, SOLUTION INTRAVENOUS at 08:57

## 2018-08-28 RX ADMIN — ATROPINE SULFATE 0.25 MG: 0.4 INJECTION INTRAMUSCULAR; INTRAVENOUS; SUBCUTANEOUS at 11:47

## 2018-08-28 NOTE — PROGRESS NOTES
Pt  Tolerated treatment w/out adverse reaction  Nurse connected pt  To cadd pump & pt  Scheduled for pump disconnect 8/30/18 @ 1150  Pt  Is aware & verbalized understanding  Confirmed pts  Next appt   Pt  Declined AVS

## 2018-08-30 ENCOUNTER — HOSPITAL ENCOUNTER (OUTPATIENT)
Dept: INFUSION CENTER | Facility: CLINIC | Age: 53
Discharge: HOME | End: 2018-08-30
Payer: COMMERCIAL

## 2018-08-30 PROCEDURE — 96372 THER/PROPH/DIAG INJ SC/IM: CPT

## 2018-08-30 RX ADMIN — PEGFILGRASTIM 6 MG: KIT SUBCUTANEOUS at 12:09

## 2018-08-30 RX ADMIN — Medication 300 UNITS: at 12:07

## 2018-08-30 NOTE — PROGRESS NOTES
Nurse disconnected pt  From cadd pump/res vol=0  Nurse applied Neulasta SC onpro to pts  HERNAN & instructed on what time to remove  Pt  Aware & verbalized understanding   Pt  Declined AVS

## 2018-08-30 NOTE — PLAN OF CARE
Problem: Potential for Falls  Goal: Patient will remain free of falls  INTERVENTIONS:  - Assess patient frequently for physical needs  -  Identify cognitive and physical deficits and behaviors that affect risk of falls    -  [place] fall precautions as indicated by assessment   - Educate patient/family on patient safety including physical limitations  - Instruct patient to call for assistance with activity based on assessment  - Modify environment to reduce risk of injury  - Consider OT/PT consult to assist with strengthening/mobility   Outcome: Progressing

## 2018-09-07 ENCOUNTER — TELEPHONE (OUTPATIENT)
Dept: HEMATOLOGY ONCOLOGY | Facility: CLINIC | Age: 53
End: 2018-09-07

## 2018-09-07 NOTE — TELEPHONE ENCOUNTER
Called and spoke with the patient regarding her nasal congestion, and sore throat  Patient states that she does have some post nasal drip and that this could be causing the sore throat  Patient denies fevers or chills  Advised the patient to call her PCP for a sick visit today and make her PCP aware that she is getting chemotherapy  Patient verbally understood

## 2018-09-07 NOTE — TELEPHONE ENCOUNTER
Carmen calls to report that late yesterday afternoon she started with a sore throat  Denies fever  Her nose is stuffy  Her glands in neck feel swollen into ears  She used Tylenol sinus and that helped a little  She wondered if it was allergies--but the sore throat is a little more than she gets with allergies  Her next chemo is Tuesday  Should she see PCP for this?

## 2018-09-10 ENCOUNTER — TELEPHONE (OUTPATIENT)
Dept: HEMATOLOGY ONCOLOGY | Facility: CLINIC | Age: 53
End: 2018-09-10

## 2018-09-10 ENCOUNTER — APPOINTMENT (OUTPATIENT)
Dept: LAB | Facility: MEDICAL CENTER | Age: 53
End: 2018-09-10
Payer: COMMERCIAL

## 2018-09-10 DIAGNOSIS — C18.2 PRIMARY ADENOCARCINOMA OF ASCENDING COLON (HCC): ICD-10-CM

## 2018-09-10 DIAGNOSIS — C77.2 METASTASIS TO RETROPERITONEAL LYMPH NODE (HCC): ICD-10-CM

## 2018-09-10 DIAGNOSIS — C78.7 LIVER METASTASES (HCC): ICD-10-CM

## 2018-09-10 DIAGNOSIS — C79.9 ADENOCARCINOMA, METASTATIC (HCC): ICD-10-CM

## 2018-09-10 DIAGNOSIS — C78.6 PERITONEAL METASTASES (HCC): ICD-10-CM

## 2018-09-10 DIAGNOSIS — C18.2 MALIGNANT NEOPLASM OF ASCENDING COLON (HCC): Primary | ICD-10-CM

## 2018-09-10 LAB
ALBUMIN SERPL BCP-MCNC: 3.9 G/DL (ref 3.5–5)
ALP SERPL-CCNC: 124 U/L (ref 46–116)
ALT SERPL W P-5'-P-CCNC: 49 U/L (ref 12–78)
ANION GAP SERPL CALCULATED.3IONS-SCNC: 8 MMOL/L (ref 4–13)
ANISOCYTOSIS BLD QL SMEAR: PRESENT
AST SERPL W P-5'-P-CCNC: 16 U/L (ref 5–45)
BASOPHILS # BLD MANUAL: 0 THOUSAND/UL (ref 0–0.1)
BASOPHILS NFR MAR MANUAL: 0 % (ref 0–1)
BILIRUB SERPL-MCNC: 0.37 MG/DL (ref 0.2–1)
BILIRUB UR QL STRIP: NEGATIVE
BUN SERPL-MCNC: 12 MG/DL (ref 5–25)
CALCIUM SERPL-MCNC: 9.1 MG/DL (ref 8.3–10.1)
CHLORIDE SERPL-SCNC: 106 MMOL/L (ref 100–108)
CLARITY UR: CLEAR
CO2 SERPL-SCNC: 26 MMOL/L (ref 21–32)
COLOR UR: YELLOW
CREAT SERPL-MCNC: 0.89 MG/DL (ref 0.6–1.3)
EOSINOPHIL # BLD MANUAL: 0 THOUSAND/UL (ref 0–0.4)
EOSINOPHIL NFR BLD MANUAL: 0 % (ref 0–6)
ERYTHROCYTE [DISTWIDTH] IN BLOOD BY AUTOMATED COUNT: 21.7 % (ref 11.6–15.1)
GFR SERPL CREATININE-BSD FRML MDRD: 75 ML/MIN/1.73SQ M
GLUCOSE SERPL-MCNC: 87 MG/DL (ref 65–140)
GLUCOSE UR STRIP-MCNC: NEGATIVE MG/DL
HCT VFR BLD AUTO: 38 % (ref 34.8–46.1)
HGB BLD-MCNC: 11.3 G/DL (ref 11.5–15.4)
HGB UR QL STRIP.AUTO: NEGATIVE
KETONES UR STRIP-MCNC: NEGATIVE MG/DL
LEUKOCYTE ESTERASE UR QL STRIP: NEGATIVE
LYMPHOCYTES # BLD AUTO: 13 % (ref 14–44)
LYMPHOCYTES # BLD AUTO: 2.06 THOUSAND/UL (ref 0.6–4.47)
MCH RBC QN AUTO: 25.9 PG (ref 26.8–34.3)
MCHC RBC AUTO-ENTMCNC: 29.7 G/DL (ref 31.4–37.4)
MCV RBC AUTO: 87 FL (ref 82–98)
METAMYELOCYTES NFR BLD MANUAL: 2 % (ref 0–1)
MONOCYTES # BLD AUTO: 0.63 THOUSAND/UL (ref 0–1.22)
MONOCYTES NFR BLD: 4 % (ref 4–12)
NEUTROPHILS # BLD MANUAL: 12.68 THOUSAND/UL (ref 1.85–7.62)
NEUTS SEG NFR BLD AUTO: 80 % (ref 43–75)
NITRITE UR QL STRIP: NEGATIVE
NRBC BLD AUTO-RTO: 0 /100 WBCS
PH UR STRIP.AUTO: 6.5 [PH] (ref 4.5–8)
PLATELET # BLD AUTO: 347 THOUSANDS/UL (ref 149–390)
PLATELET BLD QL SMEAR: ADEQUATE
PMV BLD AUTO: 9.9 FL (ref 8.9–12.7)
POLYCHROMASIA BLD QL SMEAR: PRESENT
POTASSIUM SERPL-SCNC: 3.4 MMOL/L (ref 3.5–5.3)
PROMYELOCYTES NFR BLD MANUAL: 1 % (ref 0–0)
PROT SERPL-MCNC: 7.2 G/DL (ref 6.4–8.2)
PROT UR STRIP-MCNC: NEGATIVE MG/DL
RBC # BLD AUTO: 4.36 MILLION/UL (ref 3.81–5.12)
RBC MORPH BLD: PRESENT
SODIUM SERPL-SCNC: 140 MMOL/L (ref 136–145)
SP GR UR STRIP.AUTO: 1 (ref 1–1.03)
UROBILINOGEN UR QL STRIP.AUTO: 0.2 E.U./DL
WBC # BLD AUTO: 15.85 THOUSAND/UL (ref 4.31–10.16)

## 2018-09-10 PROCEDURE — 80053 COMPREHEN METABOLIC PANEL: CPT

## 2018-09-10 PROCEDURE — 85007 BL SMEAR W/DIFF WBC COUNT: CPT

## 2018-09-10 PROCEDURE — 85027 COMPLETE CBC AUTOMATED: CPT

## 2018-09-10 PROCEDURE — 81003 URINALYSIS AUTO W/O SCOPE: CPT

## 2018-09-10 PROCEDURE — 36415 COLL VENOUS BLD VENIPUNCTURE: CPT

## 2018-09-10 RX ORDER — FLUOROURACIL 50 MG/ML
716 INJECTION, SOLUTION INTRAVENOUS ONCE
Status: COMPLETED | OUTPATIENT
Start: 2018-09-11 | End: 2018-09-11

## 2018-09-10 RX ORDER — ATROPINE SULFATE 0.4 MG/ML
0.25 INJECTION, SOLUTION ENDOTRACHEAL; INTRAMEDULLARY; INTRAMUSCULAR; INTRAVENOUS; SUBCUTANEOUS ONCE
Status: COMPLETED | OUTPATIENT
Start: 2018-09-11 | End: 2018-09-11

## 2018-09-10 RX ORDER — SODIUM CHLORIDE 9 MG/ML
20 INJECTION, SOLUTION INTRAVENOUS CONTINUOUS
Status: DISCONTINUED | OUTPATIENT
Start: 2018-09-11 | End: 2018-09-14 | Stop reason: HOSPADM

## 2018-09-11 ENCOUNTER — HOSPITAL ENCOUNTER (OUTPATIENT)
Dept: INFUSION CENTER | Facility: CLINIC | Age: 53
Discharge: HOME | End: 2018-09-11
Payer: COMMERCIAL

## 2018-09-11 VITALS
TEMPERATURE: 97.6 F | DIASTOLIC BLOOD PRESSURE: 82 MMHG | OXYGEN SATURATION: 98 % | BODY MASS INDEX: 24.75 KG/M2 | HEIGHT: 66 IN | HEART RATE: 70 BPM | SYSTOLIC BLOOD PRESSURE: 138 MMHG | RESPIRATION RATE: 18 BRPM | WEIGHT: 154 LBS

## 2018-09-11 PROCEDURE — 96417 CHEMO IV INFUS EACH ADDL SEQ: CPT

## 2018-09-11 PROCEDURE — 96411 CHEMO IV PUSH ADDL DRUG: CPT

## 2018-09-11 PROCEDURE — 96367 TX/PROPH/DG ADDL SEQ IV INF: CPT

## 2018-09-11 PROCEDURE — 96375 TX/PRO/DX INJ NEW DRUG ADDON: CPT

## 2018-09-11 PROCEDURE — 96415 CHEMO IV INFUSION ADDL HR: CPT

## 2018-09-11 PROCEDURE — 96368 THER/DIAG CONCURRENT INF: CPT

## 2018-09-11 PROCEDURE — 96413 CHEMO IV INFUSION 1 HR: CPT

## 2018-09-11 PROCEDURE — G0498 CHEMO EXTEND IV INFUS W/PUMP: HCPCS

## 2018-09-11 RX ADMIN — BEVACIZUMAB 352 MG: 400 INJECTION, SOLUTION INTRAVENOUS at 10:37

## 2018-09-11 RX ADMIN — FLUOROURACIL 716 MG: 50 INJECTION, SOLUTION INTRAVENOUS at 13:06

## 2018-09-11 RX ADMIN — IRON SUCROSE 100 MG: 20 INJECTION, SOLUTION INTRAVENOUS at 09:03

## 2018-09-11 RX ADMIN — SODIUM CHLORIDE 20 ML/HR: 0.9 INJECTION, SOLUTION INTRAVENOUS at 09:00

## 2018-09-11 RX ADMIN — IRINOTECAN HYDROCHLORIDE 320 MG: 100 INJECTION, SOLUTION INTRAVENOUS at 11:27

## 2018-09-11 RX ADMIN — ATROPINE SULFATE 0.25 MG: 0.4 INJECTION INTRAMUSCULAR; INTRAVENOUS; SUBCUTANEOUS at 11:20

## 2018-09-11 RX ADMIN — LEUCOVORIN CALCIUM 700 MG: 500 INJECTION, POWDER, LYOPHILIZED, FOR SOLUTION INTRAMUSCULAR; INTRAVENOUS at 11:27

## 2018-09-11 RX ADMIN — DEXAMETHASONE SODIUM PHOSPHATE: 10 INJECTION, SOLUTION INTRAMUSCULAR; INTRAVENOUS at 10:05

## 2018-09-11 NOTE — PROGRESS NOTES
Nurse spoke with Mary Ann Mckinney RN & informed her of pt  Going to PCP for possible throat/sinus infection & pt  Was put on Zpak which last dose is today & prednisone which pt  Will complete tomorrow  Pt  States she feels fine  Mark Reis stated per Dr Hernandez Vera pt  Is ok  To treat today  Nurse informed the pt  Of the same, pt  Agreeable & verbalized understanding

## 2018-09-11 NOTE — PROGRESS NOTES
Pt  Tolerated treatment w/out adverse reaction  Nurse connected pt  To cadd pump & pt  Scheduled for pump disconnect on 9/13/18 @ 1120  Pt  Aware & verbalized understanding  Confirmed pts  Next appt   Pt  Declined AVS

## 2018-09-13 ENCOUNTER — HOSPITAL ENCOUNTER (OUTPATIENT)
Dept: INFUSION CENTER | Facility: CLINIC | Age: 53
Discharge: HOME | End: 2018-09-13
Payer: COMMERCIAL

## 2018-09-13 VITALS — TEMPERATURE: 98.2 F

## 2018-09-13 PROCEDURE — 96372 THER/PROPH/DIAG INJ SC/IM: CPT

## 2018-09-13 RX ADMIN — HEPARIN 300 UNITS: 100 SYRINGE at 11:30

## 2018-09-13 RX ADMIN — PEGFILGRASTIM 6 MG: KIT SUBCUTANEOUS at 11:33

## 2018-09-13 NOTE — PROGRESS NOTES
Patient is here for a CADD pump disconnect and Neulasta onpro  Patient is afebrile  Patient complained of having diarrhea  She stated it started the first day of her chemo and she did go this am  She feels it is from the avastin  She states this treatment was the first time it was given over 30 minutes  She doesn't fell its related to the 5 FU  She states she has an appointment with Dr Zita Jon next week and she will mention this to him  CADD pump disconnected per protocol with res vol of 0ml  neulasta onpro placed on right arm and she verbalized understanding of how it works   Next appointment confirmed and she declined avs

## 2018-09-18 ENCOUNTER — OFFICE VISIT (OUTPATIENT)
Dept: HEMATOLOGY ONCOLOGY | Facility: CLINIC | Age: 53
End: 2018-09-18
Payer: COMMERCIAL

## 2018-09-18 VITALS
OXYGEN SATURATION: 98 % | HEART RATE: 89 BPM | SYSTOLIC BLOOD PRESSURE: 114 MMHG | TEMPERATURE: 97.5 F | DIASTOLIC BLOOD PRESSURE: 80 MMHG

## 2018-09-18 DIAGNOSIS — I82.531 CHRONIC DEEP VEIN THROMBOSIS (DVT) OF POPLITEAL VEIN OF RIGHT LOWER EXTREMITY (HCC): ICD-10-CM

## 2018-09-18 DIAGNOSIS — E87.6 HYPOKALEMIA: ICD-10-CM

## 2018-09-18 DIAGNOSIS — C77.2 METASTASIS TO RETROPERITONEAL LYMPH NODE (HCC): ICD-10-CM

## 2018-09-18 DIAGNOSIS — C78.7 LIVER METASTASES (HCC): ICD-10-CM

## 2018-09-18 DIAGNOSIS — D50.0 IRON DEFICIENCY ANEMIA DUE TO CHRONIC BLOOD LOSS: ICD-10-CM

## 2018-09-18 DIAGNOSIS — C18.2 PRIMARY ADENOCARCINOMA OF ASCENDING COLON (HCC): Primary | ICD-10-CM

## 2018-09-18 PROCEDURE — 99214 OFFICE O/P EST MOD 30 MIN: CPT | Performed by: INTERNAL MEDICINE

## 2018-09-18 RX ORDER — POTASSIUM CHLORIDE 750 MG/1
10 CAPSULE, EXTENDED RELEASE ORAL DAILY
Qty: 30 CAPSULE | Refills: 0 | Status: SHIPPED | OUTPATIENT
Start: 2018-09-18 | End: 2018-11-09 | Stop reason: ALTCHOICE

## 2018-09-18 NOTE — LETTER
September 18, 2018     Igor Rodriguez MD  [address] [facility]  [address]  [address] [place] [address] Located within [facility]    Patient: Paresh Hauser   YOB: 1965   Date of Visit: 9/18/2018       Dear Dr Ricki Alonzo: Thank you for referring Edita Ho to me for evaluation  Below are my notes for this consultation  If you have questions, please do not hesitate to call me  I look forward to following your patient along with you  Sincerely,        Zenaida Santos MD        CC: No Recipients  Zenaida Santos MD  9/18/2018  5:02 PM  Sign at close encounter    HPI: Francis Styles  is here with her   Follow-up visit for adenocarcinoma of ascending colon with liver metastasis and 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 and also history of DVT in right lower leg and iron deficiency anemia secondary to chronic blood loss  Patient had DVT right lower leg below the knee in May 2016 and that happened after taking hormonal therapy in April 2016  In 2016 she tested positive once for lupus anticoagulant and that was negative on repeat test     She stayed on Xarelto until end of September 2016  She had heavy periods in in January 2018 and she was in bed for 3 days and she developed a clot in the left lower leg distally below the knee    On 05/21/2018 patient underwent GALI and BSO and there were cancer cells in the fallopian tubes  In June 2018 patient   underwent extended right hemicolectomy, partial omentectomy and biopsy of the peritoneal nodule   Peritoneal nodule came back  positive for metastatic adenocarcinoma from colon    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 and metastatic disease to liver    6 cm T3 G2 right colon cancer with positive margins, lymphovascular invasion and perineural invasion and positive 3 of 27 lymph nodes with extranodal extension and positive peritoneal nodule biopsy   Stage IV disease because of liver and peritoneal metastases      on 07/16/2018 patient was started on FOLFIRI chemotherapy and to that Avastin was added after 1 cycle  Patient has completed 4 treatments  After 6 treatments she will be going for PET-CT scan  She had some diarrhea post cycle 4 and she thought that was  because Avastin was given over 30 minutes instead of 60 minutes  Avastin is being changed back to 60 minutes  If she had significant diarrhea again 5 FU bolus  could be removed from the program   Both 5 FU and irinotecan could give diarrhea  She had 1 large very  loose bowel movement for 2 days post chemotherapy but not multiple bowel movements  Patient has been on Xarelto for  recurrent DVTs in the legs and one time she had positive lupus anticoagulant but that was negative the next time  Patient is receiving intravenous Venofer with chemotherapy for iron deficiency  Less tiredness            She had GALI, BSO in May 2016  Pathology of   Bilateral fallopian tubes showed invasive adenocarcinoma     She had a CT of the chest, abdomen and pelvis on 06/06/2018 which showed a possible lesion on the right abdomen,  Suspicious for underlying colon mass  Also, multiple hepatic metastases were noted  Also, scattered retroperitoneal nodes also noted; Largest measuring 1 1 x 0 9 cm      Patient brought to the OR on 06/14/2018  Patient was found to have a proximal transverse colon tumor with peritoneal deposits pelvic and right diaphragm, palpable liver metastases     she had a right hemicolectomy with ileocolonic anastomosis      Final pathology showed stage IVC- pT3, pN1b, pM1c, G2             Current Outpatient Prescriptions:     acetaminophen (TYLENOL) 500 mg tablet, Take 1,000 mg by mouth every 6 (six) hours as needed for mild pain, Disp: , Rfl:     lidocaine-prilocaine (EMLA) cream, Apply 1 hour prior to chemo, cover in wrap, Disp: 30 g, Rfl: 0    ondansetron (ZOFRAN) 8 mg tablet, Take 1 tablet (8 mg total) by mouth every 8 (eight) hours as needed for nausea or vomiting, Disp: 20 tablet, Rfl: 1    oxyCODONE (ROXICODONE) 5 mg immediate release tablet, TAKE 1 TABLET BY MOUTH EVERY 4 HOURS AS NEEDED FOR PAIN UP TO 10 DAYS, Disp: , Rfl: 0    XARELTO 20 MG tablet, Take 1 tablet (20 mg total) by mouth daily, Disp: 90 tablet, Rfl: 2    No Known Allergies       Primary adenocarcinoma of ascending colon (HCC)    6/14/2018 Initial Diagnosis     Primary adenocarcinoma of ascending colon (HCC)         7/17/2018 - 8/1/2018 Chemotherapy     camptosar 180 mg/m2 day 1  5  mg/m2 day 1  5 FU 1200 mg/m2 day 1-2   Leucovorin 400 mg/m2     Venofer 100 mg (first 10 treatments) -- all every 2 weeks             8/1/2018 Adverse Reaction     Needed granix 300 mcg due to [address] of 1 01 8/14/2018 -  Chemotherapy     camptosar 180 mg/m2 day 1  5  mg/m2 day 1  5 FU 1200 mg/m2 day 1-2   Leucovorin 400 mg/m2  Avastin 5 mg/kg day 1   Venofer 100 mg (first 10 treatments)  Neulasta on Pro 6 mg day 3      -- every 2 weeks                 ROS:  09/18/18 Reviewed 13 systems:  Presently no headaches, seizures, dizziness, diplopia, dysphagia, hoarseness, chest pain, palpitations, shortness of breath, cough, hemoptysis, abdominal pain, nausea, vomiting,  melena, hematuria, fever, chills, bleeding, bone pains, skin rash, weight loss, arthritic symptoms,  weakness, numbness,  claudication and gait problem  No frequent infections  Not unusually sensitive to heat or cold  No swelling of the ankles  No swollen glands  Patient is anxious    No GYN symptoms Other symptoms are in HPI        /80 (BP Location: Left arm, Patient Position: Sitting, Cuff Size: Adult)   Pulse 89   Temp 97 5 °F (36 4 °C)   LMP 05/16/2018 (Exact Date)   SpO2 98%     Physical Exam:  Alert, oriented, not in distress, no icterus, no oral thrush, no palpable neck mass, clear lung fields, regular heart rate, abdomen  soft and non tender, no palpable abdominal mass, no ascites, no edema of ankles, no calf tenderness, no focal neurological deficit, no skin rash, no palpable lymphadenopathy, good arterial pulses, no clubbing  Patient is anxious  Performance status 1      IMAGING:      LABS:  Results for orders placed or performed in visit on 09/10/18   Comprehensive metabolic panel   Result Value Ref Range    Sodium 140 136 - 145 mmol/L    Potassium 3 4 (L) 3 5 - 5 3 mmol/L    Chloride 106 100 - 108 mmol/L    CO2 26 21 - 32 mmol/L    ANION GAP 8 4 - 13 mmol/L    BUN 12 5 - 25 mg/dL    Creatinine 0 89 0 60 - 1 30 mg/dL    Glucose 87 65 - 140 mg/dL    Calcium 9 1 8 3 - 10 1 mg/dL    AST 16 5 - 45 U/L    ALT 49 12 - 78 U/L    Alkaline Phosphatase 124 (H) 46 - 116 U/L    Total Protein 7 2 6 4 - 8 2 g/dL    Albumin 3 9 3 5 - 5 0 g/dL    Total Bilirubin 0 37 0 20 - 1 00 mg/dL    eGFR 75 ml/min/1 73sq m   UA (URINE) with reflex to Microscopic   Result Value Ref Range    Color, UA Yellow     Clarity, UA Clear     Specific [place], UA 1 004 1 003 - 1 030    pH, UA 6 5 4 5 - 8 0    Leukocytes, UA Negative Negative    Nitrite, UA Negative Negative    Protein, UA Negative Negative mg/dl    Glucose, UA Negative Negative mg/dl    Ketones, UA Negative Negative mg/dl    Urobilinogen, UA 0 2 0 2, 1 0 E U /dl E U /dl    Bilirubin, UA Negative Negative    Blood, UA Negative Negative     Labs, Imaging, & Other studies:   All pertinent labs and imaging studies were personally reviewed    Lab Results   Component Value Date     09/10/2018    K 3 4 (L) 09/10/2018     09/10/2018    CO2 26 09/10/2018    ANIONGAP 9 03/18/2015    BUN 12 09/10/2018    CREATININE 0 89 09/10/2018    GLUCOSE 100 03/18/2015    GLUF 93 05/08/2018    CALCIUM 9 1 09/10/2018    AST 16 09/10/2018    ALT 49 09/10/2018    ALKPHOS 124 (H) 09/10/2018    PROT 6 6 03/18/2015    BILITOT 0 65 03/18/2015    EGFR 75 09/10/2018     Lab Results   Component Value Date    WBC 15 85 (H) 09/10/2018    HGB 11 3 (L) 09/10/2018    HCT 38 0 09/10/2018    MCV 87 09/10/2018     09/10/2018 Reviewed and discussed with patient  Assessment and plan:  Naif Beltran  is here with her   Follow-up visit for adenocarcinoma of ascending colon with liver metastasis and 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 and also history of DVT in right lower leg and iron deficiency anemia secondary to chronic blood loss  Patient had DVT right lower leg below the knee in May 2016 and that happened after taking hormonal therapy in April 2016  In 2016 she tested positive once for lupus anticoagulant and that was negative on repeat test     She stayed on Xarelto until end of September 2016  She had heavy periods in in January 2018 and she was in bed for 3 days and she developed a clot in the left lower leg distally below the knee    On 05/21/2018 patient underwent GALI and BSO and there were cancer cells in the fallopian tubes  In June 2018 patient   underwent extended right hemicolectomy, partial omentectomy and biopsy of the peritoneal nodule   Peritoneal nodule came back  positive for metastatic adenocarcinoma from colon    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 and metastatic disease to liver    6 cm T3 G2 right colon cancer with positive margins, lymphovascular invasion and perineural invasion and positive 3 of 27 lymph nodes with extranodal extension and positive peritoneal nodule biopsy   Stage IV disease because of liver and peritoneal metastases  on 07/16/2018 patient was started on FOLFIRI chemotherapy and to that Avastin was added after 1 cycle  Patient has completed 4 treatments  After 6 treatments she will be going for PET-CT scan  She had some diarrhea post cycle 4 and she thought that was  because Avastin was given over 30 minutes instead of 60 minutes  Avastin is being changed back to 60 minutes  If she had significant diarrhea again 5 FU bolus  could be removed from the program   Both 5 FU and irinotecan could give diarrhea    She had 1 large very  loose bowel movement for 2 days post chemotherapy but not multiple bowel movements  Patient has been on Xarelto for  recurrent DVTs in the legs and one time she had positive lupus anticoagulant but that was negative the next time  Patient is receiving intravenous Venofer with chemotherapy for iron deficiency  Less tiredness     Physical examination and test results are as recorded and discussed  Plan is as above  Condition discussed and explained  Questions answered  Also prescribed potassium 10 mg tablet 1 a day for 7 days and she was save the rests for future use  Discussed the importance of self-breast examination, eating healthy foods, staying active and health screening tests  Self care  Goal is prolongation of survival from colon cancer  1  [facility]    - NM PET CT skull base to mid thigh; Future    2  Liver metastases ([facility] )    - NM PET CT skull base to mid thigh; Future    3  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[facility] )      4  Chronic deep vein thrombosis (DVT) of popliteal vein of right lower extremity (HCC)      5  Iron deficiency anemia due to chronic blood loss      6  Hypokalemia    - potassium chloride (MICRO-K) 10 MEQ CR capsule; Take 1 capsule (10 mEq total) by mouth daily Please take for 7 days and save the rest  Dispense: 30 capsule; Refill: 0      Patient voiced understanding and agreement in the discussion  Counseling / Coordination of Care   Greater than 50% of total time was spent with the patient and / or family counseling and / or coordination of care

## 2018-09-18 NOTE — PROGRESS NOTES
HPI: Saint Crandall  is here with her   Follow-up visit for adenocarcinoma of ascending colon with liver metastasis and 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 and also history of DVT in right lower leg and iron deficiency anemia secondary to chronic blood loss  Patient had DVT right lower leg below the knee in May 2016 and that happened after taking hormonal therapy in April 2016  In 2016 she tested positive once for lupus anticoagulant and that was negative on repeat test     She stayed on Xarelto until end of September 2016  She had heavy periods in in January 2018 and she was in bed for 3 days and she developed a clot in the left lower leg distally below the knee    On 05/21/2018 patient underwent GALI and BSO and there were cancer cells in the fallopian tubes  In June 2018 patient   underwent extended right hemicolectomy, partial omentectomy and biopsy of the peritoneal nodule   Peritoneal nodule came back  positive for metastatic adenocarcinoma from colon    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 and metastatic disease to liver    6 cm T3 G2 right colon cancer with positive margins, lymphovascular invasion and perineural invasion and positive 3 of 27 lymph nodes with extranodal extension and positive peritoneal nodule biopsy   Stage IV disease because of liver and peritoneal metastases  on 07/16/2018 patient was started on FOLFIRI chemotherapy and to that Avastin was added after 1 cycle  Patient has completed 4 treatments  After 6 treatments she will be going for PET-CT scan  She had some diarrhea post cycle 4 and she thought that was  because Avastin was given over 30 minutes instead of 60 minutes  Avastin is being changed back to 60 minutes  If she had significant diarrhea again 5 FU bolus  could be removed from the program   Both 5 FU and irinotecan could give diarrhea    She had 1 large very  loose bowel movement for 2 days post chemotherapy but not multiple bowel movements  Patient has been on Xarelto for  recurrent DVTs in the legs and one time she had positive lupus anticoagulant but that was negative the next time  Patient is receiving intravenous Venofer with chemotherapy for iron deficiency  Less tiredness            She had GALI, BSO in May 2016  Pathology of   Bilateral fallopian tubes showed invasive adenocarcinoma     She had a CT of the chest, abdomen and pelvis on 06/06/2018 which showed a possible lesion on the right abdomen,  Suspicious for underlying colon mass  Also, multiple hepatic metastases were noted  Also, scattered retroperitoneal nodes also noted; Largest measuring 1 1 x 0 9 cm      Patient brought to the OR on 06/14/2018  Patient was found to have a proximal transverse colon tumor with peritoneal deposits pelvic and right diaphragm, palpable liver metastases     she had a right hemicolectomy with ileocolonic anastomosis      Final pathology showed stage IVC- pT3, pN1b, pM1c, G2             Current Outpatient Prescriptions:     acetaminophen (TYLENOL) 500 mg tablet, Take 1,000 mg by mouth every 6 (six) hours as needed for mild pain, Disp: , Rfl:     lidocaine-prilocaine (EMLA) cream, Apply 1 hour prior to chemo, cover in wrap, Disp: 30 g, Rfl: 0    ondansetron (ZOFRAN) 8 mg tablet, Take 1 tablet (8 mg total) by mouth every 8 (eight) hours as needed for nausea or vomiting, Disp: 20 tablet, Rfl: 1    oxyCODONE (ROXICODONE) 5 mg immediate release tablet, TAKE 1 TABLET BY MOUTH EVERY 4 HOURS AS NEEDED FOR PAIN UP TO 10 DAYS, Disp: , Rfl: 0    XARELTO 20 MG tablet, Take 1 tablet (20 mg total) by mouth daily, Disp: 90 tablet, Rfl: 2    No Known Allergies       [facility] adenocarcinoma [facility] colon [facility] )    6/14/2018 Initial Diagnosis     Primary adenocarcinoma of ascending colon (HCC)         7/17/2018 - 8/1/2018 Chemotherapy     camptosar 180 mg/m2 day 1  5  mg/m2 day 1  5 FU 1200 mg/m2 day 1-2   Leucovorin 400 mg/m2     Venofer 100 mg (first 10 treatments) -- all every 2 weeks             8/1/2018 Adverse Reaction     Needed granix 300 mcg due to [address] of 1 01 8/14/2018 -  Chemotherapy     camptosar 180 mg/m2 day 1  5  mg/m2 day 1  5 FU 1200 mg/m2 day 1-2   Leucovorin 400 mg/m2  Avastin 5 mg/kg day 1   Venofer 100 mg (first 10 treatments)  Neulasta on Pro 6 mg day 3      -- every 2 weeks                 ROS:  09/18/18 Reviewed 13 systems:  Presently no headaches, seizures, dizziness, diplopia, dysphagia, hoarseness, chest pain, palpitations, shortness of breath, cough, hemoptysis, abdominal pain, nausea, vomiting,  melena, hematuria, fever, chills, bleeding, bone pains, skin rash, weight loss, arthritic symptoms,  weakness, numbness,  claudication and gait problem  No frequent infections  Not unusually sensitive to heat or cold  No swelling of the ankles  No swollen glands  Patient is anxious  No GYN symptoms Other symptoms are in HPI        /80 (BP Location: Left arm, Patient Position: Sitting, Cuff Size: Adult)   Pulse 89   Temp 97 5 °F (36 4 °C)   LMP 05/16/2018 (Exact Date)   SpO2 98%     Physical Exam:  Alert, oriented, not in distress, no icterus, no oral thrush, no palpable neck mass, clear lung fields, regular heart rate, abdomen  soft and non tender, no palpable abdominal mass, no ascites, no edema of ankles, no calf tenderness, no focal neurological deficit, no skin rash, no palpable lymphadenopathy, good arterial pulses, no clubbing  Patient is anxious  Performance status 1      IMAGING:      LABS:  Results for orders placed or performed in visit on 09/10/18   Comprehensive metabolic panel   Result Value Ref Range    Sodium 140 136 - 145 mmol/L    Potassium 3 4 (L) 3 5 - 5 3 mmol/L    Chloride 106 100 - 108 mmol/L    CO2 26 21 - 32 mmol/L    ANION GAP 8 4 - 13 mmol/L    BUN 12 5 - 25 mg/dL    Creatinine 0 89 0 60 - 1 30 mg/dL    Glucose 87 65 - 140 mg/dL    Calcium 9 1 8 3 - 10 1 mg/dL    AST 16 5 - 45 U/L    ALT 49 12 - 78 U/L    Alkaline Phosphatase 124 (H) 46 - 116 U/L    Total Protein 7 2 6 4 - 8 2 g/dL    Albumin 3 9 3 5 - 5 0 g/dL    Total Bilirubin 0 37 0 20 - 1 00 mg/dL    eGFR 75 ml/min/1 73sq m   UA (URINE) with reflex to Microscopic   Result Value Ref Range    Color, UA Yellow     Clarity, UA Clear     Specific [place], UA 1 004 1 003 - 1 030    pH, UA 6 5 4 5 - 8 0    Leukocytes, UA Negative Negative    Nitrite, UA Negative Negative    Protein, UA Negative Negative mg/dl    Glucose, UA Negative Negative mg/dl    Ketones, UA Negative Negative mg/dl    Urobilinogen, UA 0 2 0 2, 1 0 E U /dl E U /dl    Bilirubin, UA Negative Negative    Blood, UA Negative Negative     Labs, Imaging, & Other studies:   All pertinent labs and imaging studies were personally reviewed    Lab Results   Component Value Date     09/10/2018    K 3 4 (L) 09/10/2018     09/10/2018    CO2 26 09/10/2018    ANIONGAP 9 03/18/2015    BUN 12 09/10/2018    CREATININE 0 89 09/10/2018    GLUCOSE 100 03/18/2015    GLUF 93 05/08/2018    CALCIUM 9 1 09/10/2018    AST 16 09/10/2018    ALT 49 09/10/2018    ALKPHOS 124 (H) 09/10/2018    PROT 6 6 03/18/2015    BILITOT 0 65 03/18/2015    EGFR 75 09/10/2018     Lab Results   Component Value Date    WBC 15 85 (H) 09/10/2018    HGB 11 3 (L) 09/10/2018    HCT 38 0 09/10/2018    MCV 87 09/10/2018     09/10/2018       Reviewed and discussed with patient  Assessment and plan:  Naif Beltran  is here with her   Follow-up visit for adenocarcinoma of ascending colon with liver metastasis and 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 and also history of DVT in right lower leg and iron deficiency anemia secondary to chronic blood loss  Patient had DVT right lower leg below the knee in May 2016 and that happened after taking hormonal therapy in April 2016   In 2016 she tested positive once for lupus anticoagulant and that was negative on repeat test     She stayed on Xarelto until end of September 2016  She had heavy periods in in January 2018 and she was in bed for 3 days and she developed a clot in the left lower leg distally below the knee    On 05/21/2018 patient underwent GALI and BSO and there were cancer cells in the fallopian tubes  In June 2018 patient   underwent extended right hemicolectomy, partial omentectomy and biopsy of the peritoneal nodule   Peritoneal nodule came back  positive for metastatic adenocarcinoma from colon    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 and metastatic disease to liver    6 cm T3 G2 right colon cancer with positive margins, lymphovascular invasion and perineural invasion and positive 3 of 27 lymph nodes with extranodal extension and positive peritoneal nodule biopsy   Stage IV disease because of liver and peritoneal metastases  on 07/16/2018 patient was started on FOLFIRI chemotherapy and to that Avastin was added after 1 cycle  Patient has completed 4 treatments  After 6 treatments she will be going for PET-CT scan  She had some diarrhea post cycle 4 and she thought that was  because Avastin was given over 30 minutes instead of 60 minutes  Avastin is being changed back to 60 minutes  If she had significant diarrhea again 5 FU bolus  could be removed from the program   Both 5 FU and irinotecan could give diarrhea  She had 1 large very  loose bowel movement for 2 days post chemotherapy but not multiple bowel movements  Patient has been on Xarelto for  recurrent DVTs in the legs and one time she had positive lupus anticoagulant but that was negative the next time  Patient is receiving intravenous Venofer with chemotherapy for iron deficiency  Less tiredness     Physical examination and test results are as recorded and discussed  Plan is as above  Condition discussed and explained  Questions answered  Also prescribed potassium 10 mg tablet 1 a day for 7 days and she was save the rests for future use  Discussed the importance of self-breast examination, eating healthy foods, staying active and health screening tests  Self care  Goal is prolongation of survival from colon cancer  1  [facility]    - NM PET CT skull base to mid thigh; Future    2  Liver metastases ([facility] )    - NM PET CT skull base to mid thigh; Future    3  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[facility] )      4  Chronic deep vein thrombosis (DVT) of popliteal vein of right lower extremity (HCC)      5  Iron deficiency anemia due to chronic blood loss      6  Hypokalemia    - potassium chloride (MICRO-K) 10 MEQ CR capsule; Take 1 capsule (10 mEq total) by mouth daily Please take for 7 days and save the rest  Dispense: 30 capsule; Refill: 0      Patient voiced understanding and agreement in the discussion  Counseling / Coordination of Care   Greater than 50% of total time was spent with the patient and / or family counseling and / or coordination of care

## 2018-09-24 ENCOUNTER — APPOINTMENT (OUTPATIENT)
Dept: LAB | Facility: MEDICAL CENTER | Age: 53
End: 2018-09-24
Payer: COMMERCIAL

## 2018-09-24 ENCOUNTER — TRANSCRIBE ORDERS (OUTPATIENT)
Dept: ADMINISTRATIVE | Facility: HOSPITAL | Age: 53
End: 2018-09-24

## 2018-09-24 DIAGNOSIS — I82.532 CHRONIC DEEP VEIN THROMBOSIS (DVT) OF POPLITEAL VEIN OF LEFT LOWER EXTREMITY (HCC): Primary | ICD-10-CM

## 2018-09-24 DIAGNOSIS — C77.2 METASTASIS TO RETROPERITONEAL LYMPH NODE (HCC): ICD-10-CM

## 2018-09-24 DIAGNOSIS — C18.2 PRIMARY ADENOCARCINOMA OF ASCENDING COLON (HCC): ICD-10-CM

## 2018-09-24 DIAGNOSIS — C78.7 LIVER METASTASES (HCC): ICD-10-CM

## 2018-09-24 DIAGNOSIS — C79.9 ADENOCARCINOMA, METASTATIC (HCC): ICD-10-CM

## 2018-09-24 DIAGNOSIS — C78.6 PERITONEAL METASTASES (HCC): ICD-10-CM

## 2018-09-24 LAB
ALBUMIN SERPL BCP-MCNC: 3.7 G/DL (ref 3.5–5)
ALP SERPL-CCNC: 129 U/L (ref 46–116)
ALT SERPL W P-5'-P-CCNC: 49 U/L (ref 12–78)
ANION GAP SERPL CALCULATED.3IONS-SCNC: 3 MMOL/L (ref 4–13)
AST SERPL W P-5'-P-CCNC: 18 U/L (ref 5–45)
BILIRUB SERPL-MCNC: 0.47 MG/DL (ref 0.2–1)
BILIRUB UR QL STRIP: NEGATIVE
BUN SERPL-MCNC: 10 MG/DL (ref 5–25)
CALCIUM SERPL-MCNC: 8.9 MG/DL (ref 8.3–10.1)
CHLORIDE SERPL-SCNC: 105 MMOL/L (ref 100–108)
CLARITY UR: NORMAL
CO2 SERPL-SCNC: 28 MMOL/L (ref 21–32)
COLOR UR: NORMAL
CREAT SERPL-MCNC: 1.14 MG/DL (ref 0.6–1.3)
GFR SERPL CREATININE-BSD FRML MDRD: 56 ML/MIN/1.73SQ M
GLUCOSE P FAST SERPL-MCNC: 94 MG/DL (ref 65–99)
GLUCOSE UR STRIP-MCNC: NEGATIVE MG/DL
HGB UR QL STRIP.AUTO: NEGATIVE
KETONES UR STRIP-MCNC: NEGATIVE MG/DL
LEUKOCYTE ESTERASE UR QL STRIP: NEGATIVE
NITRITE UR QL STRIP: NEGATIVE
PH UR STRIP.AUTO: 5.5 [PH] (ref 4.5–8)
POTASSIUM SERPL-SCNC: 3.9 MMOL/L (ref 3.5–5.3)
PROT SERPL-MCNC: 6.9 G/DL (ref 6.4–8.2)
PROT UR STRIP-MCNC: NEGATIVE MG/DL
SODIUM SERPL-SCNC: 136 MMOL/L (ref 136–145)
SP GR UR STRIP.AUTO: 1.02 (ref 1–1.03)
UROBILINOGEN UR QL STRIP.AUTO: 0.2 E.U./DL

## 2018-09-24 PROCEDURE — 81003 URINALYSIS AUTO W/O SCOPE: CPT

## 2018-09-24 PROCEDURE — 80053 COMPREHEN METABOLIC PANEL: CPT | Performed by: INTERNAL MEDICINE

## 2018-09-24 RX ORDER — FLUOROURACIL 50 MG/ML
716 INJECTION, SOLUTION INTRAVENOUS ONCE
Status: COMPLETED | OUTPATIENT
Start: 2018-09-25 | End: 2018-09-25

## 2018-09-24 RX ORDER — SODIUM CHLORIDE 9 MG/ML
20 INJECTION, SOLUTION INTRAVENOUS CONTINUOUS
Status: DISCONTINUED | OUTPATIENT
Start: 2018-09-25 | End: 2018-09-28 | Stop reason: HOSPADM

## 2018-09-24 RX ORDER — DEXTROSE MONOHYDRATE 50 MG/ML
20 INJECTION, SOLUTION INTRAVENOUS CONTINUOUS
Status: DISCONTINUED | OUTPATIENT
Start: 2018-09-25 | End: 2018-09-28 | Stop reason: HOSPADM

## 2018-09-24 RX ORDER — ATROPINE SULFATE 0.4 MG/ML
0.25 INJECTION, SOLUTION ENDOTRACHEAL; INTRAMEDULLARY; INTRAMUSCULAR; INTRAVENOUS; SUBCUTANEOUS ONCE
Status: COMPLETED | OUTPATIENT
Start: 2018-09-25 | End: 2018-09-25

## 2018-09-25 ENCOUNTER — HOSPITAL ENCOUNTER (OUTPATIENT)
Dept: INFUSION CENTER | Facility: CLINIC | Age: 53
Discharge: HOME | End: 2018-09-25
Payer: COMMERCIAL

## 2018-09-25 VITALS
TEMPERATURE: 98.3 F | HEART RATE: 76 BPM | HEIGHT: 66 IN | SYSTOLIC BLOOD PRESSURE: 118 MMHG | DIASTOLIC BLOOD PRESSURE: 74 MMHG | RESPIRATION RATE: 18 BRPM | BODY MASS INDEX: 24.67 KG/M2 | OXYGEN SATURATION: 94 % | WEIGHT: 153.5 LBS

## 2018-09-25 PROCEDURE — 96411 CHEMO IV PUSH ADDL DRUG: CPT

## 2018-09-25 PROCEDURE — 96415 CHEMO IV INFUSION ADDL HR: CPT

## 2018-09-25 PROCEDURE — 96417 CHEMO IV INFUS EACH ADDL SEQ: CPT

## 2018-09-25 PROCEDURE — G0498 CHEMO EXTEND IV INFUS W/PUMP: HCPCS

## 2018-09-25 PROCEDURE — 96368 THER/DIAG CONCURRENT INF: CPT

## 2018-09-25 PROCEDURE — 96375 TX/PRO/DX INJ NEW DRUG ADDON: CPT

## 2018-09-25 PROCEDURE — 96367 TX/PROPH/DG ADDL SEQ IV INF: CPT

## 2018-09-25 PROCEDURE — 96413 CHEMO IV INFUSION 1 HR: CPT

## 2018-09-25 RX ADMIN — LEUCOVORIN CALCIUM 700 MG: 500 INJECTION, POWDER, LYOPHILIZED, FOR SOLUTION INTRAMUSCULAR; INTRAVENOUS at 11:42

## 2018-09-25 RX ADMIN — IRINOTECAN HYDROCHLORIDE 320 MG: 100 INJECTION, SOLUTION INTRAVENOUS at 11:42

## 2018-09-25 RX ADMIN — ATROPINE SULFATE 0.25 MG: 0.4 INJECTION INTRAMUSCULAR; INTRAVENOUS; SUBCUTANEOUS at 11:35

## 2018-09-25 RX ADMIN — DEXAMETHASONE SODIUM PHOSPHATE: 10 INJECTION, SOLUTION INTRAMUSCULAR; INTRAVENOUS at 09:56

## 2018-09-25 RX ADMIN — SODIUM CHLORIDE 20 ML/HR: 0.9 INJECTION, SOLUTION INTRAVENOUS at 08:59

## 2018-09-25 RX ADMIN — BEVACIZUMAB 350 MG: 400 INJECTION, SOLUTION INTRAVENOUS at 10:22

## 2018-09-25 RX ADMIN — IRON SUCROSE 100 MG: 20 INJECTION, SOLUTION INTRAVENOUS at 09:00

## 2018-09-25 RX ADMIN — FLUOROURACIL 716 MG: 50 INJECTION, SOLUTION INTRAVENOUS at 13:23

## 2018-09-25 NOTE — PROGRESS NOTES
Pt to clinic for venofer, leucovorin, campotar, avastin, 5 FU push and pump, pt avastin to be run over 60 min per Dr Marilyn Shepard note, Call Erik Leonardo RN to clarify, pt offers no complaints at this time, aware of next appointment, declines avs

## 2018-09-27 ENCOUNTER — HOSPITAL ENCOUNTER (OUTPATIENT)
Dept: INFUSION CENTER | Facility: CLINIC | Age: 53
Discharge: HOME | End: 2018-09-27
Payer: COMMERCIAL

## 2018-09-27 PROCEDURE — 96372 THER/PROPH/DIAG INJ SC/IM: CPT

## 2018-09-27 RX ADMIN — PEGFILGRASTIM 6 MG: KIT SUBCUTANEOUS at 11:56

## 2018-09-27 RX ADMIN — HEPARIN 300 UNITS: 100 SYRINGE at 11:53

## 2018-09-27 NOTE — PROGRESS NOTES
Pt to clinic for [address] and neulasta on pro,Pt tolerated infusion with complications, aware of next appointment, declines avs

## 2018-10-08 ENCOUNTER — TELEPHONE (OUTPATIENT)
Dept: HEMATOLOGY ONCOLOGY | Facility: CLINIC | Age: 53
End: 2018-10-08

## 2018-10-08 ENCOUNTER — APPOINTMENT (OUTPATIENT)
Dept: LAB | Facility: MEDICAL CENTER | Age: 53
End: 2018-10-08
Payer: COMMERCIAL

## 2018-10-08 ENCOUNTER — TRANSCRIBE ORDERS (OUTPATIENT)
Dept: ADMINISTRATIVE | Facility: HOSPITAL | Age: 53
End: 2018-10-08

## 2018-10-08 DIAGNOSIS — C78.7 LIVER METASTASES (HCC): ICD-10-CM

## 2018-10-08 DIAGNOSIS — C18.2 MALIGNANT NEOPLASM OF ASCENDING COLON (HCC): Primary | ICD-10-CM

## 2018-10-08 DIAGNOSIS — C79.9 ADENOCARCINOMA, METASTATIC (HCC): ICD-10-CM

## 2018-10-08 DIAGNOSIS — C18.2 PRIMARY ADENOCARCINOMA OF ASCENDING COLON (HCC): ICD-10-CM

## 2018-10-08 DIAGNOSIS — C78.6 PERITONEAL METASTASES (HCC): ICD-10-CM

## 2018-10-08 DIAGNOSIS — C77.2 METASTASIS TO RETROPERITONEAL LYMPH NODE (HCC): ICD-10-CM

## 2018-10-08 LAB
ALBUMIN SERPL BCP-MCNC: 3.4 G/DL (ref 3.5–5)
ALP SERPL-CCNC: 107 U/L (ref 46–116)
ALT SERPL W P-5'-P-CCNC: 41 U/L (ref 12–78)
ANION GAP SERPL CALCULATED.3IONS-SCNC: 6 MMOL/L (ref 4–13)
AST SERPL W P-5'-P-CCNC: 23 U/L (ref 5–45)
BILIRUB SERPL-MCNC: 0.27 MG/DL (ref 0.2–1)
BILIRUB UR QL STRIP: NEGATIVE
BUN SERPL-MCNC: 9 MG/DL (ref 5–25)
CALCIUM SERPL-MCNC: 8 MG/DL (ref 8.3–10.1)
CHLORIDE SERPL-SCNC: 105 MMOL/L (ref 100–108)
CLARITY UR: CLEAR
CO2 SERPL-SCNC: 25 MMOL/L (ref 21–32)
COLOR UR: YELLOW
CREAT SERPL-MCNC: 0.84 MG/DL (ref 0.6–1.3)
GFR SERPL CREATININE-BSD FRML MDRD: 81 ML/MIN/1.73SQ M
GLUCOSE SERPL-MCNC: 87 MG/DL (ref 65–140)
GLUCOSE UR STRIP-MCNC: NEGATIVE MG/DL
HGB UR QL STRIP.AUTO: NEGATIVE
KETONES UR STRIP-MCNC: NEGATIVE MG/DL
LEUKOCYTE ESTERASE UR QL STRIP: NEGATIVE
NITRITE UR QL STRIP: NEGATIVE
PH UR STRIP.AUTO: 6.5 [PH] (ref 4.5–8)
POTASSIUM SERPL-SCNC: 3.6 MMOL/L (ref 3.5–5.3)
PROT SERPL-MCNC: 6.6 G/DL (ref 6.4–8.2)
PROT UR STRIP-MCNC: NEGATIVE MG/DL
SODIUM SERPL-SCNC: 136 MMOL/L (ref 136–145)
SP GR UR STRIP.AUTO: 1 (ref 1–1.03)
UROBILINOGEN UR QL STRIP.AUTO: 0.2 E.U./DL

## 2018-10-08 PROCEDURE — 80053 COMPREHEN METABOLIC PANEL: CPT

## 2018-10-08 PROCEDURE — 81003 URINALYSIS AUTO W/O SCOPE: CPT

## 2018-10-08 RX ORDER — ATROPINE SULFATE 0.4 MG/ML
0.25 INJECTION, SOLUTION ENDOTRACHEAL; INTRAMEDULLARY; INTRAMUSCULAR; INTRAVENOUS; SUBCUTANEOUS ONCE
Status: COMPLETED | OUTPATIENT
Start: 2018-10-09 | End: 2018-10-09

## 2018-10-08 RX ORDER — SODIUM CHLORIDE 9 MG/ML
20 INJECTION, SOLUTION INTRAVENOUS CONTINUOUS
Status: DISCONTINUED | OUTPATIENT
Start: 2018-10-09 | End: 2018-10-12 | Stop reason: HOSPADM

## 2018-10-08 RX ORDER — FLUOROURACIL 50 MG/ML
712 INJECTION, SOLUTION INTRAVENOUS ONCE
Status: COMPLETED | OUTPATIENT
Start: 2018-10-09 | End: 2018-10-09

## 2018-10-09 ENCOUNTER — HOSPITAL ENCOUNTER (OUTPATIENT)
Dept: INFUSION CENTER | Facility: CLINIC | Age: 53
Discharge: HOME | End: 2018-10-09
Payer: COMMERCIAL

## 2018-10-09 VITALS
OXYGEN SATURATION: 95 % | BODY MASS INDEX: 24.83 KG/M2 | WEIGHT: 154.5 LBS | HEIGHT: 66 IN | HEART RATE: 81 BPM | DIASTOLIC BLOOD PRESSURE: 60 MMHG | SYSTOLIC BLOOD PRESSURE: 116 MMHG | RESPIRATION RATE: 14 BRPM | TEMPERATURE: 97.5 F

## 2018-10-09 PROCEDURE — 96368 THER/DIAG CONCURRENT INF: CPT

## 2018-10-09 PROCEDURE — 96417 CHEMO IV INFUS EACH ADDL SEQ: CPT

## 2018-10-09 PROCEDURE — 96375 TX/PRO/DX INJ NEW DRUG ADDON: CPT

## 2018-10-09 PROCEDURE — 96411 CHEMO IV PUSH ADDL DRUG: CPT

## 2018-10-09 PROCEDURE — 96413 CHEMO IV INFUSION 1 HR: CPT

## 2018-10-09 PROCEDURE — 96367 TX/PROPH/DG ADDL SEQ IV INF: CPT

## 2018-10-09 PROCEDURE — G0498 CHEMO EXTEND IV INFUS W/PUMP: HCPCS

## 2018-10-09 RX ADMIN — ATROPINE SULFATE 0.25 MG: 0.4 INJECTION INTRAMUSCULAR; INTRAVENOUS; SUBCUTANEOUS at 11:43

## 2018-10-09 RX ADMIN — IRON SUCROSE 100 MG: 20 INJECTION, SOLUTION INTRAVENOUS at 08:57

## 2018-10-09 RX ADMIN — LEUCOVORIN CALCIUM 700 MG: 200 INJECTION, POWDER, LYOPHILIZED, FOR SOLUTION INTRAMUSCULAR; INTRAVENOUS at 11:51

## 2018-10-09 RX ADMIN — IRINOTECAN HYDROCHLORIDE 320 MG: 100 INJECTION, SOLUTION INTRAVENOUS at 11:50

## 2018-10-09 RX ADMIN — BEVACIZUMAB 348 MG: 400 INJECTION, SOLUTION INTRAVENOUS at 10:34

## 2018-10-09 RX ADMIN — DEXAMETHASONE SODIUM PHOSPHATE: 10 INJECTION, SOLUTION INTRAMUSCULAR; INTRAVENOUS at 09:55

## 2018-10-09 RX ADMIN — SODIUM CHLORIDE 20 ML/HR: 0.9 INJECTION, SOLUTION INTRAVENOUS at 08:53

## 2018-10-09 RX ADMIN — FLUOROURACIL 712 MG: 50 INJECTION, SOLUTION INTRAVENOUS at 13:28

## 2018-10-09 NOTE — PROGRESS NOTES
Pt  Tolerated treatment w/out adverse reaction  Nurse connected pt  To cadd pump & pt  Scheduled for pump disconnect on 10/11/18 @ 1140  Pt  Is aware & verbalized understanding  Confirmed pts  Next appt   Pt  Declined AVS

## 2018-10-09 NOTE — PLAN OF CARE
Problem: Potential for Falls  Goal: Patient will remain free of falls  INTERVENTIONS:  - Assess patient frequently for physical needs  -  Identify cognitive and physical deficits and behaviors that affect risk of falls    -  [place] fall precautions as indicated by assessment   - Educate patient/family on patient safety including physical limitations  - Instruct patient to call for assistance with activity based on assessment  - Modify environment to reduce risk of injury  - Consider OT/PT consult to assist with strengthening/mobility   Outcome: Progressing

## 2018-10-11 ENCOUNTER — HOSPITAL ENCOUNTER (OUTPATIENT)
Dept: INFUSION CENTER | Facility: CLINIC | Age: 53
Discharge: HOME | End: 2018-10-11
Payer: COMMERCIAL

## 2018-10-11 PROCEDURE — 96372 THER/PROPH/DIAG INJ SC/IM: CPT

## 2018-10-11 RX ADMIN — Medication 300 UNITS: at 11:56

## 2018-10-11 RX ADMIN — PEGFILGRASTIM 6 MG: KIT SUBCUTANEOUS at 11:52

## 2018-10-19 ENCOUNTER — HOSPITAL ENCOUNTER (OUTPATIENT)
Dept: RADIOLOGY | Age: 53
Discharge: HOME | End: 2018-10-19
Payer: COMMERCIAL

## 2018-10-19 ENCOUNTER — TELEPHONE (OUTPATIENT)
Dept: HEMATOLOGY ONCOLOGY | Facility: CLINIC | Age: 53
End: 2018-10-19

## 2018-10-19 VITALS — BODY MASS INDEX: 24.58 KG/M2 | WEIGHT: 150 LBS

## 2018-10-19 DIAGNOSIS — C18.2 PRIMARY ADENOCARCINOMA OF ASCENDING COLON (HCC): ICD-10-CM

## 2018-10-19 DIAGNOSIS — C78.7 LIVER METASTASES (HCC): ICD-10-CM

## 2018-10-19 DIAGNOSIS — R93.5 ABNORMAL FINDINGS ON DIAGNOSTIC IMAGING OF ABDOMEN: ICD-10-CM

## 2018-10-19 DIAGNOSIS — C18.2 PRIMARY ADENOCARCINOMA OF ASCENDING COLON (HCC): Primary | ICD-10-CM

## 2018-10-19 DIAGNOSIS — R93.5 ABNORMAL FINDINGS ON DIAGNOSTIC IMAGING OF OTHER ABDOMINAL REGIONS, INCLUDING RETROPERITONEUM: ICD-10-CM

## 2018-10-19 LAB — GLUCOSE SERPL-MCNC: 99 MG/DL (ref 65–140)

## 2018-10-19 PROCEDURE — 82948 REAGENT STRIP/BLOOD GLUCOSE: CPT

## 2018-10-19 PROCEDURE — 78815 PET IMAGE W/CT SKULL-THIGH: CPT

## 2018-10-19 PROCEDURE — A9552 F18 FDG: HCPCS

## 2018-10-19 NOTE — TELEPHONE ENCOUNTER
Called and spoke to the patient regarding her 10/19/18 PET scan results  There were a few areas of FDG uptake posterior to the bladder, near the uterus  Findings were inconclusive if this represents metastatic disease  An MRI was recommended by the radiologist  Dr Ramirez Field wants the patient to have her chemotherapy treatment next week  He also wants her to have a abd/pelvis MRI done within the next 2 weeks

## 2018-10-22 ENCOUNTER — APPOINTMENT (OUTPATIENT)
Dept: LAB | Facility: MEDICAL CENTER | Age: 53
End: 2018-10-22
Payer: COMMERCIAL

## 2018-10-22 DIAGNOSIS — C78.6 PERITONEAL METASTASES (HCC): ICD-10-CM

## 2018-10-22 DIAGNOSIS — C79.9 ADENOCARCINOMA, METASTATIC (HCC): ICD-10-CM

## 2018-10-22 DIAGNOSIS — C18.2 PRIMARY ADENOCARCINOMA OF ASCENDING COLON (HCC): ICD-10-CM

## 2018-10-22 DIAGNOSIS — C77.2 METASTASIS TO RETROPERITONEAL LYMPH NODE (HCC): ICD-10-CM

## 2018-10-22 DIAGNOSIS — C78.7 LIVER METASTASES (HCC): ICD-10-CM

## 2018-10-22 LAB
ALBUMIN SERPL BCP-MCNC: 3.8 G/DL (ref 3.5–5)
ALP SERPL-CCNC: 107 U/L (ref 46–116)
ALT SERPL W P-5'-P-CCNC: 50 U/L (ref 12–78)
ANION GAP SERPL CALCULATED.3IONS-SCNC: 8 MMOL/L (ref 4–13)
AST SERPL W P-5'-P-CCNC: 18 U/L (ref 5–45)
BILIRUB SERPL-MCNC: 0.38 MG/DL (ref 0.2–1)
BILIRUB UR QL STRIP: NEGATIVE
BUN SERPL-MCNC: 8 MG/DL (ref 5–25)
CALCIUM SERPL-MCNC: 8.5 MG/DL (ref 8.3–10.1)
CHLORIDE SERPL-SCNC: 106 MMOL/L (ref 100–108)
CLARITY UR: CLEAR
CO2 SERPL-SCNC: 24 MMOL/L (ref 21–32)
COLOR UR: YELLOW
CREAT SERPL-MCNC: 0.96 MG/DL (ref 0.6–1.3)
GFR SERPL CREATININE-BSD FRML MDRD: 69 ML/MIN/1.73SQ M
GLUCOSE P FAST SERPL-MCNC: 69 MG/DL (ref 65–99)
GLUCOSE UR STRIP-MCNC: NEGATIVE MG/DL
HGB UR QL STRIP.AUTO: NEGATIVE
KETONES UR STRIP-MCNC: NEGATIVE MG/DL
LEUKOCYTE ESTERASE UR QL STRIP: NEGATIVE
NITRITE UR QL STRIP: NEGATIVE
PH UR STRIP.AUTO: 6 [PH] (ref 4.5–8)
POTASSIUM SERPL-SCNC: 3.8 MMOL/L (ref 3.5–5.3)
PROT SERPL-MCNC: 6.5 G/DL (ref 6.4–8.2)
PROT UR STRIP-MCNC: NEGATIVE MG/DL
SODIUM SERPL-SCNC: 138 MMOL/L (ref 136–145)
SP GR UR STRIP.AUTO: 1.01 (ref 1–1.03)
UROBILINOGEN UR QL STRIP.AUTO: 0.2 E.U./DL

## 2018-10-22 PROCEDURE — 80053 COMPREHEN METABOLIC PANEL: CPT | Performed by: INTERNAL MEDICINE

## 2018-10-22 PROCEDURE — 81003 URINALYSIS AUTO W/O SCOPE: CPT

## 2018-10-22 PROCEDURE — 36415 COLL VENOUS BLD VENIPUNCTURE: CPT | Performed by: INTERNAL MEDICINE

## 2018-10-22 RX ORDER — SODIUM CHLORIDE 9 MG/ML
20 INJECTION, SOLUTION INTRAVENOUS CONTINUOUS
Status: DISCONTINUED | OUTPATIENT
Start: 2018-10-23 | End: 2018-10-26 | Stop reason: HOSPADM

## 2018-10-22 RX ORDER — FLUOROURACIL 50 MG/ML
716 INJECTION, SOLUTION INTRAVENOUS ONCE
Status: COMPLETED | OUTPATIENT
Start: 2018-10-23 | End: 2018-10-23

## 2018-10-22 RX ORDER — ATROPINE SULFATE 0.4 MG/ML
0.25 INJECTION, SOLUTION ENDOTRACHEAL; INTRAMEDULLARY; INTRAMUSCULAR; INTRAVENOUS; SUBCUTANEOUS ONCE
Status: COMPLETED | OUTPATIENT
Start: 2018-10-23 | End: 2018-10-23

## 2018-10-23 ENCOUNTER — HOSPITAL ENCOUNTER (OUTPATIENT)
Dept: INFUSION CENTER | Facility: CLINIC | Age: 53
Discharge: HOME | End: 2018-10-23
Payer: COMMERCIAL

## 2018-10-23 ENCOUNTER — OFFICE VISIT (OUTPATIENT)
Dept: HEMATOLOGY ONCOLOGY | Facility: CLINIC | Age: 53
End: 2018-10-23
Payer: COMMERCIAL

## 2018-10-23 VITALS
WEIGHT: 153 LBS | BODY MASS INDEX: 24.59 KG/M2 | OXYGEN SATURATION: 99 % | HEART RATE: 88 BPM | HEIGHT: 66 IN | SYSTOLIC BLOOD PRESSURE: 128 MMHG | TEMPERATURE: 96.3 F | RESPIRATION RATE: 16 BRPM | DIASTOLIC BLOOD PRESSURE: 80 MMHG

## 2018-10-23 VITALS
DIASTOLIC BLOOD PRESSURE: 62 MMHG | HEIGHT: 66 IN | WEIGHT: 153 LBS | SYSTOLIC BLOOD PRESSURE: 118 MMHG | TEMPERATURE: 97.2 F | BODY MASS INDEX: 24.59 KG/M2 | HEART RATE: 76 BPM | RESPIRATION RATE: 18 BRPM

## 2018-10-23 DIAGNOSIS — C18.2 PRIMARY ADENOCARCINOMA OF ASCENDING COLON (HCC): Primary | ICD-10-CM

## 2018-10-23 DIAGNOSIS — D50.0 IRON DEFICIENCY ANEMIA DUE TO CHRONIC BLOOD LOSS: ICD-10-CM

## 2018-10-23 DIAGNOSIS — C78.6 PERITONEAL METASTASES (HCC): ICD-10-CM

## 2018-10-23 DIAGNOSIS — I82.531 CHRONIC DEEP VEIN THROMBOSIS (DVT) OF POPLITEAL VEIN OF RIGHT LOWER EXTREMITY (HCC): ICD-10-CM

## 2018-10-23 DIAGNOSIS — I82.531 CHRONIC DEEP VEIN THROMBOSIS (DVT) OF POPLITEAL VEIN OF RIGHT LOWER EXTREMITY (HCC): Primary | ICD-10-CM

## 2018-10-23 DIAGNOSIS — C77.2 METASTASIS TO RETROPERITONEAL LYMPH NODE (HCC): ICD-10-CM

## 2018-10-23 DIAGNOSIS — C78.7 LIVER METASTASES (HCC): ICD-10-CM

## 2018-10-23 LAB
DEPRECATED D DIMER PPP: <270 NG/ML (FEU) (ref 0–424)
FERRITIN SERPL-MCNC: 122 NG/ML (ref 8–388)

## 2018-10-23 PROCEDURE — 99214 OFFICE O/P EST MOD 30 MIN: CPT | Performed by: INTERNAL MEDICINE

## 2018-10-23 PROCEDURE — 96375 TX/PRO/DX INJ NEW DRUG ADDON: CPT

## 2018-10-23 PROCEDURE — 96413 CHEMO IV INFUSION 1 HR: CPT

## 2018-10-23 PROCEDURE — 85379 FIBRIN DEGRADATION QUANT: CPT

## 2018-10-23 PROCEDURE — 96411 CHEMO IV PUSH ADDL DRUG: CPT

## 2018-10-23 PROCEDURE — G0498 CHEMO EXTEND IV INFUS W/PUMP: HCPCS

## 2018-10-23 PROCEDURE — 82728 ASSAY OF FERRITIN: CPT

## 2018-10-23 PROCEDURE — 96367 TX/PROPH/DG ADDL SEQ IV INF: CPT

## 2018-10-23 PROCEDURE — 96368 THER/DIAG CONCURRENT INF: CPT

## 2018-10-23 PROCEDURE — 96415 CHEMO IV INFUSION ADDL HR: CPT

## 2018-10-23 PROCEDURE — 96417 CHEMO IV INFUS EACH ADDL SEQ: CPT

## 2018-10-23 RX ADMIN — IRON SUCROSE 100 MG: 20 INJECTION, SOLUTION INTRAVENOUS at 09:30

## 2018-10-23 RX ADMIN — IRINOTECAN HYDROCHLORIDE 320 MG: 100 INJECTION, SOLUTION INTRAVENOUS at 11:59

## 2018-10-23 RX ADMIN — ATROPINE SULFATE 0.25 MG: 0.4 INJECTION INTRAMUSCULAR; INTRAVENOUS; SUBCUTANEOUS at 10:58

## 2018-10-23 RX ADMIN — SODIUM CHLORIDE 20 ML/HR: 0.9 INJECTION, SOLUTION INTRAVENOUS at 09:28

## 2018-10-23 RX ADMIN — FLUOROURACIL 716 MG: 50 INJECTION, SOLUTION INTRAVENOUS at 13:46

## 2018-10-23 RX ADMIN — DEXAMETHASONE SODIUM PHOSPHATE: 10 INJECTION, SOLUTION INTRAMUSCULAR; INTRAVENOUS at 10:31

## 2018-10-23 RX ADMIN — BEVACIZUMAB 350 MG: 400 INJECTION, SOLUTION INTRAVENOUS at 11:08

## 2018-10-23 RX ADMIN — LEUCOVORIN CALCIUM 700 MG: 500 INJECTION, POWDER, LYOPHILIZED, FOR SOLUTION INTRAMUSCULAR; INTRAVENOUS at 11:59

## 2018-10-23 NOTE — PROGRESS NOTES
Pt to clinic for leucovorin, campotar, 5 FU push and pump, avastn and venofer infusions, pt offers no complaints at this time, pt agrees to run avastin over 30n min   Today, aware of next appointment, declines avs

## 2018-10-23 NOTE — LETTER
October 23, 2018     Alonzo Sorensen MD  [address] [facility]  [address]  [address] [place] [address] [facility]    Patient: Madie Cortes   YOB: 1965   Date of Visit: 10/23/2018       Dear Dr Linden Knowles: Thank you for referring Ilana Luna to me for evaluation  Below are my notes for this consultation  If you have questions, please do not hesitate to call me  I look forward to following your patient along with you  Sincerely,        Evie Gold MD        CC: MD Evie Galo MD  10/23/2018  9:03 AM  Sign at close encounter    HPI:   Meryle Ditto here with her   Patient is being treated for   adenocarcinoma of ascending colon with liver metastasis/abdominal metastases and 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  She is on FOLFIRI plus Avastin  History of   DVT in right lower leg and iron deficiency anemia secondary to chronic blood loss  Patient had DVT right lower leg below the knee in May 2016 and that happened after taking hormonal therapy in April 2016  In 2016 she tested positive once for lupus anticoagulant and that was negative on repeat test   Patient is on Xarelto    She stayed on Xarelto until end of September 2016  She had heavy periods in in January 2018 and she was in bed for 3 days and she developed a clot in the left lower leg distally below the knee    On 05/21/2018 patient underwent GALI and BSO and there were cancer cells in the fallopian tubes   In June 2018 patient   underwent extended right hemicolectomy, partial omentectomy and biopsy of the peritoneal nodule   Peritoneal nodule came back  positive for metastatic adenocarcinoma from colon    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 and metastatic disease to liver    6 cm T3 G2 right colon cancer with positive margins, lymphovascular invasion and perineural invasion and positive 3 of 27 lymph nodes with extranodal extension and positive peritoneal nodule biopsy   Stage IV disease because of liver and peritoneal metastases     on 07/16/2018 patient was started on FOLFIRI chemotherapy and to that Avastin was added after 1 cycle  Patient has completed 6 treatments  Patient had PET-CT scan on 10/19/2018  See report  She will have MRI scan of the pelvis  She has anal fissure that hurts and occasionally bleeds slightly  She is being referred to rectal surgeon for medical management and if needed surgery Avastin will be held  No more diarrhea     Patient is receiving intravenous Venofer with chemotherapy for iron deficiency  Less tiredness     Physical examination and test results are as recorded and discussed  Plan is as above  Condition discussed and explained  Questions answered  Also prescribed potassium 10 mg tablet 1 a day for 7 days and she was save the rests for future use  Discussed the importance of self-breast examination, eating healthy foods, staying active and health screening tests  Self care  Goal is prolongation of survival from colon cancer      1  Primary adenocarcinoma [facility] ascending colon ([facility] )     - NM PET CT skull base to mid thigh; Future     2  Liver metastases ([facility] )     - NM PET CT skull base to mid thigh; Future     3  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)        4  Chronic deep vein thrombosis (DVT) of popliteal vein of right lower extremity (HCC)        5  Iron deficiency anemia due to chronic blood loss        6   Hypokalemia         Current Outpatient Prescriptions:     acetaminophen (TYLENOL) 500 mg tablet, Take 1,000 mg by mouth every 6 (six) hours as needed for mild pain, Disp: , Rfl:     lidocaine-prilocaine (EMLA) cream, Apply 1 hour prior to chemo, cover in wrap, Disp: 30 g, Rfl: 0    ondansetron (ZOFRAN) 8 mg tablet, Take 1 tablet (8 mg total) by mouth every 8 (eight) hours as needed for nausea or vomiting, Disp: 20 tablet, Rfl: 1    potassium chloride (MICRO-K) 10 MEQ CR capsule, Take 1 capsule (10 mEq total) by mouth daily Please take for 7 days and save the rest, Disp: 30 capsule, Rfl: 0    XARELTO 20 MG tablet, Take 1 tablet (20 mg total) by mouth daily, Disp: 90 tablet, Rfl: 2  No current facility-administered medications for this visit       Facility-Administered Medications Ordered in Other Visits:     alteplase (CATHFLO) injection 2 mg, 2 mg, Intracatheter, PRN, Christo Tomlin MD    atropine injection 0 25 mg, 0 25 mg, Intravenous, Once, Christo Tomlin MD    bevacizumab (AVASTIN) 350 mg in sodium chloride 0 9 % 100 mL IVPB, 350 mg, Intravenous, Once, Christo Tomlin MD    fluorouracil (ADRUCIL) injection 716 mg, 716 mg, Intravenous, Once, Christo Tomlin MD    heparin lock flush 100 units/mL injection 300 Units, 300 Units, Intracatheter, PRN, Christo Tomlin MD    irinotecan (CAMPTOSAR) 320 mg in sodium chloride 0 9 % 500 mL chemo infusion, 320 mg, Intravenous, Once, Christo Tomlin MD    iron sucrose (VENOFER) 100 mg in sodium chloride 0 9% 100 ml IVPB 100 mg, 100 mg, Intravenous, Once, Christo Tomlin MD    leucovorin 700 mg in sodium chloride 0 9 % 250 mL IVPB, 700 mg, Intravenous, Once, Christo Tomlin MD    ondansetron (ZOFRAN) 16 mg, dexamethasone (DECADRON) 10 mg in sodium chloride 0 9 % 50 mL IVPB, , Intravenous, Once, Christo Tomlin MD    sodium chloride 0 9 % infusion, 20 mL/hr, Intravenous, Continuous, Christo Tomlin MD    No Known Allergies       [facility] )    6/14/2018 Initial Diagnosis     Primary adenocarcinoma of ascending colon ([facility] )         7/17/2018 - 8/1/2018 Chemotherapy     camptosar 180 mg/m2 day 1  5  mg/m2 day 1  5 FU 1200 mg/m2 day 1-2   Leucovorin 400 mg/m2     Venofer 100 mg (first 10 treatments) -- all every 2 weeks             8/1/2018 Adverse Reaction     Needed granix 300 mcg due to [address] of 1 01          8/14/2018 -  Chemotherapy     camptosar 180 mg/m2 day 1  5  mg/m2 day 1  5 FU 1200 mg/m2 day 1-2   Leucovorin 400 mg/m2  Avastin 5 mg/kg day 1   Venofer 100 mg (first 10 treatments)  Neulasta on Pro 6 mg day 3      -- every 2 weeks                 ROS:  10/23/18 Reviewed 13 systems:  Presently no headaches, seizures, dizziness, diplopia, dysphagia, hoarseness, chest pain, palpitations, shortness of breath, cough, hemoptysis, abdominal pain, nausea, vomiting, hematuria, fever, chills,  bone pains, skin rash, weight loss, arthritic symptoms, tiredness ,  weakness, numbness,  claudication and gait problem  No frequent infections  Not unusually sensitive to heat or cold  No swelling of the ankles  No swollen glands  Patient is anxious     No GYN symptoms Other symptoms are in HPI        /80 (BP Location: Left arm, Cuff Size: Standard)   Pulse 88   Temp (!) 96 3 °F (35 7 °C) (Tympanic)   Resp 16   Ht 5' 5 5" (1 664 m)   Wt 69 4 kg (153 lb)   LMP 05/16/2018 (Exact Date)   SpO2 99%   BMI 25 07 kg/m²      Physical Exam:  Alert, oriented, not in distress, no icterus, no oral thrush, no palpable neck mass, clear lung fields, regular heart rate, abdomen  soft and non tender, no palpable abdominal mass, no ascites, no edema of ankles, no calf tenderness, no focal neurological deficit, no skin rash, no palpable lymphadenopathy in the neck and axillary areas, good arterial pulses, no clubbing  Patient is anxious  Performance status 1  IMAGING:  IMPRESSION:     1  Patchy heterogeneous FDG uptake seen in a few of the known hepatic masses compatible with residual disease  2  A few foci of FDG uptake noted posterior to the bladder in the region of the uterine bed/vaginal cuff  Findings may represent metastatic disease given the history    Dedicated pelvic MRI may be helpful for further evaluation      The study was marked in [facility] for significant notification      Workstation performed: [id number]      Imaging     NM PET CT skull base to mid thigh (Order #[id number]) on 10/19/2018 -       LABS:  Results for orders placed or performed in visit on 10/22/18   UA (URINE) with reflex to Microscopic   Result Value Ref Range    Color, UA Yellow     Clarity, UA Clear     Specific Joiner, UA 1 010 1 003 - 1 030    pH, UA 6 0 4 5 - 8 0    Leukocytes, UA Negative Negative    Nitrite, UA Negative Negative    Protein, UA Negative Negative mg/dl    Glucose, UA Negative Negative mg/dl    Ketones, UA Negative Negative mg/dl    Urobilinogen, UA 0 2 0 2, 1 0 E U /dl E U /dl    Bilirubin, UA Negative Negative    Blood, UA Negative Negative     Labs, Imaging, & Other studies:   All pertinent labs and imaging studies were personally reviewed    Lab Results   Component Value Date     10/22/2018    K 3 8 10/22/2018     10/22/2018    CO2 24 10/22/2018    ANIONGAP 9 03/18/2015    BUN 8 10/22/2018    CREATININE 0 96 10/22/2018    GLUCOSE 100 03/18/2015    GLUF 69 10/22/2018    CALCIUM 8 5 10/22/2018    AST 18 10/22/2018    ALT 50 10/22/2018    ALKPHOS 107 10/22/2018    PROT 6 6 03/18/2015    BILITOT 0 65 03/18/2015    EGFR 69 10/22/2018       Reviewed and discussed with patient  Assessment and plan:  Eugene Mai here with her   Patient is being treated for   adenocarcinoma of ascending colon with liver metastasis/abdominal metastases and 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  She is on FOLFIRI plus Avastin  History of   DVT in right lower leg and iron deficiency anemia secondary to chronic blood loss  Patient had DVT right lower leg below the knee in May 2016 and that happened after taking hormonal therapy in April 2016  In 2016 she tested positive once for lupus anticoagulant and that was negative on repeat test   Patient is on Xarelto    She stayed on Xarelto until end of September 2016   She had heavy periods in in January 2018 and she was in bed for 3 days and she developed a clot in the left lower leg distally below the knee    On 05/21/2018 patient underwent GALI and BSO and there were cancer cells in the fallopian tubes   In June 2018 patient   underwent extended right hemicolectomy, partial omentectomy and biopsy of the peritoneal nodule   Peritoneal nodule came back  positive for metastatic adenocarcinoma from colon    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 and metastatic disease to liver    6 cm T3 G2 right colon cancer with positive margins, lymphovascular invasion and perineural invasion and positive 3 of 27 lymph nodes with extranodal extension and positive peritoneal nodule biopsy   Stage IV disease because of liver and peritoneal metastases     on 07/16/2018 patient was started on FOLFIRI chemotherapy and to that Avastin was added after 1 cycle  Patient has completed 6 treatments  Patient had PET-CT scan on 10/19/2018  See report  She will have MRI scan of the pelvis  She has anal fissure that hurts and occasionally bleeds slightly  She is being referred to rectal surgeon for medical management and if needed surgery Avastin will be held  No more diarrhea     Patient is receiving intravenous Venofer with chemotherapy for iron deficiency  Less tiredness     Physical examination and test results are as recorded and discussed in detail especially PET-CT scan report  Plan is as above, no change  Condition discussed and explained  Questions answered  Discussed the importance of self-breast examination, eating healthy foods, staying active and health screening tests  Self care  Goal is prolongation of survival from colon cancer  I sent a text message to patient's rectal surgeon to evaluate the patient and advise on anal fissure  His office will call the patient with appointment   1  Primary adenocarcinoma of ascending colon ([address] )      2  Chronic deep vein thrombosis (DVT) of popliteal vein of right lower extremity (HCC)      3  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

## 2018-10-23 NOTE — PROGRESS NOTES
HPI:   Lynn Reyes here with her   Patient is being treated for   adenocarcinoma of ascending colon with liver metastasis/abdominal metastases and 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  She is on FOLFIRI plus Avastin  History of   DVT in right lower leg and iron deficiency anemia secondary to chronic blood loss  Patient had DVT right lower leg below the knee in May 2016 and that happened after taking hormonal therapy in April 2016  In 2016 she tested positive once for lupus anticoagulant and that was negative on repeat test   Patient is on Xarelto    She stayed on Xarelto until end of September 2016  She had heavy periods in in January 2018 and she was in bed for 3 days and she developed a clot in the left lower leg distally below the knee    On 05/21/2018 patient underwent GAIL and BSO and there were cancer cells in the fallopian tubes   In June 2018 patient   underwent extended right hemicolectomy, partial omentectomy and biopsy of the peritoneal nodule   Peritoneal nodule came back  positive for metastatic adenocarcinoma from colon    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 and metastatic disease to liver    6 cm T3 G2 right colon cancer with positive margins, lymphovascular invasion and perineural invasion and positive 3 of 27 lymph nodes with extranodal extension and positive peritoneal nodule biopsy   Stage IV disease because of liver and peritoneal metastases     on 07/16/2018 patient was started on FOLFIRI chemotherapy and to that Avastin was added after 1 cycle  Patient has completed 6 treatments  Patient had PET-CT scan on 10/19/2018  See report  She will have MRI scan of the pelvis  She has anal fissure that hurts and occasionally bleeds slightly  She is being referred to rectal surgeon for medical management and if needed surgery Avastin will be held  No more diarrhea     Patient is receiving intravenous Venofer with chemotherapy for iron deficiency    Less tiredness     Physical examination and test results are as recorded and discussed  Plan is as above  Condition discussed and explained  Questions answered  Also prescribed potassium 10 mg tablet 1 a day for 7 days and she was save the rests for future use  Discussed the importance of self-breast examination, eating healthy foods, staying active and health screening tests  Self care  Goal is prolongation of survival from colon cancer      1  Primary adenocarcinoma of ascending colon ([facility] )     - NM PET CT skull base to mid thigh; Future     2  [facility]     - NM PET CT skull base to mid thigh; Future     3  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)        4  Chronic deep vein thrombosis (DVT) of popliteal vein of right lower extremity (HCC)        5  Iron deficiency anemia due to chronic blood loss        6  Hypokalemia         Current Outpatient Prescriptions:     acetaminophen (TYLENOL) 500 mg tablet, Take 1,000 mg by mouth every 6 (six) hours as needed for mild pain, Disp: , Rfl:     lidocaine-prilocaine (EMLA) cream, Apply 1 hour prior to chemo, cover in wrap, Disp: 30 g, Rfl: 0    ondansetron (ZOFRAN) 8 mg tablet, Take 1 tablet (8 mg total) by mouth every 8 (eight) hours as needed for nausea or vomiting, Disp: 20 tablet, Rfl: 1    potassium chloride (MICRO-K) 10 MEQ CR capsule, Take 1 capsule (10 mEq total) by mouth daily Please take for 7 days and save the rest, Disp: 30 capsule, Rfl: 0    XARELTO 20 MG tablet, Take 1 tablet (20 mg total) by mouth daily, Disp: 90 tablet, Rfl: 2  No current facility-administered medications for this visit       Facility-Administered Medications Ordered in Other Visits:     alteplase (CATHFLO) injection 2 mg, 2 mg, Intracatheter, PRN, Sohail Negro MD    atropine injection 0 25 mg, 0 25 mg, Intravenous, Once, Sohail Negro MD    bevacizumab (AVASTIN) 350 mg in sodium chloride 0 9 % 100 mL IVPB, 350 mg, Intravenous, Once, James Proothi, MD    fluorouracil (ADRUCIL) injection 716 mg, 716 mg, Intravenous, Once, Everton Watt MD    heparin lock flush 100 units/mL injection 300 Units, 300 Units, Intracatheter, PRN, Everton Watt MD    irinotecan (CAMPTOSAR) 320 mg in sodium chloride 0 9 % 500 mL chemo infusion, 320 mg, Intravenous, Once, Everton Watt MD    iron sucrose (VENOFER) 100 mg in sodium chloride 0 9% 100 ml IVPB 100 mg, 100 mg, Intravenous, Once, Everton Watt MD    leucovorin 700 mg in sodium chloride 0 9 % 250 mL IVPB, 700 mg, Intravenous, Once, Everton Watt MD    ondansetron (ZOFRAN) 16 mg, dexamethasone (DECADRON) 10 mg in sodium chloride 0 9 % 50 mL IVPB, , Intravenous, Once, Everton Watt MD    sodium chloride 0 9 % infusion, 20 mL/hr, Intravenous, Continuous, Everton Watt MD    No Known Allergies       Primary adenocarcinoma of ascending colon (HCC)    6/14/2018 Initial Diagnosis     [facility]         7/17/2018 - 8/1/2018 Chemotherapy     camptosar 180 mg/m2 day 1  5  mg/m2 day 1  5 FU 1200 mg/m2 day 1-2   Leucovorin 400 mg/m2     Venofer 100 mg (first 10 treatments) -- all every 2 weeks             8/1/2018 Adverse Reaction     Needed granix 300 mcg due to [address] of 1 01          8/14/2018 -  Chemotherapy     camptosar 180 mg/m2 day 1  5  mg/m2 day 1  5 FU 1200 mg/m2 day 1-2   Leucovorin 400 mg/m2  Avastin 5 mg/kg day 1   Venofer 100 mg (first 10 treatments)  Neulasta on Pro 6 mg day 3      -- every 2 weeks                 ROS:  10/23/18 Reviewed 13 systems:  Presently no headaches, seizures, dizziness, diplopia, dysphagia, hoarseness, chest pain, palpitations, shortness of breath, cough, hemoptysis, abdominal pain, nausea, vomiting, hematuria, fever, chills,  bone pains, skin rash, weight loss, arthritic symptoms, tiredness ,  weakness, numbness,  claudication and gait problem  No frequent infections  Not unusually sensitive to heat or cold   No swelling of the ankles  No swollen glands  Patient is anxious     No GYN symptoms Other symptoms are in HPI        /80 (BP Location: Left arm, Cuff Size: Standard)   Pulse 88   Temp (!) 96 3 °F (35 7 °C) (Tympanic)   Resp 16   Ht 5' 5 5" (1 664 m)   Wt 69 4 kg (153 lb)   LMP 05/16/2018 (Exact Date)   SpO2 99%   BMI 25 07 kg/m²     Physical Exam:  Alert, oriented, not in distress, no icterus, no oral thrush, no palpable neck mass, clear lung fields, regular heart rate, abdomen  soft and non tender, no palpable abdominal mass, no ascites, no edema of ankles, no calf tenderness, no focal neurological deficit, no skin rash, no palpable lymphadenopathy in the neck and axillary areas, good arterial pulses, no clubbing  Patient is anxious  Performance status 1  IMAGING:  IMPRESSION:     1  Patchy heterogeneous FDG uptake seen in a few of the known hepatic masses compatible with residual disease  2  A few foci of FDG uptake noted posterior to the bladder in the region of the uterine bed/vaginal cuff  Findings may represent metastatic disease given the history    Dedicated pelvic MRI may be helpful for further evaluation      The study was marked in EPIC for significant notification      Workstation performed: [id number]      Imaging     NM PET CT skull base to mid thigh (Order #[id number]) on 10/19/2018 -       LABS:  Results for orders placed or performed in visit on 10/22/18   UA (URINE) with reflex to Microscopic   Result Value Ref Range    Color, UA Yellow     Clarity, UA Clear     Specific [place], UA 1 010 1 003 - 1 030    pH, UA 6 0 4 5 - 8 0    Leukocytes, UA Negative Negative    Nitrite, UA Negative Negative    Protein, UA Negative Negative mg/dl    Glucose, UA Negative Negative mg/dl    Ketones, UA Negative Negative mg/dl    Urobilinogen, UA 0 2 0 2, 1 0 E U /dl E U /dl    Bilirubin, UA Negative Negative    Blood, UA Negative Negative     Labs, Imaging, & Other studies:   All pertinent labs and imaging studies were personally reviewed    Lab Results   Component Value Date     10/22/2018    K 3 8 10/22/2018     10/22/2018    CO2 24 10/22/2018    ANIONGAP 9 03/18/2015    BUN 8 10/22/2018    CREATININE 0 96 10/22/2018    GLUCOSE 100 03/18/2015    GLUF 69 10/22/2018    CALCIUM 8 5 10/22/2018    AST 18 10/22/2018    ALT 50 10/22/2018    ALKPHOS 107 10/22/2018    PROT 6 6 03/18/2015    BILITOT 0 65 03/18/2015    EGFR 69 10/22/2018       Reviewed and discussed with patient  Assessment and plan:  Meryle Ditto here with her   Patient is being treated for   adenocarcinoma of ascending colon with liver metastasis/abdominal metastases and 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  She is on FOLFIRI plus Avastin  History of   DVT in right lower leg and iron deficiency anemia secondary to chronic blood loss  Patient had DVT right lower leg below the knee in May 2016 and that happened after taking hormonal therapy in April 2016  In 2016 she tested positive once for lupus anticoagulant and that was negative on repeat test   Patient is on Xarelto    She stayed on Xarelto until end of September 2016   She had heavy periods in in January 2018 and she was in bed for 3 days and she developed a clot in the left lower leg distally below the knee    On 05/21/2018 patient underwent GALI and BSO and there were cancer cells in the fallopian tubes   In June 2018 patient   underwent extended right hemicolectomy, partial omentectomy and biopsy of the peritoneal nodule   Peritoneal nodule came back  positive for metastatic adenocarcinoma from colon    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 and metastatic disease to liver    6 cm T3 G2 right colon cancer with positive margins, lymphovascular invasion and perineural invasion and positive 3 of 27 lymph nodes with extranodal extension and positive peritoneal nodule biopsy   Stage IV disease because of liver and peritoneal metastases     on 07/16/2018 patient was started on FOLFIRI chemotherapy and to that Avastin was added after 1 cycle  Patient has completed 6 treatments  Patient had PET-CT scan on 10/19/2018  See report  She will have MRI scan of the pelvis  She has anal fissure that hurts and occasionally bleeds slightly  She is being referred to rectal surgeon for medical management and if needed surgery Avastin will be held  No more diarrhea     Patient is receiving intravenous Venofer with chemotherapy for iron deficiency  Less tiredness     Physical examination and test results are as recorded and discussed in detail especially PET-CT scan report  Plan is as above, no change  Condition discussed and explained  Questions answered  Discussed the importance of self-breast examination, eating healthy foods, staying active and health screening tests  Self care  Goal is prolongation of survival from colon cancer  I sent a text message to patient's rectal surgeon to evaluate the patient and advise on anal fissure  His office will call the patient with appointment   1  Primary adenocarcinoma of ascending colon ([facility] [address] )      2  Chronic deep vein thrombosis (DVT) of popliteal vein of right lower extremity (HCC)      3  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 )      4  Liver metastases ([facility] )      5  Iron deficiency anemia due to chronic blood loss      6  Peritoneal metastases ([facility] )      Addendum:  Checking ferritin  Checking D-dimer and may order venous Doppler study to see if Xarelto dose could be lowered to 10 mg daily  Discussed with patient and her   Patient voiced understanding and agreement in the discussion  Counseling / Coordination of Care   Greater than 50% of total time was spent with the patient and / or family counseling and / or coordination of care

## 2018-10-25 ENCOUNTER — HOSPITAL ENCOUNTER (OUTPATIENT)
Dept: INFUSION CENTER | Facility: CLINIC | Age: 53
Discharge: HOME | End: 2018-10-25
Payer: COMMERCIAL

## 2018-10-25 PROCEDURE — 96372 THER/PROPH/DIAG INJ SC/IM: CPT

## 2018-10-25 RX ADMIN — PEGFILGRASTIM 6 MG: KIT SUBCUTANEOUS at 12:05

## 2018-10-25 RX ADMIN — HEPARIN 300 UNITS: 100 SYRINGE at 11:59

## 2018-10-25 NOTE — PROGRESS NOTES
Patient here for CADD d/c and tolerated procedure well  She reports fatigue but no other c/o offered  Neulasta onpro placed to ANGE as ordered for injection tomorrow 1505  Patient is familiar with injection and removal of onpro  She was discharged after Neulasta activated and will RTO 11/6/18 for next cycle

## 2018-10-29 ENCOUNTER — HOSPITAL ENCOUNTER (OUTPATIENT)
Dept: ULTRASOUND IMAGING | Facility: HOSPITAL | Age: 53
Discharge: HOME | End: 2018-10-29
Attending: INTERNAL MEDICINE
Payer: COMMERCIAL

## 2018-10-29 DIAGNOSIS — I82.531 CHRONIC DEEP VEIN THROMBOSIS (DVT) OF POPLITEAL VEIN OF RIGHT LOWER EXTREMITY (HCC): ICD-10-CM

## 2018-10-29 PROCEDURE — 93970 EXTREMITY STUDY: CPT

## 2018-10-29 PROCEDURE — 93970 EXTREMITY STUDY: CPT | Performed by: SURGERY

## 2018-11-01 ENCOUNTER — HOSPITAL ENCOUNTER (OUTPATIENT)
Dept: MRI IMAGING | Facility: HOSPITAL | Age: 53
Discharge: HOME | End: 2018-11-01
Attending: INTERNAL MEDICINE
Payer: COMMERCIAL

## 2018-11-01 DIAGNOSIS — C18.2 PRIMARY ADENOCARCINOMA OF ASCENDING COLON (HCC): ICD-10-CM

## 2018-11-01 DIAGNOSIS — R93.5 ABNORMAL FINDINGS ON DIAGNOSTIC IMAGING OF OTHER ABDOMINAL REGIONS, INCLUDING RETROPERITONEUM: ICD-10-CM

## 2018-11-01 DIAGNOSIS — R93.5 ABNORMAL FINDINGS ON DIAGNOSTIC IMAGING OF ABDOMEN: ICD-10-CM

## 2018-11-01 DIAGNOSIS — C78.7 LIVER METASTASES (HCC): ICD-10-CM

## 2018-11-01 PROCEDURE — 72197 MRI PELVIS W/O & W/DYE: CPT

## 2018-11-01 PROCEDURE — A9585 GADOBUTROL INJECTION: HCPCS | Performed by: INTERNAL MEDICINE

## 2018-11-01 RX ADMIN — GADOBUTROL 7 ML: 604.72 INJECTION INTRAVENOUS at 19:39

## 2018-11-04 ENCOUNTER — HOSPITAL ENCOUNTER (OUTPATIENT)
Dept: MRI IMAGING | Facility: HOSPITAL | Age: 53
Discharge: HOME | End: 2018-11-04
Payer: COMMERCIAL

## 2018-11-04 PROCEDURE — 74183 MRI ABD W/O CNTR FLWD CNTR: CPT

## 2018-11-04 PROCEDURE — A9581 GADOXETATE DISODIUM INJ: HCPCS | Performed by: INTERNAL MEDICINE

## 2018-11-04 RX ADMIN — GADOXETATE DISODIUM 10 ML: 181.43 INJECTION, SOLUTION INTRAVENOUS at 17:45

## 2018-11-05 ENCOUNTER — APPOINTMENT (OUTPATIENT)
Dept: LAB | Facility: MEDICAL CENTER | Age: 53
End: 2018-11-05
Payer: COMMERCIAL

## 2018-11-05 ENCOUNTER — DOCUMENTATION (OUTPATIENT)
Dept: HEMATOLOGY ONCOLOGY | Facility: CLINIC | Age: 53
End: 2018-11-05

## 2018-11-05 DIAGNOSIS — C18.2 PRIMARY ADENOCARCINOMA OF ASCENDING COLON (HCC): ICD-10-CM

## 2018-11-05 DIAGNOSIS — C79.9 ADENOCARCINOMA, METASTATIC (HCC): ICD-10-CM

## 2018-11-05 DIAGNOSIS — C77.2 METASTASIS TO RETROPERITONEAL LYMPH NODE (HCC): ICD-10-CM

## 2018-11-05 DIAGNOSIS — C78.7 LIVER METASTASES (HCC): ICD-10-CM

## 2018-11-05 DIAGNOSIS — C78.6 PERITONEAL METASTASES (HCC): ICD-10-CM

## 2018-11-05 LAB
BILIRUB UR QL STRIP: NEGATIVE
CLARITY UR: NORMAL
COLOR UR: YELLOW
GLUCOSE UR STRIP-MCNC: NEGATIVE MG/DL
HGB UR QL STRIP.AUTO: NEGATIVE
KETONES UR STRIP-MCNC: NEGATIVE MG/DL
LEUKOCYTE ESTERASE UR QL STRIP: NEGATIVE
NITRITE UR QL STRIP: NEGATIVE
PH UR STRIP.AUTO: 6 [PH] (ref 4.5–8)
PROT UR STRIP-MCNC: NEGATIVE MG/DL
SP GR UR STRIP.AUTO: 1.01 (ref 1–1.03)
UROBILINOGEN UR QL STRIP.AUTO: 0.2 E.U./DL

## 2018-11-05 PROCEDURE — 81003 URINALYSIS AUTO W/O SCOPE: CPT

## 2018-11-05 RX ORDER — ATROPINE SULFATE 1 MG/ML
0.25 INJECTION, SOLUTION INTRAMUSCULAR; INTRAVENOUS; SUBCUTANEOUS ONCE
Status: DISCONTINUED | OUTPATIENT
Start: 2018-11-06 | End: 2018-11-05

## 2018-11-05 RX ORDER — ATROPINE SULFATE 1 MG/ML
0.25 INJECTION, SOLUTION INTRAMUSCULAR; INTRAVENOUS; SUBCUTANEOUS ONCE
Status: COMPLETED | OUTPATIENT
Start: 2018-11-06 | End: 2018-11-06

## 2018-11-05 RX ORDER — SODIUM CHLORIDE 9 MG/ML
20 INJECTION, SOLUTION INTRAVENOUS CONTINUOUS
Status: DISCONTINUED | OUTPATIENT
Start: 2018-11-06 | End: 2018-11-09 | Stop reason: HOSPADM

## 2018-11-05 RX ORDER — FLUOROURACIL 50 MG/ML
720 INJECTION, SOLUTION INTRAVENOUS ONCE
Status: COMPLETED | OUTPATIENT
Start: 2018-11-06 | End: 2018-11-06

## 2018-11-05 NOTE — PROGRESS NOTES
GI Oncology Nurse Navigator Note    Received a call from Kerline Lowe, she said she got a bill from her insurance company for a neulasta injection because there was some information missing, called her back, there was no answer, left her a message and asked her to bring in the bill so I could copy it and take it to our financial counselors to look at it and they can help figure out what needs to be done for it

## 2018-11-06 ENCOUNTER — TELEPHONE (OUTPATIENT)
Dept: HEMATOLOGY ONCOLOGY | Facility: CLINIC | Age: 53
End: 2018-11-06

## 2018-11-06 ENCOUNTER — HOSPITAL ENCOUNTER (OUTPATIENT)
Dept: INFUSION CENTER | Facility: CLINIC | Age: 53
Discharge: HOME | End: 2018-11-06
Payer: COMMERCIAL

## 2018-11-06 VITALS
TEMPERATURE: 97.9 F | OXYGEN SATURATION: 98 % | RESPIRATION RATE: 18 BRPM | BODY MASS INDEX: 24.59 KG/M2 | DIASTOLIC BLOOD PRESSURE: 74 MMHG | HEART RATE: 79 BPM | SYSTOLIC BLOOD PRESSURE: 124 MMHG | HEIGHT: 66 IN | WEIGHT: 153 LBS

## 2018-11-06 PROCEDURE — 96375 TX/PRO/DX INJ NEW DRUG ADDON: CPT

## 2018-11-06 PROCEDURE — G0498 CHEMO EXTEND IV INFUS W/PUMP: HCPCS

## 2018-11-06 PROCEDURE — 96415 CHEMO IV INFUSION ADDL HR: CPT

## 2018-11-06 PROCEDURE — 96367 TX/PROPH/DG ADDL SEQ IV INF: CPT

## 2018-11-06 PROCEDURE — 96417 CHEMO IV INFUS EACH ADDL SEQ: CPT

## 2018-11-06 PROCEDURE — 96368 THER/DIAG CONCURRENT INF: CPT

## 2018-11-06 PROCEDURE — 96413 CHEMO IV INFUSION 1 HR: CPT

## 2018-11-06 PROCEDURE — 96411 CHEMO IV PUSH ADDL DRUG: CPT

## 2018-11-06 RX ADMIN — ATROPINE SULFATE 0.25 MG: 1 INJECTION, SOLUTION INTRAMUSCULAR; INTRAVENOUS; SUBCUTANEOUS at 10:22

## 2018-11-06 RX ADMIN — DEXAMETHASONE SODIUM PHOSPHATE: 10 INJECTION, SOLUTION INTRAMUSCULAR; INTRAVENOUS at 09:56

## 2018-11-06 RX ADMIN — IRON SUCROSE 100 MG: 20 INJECTION, SOLUTION INTRAVENOUS at 08:58

## 2018-11-06 RX ADMIN — LEUCOVORIN CALCIUM 700 MG: 200 INJECTION, POWDER, LYOPHILIZED, FOR SOLUTION INTRAMUSCULAR; INTRAVENOUS at 11:16

## 2018-11-06 RX ADMIN — SODIUM CHLORIDE 20 ML/HR: 0.9 INJECTION, SOLUTION INTRAVENOUS at 09:01

## 2018-11-06 RX ADMIN — BEVACIZUMAB 354 MG: 400 INJECTION, SOLUTION INTRAVENOUS at 10:27

## 2018-11-06 RX ADMIN — IRINOTECAN HYDROCHLORIDE 320 MG: 100 INJECTION, SOLUTION INTRAVENOUS at 11:09

## 2018-11-06 RX ADMIN — FLUOROURACIL 720 MG: 50 INJECTION, SOLUTION INTRAVENOUS at 12:55

## 2018-11-06 NOTE — TELEPHONE ENCOUNTER
Hi,  I will forward this to the oncology finance group  I did not send anything  I may have been Cori Armenta

## 2018-11-06 NOTE — TELEPHONE ENCOUNTER
I don't have anything for this patient   Perhaps send an e-mail to the oncology finance e-mail so everyone can see it and respond

## 2018-11-06 NOTE — TELEPHONE ENCOUNTER
Please call Dalia at [employer]  They received a fax but only got half the fax  Dalia was just looking to see what medication was needed either Novaste or Avastin   Please call her at [phone]

## 2018-11-06 NOTE — PROGRESS NOTES
Pt to clinic for venofer, avastin, leucovorin, irinotecan, 5 fu push and pump, pt offers no complaints at this time, aware of next appointment, declines avs

## 2018-11-08 ENCOUNTER — HOSPITAL ENCOUNTER (OUTPATIENT)
Dept: INFUSION CENTER | Facility: CLINIC | Age: 53
Discharge: HOME | End: 2018-11-08
Payer: COMMERCIAL

## 2018-11-08 PROCEDURE — 96372 THER/PROPH/DIAG INJ SC/IM: CPT

## 2018-11-08 RX ADMIN — HEPARIN 300 UNITS: 100 SYRINGE at 11:20

## 2018-11-08 RX ADMIN — PEGFILGRASTIM 6 MG: KIT SUBCUTANEOUS at 11:20

## 2018-11-08 NOTE — PROGRESS NOTES
Pt tolerated cadd pump disconnection well, neulasta onpro applied to right arm and patent  Pt aware of d/c time

## 2018-11-09 ENCOUNTER — TELEPHONE (OUTPATIENT)
Dept: HEMATOLOGY ONCOLOGY | Facility: CLINIC | Age: 53
End: 2018-11-09

## 2018-11-09 ENCOUNTER — ANNUAL EXAM (OUTPATIENT)
Dept: OBGYN CLINIC | Facility: MEDICAL CENTER | Age: 53
End: 2018-11-09
Payer: COMMERCIAL

## 2018-11-09 ENCOUNTER — DOCUMENTATION (OUTPATIENT)
Dept: HEMATOLOGY ONCOLOGY | Facility: CLINIC | Age: 53
End: 2018-11-09

## 2018-11-09 VITALS
BODY MASS INDEX: 25.02 KG/M2 | DIASTOLIC BLOOD PRESSURE: 72 MMHG | WEIGHT: 150.2 LBS | SYSTOLIC BLOOD PRESSURE: 130 MMHG | HEIGHT: 65 IN

## 2018-11-09 DIAGNOSIS — Z12.39 SCREENING FOR MALIGNANT NEOPLASM OF BREAST: ICD-10-CM

## 2018-11-09 DIAGNOSIS — R92.2 DENSE BREAST: ICD-10-CM

## 2018-11-09 DIAGNOSIS — Z01.419 ENCOUNTER FOR GYNECOLOGICAL EXAMINATION WITHOUT ABNORMAL FINDING: Primary | ICD-10-CM

## 2018-11-09 PROCEDURE — S0612 ANNUAL GYNECOLOGICAL EXAMINA: HCPCS | Performed by: OBSTETRICS & GYNECOLOGY

## 2018-11-09 RX ORDER — INFLUENZA A VIRUS A/SINGAPORE/GP1908/2015 IVR-180A (H1N1) ANTIGEN (PROPIOLACTONE INACTIVATED), INFLUENZA A VIRUS A/HONG KONG/4801/2014 X-263B (H3N2) ANTIGEN (PROPIOLACTONE INACTIVATED), INFLUENZA B VIRUS B/BRISBANE/46/2015 ANTIGEN (PROPIOLACTONE INACTIVATED), AND INFLUENZA B VIRUS B/PHUKET/3073/2013 BVR-1B ANTIGEN (PROPIOLACTONE INACTIVATED) 15; 15; 15; 15 UG/.5ML; UG/.5ML; UG/.5ML; UG/.5ML
INJECTION, SUSPENSION INTRAMUSCULAR
Refills: 0 | COMMUNITY
Start: 2018-11-02 | End: 2019-05-22 | Stop reason: ALTCHOICE

## 2018-11-09 NOTE — PROGRESS NOTES
GI Oncology Nurse Navigator Note    Received a voicemail from [facility], she said she her hands were red, very swollen and she could barely bend her fingers and it hurt when she did bend them, she said it was painful when she touched hot water, called her back and she denied any fevers, denied any peeling to her hands or feet, asked her if the bottom of her feet were red, she said they were slightly red on her heels, she stated her feet did not hurt, informed Julio Cesar Moser RN of this, she stated they would make an office visit appointment for her, Carmen and I discussed hand/foot syndrome from the 5FU and I emailed her information from [facility] on this as well, discussed elevating her hands and feet to try to decrease the swelling, using moisturizers (such as udderly smooth cream) and apply often to her hands and feet, wearing comfortable footwear, not to pop blisters if they were to form, using cool compresses but not longer than 15 minutes at a time, also told her if the pain becomes too bad she could use some Tylenol but very sparingly as that can mask a fever so check temperature first and only use if necessary, if she would start to run a fever go to ED, if anything else would come up to call the office as there is a physician on call 24/7, she verbalized understanding, instructed her to call with any questions or concerns

## 2018-11-09 NOTE — PROGRESS NOTES
Assessment/Plan:    No problem-specific Assessment & Plan notes found for this encounter  Diagnoses and all orders for this visit:    Screening for malignant neoplasm of breast  -     Mammo screening bilateral w 3d & cad; Future    Dense breast  -     Mammo screening bilateral w 3d & cad; Future    Encounter for gynecological examination without abnormal finding    Other orders  -     AFLURIA QUADRIVALENT 0 5 ML LEATHA; TO BE ADMINISTERED BY PHARMACIST FOR IMMUNIZATION        Annual examination was completed  We discussed performance of mammogram   Patient did have some concerns relative to her Port-A-Cath and the ability to have this performed  I suggested that she proceed with scheduling and the mammography Center can typically work around her port  Patient did inquire regarding the results of her MRI of the abdomen and pelvis that had been performed in October  We did take the opportunity to review those findings that demonstrated her metastatic disease of the liver to be slightly decreased in size however, I did point out to the patient that there remains a soft tissue mass in the anterior rectum low in the pelvis that is unchanged in size since initial CT in June  Patient does have a follow-up scheduled with Medical Oncology at the beginning of December and will discuss these findings further with them  The patient to return to the office in 1 year or as necessary  Subjective:      Patient ID: Rosalie Dang is a 48 y o  female  Patient returns for annual gyn visit  Patient has now completed 8 cycles of chemotherapy for her stage IV colon cancer  Her only complaint is that of fatigue  She has no vaginal bleeding  She has occasional constipation and has been bothered by a rectal fissure  Patient is doing well emotionally and has good support        Gynecologic Exam         The following portions of the patient's history were reviewed and updated as appropriate:   She  has a past medical history of Cancer [facility]); DVT (deep venous thrombosis) ([facility] ); Kidney disease; Menorrhalgia; Migraine; and Postcoital bleeding  She   Patient Active Problem List    Diagnosis Date Noted    Screening for malignant neoplasm of breast 11/09/2018    Dense breast 11/09/2018    Encounter for gynecological examination without abnormal finding 11/09/2018    Hypokalemia 09/18/2018    Iron deficiency anemia due to chronic blood loss 07/10/2018    [facility] 06[facility]26/2018    Liver metastases [facility] 06/26/2018    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[facility] 06/26/2018    Peritoneal metastases [facility] 06/26/2018    Adenocarcinoma, metastatic ([facility] ) 06/14/2018    Abnormal findings on diagnostic imaging of other abdominal regions, including retroperitoneum 06/12/2018    Deep vein thrombosis (DVT) of popliteal vein of right lower extremity ([facility] 05/07/2018    Dysmenorrhea 04/18/2018     She  has a past surgical history that includes Dilation and curettage of uterus; Endometrial biopsy; Endometrial ablation; pr lap,vag hyst,uterus 250gms/<,salp-ooph (Bilateral, 5/21/2018); pr colonoscopy flx dx w/collj spec when pfrmd (N/A, 6/13/2018); pr esophagogastroduodenoscopy transoral diagnostic (N/A, 6/13/2018); pr lap,surg,colectomy, partial, w/anast (Right, 6/14/2018); pr lap,diagnostic abdomen (N/A, 6/14/2018); Abdominal adhesion surgery (N/A, 6/14/2018); Omentectomy (Right, 6/14/2018); and pr insert picc w/ sub-q port (Right, 6/28/2018)  Her family history includes Anemia in her father; Arthritis in her father; Heart disease in her family and mother; Migraines in her daughter and mother; Other in her father  She  reports that she has never smoked  She has never used smokeless tobacco  She reports that she does not drink alcohol or use drugs    Current Outpatient Prescriptions   Medication Sig Dispense Refill    acetaminophen (TYLENOL) 500 mg tablet Take 1,000 mg by mouth every 6 (six) hours as needed for mild pain      AFLURIA QUADRIVALENT 0 5 ML LEATHA TO BE ADMINISTERED BY PHARMACIST FOR IMMUNIZATION  0    lidocaine-prilocaine (EMLA) cream Apply 1 hour prior to chemo, cover in wrap 30 g 0    nifedipine 0 2 % OINT Apply topically every 4 (four) hours 0 3% ointment as typical(disregard 0 2% as auto order)  1 application to anal opening 4-6 times daily 1 Tube 0    XARELTO 20 MG tablet Take 1 tablet (20 mg total) by mouth daily 90 tablet 2     No current facility-administered medications for this visit        Facility-Administered Medications Ordered in Other Visits   Medication Dose Route Frequency Provider Last Rate Last Dose    alteplase (CATHFLO) injection 2 mg  2 mg Intracatheter PRN Betty Taylor MD        heparin lock flush 100 units/mL injection 300 Units  300 Units Intracatheter PRN Betty Taylor MD   300 Units at 11/08/18 1120     Current Outpatient Prescriptions on File Prior to Visit   Medication Sig    acetaminophen (TYLENOL) 500 mg tablet Take 1,000 mg by mouth every 6 (six) hours as needed for mild pain    lidocaine-prilocaine (EMLA) cream Apply 1 hour prior to chemo, cover in wrap    nifedipine 0 2 % OINT Apply topically every 4 (four) hours 0 3% ointment as typical(disregard 0 2% as auto order)  1 application to anal opening 4-6 times daily    XARELTO 20 MG tablet Take 1 tablet (20 mg total) by mouth daily    [DISCONTINUED] ondansetron (ZOFRAN) 8 mg tablet Take 1 tablet (8 mg total) by mouth every 8 (eight) hours as needed for nausea or vomiting    [DISCONTINUED] potassium chloride (MICRO-K) 10 MEQ CR capsule Take 1 capsule (10 mEq total) by mouth daily Please take for 7 days and save the rest (Patient not taking: Reported on 10/26/2018 )     Current Facility-Administered Medications on File Prior to Visit   Medication    alteplase (CATHFLO) injection 2 mg    heparin lock flush 100 units/mL injection 300 Units    [DISCONTINUED] alteplase (CATHFLO) injection 2 mg    [DISCONTINUED] heparin lock flush 100 units/mL injection 300 Units    [DISCONTINUED] sodium chloride 0 9 % infusion     She has No Known Allergies       Review of Systems   All other systems reviewed and are negative  Objective:      /72 (BP Location: Left arm, Patient Position: Sitting, Cuff Size: Standard)   Ht 5' 5" (1 651 m)   Wt 68 1 kg (150 lb 3 2 oz)   LMP 05/16/2018 (Exact Date)   BMI 24 99 kg/m²          Physical Exam   Constitutional: She is oriented to person, place, and time  She appears well-developed and well-nourished  HENT:   Head: Normocephalic and atraumatic  Neck: Normal range of motion  Neck supple  No thyromegaly present  Cardiovascular: Normal rate, regular rhythm and normal heart sounds  Pulmonary/Chest: Effort normal and breath sounds normal  No respiratory distress  Breasts no masses or skin changes  Chemotherapy port palpable in the right upper chest without abnormality  Abdominal: Soft  Bowel sounds are normal  She exhibits no distension and no mass  There is no tenderness  Genitourinary:   Genitourinary Comments: Ext genitalia nl, no lesions  Vagina healthy w/o lesions or discharge; cuff intact and well-supported  Adnexa no masses or tenderness  Rectal no masses   Musculoskeletal: She exhibits no edema or deformity  Neurological: She is alert and oriented to person, place, and time  She has normal reflexes  Skin: Skin is warm and dry  No rash noted  Psychiatric: She has a normal mood and affect  Her behavior is normal    Nursing note and vitals reviewed

## 2018-11-09 NOTE — TELEPHONE ENCOUNTER
Per Dr Anum Nguyen he wanted to see her in office on 11/13/18  Called and LVM informing patient that I scheduled her for 11/13/18 at 2:20 pm  I instructed her to call our office if she cannot make that appt  But Dr Anum Nguyen needs to see her sooner than later  Please R/S her appt  If she cannot make it

## 2018-11-13 ENCOUNTER — OFFICE VISIT (OUTPATIENT)
Dept: HEMATOLOGY ONCOLOGY | Facility: CLINIC | Age: 53
End: 2018-11-13
Payer: COMMERCIAL

## 2018-11-13 VITALS
OXYGEN SATURATION: 99 % | WEIGHT: 149 LBS | HEART RATE: 94 BPM | RESPIRATION RATE: 18 BRPM | TEMPERATURE: 97.2 F | BODY MASS INDEX: 23.95 KG/M2 | SYSTOLIC BLOOD PRESSURE: 130 MMHG | DIASTOLIC BLOOD PRESSURE: 80 MMHG | HEIGHT: 66 IN

## 2018-11-13 DIAGNOSIS — C78.6 PERITONEAL METASTASES (HCC): ICD-10-CM

## 2018-11-13 DIAGNOSIS — C77.2 METASTASIS TO RETROPERITONEAL LYMPH NODE (HCC): ICD-10-CM

## 2018-11-13 DIAGNOSIS — I82.531 CHRONIC DEEP VEIN THROMBOSIS (DVT) OF POPLITEAL VEIN OF RIGHT LOWER EXTREMITY (HCC): ICD-10-CM

## 2018-11-13 DIAGNOSIS — D50.0 IRON DEFICIENCY ANEMIA DUE TO CHRONIC BLOOD LOSS: ICD-10-CM

## 2018-11-13 DIAGNOSIS — C78.7 LIVER METASTASES (HCC): ICD-10-CM

## 2018-11-13 DIAGNOSIS — I82.4Y1 DEEP VEIN THROMBOSIS (DVT) OF PROXIMAL VEIN OF RIGHT LOWER EXTREMITY, UNSPECIFIED CHRONICITY (HCC): ICD-10-CM

## 2018-11-13 DIAGNOSIS — C18.2 PRIMARY ADENOCARCINOMA OF ASCENDING COLON (HCC): Primary | ICD-10-CM

## 2018-11-13 DIAGNOSIS — C79.9 ADENOCARCINOMA, METASTATIC (HCC): ICD-10-CM

## 2018-11-13 PROCEDURE — 99214 OFFICE O/P EST MOD 30 MIN: CPT | Performed by: INTERNAL MEDICINE

## 2018-11-13 NOTE — LETTER
November 14, 2018     Marco Antonio Garcia MD  [facility]  [address]  [address] [facility] [address]     Patient: Rosalie Dang   YOB: 1965   Date of Visit: 11/13/2018       Dear Dr Solomon Primer: Thank you for referring Aiden Goel to me for evaluation  Below are my notes for this consultation  If you have questions, please do not hesitate to call me  I look forward to following your patient along with you  Sincerely,        Ashley Anderson MD        CC: No Recipients  Ashley Anderson MD  11/14/2018  7:14 AM  Sign at close encounter    HPI:   Follow-up visit  Patient is here with her   She is on FOLFIRI plus Avastin for abdominal carcinomatosis and metastatic disease to liver and abdominal lymph nodes and mass in the pelvis from adenocarcinoma of ascending colon  She has P PE in her hands  5 FU bolus will be discontinued and if needed 5 FU infusion dose could be lowered later on  She feels tired  No diarrhea     History of   DVT in right lower leg and iron deficiency anemia secondary to chronic blood loss  Patient had DVT right lower leg below the knee in May 2016 and that happened after taking hormonal therapy in April 2016  In 2016 she tested positive once for lupus anticoagulant and that was negative on repeat test    She stayed on Xarelto until end of September 2016  She had heavy periods in in January 2018 and she was in bed for 3 days and she developed a clot in the left lower leg distally below the knee  She was restarted on Xarelto 20 mg daily  Xarelto dose is    being lowered to 10 mg daily because most recent Doppler study does not show DVT in the legs  She has Xarelto samples and prescription  Sample 1 8   Expiry November 2020          On 05/21/2018 patient underwent GALI and BSO and there were cancer cells in the fallopian tubes   In June 2018 patient   underwent extended right hemicolectomy, partial omentectomy and biopsy of the peritoneal nodule   Peritoneal nodule came back  positive for metastatic adenocarcinoma from colon    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 and metastatic disease to liver    6 cm T3 G2 right colon cancer with positive margins, lymphovascular invasion and perineural invasion and positive 3 of 27 lymph nodes with extranodal extension and positive peritoneal nodule biopsy   Stage IV disease because of liver and peritoneal metastases     on 07/16/2018 patient was started on FOLFIRI chemotherapy and to that Avastin was added after 1 cycle  Patient had PET-CT scan on 10/19/2018 followed by MRI scan of abdomen and pelvis  See reports  Improvement in liver lesions  She has a pelvic mass  She had anal fissure that was hurting and occasionally that would bleed  She was seen by rectal surgeon  Now she does not have pain  Bleeding is very small and rare  She is keeping her bowel movements soft  For iron deficiency patient  is receiving intravenous Venofer with chemotherapy     Hemoglobin is improving       Less tiredness            Current Outpatient Prescriptions:     acetaminophen (TYLENOL) 500 mg tablet, Take 1,000 mg by mouth every 6 (six) hours as needed for mild pain, Disp: , Rfl:     AFLURIA QUADRIVALENT 0 5 ML LEATHA, TO BE ADMINISTERED BY PHARMACIST FOR IMMUNIZATION, Disp: , Rfl: 0    lidocaine-prilocaine (EMLA) cream, Apply 1 hour prior to chemo, cover in wrap, Disp: 30 g, Rfl: 0    nifedipine 0 2 % OINT, Apply topically every 4 (four) hours 0 3% ointment as typical(disregard 0 2% as auto order) 1 application to anal opening 4-6 times daily, Disp: 1 Tube, Rfl: 0    XARELTO 20 MG tablet, Take 1 tablet (20 mg total) by mouth daily, Disp: 90 tablet, Rfl: 2    No Known Allergies       Primary adenocarcinoma of ascending colon ([facility] [address] )    6/14/2018 Initial Diagnosis     Primary adenocarcinoma of ascending colon (HCC)         7/17/2018 - 8/1/2018 Chemotherapy     camptosar 180 mg/m2 day 1  5  mg/m2 day 1  5 FU 1200 mg/m2 day 1-2   Leucovorin 400 mg/m2     Venofer 100 mg (first 10 treatments) -- all every 2 weeks             8/1/2018 Adverse Reaction     Needed granix 300 mcg due to [address] of 1 01 8/14/2018 -  Chemotherapy     camptosar 180 mg/m2 day 1  5  mg/m2 day 1  5 FU 1200 mg/m2 day 1-2   Leucovorin 400 mg/m2  Avastin 5 mg/kg day 1   Venofer 100 mg (first 10 treatments)  Neulasta on Pro 6 mg day 3      -- every 2 weeks                 ROS:  11/13/18 Reviewed 13 systems:  Presently patient does not have any other neurologic, cardiac, pulmonary, GI and  symptoms other than mentioned in HPI  No dizziness, diplopia, dysphagia, hoarseness, fever, chills,  bone pains, her weight loss, arthritic symptoms,  weakness, numbness,  claudication and gait problem  No frequent infections  Not unusually sensitive to heat or cold  No swelling of the ankles  No swollen glands  Patient is anxious  No GYN symptoms  Other symptoms are in HPI        /80 (BP Location: Left arm, Patient Position: Sitting, Cuff Size: Adult)   Pulse 94   Temp (!) 97 2 °F (36 2 °C)   Resp 18   Ht 5' 5 5" (1 664 m)   Wt 67 6 kg (149 lb)   LMP 05/16/2018 (Exact Date)   SpO2 99%   BMI 24 42 kg/m²      Physical Exam:  Presently patient is alert and oriented  She is not in distress  Vital signs are stable  There is no icterus, no oral thrush, no palpable neck mass, clear lung fields, regular heart rate, abdomen  soft and non tender, no palpable abdominal mass, no ascites, no edema of ankles, no calf tenderness, no focal neurological deficit, no skin rash except for redness of the palms of the hands, no palpable lymphadenopathy in the neck and axillary areas, good arterial pulses, no clubbing  Patient is anxious  Performance status 1      IMAGING:  IMPRESSION:  Multiple liver lesions compatible with metastatic disease, the larger lesions are each slightly smaller in size when compared to the prior CT in June but continue to slightly peripherally enhance suggesting residual disease (agree with PET/CT)  There   are smaller lesions scattered throughout the liver which probably only visible by Eovist/MRI which would explain why they were not visible by prior CT or PET scan and are not necessarily new           Workstation performed: [id number]      Imaging     MRI abdomen w wo contrast (Order #[id number]) on 11/1/2018 - Imaging Information   IMPRESSION:     5 7 cm ill-defined enhancing soft tissue mass in the pelvis near the vaginal cuff, corresponding to the area of FDG avidity on the prior PET/CT, in keeping with metastatic tumor deposit    The lesion abuts and probably involves the anterior wall of the   rectum      The study was marked in EPIC for significant notification            Workstation performed: [id number]      Imaging     MRI pelvis female wo and w contrast (Order #[id number]) on 11/1/2018 - Imaging Information      LABS:  Results for orders placed or performed in visit on 11/05/18   UA (URINE) with reflex to Microscopic   Result Value Ref Range    Color, UA Yellow     Clarity, UA Cloudy     Specific [place], UA 1 012 1 003 - 1 030    pH, UA 6 0 4 5 - 8 0    Leukocytes, UA Negative Negative    Nitrite, UA Negative Negative    Protein, UA Negative Negative mg/dl    Glucose, UA Negative Negative mg/dl    Ketones, UA Negative Negative mg/dl    Urobilinogen, UA 0 2 0 2, 1 0 E U /dl E U /dl    Bilirubin, UA Negative Negative    Blood, UA Negative Negative     Labs, Imaging, & Other studies:   All pertinent labs and imaging studies were personally reviewed    Lab Results   Component Value Date     03/18/2015    K 4 0 11/05/2018     11/05/2018    CO2 27 11/05/2018    ANIONGAP 9 03/18/2015    BUN 8 11/05/2018    CREATININE 0 87 11/05/2018    GLUCOSE 100 03/18/2015    GLUF 91 11/05/2018    CALCIUM 9 1 11/05/2018    AST 18 11/05/2018    ALT 52 11/05/2018    ALKPHOS 112 11/05/2018    PROT 6 6 03/18/2015    BILITOT 0 65 03/18/2015    EGFR 77 11/05/2018     Lab Results   Component Value Date    WBC 5 60 11/05/2018    HGB 10 9 (L) 11/05/2018    HCT 36 6 11/05/2018    MCV 97 11/05/2018     11/05/2018   No results found for: [address] [place] [address] [place] and discussed with patient  Assessment and plan: Follow-up visit  Patient is here with her   She is on FOLFIRI plus Avastin for abdominal carcinomatosis and metastatic disease to liver and abdominal lymph nodes and mass in the pelvis from adenocarcinoma of ascending colon  She has P PE in her hands  5 FU bolus will be discontinued and if needed 5 FU infusion dose could be lowered later on  She feels tired  No diarrhea     History of   DVT in right lower leg and iron deficiency anemia secondary to chronic blood loss  Patient had DVT right lower leg below the knee in May 2016 and that happened after taking hormonal therapy in April 2016  In 2016 she tested positive once for lupus anticoagulant and that was negative on repeat test    She stayed on Xarelto until end of September 2016  She had heavy periods in in January 2018 and she was in bed for 3 days and she developed a clot in the left lower leg distally below the knee  She was restarted on Xarelto 20 mg daily  Xarelto dose is    being lowered to 10 mg daily because most recent Doppler study does not show DVT in the legs  She has Xarelto samples and prescription  Sample 1 8     Expiry November 2020          On 05/21/2018 patient underwent GALI and BSO and there were cancer cells in the fallopian tubes   In June 2018 patient   underwent extended right hemicolectomy, partial omentectomy and biopsy of the peritoneal nodule   Peritoneal nodule came back  positive for metastatic adenocarcinoma from colon    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 and metastatic disease to liver    6 cm T3 G2 right colon cancer with positive margins, lymphovascular invasion and perineural invasion and positive 3 of 27 lymph nodes with extranodal extension and positive peritoneal nodule biopsy   Stage IV disease because of liver and peritoneal metastases     on 07/16/2018 patient was started on FOLFIRI chemotherapy and to that Avastin was added after 1 cycle  Patient had PET-CT scan on 10/19/2018 followed by MRI scan of abdomen and pelvis  See reports  Improvement in liver lesions  She has a pelvic mass  She had anal fissure that was hurting and occasionally that would bleed  She was seen by rectal surgeon  Now she does not have pain  Bleeding is very small and rare  She is keeping her bowel movements soft  For iron deficiency patient  is receiving intravenous Venofer with chemotherapy   Hemoglobin is improving       Less tiredness      Physical examination and test results are as recorded and discussed in detail especially results of MRI scans  As mentioned above she will not be getting 5 FU bolus and Xarelto dose is being decreased to 10 mg daily  No other change  Additional changes as needed  All discussed in detail  Questions answered                        Discussed the importance of self-breast examination, eating healthy foods, staying active and health screening tests     Patient is capable of Self care   Alondra Blue is prolongation of survival from colon cancer and no more blood clot and bleeding  1  Primary adenocarcinoma of ascending colon ([address] )      2  Peritoneal metastases ([facility] [address] )      3  Adenocarcinoma, metastatic ([facility] [address] )      4  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[facility] [address] )      5  Liver metastases ([address] )      6  Chronic deep vein thrombosis (DVT) of popliteal vein of right lower extremity (HCC)      7  Iron deficiency anemia due to chronic blood loss      8  Deep vein thrombosis (DVT) of proximal vein of right lower extremity, unspecified chronicity (HCC)    - rivaroxaban (XARELTO) 10 mg tablet; Take 1 tablet (10 mg total) by mouth daily  Dispense: 90 tablet;  Refill: 1      Patient voiced understanding and agreement in the discussion  Counseling / Coordination of Care   Greater than 50% of total time was spent with the patient and / or family counseling and / or coordination of care

## 2018-11-13 NOTE — PROGRESS NOTES
HPI:   Follow-up visit  Patient is here with her   She is on FOLFIRI plus Avastin for abdominal carcinomatosis and metastatic disease to liver and abdominal lymph nodes and mass in the pelvis from adenocarcinoma of ascending colon  She has P PE in her hands  5 FU bolus will be discontinued and if needed 5 FU infusion dose could be lowered later on  She feels tired  No diarrhea     History of   DVT in right lower leg and iron deficiency anemia secondary to chronic blood loss  Patient had DVT right lower leg below the knee in May 2016 and that happened after taking hormonal therapy in April 2016  In 2016 she tested positive once for lupus anticoagulant and that was negative on repeat test    She stayed on Xarelto until end of September 2016  She had heavy periods in in January 2018 and she was in bed for 3 days and she developed a clot in the left lower leg distally below the knee  She was restarted on Xarelto 20 mg daily  Xarelto dose is    being lowered to 10 mg daily because most recent Doppler study does not show DVT in the legs  She has Xarelto samples and prescription  Sample 1 8   Expiry November 2020          On 05/21/2018 patient underwent GALI and BSO and there were cancer cells in the fallopian tubes   In June 2018 patient   underwent extended right hemicolectomy, partial omentectomy and biopsy of the peritoneal nodule   Peritoneal nodule came back  positive for metastatic adenocarcinoma from colon    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 and metastatic disease to liver    6 cm T3 G2 right colon cancer with positive margins, lymphovascular invasion and perineural invasion and positive 3 of 27 lymph nodes with extranodal extension and positive peritoneal nodule biopsy   Stage IV disease because of liver and peritoneal metastases     on 07/16/2018 patient was started on FOLFIRI chemotherapy and to that Avastin was added after 1 cycle    Patient had PET-CT scan on 10/19/2018 followed by MRI scan of abdomen and pelvis  See reports  Improvement in liver lesions  She has a pelvic mass  She had anal fissure that was hurting and occasionally that would bleed  She was seen by rectal surgeon  Now she does not have pain  Bleeding is very small and rare  She is keeping her bowel movements soft  For iron deficiency patient  is receiving intravenous Venofer with chemotherapy     Hemoglobin is improving       Less tiredness            Current Outpatient Prescriptions:     acetaminophen (TYLENOL) 500 mg tablet, Take 1,000 mg by mouth every 6 (six) hours as needed for mild pain, Disp: , Rfl:     AFLURIA QUADRIVALENT 0 5 ML LEATHA, TO BE ADMINISTERED BY PHARMACIST FOR IMMUNIZATION, Disp: , Rfl: 0    lidocaine-prilocaine (EMLA) cream, Apply 1 hour prior to chemo, cover in wrap, Disp: 30 g, Rfl: 0    nifedipine 0 2 % OINT, Apply topically every 4 (four) hours 0 3% ointment as typical(disregard 0 2% as auto order) 1 application to anal opening 4-6 times daily, Disp: 1 Tube, Rfl: 0    XARELTO 20 MG tablet, Take 1 tablet (20 mg total) by mouth daily, Disp: 90 tablet, Rfl: 2    No Known Allergies       Primary adenocarcinoma of ascending colon (HCC)    6/14/2018 Initial Diagnosis     Primary adenocarcinoma of ascending colon (HCC)         7/17/2018 - 8/1/2018 Chemotherapy     camptosar 180 mg/m2 day 1  5  mg/m2 day 1  5 FU 1200 mg/m2 day 1-2   Leucovorin 400 mg/m2     Venofer 100 mg (first 10 treatments) -- all every 2 weeks             8/1/2018 Adverse Reaction     Needed granix 300 mcg due to [address] of 1 01 8/14/2018 -  Chemotherapy     camptosar 180 mg/m2 day 1  5  mg/m2 day 1  5 FU 1200 mg/m2 day 1-2   Leucovorin 400 mg/m2  Avastin 5 mg/kg day 1   Venofer 100 mg (first 10 treatments)  Neulasta on Pro 6 mg day 3      -- every 2 weeks                 ROS:  11/13/18 Reviewed 13 systems:  Presently patient does not have any other neurologic, cardiac, pulmonary, GI and  symptoms other than mentioned in HPI  No dizziness, diplopia, dysphagia, hoarseness, fever, chills,  bone pains, her weight loss, arthritic symptoms,  weakness, numbness,  claudication and gait problem  No frequent infections  Not unusually sensitive to heat or cold  No swelling of the ankles  No swollen glands  Patient is anxious  No GYN symptoms  Other symptoms are in HPI        /80 (BP Location: Left arm, Patient Position: Sitting, Cuff Size: Adult)   Pulse 94   Temp (!) 97 2 °F (36 2 °C)   Resp 18   Ht 5' 5 5" (1 664 m)   Wt 67 6 kg (149 lb)   LMP 05/16/2018 (Exact Date)   SpO2 99%   BMI 24 42 kg/m²     Physical Exam:  Presently patient is alert and oriented  She is not in distress  Vital signs are stable  There is no icterus, no oral thrush, no palpable neck mass, clear lung fields, regular heart rate, abdomen  soft and non tender, no palpable abdominal mass, no ascites, no edema of ankles, no calf tenderness, no focal neurological deficit, no skin rash except for redness of the palms of the hands, no palpable lymphadenopathy in the neck and axillary areas, good arterial pulses, no clubbing  Patient is anxious  Performance status 1  IMAGING:  IMPRESSION:  Multiple liver lesions compatible with metastatic disease, the larger lesions are each slightly smaller in size when compared to the prior CT in June but continue to slightly peripherally enhance suggesting residual disease (agree with PET/CT)    There   are smaller lesions scattered throughout the liver which probably only visible by Eovist/MRI which would explain why they were not visible by prior CT or PET scan and are not necessarily new           Workstation performed: [id number]      Imaging     MRI abdomen w wo contrast (Order #[id number]) on 11/1/2018 - Imaging Information   IMPRESSION:     5 7 cm ill-defined enhancing soft tissue mass in the pelvis near the vaginal cuff, corresponding to the area of FDG avidity on the prior PET/CT, in keeping with metastatic tumor deposit  The lesion abuts and probably involves the anterior wall of the   rectum      The study was marked in EPIC for significant notification            Workstation performed: [id number]      Imaging     MRI pelvis female wo and w contrast (Order #[id number]) on 11/1/2018 - Imaging Information      LABS:  Results for orders placed or performed in visit on 11/05/18   UA (URINE) with reflex to Microscopic   Result Value Ref Range    Color, UA Yellow     Clarity, UA Cloudy     Specific [place], UA 1 012 1 003 - 1 030    pH, UA 6 0 4 5 - 8 0    Leukocytes, UA Negative Negative    Nitrite, UA Negative Negative    Protein, UA Negative Negative mg/dl    Glucose, UA Negative Negative mg/dl    Ketones, UA Negative Negative mg/dl    Urobilinogen, UA 0 2 0 2, 1 0 E U /dl E U /dl    Bilirubin, UA Negative Negative    Blood, UA Negative Negative     Labs, Imaging, & Other studies:   All pertinent labs and imaging studies were personally reviewed    Lab Results   Component Value Date     03/18/2015    K 4 0 11/05/2018     11/05/2018    CO2 27 11/05/2018    ANIONGAP 9 03/18/2015    BUN 8 11/05/2018    CREATININE 0 87 11/05/2018    GLUCOSE 100 03/18/2015    GLUF 91 11/05/2018    CALCIUM 9 1 11/05/2018    AST 18 11/05/2018    ALT 52 11/05/2018    ALKPHOS 112 11/05/2018    PROT 6 6 03/18/2015    BILITOT 0 65 03/18/2015    EGFR 77 11/05/2018     Lab Results   Component Value Date    WBC 5 60 11/05/2018    HGB 10 9 (L) 11/05/2018    HCT 36 6 11/05/2018    MCV 97 11/05/2018     11/05/2018   No results found for: SEDRATE    Reviewed and discussed with patient  Assessment and plan: Follow-up visit  Patient is here with her   She is on FOLFIRI plus Avastin for abdominal carcinomatosis and metastatic disease to liver and abdominal lymph nodes and mass in the pelvis from adenocarcinoma of ascending colon  She has P PE in her hands    5 FU bolus will be discontinued and if needed 5 FU infusion dose could be lowered later on  She feels tired  No diarrhea     History of   DVT in right lower leg and iron deficiency anemia secondary to chronic blood loss  Patient had DVT right lower leg below the knee in May 2016 and that happened after taking hormonal therapy in April 2016  In 2016 she tested positive once for lupus anticoagulant and that was negative on repeat test    She stayed on Xarelto until end of September 2016  She had heavy periods in in January 2018 and she was in bed for 3 days and she developed a clot in the left lower leg distally below the knee  She was restarted on Xarelto 20 mg daily  Xarelto dose is    being lowered to 10 mg daily because most recent Doppler study does not show DVT in the legs  She has Xarelto samples and prescription  Sample 1 8   Expiry November 2020          On 05/21/2018 patient underwent GALI and BSO and there were cancer cells in the fallopian tubes   In June 2018 patient   underwent extended right hemicolectomy, partial omentectomy and biopsy of the peritoneal nodule   Peritoneal nodule came back  positive for metastatic adenocarcinoma from colon    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 and metastatic disease to liver    6 cm T3 G2 right colon cancer with positive margins, lymphovascular invasion and perineural invasion and positive 3 of 27 lymph nodes with extranodal extension and positive peritoneal nodule biopsy   Stage IV disease because of liver and peritoneal metastases     on 07/16/2018 patient was started on FOLFIRI chemotherapy and to that Avastin was added after 1 cycle  Patient had PET-CT scan on 10/19/2018 followed by MRI scan of abdomen and pelvis  See reports  Improvement in liver lesions  She has a pelvic mass  She had anal fissure that was hurting and occasionally that would bleed  She was seen by rectal surgeon  Now she does not have pain  Bleeding is very small and rare  She is keeping her bowel movements soft  For iron deficiency patient  is receiving intravenous Venofer with chemotherapy   Hemoglobin is improving       Less tiredness      Physical examination and test results are as recorded and discussed in detail especially results of MRI scans  As mentioned above she will not be getting 5 FU bolus and Xarelto dose is being decreased to 10 mg daily  No other change  Additional changes as needed  All discussed in detail  Questions answered                        Discussed the importance of self-breast examination, eating healthy foods, staying active and health screening tests     Patient is capable of Self care   Reba Xiong is prolongation of survival from colon cancer and no more blood clot and bleeding  1  Primary adenocarcinoma of ascending colon ([facility] [address] )      2  Peritoneal metastases ([facility] )      3  Adenocarcinoma, metastatic ([facility] )      4  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[facility] )      5  Liver metastases ([facility] )      6  Chronic deep vein thrombosis (DVT) of popliteal vein of right lower extremity (HCC)      7  Iron deficiency anemia due to chronic blood loss      8  Deep vein thrombosis (DVT) of proximal vein of right lower extremity, unspecified chronicity (HCC)    - rivaroxaban (XARELTO) 10 mg tablet; Take 1 tablet (10 mg total) by mouth daily  Dispense: 90 tablet; Refill: 1      Patient voiced understanding and agreement in the discussion  Counseling / Coordination of Care   Greater than 50% of total time was spent with the patient and / or family counseling and / or coordination of care

## 2018-11-16 RX ORDER — SODIUM CHLORIDE 9 MG/ML
20 INJECTION, SOLUTION INTRAVENOUS CONTINUOUS
Status: DISCONTINUED | OUTPATIENT
Start: 2018-11-19 | End: 2018-11-22 | Stop reason: HOSPADM

## 2018-11-16 RX ORDER — ATROPINE SULFATE 1 MG/ML
0.25 INJECTION, SOLUTION INTRAMUSCULAR; INTRAVENOUS; SUBCUTANEOUS ONCE
Status: COMPLETED | OUTPATIENT
Start: 2018-11-19 | End: 2018-11-19

## 2018-11-16 RX ORDER — ATROPINE SULFATE 0.4 MG/ML
0.25 INJECTION, SOLUTION ENDOTRACHEAL; INTRAMEDULLARY; INTRAMUSCULAR; INTRAVENOUS; SUBCUTANEOUS ONCE
Status: DISCONTINUED | OUTPATIENT
Start: 2018-11-19 | End: 2018-11-16

## 2018-11-16 NOTE — PROGRESS NOTES
Nurse spoke with Natalie White RN & timeout done to add one more treatment of Venofer which is scheduled to be given with treatment on 11/19/18  New orders to follow  Nurse will fax this note to pharmacy to make them aware as well

## 2018-11-17 ENCOUNTER — APPOINTMENT (OUTPATIENT)
Dept: LAB | Facility: MEDICAL CENTER | Age: 53
End: 2018-11-17
Payer: COMMERCIAL

## 2018-11-17 DIAGNOSIS — C78.6 PERITONEAL METASTASES (HCC): ICD-10-CM

## 2018-11-17 DIAGNOSIS — C78.7 LIVER METASTASES (HCC): ICD-10-CM

## 2018-11-17 DIAGNOSIS — C79.9 ADENOCARCINOMA, METASTATIC (HCC): ICD-10-CM

## 2018-11-17 DIAGNOSIS — C18.2 PRIMARY ADENOCARCINOMA OF ASCENDING COLON (HCC): ICD-10-CM

## 2018-11-17 DIAGNOSIS — C77.2 METASTASIS TO RETROPERITONEAL LYMPH NODE (HCC): ICD-10-CM

## 2018-11-17 LAB
BILIRUB UR QL STRIP: NEGATIVE
CLARITY UR: NORMAL
COLOR UR: YELLOW
GLUCOSE UR STRIP-MCNC: NEGATIVE MG/DL
HGB UR QL STRIP.AUTO: NEGATIVE
KETONES UR STRIP-MCNC: NEGATIVE MG/DL
LEUKOCYTE ESTERASE UR QL STRIP: NEGATIVE
NITRITE UR QL STRIP: NEGATIVE
PH UR STRIP.AUTO: 5.5 [PH] (ref 4.5–8)
PROT UR STRIP-MCNC: NEGATIVE MG/DL
SP GR UR STRIP.AUTO: 1.02 (ref 1–1.03)
UROBILINOGEN UR QL STRIP.AUTO: 0.2 E.U./DL

## 2018-11-17 PROCEDURE — 81003 URINALYSIS AUTO W/O SCOPE: CPT

## 2018-11-19 ENCOUNTER — HOSPITAL ENCOUNTER (OUTPATIENT)
Dept: INFUSION CENTER | Facility: CLINIC | Age: 53
Discharge: HOME | End: 2018-11-19
Payer: COMMERCIAL

## 2018-11-19 VITALS
SYSTOLIC BLOOD PRESSURE: 122 MMHG | WEIGHT: 150.5 LBS | TEMPERATURE: 97.9 F | DIASTOLIC BLOOD PRESSURE: 71 MMHG | HEART RATE: 74 BPM | BODY MASS INDEX: 24.19 KG/M2 | RESPIRATION RATE: 18 BRPM | HEIGHT: 66 IN | OXYGEN SATURATION: 100 %

## 2018-11-19 PROCEDURE — 96375 TX/PRO/DX INJ NEW DRUG ADDON: CPT

## 2018-11-19 PROCEDURE — 96417 CHEMO IV INFUS EACH ADDL SEQ: CPT

## 2018-11-19 PROCEDURE — 96415 CHEMO IV INFUSION ADDL HR: CPT

## 2018-11-19 PROCEDURE — 96367 TX/PROPH/DG ADDL SEQ IV INF: CPT

## 2018-11-19 PROCEDURE — 96413 CHEMO IV INFUSION 1 HR: CPT

## 2018-11-19 PROCEDURE — 96368 THER/DIAG CONCURRENT INF: CPT

## 2018-11-19 PROCEDURE — G0498 CHEMO EXTEND IV INFUS W/PUMP: HCPCS

## 2018-11-19 RX ADMIN — ATROPINE SULFATE 0.25 MG: 1 INJECTION, SOLUTION INTRAMUSCULAR; INTRAVENOUS; SUBCUTANEOUS at 10:43

## 2018-11-19 RX ADMIN — LEUCOVORIN CALCIUM 700 MG: 500 INJECTION, POWDER, LYOPHILIZED, FOR SOLUTION INTRAMUSCULAR; INTRAVENOUS at 11:24

## 2018-11-19 RX ADMIN — DEXAMETHASONE SODIUM PHOSPHATE: 10 INJECTION, SOLUTION INTRAMUSCULAR; INTRAVENOUS at 10:22

## 2018-11-19 RX ADMIN — IRINOTECAN HYDROCHLORIDE 320 MG: 20 INJECTION, SOLUTION INTRAVENOUS at 11:24

## 2018-11-19 RX ADMIN — IRON SUCROSE 100 MG: 20 INJECTION, SOLUTION INTRAVENOUS at 09:26

## 2018-11-19 RX ADMIN — SODIUM CHLORIDE 20 ML/HR: 0.9 INJECTION, SOLUTION INTRAVENOUS at 09:18

## 2018-11-19 RX ADMIN — BEVACIZUMAB 338 MG: 100 INJECTION, SOLUTION INTRAVENOUS at 10:46

## 2018-11-19 NOTE — PROGRESS NOTES
Pt to clinic for folfiri, avastin, venofer infusion, pt offers no complaints at this time, will continue to monitor, aware of next appointment, declines avs

## 2018-11-21 ENCOUNTER — HOSPITAL ENCOUNTER (OUTPATIENT)
Dept: INFUSION CENTER | Facility: CLINIC | Age: 53
Discharge: HOME | End: 2018-11-21
Payer: COMMERCIAL

## 2018-11-21 PROCEDURE — 96372 THER/PROPH/DIAG INJ SC/IM: CPT

## 2018-11-21 RX ADMIN — HEPARIN 300 UNITS: 100 SYRINGE at 11:28

## 2018-11-21 RX ADMIN — PEGFILGRASTIM 6 MG: KIT SUBCUTANEOUS at 11:28

## 2018-11-21 NOTE — PROGRESS NOTES
Pt tolerated treatment well  [place] volume 0  Cadd pump disconnected  Aware of next appt  AVS declined

## 2018-12-03 ENCOUNTER — TRANSCRIBE ORDERS (OUTPATIENT)
Dept: ADMINISTRATIVE | Facility: HOSPITAL | Age: 53
End: 2018-12-03

## 2018-12-03 ENCOUNTER — APPOINTMENT (OUTPATIENT)
Dept: LAB | Facility: MEDICAL CENTER | Age: 53
End: 2018-12-03
Payer: COMMERCIAL

## 2018-12-03 DIAGNOSIS — C78.7 LIVER METASTASES (HCC): ICD-10-CM

## 2018-12-03 DIAGNOSIS — C18.2 MALIGNANT NEOPLASM OF ASCENDING COLON (HCC): Primary | ICD-10-CM

## 2018-12-03 DIAGNOSIS — C78.6 PERITONEAL METASTASES (HCC): ICD-10-CM

## 2018-12-03 DIAGNOSIS — C77.2 SECONDARY MALIGNANT NEOPLASM OF RETROPERITONEAL LYMPH NODES (HCC): ICD-10-CM

## 2018-12-03 DIAGNOSIS — C79.9 ADENOCARCINOMA, METASTATIC (HCC): ICD-10-CM

## 2018-12-03 LAB
BILIRUB UR QL STRIP: NEGATIVE
CLARITY UR: NORMAL
COLOR UR: YELLOW
GLUCOSE UR STRIP-MCNC: NEGATIVE MG/DL
HGB UR QL STRIP.AUTO: NEGATIVE
KETONES UR STRIP-MCNC: NEGATIVE MG/DL
LEUKOCYTE ESTERASE UR QL STRIP: NEGATIVE
NITRITE UR QL STRIP: NEGATIVE
PH UR STRIP.AUTO: 6 [PH] (ref 4.5–8)
PROT UR STRIP-MCNC: NEGATIVE MG/DL
SP GR UR STRIP.AUTO: 1.02 (ref 1–1.03)
UROBILINOGEN UR QL STRIP.AUTO: 0.2 E.U./DL

## 2018-12-03 PROCEDURE — 81003 URINALYSIS AUTO W/O SCOPE: CPT | Performed by: INTERNAL MEDICINE

## 2018-12-03 RX ORDER — ATROPINE SULFATE 1 MG/ML
0.25 INJECTION, SOLUTION INTRAMUSCULAR; INTRAVENOUS; SUBCUTANEOUS ONCE
Status: COMPLETED | OUTPATIENT
Start: 2018-12-04 | End: 2018-12-04

## 2018-12-03 RX ORDER — SODIUM CHLORIDE 9 MG/ML
20 INJECTION, SOLUTION INTRAVENOUS CONTINUOUS
Status: DISCONTINUED | OUTPATIENT
Start: 2018-12-04 | End: 2018-12-07 | Stop reason: HOSPADM

## 2018-12-04 ENCOUNTER — HOSPITAL ENCOUNTER (OUTPATIENT)
Dept: INFUSION CENTER | Facility: CLINIC | Age: 53
Discharge: HOME | End: 2018-12-04
Payer: COMMERCIAL

## 2018-12-04 VITALS
WEIGHT: 150 LBS | DIASTOLIC BLOOD PRESSURE: 85 MMHG | TEMPERATURE: 96.4 F | SYSTOLIC BLOOD PRESSURE: 132 MMHG | HEIGHT: 66 IN | OXYGEN SATURATION: 98 % | BODY MASS INDEX: 24.11 KG/M2 | HEART RATE: 84 BPM | RESPIRATION RATE: 18 BRPM

## 2018-12-04 PROCEDURE — 96413 CHEMO IV INFUSION 1 HR: CPT

## 2018-12-04 PROCEDURE — 96367 TX/PROPH/DG ADDL SEQ IV INF: CPT

## 2018-12-04 PROCEDURE — 96417 CHEMO IV INFUS EACH ADDL SEQ: CPT

## 2018-12-04 PROCEDURE — 96375 TX/PRO/DX INJ NEW DRUG ADDON: CPT

## 2018-12-04 PROCEDURE — 96368 THER/DIAG CONCURRENT INF: CPT

## 2018-12-04 PROCEDURE — 96415 CHEMO IV INFUSION ADDL HR: CPT

## 2018-12-04 RX ADMIN — LEUCOVORIN CALCIUM 700 MG: 500 INJECTION, POWDER, LYOPHILIZED, FOR SOLUTION INTRAMUSCULAR; INTRAVENOUS at 10:38

## 2018-12-04 RX ADMIN — SODIUM CHLORIDE 20 ML/HR: 0.9 INJECTION, SOLUTION INTRAVENOUS at 09:01

## 2018-12-04 RX ADMIN — BEVACIZUMAB 338 MG: 400 INJECTION, SOLUTION INTRAVENOUS at 09:55

## 2018-12-04 RX ADMIN — IRINOTECAN HYDROCHLORIDE 320 MG: 20 INJECTION, SOLUTION INTRAVENOUS at 10:38

## 2018-12-04 RX ADMIN — DEXAMETHASONE SODIUM PHOSPHATE: 10 INJECTION, SOLUTION INTRAMUSCULAR; INTRAVENOUS at 09:08

## 2018-12-04 RX ADMIN — ATROPINE SULFATE 0.25 MG: 1 INJECTION, SOLUTION INTRAMUSCULAR; INTRAVENOUS; SUBCUTANEOUS at 09:53

## 2018-12-04 NOTE — PROGRESS NOTES
Pt to clinic for leucovrin, camptosar, 5 FU pump, avastin, pt offers no complaints at this time, will continue to monitor, aware of next appointment, declines avs

## 2018-12-06 ENCOUNTER — HOSPITAL ENCOUNTER (OUTPATIENT)
Dept: INFUSION CENTER | Facility: CLINIC | Age: 53
Discharge: HOME | End: 2018-12-06
Payer: COMMERCIAL

## 2018-12-06 PROCEDURE — 96372 THER/PROPH/DIAG INJ SC/IM: CPT

## 2018-12-06 RX ADMIN — PEGFILGRASTIM 6 MG: KIT SUBCUTANEOUS at 10:49

## 2018-12-06 RX ADMIN — HEPARIN 300 UNITS: 100 SYRINGE at 10:47

## 2018-12-17 ENCOUNTER — APPOINTMENT (OUTPATIENT)
Dept: LAB | Facility: MEDICAL CENTER | Age: 53
End: 2018-12-17
Payer: COMMERCIAL

## 2018-12-17 RX ORDER — SODIUM CHLORIDE 9 MG/ML
20 INJECTION, SOLUTION INTRAVENOUS CONTINUOUS
Status: DISCONTINUED | OUTPATIENT
Start: 2018-12-18 | End: 2018-12-21 | Stop reason: HOSPADM

## 2018-12-17 RX ORDER — ATROPINE SULFATE 1 MG/ML
0.25 INJECTION, SOLUTION INTRAMUSCULAR; INTRAVENOUS; SUBCUTANEOUS ONCE
Status: COMPLETED | OUTPATIENT
Start: 2018-12-18 | End: 2018-12-18

## 2018-12-18 ENCOUNTER — HOSPITAL ENCOUNTER (OUTPATIENT)
Dept: INFUSION CENTER | Facility: CLINIC | Age: 53
Discharge: HOME | End: 2018-12-18
Payer: COMMERCIAL

## 2018-12-18 ENCOUNTER — DOCUMENTATION (OUTPATIENT)
Dept: HEMATOLOGY ONCOLOGY | Facility: CLINIC | Age: 53
End: 2018-12-18

## 2018-12-18 VITALS
SYSTOLIC BLOOD PRESSURE: 136 MMHG | BODY MASS INDEX: 24.43 KG/M2 | RESPIRATION RATE: 16 BRPM | TEMPERATURE: 97.3 F | DIASTOLIC BLOOD PRESSURE: 78 MMHG | WEIGHT: 152 LBS | HEIGHT: 66 IN | HEART RATE: 71 BPM

## 2018-12-18 PROCEDURE — 96417 CHEMO IV INFUS EACH ADDL SEQ: CPT

## 2018-12-18 PROCEDURE — 96368 THER/DIAG CONCURRENT INF: CPT

## 2018-12-18 PROCEDURE — 96415 CHEMO IV INFUSION ADDL HR: CPT

## 2018-12-18 PROCEDURE — 96367 TX/PROPH/DG ADDL SEQ IV INF: CPT

## 2018-12-18 PROCEDURE — 96375 TX/PRO/DX INJ NEW DRUG ADDON: CPT

## 2018-12-18 PROCEDURE — 96413 CHEMO IV INFUSION 1 HR: CPT

## 2018-12-18 RX ADMIN — IRINOTECAN HYDROCHLORIDE 320 MG: 20 INJECTION, SOLUTION INTRAVENOUS at 10:54

## 2018-12-18 RX ADMIN — DEXAMETHASONE SODIUM PHOSPHATE: 10 INJECTION, SOLUTION INTRAMUSCULAR; INTRAVENOUS at 09:17

## 2018-12-18 RX ADMIN — SODIUM CHLORIDE 20 ML/HR: 0.9 INJECTION, SOLUTION INTRAVENOUS at 08:57

## 2018-12-18 RX ADMIN — ATROPINE SULFATE 0.25 MG: 1 INJECTION, SOLUTION INTRAMUSCULAR; INTRAVENOUS; SUBCUTANEOUS at 09:55

## 2018-12-18 RX ADMIN — LEUCOVORIN CALCIUM 700 MG: 500 INJECTION, POWDER, LYOPHILIZED, FOR SOLUTION INTRAMUSCULAR; INTRAVENOUS at 10:54

## 2018-12-18 RX ADMIN — BEVACIZUMAB 340 MG: 400 INJECTION, SOLUTION INTRAVENOUS at 09:59

## 2018-12-18 NOTE — PROGRESS NOTES
12/18/2018  Received notification from cover my meds that the xarelto 10 mg needs authorization  [facility] retail pharmacy ([phone]) started the auth process    Completed the auth request and submitted it through cover my meds  Pt has [employer]  ID #[id number]    Received approval from [employer]  Cs ID #[id number] is valid from 11/18/2018 through 12/18/2019  Called [facility] pharmacy @ 12:27 ([phone]) spoke with the pharmacist who stated that he still can't the claim to go through  I told him on the auth statement  There is a code "76 " call the help desk for the pharmacy  Called [facility] @ 12:31 ([phone]) spoke with Silvina Isaac who stated the [facility]  He also stated that there is a limit of 3 refills which the pt has met  Therefore the copay amount will now be $420 00 per month  It does not reset in the new year  Silvina Isaac stated that it would be cheaper if the pt would go through [facility] [employer] home delivery      Notified clinical and financial

## 2018-12-18 NOTE — PROGRESS NOTES
Pt to clinic for leucovorin, camptosar, avastin and 5 FU CADD, pt offers no complaints at this time, will continue to monitor, aware of next appointment, declines avs

## 2018-12-20 ENCOUNTER — HOSPITAL ENCOUNTER (OUTPATIENT)
Dept: INFUSION CENTER | Facility: CLINIC | Age: 53
Discharge: HOME | End: 2018-12-20
Payer: COMMERCIAL

## 2018-12-20 ENCOUNTER — DOCUMENTATION (OUTPATIENT)
Dept: HEMATOLOGY ONCOLOGY | Facility: CLINIC | Age: 53
End: 2018-12-20

## 2018-12-20 PROCEDURE — 96372 THER/PROPH/DIAG INJ SC/IM: CPT

## 2018-12-20 RX ADMIN — PEGFILGRASTIM 6 MG: KIT SUBCUTANEOUS at 10:57

## 2018-12-20 RX ADMIN — Medication 300 UNITS: at 10:57

## 2018-12-20 NOTE — PROGRESS NOTES
GI Oncology Nurse Navigator Note    Received a request from [address] [place], [place] [address]anna Garibay to have her scans and pathology forwarded to another physician, contacted medical records department with request

## 2018-12-21 LAB — SCAN RESULT: NORMAL

## 2018-12-28 RX ORDER — ATROPINE SULFATE 1 MG/ML
0.25 INJECTION, SOLUTION INTRAMUSCULAR; INTRAVENOUS; SUBCUTANEOUS ONCE
Status: COMPLETED | OUTPATIENT
Start: 2018-12-31 | End: 2018-12-31

## 2018-12-28 RX ORDER — SODIUM CHLORIDE 9 MG/ML
20 INJECTION, SOLUTION INTRAVENOUS CONTINUOUS
Status: DISCONTINUED | OUTPATIENT
Start: 2018-12-31 | End: 2019-01-03 | Stop reason: HOSPADM

## 2018-12-29 ENCOUNTER — TRANSCRIBE ORDERS (OUTPATIENT)
Dept: ADMINISTRATIVE | Facility: HOSPITAL | Age: 53
End: 2018-12-29

## 2018-12-29 ENCOUNTER — APPOINTMENT (OUTPATIENT)
Dept: LAB | Facility: MEDICAL CENTER | Age: 53
End: 2018-12-29
Payer: COMMERCIAL

## 2018-12-29 DIAGNOSIS — C18.2 MALIGNANT NEOPLASM OF ASCENDING COLON (HCC): Primary | ICD-10-CM

## 2018-12-29 DIAGNOSIS — I82.531 CHRONIC EMBOLISM AND THROMBOSIS OF RIGHT POPLITEAL VEIN (HCC): ICD-10-CM

## 2018-12-29 LAB
ALBUMIN SERPL BCP-MCNC: 3.6 G/DL (ref 3.5–5)
ALP SERPL-CCNC: 140 U/L (ref 46–116)
ALT SERPL W P-5'-P-CCNC: 52 U/L (ref 12–78)
ANION GAP SERPL CALCULATED.3IONS-SCNC: 6 MMOL/L (ref 4–13)
AST SERPL W P-5'-P-CCNC: 18 U/L (ref 5–45)
BASOPHILS # BLD AUTO: 0.07 THOUSANDS/ΜL (ref 0–0.1)
BASOPHILS NFR BLD AUTO: 1 % (ref 0–1)
BILIRUB SERPL-MCNC: 0.22 MG/DL (ref 0.2–1)
BUN SERPL-MCNC: 9 MG/DL (ref 5–25)
CALCIUM SERPL-MCNC: 9 MG/DL (ref 8.3–10.1)
CHLORIDE SERPL-SCNC: 106 MMOL/L (ref 100–108)
CO2 SERPL-SCNC: 26 MMOL/L (ref 21–32)
CREAT SERPL-MCNC: 0.87 MG/DL (ref 0.6–1.3)
EOSINOPHIL # BLD AUTO: 0.17 THOUSAND/ΜL (ref 0–0.61)
EOSINOPHIL NFR BLD AUTO: 2 % (ref 0–6)
ERYTHROCYTE [DISTWIDTH] IN BLOOD BY AUTOMATED COUNT: 17 % (ref 11.6–15.1)
GFR SERPL CREATININE-BSD FRML MDRD: 77 ML/MIN/1.73SQ M
GLUCOSE SERPL-MCNC: 95 MG/DL (ref 65–140)
HCT VFR BLD AUTO: 39.3 % (ref 34.8–46.1)
HGB BLD-MCNC: 11.6 G/DL (ref 11.5–15.4)
IMM GRANULOCYTES # BLD AUTO: 0.06 THOUSAND/UL (ref 0–0.2)
IMM GRANULOCYTES NFR BLD AUTO: 1 % (ref 0–2)
LYMPHOCYTES # BLD AUTO: 1.18 THOUSANDS/ΜL (ref 0.6–4.47)
LYMPHOCYTES NFR BLD AUTO: 16 % (ref 14–44)
MCH RBC QN AUTO: 31.4 PG (ref 26.8–34.3)
MCHC RBC AUTO-ENTMCNC: 29.5 G/DL (ref 31.4–37.4)
MCV RBC AUTO: 106 FL (ref 82–98)
MONOCYTES # BLD AUTO: 0.75 THOUSAND/ΜL (ref 0.17–1.22)
MONOCYTES NFR BLD AUTO: 10 % (ref 4–12)
NEUTROPHILS # BLD AUTO: 5.09 THOUSANDS/ΜL (ref 1.85–7.62)
NEUTS SEG NFR BLD AUTO: 70 % (ref 43–75)
NRBC BLD AUTO-RTO: 0 /100 WBCS
PLATELET # BLD AUTO: 258 THOUSANDS/UL (ref 149–390)
PMV BLD AUTO: 10 FL (ref 8.9–12.7)
POTASSIUM SERPL-SCNC: 3.8 MMOL/L (ref 3.5–5.3)
PROT SERPL-MCNC: 7 G/DL (ref 6.4–8.2)
RBC # BLD AUTO: 3.7 MILLION/UL (ref 3.81–5.12)
SODIUM SERPL-SCNC: 138 MMOL/L (ref 136–145)
WBC # BLD AUTO: 7.32 THOUSAND/UL (ref 4.31–10.16)

## 2018-12-29 PROCEDURE — 80053 COMPREHEN METABOLIC PANEL: CPT | Performed by: INTERNAL MEDICINE

## 2018-12-29 PROCEDURE — 85025 COMPLETE CBC W/AUTO DIFF WBC: CPT

## 2018-12-29 PROCEDURE — 36415 COLL VENOUS BLD VENIPUNCTURE: CPT | Performed by: INTERNAL MEDICINE

## 2018-12-31 ENCOUNTER — HOSPITAL ENCOUNTER (OUTPATIENT)
Dept: INFUSION CENTER | Facility: CLINIC | Age: 53
Discharge: HOME | End: 2018-12-31
Payer: COMMERCIAL

## 2018-12-31 VITALS
SYSTOLIC BLOOD PRESSURE: 130 MMHG | BODY MASS INDEX: 22.91 KG/M2 | HEIGHT: 67 IN | TEMPERATURE: 98.2 F | RESPIRATION RATE: 16 BRPM | HEART RATE: 75 BPM | WEIGHT: 146 LBS | OXYGEN SATURATION: 97 % | DIASTOLIC BLOOD PRESSURE: 70 MMHG

## 2018-12-31 DIAGNOSIS — C18.2 MALIGNANT NEOPLASM OF ASCENDING COLON (HCC): Primary | ICD-10-CM

## 2018-12-31 PROCEDURE — 96417 CHEMO IV INFUS EACH ADDL SEQ: CPT

## 2018-12-31 PROCEDURE — 96367 TX/PROPH/DG ADDL SEQ IV INF: CPT

## 2018-12-31 PROCEDURE — 96375 TX/PRO/DX INJ NEW DRUG ADDON: CPT

## 2018-12-31 PROCEDURE — 96413 CHEMO IV INFUSION 1 HR: CPT

## 2018-12-31 PROCEDURE — G0498 CHEMO EXTEND IV INFUS W/PUMP: HCPCS

## 2018-12-31 PROCEDURE — 96368 THER/DIAG CONCURRENT INF: CPT

## 2018-12-31 PROCEDURE — 96415 CHEMO IV INFUSION ADDL HR: CPT

## 2018-12-31 RX ADMIN — LEUCOVORIN CALCIUM 700 MG: 500 INJECTION, POWDER, LYOPHILIZED, FOR SOLUTION INTRAMUSCULAR; INTRAVENOUS at 10:07

## 2018-12-31 RX ADMIN — SODIUM CHLORIDE 20 ML/HR: 0.9 INJECTION, SOLUTION INTRAVENOUS at 08:38

## 2018-12-31 RX ADMIN — ATROPINE SULFATE 0.25 MG: 1 INJECTION, SOLUTION INTRAMUSCULAR; INTRAVENOUS; SUBCUTANEOUS at 10:02

## 2018-12-31 RX ADMIN — IRINOTECAN HYDROCHLORIDE 320 MG: 20 INJECTION, SOLUTION INTRAVENOUS at 10:09

## 2018-12-31 RX ADMIN — BEVACIZUMAB 340 MG: 400 INJECTION, SOLUTION INTRAVENOUS at 09:13

## 2018-12-31 RX ADMIN — DEXAMETHASONE SODIUM PHOSPHATE: 10 INJECTION, SOLUTION INTRAMUSCULAR; INTRAVENOUS at 08:49

## 2018-12-31 NOTE — PROGRESS NOTES
Infusion therapy completed and CADD pump connected as per protocol  Pt/ aware to return to clinic in 46 hours for CADD d/c and neulasta on body injector placement

## 2018-12-31 NOTE — PROGRESS NOTES
To clinic for FOLFIRI plus Avastin  Pt reports that she has had upper respiratory symptoms of runny nose and scratchy throat for about a week  She denies any fevers, her drainage is clear--no yellow or green expectoration  Lab values and Blood Pressure WNL for tx today

## 2019-01-02 ENCOUNTER — DOCUMENTATION (OUTPATIENT)
Dept: HEMATOLOGY ONCOLOGY | Facility: CLINIC | Age: 54
End: 2019-01-02

## 2019-01-02 ENCOUNTER — HOSPITAL ENCOUNTER (OUTPATIENT)
Dept: INFUSION CENTER | Facility: CLINIC | Age: 54
Discharge: HOME | End: 2019-01-02
Payer: COMMERCIAL

## 2019-01-02 PROCEDURE — 96372 THER/PROPH/DIAG INJ SC/IM: CPT

## 2019-01-02 RX ADMIN — PEGFILGRASTIM 6 MG: KIT SUBCUTANEOUS at 10:05

## 2019-01-02 NOTE — PROGRESS NOTES
GI Oncology Nurse Navigator Note    Received a call from [facility] asking if the medical records and scans she requested to be sent to another facility were sent, informed her I had received a tracking number from medical records and looked it up and it showed the other facility had received the records on 12/24, she also asked about 2 bills she had received and asked if I could help with that, asked her to email me the claim numbers and I will look into them for her, she was agreeable to this

## 2019-01-02 NOTE — PROGRESS NOTES
Patient here for CADD D/C  Offers no complaints, denies any side effects at this time  Patient CADD removed per protocol and Nulasta on pro placed on right arm  Patient verbalized next apt date and time

## 2019-01-11 ENCOUNTER — TELEPHONE (OUTPATIENT)
Dept: HEMATOLOGY ONCOLOGY | Facility: CLINIC | Age: 54
End: 2019-01-11

## 2019-01-11 RX ORDER — ATROPINE SULFATE 1 MG/ML
0.25 INJECTION, SOLUTION INTRAMUSCULAR; INTRAVENOUS; SUBCUTANEOUS ONCE
Status: COMPLETED | OUTPATIENT
Start: 2019-01-14 | End: 2019-01-14

## 2019-01-11 RX ORDER — SODIUM CHLORIDE 9 MG/ML
20 INJECTION, SOLUTION INTRAVENOUS CONTINUOUS
Status: DISCONTINUED | OUTPATIENT
Start: 2019-01-14 | End: 2019-01-17 | Stop reason: HOSPADM

## 2019-01-11 NOTE — TELEPHONE ENCOUNTER
Call from patient confirming standing lab orders are in stating she will go for blood work tomorrow for chemo on Monday  I only saw order for CMP  Can you please add CBC w/Diff and if UA is needed  Call patient if needed  She will be going to [facility]

## 2019-01-12 ENCOUNTER — TRANSCRIBE ORDERS (OUTPATIENT)
Dept: ADMINISTRATIVE | Facility: HOSPITAL | Age: 54
End: 2019-01-12

## 2019-01-12 ENCOUNTER — APPOINTMENT (OUTPATIENT)
Dept: LAB | Facility: MEDICAL CENTER | Age: 54
End: 2019-01-12
Payer: COMMERCIAL

## 2019-01-12 DIAGNOSIS — C18.2 MALIGNANT NEOPLASM OF ASCENDING COLON (HCC): Primary | ICD-10-CM

## 2019-01-12 DIAGNOSIS — C18.2 MALIGNANT NEOPLASM OF ASCENDING COLON (HCC): ICD-10-CM

## 2019-01-12 LAB
ALBUMIN SERPL BCP-MCNC: 3.8 G/DL (ref 3.5–5)
ALP SERPL-CCNC: 117 U/L (ref 46–116)
ALT SERPL W P-5'-P-CCNC: 39 U/L (ref 12–78)
ANION GAP SERPL CALCULATED.3IONS-SCNC: 9 MMOL/L (ref 4–13)
AST SERPL W P-5'-P-CCNC: 17 U/L (ref 5–45)
BASOPHILS # BLD AUTO: 0.06 THOUSANDS/ΜL (ref 0–0.1)
BASOPHILS NFR BLD AUTO: 1 % (ref 0–1)
BILIRUB SERPL-MCNC: 0.27 MG/DL (ref 0.2–1)
BUN SERPL-MCNC: 9 MG/DL (ref 5–25)
CALCIUM SERPL-MCNC: 9.1 MG/DL (ref 8.3–10.1)
CHLORIDE SERPL-SCNC: 106 MMOL/L (ref 100–108)
CO2 SERPL-SCNC: 25 MMOL/L (ref 21–32)
CREAT SERPL-MCNC: 0.91 MG/DL (ref 0.6–1.3)
EOSINOPHIL # BLD AUTO: 0.19 THOUSAND/ΜL (ref 0–0.61)
EOSINOPHIL NFR BLD AUTO: 3 % (ref 0–6)
ERYTHROCYTE [DISTWIDTH] IN BLOOD BY AUTOMATED COUNT: 16 % (ref 11.6–15.1)
GFR SERPL CREATININE-BSD FRML MDRD: 73 ML/MIN/1.73SQ M
GLUCOSE SERPL-MCNC: 111 MG/DL (ref 65–140)
HCT VFR BLD AUTO: 44.5 % (ref 34.8–46.1)
HGB BLD-MCNC: 13.6 G/DL (ref 11.5–15.4)
IMM GRANULOCYTES # BLD AUTO: 0.04 THOUSAND/UL (ref 0–0.2)
IMM GRANULOCYTES NFR BLD AUTO: 1 % (ref 0–2)
LYMPHOCYTES # BLD AUTO: 1.84 THOUSANDS/ΜL (ref 0.6–4.47)
LYMPHOCYTES NFR BLD AUTO: 32 % (ref 14–44)
MCH RBC QN AUTO: 32 PG (ref 26.8–34.3)
MCHC RBC AUTO-ENTMCNC: 30.6 G/DL (ref 31.4–37.4)
MCV RBC AUTO: 105 FL (ref 82–98)
MONOCYTES # BLD AUTO: 0.48 THOUSAND/ΜL (ref 0.17–1.22)
MONOCYTES NFR BLD AUTO: 8 % (ref 4–12)
NEUTROPHILS # BLD AUTO: 3.2 THOUSANDS/ΜL (ref 1.85–7.62)
NEUTS SEG NFR BLD AUTO: 55 % (ref 43–75)
NRBC BLD AUTO-RTO: 0 /100 WBCS
PLATELET # BLD AUTO: 281 THOUSANDS/UL (ref 149–390)
PMV BLD AUTO: 10 FL (ref 8.9–12.7)
POTASSIUM SERPL-SCNC: 3.8 MMOL/L (ref 3.5–5.3)
PROT SERPL-MCNC: 7.2 G/DL (ref 6.4–8.2)
RBC # BLD AUTO: 4.25 MILLION/UL (ref 3.81–5.12)
SODIUM SERPL-SCNC: 140 MMOL/L (ref 136–145)
WBC # BLD AUTO: 5.81 THOUSAND/UL (ref 4.31–10.16)

## 2019-01-12 PROCEDURE — 85025 COMPLETE CBC W/AUTO DIFF WBC: CPT

## 2019-01-12 PROCEDURE — 36415 COLL VENOUS BLD VENIPUNCTURE: CPT

## 2019-01-12 PROCEDURE — 80053 COMPREHEN METABOLIC PANEL: CPT

## 2019-01-14 ENCOUNTER — HOSPITAL ENCOUNTER (OUTPATIENT)
Dept: INFUSION CENTER | Facility: CLINIC | Age: 54
Discharge: HOME | End: 2019-01-14
Payer: COMMERCIAL

## 2019-01-14 VITALS
HEIGHT: 66 IN | OXYGEN SATURATION: 99 % | DIASTOLIC BLOOD PRESSURE: 70 MMHG | WEIGHT: 149.5 LBS | SYSTOLIC BLOOD PRESSURE: 102 MMHG | RESPIRATION RATE: 14 BRPM | TEMPERATURE: 98.2 F | HEART RATE: 74 BPM | BODY MASS INDEX: 24.03 KG/M2

## 2019-01-14 PROCEDURE — 96413 CHEMO IV INFUSION 1 HR: CPT

## 2019-01-14 PROCEDURE — 96375 TX/PRO/DX INJ NEW DRUG ADDON: CPT

## 2019-01-14 PROCEDURE — G0498 CHEMO EXTEND IV INFUS W/PUMP: HCPCS

## 2019-01-14 PROCEDURE — 96368 THER/DIAG CONCURRENT INF: CPT

## 2019-01-14 PROCEDURE — 96417 CHEMO IV INFUS EACH ADDL SEQ: CPT

## 2019-01-14 PROCEDURE — 96367 TX/PROPH/DG ADDL SEQ IV INF: CPT

## 2019-01-14 PROCEDURE — 96415 CHEMO IV INFUSION ADDL HR: CPT

## 2019-01-14 RX ADMIN — LEUCOVORIN CALCIUM 700 MG: 500 INJECTION, POWDER, LYOPHILIZED, FOR SOLUTION INTRAMUSCULAR; INTRAVENOUS at 10:18

## 2019-01-14 RX ADMIN — SODIUM CHLORIDE 20 ML/HR: 0.9 INJECTION, SOLUTION INTRAVENOUS at 08:49

## 2019-01-14 RX ADMIN — DEXAMETHASONE SODIUM PHOSPHATE: 10 INJECTION, SOLUTION INTRAMUSCULAR; INTRAVENOUS at 08:49

## 2019-01-14 RX ADMIN — IRINOTECAN HYDROCHLORIDE 320 MG: 20 INJECTION, SOLUTION INTRAVENOUS at 10:22

## 2019-01-14 RX ADMIN — ATROPINE SULFATE 0.25 MG: 1 INJECTION, SOLUTION INTRAMUSCULAR; INTRAVENOUS; SUBCUTANEOUS at 09:26

## 2019-01-14 RX ADMIN — BEVACIZUMAB 340 MG: 400 INJECTION, SOLUTION INTRAVENOUS at 09:33

## 2019-01-14 NOTE — PROGRESS NOTES
Pt to clinic for leucovorin, camptosar, avastin, 5 FU Pump connect, pt offers no complaints, will continue to monitor, aware of next appointment, declines avs

## 2019-01-15 ENCOUNTER — OFFICE VISIT (OUTPATIENT)
Dept: HEMATOLOGY ONCOLOGY | Facility: CLINIC | Age: 54
End: 2019-01-15
Payer: COMMERCIAL

## 2019-01-15 ENCOUNTER — TELEPHONE (OUTPATIENT)
Dept: HEMATOLOGY ONCOLOGY | Facility: CLINIC | Age: 54
End: 2019-01-15

## 2019-01-15 VITALS
BODY MASS INDEX: 24.11 KG/M2 | TEMPERATURE: 97.8 F | HEIGHT: 66 IN | DIASTOLIC BLOOD PRESSURE: 72 MMHG | HEART RATE: 89 BPM | SYSTOLIC BLOOD PRESSURE: 112 MMHG | RESPIRATION RATE: 18 BRPM | WEIGHT: 150 LBS

## 2019-01-15 DIAGNOSIS — C78.7 LIVER METASTASES (HCC): ICD-10-CM

## 2019-01-15 DIAGNOSIS — I82.531 CHRONIC DEEP VEIN THROMBOSIS (DVT) OF POPLITEAL VEIN OF RIGHT LOWER EXTREMITY (HCC): ICD-10-CM

## 2019-01-15 DIAGNOSIS — C78.6 PERITONEAL METASTASES (HCC): ICD-10-CM

## 2019-01-15 DIAGNOSIS — C18.2 PRIMARY ADENOCARCINOMA OF ASCENDING COLON (HCC): Primary | ICD-10-CM

## 2019-01-15 DIAGNOSIS — C77.2 METASTASIS TO RETROPERITONEAL LYMPH NODE (HCC): ICD-10-CM

## 2019-01-15 PROCEDURE — 99214 OFFICE O/P EST MOD 30 MIN: CPT | Performed by: INTERNAL MEDICINE

## 2019-01-15 NOTE — TELEPHONE ENCOUNTER
Patient called and states she went to see Dr Maral Callejas for a second opinion and had a CT Scan done  She asked if you had gotten the results of that for her appointment today         She asked to be called back at [phone]

## 2019-01-15 NOTE — LETTER
January 16, 2019     Dima Lieberman MD  [address] [facility]  [address]  [address] [facility]    Patient: Savannah Sandy   YOB: 1965   Date of Visit: 1/15/2019       Dear Dr Terrance Spatz: Thank you for referring Tess Caro to me for evaluation  Below are my notes for this consultation  If you have questions, please do not hesitate to call me  I look forward to following your patient along with you  Sincerely,        Ceiclia Duggan MD        CC: No Recipients  Cecilia Duggan MD  1/16/2019  1:03 AM  Sign at close encounter    HPI:   Patient is here with her   She is being treated for abdominal carcinomatosis and metastatic disease to liver and abdomeninal lymph nodes and also disease in the pelvis from cancer of ascending colon  Patient  is responding to modified FOLFIRI plus Avastin  She had PPE in her hands and bolus 5 FU was discontinued  She also had iron deficiency and had Venofer intravenously  She is not on Venofer anymore  She was recently seen by liver specialist at at [facility] and was not felt to be a surgical candidate  She had CT scans in [place] that showed continuous improvement in her metastatic disease   Patient had DVT right lower leg below the knee in May 2016 and that happened after taking hormonal therapy in April 2016  In 2016 she tested positive once for lupus anticoagulant and that was negative on repeat test    She stayed on Xarelto until end of September 2016  She had heavy periods in in January 2018 and she was in bed for 3 days and she developed a clot in the left lower leg distally below the knee  She was restarted on Xarelto 20 mg daily   Xarelto dose is    being lowered to 10 mg daily because most recent Doppler study did not show DVT in the legs             On 05/21/2018 patient underwent GALI and BSO and there were cancer cells in the fallopian tubes   In June 2018 patient   underwent extended right hemicolectomy, partial omentectomy and biopsy of the peritoneal nodule   Peritoneal nodule came back  positive for metastatic adenocarcinoma from colon    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 and metastatic disease to liver    6 cm T3 G2 right colon cancer with positive margins, lymphovascular invasion and perineural invasion and positive 3 of 27 lymph nodes with extranodal extension and positive peritoneal nodule biopsy   Stage IV disease because of liver and peritoneal metastases     on 07/16/2018 patient was started on FOLFIRI chemotherapy and to that Avastin was added after 1 cycle  She had anal fissure that was hurting and occasionally that would bleed  She was seen by rectal surgeon  Now she does not have pain  Bleeding is very small and rare  She is keeping her bowel movements soft  Tiredness is less     Physical examination and test results are as recorded and discussed in detail especially results of MRI scans  As mentioned above she will not be getting 5 FU bolus and Xarelto dose is being decreased to 10 mg daily  No other change  Additional changes as needed  All discussed in detail  Questions answered                        Discussed the importance of self-breast examination, eating healthy foods, staying active and health screening tests     Patient is capable of Self care   Karriemargaret Watsonon is prolongation of survival from colon cancer and no more blood clot and bleeding      Current Outpatient Prescriptions:     acetaminophen (TYLENOL) 500 mg tablet, Take 1,000 mg by mouth every 6 (six) hours as needed for mild pain, Disp: , Rfl:     AFLURIA QUADRIVALENT 0 5 ML LEATHA, TO BE ADMINISTERED BY PHARMACIST FOR IMMUNIZATION, Disp: , Rfl: 0    lidocaine-prilocaine (EMLA) cream, Apply 1 hour prior to chemo, cover in wrap, Disp: 30 g, Rfl: 0    nifedipine 0 2 % OINT, Apply topically every 4 (four) hours 0 3% ointment as typical(disregard 0 2% as auto order) 1 application to anal opening 4-6 times daily, Disp: 1 Tube, Rfl: 0    rivaroxaban (XARELTO) 10 mg tablet, Take 1 tablet (10 mg total) by mouth daily, Disp: 90 tablet, Rfl: 1  No current facility-administered medications for this visit  Facility-Administered Medications Ordered in Other Visits:     alteplase (CATHFLO) injection 2 mg, 2 mg, Intracatheter, PRN, Marcella Alicia MD    [START ON 1/16/2019] alteplase (CATHFLO) injection 2 mg, 2 mg, Intracatheter, PRN, Marcella Alicia MD    [START ON 1/16/2019] pegfilgrastim (NEULASTA ONPRO) subcutaneous injection kit 6 mg, 6 mg, Subcutaneous, Once, Marcella Alicia MD    sodium chloride 0 9 % infusion, 20 mL/hr, Intravenous, Continuous, Marcella Alicia MD, Stopped at 01/14/19 1200    No Known Allergies       Primary adenocarcinoma of ascending colon ([facility] )    6/14/2018 Initial Diagnosis     [facility] adenocarcinoma [facility] ascending [facility] ([facility] )         7/17/2018 - 8/1/2018 Chemotherapy     camptosar 180 mg/m2 day 1  5  mg/m2 day 1  5 FU 1200 mg/m2 day 1-2   Leucovorin 400 mg/m2     Venofer 100 mg (first 10 treatments) -- all every 2 weeks             8/1/2018 Adverse Reaction     Needed granix 300 mcg due to [address] of 1 01 8/14/2018 -  Chemotherapy     camptosar 180 mg/m2 day 1  5  mg/m2 day 1  5 FU 1200 mg/m2 day 1-2   Leucovorin 400 mg/m2  Avastin 5 mg/kg day 1   Venofer 100 mg (first 10 treatments)  Neulasta on Pro 6 mg day 3      -- every 2 weeks                 ROS:  01/15/19 Reviewed 13 systems:  Presently no headaches, seizures, dizziness, diplopia, dysphagia, hoarseness, chest pain, palpitations, shortness of breath, cough, hemoptysis, abdominal pain, nausea, vomiting,  melena, hematuria, fever, chills,  bone pains, skin rash, weight loss, arthritic symptoms,  weakness, numbness,  claudication and gait problem  No frequent infections  Not unusually sensitive to heat or cold  No swelling of the ankles  No swollen glands  Patient is anxious    No GYN symptoms Other symptoms are in HPI        /72 (BP Location: Left arm, Patient Position: Sitting, Cuff Size: Adult)   Pulse 89   Temp 97 8 °F (36 6 °C)   Resp 18   Ht 5' 6" (1 676 m)   Wt 68 kg (150 lb)   LMP 05/16/2018 (LMP Unknown)   BMI 24 21 kg/m²      Physical Exam:  Alert, oriented, not in distress, no icterus, no oral thrush, no palpable neck mass, clear lung fields, regular heart rate, abdomen  soft and non tender, no palpable abdominal mass, no ascites, no edema of ankles, no calf tenderness, no focal neurological deficit, no skin rash, no palpable lymphadenopathy in the neck and axillary areas, good arterial pulses, no clubbing  Patient is anxious  Performance status 1      IMAGING:      LABS:  Results for orders placed or performed in visit on 01/12/19   CBC and differential   Result Value Ref Range    WBC 5 81 4 31 - 10 16 Thousand/uL    RBC 4 25 3 81 - 5 12 Million/uL    Hemoglobin 13 6 11 5 - 15 4 g/dL    Hematocrit 44 5 34 8 - 46 1 %     (H) 82 - 98 fL    MCH 32 0 26 8 - 34 3 pg    MCHC 30 6 (L) 31 4 - 37 4 g/dL    RDW 16 0 (H) 11 6 - 15 1 %    MPV 10 0 8 9 - 12 7 fL    Platelets 170 429 - 385 Thousands/uL    nRBC 0 /100 WBCs    Neutrophils Relative 55 43 - 75 %    Immat GRANS % 1 0 - 2 %    Lymphocytes Relative 32 14 - 44 %    Monocytes Relative 8 4 - 12 %    Eosinophils Relative 3 0 - 6 %    Basophils Relative 1 0 - 1 %    Neutrophils Absolute 3 20 1 85 - 7 62 Thousands/µL    Immature Grans Absolute 0 04 0 00 - 0 20 Thousand/uL    Lymphocytes Absolute 1 84 0 60 - 4 47 Thousands/µL    Monocytes Absolute 0 48 0 17 - 1 22 Thousand/µL    Eosinophils Absolute 0 19 0 00 - 0 61 Thousand/µL    Basophils Absolute 0 06 0 00 - 0 10 Thousands/µL     Labs, Imaging, & Other studies:   All pertinent labs and imaging studies were personally reviewed    Lab Results   Component Value Date     03/18/2015    K 3 8 01/12/2019     01/12/2019    CO2 25 01/12/2019    ANIONGAP 9 03/18/2015    BUN 9 01/12/2019    CREATININE 0 91 01/12/2019    GLUCOSE 100 03/18/2015    GLUF 91 11/05/2018    CALCIUM 9 1 01/12/2019    AST 17 01/12/2019    ALT 39 01/12/2019    ALKPHOS 117 (H) 01/12/2019    PROT 6 6 03/18/2015    BILITOT 0 65 03/18/2015    EGFR 73 01/12/2019     Lab Results   Component Value Date    WBC 5 81 01/12/2019    HGB 13 6 01/12/2019    HCT 44 5 01/12/2019     (H) 01/12/2019     01/12/2019   No results found for: [address] [facility] and discussed with patient  Assessment and plan:   Patient is here with her   She is being treated for abdominal carcinomatosis and metastatic disease to liver and abdomeninal lymph nodes and also disease in the pelvis from cancer of ascending colon  Patient  is responding to modified FOLFIRI plus Avastin  She had PPE in her hands and bolus 5 FU was discontinued  She also had iron deficiency and had Venofer intravenously  She is not on Venofer anymore  She was recently seen by liver specialist at at [facility] and was not felt to be a surgical candidate  She had CT scans in [place] that showed continuous improvement in her metastatic disease   Patient had DVT right lower leg below the knee in May 2016 and that happened after taking hormonal therapy in April 2016  In 2016 she tested positive once for lupus anticoagulant and that was negative on repeat test    She stayed on Xarelto until end of September 2016  She had heavy periods in in January 2018 and she was in bed for 3 days and she developed a clot in the left lower leg distally below the knee  She was restarted on Xarelto 20 mg daily   Xarelto dose is    being lowered to 10 mg daily because most recent Doppler study did not show DVT in the legs             On 05/21/2018 patient underwent GALI and BSO and there were cancer cells in the fallopian tubes   In June 2018 patient   underwent extended right hemicolectomy, partial omentectomy and biopsy of the peritoneal nodule   Peritoneal nodule came back  positive for metastatic adenocarcinoma from colon    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 and metastatic disease to liver    6 cm T3 G2 right colon cancer with positive margins, lymphovascular invasion and perineural invasion and positive 3 of 27 lymph nodes with extranodal extension and positive peritoneal nodule biopsy   Stage IV disease because of liver and peritoneal metastases     on 07/16/2018 patient was started on FOLFIRI chemotherapy and to that Avastin was added after 1 cycle  She had anal fissure that was hurting and occasionally that would bleed  She was seen by rectal surgeon  Now she does not have pain  Bleeding is very small and rare  She is keeping her bowel movements soft  Tiredness is less     Physical examination and test results are as recorded and discussed in detail especially results of CT scans in [place] and I have the report  No change in therapy  Ordered follow-up MRI scans  All discussed in detail  Questions answered                        Discussed the importance of self-breast examination, eating healthy foods, staying active and health screening tests     Patient is capable of Self care   Karrie Cruz is prolongation of survival from colon cancer and no more blood clot and bleeding  1  Primary adenocarcinoma of ascending colon (HCC)    - CBC and differential; Standing  - Comprehensive metabolic panel; Standing  - CBC and differential  - Comprehensive metabolic panel  - MRI abdomen w wo contrast; Future  - MRI pelvis w wo contrast; Future    2  Peritoneal metastases ([address] )    - MRI abdomen w wo contrast; Future  - MRI pelvis w wo contrast; Future    3  Liver metastases ([address] )    - MRI abdomen w wo contrast; Future    4  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

## 2019-01-15 NOTE — PROGRESS NOTES
HPI:   Patient is here with her   She is being treated for abdominal carcinomatosis and metastatic disease to liver and abdomeninal lymph nodes and also disease in the pelvis from cancer of ascending colon  Patient  is responding to modified FOLFIRI plus Avastin  She had PPE in her hands and bolus 5 FU was discontinued  She also had iron deficiency and had Venofer intravenously  She is not on Venofer anymore  She was recently seen by liver specialist at at [facility] and was not felt to be a surgical candidate  She had CT scans in [place] that showed continuous improvement in her metastatic disease   Patient had DVT right lower leg below the knee in May 2016 and that happened after taking hormonal therapy in April 2016  In 2016 she tested positive once for lupus anticoagulant and that was negative on repeat test    She stayed on Xarelto until end of September 2016  She had heavy periods in in January 2018 and she was in bed for 3 days and she developed a clot in the left lower leg distally below the knee  She was restarted on Xarelto 20 mg daily   Xarelto dose is    being lowered to 10 mg daily because most recent Doppler study did not show DVT in the legs             On 05/21/2018 patient underwent GALI and BSO and there were cancer cells in the fallopian tubes   In June 2018 patient   underwent extended right hemicolectomy, partial omentectomy and biopsy of the peritoneal nodule   Peritoneal nodule came back  positive for metastatic adenocarcinoma from colon    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 and metastatic disease to liver    6 cm T3 G2 right colon cancer with positive margins, lymphovascular invasion and perineural invasion and positive 3 of 27 lymph nodes with extranodal extension and positive peritoneal nodule biopsy   Stage IV disease because of liver and peritoneal metastases     on 07/16/2018 patient was started on FOLFIRI chemotherapy and to that Avastin was added after 1 cycle  She had anal fissure that was hurting and occasionally that would bleed  She was seen by rectal surgeon  Now she does not have pain  Bleeding is very small and rare  She is keeping her bowel movements soft  Tiredness is less     Physical examination and test results are as recorded and discussed in detail especially results of MRI scans  As mentioned above she will not be getting 5 FU bolus and Xarelto dose is being decreased to 10 mg daily  No other change  Additional changes as needed  All discussed in detail  Questions answered                        Discussed the importance of self-breast examination, eating healthy foods, staying active and health screening tests     Patient is capable of Self care   Alejandro Mates is prolongation of survival from colon cancer and no more blood clot and bleeding      Current Outpatient Prescriptions:     acetaminophen (TYLENOL) 500 mg tablet, Take 1,000 mg by mouth every 6 (six) hours as needed for mild pain, Disp: , Rfl:     AFLURIA QUADRIVALENT 0 5 ML LEATHA, TO BE ADMINISTERED BY PHARMACIST FOR IMMUNIZATION, Disp: , Rfl: 0    lidocaine-prilocaine (EMLA) cream, Apply 1 hour prior to chemo, cover in wrap, Disp: 30 g, Rfl: 0    nifedipine 0 2 % OINT, Apply topically every 4 (four) hours 0 3% ointment as typical(disregard 0 2% as auto order) 1 application to anal opening 4-6 times daily, Disp: 1 Tube, Rfl: 0    rivaroxaban (XARELTO) 10 mg tablet, Take 1 tablet (10 mg total) by mouth daily, Disp: 90 tablet, Rfl: 1  No current facility-administered medications for this visit       Facility-Administered Medications Ordered in Other Visits:     alteplase (CATHFLO) injection 2 mg, 2 mg, Intracatheter, PRN, Nicci Tse MD    [START ON 1/16/2019] alteplase (CATHFLO) injection 2 mg, 2 mg, Intracatheter, PRN, Nicci Tse MD    [START ON 1/16/2019] pegfilgrastim (NEULASTA ONPRO) subcutaneous injection kit 6 mg, 6 mg, Subcutaneous, Once, Hakeem Perrin MD    sodium chloride 0 9 % infusion, 20 mL/hr, Intravenous, Continuous, Hakeem Perrin MD, Stopped at 01/14/19 1200    No Known Allergies       Primary adenocarcinoma of ascending colon (HCC)    6/14/2018 Initial Diagnosis     Primary adenocarcinoma of ascending colon (HCC)         7/17/2018 - 8/1/2018 Chemotherapy     camptosar 180 mg/m2 day 1  5  mg/m2 day 1  5 FU 1200 mg/m2 day 1-2   Leucovorin 400 mg/m2     Venofer 100 mg (first 10 treatments) -- all every 2 weeks             8/1/2018 Adverse Reaction     Needed granix 300 mcg due to [address] of 1 01 8/14/2018 -  Chemotherapy     camptosar 180 mg/m2 day 1  5  mg/m2 day 1  5 FU 1200 mg/m2 day 1-2   Leucovorin 400 mg/m2  Avastin 5 mg/kg day 1   Venofer 100 mg (first 10 treatments)  Neulasta on Pro 6 mg day 3      -- every 2 weeks                 ROS:  01/15/19 Reviewed 13 systems:  Presently no headaches, seizures, dizziness, diplopia, dysphagia, hoarseness, chest pain, palpitations, shortness of breath, cough, hemoptysis, abdominal pain, nausea, vomiting,  melena, hematuria, fever, chills,  bone pains, skin rash, weight loss, arthritic symptoms,  weakness, numbness,  claudication and gait problem  No frequent infections  Not unusually sensitive to heat or cold  No swelling of the ankles  No swollen glands  Patient is anxious    No GYN symptoms Other symptoms are in HPI        /72 (BP Location: Left arm, Patient Position: Sitting, Cuff Size: Adult)   Pulse 89   Temp 97 8 °F (36 6 °C)   Resp 18   Ht 5' 6" (1 676 m)   Wt 68 kg (150 lb)   LMP 05/16/2018 (LMP Unknown)   BMI 24 21 kg/m²     Physical Exam:  Alert, oriented, not in distress, no icterus, no oral thrush, no palpable neck mass, clear lung fields, regular heart rate, abdomen  soft and non tender, no palpable abdominal mass, no ascites, no edema of ankles, no calf tenderness, no focal neurological deficit, no skin rash, no palpable lymphadenopathy in the neck and axillary areas, good arterial pulses, no clubbing  Patient is anxious  Performance status 1  IMAGING:      LABS:  Results for orders placed or performed in visit on 01/12/19   CBC and differential   Result Value Ref Range    WBC 5 81 4 31 - 10 16 Thousand/uL    RBC 4 25 3 81 - 5 12 Million/uL    Hemoglobin 13 6 11 5 - 15 4 g/dL    Hematocrit 44 5 34 8 - 46 1 %     (H) 82 - 98 fL    MCH 32 0 26 8 - 34 3 pg    MCHC 30 6 (L) 31 4 - 37 4 g/dL    RDW 16 0 (H) 11 6 - 15 1 %    MPV 10 0 8 9 - 12 7 fL    Platelets 825 323 - 989 Thousands/uL    nRBC 0 /100 WBCs    Neutrophils Relative 55 43 - 75 %    Immat GRANS % 1 0 - 2 %    Lymphocytes Relative 32 14 - 44 %    Monocytes Relative 8 4 - 12 %    Eosinophils Relative 3 0 - 6 %    Basophils Relative 1 0 - 1 %    Neutrophils Absolute 3 20 1 85 - 7 62 Thousands/µL    Immature Grans Absolute 0 04 0 00 - 0 20 Thousand/uL    Lymphocytes Absolute 1 84 0 60 - 4 47 Thousands/µL    Monocytes Absolute 0 48 0 17 - 1 22 Thousand/µL    Eosinophils Absolute 0 19 0 00 - 0 61 Thousand/µL    Basophils Absolute 0 06 0 00 - 0 10 Thousands/µL     Labs, Imaging, & Other studies:   All pertinent labs and imaging studies were personally reviewed    Lab Results   Component Value Date     03/18/2015    K 3 8 01/12/2019     01/12/2019    CO2 25 01/12/2019    ANIONGAP 9 03/18/2015    BUN 9 01/12/2019    CREATININE 0 91 01/12/2019    GLUCOSE 100 03/18/2015    GLUF 91 11/05/2018    CALCIUM 9 1 01/12/2019    AST 17 01/12/2019    ALT 39 01/12/2019    ALKPHOS 117 (H) 01/12/2019    PROT 6 6 03/18/2015    BILITOT 0 65 03/18/2015    EGFR 73 01/12/2019     Lab Results   Component Value Date    WBC 5 81 01/12/2019    HGB 13 6 01/12/2019    HCT 44 5 01/12/2019     (H) 01/12/2019     01/12/2019   No results found for: SEDRATE    Reviewed and discussed with patient  Assessment and plan:   Patient is here with her     She is being treated for abdominal carcinomatosis and metastatic disease to liver and abdomeninal lymph nodes and also disease in the pelvis from cancer of ascending colon  Patient  is responding to modified FOLFIRI plus Avastin  She had PPE in her hands and bolus 5 FU was discontinued  She also had iron deficiency and had Venofer intravenously  She is not on Venofer anymore  She was recently seen by liver specialist at at [facility] and was not felt to be a surgical candidate  She had CT scans in [place] that showed continuous improvement in her metastatic disease   Patient had DVT right lower leg below the knee in May 2016 and that happened after taking hormonal therapy in April 2016  In 2016 she tested positive once for lupus anticoagulant and that was negative on repeat test    She stayed on Xarelto until end of September 2016  She had heavy periods in in January 2018 and she was in bed for 3 days and she developed a clot in the left lower leg distally below the knee  She was restarted on Xarelto 20 mg daily  Xarelto dose is    being lowered to 10 mg daily because most recent Doppler study did not show DVT in the legs             On 05/21/2018 patient underwent GALI and BSO and there were cancer cells in the fallopian tubes   In June 2018 patient   underwent extended right hemicolectomy, partial omentectomy and biopsy of the peritoneal nodule   Peritoneal nodule came back  positive for metastatic adenocarcinoma from colon    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 and metastatic disease to liver    6 cm T3 G2 right colon cancer with positive margins, lymphovascular invasion and perineural invasion and positive 3 of 27 lymph nodes with extranodal extension and positive peritoneal nodule biopsy   Stage IV disease because of liver and peritoneal metastases     on 07/16/2018 patient was started on FOLFIRI chemotherapy and to that Avastin was added after 1 cycle     She had anal fissure that was hurting and occasionally that would bleed  She was seen by rectal surgeon  Now she does not have pain  Bleeding is very small and rare  She is keeping her bowel movements soft  Tiredness is less     Physical examination and test results are as recorded and discussed in detail especially results of CT scans in [place] and I have the report  No change in therapy  Ordered follow-up MRI scans  All discussed in detail  Questions answered                        Discussed the importance of self-breast examination, eating healthy foods, staying active and health screening tests     Patient is capable of Self care   Gail Mccabe is prolongation of survival from colon cancer and no more blood clot and bleeding  1  Primary adenocarcinoma of ascending colon (HCC)    - CBC and differential; Standing  - Comprehensive metabolic panel; Standing  - CBC and differential  - Comprehensive metabolic panel  - MRI abdomen w wo contrast; Future  - MRI pelvis w wo contrast; Future    2  Peritoneal metastases ([address] )    - MRI abdomen w wo contrast; Future  - MRI pelvis w wo contrast; Future    3  Liver metastases ([address] )    - MRI abdomen w wo contrast; Future    4  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

## 2019-01-16 ENCOUNTER — HOSPITAL ENCOUNTER (OUTPATIENT)
Dept: INFUSION CENTER | Facility: CLINIC | Age: 54
Discharge: HOME | End: 2019-01-16
Payer: COMMERCIAL

## 2019-01-16 PROCEDURE — 96372 THER/PROPH/DIAG INJ SC/IM: CPT

## 2019-01-16 RX ADMIN — PEGFILGRASTIM 6 MG: KIT SUBCUTANEOUS at 10:24

## 2019-01-16 NOTE — PROGRESS NOTES
Pt  offers no complaints  CADD pump disconnected,  res  Vol  0 mL , good blood return noted  Neulasta Onpro applied to [facility]  AVS declined  Next appt  confirmed

## 2019-01-25 RX ORDER — ATROPINE SULFATE 1 MG/ML
0.25 INJECTION, SOLUTION INTRAMUSCULAR; INTRAVENOUS; SUBCUTANEOUS ONCE
Status: COMPLETED | OUTPATIENT
Start: 2019-01-28 | End: 2019-01-28

## 2019-01-25 RX ORDER — SODIUM CHLORIDE 9 MG/ML
20 INJECTION, SOLUTION INTRAVENOUS CONTINUOUS
Status: DISCONTINUED | OUTPATIENT
Start: 2019-01-28 | End: 2019-01-31 | Stop reason: HOSPADM

## 2019-01-26 ENCOUNTER — APPOINTMENT (OUTPATIENT)
Dept: LAB | Facility: MEDICAL CENTER | Age: 54
End: 2019-01-26
Payer: COMMERCIAL

## 2019-01-28 ENCOUNTER — HOSPITAL ENCOUNTER (OUTPATIENT)
Dept: INFUSION CENTER | Facility: CLINIC | Age: 54
Discharge: HOME | End: 2019-01-28
Payer: COMMERCIAL

## 2019-01-28 VITALS
HEART RATE: 73 BPM | OXYGEN SATURATION: 96 % | WEIGHT: 151 LBS | TEMPERATURE: 98.3 F | SYSTOLIC BLOOD PRESSURE: 131 MMHG | DIASTOLIC BLOOD PRESSURE: 73 MMHG | BODY MASS INDEX: 24.27 KG/M2 | RESPIRATION RATE: 18 BRPM | HEIGHT: 66 IN

## 2019-01-28 PROCEDURE — 96375 TX/PRO/DX INJ NEW DRUG ADDON: CPT

## 2019-01-28 PROCEDURE — 96413 CHEMO IV INFUSION 1 HR: CPT

## 2019-01-28 PROCEDURE — 96368 THER/DIAG CONCURRENT INF: CPT

## 2019-01-28 PROCEDURE — 96417 CHEMO IV INFUS EACH ADDL SEQ: CPT

## 2019-01-28 PROCEDURE — G0498 CHEMO EXTEND IV INFUS W/PUMP: HCPCS

## 2019-01-28 PROCEDURE — 96367 TX/PROPH/DG ADDL SEQ IV INF: CPT

## 2019-01-28 RX ADMIN — SODIUM CHLORIDE 20 ML/HR: 0.9 INJECTION, SOLUTION INTRAVENOUS at 09:00

## 2019-01-28 RX ADMIN — ATROPINE SULFATE 0.25 MG: 1 INJECTION, SOLUTION INTRAMUSCULAR; INTRAVENOUS; SUBCUTANEOUS at 09:46

## 2019-01-28 RX ADMIN — BEVACIZUMAB 340 MG: 400 INJECTION, SOLUTION INTRAVENOUS at 09:49

## 2019-01-28 RX ADMIN — IRINOTECAN HYDROCHLORIDE 320 MG: 20 INJECTION, SOLUTION INTRAVENOUS at 10:36

## 2019-01-28 RX ADMIN — DEXAMETHASONE SODIUM PHOSPHATE: 10 INJECTION, SOLUTION INTRAMUSCULAR; INTRAVENOUS at 09:19

## 2019-01-28 RX ADMIN — LEUCOVORIN CALCIUM 700 MG: 500 INJECTION, POWDER, LYOPHILIZED, FOR SOLUTION INTRAMUSCULAR; INTRAVENOUS at 10:34

## 2019-01-28 NOTE — PROGRESS NOTES
Pt to clinic for avastin, leucovorin, camptosar, 5 fu pump   Pt offers no complaints at this time, will continue to monitor, aware of next appointment, declines avs

## 2019-01-30 ENCOUNTER — HOSPITAL ENCOUNTER (OUTPATIENT)
Dept: INFUSION CENTER | Facility: CLINIC | Age: 54
Discharge: HOME | End: 2019-01-30
Payer: COMMERCIAL

## 2019-01-30 PROCEDURE — 96372 THER/PROPH/DIAG INJ SC/IM: CPT

## 2019-01-30 RX ADMIN — PEGFILGRASTIM 6 MG: KIT SUBCUTANEOUS at 10:29

## 2019-01-30 NOTE — PROGRESS NOTES
Pt to clinic for cadd dc and neulasta on body, pt offers no complaints at this time, aware of next appointment, declines avs

## 2019-02-08 RX ORDER — ATROPINE SULFATE 1 MG/ML
0.25 INJECTION, SOLUTION INTRAMUSCULAR; INTRAVENOUS; SUBCUTANEOUS ONCE
Status: COMPLETED | OUTPATIENT
Start: 2019-02-11 | End: 2019-02-11

## 2019-02-08 RX ORDER — SODIUM CHLORIDE 9 MG/ML
20 INJECTION, SOLUTION INTRAVENOUS CONTINUOUS
Status: DISCONTINUED | OUTPATIENT
Start: 2019-02-11 | End: 2019-02-14 | Stop reason: HOSPADM

## 2019-02-09 ENCOUNTER — APPOINTMENT (OUTPATIENT)
Dept: LAB | Facility: MEDICAL CENTER | Age: 54
End: 2019-02-09
Payer: COMMERCIAL

## 2019-02-09 LAB
ALBUMIN SERPL BCP-MCNC: 3.7 G/DL (ref 3.5–5)
ALP SERPL-CCNC: 112 U/L (ref 46–116)
ALT SERPL W P-5'-P-CCNC: 35 U/L (ref 12–78)
ANION GAP SERPL CALCULATED.3IONS-SCNC: 5 MMOL/L (ref 4–13)
AST SERPL W P-5'-P-CCNC: 14 U/L (ref 5–45)
BASOPHILS # BLD AUTO: 0.05 THOUSANDS/ΜL (ref 0–0.1)
BASOPHILS NFR BLD AUTO: 1 % (ref 0–1)
BILIRUB SERPL-MCNC: 0.29 MG/DL (ref 0.2–1)
BUN SERPL-MCNC: 12 MG/DL (ref 5–25)
CALCIUM SERPL-MCNC: 8.9 MG/DL (ref 8.3–10.1)
CHLORIDE SERPL-SCNC: 107 MMOL/L (ref 100–108)
CO2 SERPL-SCNC: 27 MMOL/L (ref 21–32)
CREAT SERPL-MCNC: 0.85 MG/DL (ref 0.6–1.3)
EOSINOPHIL # BLD AUTO: 0.14 THOUSAND/ΜL (ref 0–0.61)
EOSINOPHIL NFR BLD AUTO: 2 % (ref 0–6)
ERYTHROCYTE [DISTWIDTH] IN BLOOD BY AUTOMATED COUNT: 15.9 % (ref 11.6–15.1)
GFR SERPL CREATININE-BSD FRML MDRD: 80 ML/MIN/1.73SQ M
GLUCOSE P FAST SERPL-MCNC: 88 MG/DL (ref 65–99)
HCT VFR BLD AUTO: 38.5 % (ref 34.8–46.1)
HGB BLD-MCNC: 11.8 G/DL (ref 11.5–15.4)
IMM GRANULOCYTES # BLD AUTO: 0.13 THOUSAND/UL (ref 0–0.2)
IMM GRANULOCYTES NFR BLD AUTO: 2 % (ref 0–2)
LYMPHOCYTES # BLD AUTO: 1.67 THOUSANDS/ΜL (ref 0.6–4.47)
LYMPHOCYTES NFR BLD AUTO: 24 % (ref 14–44)
MCH RBC QN AUTO: 32.3 PG (ref 26.8–34.3)
MCHC RBC AUTO-ENTMCNC: 30.6 G/DL (ref 31.4–37.4)
MCV RBC AUTO: 106 FL (ref 82–98)
MONOCYTES # BLD AUTO: 0.65 THOUSAND/ΜL (ref 0.17–1.22)
MONOCYTES NFR BLD AUTO: 10 % (ref 4–12)
NEUTROPHILS # BLD AUTO: 4.2 THOUSANDS/ΜL (ref 1.85–7.62)
NEUTS SEG NFR BLD AUTO: 61 % (ref 43–75)
NRBC BLD AUTO-RTO: 0 /100 WBCS
PLATELET # BLD AUTO: 217 THOUSANDS/UL (ref 149–390)
PMV BLD AUTO: 10.5 FL (ref 8.9–12.7)
POTASSIUM SERPL-SCNC: 3.9 MMOL/L (ref 3.5–5.3)
PROT SERPL-MCNC: 6.7 G/DL (ref 6.4–8.2)
RBC # BLD AUTO: 3.65 MILLION/UL (ref 3.81–5.12)
SODIUM SERPL-SCNC: 139 MMOL/L (ref 136–145)
WBC # BLD AUTO: 6.84 THOUSAND/UL (ref 4.31–10.16)

## 2019-02-09 PROCEDURE — 85025 COMPLETE CBC W/AUTO DIFF WBC: CPT | Performed by: INTERNAL MEDICINE

## 2019-02-09 PROCEDURE — 36415 COLL VENOUS BLD VENIPUNCTURE: CPT | Performed by: INTERNAL MEDICINE

## 2019-02-09 PROCEDURE — 80053 COMPREHEN METABOLIC PANEL: CPT | Performed by: INTERNAL MEDICINE

## 2019-02-11 ENCOUNTER — HOSPITAL ENCOUNTER (OUTPATIENT)
Dept: INFUSION CENTER | Facility: CLINIC | Age: 54
Discharge: HOME | End: 2019-02-11
Payer: COMMERCIAL

## 2019-02-11 VITALS
OXYGEN SATURATION: 96 % | WEIGHT: 151 LBS | DIASTOLIC BLOOD PRESSURE: 70 MMHG | BODY MASS INDEX: 25.16 KG/M2 | RESPIRATION RATE: 18 BRPM | HEART RATE: 86 BPM | HEIGHT: 65 IN | TEMPERATURE: 97.8 F | SYSTOLIC BLOOD PRESSURE: 100 MMHG

## 2019-02-11 PROCEDURE — 96415 CHEMO IV INFUSION ADDL HR: CPT

## 2019-02-11 PROCEDURE — 96413 CHEMO IV INFUSION 1 HR: CPT

## 2019-02-11 PROCEDURE — 96368 THER/DIAG CONCURRENT INF: CPT

## 2019-02-11 PROCEDURE — 96375 TX/PRO/DX INJ NEW DRUG ADDON: CPT

## 2019-02-11 PROCEDURE — 96417 CHEMO IV INFUS EACH ADDL SEQ: CPT

## 2019-02-11 PROCEDURE — 96367 TX/PROPH/DG ADDL SEQ IV INF: CPT

## 2019-02-11 PROCEDURE — G0498 CHEMO EXTEND IV INFUS W/PUMP: HCPCS

## 2019-02-11 RX ADMIN — ATROPINE SULFATE 0.25 MG: 1 INJECTION, SOLUTION INTRAMUSCULAR; INTRAVENOUS; SUBCUTANEOUS at 10:23

## 2019-02-11 RX ADMIN — SODIUM CHLORIDE 20 ML/HR: 0.9 INJECTION, SOLUTION INTRAVENOUS at 09:00

## 2019-02-11 RX ADMIN — DEXAMETHASONE SODIUM PHOSPHATE: 10 INJECTION, SOLUTION INTRAMUSCULAR; INTRAVENOUS at 09:17

## 2019-02-11 RX ADMIN — LEUCOVORIN CALCIUM 700 MG: 500 INJECTION, POWDER, LYOPHILIZED, FOR SOLUTION INTRAMUSCULAR; INTRAVENOUS at 10:29

## 2019-02-11 RX ADMIN — IRINOTECAN HYDROCHLORIDE 320 MG: 20 INJECTION, SOLUTION INTRAVENOUS at 10:29

## 2019-02-11 RX ADMIN — BEVACIZUMAB 342 MG: 400 INJECTION, SOLUTION INTRAVENOUS at 09:44

## 2019-02-11 NOTE — PROGRESS NOTES
Patient tolerated treatment today without issue  Patient connected to CADD pump for 46 hour infusion of 5FU  All connections secured with tape  Green indicator light verified flashing before d/c  Patient aware to return on Wednesday at 1005 for disconnect   Patient declined AVS

## 2019-02-13 ENCOUNTER — HOSPITAL ENCOUNTER (OUTPATIENT)
Dept: INFUSION CENTER | Facility: CLINIC | Age: 54
Discharge: HOME | End: 2019-02-13
Payer: COMMERCIAL

## 2019-02-13 PROCEDURE — 96372 THER/PROPH/DIAG INJ SC/IM: CPT

## 2019-02-13 RX ADMIN — PEGFILGRASTIM 6 MG: KIT SUBCUTANEOUS at 10:28

## 2019-02-13 NOTE — PROGRESS NOTES
Patient here for CADD d/c and neulasta onpro placement  She is doing well  She does report her mouth feeling, dry, swollen and sore but this is nothing new and is not worse from previous  She denies any mouth sores  She uses biotene and a colgate rinse that she believes is alcohol free  I encouraged her to keep us informed if this worsens  She did tolerate CADD d/c well  Neulasta onpro then placed to ANGE for injection tomorrow at 1330  Patient is familiar with injection and removal of onpro  She was discharged post procedure and will RTO 2/25/19 for next cycle

## 2019-02-22 RX ORDER — ATROPINE SULFATE 1 MG/ML
0.25 INJECTION, SOLUTION INTRAMUSCULAR; INTRAVENOUS; SUBCUTANEOUS ONCE
Status: COMPLETED | OUTPATIENT
Start: 2019-02-25 | End: 2019-02-25

## 2019-02-22 RX ORDER — SODIUM CHLORIDE 9 MG/ML
20 INJECTION, SOLUTION INTRAVENOUS CONTINUOUS
Status: DISCONTINUED | OUTPATIENT
Start: 2019-02-23 | End: 2019-02-28 | Stop reason: HOSPADM

## 2019-02-23 ENCOUNTER — APPOINTMENT (OUTPATIENT)
Dept: LAB | Facility: MEDICAL CENTER | Age: 54
End: 2019-02-23
Payer: COMMERCIAL

## 2019-02-25 ENCOUNTER — TELEPHONE (OUTPATIENT)
Dept: HEMATOLOGY ONCOLOGY | Facility: CLINIC | Age: 54
End: 2019-02-25

## 2019-02-25 ENCOUNTER — HOSPITAL ENCOUNTER (OUTPATIENT)
Dept: INFUSION CENTER | Facility: CLINIC | Age: 54
Discharge: HOME | End: 2019-02-25
Payer: COMMERCIAL

## 2019-02-25 VITALS
BODY MASS INDEX: 24.67 KG/M2 | HEART RATE: 74 BPM | SYSTOLIC BLOOD PRESSURE: 129 MMHG | TEMPERATURE: 98 F | RESPIRATION RATE: 18 BRPM | WEIGHT: 153.5 LBS | HEIGHT: 66 IN | OXYGEN SATURATION: 98 % | DIASTOLIC BLOOD PRESSURE: 70 MMHG

## 2019-02-25 PROCEDURE — 96368 THER/DIAG CONCURRENT INF: CPT

## 2019-02-25 PROCEDURE — 96417 CHEMO IV INFUS EACH ADDL SEQ: CPT

## 2019-02-25 PROCEDURE — 96413 CHEMO IV INFUSION 1 HR: CPT

## 2019-02-25 PROCEDURE — 96367 TX/PROPH/DG ADDL SEQ IV INF: CPT

## 2019-02-25 PROCEDURE — 96375 TX/PRO/DX INJ NEW DRUG ADDON: CPT

## 2019-02-25 RX ADMIN — LEUCOVORIN CALCIUM 700 MG: 500 INJECTION, POWDER, LYOPHILIZED, FOR SOLUTION INTRAMUSCULAR; INTRAVENOUS at 10:18

## 2019-02-25 RX ADMIN — IRINOTECAN HYDROCHLORIDE 320 MG: 20 INJECTION, SOLUTION INTRAVENOUS at 10:20

## 2019-02-25 RX ADMIN — SODIUM CHLORIDE 20 ML/HR: 0.9 INJECTION, SOLUTION INTRAVENOUS at 08:34

## 2019-02-25 RX ADMIN — ATROPINE SULFATE 0.25 MG: 1 INJECTION, SOLUTION INTRAMUSCULAR; INTRAVENOUS; SUBCUTANEOUS at 09:27

## 2019-02-25 RX ADMIN — BEVACIZUMAB 342 MG: 400 INJECTION, SOLUTION INTRAVENOUS at 09:31

## 2019-02-25 RX ADMIN — DEXAMETHASONE SODIUM PHOSPHATE: 10 INJECTION, SOLUTION INTRAMUSCULAR; INTRAVENOUS at 08:37

## 2019-02-25 NOTE — TELEPHONE ENCOUNTER
The patient called in and stated that her MRI for the pelvis and abdomen was denial due to not having the proper documentation  The insurance would need all the clinical documents and medical necessity in order to approved the MRI  she is requesting a call back      She can be reached at [phone]

## 2019-02-25 NOTE — PLAN OF CARE
Problem: Potential for Falls  Goal: Patient will remain free of falls  Description  INTERVENTIONS:  - Assess patient frequently for physical needs  -  Identify cognitive and physical deficits and behaviors that affect risk of falls  -  [place] fall precautions as indicated by assessment   - Educate patient/family on patient safety including physical limitations  - Instruct patient to call for assistance with activity based on assessment  - Modify environment to reduce risk of injury  - Consider OT/PT consult to assist with strengthening/mobility  Outcome: Progressing     Problem: Knowledge Deficit  Goal: Patient/family/caregiver demonstrates understanding of disease process, treatment plan, medications, and discharge instructions  Description  Complete learning assessment and assess knowledge base    Interventions:  - Provide teaching at level of understanding  - Provide teaching via preferred learning methods  Outcome: Progressing

## 2019-02-25 NOTE — PROGRESS NOTES
Pt tolerated treatment well  Good blood return noted before, during and after chemo  CADD pump connected per protocol after 2 RN double checks  Pt knows to return 2/27/19 at 1  Aware of next appt  AVS declined

## 2019-02-26 NOTE — TELEPHONE ENCOUNTER
Per FELY Ibrahim, she would like to review the MRI that was done at [facility] 1/7/19 before making the decision to have additional CT testing  I contacted the patient and informed her of this information  I instructed the patient to call the office if she had any additional questions

## 2019-02-27 ENCOUNTER — HOSPITAL ENCOUNTER (OUTPATIENT)
Dept: INFUSION CENTER | Facility: CLINIC | Age: 54
Discharge: HOME | End: 2019-02-27
Payer: COMMERCIAL

## 2019-02-27 PROCEDURE — 96372 THER/PROPH/DIAG INJ SC/IM: CPT

## 2019-02-27 RX ADMIN — PEGFILGRASTIM 6 MG: KIT SUBCUTANEOUS at 10:20

## 2019-02-27 NOTE — PROGRESS NOTES
Pt arrived for cadd d/c, res vol=0  Good blood return noted  Neulasta on-pro placed on ANGE   Offers no complaints

## 2019-03-04 ENCOUNTER — TELEPHONE (OUTPATIENT)
Dept: HEMATOLOGY ONCOLOGY | Facility: CLINIC | Age: 54
End: 2019-03-04

## 2019-03-04 NOTE — TELEPHONE ENCOUNTER
Called and informed patient that we have not received the results of the CT from [facility] yet  I informed her that a 2nd request was sent to them and we are waiting for the results to be faxed over  Once FELY Daugheryt reviews the results we will contact her to informed her if the MRI needs to be completed

## 2019-03-04 NOTE — TELEPHONE ENCOUNTER
The patient called in about the MRI, she was informed about what the prior note stated   She is requesting a call back at [phone]

## 2019-03-06 ENCOUNTER — TELEPHONE (OUTPATIENT)
Dept: HEMATOLOGY ONCOLOGY | Facility: CLINIC | Age: 54
End: 2019-03-06

## 2019-03-07 DIAGNOSIS — C18.2 MALIGNANT NEOPLASM OF ASCENDING COLON (HCC): ICD-10-CM

## 2019-03-07 RX ORDER — ATROPINE SULFATE 1 MG/ML
0.25 INJECTION, SOLUTION INTRAMUSCULAR; INTRAVENOUS; SUBCUTANEOUS ONCE
Status: COMPLETED | OUTPATIENT
Start: 2019-03-11 | End: 2019-03-11

## 2019-03-07 RX ORDER — SODIUM CHLORIDE 9 MG/ML
20 INJECTION, SOLUTION INTRAVENOUS CONTINUOUS
Status: DISCONTINUED | OUTPATIENT
Start: 2019-03-11 | End: 2019-03-14 | Stop reason: HOSPADM

## 2019-03-07 RX ORDER — LIDOCAINE AND PRILOCAINE 25; 25 MG/G; MG/G
CREAM TOPICAL
Qty: 30 G | Refills: 0 | Status: SHIPPED | OUTPATIENT
Start: 2019-03-07 | End: 2020-05-07 | Stop reason: HOSPADM

## 2019-03-09 ENCOUNTER — APPOINTMENT (OUTPATIENT)
Dept: LAB | Facility: MEDICAL CENTER | Age: 54
End: 2019-03-09
Payer: COMMERCIAL

## 2019-03-11 ENCOUNTER — DOCUMENTATION (OUTPATIENT)
Dept: HEMATOLOGY ONCOLOGY | Facility: CLINIC | Age: 54
End: 2019-03-11

## 2019-03-11 ENCOUNTER — HOSPITAL ENCOUNTER (OUTPATIENT)
Dept: INFUSION CENTER | Facility: CLINIC | Age: 54
Discharge: HOME | End: 2019-03-11
Payer: COMMERCIAL

## 2019-03-11 VITALS
OXYGEN SATURATION: 100 % | DIASTOLIC BLOOD PRESSURE: 76 MMHG | BODY MASS INDEX: 24.75 KG/M2 | HEART RATE: 80 BPM | RESPIRATION RATE: 16 BRPM | WEIGHT: 154 LBS | SYSTOLIC BLOOD PRESSURE: 127 MMHG | HEIGHT: 66 IN | TEMPERATURE: 97.6 F

## 2019-03-11 PROCEDURE — 96415 CHEMO IV INFUSION ADDL HR: CPT

## 2019-03-11 PROCEDURE — 96413 CHEMO IV INFUSION 1 HR: CPT

## 2019-03-11 PROCEDURE — G0498 CHEMO EXTEND IV INFUS W/PUMP: HCPCS

## 2019-03-11 PROCEDURE — 96368 THER/DIAG CONCURRENT INF: CPT

## 2019-03-11 PROCEDURE — 96367 TX/PROPH/DG ADDL SEQ IV INF: CPT

## 2019-03-11 PROCEDURE — 96375 TX/PRO/DX INJ NEW DRUG ADDON: CPT

## 2019-03-11 PROCEDURE — 96417 CHEMO IV INFUS EACH ADDL SEQ: CPT

## 2019-03-11 RX ADMIN — SODIUM CHLORIDE 20 ML/HR: 0.9 INJECTION, SOLUTION INTRAVENOUS at 08:38

## 2019-03-11 RX ADMIN — DEXAMETHASONE SODIUM PHOSPHATE: 10 INJECTION, SOLUTION INTRAMUSCULAR; INTRAVENOUS at 08:42

## 2019-03-11 RX ADMIN — BEVACIZUMAB 342 MG: 400 INJECTION, SOLUTION INTRAVENOUS at 09:25

## 2019-03-11 RX ADMIN — ATROPINE SULFATE 0.25 MG: 1 INJECTION, SOLUTION INTRAMUSCULAR; INTRAVENOUS; SUBCUTANEOUS at 09:21

## 2019-03-11 RX ADMIN — IRINOTECAN HYDROCHLORIDE 320 MG: 20 INJECTION, SOLUTION INTRAVENOUS at 10:12

## 2019-03-11 RX ADMIN — LEUCOVORIN CALCIUM 700 MG: 500 INJECTION, POWDER, LYOPHILIZED, FOR SOLUTION INTRAMUSCULAR; INTRAVENOUS at 10:09

## 2019-03-11 NOTE — PLAN OF CARE
Problem: Potential for Falls  Goal: Patient will remain free of falls  Description  INTERVENTIONS:  - Assess patient frequently for physical needs  -  Identify cognitive and physical deficits and behaviors that affect risk of falls  -  [place] fall precautions as indicated by assessment   - Educate patient/family on patient safety including physical limitations  - Instruct patient to call for assistance with activity based on assessment  - Modify environment to reduce risk of injury  - Consider OT/PT consult to assist with strengthening/mobility  Outcome: Progressing     Problem: Knowledge Deficit  Goal: Patient/family/caregiver demonstrates understanding of disease process, treatment plan, medications, and discharge instructions  Description  Complete learning assessment and assess knowledge base    Interventions:  - Provide teaching at level of understanding  - Provide teaching via preferred learning methods  Outcome: Progressing

## 2019-03-11 NOTE — PROGRESS NOTES
GI Oncology Nurse Navigator Note    Received a message from [facility] regarding an MRI she was scheduled for but she stated her insurance was denying it, she asked if anything further was heard about it, she also was inquiring about her next appointment with Dr Bert Crystal on 3/14, she was interested in rescheduling, inbox message sent to [facility] regarding these issues, she will get back to [facility] and help with them, called and left a message for Carmen that Silvina Eubanks will be in touch and she should call with any other questions or concerns

## 2019-03-11 NOTE — PROGRESS NOTES
Pt to clinic for leucovorin, camptosar, avastin, 5 FU pump, pt offers no complaints at this time, will continue to monitor, aware of next appointment, declines avs

## 2019-03-12 ENCOUNTER — TELEPHONE (OUTPATIENT)
Dept: HEMATOLOGY ONCOLOGY | Facility: CLINIC | Age: 54
End: 2019-03-12

## 2019-03-13 ENCOUNTER — HOSPITAL ENCOUNTER (OUTPATIENT)
Dept: INFUSION CENTER | Facility: CLINIC | Age: 54
Discharge: HOME | End: 2019-03-13
Payer: COMMERCIAL

## 2019-03-13 VITALS — TEMPERATURE: 97.6 F

## 2019-03-13 PROCEDURE — 96372 THER/PROPH/DIAG INJ SC/IM: CPT

## 2019-03-13 RX ADMIN — PEGFILGRASTIM 6 MG: KIT SUBCUTANEOUS at 10:15

## 2019-03-13 NOTE — PROGRESS NOTES
Patient to [facility] for [facility] [place] maintenance / Neulasta On Pro: Offers no complaints at present time: Tolerated infusion without incident: No adverse reactions noted: Res volume - 0 0 ml: Neulasta On Pro per MD order: Applied to ANGE: Verified follow up appt with patient: Declined AVS

## 2019-03-14 ENCOUNTER — OFFICE VISIT (OUTPATIENT)
Dept: HEMATOLOGY ONCOLOGY | Facility: CLINIC | Age: 54
End: 2019-03-14
Payer: COMMERCIAL

## 2019-03-14 VITALS
TEMPERATURE: 97 F | HEIGHT: 66 IN | HEART RATE: 100 BPM | BODY MASS INDEX: 24.59 KG/M2 | DIASTOLIC BLOOD PRESSURE: 76 MMHG | WEIGHT: 153 LBS | SYSTOLIC BLOOD PRESSURE: 124 MMHG

## 2019-03-14 DIAGNOSIS — I82.531 CHRONIC DEEP VEIN THROMBOSIS (DVT) OF POPLITEAL VEIN OF RIGHT LOWER EXTREMITY (HCC): ICD-10-CM

## 2019-03-14 DIAGNOSIS — C77.2 METASTASIS TO RETROPERITONEAL LYMPH NODE (HCC): ICD-10-CM

## 2019-03-14 DIAGNOSIS — C78.6 PERITONEAL METASTASES (HCC): ICD-10-CM

## 2019-03-14 DIAGNOSIS — C18.2 PRIMARY ADENOCARCINOMA OF ASCENDING COLON (HCC): Primary | ICD-10-CM

## 2019-03-14 DIAGNOSIS — C78.7 LIVER METASTASES (HCC): ICD-10-CM

## 2019-03-14 PROCEDURE — 99214 OFFICE O/P EST MOD 30 MIN: CPT | Performed by: INTERNAL MEDICINE

## 2019-03-14 NOTE — LETTER
March 14, 2019     Ania Pena MD  [address] [facility]  [address]  [address] [facility]    Patient: Godfrey Che   YOB: 1965   Date of Visit: 3/14/2019       Dear Dr Deborah Aguilar: Thank you for referring Juan Gordillo to me for evaluation  Below are my notes for this consultation  If you have questions, please do not hesitate to call me  I look forward to following your patient along with you  Sincerely,        Joon Beck MD        CC: No Recipients  Joon Beck MD  3/14/2019  5:55 PM  Sign at close encounter    HPI:   Patient is here with her      Since July 2018 patient has been on modified FOLFIRI plus Avastin for  abdominal carcinomatosis and metastatic disease to liver and abdomeninal lymph nodes and also disease in the pelvis from cancer of ascending colon  She has responded to treatments  She will have follow-up CT scans  This all started when she   underwent GALI and BSO in May 2018 and there were cancer cells in the fallopian tubes   In June 2018 patient   underwent extended right hemicolectomy, partial omentectomy and biopsy of the peritoneal nodule   Peritoneal nodule came back  positive for metastatic adenocarcinoma from colon    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 and metastatic disease to liver    6 cm T3 G2 right colon cancer with positive margins, lymphovascular invasion and perineural invasion and positive 3 of 27 lymph nodes with extranodal extension and positive peritoneal nodule biopsy   Stage IV disease because of liver and peritoneal metastases   She had PPE in her hands and bolus 5 FU was discontinued  She also had iron deficiency and had Venofer intravenously  She is not on Venofer anymore  She was recently seen by liver specialist at at [facility] and was not felt to be a surgical candidate    She had CT scans in [place] that showed continuous improvement in her metastatic disease   Patient had DVT right lower leg below the knee in May 2016 and that happened after taking hormonal therapy in April 2016  In 2016 she tested positive once for lupus anticoagulant and that was negative on repeat test    She stayed on Xarelto until end of September 2016  She had heavy periods in in January 2018 and she was in bed for 3 days and she developed a clot in the left lower leg distally below the knee   She was restarted on Xarelto 20 mg daily  Xarelto dose is being lowered to 10 mg daily because most recent Doppler study did not show DVT in the legs     Patient has some tiredness  Has hot flashes  Some soreness in the mouth but no sores           Current Outpatient Medications:     acetaminophen (TYLENOL) 500 mg tablet, Take 1,000 mg by mouth every 6 (six) hours as needed for mild pain, Disp: , Rfl:     AFLURIA QUADRIVALENT 0 5 ML LEATHA, TO BE ADMINISTERED BY PHARMACIST FOR IMMUNIZATION, Disp: , Rfl: 0    lidocaine-prilocaine (EMLA) cream, APPLY 1 HOUR PRIOR TO CHEMO, COVER IN WRAP, Disp: 30 g, Rfl: 0    nifedipine 0 2 % OINT, Apply topically every 4 (four) hours 0 3% ointment as typical(disregard 0 2% as auto order) 1 application to anal opening 4-6 times daily, Disp: 1 Tube, Rfl: 0    rivaroxaban (XARELTO) 10 mg tablet, Take 1 tablet (10 mg total) by mouth daily, Disp: 90 tablet, Rfl: 1  No current facility-administered medications for this visit       Facility-Administered Medications Ordered in Other Visits:     alteplase (CATHFLO) injection 2 mg, 2 mg, Intracatheter, PRN, Michael Syed MD    No Known Allergies       Primary adenocarcinoma of ascending colon ([facility] [address] )    6/14/2018 Initial Diagnosis     Primary adenocarcinoma of ascending colon ([facility] [address] )         7/17/2018 - 8/1/2018 Chemotherapy     camptosar 180 mg/m2 day 1  5  mg/m2 day 1  5 FU 1200 mg/m2 day 1-2   Leucovorin 400 mg/m2     Venofer 100 mg (first 10 treatments) -- all every 2 weeks             8/1/2018 Adverse Reaction Needed granix 300 mcg due to [address] of 1 01 8/14/2018 -  Chemotherapy     camptosar 180 mg/m2 day 1  5  mg/m2 day 1  5 FU 1200 mg/m2 day 1-2   Leucovorin 400 mg/m2  Avastin 5 mg/kg day 1   Venofer 100 mg (first 10 treatments)  Neulasta on Pro 6 mg day 3      -- every 2 weeks                 ROS:  03/14/19 Reviewed 13 systems:  Presently no neurological, cardiac, pulmonary, GI and  symptoms  No other symptoms like  fever, chills, bleeding, bone pains, skin rash, weight loss, arthritic symptoms,  weakness, numbness,  claudication and gait problem  No frequent infections  Not unusually sensitive to heat or cold  No swelling of the ankles  No swollen glands  Patient is anxious  Other symptoms are in HPI        /76 (BP Location: Left arm, Patient Position: Sitting, Cuff Size: Adult)   Pulse 100   Temp (!) 97 °F (36 1 °C) (Temporal)   Ht 5' 5 5" (1 664 m)   Wt 69 4 kg (153 lb)   LMP 05/16/2018 (LMP Unknown)   BMI 25 07 kg/m²      Physical Exam:    Patient is alert and oriented  She is not in distress  Vital signs are stable  There is no icterus     No oral thrush  There is no palpable neck mass  Lung fields are clear to percussion and auscultation  Heart rate is regular  Abdomen is soft and non tender, no palpable abdominal mass, no ascites, no edema of ankles, no calf tenderness, no focal neurological deficit, no skin rash, no palpable lymphadenopathy in the neck and axillary areas, good arterial pulses, no clubbing  Patient is anxious  Performance status 1      IMAGING:      LABS:  Results for orders placed or performed in visit on 03/08/19   Comprehensive metabolic panel   Result Value Ref Range    Sodium 141 136 - 145 mmol/L    Potassium 4 2 3 5 - 5 3 mmol/L    Chloride 107 100 - 108 mmol/L    CO2 28 21 - 32 mmol/L    ANION GAP 6 4 - 13 mmol/L    BUN 10 5 - 25 mg/dL    Creatinine 0 99 0 60 - 1 30 mg/dL    Glucose 107 65 - 140 mg/dL    Calcium 8 5 8 3 - 10 1 mg/dL    AST 11 5 - 45 U/L    ALT 33 12 - 78 U/L    Alkaline Phosphatase 104 46 - 116 U/L    Total Protein 6 6 6 4 - 8 2 g/dL    Albumin 3 8 3 5 - 5 0 g/dL    Total Bilirubin 0 33 0 20 - 1 00 mg/dL    eGFR 66 ml/min/1 73sq m     Labs, Imaging, & Other studies:   All pertinent labs and imaging studies were personally reviewed    Lab Results   Component Value Date     03/18/2015    K 4 2 03/09/2019     03/09/2019    CO2 28 03/09/2019    ANIONGAP 9 03/18/2015    BUN 10 03/09/2019    CREATININE 0 99 03/09/2019    GLUCOSE 100 03/18/2015    GLUF 85 02/23/2019    CALCIUM 8 5 03/09/2019    AST 11 03/09/2019    ALT 33 03/09/2019    ALKPHOS 104 03/09/2019    PROT 6 6 03/18/2015    BILITOT 0 65 03/18/2015    EGFR 66 03/09/2019     Lab Results   Component Value Date    WBC 5 37 03/09/2019    HGB 12 9 03/09/2019    HCT 41 5 03/09/2019     (H) 03/09/2019     03/09/2019       Reviewed and discussed with patient  Assessment and plan:  Patient is here with her      Since July 2018 patient has been on modified FOLFIRI plus Avastin for  abdominal carcinomatosis and metastatic disease to liver and abdomeninal lymph nodes and also disease in the pelvis from cancer of ascending colon  She has responded to treatments  She will have follow-up CT scans    This all started when she   underwent GALI and BSO in May 2018 and there were cancer cells in the fallopian tubes   In June 2018 patient   underwent extended right hemicolectomy, partial omentectomy and biopsy of the peritoneal nodule   Peritoneal nodule came back  positive for metastatic adenocarcinoma from colon    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 and metastatic disease to liver    6 cm T3 G2 right colon cancer with positive margins, lymphovascular invasion and perineural invasion and positive 3 of 27 lymph nodes with extranodal extension and positive peritoneal nodule biopsy   Stage IV disease because of liver and peritoneal metastases   She had PPE in her hands and bolus 5 FU was discontinued  She also had iron deficiency and had Venofer intravenously  She is not on Venofer anymore  She was recently seen by liver specialist at at [facility] and was not felt to be a surgical candidate  She had CT scans in [place] that showed continuous improvement in her metastatic disease   Patient had DVT right lower leg below the knee in May 2016 and that happened after taking hormonal therapy in April 2016  In 2016 she tested positive once for lupus anticoagulant and that was negative on repeat test    She stayed on Xarelto until end of September 2016  She had heavy periods in in January 2018 and she was in bed for 3 days and she developed a clot in the left lower leg distally below the knee   She was restarted on Xarelto 20 mg daily  Xarelto dose is being lowered to 10 mg daily because most recent Doppler study did not show DVT in the legs     Patient has some tiredness  Has hot flashes  Some soreness in the mouth but no sores      Physical examination and test results are as recorded and discussed in detail  Patient will have follow-up CT scans  Patient's medical insurance coverage did not cover for MRI scans of abdomen and pelvis  She is being continued on present treatment plan  Condition discussed and explained  Questions answered  Chavez Lim is prolongation of survival from colon cancer and no more blood clot and bleeding                       Discussed the importance of self-breast examination, eating healthy foods, staying active and health screening tests  Artem Walker is capable of Self care  1  Primary adenocarcinoma of ascending colon ([facility] [address] )    - CT chest abdomen pelvis w contrast; Future  - UA (URINE) with reflex to Microscopic; Standing  - UA (URINE) with reflex to Microscopic    2   Liver metastases ([facility] [address] )    - CT chest abdomen pelvis w contrast; Future  - UA (URINE) with reflex to Microscopic; Standing  - UA (URINE) with reflex to Microscopic    3  Peritoneal metastases ([address] )    - CT chest abdomen pelvis w contrast; Future  - UA (URINE) with reflex to Microscopic; Standing  - UA (URINE) with reflex to Microscopic    4  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

## 2019-03-14 NOTE — PROGRESS NOTES
HPI:   Patient is here with her      Since July 2018 patient has been on modified FOLFIRI plus Avastin for  abdominal carcinomatosis and metastatic disease to liver and abdomeninal lymph nodes and also disease in the pelvis from cancer of ascending colon  She has responded to treatments  She will have follow-up CT scans  This all started when she   underwent GALI and BSO in May 2018 and there were cancer cells in the fallopian tubes   In June 2018 patient   underwent extended right hemicolectomy, partial omentectomy and biopsy of the peritoneal nodule   Peritoneal nodule came back  positive for metastatic adenocarcinoma from colon    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 and metastatic disease to liver    6 cm T3 G2 right colon cancer with positive margins, lymphovascular invasion and perineural invasion and positive 3 of 27 lymph nodes with extranodal extension and positive peritoneal nodule biopsy   Stage IV disease because of liver and peritoneal metastases   She had PPE in her hands and bolus 5 FU was discontinued  She also had iron deficiency and had Venofer intravenously  She is not on Venofer anymore  She was recently seen by liver specialist at at [facility] and was not felt to be a surgical candidate  She had CT scans in [place] that showed continuous improvement in her metastatic disease   Patient had DVT right lower leg below the knee in May 2016 and that happened after taking hormonal therapy in April 2016  In 2016 she tested positive once for lupus anticoagulant and that was negative on repeat test    She stayed on Xarelto until end of September 2016  She had heavy periods in in January 2018 and she was in bed for 3 days and she developed a clot in the left lower leg distally below the knee   She was restarted on Xarelto 20 mg daily  Xarelto dose is being lowered to 10 mg daily because most recent Doppler study did not show DVT in the legs     Patient has some tiredness  Has hot flashes  Some soreness in the mouth but no sores           Current Outpatient Medications:     acetaminophen (TYLENOL) 500 mg tablet, Take 1,000 mg by mouth every 6 (six) hours as needed for mild pain, Disp: , Rfl:     AFLURIA QUADRIVALENT 0 5 ML LEATHA, TO BE ADMINISTERED BY PHARMACIST FOR IMMUNIZATION, Disp: , Rfl: 0    lidocaine-prilocaine (EMLA) cream, APPLY 1 HOUR PRIOR TO CHEMO, COVER IN WRAP, Disp: 30 g, Rfl: 0    nifedipine 0 2 % OINT, Apply topically every 4 (four) hours 0 3% ointment as typical(disregard 0 2% as auto order) 1 application to anal opening 4-6 times daily, Disp: 1 Tube, Rfl: 0    rivaroxaban (XARELTO) 10 mg tablet, Take 1 tablet (10 mg total) by mouth daily, Disp: 90 tablet, Rfl: 1  No current facility-administered medications for this visit  Facility-Administered Medications Ordered in Other Visits:     alteplase (CATHFLO) injection 2 mg, 2 mg, Intracatheter, PRN, Latonia King MD    No Known Allergies       Primary adenocarcinoma of ascending colon ([facility] [address] )    6/14/2018 Initial Diagnosis     Primary adenocarcinoma of ascending colon ([facility] [address] )         7/17/2018 - 8/1/2018 Chemotherapy     camptosar 180 mg/m2 day 1  5  mg/m2 day 1  5 FU 1200 mg/m2 day 1-2   Leucovorin 400 mg/m2     Venofer 100 mg (first 10 treatments) -- all every 2 weeks             8/1/2018 Adverse Reaction     Needed granix 300 mcg due to [address] of 1 01 8/14/2018 -  Chemotherapy     camptosar 180 mg/m2 day 1  5  mg/m2 day 1  5 FU 1200 mg/m2 day 1-2   Leucovorin 400 mg/m2  Avastin 5 mg/kg day 1   Venofer 100 mg (first 10 treatments)  Neulasta on Pro 6 mg day 3      -- every 2 weeks                 ROS:  03/14/19 Reviewed 13 systems:  Presently no neurological, cardiac, pulmonary, GI and  symptoms  No other symptoms like  fever, chills, bleeding, bone pains, skin rash, weight loss, arthritic symptoms,  weakness, numbness,  claudication and gait problem   No frequent infections  Not unusually sensitive to heat or cold  No swelling of the ankles  No swollen glands  Patient is anxious  Other symptoms are in HPI        /76 (BP Location: Left arm, Patient Position: Sitting, Cuff Size: Adult)   Pulse 100   Temp (!) 97 °F (36 1 °C) (Temporal)   Ht 5' 5 5" (1 664 m)   Wt 69 4 kg (153 lb)   LMP 05/16/2018 (LMP Unknown)   BMI 25 07 kg/m²     Physical Exam:    Patient is alert and oriented  She is not in distress  Vital signs are stable  There is no icterus     No oral thrush  There is no palpable neck mass  Lung fields are clear to percussion and auscultation  Heart rate is regular  Abdomen is soft and non tender, no palpable abdominal mass, no ascites, no edema of ankles, no calf tenderness, no focal neurological deficit, no skin rash, no palpable lymphadenopathy in the neck and axillary areas, good arterial pulses, no clubbing  Patient is anxious  Performance status 1      IMAGING:      LABS:  Results for orders placed or performed in visit on 03/08/19   Comprehensive metabolic panel   Result Value Ref Range    Sodium 141 136 - 145 mmol/L    Potassium 4 2 3 5 - 5 3 mmol/L    Chloride 107 100 - 108 mmol/L    CO2 28 21 - 32 mmol/L    ANION GAP 6 4 - 13 mmol/L    BUN 10 5 - 25 mg/dL    Creatinine 0 99 0 60 - 1 30 mg/dL    Glucose 107 65 - 140 mg/dL    Calcium 8 5 8 3 - 10 1 mg/dL    AST 11 5 - 45 U/L    ALT 33 12 - 78 U/L    Alkaline Phosphatase 104 46 - 116 U/L    Total Protein 6 6 6 4 - 8 2 g/dL    Albumin 3 8 3 5 - 5 0 g/dL    Total Bilirubin 0 33 0 20 - 1 00 mg/dL    eGFR 66 ml/min/1 73sq m     Labs, Imaging, & Other studies:   All pertinent labs and imaging studies were personally reviewed    Lab Results   Component Value Date     03/18/2015    K 4 2 03/09/2019     03/09/2019    CO2 28 03/09/2019    ANIONGAP 9 03/18/2015    BUN 10 03/09/2019    CREATININE 0 99 03/09/2019    GLUCOSE 100 03/18/2015    GLUF 85 02/23/2019    CALCIUM 8 5 03/09/2019    AST 11 03/09/2019    ALT 33 03/09/2019    ALKPHOS 104 03/09/2019    PROT 6 6 03/18/2015    BILITOT 0 65 03/18/2015    EGFR 66 03/09/2019     Lab Results   Component Value Date    WBC 5 37 03/09/2019    HGB 12 9 03/09/2019    HCT 41 5 03/09/2019     (H) 03/09/2019     03/09/2019       Reviewed and discussed with patient  Assessment and plan:  Patient is here with her      Since July 2018 patient has been on modified FOLFIRI plus Avastin for  abdominal carcinomatosis and metastatic disease to liver and abdomeninal lymph nodes and also disease in the pelvis from cancer of ascending colon  She has responded to treatments  She will have follow-up CT scans  This all started when she   underwent GALI and BSO in May 2018 and there were cancer cells in the fallopian tubes   In June 2018 patient   underwent extended right hemicolectomy, partial omentectomy and biopsy of the peritoneal nodule   Peritoneal nodule came back  positive for metastatic adenocarcinoma from colon    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 and metastatic disease to liver    6 cm T3 G2 right colon cancer with positive margins, lymphovascular invasion and perineural invasion and positive 3 of 27 lymph nodes with extranodal extension and positive peritoneal nodule biopsy   Stage IV disease because of liver and peritoneal metastases   She had PPE in her hands and bolus 5 FU was discontinued  She also had iron deficiency and had Venofer intravenously  She is not on Venofer anymore  She was recently seen by liver specialist at at [facility] and was not felt to be a surgical candidate  She had CT scans in [place] that showed continuous improvement in her metastatic disease   Patient had DVT right lower leg below the knee in May 2016 and that happened after taking hormonal therapy in April 2016   In 2016 she tested positive once for lupus anticoagulant and that was negative on repeat test    She stayed on Xarelto until end of September 2016  She had heavy periods in in January 2018 and she was in bed for 3 days and she developed a clot in the left lower leg distally below the knee   She was restarted on Xarelto 20 mg daily  Xarelto dose is being lowered to 10 mg daily because most recent Doppler study did not show DVT in the legs     Patient has some tiredness  Has hot flashes  Some soreness in the mouth but no sores      Physical examination and test results are as recorded and discussed in detail  Patient will have follow-up CT scans  Patient's medical insurance coverage did not cover for MRI scans of abdomen and pelvis  She is being continued on present treatment plan  Condition discussed and explained  Questions answered  Lg Ram is prolongation of survival from colon cancer and no more blood clot and bleeding                       Discussed the importance of self-breast examination, eating healthy foods, staying active and health screening tests  Otilio Francisco J is capable of Self care  1  Primary adenocarcinoma of ascending colon ([address] )    - CT chest abdomen pelvis w contrast; Future  - UA (URINE) with reflex to Microscopic; Standing  - UA (URINE) with reflex to Microscopic    2  Liver metastases ([address] )    - CT chest abdomen pelvis w contrast; Future  - UA (URINE) with reflex to Microscopic; Standing  - UA (URINE) with reflex to Microscopic    3  Peritoneal metastases ([address] )    - CT chest abdomen pelvis w contrast; Future  - UA (URINE) with reflex to Microscopic; Standing  - UA (URINE) with reflex to Microscopic    4  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

## 2019-03-19 ENCOUNTER — HOSPITAL ENCOUNTER (OUTPATIENT)
Dept: CT IMAGING | Facility: HOSPITAL | Age: 54
Discharge: HOME | End: 2019-03-19
Attending: INTERNAL MEDICINE
Payer: COMMERCIAL

## 2019-03-19 DIAGNOSIS — C77.2 METASTASIS TO RETROPERITONEAL LYMPH NODE (HCC): ICD-10-CM

## 2019-03-19 DIAGNOSIS — C78.7 LIVER METASTASES (HCC): ICD-10-CM

## 2019-03-19 DIAGNOSIS — C18.2 PRIMARY ADENOCARCINOMA OF ASCENDING COLON (HCC): ICD-10-CM

## 2019-03-19 DIAGNOSIS — C78.6 PERITONEAL METASTASES (HCC): ICD-10-CM

## 2019-03-19 PROCEDURE — 71260 CT THORAX DX C+: CPT

## 2019-03-19 PROCEDURE — 74177 CT ABD & PELVIS W/CONTRAST: CPT

## 2019-03-19 RX ADMIN — IOHEXOL 100 ML: 350 INJECTION, SOLUTION INTRAVENOUS at 11:53

## 2019-03-21 RX ORDER — SODIUM CHLORIDE 9 MG/ML
20 INJECTION, SOLUTION INTRAVENOUS CONTINUOUS
Status: DISCONTINUED | OUTPATIENT
Start: 2019-03-25 | End: 2019-03-28 | Stop reason: HOSPADM

## 2019-03-21 RX ORDER — ATROPINE SULFATE 1 MG/ML
0.25 INJECTION, SOLUTION INTRAMUSCULAR; INTRAVENOUS; SUBCUTANEOUS ONCE
Status: COMPLETED | OUTPATIENT
Start: 2019-03-25 | End: 2019-03-25

## 2019-03-22 ENCOUNTER — TELEPHONE (OUTPATIENT)
Dept: HEMATOLOGY ONCOLOGY | Facility: CLINIC | Age: 54
End: 2019-03-22

## 2019-03-23 ENCOUNTER — APPOINTMENT (OUTPATIENT)
Dept: LAB | Facility: MEDICAL CENTER | Age: 54
End: 2019-03-23
Payer: COMMERCIAL

## 2019-03-23 LAB
BILIRUB UR QL STRIP: NEGATIVE
CLARITY UR: CLEAR
COLOR UR: YELLOW
GLUCOSE UR STRIP-MCNC: NEGATIVE MG/DL
HGB UR QL STRIP.AUTO: NEGATIVE
KETONES UR STRIP-MCNC: NEGATIVE MG/DL
LEUKOCYTE ESTERASE UR QL STRIP: NEGATIVE
NITRITE UR QL STRIP: NEGATIVE
PH UR STRIP.AUTO: 6 [PH]
PROT UR STRIP-MCNC: NEGATIVE MG/DL
SP GR UR STRIP.AUTO: 1.02 (ref 1–1.03)
UROBILINOGEN UR QL STRIP.AUTO: 0.2 E.U./DL

## 2019-03-23 PROCEDURE — 81003 URINALYSIS AUTO W/O SCOPE: CPT | Performed by: INTERNAL MEDICINE

## 2019-03-25 ENCOUNTER — HOSPITAL ENCOUNTER (OUTPATIENT)
Dept: INFUSION CENTER | Facility: CLINIC | Age: 54
Discharge: HOME | End: 2019-03-25
Payer: COMMERCIAL

## 2019-03-25 VITALS
WEIGHT: 154 LBS | BODY MASS INDEX: 24.75 KG/M2 | DIASTOLIC BLOOD PRESSURE: 67 MMHG | HEART RATE: 78 BPM | SYSTOLIC BLOOD PRESSURE: 137 MMHG | RESPIRATION RATE: 18 BRPM | TEMPERATURE: 97.6 F | OXYGEN SATURATION: 98 % | HEIGHT: 66 IN

## 2019-03-25 PROCEDURE — 96368 THER/DIAG CONCURRENT INF: CPT

## 2019-03-25 PROCEDURE — 96375 TX/PRO/DX INJ NEW DRUG ADDON: CPT

## 2019-03-25 PROCEDURE — 96367 TX/PROPH/DG ADDL SEQ IV INF: CPT

## 2019-03-25 PROCEDURE — G0498 CHEMO EXTEND IV INFUS W/PUMP: HCPCS

## 2019-03-25 PROCEDURE — 96417 CHEMO IV INFUS EACH ADDL SEQ: CPT

## 2019-03-25 PROCEDURE — 96413 CHEMO IV INFUSION 1 HR: CPT

## 2019-03-25 RX ADMIN — BEVACIZUMAB 342 MG: 400 INJECTION, SOLUTION INTRAVENOUS at 09:15

## 2019-03-25 RX ADMIN — IRINOTECAN HYDROCHLORIDE 320 MG: 20 INJECTION, SOLUTION INTRAVENOUS at 10:10

## 2019-03-25 RX ADMIN — LEUCOVORIN CALCIUM 700 MG: 500 INJECTION, POWDER, LYOPHILIZED, FOR SOLUTION INTRAMUSCULAR; INTRAVENOUS at 10:05

## 2019-03-25 RX ADMIN — ATROPINE SULFATE 0.25 MG: 1 INJECTION, SOLUTION INTRAMUSCULAR; INTRAVENOUS; SUBCUTANEOUS at 09:50

## 2019-03-25 RX ADMIN — DEXAMETHASONE SODIUM PHOSPHATE: 10 INJECTION, SOLUTION INTRAMUSCULAR; INTRAVENOUS at 08:45

## 2019-03-25 RX ADMIN — SODIUM CHLORIDE 20 ML/HR: 0.9 INJECTION, SOLUTION INTRAVENOUS at 08:30

## 2019-03-27 ENCOUNTER — HOSPITAL ENCOUNTER (OUTPATIENT)
Dept: INFUSION CENTER | Facility: CLINIC | Age: 54
Discharge: HOME | End: 2019-03-27
Payer: COMMERCIAL

## 2019-03-27 PROCEDURE — 96372 THER/PROPH/DIAG INJ SC/IM: CPT

## 2019-03-27 RX ADMIN — PEGFILGRASTIM 6 MG: KIT SUBCUTANEOUS at 10:12

## 2019-03-27 NOTE — PROGRESS NOTES
Pt to clinic for CADD dc and neulasta on body, pt offered no complaints, aware of next appointment, declined avs

## 2019-04-05 RX ORDER — ATROPINE SULFATE 1 MG/ML
0.25 INJECTION, SOLUTION INTRAMUSCULAR; INTRAVENOUS; SUBCUTANEOUS ONCE
Status: COMPLETED | OUTPATIENT
Start: 2019-04-08 | End: 2019-04-08

## 2019-04-05 RX ORDER — SODIUM CHLORIDE 9 MG/ML
20 INJECTION, SOLUTION INTRAVENOUS CONTINUOUS
Status: DISCONTINUED | OUTPATIENT
Start: 2019-04-08 | End: 2019-04-11 | Stop reason: HOSPADM

## 2019-04-06 ENCOUNTER — APPOINTMENT (OUTPATIENT)
Dept: LAB | Facility: MEDICAL CENTER | Age: 54
End: 2019-04-06
Payer: COMMERCIAL

## 2019-04-08 ENCOUNTER — HOSPITAL ENCOUNTER (OUTPATIENT)
Dept: INFUSION CENTER | Facility: CLINIC | Age: 54
Discharge: HOME | End: 2019-04-08
Payer: COMMERCIAL

## 2019-04-08 VITALS
SYSTOLIC BLOOD PRESSURE: 108 MMHG | WEIGHT: 157 LBS | RESPIRATION RATE: 18 BRPM | BODY MASS INDEX: 25.23 KG/M2 | HEIGHT: 66 IN | DIASTOLIC BLOOD PRESSURE: 62 MMHG | TEMPERATURE: 97 F | HEART RATE: 75 BPM

## 2019-04-08 PROCEDURE — 96413 CHEMO IV INFUSION 1 HR: CPT

## 2019-04-08 PROCEDURE — 96368 THER/DIAG CONCURRENT INF: CPT

## 2019-04-08 PROCEDURE — 96375 TX/PRO/DX INJ NEW DRUG ADDON: CPT

## 2019-04-08 PROCEDURE — 96367 TX/PROPH/DG ADDL SEQ IV INF: CPT

## 2019-04-08 PROCEDURE — G0498 CHEMO EXTEND IV INFUS W/PUMP: HCPCS

## 2019-04-08 PROCEDURE — 96417 CHEMO IV INFUS EACH ADDL SEQ: CPT

## 2019-04-08 PROCEDURE — 96415 CHEMO IV INFUSION ADDL HR: CPT

## 2019-04-08 RX ADMIN — ATROPINE SULFATE 0.25 MG: 1 INJECTION, SOLUTION INTRAMUSCULAR; INTRAVENOUS; SUBCUTANEOUS at 10:05

## 2019-04-08 RX ADMIN — BEVACIZUMAB 350 MG: 400 INJECTION, SOLUTION INTRAVENOUS at 09:27

## 2019-04-08 RX ADMIN — LEUCOVORIN CALCIUM 700 MG: 200 INJECTION, POWDER, LYOPHILIZED, FOR SOLUTION INTRAMUSCULAR; INTRAVENOUS at 10:11

## 2019-04-08 RX ADMIN — DEXAMETHASONE SODIUM PHOSPHATE: 10 INJECTION, SOLUTION INTRAMUSCULAR; INTRAVENOUS at 08:46

## 2019-04-08 RX ADMIN — IRINOTECAN HYDROCHLORIDE 320 MG: 20 INJECTION, SOLUTION INTRAVENOUS at 10:13

## 2019-04-08 RX ADMIN — SODIUM CHLORIDE 20 ML/HR: 0.9 INJECTION, SOLUTION INTRAVENOUS at 08:29

## 2019-04-08 NOTE — PROGRESS NOTES
Infusional Chemotherapy completed and CADD pump connected  Pt aware to return in 46 hours for CADD d/c    She declines AVS

## 2019-04-08 NOTE — PROGRESS NOTES
To [facility] for avastin/FOLFIRI for stage IV Colon cancer  Pt continues to do well and offers no complaints  She specifically denies any GI issues or symptoms such as nausea or diarrhea  Labs and symptoms meet parameters for treatment as ordered today

## 2019-04-10 ENCOUNTER — HOSPITAL ENCOUNTER (OUTPATIENT)
Dept: INFUSION CENTER | Facility: CLINIC | Age: 54
Discharge: HOME | End: 2019-04-10
Payer: COMMERCIAL

## 2019-04-10 VITALS — TEMPERATURE: 98.4 F

## 2019-04-10 PROCEDURE — 96372 THER/PROPH/DIAG INJ SC/IM: CPT

## 2019-04-10 RX ADMIN — PEGFILGRASTIM 6 MG: KIT SUBCUTANEOUS at 10:21

## 2019-04-10 NOTE — PROGRESS NOTES
Pt tolerated treatment well  Wanda volume 0  Cadd pump disconnected  Aware of next appt  AVS declined

## 2019-04-15 DIAGNOSIS — I82.4Y1 DEEP VEIN THROMBOSIS (DVT) OF PROXIMAL VEIN OF RIGHT LOWER EXTREMITY, UNSPECIFIED CHRONICITY (HCC): ICD-10-CM

## 2019-04-18 RX ORDER — SODIUM CHLORIDE 9 MG/ML
20 INJECTION, SOLUTION INTRAVENOUS CONTINUOUS
Status: DISCONTINUED | OUTPATIENT
Start: 2019-04-22 | End: 2019-04-25 | Stop reason: HOSPADM

## 2019-04-18 RX ORDER — ATROPINE SULFATE 1 MG/ML
0.25 INJECTION, SOLUTION INTRAMUSCULAR; INTRAVENOUS; SUBCUTANEOUS ONCE
Status: COMPLETED | OUTPATIENT
Start: 2019-04-22 | End: 2019-04-22

## 2019-04-19 DIAGNOSIS — C18.2 PRIMARY ADENOCARCINOMA OF ASCENDING COLON (HCC): ICD-10-CM

## 2019-04-19 DIAGNOSIS — C78.7 LIVER METASTASES (HCC): ICD-10-CM

## 2019-04-19 RX ORDER — ATROPINE SULFATE 1 MG/ML
0.25 INJECTION, SOLUTION INTRAMUSCULAR; INTRAVENOUS; SUBCUTANEOUS ONCE
Status: CANCELLED | OUTPATIENT
Start: 2019-05-20

## 2019-04-19 RX ORDER — SODIUM CHLORIDE 9 MG/ML
20 INJECTION, SOLUTION INTRAVENOUS ONCE
Status: CANCELLED | OUTPATIENT
Start: 2019-05-20

## 2019-04-19 RX ORDER — ATROPINE SULFATE 1 MG/ML
0.25 INJECTION, SOLUTION INTRAMUSCULAR; INTRAVENOUS; SUBCUTANEOUS ONCE AS NEEDED
Status: CANCELLED | OUTPATIENT
Start: 2019-05-20

## 2019-04-20 ENCOUNTER — APPOINTMENT (OUTPATIENT)
Dept: LAB | Facility: MEDICAL CENTER | Age: 54
End: 2019-04-20
Payer: COMMERCIAL

## 2019-04-20 DIAGNOSIS — C18.2 PRIMARY ADENOCARCINOMA OF ASCENDING COLON (HCC): ICD-10-CM

## 2019-04-20 DIAGNOSIS — C78.7 LIVER METASTASES (HCC): ICD-10-CM

## 2019-04-22 ENCOUNTER — HOSPITAL ENCOUNTER (OUTPATIENT)
Dept: INFUSION CENTER | Facility: CLINIC | Age: 54
Discharge: HOME | End: 2019-04-22
Payer: COMMERCIAL

## 2019-04-22 VITALS
WEIGHT: 156.31 LBS | BODY MASS INDEX: 25.12 KG/M2 | SYSTOLIC BLOOD PRESSURE: 120 MMHG | TEMPERATURE: 98.3 F | HEART RATE: 76 BPM | HEIGHT: 66 IN | DIASTOLIC BLOOD PRESSURE: 72 MMHG | RESPIRATION RATE: 18 BRPM

## 2019-04-22 PROCEDURE — 96375 TX/PRO/DX INJ NEW DRUG ADDON: CPT

## 2019-04-22 PROCEDURE — 96413 CHEMO IV INFUSION 1 HR: CPT

## 2019-04-22 PROCEDURE — 96417 CHEMO IV INFUS EACH ADDL SEQ: CPT

## 2019-04-22 PROCEDURE — 96368 THER/DIAG CONCURRENT INF: CPT

## 2019-04-22 PROCEDURE — G0498 CHEMO EXTEND IV INFUS W/PUMP: HCPCS

## 2019-04-22 PROCEDURE — 96415 CHEMO IV INFUSION ADDL HR: CPT

## 2019-04-22 RX ADMIN — ATROPINE SULFATE 0.25 MG: 1 INJECTION, SOLUTION INTRAMUSCULAR; INTRAVENOUS; SUBCUTANEOUS at 09:58

## 2019-04-22 RX ADMIN — LEUCOVORIN CALCIUM 700 MG: 500 INJECTION, POWDER, LYOPHILIZED, FOR SOLUTION INTRAMUSCULAR; INTRAVENOUS at 10:06

## 2019-04-22 RX ADMIN — SODIUM CHLORIDE 20 ML/HR: 0.9 INJECTION, SOLUTION INTRAVENOUS at 08:15

## 2019-04-22 RX ADMIN — DEXAMETHASONE SODIUM PHOSPHATE: 10 INJECTION, SOLUTION INTRAMUSCULAR; INTRAVENOUS at 08:28

## 2019-04-22 RX ADMIN — BEVACIZUMAB 350 MG: 400 INJECTION, SOLUTION INTRAVENOUS at 09:12

## 2019-04-22 RX ADMIN — IRINOTECAN HYDROCHLORIDE 320 MG: 20 INJECTION, SOLUTION INTRAVENOUS at 10:06

## 2019-04-22 NOTE — PROGRESS NOTES
Pt here for mFOLFIRI and Avastin  Vitals stable; labs within parameters for treatment  Callbell within reach; will continue to monitor

## 2019-04-22 NOTE — PROGRESS NOTES
Pt tolerated treatment well  CADD connected after two RN check  Pt declined AVS  Pt aware to return Wednesday around 0950

## 2019-04-24 ENCOUNTER — HOSPITAL ENCOUNTER (OUTPATIENT)
Dept: INFUSION CENTER | Facility: CLINIC | Age: 54
Discharge: HOME | End: 2019-04-24
Payer: COMMERCIAL

## 2019-04-24 PROCEDURE — 96372 THER/PROPH/DIAG INJ SC/IM: CPT

## 2019-04-24 RX ADMIN — PEGFILGRASTIM 6 MG: KIT SUBCUTANEOUS at 10:06

## 2019-04-24 NOTE — PROGRESS NOTES
Pt tolerated treatment well  [place] volume 0  Cadd pump disconnected  Neulasta on-pro applied  Aware of next appt  AVS declined

## 2019-05-03 ENCOUNTER — DOCUMENTATION (OUTPATIENT)
Dept: HEMATOLOGY ONCOLOGY | Facility: CLINIC | Age: 54
End: 2019-05-03

## 2019-05-03 RX ORDER — SODIUM CHLORIDE 9 MG/ML
20 INJECTION, SOLUTION INTRAVENOUS CONTINUOUS
Status: DISCONTINUED | OUTPATIENT
Start: 2019-05-06 | End: 2019-05-09 | Stop reason: HOSPADM

## 2019-05-03 RX ORDER — ATROPINE SULFATE 1 MG/ML
0.25 INJECTION, SOLUTION INTRAMUSCULAR; INTRAVENOUS; SUBCUTANEOUS ONCE
Status: COMPLETED | OUTPATIENT
Start: 2019-05-06 | End: 2019-05-06

## 2019-05-04 ENCOUNTER — APPOINTMENT (OUTPATIENT)
Dept: LAB | Facility: MEDICAL CENTER | Age: 54
End: 2019-05-04
Payer: COMMERCIAL

## 2019-05-06 ENCOUNTER — HOSPITAL ENCOUNTER (OUTPATIENT)
Dept: INFUSION CENTER | Facility: CLINIC | Age: 54
Discharge: HOME | End: 2019-05-06
Payer: COMMERCIAL

## 2019-05-06 ENCOUNTER — DOCUMENTATION (OUTPATIENT)
Dept: HEMATOLOGY ONCOLOGY | Facility: CLINIC | Age: 54
End: 2019-05-06

## 2019-05-06 VITALS
DIASTOLIC BLOOD PRESSURE: 62 MMHG | BODY MASS INDEX: 25.07 KG/M2 | WEIGHT: 156 LBS | SYSTOLIC BLOOD PRESSURE: 125 MMHG | HEIGHT: 66 IN | OXYGEN SATURATION: 100 % | HEART RATE: 84 BPM | RESPIRATION RATE: 18 BRPM | TEMPERATURE: 97.4 F

## 2019-05-06 PROCEDURE — 96368 THER/DIAG CONCURRENT INF: CPT

## 2019-05-06 PROCEDURE — 96417 CHEMO IV INFUS EACH ADDL SEQ: CPT

## 2019-05-06 PROCEDURE — 96367 TX/PROPH/DG ADDL SEQ IV INF: CPT

## 2019-05-06 PROCEDURE — 96415 CHEMO IV INFUSION ADDL HR: CPT

## 2019-05-06 PROCEDURE — 96413 CHEMO IV INFUSION 1 HR: CPT

## 2019-05-06 PROCEDURE — 96375 TX/PRO/DX INJ NEW DRUG ADDON: CPT

## 2019-05-06 PROCEDURE — G0498 CHEMO EXTEND IV INFUS W/PUMP: HCPCS

## 2019-05-06 RX ADMIN — IRINOTECAN HYDROCHLORIDE 320 MG: 20 INJECTION, SOLUTION INTRAVENOUS at 09:53

## 2019-05-06 RX ADMIN — ATROPINE SULFATE 0.25 MG: 1 INJECTION, SOLUTION INTRAMUSCULAR; INTRAVENOUS; SUBCUTANEOUS at 09:05

## 2019-05-06 RX ADMIN — LEUCOVORIN CALCIUM 700 MG: 500 INJECTION, POWDER, LYOPHILIZED, FOR SOLUTION INTRAMUSCULAR; INTRAVENOUS at 09:51

## 2019-05-06 RX ADMIN — BEVACIZUMAB 354 MG: 400 INJECTION, SOLUTION INTRAVENOUS at 09:13

## 2019-05-06 RX ADMIN — DEXAMETHASONE SODIUM PHOSPHATE: 10 INJECTION, SOLUTION INTRAMUSCULAR; INTRAVENOUS at 08:37

## 2019-05-06 RX ADMIN — SODIUM CHLORIDE 20 ML/HR: 0.9 INJECTION, SOLUTION INTRAVENOUS at 08:35

## 2019-05-06 NOTE — PLAN OF CARE
Problem: Potential for Falls  Goal: Patient will remain free of falls  Description  INTERVENTIONS:  - Assess patient frequently for physical needs  -  Identify cognitive and physical deficits and behaviors that affect risk of falls  -  [place] fall precautions as indicated by assessment   - Educate patient/family on patient safety including physical limitations  - Instruct patient to call for assistance with activity based on assessment  - Modify environment to reduce risk of injury  - Consider OT/PT consult to assist with strengthening/mobility  Outcome: Progressing     Problem: Knowledge Deficit  Goal: Patient/family/caregiver demonstrates understanding of disease process, treatment plan, medications, and discharge instructions  Description  Complete learning assessment and assess knowledge base    Interventions:  - Provide teaching at level of understanding  - Provide teaching via preferred learning methods  Outcome: Progressing

## 2019-05-06 NOTE — PROGRESS NOTES
Pt to clinic for avastin, leucovorin, camptosar, 5 FU CADD start, pt offers no complaints at this time, will continue to monitor, aware of next appointment, declines avs

## 2019-05-07 ENCOUNTER — OFFICE VISIT (OUTPATIENT)
Dept: HEMATOLOGY ONCOLOGY | Facility: CLINIC | Age: 54
End: 2019-05-07
Payer: COMMERCIAL

## 2019-05-07 VITALS
SYSTOLIC BLOOD PRESSURE: 130 MMHG | WEIGHT: 156.5 LBS | HEART RATE: 82 BPM | TEMPERATURE: 97.5 F | DIASTOLIC BLOOD PRESSURE: 90 MMHG | HEIGHT: 66 IN | OXYGEN SATURATION: 97 % | RESPIRATION RATE: 18 BRPM | BODY MASS INDEX: 25.15 KG/M2

## 2019-05-07 DIAGNOSIS — C79.9 ADENOCARCINOMA, METASTATIC (HCC): ICD-10-CM

## 2019-05-07 DIAGNOSIS — C78.7 LIVER METASTASES (HCC): ICD-10-CM

## 2019-05-07 DIAGNOSIS — R93.5 ABNORMAL FINDINGS ON DIAGNOSTIC IMAGING OF OTHER ABDOMINAL REGIONS, INCLUDING RETROPERITONEUM: ICD-10-CM

## 2019-05-07 DIAGNOSIS — I82.531 CHRONIC DEEP VEIN THROMBOSIS (DVT) OF POPLITEAL VEIN OF RIGHT LOWER EXTREMITY (HCC): ICD-10-CM

## 2019-05-07 DIAGNOSIS — C18.2 PRIMARY ADENOCARCINOMA OF ASCENDING COLON (HCC): Primary | ICD-10-CM

## 2019-05-07 DIAGNOSIS — C77.2 METASTASIS TO RETROPERITONEAL LYMPH NODE (HCC): ICD-10-CM

## 2019-05-07 DIAGNOSIS — C78.6 PERITONEAL METASTASES (HCC): ICD-10-CM

## 2019-05-07 PROCEDURE — 99214 OFFICE O/P EST MOD 30 MIN: CPT | Performed by: INTERNAL MEDICINE

## 2019-05-08 ENCOUNTER — HOSPITAL ENCOUNTER (OUTPATIENT)
Dept: INFUSION CENTER | Facility: CLINIC | Age: 54
Discharge: HOME | End: 2019-05-08
Payer: COMMERCIAL

## 2019-05-08 PROCEDURE — 96372 THER/PROPH/DIAG INJ SC/IM: CPT

## 2019-05-08 RX ADMIN — PEGFILGRASTIM 6 MG: KIT SUBCUTANEOUS at 10:05

## 2019-05-08 NOTE — PLAN OF CARE
Problem: Potential for Falls  Goal: Patient will remain free of falls  Description  INTERVENTIONS:  - Assess patient frequently for physical needs  -  Identify cognitive and physical deficits and behaviors that affect risk of falls    -  [place] fall precautions as indicated by assessment   - Educate patient/family on patient safety including physical limitations  - Instruct patient to call for assistance with activity based on assessment  - Modify environment to reduce risk of injury  - Consider OT/PT consult to assist with strengthening/mobility  Outcome: Progressing

## 2019-05-08 NOTE — PROGRESS NOTES
Patient tolerated 46 hour infusion of 5FU at home without issues  CADD d/c'd and port flushed per protocol  Neulasta on-pro appled to patients right upper arm  Green indicator light verified flashing before d/c   Patient advised on timing of medication, precautions to take, and when to notify MD  Patient verified upcoming appointment and declined AVS

## 2019-05-10 ENCOUNTER — TELEPHONE (OUTPATIENT)
Dept: HEMATOLOGY ONCOLOGY | Facility: CLINIC | Age: 54
End: 2019-05-10

## 2019-05-16 DIAGNOSIS — C18.2 PRIMARY ADENOCARCINOMA OF ASCENDING COLON (HCC): Primary | ICD-10-CM

## 2019-05-16 DIAGNOSIS — C78.7 LIVER METASTASES (HCC): ICD-10-CM

## 2019-05-17 ENCOUNTER — APPOINTMENT (OUTPATIENT)
Dept: LAB | Facility: MEDICAL CENTER | Age: 54
End: 2019-05-17
Payer: COMMERCIAL

## 2019-05-17 DIAGNOSIS — C78.7 LIVER METASTASES (HCC): ICD-10-CM

## 2019-05-17 DIAGNOSIS — C78.6 PERITONEAL METASTASES (HCC): ICD-10-CM

## 2019-05-17 DIAGNOSIS — C77.2 METASTASIS TO RETROPERITONEAL LYMPH NODE (HCC): ICD-10-CM

## 2019-05-17 DIAGNOSIS — C18.2 PRIMARY ADENOCARCINOMA OF ASCENDING COLON (HCC): ICD-10-CM

## 2019-05-17 LAB
BILIRUB UR QL STRIP: NEGATIVE
CLARITY UR: NORMAL
COLOR UR: YELLOW
GLUCOSE UR STRIP-MCNC: NEGATIVE MG/DL
HGB UR QL STRIP.AUTO: NEGATIVE
KETONES UR STRIP-MCNC: NEGATIVE MG/DL
LEUKOCYTE ESTERASE UR QL STRIP: NEGATIVE
NITRITE UR QL STRIP: NEGATIVE
PH UR STRIP.AUTO: 6 [PH]
PROT UR STRIP-MCNC: NEGATIVE MG/DL
SP GR UR STRIP.AUTO: 1.01 (ref 1–1.03)
UROBILINOGEN UR QL STRIP.AUTO: 0.2 E.U./DL

## 2019-05-17 PROCEDURE — 81003 URINALYSIS AUTO W/O SCOPE: CPT

## 2019-05-20 ENCOUNTER — HOSPITAL ENCOUNTER (OUTPATIENT)
Dept: INFUSION CENTER | Facility: CLINIC | Age: 54
Discharge: HOME | End: 2019-05-20
Payer: COMMERCIAL

## 2019-05-20 VITALS
TEMPERATURE: 97.8 F | DIASTOLIC BLOOD PRESSURE: 78 MMHG | WEIGHT: 156.5 LBS | OXYGEN SATURATION: 98 % | HEART RATE: 92 BPM | SYSTOLIC BLOOD PRESSURE: 128 MMHG | BODY MASS INDEX: 25.15 KG/M2 | HEIGHT: 66 IN | RESPIRATION RATE: 18 BRPM

## 2019-05-20 DIAGNOSIS — C18.2 PRIMARY ADENOCARCINOMA OF ASCENDING COLON (HCC): Primary | ICD-10-CM

## 2019-05-20 DIAGNOSIS — C78.7 LIVER METASTASES (HCC): ICD-10-CM

## 2019-05-20 PROCEDURE — 96417 CHEMO IV INFUS EACH ADDL SEQ: CPT

## 2019-05-20 PROCEDURE — 96413 CHEMO IV INFUSION 1 HR: CPT

## 2019-05-20 PROCEDURE — 96367 TX/PROPH/DG ADDL SEQ IV INF: CPT

## 2019-05-20 PROCEDURE — 96415 CHEMO IV INFUSION ADDL HR: CPT

## 2019-05-20 PROCEDURE — 96375 TX/PRO/DX INJ NEW DRUG ADDON: CPT

## 2019-05-20 PROCEDURE — G0498 CHEMO EXTEND IV INFUS W/PUMP: HCPCS

## 2019-05-20 PROCEDURE — 96368 THER/DIAG CONCURRENT INF: CPT

## 2019-05-20 RX ORDER — ATROPINE SULFATE 1 MG/ML
0.25 INJECTION, SOLUTION INTRAMUSCULAR; INTRAVENOUS; SUBCUTANEOUS ONCE AS NEEDED
Status: DISCONTINUED | OUTPATIENT
Start: 2019-05-20 | End: 2019-05-23 | Stop reason: HOSPADM

## 2019-05-20 RX ORDER — ATROPINE SULFATE 1 MG/ML
0.25 INJECTION, SOLUTION INTRAMUSCULAR; INTRAVENOUS; SUBCUTANEOUS ONCE
Status: COMPLETED | OUTPATIENT
Start: 2019-05-20 | End: 2019-05-20

## 2019-05-20 RX ORDER — SODIUM CHLORIDE 9 MG/ML
20 INJECTION, SOLUTION INTRAVENOUS ONCE
Status: COMPLETED | OUTPATIENT
Start: 2019-05-20 | End: 2019-05-20

## 2019-05-20 RX ADMIN — LEUCOVORIN CALCIUM 716 MG: 500 INJECTION, POWDER, LYOPHILIZED, FOR SOLUTION INTRAMUSCULAR; INTRAVENOUS at 10:08

## 2019-05-20 RX ADMIN — SODIUM CHLORIDE 20 ML/HR: 0.9 INJECTION, SOLUTION INTRAVENOUS at 08:26

## 2019-05-20 RX ADMIN — ATROPINE SULFATE 0.25 MG: 1 INJECTION, SOLUTION INTRAMUSCULAR; INTRAVENOUS; SUBCUTANEOUS at 09:21

## 2019-05-20 RX ADMIN — IRINOTECAN HYDROCHLORIDE 322 MG: 20 INJECTION, SOLUTION INTRAVENOUS at 10:08

## 2019-05-20 RX ADMIN — DEXAMETHASONE SODIUM PHOSPHATE: 10 INJECTION, SOLUTION INTRAMUSCULAR; INTRAVENOUS at 08:49

## 2019-05-20 RX ADMIN — BEVACIZUMAB 355 MG: 400 INJECTION, SOLUTION INTRAVENOUS at 09:28

## 2019-05-20 NOTE — PROGRESS NOTES
Pt to clinic for avastin, irinotecan, 5 FU cadd start, pt offers no complaints, aware of next appointment, declines avs

## 2019-05-20 NOTE — PLAN OF CARE
Problem: Potential for Falls  Goal: Patient will remain free of falls  Description  INTERVENTIONS:  - Assess patient frequently for physical needs  -  Identify cognitive and physical deficits and behaviors that affect risk of falls  -  Zearing fall precautions as indicated by assessment   - Educate patient/family on patient safety including physical limitations  - Instruct patient to call for assistance with activity based on assessment  - Modify environment to reduce risk of injury  - Consider OT/PT consult to assist with strengthening/mobility  Outcome: Progressing     Problem: Knowledge Deficit  Goal: Patient/family/caregiver demonstrates understanding of disease process, treatment plan, medications, and discharge instructions  Description  Complete learning assessment and assess knowledge base    Interventions:  - Provide teaching at level of understanding  - Provide teaching via preferred learning methods  Outcome: Progressing

## 2019-05-22 ENCOUNTER — HOSPITAL ENCOUNTER (OUTPATIENT)
Dept: INFUSION CENTER | Facility: CLINIC | Age: 54
Discharge: HOME | End: 2019-05-22
Payer: COMMERCIAL

## 2019-05-22 DIAGNOSIS — C78.7 LIVER METASTASES (HCC): ICD-10-CM

## 2019-05-22 DIAGNOSIS — C18.2 PRIMARY ADENOCARCINOMA OF ASCENDING COLON (HCC): Primary | ICD-10-CM

## 2019-05-22 PROCEDURE — 96372 THER/PROPH/DIAG INJ SC/IM: CPT

## 2019-05-22 RX ADMIN — PEGFILGRASTIM 6 MG: KIT SUBCUTANEOUS at 10:45

## 2019-05-22 NOTE — PATIENT INSTRUCTIONS
May 2019      Chance Monday Tuesday Wednesday Thursday Friday Saturday                  1     2     3     4    LAB WALK IN  10:00 AM   (5 min )   AN WINDGP CHAIR 1   [facility] [place]            5     6    INF CHEMO-INFUSION 4 HR   8:00 AM   (330 min )   AN INF CHAIR 9   [facility] 7    FOLLOW UP PG   1:45 PM   (20 min )   Valentin Willis MD   [facility] [place] 8    INF COLONY STIMULATING FACTORS   9:30 AM   (30 min )   AN INF QUICK CHAIR 01   [facility] 9     10     11                12     13     14     15     16     17    LAB WALK IN  12:05 PM   (5 min )   AN WINDGP CHAIR 1   [facility] [place] 18                19     20    INF CHEMO-INFUSION 4 HR   8:00 AM   (330 min )   AN INF CHAIR [facility] 21     22    INF COLONY STIMULATING FACTORS  10:00 AM   (30 min )   AN INF QUICK CHAIR [facility] 23     24     25        Cycle 22, Day 1  Cycle 22, Day 3      26     27     28     29     30     31                          Treatment Details       5/20/2019 - Cycle 22, Day 1      Chemotherapy: ONCBCN PROVIDER COMMUNICATION5, BEVACIZUMAB IVPB, IRINOTECAN INFUSION, LEUCOVORIN IVPB      Take-Home Chemo: ONCBCN PROVIDER COMMUNICATION8, FLUOROURACIL AMBULATORY INFUSION    5/22/2019 - Cycle 22, Day 3      Supportive Care: [facility] PROVIDER COMMUNICATION7, pegfilgrastim (Azeb Chawla)        June 2019 Sunday Monday Tuesday Wednesday Thursday Friday Saturday                                 1                2     3    INF CHEMO-INFUSION 4 HR   8:00 AM   (330 min )   AN INF CHAIR [facility] 4     5    INF COLONY STIMULATING FACTORS  12:00 PM   (30 min )   AN INF QUICK CHAIR [facility] 302 [facility] 6  Happy Birthday!     7     8 9     10     11     12     13     14     15                16     17    CT [address]   2:00 PM   (30 min )   AN CT 1   [facility] Brett CAT Scan 18     19     20    INF CHEMO-INFUSION 4 HR  12:00 PM   (330 min )   AN INF CHAIR [address] 21     22    INF COLONY STIMULATING FACTORS  12:00 PM   (30 min )   AN INF QUICK CHAIR [address] 302 [facility]            23     24     25     26     27     28     29                30

## 2019-05-22 NOTE — PROGRESS NOTES
Patient here for CADD d/c and neulasta onpro  She tolerated CADD d/c without incident  Neulasta placed to ANGE as ordered for injection tomorrow at 1345  Patient familiar with injection and removal of onpro  She reports no new symptoms and/or side effects post chemo  She reports that her mouth is sore and this will last several days  She states this is nothing new and not worse than previous  No other c/o offered  She was discharged once neulasta activated and will RTO 6/3/19 for her next cycle

## 2019-05-30 DIAGNOSIS — C18.2 PRIMARY ADENOCARCINOMA OF ASCENDING COLON (HCC): ICD-10-CM

## 2019-05-30 DIAGNOSIS — C18.2 PRIMARY ADENOCARCINOMA OF ASCENDING COLON (HCC): Primary | ICD-10-CM

## 2019-05-30 DIAGNOSIS — C78.7 LIVER METASTASES (HCC): ICD-10-CM

## 2019-05-30 RX ORDER — ATROPINE SULFATE 1 MG/ML
0.25 INJECTION, SOLUTION INTRAMUSCULAR; INTRAVENOUS; SUBCUTANEOUS ONCE
Status: CANCELLED | OUTPATIENT
Start: 2019-06-03

## 2019-05-30 RX ORDER — ATROPINE SULFATE 1 MG/ML
0.25 INJECTION, SOLUTION INTRAMUSCULAR; INTRAVENOUS; SUBCUTANEOUS ONCE AS NEEDED
Status: CANCELLED | OUTPATIENT
Start: 2019-06-03

## 2019-05-30 RX ORDER — ATROPINE SULFATE 1 MG/ML
0.25 INJECTION, SOLUTION INTRAMUSCULAR; INTRAVENOUS; SUBCUTANEOUS ONCE AS NEEDED
Status: CANCELLED | OUTPATIENT
Start: 2019-06-20

## 2019-05-30 RX ORDER — SODIUM CHLORIDE 9 MG/ML
20 INJECTION, SOLUTION INTRAVENOUS ONCE
Status: CANCELLED | OUTPATIENT
Start: 2019-06-03

## 2019-05-30 RX ORDER — SODIUM CHLORIDE 9 MG/ML
20 INJECTION, SOLUTION INTRAVENOUS ONCE
Status: CANCELLED | OUTPATIENT
Start: 2019-06-20

## 2019-05-30 RX ORDER — SODIUM CHLORIDE 9 MG/ML
20 INJECTION, SOLUTION INTRAVENOUS ONCE
Status: CANCELLED | OUTPATIENT
Start: 2019-07-03

## 2019-05-30 RX ORDER — ATROPINE SULFATE 1 MG/ML
0.25 INJECTION, SOLUTION INTRAMUSCULAR; INTRAVENOUS; SUBCUTANEOUS ONCE AS NEEDED
Status: CANCELLED | OUTPATIENT
Start: 2019-07-03

## 2019-05-30 RX ORDER — ATROPINE SULFATE 1 MG/ML
0.25 INJECTION, SOLUTION INTRAMUSCULAR; INTRAVENOUS; SUBCUTANEOUS ONCE
Status: CANCELLED | OUTPATIENT
Start: 2019-06-20

## 2019-05-30 RX ORDER — ATROPINE SULFATE 1 MG/ML
0.25 INJECTION, SOLUTION INTRAMUSCULAR; INTRAVENOUS; SUBCUTANEOUS ONCE
Status: CANCELLED | OUTPATIENT
Start: 2019-07-03

## 2019-05-31 ENCOUNTER — APPOINTMENT (OUTPATIENT)
Dept: LAB | Facility: MEDICAL CENTER | Age: 54
End: 2019-05-31
Payer: COMMERCIAL

## 2019-06-03 ENCOUNTER — HOSPITAL ENCOUNTER (OUTPATIENT)
Dept: INFUSION CENTER | Facility: CLINIC | Age: 54
Discharge: HOME | End: 2019-06-03
Payer: COMMERCIAL

## 2019-06-03 VITALS
HEIGHT: 66 IN | BODY MASS INDEX: 25.23 KG/M2 | RESPIRATION RATE: 18 BRPM | HEART RATE: 76 BPM | TEMPERATURE: 97.5 F | WEIGHT: 157 LBS | SYSTOLIC BLOOD PRESSURE: 134 MMHG | DIASTOLIC BLOOD PRESSURE: 81 MMHG

## 2019-06-03 DIAGNOSIS — C78.7 LIVER METASTASES (HCC): Primary | ICD-10-CM

## 2019-06-03 DIAGNOSIS — C18.2 PRIMARY ADENOCARCINOMA OF ASCENDING COLON (HCC): ICD-10-CM

## 2019-06-03 PROCEDURE — 96415 CHEMO IV INFUSION ADDL HR: CPT

## 2019-06-03 PROCEDURE — 96375 TX/PRO/DX INJ NEW DRUG ADDON: CPT

## 2019-06-03 PROCEDURE — 96367 TX/PROPH/DG ADDL SEQ IV INF: CPT

## 2019-06-03 PROCEDURE — 96413 CHEMO IV INFUSION 1 HR: CPT

## 2019-06-03 PROCEDURE — G0498 CHEMO EXTEND IV INFUS W/PUMP: HCPCS

## 2019-06-03 PROCEDURE — 96368 THER/DIAG CONCURRENT INF: CPT

## 2019-06-03 PROCEDURE — 96417 CHEMO IV INFUS EACH ADDL SEQ: CPT

## 2019-06-03 RX ORDER — SODIUM CHLORIDE 9 MG/ML
20 INJECTION, SOLUTION INTRAVENOUS ONCE
Status: COMPLETED | OUTPATIENT
Start: 2019-06-03 | End: 2019-06-03

## 2019-06-03 RX ORDER — ATROPINE SULFATE 1 MG/ML
0.25 INJECTION, SOLUTION INTRAMUSCULAR; INTRAVENOUS; SUBCUTANEOUS ONCE AS NEEDED
Status: DISCONTINUED | OUTPATIENT
Start: 2019-06-03 | End: 2019-06-06 | Stop reason: HOSPADM

## 2019-06-03 RX ORDER — ATROPINE SULFATE 1 MG/ML
0.25 INJECTION, SOLUTION INTRAMUSCULAR; INTRAVENOUS; SUBCUTANEOUS ONCE
Status: COMPLETED | OUTPATIENT
Start: 2019-06-03 | End: 2019-06-03

## 2019-06-03 RX ADMIN — SODIUM CHLORIDE 20 ML/HR: 0.9 INJECTION, SOLUTION INTRAVENOUS at 08:26

## 2019-06-03 RX ADMIN — ATROPINE SULFATE 0.25 MG: 1 INJECTION, SOLUTION INTRAMUSCULAR; INTRAVENOUS; SUBCUTANEOUS at 10:28

## 2019-06-03 RX ADMIN — IRINOTECAN HYDROCHLORIDE 322 MG: 20 INJECTION, SOLUTION INTRAVENOUS at 10:35

## 2019-06-03 RX ADMIN — LEUCOVORIN CALCIUM 716 MG: 500 INJECTION, POWDER, LYOPHILIZED, FOR SOLUTION INTRAMUSCULAR; INTRAVENOUS at 10:35

## 2019-06-03 RX ADMIN — BEVACIZUMAB 355 MG: 400 INJECTION, SOLUTION INTRAVENOUS at 09:44

## 2019-06-03 RX ADMIN — DEXAMETHASONE SODIUM PHOSPHATE: 10 INJECTION, SOLUTION INTRAMUSCULAR; INTRAVENOUS at 08:56

## 2019-06-03 NOTE — PROGRESS NOTES
To Infusion Center for Cycle 22 FOLFIRI  Pt continues to tolerate this without reported ill effect  She offers no complaints today and denies any changes in her medication or health history since her last visit  All therapy given as per orders without incident  Pt aware to return to clinic in 46 hours for CADD d/c and neulasta on body injector placement  She declines AVS today  Discharged to home with her

## 2019-06-03 NOTE — PATIENT INSTRUCTIONS
June 2019 Sunday Monday Tuesday Wednesday Thursday Friday Saturday                                 1                2     3    INF CHEMO-INFUSION 4 HR   8:00 AM   (330 min )   AN INF CHAIR 3   [address] 4     5    INF COLONY STIMULATING FACTORS  10:30 AM   (30 min )   AN INF QUICK CHAIR [address] 6  Happy Birthday!     7     8        Cycle 22, Day 1  Cycle 22, Day 3      9     10     11     12     13     14     15                16     17    CT CHEST ABDOMEN PELVIS W CON   2:00 PM   (30 min )   AN CT 1   [facility] Brett CAT Scan 18     19     20    INF CHEMO-INFUSION 4 HR  12:00 PM   (330 min )   AN INF CHAIR 66 [facility] 21     22    INF COLONY STIMULATING FACTORS  12:00 PM   (30 min )   AN INF QUICK CHAIR [address] [place] [address]    Cycle 22, Day 1  Cycle 22, Day 3      23     24     25     26    SIMNO SCREEN BILAT W DBT & CAD   8:30 AM   (30 min )   AN MAMMO WG 95  [facility] 27     28     29                30                                                   Treatment Details       6/3/2019 - Cycle 22, Day 1      Chemotherapy: ONCBCN PROVIDER COMMUNICATION5, BEVACIZUMAB IVPB, IRINOTECAN INFUSION, LEUCOVORIN IVPB      Take-Home Chemo: ONCBCN PROVIDER COMMUNICATION8, FLUOROURACIL AMBULATORY INFUSION    6/5/2019 - Cycle 22, Day 3      Supportive Care: ONCBCN PROVIDER COMMUNICATION7, pegfilgrastim (NEULASTA ONPRO)    6/17/2019 - Cycle 22, Day 1      Chemotherapy: ONCBCN PROVIDER COMMUNICATION5, BEVACIZUMAB IVPB, IRINOTECAN INFUSION, LEUCOVORIN IVPB      Take-Home Chemo: ONCBCN PROVIDER COMMUNICATION8, FLUOROURACIL AMBULATORY INFUSION    6/19/2019 - Cycle 22, Day 3      Supportive Care: [address] [facility], pegfilgrastim (Aditi Shiver)

## 2019-06-05 ENCOUNTER — HOSPITAL ENCOUNTER (OUTPATIENT)
Dept: INFUSION CENTER | Facility: CLINIC | Age: 54
Discharge: HOME | End: 2019-06-05
Payer: COMMERCIAL

## 2019-06-05 DIAGNOSIS — C78.7 LIVER METASTASES (HCC): Primary | ICD-10-CM

## 2019-06-05 DIAGNOSIS — C18.2 PRIMARY ADENOCARCINOMA OF ASCENDING COLON (HCC): ICD-10-CM

## 2019-06-05 PROCEDURE — 96372 THER/PROPH/DIAG INJ SC/IM: CPT

## 2019-06-05 RX ADMIN — PEGFILGRASTIM 6 MG: KIT SUBCUTANEOUS at 10:35

## 2019-06-05 NOTE — PROGRESS NOTES
Patient here for CADD d/c and neulasta onpro  No changes reported  She tolerated CADD d/c without incident followed by neulasta onpro placement to ANGE as ordered for injection tomorrow at 1335  Patient familiar with injection and removal of onpro  She will RTO 6/20/19 for her next cycle

## 2019-06-05 NOTE — PATIENT INSTRUCTIONS
June 2019 Sunday Monday Tuesday Wednesday Thursday Friday Saturday                                 1                2     3    INF CHEMO-INFUSION 4 HR   8:00 AM   (330 min )   AN INF CHAIR 3   St  [address] [facility] 4     5    INF COLONY STIMULATING FACTORS  10:30 AM   (30 min )   AN INF QUICK CHAIR [address] 6  Happy Birthday!     7     8        Cycle 22, Day 1  Cycle 22, Day 3      9     10     11     12     13     14     15                16     17    CT CHEST ABDOMEN PELVIS W CON   2:00 PM   (30 min )   AN CT 1   [facility] Brett CAT Scan 18     19     20    INF CHEMO-INFUSION 4 HR  12:00 PM   (330 min )   AN INF CHAIR 479 [facility] 21     22    INF COLONY STIMULATING FACTORS  12:00 PM   (30 min )   AN INF QUICK CHAIR [address]    Cycle 22, Day 1  Cycle 22, Day 3      23     24     25     26    SIMON SCREEN BILAT W DBT & CAD   8:30 AM   (30 min )   AN MAMMO WG [address] [facility] 27     28     29                30                                                   Treatment Details       6/3/2019 - Cycle 22, Day 1      Chemotherapy: ONCBCN PROVIDER COMMUNICATION5, BEVACIZUMAB IVPB, IRINOTECAN INFUSION, LEUCOVORIN IVPB      Take-Home Chemo: ONCBCN PROVIDER COMMUNICATION8, FLUOROURACIL AMBULATORY INFUSION    6/5/2019 - Cycle 22, Day 3      Supportive Care: ONCBCN PROVIDER COMMUNICATION7, pegfilgrastim (NEULASTA ONPRO)    6/17/2019 - Cycle 22, Day 1      Chemotherapy: ONCBCN PROVIDER COMMUNICATION5, BEVACIZUMAB IVPB, IRINOTECAN INFUSION, LEUCOVORIN IVPB      Take-Home Chemo: ONCBCN PROVIDER COMMUNICATION8, FLUOROURACIL AMBULATORY INFUSION    6/19/2019 - Cycle 22, Day 3      Supportive Care: 350 [facility], pegfilgrastim (Ang Amena)

## 2019-06-11 DIAGNOSIS — C78.7 LIVER METASTASES (HCC): ICD-10-CM

## 2019-06-11 DIAGNOSIS — C18.2 PRIMARY ADENOCARCINOMA OF ASCENDING COLON (HCC): Primary | ICD-10-CM

## 2019-06-17 ENCOUNTER — HOSPITAL ENCOUNTER (OUTPATIENT)
Dept: CT IMAGING | Facility: HOSPITAL | Age: 54
Discharge: HOME | End: 2019-06-17
Attending: INTERNAL MEDICINE
Payer: COMMERCIAL

## 2019-06-17 DIAGNOSIS — C77.2 METASTASIS TO RETROPERITONEAL LYMPH NODE (HCC): ICD-10-CM

## 2019-06-17 DIAGNOSIS — C78.6 PERITONEAL METASTASES (HCC): ICD-10-CM

## 2019-06-17 DIAGNOSIS — C18.2 PRIMARY ADENOCARCINOMA OF ASCENDING COLON (HCC): ICD-10-CM

## 2019-06-17 DIAGNOSIS — C78.7 LIVER METASTASES (HCC): ICD-10-CM

## 2019-06-17 PROCEDURE — 71260 CT THORAX DX C+: CPT

## 2019-06-17 PROCEDURE — 74177 CT ABD & PELVIS W/CONTRAST: CPT

## 2019-06-17 RX ADMIN — IOHEXOL 100 ML: 350 INJECTION, SOLUTION INTRAVENOUS at 14:20

## 2019-06-18 ENCOUNTER — APPOINTMENT (OUTPATIENT)
Dept: LAB | Facility: MEDICAL CENTER | Age: 54
End: 2019-06-18
Payer: COMMERCIAL

## 2019-06-18 DIAGNOSIS — C78.7 LIVER METASTASES (HCC): ICD-10-CM

## 2019-06-18 DIAGNOSIS — C18.2 PRIMARY ADENOCARCINOMA OF ASCENDING COLON (HCC): ICD-10-CM

## 2019-06-18 LAB
ALBUMIN SERPL BCP-MCNC: 4 G/DL (ref 3.5–5)
ALP SERPL-CCNC: 100 U/L (ref 46–116)
ALT SERPL W P-5'-P-CCNC: 38 U/L (ref 12–78)
ANION GAP SERPL CALCULATED.3IONS-SCNC: 8 MMOL/L (ref 4–13)
AST SERPL W P-5'-P-CCNC: 16 U/L (ref 5–45)
BASOPHILS # BLD AUTO: 0.04 THOUSANDS/ΜL (ref 0–0.1)
BASOPHILS NFR BLD AUTO: 1 % (ref 0–1)
BILIRUB SERPL-MCNC: 0.45 MG/DL (ref 0.2–1)
BUN SERPL-MCNC: 10 MG/DL (ref 5–25)
CALCIUM SERPL-MCNC: 9.3 MG/DL (ref 8.3–10.1)
CHLORIDE SERPL-SCNC: 111 MMOL/L (ref 100–108)
CO2 SERPL-SCNC: 23 MMOL/L (ref 21–32)
CREAT SERPL-MCNC: 0.96 MG/DL (ref 0.6–1.3)
EOSINOPHIL # BLD AUTO: 0.11 THOUSAND/ΜL (ref 0–0.61)
EOSINOPHIL NFR BLD AUTO: 2 % (ref 0–6)
ERYTHROCYTE [DISTWIDTH] IN BLOOD BY AUTOMATED COUNT: 16.1 % (ref 11.6–15.1)
GFR SERPL CREATININE-BSD FRML MDRD: 68 ML/MIN/1.73SQ M
GLUCOSE P FAST SERPL-MCNC: 97 MG/DL (ref 65–99)
HCT VFR BLD AUTO: 41.9 % (ref 34.8–46.1)
HGB BLD-MCNC: 13 G/DL (ref 11.5–15.4)
IMM GRANULOCYTES # BLD AUTO: 0.03 THOUSAND/UL (ref 0–0.2)
IMM GRANULOCYTES NFR BLD AUTO: 1 % (ref 0–2)
LYMPHOCYTES # BLD AUTO: 1.59 THOUSANDS/ΜL (ref 0.6–4.47)
LYMPHOCYTES NFR BLD AUTO: 26 % (ref 14–44)
MCH RBC QN AUTO: 32.5 PG (ref 26.8–34.3)
MCHC RBC AUTO-ENTMCNC: 31 G/DL (ref 31.4–37.4)
MCV RBC AUTO: 105 FL (ref 82–98)
MONOCYTES # BLD AUTO: 0.64 THOUSAND/ΜL (ref 0.17–1.22)
MONOCYTES NFR BLD AUTO: 11 % (ref 4–12)
NEUTROPHILS # BLD AUTO: 3.61 THOUSANDS/ΜL (ref 1.85–7.62)
NEUTS SEG NFR BLD AUTO: 59 % (ref 43–75)
NRBC BLD AUTO-RTO: 0 /100 WBCS
PLATELET # BLD AUTO: 232 THOUSANDS/UL (ref 149–390)
PMV BLD AUTO: 9.9 FL (ref 8.9–12.7)
POTASSIUM SERPL-SCNC: 4.1 MMOL/L (ref 3.5–5.3)
PROT SERPL-MCNC: 6.9 G/DL (ref 6.4–8.2)
RBC # BLD AUTO: 4 MILLION/UL (ref 3.81–5.12)
SODIUM SERPL-SCNC: 142 MMOL/L (ref 136–145)
WBC # BLD AUTO: 6.02 THOUSAND/UL (ref 4.31–10.16)

## 2019-06-18 PROCEDURE — 36415 COLL VENOUS BLD VENIPUNCTURE: CPT

## 2019-06-18 PROCEDURE — 80053 COMPREHEN METABOLIC PANEL: CPT

## 2019-06-18 PROCEDURE — 85025 COMPLETE CBC W/AUTO DIFF WBC: CPT | Performed by: INTERNAL MEDICINE

## 2019-06-19 ENCOUNTER — HOSPITAL ENCOUNTER (OUTPATIENT)
Dept: INFUSION CENTER | Facility: CLINIC | Age: 54
End: 2019-06-19

## 2019-06-20 ENCOUNTER — HOSPITAL ENCOUNTER (OUTPATIENT)
Dept: INFUSION CENTER | Facility: CLINIC | Age: 54
Discharge: HOME | End: 2019-06-20
Payer: COMMERCIAL

## 2019-06-20 VITALS
HEIGHT: 66 IN | DIASTOLIC BLOOD PRESSURE: 80 MMHG | BODY MASS INDEX: 25.3 KG/M2 | TEMPERATURE: 97.6 F | WEIGHT: 157.46 LBS | HEART RATE: 68 BPM | RESPIRATION RATE: 18 BRPM | OXYGEN SATURATION: 99 % | SYSTOLIC BLOOD PRESSURE: 116 MMHG

## 2019-06-20 DIAGNOSIS — C78.7 LIVER METASTASES (HCC): Primary | ICD-10-CM

## 2019-06-20 DIAGNOSIS — C18.2 PRIMARY ADENOCARCINOMA OF ASCENDING COLON (HCC): ICD-10-CM

## 2019-06-20 PROCEDURE — 96375 TX/PRO/DX INJ NEW DRUG ADDON: CPT

## 2019-06-20 PROCEDURE — 96368 THER/DIAG CONCURRENT INF: CPT

## 2019-06-20 PROCEDURE — 96417 CHEMO IV INFUS EACH ADDL SEQ: CPT

## 2019-06-20 PROCEDURE — 96367 TX/PROPH/DG ADDL SEQ IV INF: CPT

## 2019-06-20 PROCEDURE — 96415 CHEMO IV INFUSION ADDL HR: CPT

## 2019-06-20 PROCEDURE — 96413 CHEMO IV INFUSION 1 HR: CPT

## 2019-06-20 PROCEDURE — G0498 CHEMO EXTEND IV INFUS W/PUMP: HCPCS

## 2019-06-20 RX ORDER — ATROPINE SULFATE 1 MG/ML
0.25 INJECTION, SOLUTION INTRAMUSCULAR; INTRAVENOUS; SUBCUTANEOUS ONCE
Status: COMPLETED | OUTPATIENT
Start: 2019-06-20 | End: 2019-06-20

## 2019-06-20 RX ORDER — ATROPINE SULFATE 1 MG/ML
0.25 INJECTION, SOLUTION INTRAMUSCULAR; INTRAVENOUS; SUBCUTANEOUS ONCE AS NEEDED
Status: DISCONTINUED | OUTPATIENT
Start: 2019-06-20 | End: 2019-06-23 | Stop reason: HOSPADM

## 2019-06-20 RX ORDER — SODIUM CHLORIDE 9 MG/ML
20 INJECTION, SOLUTION INTRAVENOUS ONCE
Status: COMPLETED | OUTPATIENT
Start: 2019-06-20 | End: 2019-06-20

## 2019-06-20 RX ADMIN — IRINOTECAN HYDROCHLORIDE 320 MG: 20 INJECTION, SOLUTION INTRAVENOUS at 13:51

## 2019-06-20 RX ADMIN — DEXAMETHASONE SODIUM PHOSPHATE: 10 INJECTION, SOLUTION INTRAMUSCULAR; INTRAVENOUS at 12:25

## 2019-06-20 RX ADMIN — ATROPINE SULFATE 0.25 MG: 1 INJECTION, SOLUTION INTRAMUSCULAR; INTRAVENOUS; SUBCUTANEOUS at 13:41

## 2019-06-20 RX ADMIN — SODIUM CHLORIDE 20 ML/HR: 0.9 INJECTION, SOLUTION INTRAVENOUS at 12:05

## 2019-06-20 RX ADMIN — BEVACIZUMAB 355 MG: 400 INJECTION, SOLUTION INTRAVENOUS at 12:51

## 2019-06-20 RX ADMIN — LEUCOVORIN CALCIUM 700 MG: 500 INJECTION, POWDER, LYOPHILIZED, FOR SOLUTION INTRAMUSCULAR; INTRAVENOUS at 13:44

## 2019-06-20 NOTE — PROGRESS NOTES
Pt  Tolerated treatment w/out adverse reaction  Nurse connected pt  To cadd pump & pt  Scheduled for pump disconnect on 6/22/19 @ 1333  Pt  Is aware & verbalized understanding  Confirmed pts  Next appt   Pt  Declined AVS

## 2019-06-21 ENCOUNTER — TELEPHONE (OUTPATIENT)
Dept: HEMATOLOGY ONCOLOGY | Facility: CLINIC | Age: 54
End: 2019-06-21

## 2019-06-22 ENCOUNTER — HOSPITAL ENCOUNTER (OUTPATIENT)
Dept: INFUSION CENTER | Facility: CLINIC | Age: 54
Discharge: HOME | End: 2019-06-22
Payer: COMMERCIAL

## 2019-06-22 DIAGNOSIS — C18.2 PRIMARY ADENOCARCINOMA OF ASCENDING COLON (HCC): Primary | ICD-10-CM

## 2019-06-22 DIAGNOSIS — C78.7 LIVER METASTASES (HCC): ICD-10-CM

## 2019-06-22 PROCEDURE — 96372 THER/PROPH/DIAG INJ SC/IM: CPT

## 2019-06-22 RX ADMIN — PEGFILGRASTIM 6 MG: KIT SUBCUTANEOUS at 13:36

## 2019-06-22 NOTE — PROGRESS NOTES
Pt tolerated treatment well  [place] volume 0  Cadd pump disconnected  Aware of next appt  AVS declined

## 2019-06-24 ENCOUNTER — TELEPHONE (OUTPATIENT)
Dept: HEMATOLOGY ONCOLOGY | Facility: CLINIC | Age: 54
End: 2019-06-24

## 2019-06-25 DIAGNOSIS — C78.7 LIVER METASTASES (HCC): ICD-10-CM

## 2019-06-25 DIAGNOSIS — C18.2 PRIMARY ADENOCARCINOMA OF ASCENDING COLON (HCC): Primary | ICD-10-CM

## 2019-06-26 ENCOUNTER — HOSPITAL ENCOUNTER (OUTPATIENT)
Dept: RADIOLOGY | Facility: MEDICAL CENTER | Age: 54
Discharge: HOME | End: 2019-06-26
Payer: COMMERCIAL

## 2019-06-26 VITALS — WEIGHT: 157 LBS | BODY MASS INDEX: 25.23 KG/M2 | HEIGHT: 66 IN

## 2019-06-26 DIAGNOSIS — R92.2 DENSE BREAST: ICD-10-CM

## 2019-06-26 DIAGNOSIS — Z12.39 SCREENING FOR MALIGNANT NEOPLASM OF BREAST: ICD-10-CM

## 2019-06-26 PROCEDURE — 77067 SCR MAMMO BI INCL CAD: CPT

## 2019-06-26 PROCEDURE — 77063 BREAST TOMOSYNTHESIS BI: CPT

## 2019-07-01 ENCOUNTER — APPOINTMENT (OUTPATIENT)
Dept: LAB | Facility: MEDICAL CENTER | Age: 54
End: 2019-07-01
Payer: COMMERCIAL

## 2019-07-01 DIAGNOSIS — C18.2 MALIGNANT NEOPLASM OF ASCENDING COLON (HCC): ICD-10-CM

## 2019-07-01 DIAGNOSIS — C78.7 LIVER METASTASES (HCC): ICD-10-CM

## 2019-07-01 DIAGNOSIS — C18.2 PRIMARY ADENOCARCINOMA OF ASCENDING COLON (HCC): ICD-10-CM

## 2019-07-01 LAB
ALBUMIN SERPL BCP-MCNC: 3.9 G/DL (ref 3.5–5)
ALP SERPL-CCNC: 126 U/L (ref 46–116)
ALT SERPL W P-5'-P-CCNC: 32 U/L (ref 12–78)
ANION GAP SERPL CALCULATED.3IONS-SCNC: 6 MMOL/L (ref 4–13)
AST SERPL W P-5'-P-CCNC: 16 U/L (ref 5–45)
BASOPHILS # BLD AUTO: 0.05 THOUSANDS/ΜL (ref 0–0.1)
BASOPHILS NFR BLD AUTO: 1 % (ref 0–1)
BILIRUB SERPL-MCNC: 0.46 MG/DL (ref 0.2–1)
BUN SERPL-MCNC: 8 MG/DL (ref 5–25)
CALCIUM SERPL-MCNC: 9.1 MG/DL (ref 8.3–10.1)
CHLORIDE SERPL-SCNC: 106 MMOL/L (ref 100–108)
CO2 SERPL-SCNC: 28 MMOL/L (ref 21–32)
CREAT SERPL-MCNC: 0.97 MG/DL (ref 0.6–1.3)
EOSINOPHIL # BLD AUTO: 0.17 THOUSAND/ΜL (ref 0–0.61)
EOSINOPHIL NFR BLD AUTO: 2 % (ref 0–6)
ERYTHROCYTE [DISTWIDTH] IN BLOOD BY AUTOMATED COUNT: 16 % (ref 11.6–15.1)
GFR SERPL CREATININE-BSD FRML MDRD: 67 ML/MIN/1.73SQ M
GLUCOSE SERPL-MCNC: 108 MG/DL (ref 65–140)
HCT VFR BLD AUTO: 42.6 % (ref 34.8–46.1)
HGB BLD-MCNC: 13.2 G/DL (ref 11.5–15.4)
IMM GRANULOCYTES # BLD AUTO: 0.06 THOUSAND/UL (ref 0–0.2)
IMM GRANULOCYTES NFR BLD AUTO: 1 % (ref 0–2)
LYMPHOCYTES # BLD AUTO: 1.15 THOUSANDS/ΜL (ref 0.6–4.47)
LYMPHOCYTES NFR BLD AUTO: 12 % (ref 14–44)
MCH RBC QN AUTO: 32.6 PG (ref 26.8–34.3)
MCHC RBC AUTO-ENTMCNC: 31 G/DL (ref 31.4–37.4)
MCV RBC AUTO: 105 FL (ref 82–98)
MONOCYTES # BLD AUTO: 1.27 THOUSAND/ΜL (ref 0.17–1.22)
MONOCYTES NFR BLD AUTO: 13 % (ref 4–12)
NEUTROPHILS # BLD AUTO: 6.81 THOUSANDS/ΜL (ref 1.85–7.62)
NEUTS SEG NFR BLD AUTO: 71 % (ref 43–75)
NRBC BLD AUTO-RTO: 0 /100 WBCS
PLATELET # BLD AUTO: 193 THOUSANDS/UL (ref 149–390)
PMV BLD AUTO: 10.7 FL (ref 8.9–12.7)
POTASSIUM SERPL-SCNC: 3.8 MMOL/L (ref 3.5–5.3)
PROT SERPL-MCNC: 7.1 G/DL (ref 6.4–8.2)
RBC # BLD AUTO: 4.05 MILLION/UL (ref 3.81–5.12)
SODIUM SERPL-SCNC: 140 MMOL/L (ref 136–145)
WBC # BLD AUTO: 9.51 THOUSAND/UL (ref 4.31–10.16)

## 2019-07-01 PROCEDURE — 36415 COLL VENOUS BLD VENIPUNCTURE: CPT

## 2019-07-01 PROCEDURE — 85025 COMPLETE CBC W/AUTO DIFF WBC: CPT

## 2019-07-01 PROCEDURE — 80053 COMPREHEN METABOLIC PANEL: CPT

## 2019-07-02 ENCOUNTER — DOCUMENTATION (OUTPATIENT)
Dept: HEMATOLOGY ONCOLOGY | Facility: CLINIC | Age: 54
End: 2019-07-02

## 2019-07-02 NOTE — PROGRESS NOTES
GI Oncology Nurse Navigator Note    Received a call from [facility] [address], she said she has red spots on her legs and at times they are getting worse, they do not itch, are not painful and she said she did speak to Dr Jayy Suero about them already, she was just getting concerned as they are now getting worse, she is coming to the infusion center tomorrow and asked if I can meet her there to assess them, told her I would, also told her to have the nurses there take a look prior to the start of her infusion to assess as well in case I am not there before she starts, she was agreeable

## 2019-07-03 ENCOUNTER — HOSPITAL ENCOUNTER (OUTPATIENT)
Dept: INFUSION CENTER | Facility: CLINIC | Age: 54
Discharge: HOME | End: 2019-07-03
Payer: COMMERCIAL

## 2019-07-03 VITALS
BODY MASS INDEX: 24.94 KG/M2 | OXYGEN SATURATION: 97 % | WEIGHT: 155.2 LBS | RESPIRATION RATE: 19 BRPM | HEIGHT: 66 IN | SYSTOLIC BLOOD PRESSURE: 124 MMHG | TEMPERATURE: 97.5 F | HEART RATE: 82 BPM | DIASTOLIC BLOOD PRESSURE: 70 MMHG

## 2019-07-03 DIAGNOSIS — C78.7 LIVER METASTASES (HCC): Primary | ICD-10-CM

## 2019-07-03 DIAGNOSIS — C18.2 PRIMARY ADENOCARCINOMA OF ASCENDING COLON (HCC): ICD-10-CM

## 2019-07-03 PROCEDURE — 96413 CHEMO IV INFUSION 1 HR: CPT

## 2019-07-03 PROCEDURE — G0498 CHEMO EXTEND IV INFUS W/PUMP: HCPCS

## 2019-07-03 PROCEDURE — 96367 TX/PROPH/DG ADDL SEQ IV INF: CPT

## 2019-07-03 PROCEDURE — 96417 CHEMO IV INFUS EACH ADDL SEQ: CPT

## 2019-07-03 PROCEDURE — 96368 THER/DIAG CONCURRENT INF: CPT

## 2019-07-03 PROCEDURE — 96375 TX/PRO/DX INJ NEW DRUG ADDON: CPT

## 2019-07-03 RX ORDER — SODIUM CHLORIDE 9 MG/ML
20 INJECTION, SOLUTION INTRAVENOUS ONCE
Status: COMPLETED | OUTPATIENT
Start: 2019-07-03 | End: 2019-07-03

## 2019-07-03 RX ORDER — ATROPINE SULFATE 1 MG/ML
0.25 INJECTION, SOLUTION INTRAMUSCULAR; INTRAVENOUS; SUBCUTANEOUS ONCE AS NEEDED
Status: DISCONTINUED | OUTPATIENT
Start: 2019-07-03 | End: 2019-07-06 | Stop reason: HOSPADM

## 2019-07-03 RX ORDER — ATROPINE SULFATE 1 MG/ML
0.25 INJECTION, SOLUTION INTRAMUSCULAR; INTRAVENOUS; SUBCUTANEOUS ONCE
Status: COMPLETED | OUTPATIENT
Start: 2019-07-03 | End: 2019-07-03

## 2019-07-03 RX ADMIN — IRINOTECAN HYDROCHLORIDE 320 MG: 20 INJECTION, SOLUTION INTRAVENOUS at 14:01

## 2019-07-03 RX ADMIN — ATROPINE SULFATE 0.25 MG: 1 INJECTION, SOLUTION INTRAMUSCULAR; INTRAVENOUS; SUBCUTANEOUS at 13:07

## 2019-07-03 RX ADMIN — SODIUM CHLORIDE 20 ML/HR: 0.9 INJECTION, SOLUTION INTRAVENOUS at 12:25

## 2019-07-03 RX ADMIN — BEVACIZUMAB 355 MG: 400 INJECTION, SOLUTION INTRAVENOUS at 13:08

## 2019-07-03 RX ADMIN — LEUCOVORIN CALCIUM 700 MG: 500 INJECTION, POWDER, LYOPHILIZED, FOR SOLUTION INTRAMUSCULAR; INTRAVENOUS at 13:54

## 2019-07-03 RX ADMIN — DEXAMETHASONE SODIUM PHOSPHATE: 10 INJECTION, SOLUTION INTRAMUSCULAR; INTRAVENOUS at 12:27

## 2019-07-03 NOTE — PROGRESS NOTES
Pt tolerated treatment well  Good blood return noted before, during and after chemo  CADD pump connected per protocol after 2 RN double checks  Pt knows to return 7/5/19 at 1341  Aware of next appt  AVS declined

## 2019-07-05 ENCOUNTER — HOSPITAL ENCOUNTER (OUTPATIENT)
Dept: INFUSION CENTER | Facility: CLINIC | Age: 54
Discharge: HOME | End: 2019-07-05
Payer: COMMERCIAL

## 2019-07-05 DIAGNOSIS — C18.2 PRIMARY ADENOCARCINOMA OF ASCENDING COLON (HCC): ICD-10-CM

## 2019-07-05 DIAGNOSIS — C78.7 LIVER METASTASES (HCC): Primary | ICD-10-CM

## 2019-07-05 PROCEDURE — 96372 THER/PROPH/DIAG INJ SC/IM: CPT

## 2019-07-05 RX ADMIN — PEGFILGRASTIM 6 MG: KIT SUBCUTANEOUS at 13:45

## 2019-07-05 NOTE — PROGRESS NOTES
Pt to clinic for CADD dc and neulasta on pro, pt offers no complaints at this time, aware of when to take off neulasta, pt declines avs

## 2019-07-09 ENCOUNTER — OFFICE VISIT (OUTPATIENT)
Dept: HEMATOLOGY ONCOLOGY | Facility: CLINIC | Age: 54
End: 2019-07-09
Payer: COMMERCIAL

## 2019-07-09 VITALS
WEIGHT: 155.5 LBS | RESPIRATION RATE: 18 BRPM | HEART RATE: 98 BPM | TEMPERATURE: 99.1 F | DIASTOLIC BLOOD PRESSURE: 88 MMHG | BODY MASS INDEX: 25.91 KG/M2 | HEIGHT: 65 IN | SYSTOLIC BLOOD PRESSURE: 122 MMHG

## 2019-07-09 DIAGNOSIS — C78.7 LIVER METASTASES (HCC): ICD-10-CM

## 2019-07-09 DIAGNOSIS — C78.6 PERITONEAL METASTASES (HCC): ICD-10-CM

## 2019-07-09 DIAGNOSIS — C18.2 PRIMARY ADENOCARCINOMA OF ASCENDING COLON (HCC): Primary | ICD-10-CM

## 2019-07-09 DIAGNOSIS — D70.1 CHEMOTHERAPY INDUCED NEUTROPENIA (HCC): ICD-10-CM

## 2019-07-09 DIAGNOSIS — T45.1X5A CHEMOTHERAPY INDUCED NEUTROPENIA (HCC): ICD-10-CM

## 2019-07-09 DIAGNOSIS — I82.531 CHRONIC DEEP VEIN THROMBOSIS (DVT) OF POPLITEAL VEIN OF RIGHT LOWER EXTREMITY (HCC): ICD-10-CM

## 2019-07-09 DIAGNOSIS — C77.2 METASTASIS TO RETROPERITONEAL LYMPH NODE (HCC): ICD-10-CM

## 2019-07-09 PROCEDURE — 99214 OFFICE O/P EST MOD 30 MIN: CPT | Performed by: INTERNAL MEDICINE

## 2019-07-09 RX ORDER — SODIUM CHLORIDE 9 MG/ML
20 INJECTION, SOLUTION INTRAVENOUS ONCE
Status: CANCELLED | OUTPATIENT
Start: 2019-08-15

## 2019-07-09 RX ORDER — ATROPINE SULFATE 1 MG/ML
0.25 INJECTION, SOLUTION INTRAMUSCULAR; INTRAVENOUS; SUBCUTANEOUS ONCE AS NEEDED
Status: CANCELLED | OUTPATIENT
Start: 2019-07-18

## 2019-07-09 RX ORDER — ATROPINE SULFATE 1 MG/ML
0.25 INJECTION, SOLUTION INTRAMUSCULAR; INTRAVENOUS; SUBCUTANEOUS ONCE
Status: CANCELLED | OUTPATIENT
Start: 2019-07-18

## 2019-07-09 RX ORDER — ATROPINE SULFATE 1 MG/ML
0.25 INJECTION, SOLUTION INTRAMUSCULAR; INTRAVENOUS; SUBCUTANEOUS ONCE
Status: CANCELLED | OUTPATIENT
Start: 2019-08-29

## 2019-07-09 RX ORDER — ATROPINE SULFATE 1 MG/ML
0.25 INJECTION, SOLUTION INTRAMUSCULAR; INTRAVENOUS; SUBCUTANEOUS ONCE
Status: CANCELLED | OUTPATIENT
Start: 2019-08-15

## 2019-07-09 RX ORDER — ATROPINE SULFATE 1 MG/ML
0.25 INJECTION, SOLUTION INTRAMUSCULAR; INTRAVENOUS; SUBCUTANEOUS ONCE AS NEEDED
Status: CANCELLED | OUTPATIENT
Start: 2019-08-15

## 2019-07-09 RX ORDER — SODIUM CHLORIDE 9 MG/ML
20 INJECTION, SOLUTION INTRAVENOUS ONCE
Status: CANCELLED | OUTPATIENT
Start: 2019-08-29

## 2019-07-09 RX ORDER — ATROPINE SULFATE 1 MG/ML
0.25 INJECTION, SOLUTION INTRAMUSCULAR; INTRAVENOUS; SUBCUTANEOUS ONCE
Status: CANCELLED | OUTPATIENT
Start: 2019-07-31

## 2019-07-09 RX ORDER — SODIUM CHLORIDE 9 MG/ML
20 INJECTION, SOLUTION INTRAVENOUS ONCE
Status: CANCELLED | OUTPATIENT
Start: 2019-07-31

## 2019-07-09 RX ORDER — PREDNISONE 5 MG/ML
SOLUTION ORAL DAILY
COMMUNITY
End: 2020-05-19

## 2019-07-09 RX ORDER — SODIUM CHLORIDE 9 MG/ML
20 INJECTION, SOLUTION INTRAVENOUS ONCE
Status: CANCELLED | OUTPATIENT
Start: 2019-07-18

## 2019-07-09 RX ORDER — ATROPINE SULFATE 1 MG/ML
0.25 INJECTION, SOLUTION INTRAMUSCULAR; INTRAVENOUS; SUBCUTANEOUS ONCE AS NEEDED
Status: CANCELLED | OUTPATIENT
Start: 2019-08-29

## 2019-07-09 RX ORDER — ATROPINE SULFATE 1 MG/ML
0.25 INJECTION, SOLUTION INTRAMUSCULAR; INTRAVENOUS; SUBCUTANEOUS ONCE AS NEEDED
Status: CANCELLED | OUTPATIENT
Start: 2019-07-31

## 2019-07-09 NOTE — LETTER
July 9, 2019     Estevan Arora MD  [address] [facility]  [address]  [address] [facility]    Patient: Amada Cohen   YOB: 1965   Date of Visit: 7/9/2019       Dear Dr Skelton Snare: Thank you for referring Susu Diehl to me for evaluation  Below are my notes for this consultation  If you have questions, please do not hesitate to call me  I look forward to following your patient along with you  Sincerely,        Carole Snow MD        CC: No Recipients  Carole Snow MD  7/9/2019  7:43 PM  Sign at close encounter    HPI:  Patient is here with her   She is on modified FOLFIRI plus Avastin without bolus 5 FU for stage IV colon cancer with abdominal carcinomatosis and metastatic disease to liver, abdominal lymph nodes and in the pelvis    In May 2018 patient underwent GALI and BSO for uterine bleeding  and there were cancer cells in the fallopian tubes   In June 2018 patient   underwent extended right hemicolectomy, partial omentectomy and biopsy of the peritoneal nodule   Peritoneal nodule came back  positive for metastatic adenocarcinoma from colon    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 and metastatic disease to liver    6 cm T3 G2 right colon cancer with positive margins, lymphovascular invasion and perineural invasion and positive 3 of 27 lymph nodes with extranodal extension and positive peritoneal nodule biopsy   Stage IV disease because of liver and peritoneal metastases   In July 2018 patient was started  on modified FOLFIRI plus Avastin    She had PPE in her hands and bolus 5 FU was discontinued      She also had iron deficiency and had Venofer intravenously  Miranda Hernandez is not on Venofer anymore   She was seen by liver specialist at  [facility] and was not felt to be a surgical candidate   She had CT scans in [place] that showed continuous improvement in her metastatic disease       Patient has some tiredness   Has hot flashes   Some soreness in the mouth but no sores    She has non pruritic skin rash on her legs     Patient is on Xarelto 10 mg daily for history of DVT right lower extremity in 2016    Current Outpatient Medications:     acetaminophen (TYLENOL) 500 mg tablet, Take 1,000 mg by mouth every 6 (six) hours as needed for mild pain, Disp: , Rfl:     lidocaine-prilocaine (EMLA) cream, APPLY 1 HOUR PRIOR TO CHEMO, COVER IN WRAP, Disp: 30 g, Rfl: 0    nifedipine 0 2 % OINT, Apply topically every 4 (four) hours 0 3% ointment as typical(disregard 0 2% as auto order) 1 application to anal opening 4-6 times daily, Disp: 1 Tube, Rfl: 0    predniSONE 5 mg/5 mL solution, Take by mouth daily, Disp: , Rfl:     rivaroxaban (XARELTO) 10 mg tablet, Take 1 tablet (10 mg total) by mouth daily, Disp: 90 tablet, Rfl: 2    No Known Allergies       Primary adenocarcinoma of ascending colon ([facility] [address] )    6/14/2018 Initial Diagnosis     Primary adenocarcinoma of ascending colon ([facility] [address] )      7/16/2018 -  Chemotherapy     fluorouracil (ADRUCIL) injection 715 mg, 400 mg/m2, Intravenous, Once, 7 of 7 cycles  pegfilgrastim (NEULASTA ONPRO) subcutaneous injection kit 6 mg, 6 mg, Subcutaneous, Once, 4 of 6 cycles  Administration: 6 mg (5/22/2019), 6 mg (6/5/2019), 6 mg (6/22/2019), 6 mg (7/5/2019)  bevacizumab (AVASTIN) 355 mg in sodium chloride 0 9 % 100 mL IVPB, 5 mg/kg, Intravenous, Once, 11 of 13 cycles  Administration: 355 mg (5/20/2019), 355 mg (6/3/2019), 355 mg (6/20/2019), 355 mg (7/3/2019)  irinotecan (CAMPTOSAR) 322 mg in sodium chloride 0 9 % 500 mL chemo infusion, 180 mg/m2, Intravenous, Once, 11 of 13 cycles  Administration: 322 mg (5/20/2019), 322 mg (6/3/2019), 320 mg (6/20/2019), 320 mg (7/3/2019)  leucovorin 716 mg in sodium chloride 0 9 % 250 mL IVPB, 400 mg/m2, Intravenous, Once, 11 of 13 cycles  Administration: 716 mg (5/20/2019), 716 mg (6/3/2019), 700 mg (6/20/2019), 700 mg (7/3/2019)      7/17/2018 - 8/1/2018 Chemotherapy camptosar 180 mg/m2 day 1  5  mg/m2 day 1  5 FU 1200 mg/m2 day 1-2   Leucovorin 400 mg/m2     Venofer 100 mg (first 10 treatments) -- all every 2 weeks          8/1/2018 Adverse Reaction     Needed granix 300 mcg due to [address] of 1 01       8/14/2018 -  Chemotherapy     camptosar 180 mg/m2 day 1  5  mg/m2 day 1  5 FU 1200 mg/m2 day 1-2   Leucovorin 400 mg/m2  Avastin 5 mg/kg day 1   Venofer 100 mg (first 10 treatments)  Neulasta on Pro 6 mg day 3      -- every 2 weeks             Liver metastases ([facility] [address] )    6/26/2018 Initial Diagnosis     Liver metastases ([facility] [address] )      7/16/2018 -  Chemotherapy     fluorouracil (ADRUCIL) injection 715 mg, 400 mg/m2, Intravenous, Once, 7 of 7 cycles  pegfilgrastim (NEULASTA ONPRO) subcutaneous injection kit 6 mg, 6 mg, Subcutaneous, Once, 4 of 6 cycles  Administration: 6 mg (5/22/2019), 6 mg (6/5/2019), 6 mg (6/22/2019), 6 mg (7/5/2019)  bevacizumab (AVASTIN) 355 mg in sodium chloride 0 9 % 100 mL IVPB, 5 mg/kg, Intravenous, Once, 11 of 13 cycles  Administration: 355 mg (5/20/2019), 355 mg (6/3/2019), 355 mg (6/20/2019), 355 mg (7/3/2019)  irinotecan (CAMPTOSAR) 322 mg in sodium chloride 0 9 % 500 mL chemo infusion, 180 mg/m2, Intravenous, Once, 11 of 13 cycles  Administration: 322 mg (5/20/2019), 322 mg (6/3/2019), 320 mg (6/20/2019), 320 mg (7/3/2019)  leucovorin 716 mg in sodium chloride 0 9 % 250 mL IVPB, 400 mg/m2, Intravenous, Once, 11 of 13 cycles  Administration: 716 mg (5/20/2019), 716 mg (6/3/2019), 700 mg (6/20/2019), 700 mg (7/3/2019)         ROS:  07/09/19 Reviewed 13 systems:  Presently no neurological, cardiac, pulmonary, GI and  symptoms  No other symptoms like fever, chills, bleeding, bone pains,  weight loss, arthritic symptoms,   weakness, numbness,  claudication and gait problem  No frequent infections  Not unusually sensitive to heat or cold  No swelling of the ankles  No swollen glands  Patient is anxious   Other symptoms are in HPI        /88 (BP Location: Left arm, Patient Position: Sitting, Cuff Size: Adult)   Pulse 98   Temp 99 1 °F (37 3 °C)   Resp 18   Ht 5' 5" (1 651 m)   Wt 70 5 kg (155 lb 8 oz)   LMP 05/16/2018 (LMP Unknown)   BMI 25 88 kg/m²      Physical Exam:    Patient is alert and oriented  She is not in distress  Vital signs are as above  There is no scleral icterus, no oral thrush, no palpable neck mass, clear lung fields, regular heart rate, abdomen  soft and non tender, no palpable abdominal mass, no ascites, no edema of ankles, no calf tenderness, no focal neurological deficit, non specific skin rash on lower legs mostly anteriorly in the shin areas, no palpable lymphadenopathy in the neck and axillary areas, good arterial pulses, no clubbing  Patient is anxious  Performance status 1  IMAGING:  CT chest abdomen pelvis w contrast   Status: Final result   PACS Images      Show images for CT chest abdomen pelvis w contrast   Study Result     CT CHEST, ABDOMEN AND PELVIS WITH IV CONTRAST     INDICATION:   C18 2: Malignant neoplasm of ascending colon  C78 7: Secondary malignant neoplasm of liver and intrahepatic bile duct  C77 2: Secondary and unspecified malignant neoplasm of intra-abdominal lymph nodes  C78 6: Secondary malignant neoplasm of retroperitoneum and peritoneum  History of right-sided colorectal cancer with known metastases to the liver as well as peritoneal carcinomatosis  Receiving FOLFIRI and Avastin  Restaging      COMPARISON:  CT, dated March 19, 2019      TECHNIQUE: CT examination of the chest, abdomen and pelvis was performed  In addition to portal venous phase postcontrast scanning through the abdomen and pelvis, delayed phase postcontrast scanning was performed through the upper abdominal viscera  Axial, sagittal, and coronal 2D reformatted images were created from the source data and submitted for interpretation      Radiation dose length product (DLP) for this visit:  787 mGy-cm     This examination, like all CT scans performed in the [facility], was performed utilizing techniques to minimize radiation dose exposure, including the use of iterative   reconstruction and automated exposure control      IV Contrast:  100 mL of iohexol (OMNIPAQUE)  Enteric Contrast: Enteric contrast was administered      FINDINGS:     CHEST     LUNGS:  Two small 3 mm pulmonary nodules are seen in the left upper lobe (series 4, image 22), unchanged  No additional new pulmonary nodules are seen      PLEURA:  Unremarkable      HEART/GREAT VESSELS:  Unremarkable for patient's age      MEDIASTINUM AND MARGIE:  Unremarkable      CHEST WALL AND LOWER NECK:   A right internal jugular venous approach port terminates in the lower superior vena cava      ABDOMEN     LIVER/BILIARY TREE:  Redemonstrated are multiple hypoattenuating masses, all of which are confined to the right hepatic lobe  A reference mass in hepatic segment 8 measures 16 x 16 mm in greatest transverse dimensions (series 2, image 42), previously 16   x 16 mm  Additionally, a separate reference lesion in hepatic segment 6 measures 14 x 13 mm in greatest transverse dimensions (series 2, image 56), previously 16 x 13 mm  No new hepatic lesions are seen      GALLBLADDER:  No calcified gallstones  No pericholecystic inflammatory change      SPLEEN:  Unremarkable      PANCREAS:  Unremarkable      ADRENAL GLANDS:  Unremarkable      KIDNEYS/URETERS:  Unremarkable  No hydronephrosis      STOMACH AND BOWEL:  Redemonstrated are postsurgical changes related to ascending hemicolectomy  Remainder of bowel is normal   The anastomosis is normal      ABDOMINOPELVIC CAVITY:  A calcified deposit in the mesentery is unchanged  No new enlarged lymph nodes are present      VESSELS:  Unremarkable for patient's age      PELVIS     REPRODUCTIVE ORGANS:  Stable post surgical changes related to hysterectomy    Previously seen tissue deposit in the pelvic cul-de-sac is not well visualized on today's study      URINARY BLADDER:  Unremarkable      ABDOMINAL WALL/INGUINAL REGIONS:  Unremarkable      OSSEOUS STRUCTURES:  No acute fracture or destructive osseous lesion      IMPRESSION:     CHEST:  No evidence of metastatic disease  Stable small 3 mm pulmonary nodules      ABDOMEN AND PELVIS:  1   Stable size and number of known hepatic metastases  2   The previously seen FDG avid soft tissue deposit in the pelvic cul-de-sac is poorly evaluated on today's study    MRI of the pelvis with contrast may be necessary to further characterize for response to therapy if it would change treatment decisions            Workstation performed: [id number]      Imaging     CT chest abdomen pelvis w contrast (Order: [id number]) - 6/17/2019       LABS:  Results for orders placed or performed in visit on 07/01/19   Comprehensive metabolic panel   Result Value Ref Range    Sodium 140 136 - 145 mmol/L    Potassium 3 8 3 5 - 5 3 mmol/L    Chloride 106 100 - 108 mmol/L    CO2 28 21 - 32 mmol/L    ANION GAP 6 4 - 13 mmol/L    BUN 8 5 - 25 mg/dL    Creatinine 0 97 0 60 - 1 30 mg/dL    Glucose 108 65 - 140 mg/dL    Calcium 9 1 8 3 - 10 1 mg/dL    AST 16 5 - 45 U/L    ALT 32 12 - 78 U/L    Alkaline Phosphatase 126 (H) 46 - 116 U/L    Total Protein 7 1 6 4 - 8 2 g/dL    Albumin 3 9 3 5 - 5 0 g/dL    Total Bilirubin 0 46 0 20 - 1 00 mg/dL    eGFR 67 ml/min/1 73sq m   CBC and differential   Result Value Ref Range    WBC 9 51 4 31 - 10 16 Thousand/uL    RBC 4 05 3 81 - 5 12 Million/uL    Hemoglobin 13 2 11 5 - 15 4 g/dL    Hematocrit 42 6 34 8 - 46 1 %     (H) 82 - 98 fL    MCH 32 6 26 8 - 34 3 pg    MCHC 31 0 (L) 31 4 - 37 4 g/dL    RDW 16 0 (H) 11 6 - 15 1 %    MPV 10 7 8 9 - 12 7 fL    Platelets 186 327 - 482 Thousands/uL    nRBC 0 /100 WBCs    Neutrophils Relative 71 43 - 75 %    Immat GRANS % 1 0 - 2 %    Lymphocytes Relative 12 (L) 14 - 44 %    Monocytes Relative 13 (H) 4 - 12 %    Eosinophils Relative 2 0 - 6 %    Basophils Relative 1 0 - 1 %    Neutrophils Absolute 6 81 1 85 - 7 62 Thousands/µL    Immature Grans Absolute 0 06 0 00 - 0 20 Thousand/uL    Lymphocytes Absolute 1 15 0 60 - 4 47 Thousands/µL    Monocytes Absolute 1 27 (H) 0 17 - 1 22 Thousand/µL    Eosinophils Absolute 0 17 0 00 - 0 61 Thousand/µL    Basophils Absolute 0 05 0 00 - 0 10 Thousands/µL     Labs, Imaging, & Other studies:   All pertinent labs and imaging studies were personally reviewed    Lab Results   Component Value Date     03/18/2015    K 3 8 07/01/2019     07/01/2019    CO2 28 07/01/2019    ANIONGAP 9 03/18/2015    BUN 8 07/01/2019    CREATININE 0 97 07/01/2019    GLUCOSE 100 03/18/2015    GLUF 97 06/18/2019    CALCIUM 9 1 07/01/2019    AST 16 07/01/2019    ALT 32 07/01/2019    ALKPHOS 126 (H) 07/01/2019    PROT 6 6 03/18/2015    BILITOT 0 65 03/18/2015    EGFR 67 07/01/2019     Lab Results   Component Value Date    WBC 9 51 07/01/2019    HGB 13 2 07/01/2019    HCT 42 6 07/01/2019     (H) 07/01/2019     07/01/2019       Reviewed and discussed with patient  Assessment and plan:  Patient is here with her   She is on modified FOLFIRI plus Avastin without bolus 5 FU for stage IV colon cancer with abdominal carcinomatosis and metastatic disease to liver, abdominal lymph nodes and in the pelvis     In May 2018 patient underwent GALI and BSO for uterine bleeding  and there were cancer cells in the fallopian tubes   In June 2018 patient   underwent extended right hemicolectomy, partial omentectomy and biopsy of the peritoneal nodule   Peritoneal nodule came back  positive for metastatic adenocarcinoma from colon    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 and metastatic disease to liver    6 cm T3 G2 right colon cancer with positive margins, lymphovascular invasion and perineural invasion and positive 3 of 27 lymph nodes with extranodal extension and positive peritoneal nodule biopsy   Stage IV disease because of liver and peritoneal metastases   In July 2018 patient was started  on modified FOLFIRI plus Avastin  She had PPE in her hands and bolus 5 FU was discontinued      She also had iron deficiency and had Venofer intravenously  Violeta Rao is not on Venofer anymore   She was seen by liver specialist at  [facility] and was not felt to be a surgical candidate   She had CT scans in [place] that showed continuous improvement in her metastatic disease       Patient has some tiredness   Has hot flashes   Some soreness in the mouth but no sores     Non pruritic skin rash on lower legs     Patient is on Xarelto 10 mg daily for history of DVT right lower extremity in 2016     Physical examination and test results are as recorded and discussed in detail  Metastatic colon cancer is stable or slightly improved in the pelvis on most recent CT scan  Radiologist has suggested getting MRI scan  Patient has seen the films with the radiologist  Izabela Woods will like to have MRI scan of abdomen and pelvis     She is being continued on present treatment plan   Condition discussed and explained   Questions answered  [place] is prolongation of survival from colon cancer and no more blood clot and bleeding                       Discussed the importance of self-breast examination, eating healthy foods, staying active and health screening tests  Pelon Andrew is capable of Self care  1  Primary adenocarcinoma of ascending colon ([facility] [address] )    - MRI pelvis w wo contrast; Future  - MRI abdomen wo contrast; Future  - Infusion Calculated Appointment Request; Future  - CBC and differential; Future  - Comprehensive metabolic panel; Future  - Infusion Calculated Appointment Request; Future  - Infusion Calculated Appointment Request; Future  - CBC and differential; Future  - Comprehensive metabolic panel;  Future  - Infusion Calculated Appointment Request; Future  - Infusion Calculated Appointment Request; Future  - CBC and differential; Future  - Comprehensive metabolic panel; Future  - sodium chloride 0 9 % infusion  - ondansetron (ZOFRAN) 16 mg, dexamethasone (DECADRON) 10 mg in sodium chloride 0 9 % 50 mL IVPB  - atropine injection 0 25 mg  - atropine injection 0 25 mg  - bevacizumab (AVASTIN) 355 mg in sodium chloride 0 9 % 100 mL IVPB  - irinotecan (CAMPTOSAR) 322 mg in sodium chloride 0 9 % 500 mL chemo infusion  - leucovorin 716 mg in sodium chloride 0 9 % 250 mL IVPB  - pegfilgrastim (NEULASTA ONPRO) subcutaneous injection kit 6 mg  - Infusion Calculated Appointment Request; Future  - Infusion Calculated Appointment Request; Future  - CBC and differential; Future  - Comprehensive metabolic panel; Future  - Infusion Calculated Appointment Request; Future    2  Liver metastases ([facility] [address] )    - MRI pelvis w wo contrast; Future  - MRI abdomen wo contrast; Future  - Infusion Calculated Appointment Request; Future  - CBC and differential; Future  - Comprehensive metabolic panel; Future  - Infusion Calculated Appointment Request; Future  - Infusion Calculated Appointment Request; Future  - CBC and differential; Future  - Comprehensive metabolic panel; Future  - Infusion Calculated Appointment Request; Future  - Infusion Calculated Appointment Request; Future  - CBC and differential; Future  - Comprehensive metabolic panel;  Future  - sodium chloride 0 9 % infusion  - ondansetron (ZOFRAN) 16 mg, dexamethasone (DECADRON) 10 mg in sodium chloride 0 9 % 50 mL IVPB  - atropine injection 0 25 mg  - atropine injection 0 25 mg  - bevacizumab (AVASTIN) 355 mg in sodium chloride 0 9 % 100 mL IVPB  - irinotecan (CAMPTOSAR) 322 mg in sodium chloride 0 9 % 500 mL chemo infusion  - leucovorin 716 mg in sodium chloride 0 9 % 250 mL IVPB  - pegfilgrastim (NEULASTA ONPRO) subcutaneous injection kit 6 mg  - Infusion Calculated Appointment Request; Future  - Infusion Calculated Appointment Request; Future  - CBC and differential; Future  - Comprehensive metabolic panel; Future  - Infusion Calculated Appointment Request; Future    3  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  - MRI pelvis w wo contrast; Future  - MRI abdomen wo contrast; Future    4  Peritoneal metastases ([address] )    - MRI pelvis w wo contrast; Future  - MRI abdomen wo contrast; Future    5  Chronic deep vein thrombosis (DVT) of popliteal vein of right lower extremity (HCC)      6  Chemotherapy induced neutropenia (HCC)    - Infusion Calculated Appointment Request; Future  - CBC and differential; Future  - Comprehensive metabolic panel; Future  - Infusion Calculated Appointment Request; Future  - Infusion Calculated Appointment Request; Future  - CBC and differential; Future  - Comprehensive metabolic panel; Future  - Infusion Calculated Appointment Request; Future  - Infusion Calculated Appointment Request; Future  - CBC and differential; Future  - Comprehensive metabolic panel; Future  - Infusion Calculated Appointment Request; Future        Patient voiced understanding and agreement in the discussion  Counseling / Coordination of Care   Greater than 50% of total time was spent with the patient and / or family counseling and / or coordination of care

## 2019-07-09 NOTE — PROGRESS NOTES
HPI:  Patient is here with her   She is on modified FOLFIRI plus Avastin without bolus 5 FU for stage IV colon cancer with abdominal carcinomatosis and metastatic disease to liver, abdominal lymph nodes and in the pelvis    In May 2018 patient underwent GALI and BSO for uterine bleeding  and there were cancer cells in the fallopian tubes   In June 2018 patient   underwent extended right hemicolectomy, partial omentectomy and biopsy of the peritoneal nodule   Peritoneal nodule came back  positive for metastatic adenocarcinoma from colon    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 and metastatic disease to liver    6 cm T3 G2 right colon cancer with positive margins, lymphovascular invasion and perineural invasion and positive 3 of 27 lymph nodes with extranodal extension and positive peritoneal nodule biopsy   Stage IV disease because of liver and peritoneal metastases   In July 2018 patient was started  on modified FOLFIRI plus Avastin  She had PPE in her hands and bolus 5 FU was discontinued      She also had iron deficiency and had Venofer intravenously  Alexandrea Fiddler is not on Venofer anymore   She was seen by liver specialist at  [facility] and was not felt to be a surgical candidate   She had CT scans in [place] that showed continuous improvement in her metastatic disease       Patient has some tiredness   Has hot flashes   Some soreness in the mouth but no sores    She has non pruritic skin rash on her legs     Patient is on Xarelto 10 mg daily for history of DVT right lower extremity in 2016    Current Outpatient Medications:     acetaminophen (TYLENOL) 500 mg tablet, Take 1,000 mg by mouth every 6 (six) hours as needed for mild pain, Disp: , Rfl:     lidocaine-prilocaine (EMLA) cream, APPLY 1 HOUR PRIOR TO CHEMO, COVER IN WRAP, Disp: 30 g, Rfl: 0    nifedipine 0 2 % OINT, Apply topically every 4 (four) hours 0 3% ointment as typical(disregard 0 2% as auto order) 1 application to anal opening 4-6 times daily, Disp: 1 Tube, Rfl: 0    predniSONE 5 mg/5 mL solution, Take by mouth daily, Disp: , Rfl:     rivaroxaban (XARELTO) 10 mg tablet, Take 1 tablet (10 mg total) by mouth daily, Disp: 90 tablet, Rfl: 2    No Known Allergies       Primary adenocarcinoma of ascending colon ([facility] [address] )    6/14/2018 Initial Diagnosis     Primary adenocarcinoma of ascending colon ([facility] [address] )      7/16/2018 -  Chemotherapy     fluorouracil (ADRUCIL) injection 715 mg, 400 mg/m2, Intravenous, Once, 7 of 7 cycles  pegfilgrastim (NEULASTA ONPRO) subcutaneous injection kit 6 mg, 6 mg, Subcutaneous, Once, 4 of 6 cycles  Administration: 6 mg (5/22/2019), 6 mg (6/5/2019), 6 mg (6/22/2019), 6 mg (7/5/2019)  bevacizumab (AVASTIN) 355 mg in sodium chloride 0 9 % 100 mL IVPB, 5 mg/kg, Intravenous, Once, 11 of 13 cycles  Administration: 355 mg (5/20/2019), 355 mg (6/3/2019), 355 mg (6/20/2019), 355 mg (7/3/2019)  irinotecan (CAMPTOSAR) 322 mg in sodium chloride 0 9 % 500 mL chemo infusion, 180 mg/m2, Intravenous, Once, 11 of 13 cycles  Administration: 322 mg (5/20/2019), 322 mg (6/3/2019), 320 mg (6/20/2019), 320 mg (7/3/2019)  leucovorin 716 mg in sodium chloride 0 9 % 250 mL IVPB, 400 mg/m2, Intravenous, Once, 11 of 13 cycles  Administration: 716 mg (5/20/2019), 716 mg (6/3/2019), 700 mg (6/20/2019), 700 mg (7/3/2019)      7/17/2018 - 8/1/2018 Chemotherapy     camptosar 180 mg/m2 day 1  5  mg/m2 day 1  5 FU 1200 mg/m2 day 1-2   Leucovorin 400 mg/m2     Venofer 100 mg (first 10 treatments) -- all every 2 weeks          8/1/2018 Adverse Reaction     Needed granix 300 mcg due to [address] of 1 01       8/14/2018 -  Chemotherapy     camptosar 180 mg/m2 day 1  5  mg/m2 day 1  5 FU 1200 mg/m2 day 1-2   Leucovorin 400 mg/m2  Avastin 5 mg/kg day 1   Venofer 100 mg (first 10 treatments)  Neulasta on Pro 6 mg day 3      -- every 2 weeks             Liver metastases ([facility] [address] )    6/26/2018 Initial Diagnosis     [facility]      7/16/2018 -  Chemotherapy     fluorouracil (ADRUCIL) injection 715 mg, 400 mg/m2, Intravenous, Once, 7 of 7 cycles  pegfilgrastim (NEULASTA ONPRO) subcutaneous injection kit 6 mg, 6 mg, Subcutaneous, Once, 4 of 6 cycles  Administration: 6 mg (5/22/2019), 6 mg (6/5/2019), 6 mg (6/22/2019), 6 mg (7/5/2019)  bevacizumab (AVASTIN) 355 mg in sodium chloride 0 9 % 100 mL IVPB, 5 mg/kg, Intravenous, Once, 11 of 13 cycles  Administration: 355 mg (5/20/2019), 355 mg (6/3/2019), 355 mg (6/20/2019), 355 mg (7/3/2019)  irinotecan (CAMPTOSAR) 322 mg in sodium chloride 0 9 % 500 mL chemo infusion, 180 mg/m2, Intravenous, Once, 11 of 13 cycles  Administration: 322 mg (5/20/2019), 322 mg (6/3/2019), 320 mg (6/20/2019), 320 mg (7/3/2019)  leucovorin 716 mg in sodium chloride 0 9 % 250 mL IVPB, 400 mg/m2, Intravenous, Once, 11 of 13 cycles  Administration: 716 mg (5/20/2019), 716 mg (6/3/2019), 700 mg (6/20/2019), 700 mg (7/3/2019)         ROS:  07/09/19 Reviewed 13 systems:  Presently no neurological, cardiac, pulmonary, GI and  symptoms  No other symptoms like fever, chills, bleeding, bone pains,  weight loss, arthritic symptoms,   weakness, numbness,  claudication and gait problem  No frequent infections  Not unusually sensitive to heat or cold  No swelling of the ankles  No swollen glands  Patient is anxious  Other symptoms are in HPI        /88 (BP Location: Left arm, Patient Position: Sitting, Cuff Size: Adult)   Pulse 98   Temp 99 1 °F (37 3 °C)   Resp 18   Ht 5' 5" (1 651 m)   Wt 70 5 kg (155 lb 8 oz)   LMP 05/16/2018 (LMP Unknown)   BMI 25 88 kg/m²     Physical Exam:    Patient is alert and oriented  She is not in distress  Vital signs are as above    There is no scleral icterus, no oral thrush, no palpable neck mass, clear lung fields, regular heart rate, abdomen  soft and non tender, no palpable abdominal mass, no ascites, no edema of ankles, no calf tenderness, no focal neurological deficit, non specific skin rash on lower legs mostly anteriorly in the shin areas, no palpable lymphadenopathy in the neck and axillary areas, good arterial pulses, no clubbing  Patient is anxious  Performance status 1  IMAGING:  CT chest abdomen pelvis w contrast   Status: Final result   PACS Images      Show images for CT chest abdomen pelvis w contrast   Study Result     CT CHEST, ABDOMEN AND PELVIS WITH IV CONTRAST     INDICATION:   C18 2: Malignant neoplasm of ascending colon  C78 7: Secondary malignant neoplasm of liver and intrahepatic bile duct  C77 2: Secondary and unspecified malignant neoplasm of intra-abdominal lymph nodes  C78 6: Secondary malignant neoplasm of retroperitoneum and peritoneum  History of right-sided colorectal cancer with known metastases to the liver as well as peritoneal carcinomatosis  Receiving FOLFIRI and Avastin  Restaging      COMPARISON:  CT, dated March 19, 2019      TECHNIQUE: CT examination of the chest, abdomen and pelvis was performed  In addition to portal venous phase postcontrast scanning through the abdomen and pelvis, delayed phase postcontrast scanning was performed through the upper abdominal viscera  Axial, sagittal, and coronal 2D reformatted images were created from the source data and submitted for interpretation      Radiation dose length product (DLP) for this visit:  787 mGy-cm   This examination, like all CT scans performed in the [facility], was performed utilizing techniques to minimize radiation dose exposure, including the use of iterative   reconstruction and automated exposure control      IV Contrast:  100 mL of iohexol (OMNIPAQUE)  Enteric Contrast: Enteric contrast was administered      FINDINGS:     CHEST     LUNGS:  Two small 3 mm pulmonary nodules are seen in the left upper lobe (series 4, image 22), unchanged    No additional new pulmonary nodules are seen      PLEURA:  Unremarkable      HEART/GREAT VESSELS:  Unremarkable for patient's age      MEDIASTINUM AND MARGIE:  Unremarkable      CHEST WALL AND LOWER NECK:   A right internal jugular venous approach port terminates in the lower superior vena cava      ABDOMEN     LIVER/BILIARY TREE:  Redemonstrated are multiple hypoattenuating masses, all of which are confined to the right hepatic lobe  A reference mass in hepatic segment 8 measures 16 x 16 mm in greatest transverse dimensions (series 2, image 42), previously 16   x 16 mm  Additionally, a separate reference lesion in hepatic segment 6 measures 14 x 13 mm in greatest transverse dimensions (series 2, image 56), previously 16 x 13 mm  No new hepatic lesions are seen      GALLBLADDER:  No calcified gallstones  No pericholecystic inflammatory change      SPLEEN:  Unremarkable      PANCREAS:  Unremarkable      ADRENAL GLANDS:  Unremarkable      KIDNEYS/URETERS:  Unremarkable  No hydronephrosis      STOMACH AND BOWEL:  Redemonstrated are postsurgical changes related to ascending hemicolectomy  Remainder of bowel is normal   The anastomosis is normal      ABDOMINOPELVIC CAVITY:  A calcified deposit in the mesentery is unchanged  No new enlarged lymph nodes are present      VESSELS:  Unremarkable for patient's age      PELVIS     REPRODUCTIVE ORGANS:  Stable post surgical changes related to hysterectomy  Previously seen tissue deposit in the pelvic cul-de-sac is not well visualized on today's study      URINARY BLADDER:  Unremarkable      ABDOMINAL WALL/INGUINAL REGIONS:  Unremarkable      OSSEOUS STRUCTURES:  No acute fracture or destructive osseous lesion      IMPRESSION:     CHEST:  No evidence of metastatic disease  Stable small 3 mm pulmonary nodules      ABDOMEN AND PELVIS:  1   Stable size and number of known hepatic metastases  2   The previously seen FDG avid soft tissue deposit in the pelvic cul-de-sac is poorly evaluated on today's study    MRI of the pelvis with contrast may be necessary to further characterize for response to therapy if it would change treatment decisions            Workstation performed: [id number]      Imaging     CT chest abdomen pelvis w contrast (Order: [id number]) - 6/17/2019       LABS:  Results for orders placed or performed in visit on 07/01/19   Comprehensive metabolic panel   Result Value Ref Range    Sodium 140 136 - 145 mmol/L    Potassium 3 8 3 5 - 5 3 mmol/L    Chloride 106 100 - 108 mmol/L    CO2 28 21 - 32 mmol/L    ANION GAP 6 4 - 13 mmol/L    BUN 8 5 - 25 mg/dL    Creatinine 0 97 0 60 - 1 30 mg/dL    Glucose 108 65 - 140 mg/dL    Calcium 9 1 8 3 - 10 1 mg/dL    AST 16 5 - 45 U/L    ALT 32 12 - 78 U/L    Alkaline Phosphatase 126 (H) 46 - 116 U/L    Total Protein 7 1 6 4 - 8 2 g/dL    Albumin 3 9 3 5 - 5 0 g/dL    Total Bilirubin 0 46 0 20 - 1 00 mg/dL    eGFR 67 ml/min/1 73sq m   CBC and differential   Result Value Ref Range    WBC 9 51 4 31 - 10 16 Thousand/uL    RBC 4 05 3 81 - 5 12 Million/uL    Hemoglobin 13 2 11 5 - 15 4 g/dL    Hematocrit 42 6 34 8 - 46 1 %     (H) 82 - 98 fL    MCH 32 6 26 8 - 34 3 pg    MCHC 31 0 (L) 31 4 - 37 4 g/dL    RDW 16 0 (H) 11 6 - 15 1 %    MPV 10 7 8 9 - 12 7 fL    Platelets 026 624 - 288 Thousands/uL    nRBC 0 /100 WBCs    Neutrophils Relative 71 43 - 75 %    Immat GRANS % 1 0 - 2 %    Lymphocytes Relative 12 (L) 14 - 44 %    Monocytes Relative 13 (H) 4 - 12 %    Eosinophils Relative 2 0 - 6 %    Basophils Relative 1 0 - 1 %    Neutrophils Absolute 6 81 1 85 - 7 62 Thousands/µL    Immature Grans Absolute 0 06 0 00 - 0 20 Thousand/uL    Lymphocytes Absolute 1 15 0 60 - 4 47 Thousands/µL    Monocytes Absolute 1 27 (H) 0 17 - 1 22 Thousand/µL    Eosinophils Absolute 0 17 0 00 - 0 61 Thousand/µL    Basophils Absolute 0 05 0 00 - 0 10 Thousands/µL     Labs, Imaging, & Other studies:   All pertinent labs and imaging studies were personally reviewed    Lab Results   Component Value Date     03/18/2015    K 3 8 07/01/2019     07/01/2019    CO2 28 07/01/2019    ANIONGAP 9 03/18/2015    BUN 8 07/01/2019    CREATININE 0 97 07/01/2019    GLUCOSE 100 03/18/2015    GLUF 97 06/18/2019    CALCIUM 9 1 07/01/2019    AST 16 07/01/2019    ALT 32 07/01/2019    ALKPHOS 126 (H) 07/01/2019    PROT 6 6 03/18/2015    BILITOT 0 65 03/18/2015    EGFR 67 07/01/2019     Lab Results   Component Value Date    WBC 9 51 07/01/2019    HGB 13 2 07/01/2019    HCT 42 6 07/01/2019     (H) 07/01/2019     07/01/2019       Reviewed and discussed with patient  Assessment and plan:  Patient is here with her   She is on modified FOLFIRI plus Avastin without bolus 5 FU for stage IV colon cancer with abdominal carcinomatosis and metastatic disease to liver, abdominal lymph nodes and in the pelvis    In May 2018 patient underwent GALI and BSO for uterine bleeding  and there were cancer cells in the fallopian tubes   In June 2018 patient   underwent extended right hemicolectomy, partial omentectomy and biopsy of the peritoneal nodule   Peritoneal nodule came back  positive for metastatic adenocarcinoma from colon    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 and metastatic disease to liver    6 cm T3 G2 right colon cancer with positive margins, lymphovascular invasion and perineural invasion and positive 3 of 27 lymph nodes with extranodal extension and positive peritoneal nodule biopsy   Stage IV disease because of liver and peritoneal metastases   In July 2018 patient was started  on modified FOLFIRI plus Avastin    She had PPE in her hands and bolus 5 FU was discontinued      She also had iron deficiency and had Venofer intravenously  Cady Gunn is not on Venofer anymore   She was seen by liver specialist at  [facility] and was not felt to be a surgical candidate   She had CT scans in [place] that showed continuous improvement in her metastatic disease       Patient has some tiredness   Has hot flashes   Some soreness in the mouth but no sores   Non pruritic skin rash on lower legs     Patient is on Xarelto 10 mg daily for history of DVT right lower extremity in 2016     Physical examination and test results are as recorded and discussed in detail  Metastatic colon cancer is stable or slightly improved in the pelvis on most recent CT scan  Radiologist has suggested getting MRI scan  Patient has seen the films with the radiologist  Belinda Nealhouse will like to have MRI scan of abdomen and pelvis     She is being continued on present treatment plan   Condition discussed and explained   Questions answered  Cam Campbell is prolongation of survival from colon cancer and no more blood clot and bleeding                       Discussed the importance of self-breast examination, eating healthy foods, staying active and health screening tests  Denise Li is capable of Self care  1  Primary adenocarcinoma of ascending colon ([facility] [address] )    - MRI pelvis w wo contrast; Future  - MRI abdomen wo contrast; Future  - Infusion Calculated Appointment Request; Future  - CBC and differential; Future  - Comprehensive metabolic panel; Future  - Infusion Calculated Appointment Request; Future  - Infusion Calculated Appointment Request; Future  - CBC and differential; Future  - Comprehensive metabolic panel; Future  - Infusion Calculated Appointment Request; Future  - Infusion Calculated Appointment Request; Future  - CBC and differential; Future  - Comprehensive metabolic panel;  Future  - sodium chloride 0 9 % infusion  - ondansetron (ZOFRAN) 16 mg, dexamethasone (DECADRON) 10 mg in sodium chloride 0 9 % 50 mL IVPB  - atropine injection 0 25 mg  - atropine injection 0 25 mg  - bevacizumab (AVASTIN) 355 mg in sodium chloride 0 9 % 100 mL IVPB  - irinotecan (CAMPTOSAR) 322 mg in sodium chloride 0 9 % 500 mL chemo infusion  - leucovorin 716 mg in sodium chloride 0 9 % 250 mL IVPB  - pegfilgrastim (NEULASTA ONPRO) subcutaneous injection kit 6 mg  - Infusion Calculated Appointment Request; Future  - Infusion Calculated Appointment Request; Future  - CBC and differential; Future  - Comprehensive metabolic panel; Future  - Infusion Calculated Appointment Request; Future    2  Liver metastases ([address] )    - MRI pelvis w wo contrast; Future  - MRI abdomen wo contrast; Future  - Infusion Calculated Appointment Request; Future  - CBC and differential; Future  - Comprehensive metabolic panel; Future  - Infusion Calculated Appointment Request; Future  - Infusion Calculated Appointment Request; Future  - CBC and differential; Future  - Comprehensive metabolic panel; Future  - Infusion Calculated Appointment Request; Future  - Infusion Calculated Appointment Request; Future  - CBC and differential; Future  - Comprehensive metabolic panel; Future  - sodium chloride 0 9 % infusion  - ondansetron (ZOFRAN) 16 mg, dexamethasone (DECADRON) 10 mg in sodium chloride 0 9 % 50 mL IVPB  - atropine injection 0 25 mg  - atropine injection 0 25 mg  - bevacizumab (AVASTIN) 355 mg in sodium chloride 0 9 % 100 mL IVPB  - irinotecan (CAMPTOSAR) 322 mg in sodium chloride 0 9 % 500 mL chemo infusion  - leucovorin 716 mg in sodium chloride 0 9 % 250 mL IVPB  - pegfilgrastim (NEULASTA ONPRO) subcutaneous injection kit 6 mg  - Infusion Calculated Appointment Request; Future  - Infusion Calculated Appointment Request; Future  - CBC and differential; Future  - Comprehensive metabolic panel; Future  - Infusion Calculated Appointment Request; Future    3  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  - MRI pelvis w wo contrast; Future  - MRI abdomen wo contrast; Future    4  Peritoneal metastases ([address] )    - MRI pelvis w wo contrast; Future  - MRI abdomen wo contrast; Future    5  Chronic deep vein thrombosis (DVT) of popliteal vein of right lower extremity (HCC)      6  Chemotherapy induced neutropenia (HCC)    - Infusion Calculated Appointment Request; Future  - CBC and differential; Future  - Comprehensive metabolic panel;  Future  - Infusion Calculated Appointment Request; Future  - Infusion Calculated Appointment Request; Future  - CBC and differential; Future  - Comprehensive metabolic panel; Future  - Infusion Calculated Appointment Request; Future  - Infusion Calculated Appointment Request; Future  - CBC and differential; Future  - Comprehensive metabolic panel; Future  - Infusion Calculated Appointment Request; Future        Patient voiced understanding and agreement in the discussion  Counseling / Coordination of Care   Greater than 50% of total time was spent with the patient and / or family counseling and / or coordination of care

## 2019-07-10 ENCOUNTER — TELEPHONE (OUTPATIENT)
Dept: HEMATOLOGY ONCOLOGY | Facility: CLINIC | Age: 54
End: 2019-07-10

## 2019-07-10 DIAGNOSIS — T45.1X5A CHEMOTHERAPY INDUCED NEUTROPENIA (HCC): Primary | ICD-10-CM

## 2019-07-10 DIAGNOSIS — C18.2 PRIMARY ADENOCARCINOMA OF ASCENDING COLON (HCC): ICD-10-CM

## 2019-07-10 DIAGNOSIS — D70.1 CHEMOTHERAPY INDUCED NEUTROPENIA (HCC): Primary | ICD-10-CM

## 2019-07-10 DIAGNOSIS — C78.7 LIVER METASTASES (HCC): ICD-10-CM

## 2019-07-10 NOTE — TELEPHONE ENCOUNTER
Patient called and stated she was suppose to have infusions set up for the rest of the month of august on the 15th and the 29th and those appointments were not made yesterday when checking out with Hillary Ware

## 2019-07-11 DIAGNOSIS — C18.2 PRIMARY ADENOCARCINOMA OF ASCENDING COLON (HCC): ICD-10-CM

## 2019-07-11 DIAGNOSIS — T45.1X5A CHEMOTHERAPY INDUCED NEUTROPENIA (HCC): ICD-10-CM

## 2019-07-11 DIAGNOSIS — D70.1 CHEMOTHERAPY INDUCED NEUTROPENIA (HCC): ICD-10-CM

## 2019-07-11 DIAGNOSIS — C78.7 LIVER METASTASES (HCC): ICD-10-CM

## 2019-07-11 NOTE — TELEPHONE ENCOUNTER
Patient called back stating that she wanted to get further information about the dates of her up coming treatment  I informed her per Chris Figueroa RN that the treatment and appt are being set up and once we have the dates we will contact her with the information

## 2019-07-11 NOTE — TELEPHONE ENCOUNTER
LM for pt informing her that I wasn't able to move forward with scheduling additional treatments since the provider hadn't scheduled those in the depot  If you can please have the doctor put additional orders in and then I can move forward with scheduling the treatments and can contact the pt back with those dates and times

## 2019-07-15 ENCOUNTER — APPOINTMENT (OUTPATIENT)
Dept: LAB | Facility: MEDICAL CENTER | Age: 54
End: 2019-07-15
Payer: COMMERCIAL

## 2019-07-15 DIAGNOSIS — C18.2 PRIMARY ADENOCARCINOMA OF ASCENDING COLON (HCC): ICD-10-CM

## 2019-07-15 DIAGNOSIS — C78.7 LIVER METASTASES (HCC): ICD-10-CM

## 2019-07-15 LAB
ALBUMIN SERPL BCP-MCNC: 3.6 G/DL (ref 3.5–5)
ALP SERPL-CCNC: 93 U/L (ref 46–116)
ALT SERPL W P-5'-P-CCNC: 22 U/L (ref 12–78)
ANION GAP SERPL CALCULATED.3IONS-SCNC: 10 MMOL/L (ref 4–13)
ANISOCYTOSIS BLD QL SMEAR: PRESENT
AST SERPL W P-5'-P-CCNC: 8 U/L (ref 5–45)
BASOPHILS # BLD MANUAL: 0 THOUSAND/UL (ref 0–0.1)
BASOPHILS NFR MAR MANUAL: 0 % (ref 0–1)
BILIRUB SERPL-MCNC: 0.34 MG/DL (ref 0.2–1)
BUN SERPL-MCNC: 15 MG/DL (ref 5–25)
CALCIUM SERPL-MCNC: 8.5 MG/DL (ref 8.3–10.1)
CHLORIDE SERPL-SCNC: 107 MMOL/L (ref 100–108)
CO2 SERPL-SCNC: 26 MMOL/L (ref 21–32)
CREAT SERPL-MCNC: 0.99 MG/DL (ref 0.6–1.3)
DACRYOCYTES BLD QL SMEAR: PRESENT
EOSINOPHIL # BLD MANUAL: 0.1 THOUSAND/UL (ref 0–0.4)
EOSINOPHIL NFR BLD MANUAL: 1 % (ref 0–6)
ERYTHROCYTE [DISTWIDTH] IN BLOOD BY AUTOMATED COUNT: 16.5 % (ref 11.6–15.1)
GFR SERPL CREATININE-BSD FRML MDRD: 66 ML/MIN/1.73SQ M
GLUCOSE P FAST SERPL-MCNC: 80 MG/DL (ref 65–99)
HCT VFR BLD AUTO: 38.9 % (ref 34.8–46.1)
HGB BLD-MCNC: 11.7 G/DL (ref 11.5–15.4)
LYMPHOCYTES # BLD AUTO: 2.29 THOUSAND/UL (ref 0.6–4.47)
LYMPHOCYTES # BLD AUTO: 22 % (ref 14–44)
MCH RBC QN AUTO: 32.2 PG (ref 26.8–34.3)
MCHC RBC AUTO-ENTMCNC: 30.1 G/DL (ref 31.4–37.4)
MCV RBC AUTO: 107 FL (ref 82–98)
MONOCYTES # BLD AUTO: 0.31 THOUSAND/UL (ref 0–1.22)
MONOCYTES NFR BLD: 3 % (ref 4–12)
NEUTROPHILS # BLD MANUAL: 7.18 THOUSAND/UL (ref 1.85–7.62)
NEUTS BAND NFR BLD MANUAL: 10 % (ref 0–8)
NEUTS SEG NFR BLD AUTO: 59 % (ref 43–75)
NRBC BLD AUTO-RTO: 0 /100 WBCS
OVALOCYTES BLD QL SMEAR: PRESENT
PLATELET # BLD AUTO: 192 THOUSANDS/UL (ref 149–390)
PLATELET BLD QL SMEAR: ADEQUATE
PMV BLD AUTO: 10.3 FL (ref 8.9–12.7)
POIKILOCYTOSIS BLD QL SMEAR: PRESENT
POLYCHROMASIA BLD QL SMEAR: PRESENT
POTASSIUM SERPL-SCNC: 3.7 MMOL/L (ref 3.5–5.3)
PROT SERPL-MCNC: 6 G/DL (ref 6.4–8.2)
RBC # BLD AUTO: 3.63 MILLION/UL (ref 3.81–5.12)
RBC MORPH BLD: PRESENT
SODIUM SERPL-SCNC: 143 MMOL/L (ref 136–145)
VARIANT LYMPHS # BLD AUTO: 5 %
WBC # BLD AUTO: 10.41 THOUSAND/UL (ref 4.31–10.16)

## 2019-07-15 PROCEDURE — 36415 COLL VENOUS BLD VENIPUNCTURE: CPT

## 2019-07-15 PROCEDURE — 80053 COMPREHEN METABOLIC PANEL: CPT

## 2019-07-15 PROCEDURE — 85027 COMPLETE CBC AUTOMATED: CPT

## 2019-07-15 PROCEDURE — 85007 BL SMEAR W/DIFF WBC COUNT: CPT

## 2019-07-17 ENCOUNTER — TELEPHONE (OUTPATIENT)
Dept: HEMATOLOGY ONCOLOGY | Facility: CLINIC | Age: 54
End: 2019-07-17

## 2019-07-17 DIAGNOSIS — C78.7 LIVER METASTASES (HCC): ICD-10-CM

## 2019-07-17 DIAGNOSIS — C18.2 PRIMARY ADENOCARCINOMA OF ASCENDING COLON (HCC): ICD-10-CM

## 2019-07-17 NOTE — TELEPHONE ENCOUNTER
Dr Noriega's appt requests for treatments on 9/12 and 9/26 are already entered into [facility]  Per the previous notes from [address] [place] [address] Road these were being scheduled? Please call infusion to ask why they did not schedule based on signed appt requests

## 2019-07-17 NOTE — TELEPHONE ENCOUNTER
Patient called and stated she was suppose to be scheduled for sept 12 & 26th as well for future infusions she stated it was not on her my chart

## 2019-07-18 ENCOUNTER — HOSPITAL ENCOUNTER (OUTPATIENT)
Dept: INFUSION CENTER | Facility: CLINIC | Age: 54
Discharge: HOME | End: 2019-07-18
Payer: COMMERCIAL

## 2019-07-18 VITALS
SYSTOLIC BLOOD PRESSURE: 132 MMHG | RESPIRATION RATE: 18 BRPM | WEIGHT: 159 LBS | OXYGEN SATURATION: 100 % | HEIGHT: 66 IN | DIASTOLIC BLOOD PRESSURE: 84 MMHG | BODY MASS INDEX: 25.55 KG/M2 | TEMPERATURE: 97.4 F | HEART RATE: 55 BPM

## 2019-07-18 DIAGNOSIS — C18.2 PRIMARY ADENOCARCINOMA OF ASCENDING COLON (HCC): ICD-10-CM

## 2019-07-18 DIAGNOSIS — T45.1X5A CHEMOTHERAPY INDUCED NEUTROPENIA (HCC): ICD-10-CM

## 2019-07-18 DIAGNOSIS — D70.1 CHEMOTHERAPY INDUCED NEUTROPENIA (HCC): ICD-10-CM

## 2019-07-18 DIAGNOSIS — C78.7 LIVER METASTASES (HCC): Primary | ICD-10-CM

## 2019-07-18 PROCEDURE — 96413 CHEMO IV INFUSION 1 HR: CPT

## 2019-07-18 PROCEDURE — 96368 THER/DIAG CONCURRENT INF: CPT

## 2019-07-18 PROCEDURE — 96417 CHEMO IV INFUS EACH ADDL SEQ: CPT

## 2019-07-18 PROCEDURE — 96375 TX/PRO/DX INJ NEW DRUG ADDON: CPT

## 2019-07-18 PROCEDURE — 96415 CHEMO IV INFUSION ADDL HR: CPT

## 2019-07-18 PROCEDURE — 96367 TX/PROPH/DG ADDL SEQ IV INF: CPT

## 2019-07-18 RX ORDER — ATROPINE SULFATE 1 MG/ML
0.25 INJECTION, SOLUTION INTRAMUSCULAR; INTRAVENOUS; SUBCUTANEOUS ONCE
Status: COMPLETED | OUTPATIENT
Start: 2019-07-18 | End: 2019-07-18

## 2019-07-18 RX ORDER — SODIUM CHLORIDE 9 MG/ML
20 INJECTION, SOLUTION INTRAVENOUS ONCE
Status: COMPLETED | OUTPATIENT
Start: 2019-07-18 | End: 2019-07-18

## 2019-07-18 RX ORDER — ATROPINE SULFATE 1 MG/ML
0.25 INJECTION, SOLUTION INTRAMUSCULAR; INTRAVENOUS; SUBCUTANEOUS ONCE AS NEEDED
Status: DISCONTINUED | OUTPATIENT
Start: 2019-07-18 | End: 2019-07-21 | Stop reason: HOSPADM

## 2019-07-18 RX ADMIN — BEVACIZUMAB 355 MG: 400 INJECTION, SOLUTION INTRAVENOUS at 12:43

## 2019-07-18 RX ADMIN — DEXAMETHASONE SODIUM PHOSPHATE: 10 INJECTION, SOLUTION INTRAMUSCULAR; INTRAVENOUS at 12:21

## 2019-07-18 RX ADMIN — IRINOTECAN HYDROCHLORIDE 320 MG: 20 INJECTION INTRAVENOUS at 13:36

## 2019-07-18 RX ADMIN — SODIUM CHLORIDE 20 ML/HR: 0.9 INJECTION, SOLUTION INTRAVENOUS at 11:57

## 2019-07-18 RX ADMIN — ATROPINE SULFATE 0.25 MG: 1 INJECTION, SOLUTION INTRAMUSCULAR; INTRAVENOUS; SUBCUTANEOUS at 13:24

## 2019-07-18 RX ADMIN — LEUCOVORIN CALCIUM 700 MG: 500 INJECTION, POWDER, LYOPHILIZED, FOR SOLUTION INTRAMUSCULAR; INTRAVENOUS at 13:33

## 2019-07-18 NOTE — TELEPHONE ENCOUNTER
Per Nurse DANIELA Ashley, patient will be going into the infusion center on 7/18/19 for an appt and at that time the scheduling can be corrected

## 2019-07-18 NOTE — PATIENT INSTRUCTIONS
July 2019 Sunday Monday Tuesday Wednesday Thursday Friday Saturday        1    LAB WALK IN  11:45 AM   (5 min )   AN WINDGP CHAIR 1   [facility] - Sarah Ann 2     3    INF CHEMO-INFUSION 4 HR  11:30 AM   (330 min )   AN INF CHAIR 10   [address] 4     5    INF COLONY STIMULATING FACTORS   1:30 PM   (30 min )   AN INF QUICK CHAIR   [address] 6          Cycle 26, Day 1  Cycle 26, Day 3    7     8     9    FOLLOW UP PG   3:45 PM   (30 min )   [facility] [place] 10     11     12     13                14     15    LAB WALK IN   8:40 AM   (5 min )   AN WINDGP CHAIR 1   [facility] - Sarah Ann 16     17     18    INF CHEMO-INFUSION 4 HR  11:30 AM   (330 min )   AN INF CHAIR [address] 19     20    INF COLONY STIMULATING FACTORS   1:00 PM   (30 min )   AN INF QUICK CHAIR [address]       Cycle 27, Day 1  Cycle 27, Day 3   21     22     23     24    MRI ABDOMEN WO CON   2:30 PM   (45 min )   AM MRI 1   [facility]    MRI PELVIS W WO CON   3:15 PM   (45 min )   AM MRI 1   [facility] 25     26     27                28     29     30     31    INF CHEMO-INFUSION 3 HR  11:30 AM   (270 min )   AN INF CHAIR [address]                     Cycle 28, Day 1           Treatment Details       7/3/2019 - Cycle 26, Day 1      Chemotherapy: ONCBCN PROVIDER COMMUNICATION5, BEVACIZUMAB IVPB, IRINOTECAN INFUSION, LEUCOVORIN IVPB      Take-Home Chemo: ONCBCN PROVIDER COMMUNICATION8, FLUOROURACIL AMBULATORY INFUSION    7/5/2019 - Cycle 26, Day 3      Supportive Care: ONCBCN PROVIDER COMMUNICATION7, pegfilgrastim (NEULASTA ONPRO)    7/18/2019 - Cycle 27, Day 1      Chemotherapy: ONCBCN PROVIDER COMMUNICATION5, BEVACIZUMAB IVPB, IRINOTECAN INFUSION, LEUCOVORIN IVPB      Take-Home Chemo: ONCBCN PROVIDER COMMUNICATION8, FLUOROURACIL AMBULATORY INFUSION    7/20/2019 - Cycle 27, Day 3      Supportive Care: [facility] PROVIDER COMMUNICATION7, pegfilgrastim (Jody Mooreland)    7/31/2019 - Cycle 28, Day 1      Chemotherapy: ONCBCN PROVIDER COMMUNICATION5, BEVACIZUMAB IVPB, IRINOTECAN INFUSION, LEUCOVORIN IVPB      Take-Home Chemo: ONCBCN PROVIDER Marcia Gonzalez AMBULATORY INFUSION        August 2019 Sunday Monday Tuesday Wednesday Thursday Friday Saturday                       1     2    INF COLONY STIMULATING FACTORS  12:00 PM   (30 min )   AN INF QUICK CHAIR [facility] 3            Cycle 28, Day 3    4     5     6     7     8     9     10                11     12     13     14     15    INF ONCOLOGY TX-TREATMENT PLAN  11:30 AM   (200 min )   AN INF CHAIR 5   St  [address] [place] [address] 16     17    INF ONCOLOGY TX-TREATMENT PLAN  12:00 PM   (30 min )   AN INF QUICK CHAIR [address]        Cycle 29, Day 1    18     19     20    FOLLOW UP PG  11:15 AM   (30 min )   Hilda Renee MD   [facility] [place] 21     22     23     24       Cycle 29, Day 3         25     26     27     28     29    INF ONCOLOGY TX-TREATMENT PLAN  11:30 AM   (200 min )   AN INF BED [address] 30     31    INF ONCOLOGY TX-TREATMENT PLAN  12:00 PM   (30 min )   AN INF QUICK CHAIR [address]       Cycle 30, Day 1  Cycle 30, Day 3        Treatment Details       8/2/2019 - Cycle 28, Day 3      Supportive Care: [facility] PROVIDER COMMUNICATION7, pegfilgrastim (NEULASTA ONPRO)    8/16/2019 - Cycle 29, Day 1      Chemotherapy: ONCBCN PROVIDER COMMUNICATION5, BEVACIZUMAB IVPB, IRINOTECAN INFUSION, LEUCOVORIN IVPB      Take-Home Chemo: [address], FLUOROURACIL AMBULATORY INFUSION    8/18/2019 - Cycle 29, Day 3      Supportive Care: [facility] PROVIDER COMMUNICATION7, pegfilgrastim (NEULASTA ONPRO)    8/29/2019 - Cycle 30, Day 1      Chemotherapy: ONCBCN PROVIDER COMMUNICATION5, BEVACIZUMAB IVPB, IRINOTECAN INFUSION, LEUCOVORIN IVPB      Take-Home Chemo: ONCBCN PROVIDER COMMUNICATION8, FLUOROURACIL AMBULATORY INFUSION    8/31/2019 - Cycle 30, Day 3      Supportive Care: [facility], pegfilgrastim (Ang Amena)

## 2019-07-18 NOTE — PROGRESS NOTES
To Infusion Center for FOLFIRI with avastin  Pt has URI/congestion  She will complete a 10 day course of antibiotics and also steroids with only minimal relief  She is afebrile and has only clear drainage  She believes this may be allergies  All treatment completed without incident  Pt will return in 46 hours for CADD d/c

## 2019-07-20 ENCOUNTER — HOSPITAL ENCOUNTER (OUTPATIENT)
Dept: INFUSION CENTER | Facility: CLINIC | Age: 54
Discharge: HOME | End: 2019-07-20
Payer: COMMERCIAL

## 2019-07-20 DIAGNOSIS — C18.2 PRIMARY ADENOCARCINOMA OF ASCENDING COLON (HCC): ICD-10-CM

## 2019-07-20 DIAGNOSIS — T45.1X5A CHEMOTHERAPY INDUCED NEUTROPENIA (HCC): ICD-10-CM

## 2019-07-20 DIAGNOSIS — C78.7 LIVER METASTASES (HCC): Primary | ICD-10-CM

## 2019-07-20 DIAGNOSIS — D70.1 CHEMOTHERAPY INDUCED NEUTROPENIA (HCC): ICD-10-CM

## 2019-07-20 PROCEDURE — 96372 THER/PROPH/DIAG INJ SC/IM: CPT

## 2019-07-20 RX ADMIN — PEGFILGRASTIM 6 MG: KIT SUBCUTANEOUS at 13:28

## 2019-07-20 NOTE — PROGRESS NOTES
Pt arrived to unit with no c/o -- CADD pump at zer res volume  Pt CADD dcd - port flushed and has excellent brisk blood return    Pts Neulasta ON Pro applied to ANGE per her request as ordered  We did review the side effects and delivery time, pt verb understanding  I did encourage her to speak with Mi Noriega re the side effectys of Neulasta and better sx management post Neulasta  Pt reports that " after last cycle the Neulasta side effects and pain was significant,"   pt had bone pain and despite Claritin and Advil, she had minimal relief  She will speak with Dr Jay Brower she states if this happens again this time        Pt dcd stable and ambulatory   Declined AVS  Confirmed return appt

## 2019-07-22 ENCOUNTER — DOCUMENTATION (OUTPATIENT)
Dept: HEMATOLOGY ONCOLOGY | Facility: CLINIC | Age: 54
End: 2019-07-22

## 2019-07-22 DIAGNOSIS — D70.1 CHEMOTHERAPY INDUCED NEUTROPENIA (HCC): Primary | ICD-10-CM

## 2019-07-22 DIAGNOSIS — T45.1X5A CHEMOTHERAPY INDUCED NEUTROPENIA (HCC): Primary | ICD-10-CM

## 2019-07-22 DIAGNOSIS — C18.2 PRIMARY ADENOCARCINOMA OF ASCENDING COLON (HCC): ICD-10-CM

## 2019-07-22 DIAGNOSIS — C78.7 LIVER METASTASES (HCC): ICD-10-CM

## 2019-07-22 NOTE — PROGRESS NOTES
GI Oncology Nurse Navigator Note    Received a call from [facility] inquiring if her MRIs were approved, asked financial and they are approved, called her back and let her know

## 2019-07-24 ENCOUNTER — HOSPITAL ENCOUNTER (OUTPATIENT)
Dept: MRI IMAGING | Facility: HOSPITAL | Age: 54
Discharge: HOME | End: 2019-07-24
Attending: INTERNAL MEDICINE
Payer: COMMERCIAL

## 2019-07-24 ENCOUNTER — TRANSCRIBE ORDERS (OUTPATIENT)
Dept: ADMINISTRATIVE | Facility: HOSPITAL | Age: 54
End: 2019-07-24

## 2019-07-24 ENCOUNTER — TELEPHONE (OUTPATIENT)
Dept: HEMATOLOGY ONCOLOGY | Facility: CLINIC | Age: 54
End: 2019-07-24

## 2019-07-24 DIAGNOSIS — C78.6 PERITONEAL METASTASES (HCC): ICD-10-CM

## 2019-07-24 DIAGNOSIS — C77.2 METASTASIS TO RETROPERITONEAL LYMPH NODE (HCC): ICD-10-CM

## 2019-07-24 DIAGNOSIS — C18.2 PRIMARY ADENOCARCINOMA OF ASCENDING COLON (HCC): ICD-10-CM

## 2019-07-24 DIAGNOSIS — C78.7 LIVER METASTASES (HCC): ICD-10-CM

## 2019-07-24 PROCEDURE — A9581 GADOXETATE DISODIUM INJ: HCPCS | Performed by: INTERNAL MEDICINE

## 2019-07-24 PROCEDURE — 74183 MRI ABD W/O CNTR FLWD CNTR: CPT

## 2019-07-24 RX ADMIN — GADOXETATE DISODIUM 10 ML: 181.43 INJECTION, SOLUTION INTRAVENOUS at 15:29

## 2019-07-24 NOTE — TELEPHONE ENCOUNTER
Grzegorz Mcneal from [facility] pre admission called stating that the MRI pelvis w wo contrast needs to be signed in epic call was transfrred to [address] [place] [address] for further assistance

## 2019-07-25 ENCOUNTER — HOSPITAL ENCOUNTER (OUTPATIENT)
Dept: MRI IMAGING | Facility: HOSPITAL | Age: 54
Discharge: HOME | End: 2019-07-25
Attending: INTERNAL MEDICINE
Payer: COMMERCIAL

## 2019-07-25 PROCEDURE — 72197 MRI PELVIS W/O & W/DYE: CPT

## 2019-07-25 PROCEDURE — A9585 GADOBUTROL INJECTION: HCPCS | Performed by: RADIOLOGY

## 2019-07-25 RX ADMIN — GADOBUTROL 8 ML: 604.72 INJECTION INTRAVENOUS at 16:41

## 2019-07-29 ENCOUNTER — TELEPHONE (OUTPATIENT)
Dept: HEMATOLOGY ONCOLOGY | Facility: CLINIC | Age: 54
End: 2019-07-29

## 2019-07-29 ENCOUNTER — APPOINTMENT (OUTPATIENT)
Dept: LAB | Facility: MEDICAL CENTER | Age: 54
End: 2019-07-29
Payer: COMMERCIAL

## 2019-07-29 DIAGNOSIS — T45.1X5A CHEMOTHERAPY INDUCED NEUTROPENIA (HCC): ICD-10-CM

## 2019-07-29 DIAGNOSIS — C18.2 PRIMARY ADENOCARCINOMA OF ASCENDING COLON (HCC): ICD-10-CM

## 2019-07-29 DIAGNOSIS — C78.7 LIVER METASTASES (HCC): ICD-10-CM

## 2019-07-29 DIAGNOSIS — D70.1 CHEMOTHERAPY INDUCED NEUTROPENIA (HCC): ICD-10-CM

## 2019-07-29 LAB
ALBUMIN SERPL BCP-MCNC: 3.4 G/DL (ref 3.5–5)
ALP SERPL-CCNC: 87 U/L (ref 46–116)
ALT SERPL W P-5'-P-CCNC: 38 U/L (ref 12–78)
ANION GAP SERPL CALCULATED.3IONS-SCNC: 5 MMOL/L (ref 4–13)
AST SERPL W P-5'-P-CCNC: 10 U/L (ref 5–45)
BASOPHILS # BLD MANUAL: 0.05 THOUSAND/UL (ref 0–0.1)
BASOPHILS NFR MAR MANUAL: 1 % (ref 0–1)
BILIRUB SERPL-MCNC: 0.26 MG/DL (ref 0.2–1)
BUN SERPL-MCNC: 12 MG/DL (ref 5–25)
CALCIUM SERPL-MCNC: 8.9 MG/DL (ref 8.3–10.1)
CHLORIDE SERPL-SCNC: 113 MMOL/L (ref 100–108)
CO2 SERPL-SCNC: 28 MMOL/L (ref 21–32)
CREAT SERPL-MCNC: 0.9 MG/DL (ref 0.6–1.3)
DOHLE BOD BLD QL SMEAR: PRESENT
EOSINOPHIL # BLD MANUAL: 0 THOUSAND/UL (ref 0–0.4)
EOSINOPHIL NFR BLD MANUAL: 0 % (ref 0–6)
ERYTHROCYTE [DISTWIDTH] IN BLOOD BY AUTOMATED COUNT: 16.5 % (ref 11.6–15.1)
GFR SERPL CREATININE-BSD FRML MDRD: 74 ML/MIN/1.73SQ M
GLUCOSE P FAST SERPL-MCNC: 76 MG/DL (ref 65–99)
HCT VFR BLD AUTO: 35.2 % (ref 34.8–46.1)
HGB BLD-MCNC: 10.7 G/DL (ref 11.5–15.4)
LYMPHOCYTES # BLD AUTO: 0.64 THOUSAND/UL (ref 0.6–4.47)
LYMPHOCYTES # BLD AUTO: 12 % (ref 14–44)
MCH RBC QN AUTO: 32.3 PG (ref 26.8–34.3)
MCHC RBC AUTO-ENTMCNC: 30.4 G/DL (ref 31.4–37.4)
MCV RBC AUTO: 106 FL (ref 82–98)
METAMYELOCYTES NFR BLD MANUAL: 1 % (ref 0–1)
MONOCYTES # BLD AUTO: 0.8 THOUSAND/UL (ref 0–1.22)
MONOCYTES NFR BLD: 15 % (ref 4–12)
NEUTROPHILS # BLD MANUAL: 3.07 THOUSAND/UL (ref 1.85–7.62)
NEUTS BAND NFR BLD MANUAL: 2 % (ref 0–8)
NEUTS SEG NFR BLD AUTO: 56 % (ref 43–75)
NRBC BLD AUTO-RTO: 0 /100 WBCS
OVALOCYTES BLD QL SMEAR: PRESENT
PLATELET # BLD AUTO: 137 THOUSANDS/UL (ref 149–390)
PLATELET BLD QL SMEAR: ABNORMAL
PMV BLD AUTO: 10.9 FL (ref 8.9–12.7)
POLYCHROMASIA BLD QL SMEAR: PRESENT
POTASSIUM SERPL-SCNC: 3.9 MMOL/L (ref 3.5–5.3)
PROT SERPL-MCNC: 6.2 G/DL (ref 6.4–8.2)
RBC # BLD AUTO: 3.31 MILLION/UL (ref 3.81–5.12)
RBC MORPH BLD: PRESENT
SODIUM SERPL-SCNC: 146 MMOL/L (ref 136–145)
TOXIC GRANULES BLD QL SMEAR: PRESENT
VARIANT LYMPHS # BLD AUTO: 13 %
WBC # BLD AUTO: 5.3 THOUSAND/UL (ref 4.31–10.16)

## 2019-07-29 PROCEDURE — 85007 BL SMEAR W/DIFF WBC COUNT: CPT

## 2019-07-29 PROCEDURE — 36415 COLL VENOUS BLD VENIPUNCTURE: CPT

## 2019-07-29 PROCEDURE — 85027 COMPLETE CBC AUTOMATED: CPT

## 2019-07-29 PROCEDURE — 80053 COMPREHEN METABOLIC PANEL: CPT

## 2019-07-31 ENCOUNTER — HOSPITAL ENCOUNTER (OUTPATIENT)
Dept: INFUSION CENTER | Facility: CLINIC | Age: 54
Discharge: HOME | End: 2019-07-31
Payer: COMMERCIAL

## 2019-07-31 VITALS
WEIGHT: 159.17 LBS | HEART RATE: 72 BPM | DIASTOLIC BLOOD PRESSURE: 82 MMHG | TEMPERATURE: 97.6 F | SYSTOLIC BLOOD PRESSURE: 122 MMHG | BODY MASS INDEX: 25.58 KG/M2 | HEIGHT: 66 IN | RESPIRATION RATE: 19 BRPM | OXYGEN SATURATION: 100 %

## 2019-07-31 DIAGNOSIS — T45.1X5A CHEMOTHERAPY INDUCED NEUTROPENIA (HCC): ICD-10-CM

## 2019-07-31 DIAGNOSIS — D70.1 CHEMOTHERAPY INDUCED NEUTROPENIA (HCC): ICD-10-CM

## 2019-07-31 DIAGNOSIS — C18.2 PRIMARY ADENOCARCINOMA OF ASCENDING COLON (HCC): ICD-10-CM

## 2019-07-31 DIAGNOSIS — C78.7 LIVER METASTASES (HCC): Primary | ICD-10-CM

## 2019-07-31 PROCEDURE — 96417 CHEMO IV INFUS EACH ADDL SEQ: CPT

## 2019-07-31 PROCEDURE — 96413 CHEMO IV INFUSION 1 HR: CPT

## 2019-07-31 PROCEDURE — 96375 TX/PRO/DX INJ NEW DRUG ADDON: CPT

## 2019-07-31 PROCEDURE — 96368 THER/DIAG CONCURRENT INF: CPT

## 2019-07-31 PROCEDURE — G0498 CHEMO EXTEND IV INFUS W/PUMP: HCPCS

## 2019-07-31 PROCEDURE — 96367 TX/PROPH/DG ADDL SEQ IV INF: CPT

## 2019-07-31 RX ORDER — ATROPINE SULFATE 1 MG/ML
0.25 INJECTION, SOLUTION INTRAMUSCULAR; INTRAVENOUS; SUBCUTANEOUS ONCE AS NEEDED
Status: DISCONTINUED | OUTPATIENT
Start: 2019-07-31 | End: 2019-08-03 | Stop reason: HOSPADM

## 2019-07-31 RX ORDER — ATROPINE SULFATE 1 MG/ML
0.25 INJECTION, SOLUTION INTRAMUSCULAR; INTRAVENOUS; SUBCUTANEOUS ONCE
Status: COMPLETED | OUTPATIENT
Start: 2019-07-31 | End: 2019-07-31

## 2019-07-31 RX ORDER — SODIUM CHLORIDE 9 MG/ML
20 INJECTION, SOLUTION INTRAVENOUS ONCE
Status: COMPLETED | OUTPATIENT
Start: 2019-07-31 | End: 2019-07-31

## 2019-07-31 RX ADMIN — IRINOTECAN HYDROCHLORIDE 320 MG: 20 INJECTION INTRAVENOUS at 13:51

## 2019-07-31 RX ADMIN — DEXAMETHASONE SODIUM PHOSPHATE: 10 INJECTION, SOLUTION INTRAMUSCULAR; INTRAVENOUS at 12:28

## 2019-07-31 RX ADMIN — BEVACIZUMAB 355 MG: 400 INJECTION, SOLUTION INTRAVENOUS at 13:06

## 2019-07-31 RX ADMIN — ATROPINE SULFATE 0.25 MG: 1 INJECTION, SOLUTION INTRAMUSCULAR; INTRAVENOUS; SUBCUTANEOUS at 13:06

## 2019-07-31 RX ADMIN — SODIUM CHLORIDE 20 ML/HR: 0.9 INJECTION, SOLUTION INTRAVENOUS at 12:25

## 2019-07-31 RX ADMIN — LEUCOVORIN CALCIUM 700 MG: 500 INJECTION, POWDER, LYOPHILIZED, FOR SOLUTION INTRAMUSCULAR; INTRAVENOUS at 13:50

## 2019-07-31 NOTE — PROGRESS NOTES
Pt tolerated treatment well  Good blood return noted before, during and after chemo  CADD pump connected per protocol after 2 RN double checks  Pt knows to return 8-2-19 at 1350  Aware of next appt  AVS declined

## 2019-08-02 ENCOUNTER — HOSPITAL ENCOUNTER (OUTPATIENT)
Dept: INFUSION CENTER | Facility: CLINIC | Age: 54
Discharge: HOME | End: 2019-08-02
Payer: COMMERCIAL

## 2019-08-02 VITALS — TEMPERATURE: 98.7 F

## 2019-08-02 DIAGNOSIS — C78.7 LIVER METASTASES (HCC): Primary | ICD-10-CM

## 2019-08-02 DIAGNOSIS — D70.1 CHEMOTHERAPY INDUCED NEUTROPENIA (HCC): ICD-10-CM

## 2019-08-02 DIAGNOSIS — C18.2 PRIMARY ADENOCARCINOMA OF ASCENDING COLON (HCC): ICD-10-CM

## 2019-08-02 DIAGNOSIS — T45.1X5A CHEMOTHERAPY INDUCED NEUTROPENIA (HCC): ICD-10-CM

## 2019-08-02 PROCEDURE — 96372 THER/PROPH/DIAG INJ SC/IM: CPT

## 2019-08-02 RX ADMIN — PEGFILGRASTIM 6 MG: KIT SUBCUTANEOUS at 13:44

## 2019-08-02 NOTE — PROGRESS NOTES
Patient presents today for CADD disconnect and neulasta on pro placement  Patient offers no complaints  Afebrile  CADD pump reservoir volume noted zero ml  Neulasta on Pro placed on right arm, pump noted flashing green  Patient verbalizes understanding of date and time to remove pump  Patient's next appointment verified  AVS offered and declined

## 2019-08-05 DIAGNOSIS — C78.7 LIVER METASTASES (HCC): ICD-10-CM

## 2019-08-05 DIAGNOSIS — C18.2 PRIMARY ADENOCARCINOMA OF ASCENDING COLON (HCC): Primary | ICD-10-CM

## 2019-08-13 ENCOUNTER — APPOINTMENT (OUTPATIENT)
Dept: LAB | Facility: MEDICAL CENTER | Age: 54
End: 2019-08-13
Payer: COMMERCIAL

## 2019-08-13 DIAGNOSIS — D70.1 CHEMOTHERAPY INDUCED NEUTROPENIA (HCC): ICD-10-CM

## 2019-08-13 DIAGNOSIS — T45.1X5A CHEMOTHERAPY INDUCED NEUTROPENIA (HCC): ICD-10-CM

## 2019-08-13 DIAGNOSIS — C78.7 LIVER METASTASES (HCC): ICD-10-CM

## 2019-08-13 DIAGNOSIS — C18.2 PRIMARY ADENOCARCINOMA OF ASCENDING COLON (HCC): ICD-10-CM

## 2019-08-13 LAB
BASOPHILS # BLD AUTO: 0.08 THOUSANDS/ΜL (ref 0–0.1)
BASOPHILS NFR BLD AUTO: 1 % (ref 0–1)
EOSINOPHIL # BLD AUTO: 0.14 THOUSAND/ΜL (ref 0–0.61)
EOSINOPHIL NFR BLD AUTO: 1 % (ref 0–6)
ERYTHROCYTE [DISTWIDTH] IN BLOOD BY AUTOMATED COUNT: 17.2 % (ref 11.6–15.1)
HCT VFR BLD AUTO: 41.3 % (ref 34.8–46.1)
HGB BLD-MCNC: 12.5 G/DL (ref 11.5–15.4)
IMM GRANULOCYTES # BLD AUTO: 0.19 THOUSAND/UL (ref 0–0.2)
IMM GRANULOCYTES NFR BLD AUTO: 2 % (ref 0–2)
LYMPHOCYTES # BLD AUTO: 1.66 THOUSANDS/ΜL (ref 0.6–4.47)
LYMPHOCYTES NFR BLD AUTO: 14 % (ref 14–44)
MCH RBC QN AUTO: 33.1 PG (ref 26.8–34.3)
MCHC RBC AUTO-ENTMCNC: 30.3 G/DL (ref 31.4–37.4)
MCV RBC AUTO: 109 FL (ref 82–98)
MONOCYTES # BLD AUTO: 1.04 THOUSAND/ΜL (ref 0.17–1.22)
MONOCYTES NFR BLD AUTO: 9 % (ref 4–12)
NEUTROPHILS # BLD AUTO: 8.75 THOUSANDS/ΜL (ref 1.85–7.62)
NEUTS SEG NFR BLD AUTO: 73 % (ref 43–75)
NRBC BLD AUTO-RTO: 0 /100 WBCS
PLATELET # BLD AUTO: 248 THOUSANDS/UL (ref 149–390)
PMV BLD AUTO: 9.9 FL (ref 8.9–12.7)
RBC # BLD AUTO: 3.78 MILLION/UL (ref 3.81–5.12)
WBC # BLD AUTO: 11.86 THOUSAND/UL (ref 4.31–10.16)

## 2019-08-13 PROCEDURE — 85025 COMPLETE CBC W/AUTO DIFF WBC: CPT

## 2019-08-15 ENCOUNTER — HOSPITAL ENCOUNTER (OUTPATIENT)
Dept: INFUSION CENTER | Facility: CLINIC | Age: 54
Discharge: HOME | End: 2019-08-15
Payer: COMMERCIAL

## 2019-08-15 VITALS
BODY MASS INDEX: 25.71 KG/M2 | HEIGHT: 66 IN | HEART RATE: 68 BPM | SYSTOLIC BLOOD PRESSURE: 118 MMHG | DIASTOLIC BLOOD PRESSURE: 82 MMHG | RESPIRATION RATE: 18 BRPM | TEMPERATURE: 98.1 F | WEIGHT: 160 LBS

## 2019-08-15 DIAGNOSIS — C18.2 PRIMARY ADENOCARCINOMA OF ASCENDING COLON (HCC): ICD-10-CM

## 2019-08-15 DIAGNOSIS — C78.7 LIVER METASTASES (HCC): Primary | ICD-10-CM

## 2019-08-15 PROCEDURE — 96415 CHEMO IV INFUSION ADDL HR: CPT

## 2019-08-15 PROCEDURE — 96367 TX/PROPH/DG ADDL SEQ IV INF: CPT

## 2019-08-15 PROCEDURE — 96368 THER/DIAG CONCURRENT INF: CPT

## 2019-08-15 PROCEDURE — G0498 CHEMO EXTEND IV INFUS W/PUMP: HCPCS

## 2019-08-15 PROCEDURE — 96413 CHEMO IV INFUSION 1 HR: CPT

## 2019-08-15 PROCEDURE — 96417 CHEMO IV INFUS EACH ADDL SEQ: CPT

## 2019-08-15 PROCEDURE — 96375 TX/PRO/DX INJ NEW DRUG ADDON: CPT

## 2019-08-15 RX ORDER — SODIUM CHLORIDE 9 MG/ML
20 INJECTION, SOLUTION INTRAVENOUS ONCE
Status: COMPLETED | OUTPATIENT
Start: 2019-08-15 | End: 2019-08-15

## 2019-08-15 RX ORDER — ATROPINE SULFATE 1 MG/ML
0.25 INJECTION, SOLUTION INTRAMUSCULAR; INTRAVENOUS; SUBCUTANEOUS ONCE AS NEEDED
Status: DISCONTINUED | OUTPATIENT
Start: 2019-08-15 | End: 2019-08-18 | Stop reason: HOSPADM

## 2019-08-15 RX ORDER — ATROPINE SULFATE 1 MG/ML
0.25 INJECTION, SOLUTION INTRAMUSCULAR; INTRAVENOUS; SUBCUTANEOUS ONCE
Status: COMPLETED | OUTPATIENT
Start: 2019-08-15 | End: 2019-08-15

## 2019-08-15 RX ADMIN — BEVACIZUMAB 355 MG: 400 INJECTION, SOLUTION INTRAVENOUS at 12:56

## 2019-08-15 RX ADMIN — SODIUM CHLORIDE 20 ML/HR: 0.9 INJECTION, SOLUTION INTRAVENOUS at 12:15

## 2019-08-15 RX ADMIN — LEUCOVORIN CALCIUM 700 MG: 500 INJECTION, POWDER, LYOPHILIZED, FOR SOLUTION INTRAMUSCULAR; INTRAVENOUS at 13:50

## 2019-08-15 RX ADMIN — DEXAMETHASONE SODIUM PHOSPHATE: 10 INJECTION, SOLUTION INTRAMUSCULAR; INTRAVENOUS at 12:25

## 2019-08-15 RX ADMIN — IRINOTECAN HYDROCHLORIDE 320 MG: 20 INJECTION, SOLUTION INTRAVENOUS at 13:52

## 2019-08-15 RX ADMIN — ATROPINE SULFATE 0.25 MG: 1 INJECTION, SOLUTION INTRAMUSCULAR; INTRAVENOUS; SUBCUTANEOUS at 13:46

## 2019-08-15 NOTE — PROGRESS NOTES
Pt  Tolerated treatment w/out adverse reaction  Nurse connected pt  To cadd pump & scheduled for pump disconnect 8/17/19 @ 1331  Confirmed pts  Next appt   Pt  Declined AVS

## 2019-08-17 ENCOUNTER — HOSPITAL ENCOUNTER (OUTPATIENT)
Dept: INFUSION CENTER | Facility: CLINIC | Age: 54
Discharge: HOME | End: 2019-08-17
Payer: COMMERCIAL

## 2019-08-17 VITALS — TEMPERATURE: 98.1 F

## 2019-08-17 DIAGNOSIS — C18.2 PRIMARY ADENOCARCINOMA OF ASCENDING COLON (HCC): ICD-10-CM

## 2019-08-17 DIAGNOSIS — C78.7 LIVER METASTASES (HCC): Primary | ICD-10-CM

## 2019-08-17 PROCEDURE — 96372 THER/PROPH/DIAG INJ SC/IM: CPT

## 2019-08-17 RX ADMIN — PEGFILGRASTIM 6 MG: KIT SUBCUTANEOUS at 13:40

## 2019-08-17 NOTE — PROGRESS NOTES
Patient to [facility] for [address] / Port maintenance / Neulasta On Pro: Offers no complaints at present time: Tolerated infusion without incident: No adverse reactions noted: Res volume - 0 0 ml: Neulasta On Pro per MD order: Applied to Right UA: Verified follow up appt with patient: Declined AVS

## 2019-08-19 DIAGNOSIS — C18.2 PRIMARY ADENOCARCINOMA OF ASCENDING COLON (HCC): ICD-10-CM

## 2019-08-19 DIAGNOSIS — C78.7 LIVER METASTASES (HCC): ICD-10-CM

## 2019-08-19 DIAGNOSIS — D70.1 CHEMOTHERAPY INDUCED NEUTROPENIA (HCC): Primary | ICD-10-CM

## 2019-08-19 DIAGNOSIS — T45.1X5A CHEMOTHERAPY INDUCED NEUTROPENIA (HCC): Primary | ICD-10-CM

## 2019-08-20 ENCOUNTER — OFFICE VISIT (OUTPATIENT)
Dept: HEMATOLOGY ONCOLOGY | Facility: CLINIC | Age: 54
End: 2019-08-20
Payer: COMMERCIAL

## 2019-08-20 VITALS
SYSTOLIC BLOOD PRESSURE: 118 MMHG | HEART RATE: 100 BPM | OXYGEN SATURATION: 98 % | RESPIRATION RATE: 16 BRPM | DIASTOLIC BLOOD PRESSURE: 78 MMHG | BODY MASS INDEX: 26.33 KG/M2 | WEIGHT: 158 LBS | TEMPERATURE: 97.6 F | HEIGHT: 65 IN

## 2019-08-20 DIAGNOSIS — I82.531 CHRONIC DEEP VEIN THROMBOSIS (DVT) OF POPLITEAL VEIN OF RIGHT LOWER EXTREMITY (HCC): ICD-10-CM

## 2019-08-20 DIAGNOSIS — C78.7 LIVER METASTASES (HCC): ICD-10-CM

## 2019-08-20 DIAGNOSIS — C78.6 PERITONEAL METASTASES (HCC): ICD-10-CM

## 2019-08-20 DIAGNOSIS — D70.1 CHEMOTHERAPY INDUCED NEUTROPENIA (HCC): ICD-10-CM

## 2019-08-20 DIAGNOSIS — T45.1X5A CHEMOTHERAPY INDUCED NEUTROPENIA (HCC): ICD-10-CM

## 2019-08-20 DIAGNOSIS — C18.2 PRIMARY ADENOCARCINOMA OF ASCENDING COLON (HCC): Primary | ICD-10-CM

## 2019-08-20 DIAGNOSIS — C77.2 METASTASIS TO RETROPERITONEAL LYMPH NODE (HCC): ICD-10-CM

## 2019-08-20 PROCEDURE — 99214 OFFICE O/P EST MOD 30 MIN: CPT | Performed by: INTERNAL MEDICINE

## 2019-08-20 RX ORDER — ATROPINE SULFATE 1 MG/ML
0.25 INJECTION, SOLUTION INTRAMUSCULAR; INTRAVENOUS; SUBCUTANEOUS ONCE
Status: CANCELLED | OUTPATIENT
Start: 2019-09-26

## 2019-08-20 RX ORDER — SODIUM CHLORIDE 9 MG/ML
20 INJECTION, SOLUTION INTRAVENOUS ONCE
Status: CANCELLED | OUTPATIENT
Start: 2019-09-26

## 2019-08-20 RX ORDER — ATROPINE SULFATE 1 MG/ML
0.25 INJECTION, SOLUTION INTRAMUSCULAR; INTRAVENOUS; SUBCUTANEOUS ONCE
Status: CANCELLED | OUTPATIENT
Start: 2019-09-12

## 2019-08-20 RX ORDER — ATROPINE SULFATE 1 MG/ML
0.25 INJECTION, SOLUTION INTRAMUSCULAR; INTRAVENOUS; SUBCUTANEOUS ONCE AS NEEDED
Status: CANCELLED | OUTPATIENT
Start: 2019-09-12

## 2019-08-20 RX ORDER — ATROPINE SULFATE 1 MG/ML
0.25 INJECTION, SOLUTION INTRAMUSCULAR; INTRAVENOUS; SUBCUTANEOUS ONCE AS NEEDED
Status: CANCELLED | OUTPATIENT
Start: 2019-09-26

## 2019-08-20 RX ORDER — SODIUM CHLORIDE 9 MG/ML
20 INJECTION, SOLUTION INTRAVENOUS ONCE
Status: CANCELLED | OUTPATIENT
Start: 2019-09-12

## 2019-08-20 NOTE — PROGRESS NOTES
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 and metastatic disease to liver, abdominal lymph nodes and pelvis  Presently patient is on modified FOLFIRI plus Avastin without bolus 5 FU  Bolus 5 FU was discontinued because of toxicity  Patient has some tiredness  She has manageable diarrhea     Zak Belinda May 2018 patient underwent GALI and BSO for uterine bleeding  and there were cancer cells in the fallopian tubes   In June 2018 patient   underwent extended right hemicolectomy, partial omentectomy and biopsy of the peritoneal nodule   Peritoneal nodule came back  positive for metastatic adenocarcinoma from colon    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 and metastatic disease to liver    6 cm T3 G2 right colon cancer with positive margins, lymphovascular invasion and perineural invasion and positive 3 of 27 lymph nodes with extranodal extension and positive peritoneal nodule biopsy   Stage IV disease because of liver and peritoneal metastases   In July 2018 patient was started  on modified FOLFIRI plus Avastin   She had PPE in her hands and bolus 5 FU was discontinued      She also had iron deficiency and had Venofer intravenously  Jt Avilez is not on Venofer anymore   She was seen by liver specialist at [facility] and was not felt to be a surgical candidate       Patient is on Xarelto 10 mg daily for history of DVT right lower extremity in 2016       Current Outpatient Medications:     acetaminophen (TYLENOL) 500 mg tablet, Take 1,000 mg by mouth every 6 (six) hours as needed for mild pain, Disp: , Rfl:     fluorouracil 4,295 mg in CADD infusion pump, Infuse 4,295 mg (1,200 mg/m2/day x 1 79 m2 (Treatment plan recorded BSA)) into a venous catheter over 46 hours for 2 days, Disp: 1 Device, Rfl: 0    lidocaine-prilocaine (EMLA) cream, APPLY 1 HOUR PRIOR TO CHEMO, COVER IN WRAP, Disp: 30 g, Rfl: 0   nifedipine 0 2 % OINT, Apply topically every 4 (four) hours 0 3% ointment as typical(disregard 0 2% as auto order) 1 application to anal opening 4-6 times daily, Disp: 1 Tube, Rfl: 0    predniSONE 5 mg/5 mL solution, Take by mouth daily, Disp: , Rfl:     rivaroxaban (XARELTO) 10 mg tablet, Take 1 tablet (10 mg total) by mouth daily, Disp: 90 tablet, Rfl: 2    No Known Allergies       Primary adenocarcinoma of ascending colon ([facility] [address] )    6/14/2018 Initial Diagnosis     Primary adenocarcinoma of ascending colon ([facility] [address] )      7/16/2018 -  Chemotherapy     fluorouracil (ADRUCIL) injection 715 mg, 400 mg/m2, Intravenous, Once, 7 of 7 cycles  pegfilgrastim (NEULASTA ONPRO) subcutaneous injection kit 6 mg, 6 mg, Subcutaneous, Once, 7 of 11 cycles  Administration: 6 mg (5/22/2019), 6 mg (6/5/2019), 6 mg (6/22/2019), 6 mg (7/5/2019), 6 mg (7/20/2019), 6 mg (8/2/2019), 6 mg (8/17/2019)  bevacizumab (AVASTIN) 355 mg in sodium chloride 0 9 % 100 mL IVPB, 5 mg/kg, Intravenous, Once, 14 of 18 cycles  Administration: 355 mg (5/20/2019), 355 mg (6/3/2019), 355 mg (6/20/2019), 355 mg (7/3/2019), 355 mg (7/18/2019), 355 mg (7/31/2019), 355 mg (8/15/2019)  irinotecan (CAMPTOSAR) 322 mg in sodium chloride 0 9 % 500 mL chemo infusion, 180 mg/m2, Intravenous, Once, 14 of 18 cycles  Administration: 322 mg (5/20/2019), 322 mg (6/3/2019), 320 mg (6/20/2019), 320 mg (7/3/2019), 320 mg (7/18/2019), 320 mg (7/31/2019), 320 mg (8/15/2019)  leucovorin 716 mg in sodium chloride 0 9 % 250 mL IVPB, 400 mg/m2, Intravenous, Once, 14 of 18 cycles  Administration: 716 mg (5/20/2019), 716 mg (6/3/2019), 700 mg (6/20/2019), 700 mg (7/3/2019), 700 mg (7/18/2019), 700 mg (7/31/2019), 700 mg (8/15/2019)      7/17/2018 - 8/1/2018 Chemotherapy     camptosar 180 mg/m2 day 1  5  mg/m2 day 1  5 FU 1200 mg/m2 day 1-2   Leucovorin 400 mg/m2     Venofer 100 mg (first 10 treatments) -- all every 2 weeks          8/1/2018 Adverse Reaction     Needed granix 300 mcg due to [address] of 1 01       8/14/2018 -  Chemotherapy     camptosar 180 mg/m2 day 1  5  mg/m2 day 1  5 FU 1200 mg/m2 day 1-2   Leucovorin 400 mg/m2  Avastin 5 mg/kg day 1   Venofer 100 mg (first 10 treatments)  Neulasta on Pro 6 mg day 3      -- every 2 weeks             Liver metastases ([facility] [address] )    6/26/2018 Initial Diagnosis     Liver metastases ([facility] [address] )      7/16/2018 -  Chemotherapy     fluorouracil (ADRUCIL) injection 715 mg, 400 mg/m2, Intravenous, Once, 7 of 7 cycles  pegfilgrastim (NEULASTA ONPRO) subcutaneous injection kit 6 mg, 6 mg, Subcutaneous, Once, 7 of 11 cycles  Administration: 6 mg (5/22/2019), 6 mg (6/5/2019), 6 mg (6/22/2019), 6 mg (7/5/2019), 6 mg (7/20/2019), 6 mg (8/2/2019), 6 mg (8/17/2019)  bevacizumab (AVASTIN) 355 mg in sodium chloride 0 9 % 100 mL IVPB, 5 mg/kg, Intravenous, Once, 14 of 18 cycles  Administration: 355 mg (5/20/2019), 355 mg (6/3/2019), 355 mg (6/20/2019), 355 mg (7/3/2019), 355 mg (7/18/2019), 355 mg (7/31/2019), 355 mg (8/15/2019)  irinotecan (CAMPTOSAR) 322 mg in sodium chloride 0 9 % 500 mL chemo infusion, 180 mg/m2, Intravenous, Once, 14 of 18 cycles  Administration: 322 mg (5/20/2019), 322 mg (6/3/2019), 320 mg (6/20/2019), 320 mg (7/3/2019), 320 mg (7/18/2019), 320 mg (7/31/2019), 320 mg (8/15/2019)  leucovorin 716 mg in sodium chloride 0 9 % 250 mL IVPB, 400 mg/m2, Intravenous, Once, 14 of 18 cycles  Administration: 716 mg (5/20/2019), 716 mg (6/3/2019), 700 mg (6/20/2019), 700 mg (7/3/2019), 700 mg (7/18/2019), 700 mg (7/31/2019), 700 mg (8/15/2019)         ROS:  08/20/19 Reviewed 13 systems:  Presently no headaches, seizures, dizziness, diplopia, dysphagia, hoarseness, chest pain, palpitations, shortness of breath, cough, hemoptysis, abdominal pain, nausea, vomiting,  melena, hematuria, fever, chills, bleeding, bone pains, skin rash, weight loss, arthritic symptoms,   weakness, numbness,  claudication and gait problem  No frequent infections    Not unusually sensitive to heat or cold  No swelling of the ankles  No swollen glands     No GYN symptoms Patient is anxious  Other symptoms are in HPI        /78 (BP Location: Right arm, Patient Position: Sitting, Cuff Size: Adult)   Pulse 100   Temp 97 6 °F (36 4 °C)   Resp 16   Ht 5' 5" (1 651 m)   Wt 71 7 kg (158 lb)   LMP 05/16/2018 (LMP Unknown)   SpO2 98%   BMI 26 29 kg/m²     Physical Exam:  Alert, oriented, not in distress, no icterus, no oral thrush, no palpable neck mass, clear lung fields, regular heart rate, abdomen  soft and non tender, no palpable abdominal mass, no ascites, no edema of ankles, no calf tenderness, no focal neurological deficit, no skin rash, no palpable lymphadenopathy in the neck and axillary areas, good arterial pulses, no clubbing  Patient is anxious  Performance status 1  IMAGING:  IMPRESSION:     Partial response with decrease in the size of the previously noted hepatic lesions as compared to previous study from November 4, 2018     The size of these lesions is grossly unchanged from the CT of June 17, 2019     No progression     Routine surveillance can be continued        Workstation performed: [id number]      Imaging     MRI abdomen w wo contrast (Order: [id number]) - 7/24/2019   IMPRESSION:     The previously seen ill-defined lobulated pelvic soft tissue mass adjacent to the vaginal cuff is reidentified as an enhancing mass #5/17, #10/49   This lesion is decreased in size now measuring 2 5 x 1 8 x 2 2 cm, with dimensions of 5 7 x 2 7 x 1 9 cm on   the pelvic MRI from 11/1/2018 and correlates to the area of FDG uptake and suspected metastasis      No new findings         Workstation performed: [id number]      Imaging     MRI pelvis w wo contrast (Order: [id number]) - 7/24/2019       LABS:  Results for orders placed or performed in visit on 08/13/19   CBC and differential   Result Value Ref Range    WBC 11 86 (H) 4 31 - 10 16 Thousand/uL    RBC 3 78 (L) 3 81 - 5 12 Million/uL Hemoglobin 12 5 11 5 - 15 4 g/dL    Hematocrit 41 3 34 8 - 46 1 %     (H) 82 - 98 fL    MCH 33 1 26 8 - 34 3 pg    MCHC 30 3 (L) 31 4 - 37 4 g/dL    RDW 17 2 (H) 11 6 - 15 1 %    MPV 9 9 8 9 - 12 7 fL    Platelets 896 456 - 407 Thousands/uL    nRBC 0 /100 WBCs    Neutrophils Relative 73 43 - 75 %    Immat GRANS % 2 0 - 2 %    Lymphocytes Relative 14 14 - 44 %    Monocytes Relative 9 4 - 12 %    Eosinophils Relative 1 0 - 6 %    Basophils Relative 1 0 - 1 %    Neutrophils Absolute 8 75 (H) 1 85 - 7 62 Thousands/µL    Immature Grans Absolute 0 19 0 00 - 0 20 Thousand/uL    Lymphocytes Absolute 1 66 0 60 - 4 47 Thousands/µL    Monocytes Absolute 1 04 0 17 - 1 22 Thousand/µL    Eosinophils Absolute 0 14 0 00 - 0 61 Thousand/µL    Basophils Absolute 0 08 0 00 - 0 10 Thousands/µL     Labs, Imaging, & Other studies:   All pertinent labs and imaging studies were personally reviewed    Lab Results   Component Value Date     03/18/2015    K 4 2 08/13/2019     08/13/2019    CO2 27 08/13/2019    ANIONGAP 9 03/18/2015    BUN 10 08/13/2019    CREATININE 0 98 08/13/2019    GLUCOSE 100 03/18/2015    GLUF 96 08/13/2019    CALCIUM 8 5 08/13/2019    AST 16 08/13/2019    ALT 30 08/13/2019    ALKPHOS 105 08/13/2019    PROT 6 6 03/18/2015    BILITOT 0 65 03/18/2015    EGFR 67 08/13/2019     Lab Results   Component Value Date    WBC 11 86 (H) 08/13/2019    HGB 12 5 08/13/2019    HCT 41 3 08/13/2019     (H) 08/13/2019     08/13/2019       Reviewed and discussed with patient  Assessment and plan:  Patient is here with her   Follow-up visit for 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 and metastatic disease to liver, abdominal lymph nodes and pelvis  Presently patient is on modified FOLFIRI plus Avastin without bolus 5 FU  Bolus 5 FU was discontinued because of toxicity  Patient has some tiredness  She has manageable diarrhea     Addy Milton May 2018 patient underwent GALI and BSO for uterine bleeding  and there were cancer cells in the fallopian tubes   In June 2018 patient   underwent extended right hemicolectomy, partial omentectomy and biopsy of the peritoneal nodule   Peritoneal nodule came back  positive for metastatic adenocarcinoma from colon    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 and metastatic disease to liver    6 cm T3 G2 right colon cancer with positive margins, lymphovascular invasion and perineural invasion and positive 3 of 27 lymph nodes with extranodal extension and positive peritoneal nodule biopsy   Stage IV disease because of liver and peritoneal metastases   In July 2018 patient was started  on modified FOLFIRI plus Avastin   She had PPE in her hands and bolus 5 FU was discontinued      She also had iron deficiency and had Venofer intravenously  Kanu Redd is not on Venofer anymore   She was seen by liver specialist at [facility] and was not felt to be a surgical candidate       Patient is on Xarelto 10 mg daily for history of DVT right lower extremity in 2016     Physical examination and test results are as recorded and discussed in detail  Metastatic colon cancer is stable to slightly improved on CT scan and MRI scan  She reports above  No change in therapy at this time  She would like to have PET-CT scan next   Condition discussed and explained   Questions answered  Alivia Kim is prolongation of survival from colon cancer and no more blood clot and bleeding                       Discussed the importance of self-breast examination, eating healthy foods, staying active and health screening tests  Shy Ross is capable of Self care  1  Primary adenocarcinoma of ascending colon ([facility] [address] )    - NM PET CT skull base to mid thigh; Future  - CBC and differential; Future  - Comprehensive metabolic panel; Future  - Infusion Calculated Appointment Request; Future  - CBC and differential; Future  - Comprehensive metabolic panel;  Future  - Infusion Calculated Appointment Request; Future  - Infusion Calculated Appointment Request; Future  - CBC and differential; Future  - Comprehensive metabolic panel; Future  - Infusion Calculated Appointment Request; Future  - Infusion Calculated Appointment Request; Future  - CBC and differential; Future  - Comprehensive metabolic panel; Future  - Infusion Calculated Appointment Request; Future    2  Liver metastases ([facility] )    - NM PET CT skull base to mid thigh; Future  - CBC and differential; Future  - Comprehensive metabolic panel; Future  - Infusion Calculated Appointment Request; Future  - CBC and differential; Future  - Comprehensive metabolic panel; Future  - Infusion Calculated Appointment Request; Future  - Infusion Calculated Appointment Request; Future  - CBC and differential; Future  - Comprehensive metabolic panel; Future  - Infusion Calculated Appointment Request; Future  - Infusion Calculated Appointment Request; Future  - CBC and differential; Future  - Comprehensive metabolic panel; Future  - Infusion Calculated Appointment Request; Future    3  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[facility] )      4  Chronic deep vein thrombosis (DVT) of popliteal vein of right lower extremity (HCC)      5  Peritoneal metastases ([facility] )      6  Chemotherapy induced neutropenia (HCC)    - CBC and differential; Future  - Comprehensive metabolic panel; Future  - Infusion Calculated Appointment Request; Future  - CBC and differential; Future  - Comprehensive metabolic panel; Future  - Infusion Calculated Appointment Request; Future  - Infusion Calculated Appointment Request; Future  - CBC and differential; Future  - Comprehensive metabolic panel; Future  - Infusion Calculated Appointment Request; Future  - Infusion Calculated Appointment Request; Future  - CBC and differential; Future  - Comprehensive metabolic panel;  Future  - Infusion Calculated Appointment Request; Future        Patient voiced understanding and agreement in the discussion  Counseling / Coordination of Care   Greater than 50% of total time was spent with the patient and / or family counseling and / or coordination of care

## 2019-08-27 ENCOUNTER — APPOINTMENT (OUTPATIENT)
Dept: LAB | Facility: MEDICAL CENTER | Age: 54
End: 2019-08-27
Payer: COMMERCIAL

## 2019-08-27 DIAGNOSIS — C18.2 PRIMARY ADENOCARCINOMA OF ASCENDING COLON (HCC): ICD-10-CM

## 2019-08-27 DIAGNOSIS — D70.1 CHEMOTHERAPY INDUCED NEUTROPENIA (HCC): ICD-10-CM

## 2019-08-27 DIAGNOSIS — T45.1X5A CHEMOTHERAPY INDUCED NEUTROPENIA (HCC): ICD-10-CM

## 2019-08-27 DIAGNOSIS — C78.7 LIVER METASTASES (HCC): ICD-10-CM

## 2019-08-27 LAB
ALBUMIN SERPL BCP-MCNC: 3.8 G/DL (ref 3.5–5)
ALP SERPL-CCNC: 111 U/L (ref 46–116)
ALT SERPL W P-5'-P-CCNC: 29 U/L (ref 12–78)
ANION GAP SERPL CALCULATED.3IONS-SCNC: 5 MMOL/L (ref 4–13)
AST SERPL W P-5'-P-CCNC: 16 U/L (ref 5–45)
BASOPHILS # BLD AUTO: 0.08 THOUSANDS/ΜL (ref 0–0.1)
BASOPHILS NFR BLD AUTO: 1 % (ref 0–1)
BILIRUB SERPL-MCNC: 0.32 MG/DL (ref 0.2–1)
BUN SERPL-MCNC: 10 MG/DL (ref 5–25)
CALCIUM SERPL-MCNC: 9 MG/DL (ref 8.3–10.1)
CHLORIDE SERPL-SCNC: 111 MMOL/L (ref 100–108)
CO2 SERPL-SCNC: 28 MMOL/L (ref 21–32)
CREAT SERPL-MCNC: 0.82 MG/DL (ref 0.6–1.3)
EOSINOPHIL # BLD AUTO: 0.14 THOUSAND/ΜL (ref 0–0.61)
EOSINOPHIL NFR BLD AUTO: 1 % (ref 0–6)
ERYTHROCYTE [DISTWIDTH] IN BLOOD BY AUTOMATED COUNT: 17 % (ref 11.6–15.1)
GFR SERPL CREATININE-BSD FRML MDRD: 83 ML/MIN/1.73SQ M
GLUCOSE P FAST SERPL-MCNC: 87 MG/DL (ref 65–99)
HCT VFR BLD AUTO: 38.7 % (ref 34.8–46.1)
HGB BLD-MCNC: 11.7 G/DL (ref 11.5–15.4)
IMM GRANULOCYTES # BLD AUTO: 0.13 THOUSAND/UL (ref 0–0.2)
IMM GRANULOCYTES NFR BLD AUTO: 1 % (ref 0–2)
LYMPHOCYTES # BLD AUTO: 1.37 THOUSANDS/ΜL (ref 0.6–4.47)
LYMPHOCYTES NFR BLD AUTO: 12 % (ref 14–44)
MCH RBC QN AUTO: 32.3 PG (ref 26.8–34.3)
MCHC RBC AUTO-ENTMCNC: 30.2 G/DL (ref 31.4–37.4)
MCV RBC AUTO: 107 FL (ref 82–98)
MONOCYTES # BLD AUTO: 1.21 THOUSAND/ΜL (ref 0.17–1.22)
MONOCYTES NFR BLD AUTO: 11 % (ref 4–12)
NEUTROPHILS # BLD AUTO: 8.13 THOUSANDS/ΜL (ref 1.85–7.62)
NEUTS SEG NFR BLD AUTO: 74 % (ref 43–75)
NRBC BLD AUTO-RTO: 0 /100 WBCS
PLATELET # BLD AUTO: 173 THOUSANDS/UL (ref 149–390)
PMV BLD AUTO: 10.5 FL (ref 8.9–12.7)
POTASSIUM SERPL-SCNC: 4.2 MMOL/L (ref 3.5–5.3)
PROT SERPL-MCNC: 6.6 G/DL (ref 6.4–8.2)
RBC # BLD AUTO: 3.62 MILLION/UL (ref 3.81–5.12)
SODIUM SERPL-SCNC: 144 MMOL/L (ref 136–145)
WBC # BLD AUTO: 11.06 THOUSAND/UL (ref 4.31–10.16)

## 2019-08-27 PROCEDURE — 80053 COMPREHEN METABOLIC PANEL: CPT

## 2019-08-27 PROCEDURE — 36415 COLL VENOUS BLD VENIPUNCTURE: CPT

## 2019-08-27 PROCEDURE — 85025 COMPLETE CBC W/AUTO DIFF WBC: CPT

## 2019-08-29 ENCOUNTER — HOSPITAL ENCOUNTER (OUTPATIENT)
Dept: INFUSION CENTER | Facility: CLINIC | Age: 54
Discharge: HOME | End: 2019-08-29
Payer: COMMERCIAL

## 2019-08-29 VITALS
WEIGHT: 161.5 LBS | HEART RATE: 65 BPM | HEIGHT: 66 IN | RESPIRATION RATE: 16 BRPM | BODY MASS INDEX: 25.96 KG/M2 | SYSTOLIC BLOOD PRESSURE: 118 MMHG | DIASTOLIC BLOOD PRESSURE: 80 MMHG | TEMPERATURE: 96.8 F

## 2019-08-29 DIAGNOSIS — D70.1 CHEMOTHERAPY INDUCED NEUTROPENIA (HCC): ICD-10-CM

## 2019-08-29 DIAGNOSIS — C78.7 LIVER METASTASES (HCC): Primary | ICD-10-CM

## 2019-08-29 DIAGNOSIS — C18.2 PRIMARY ADENOCARCINOMA OF ASCENDING COLON (HCC): ICD-10-CM

## 2019-08-29 DIAGNOSIS — T45.1X5A CHEMOTHERAPY INDUCED NEUTROPENIA (HCC): ICD-10-CM

## 2019-08-29 PROCEDURE — 96367 TX/PROPH/DG ADDL SEQ IV INF: CPT

## 2019-08-29 PROCEDURE — 96375 TX/PRO/DX INJ NEW DRUG ADDON: CPT

## 2019-08-29 PROCEDURE — 96415 CHEMO IV INFUSION ADDL HR: CPT

## 2019-08-29 PROCEDURE — 96413 CHEMO IV INFUSION 1 HR: CPT

## 2019-08-29 PROCEDURE — 96368 THER/DIAG CONCURRENT INF: CPT

## 2019-08-29 PROCEDURE — 96417 CHEMO IV INFUS EACH ADDL SEQ: CPT

## 2019-08-29 RX ORDER — ATROPINE SULFATE 1 MG/ML
0.25 INJECTION, SOLUTION INTRAMUSCULAR; INTRAVENOUS; SUBCUTANEOUS ONCE AS NEEDED
Status: DISCONTINUED | OUTPATIENT
Start: 2019-08-29 | End: 2019-09-01 | Stop reason: HOSPADM

## 2019-08-29 RX ORDER — SODIUM CHLORIDE 9 MG/ML
20 INJECTION, SOLUTION INTRAVENOUS ONCE
Status: COMPLETED | OUTPATIENT
Start: 2019-08-29 | End: 2019-08-29

## 2019-08-29 RX ORDER — ATROPINE SULFATE 1 MG/ML
0.25 INJECTION, SOLUTION INTRAMUSCULAR; INTRAVENOUS; SUBCUTANEOUS ONCE
Status: COMPLETED | OUTPATIENT
Start: 2019-08-29 | End: 2019-08-29

## 2019-08-29 RX ADMIN — ATROPINE SULFATE 0.25 MG: 1 INJECTION, SOLUTION INTRAMUSCULAR; INTRAVENOUS; SUBCUTANEOUS at 12:48

## 2019-08-29 RX ADMIN — DEXAMETHASONE SODIUM PHOSPHATE: 10 INJECTION, SOLUTION INTRAMUSCULAR; INTRAVENOUS at 12:17

## 2019-08-29 RX ADMIN — SODIUM CHLORIDE 20 ML/HR: 0.9 INJECTION, SOLUTION INTRAVENOUS at 12:16

## 2019-08-29 RX ADMIN — IRINOTECAN HYDROCHLORIDE 320 MG: 20 INJECTION INTRAVENOUS at 13:36

## 2019-08-29 RX ADMIN — LEUCOVORIN CALCIUM 700 MG: 200 INJECTION, POWDER, LYOPHILIZED, FOR SUSPENSION INTRAMUSCULAR; INTRAVENOUS at 13:36

## 2019-08-29 RX ADMIN — BEVACIZUMAB 355 MG: 400 INJECTION, SOLUTION INTRAVENOUS at 12:51

## 2019-08-29 NOTE — PLAN OF CARE
Problem: Potential for Falls  Goal: Patient will remain free of falls  Description  INTERVENTIONS:  - Assess patient frequently for physical needs  -  Identify cognitive and physical deficits and behaviors that affect risk of falls  -  [place] fall precautions as indicated by assessment   - Educate patient/family on patient safety including physical limitations  - Instruct patient to call for assistance with activity based on assessment  - Modify environment to reduce risk of injury  - Consider OT/PT consult to assist with strengthening/mobility  Outcome: Progressing     Problem: Knowledge Deficit  Goal: Patient/family/caregiver demonstrates understanding of disease process, treatment plan, medications, and discharge instructions  Description  Complete learning assessment and assess knowledge base    Interventions:  - Provide teaching at level of understanding  - Provide teaching via preferred learning methods  Outcome: Progressing

## 2019-08-29 NOTE — PROGRESS NOTES
Pt to clinic for avastin   Leucovorin, irinotecan, 5 FU cadd start, pt offers no complaints at this time, aware of next appointment, declined avs Afsaneh Devi

## 2019-08-31 ENCOUNTER — HOSPITAL ENCOUNTER (OUTPATIENT)
Dept: INFUSION CENTER | Facility: CLINIC | Age: 54
Discharge: HOME | End: 2019-08-31
Payer: COMMERCIAL

## 2019-08-31 DIAGNOSIS — C18.2 PRIMARY ADENOCARCINOMA OF ASCENDING COLON (HCC): ICD-10-CM

## 2019-08-31 DIAGNOSIS — T45.1X5A CHEMOTHERAPY INDUCED NEUTROPENIA (HCC): ICD-10-CM

## 2019-08-31 DIAGNOSIS — C78.7 LIVER METASTASES (HCC): Primary | ICD-10-CM

## 2019-08-31 DIAGNOSIS — D70.1 CHEMOTHERAPY INDUCED NEUTROPENIA (HCC): ICD-10-CM

## 2019-08-31 PROCEDURE — 96372 THER/PROPH/DIAG INJ SC/IM: CPT

## 2019-08-31 RX ADMIN — PEGFILGRASTIM 6 MG: KIT SUBCUTANEOUS at 13:40

## 2019-08-31 NOTE — PROGRESS NOTES
Pt tolerated treatment well  [place] volume 0  Cadd pump disconnected and port saline locked  Aware of next appt  AVS declined

## 2019-09-03 DIAGNOSIS — T45.1X5A CHEMOTHERAPY INDUCED NEUTROPENIA (HCC): Primary | ICD-10-CM

## 2019-09-03 DIAGNOSIS — C78.7 LIVER METASTASES (HCC): ICD-10-CM

## 2019-09-03 DIAGNOSIS — C18.2 PRIMARY ADENOCARCINOMA OF ASCENDING COLON (HCC): ICD-10-CM

## 2019-09-03 DIAGNOSIS — D70.1 CHEMOTHERAPY INDUCED NEUTROPENIA (HCC): Primary | ICD-10-CM

## 2019-09-10 ENCOUNTER — APPOINTMENT (OUTPATIENT)
Dept: LAB | Facility: MEDICAL CENTER | Age: 54
End: 2019-09-10
Payer: COMMERCIAL

## 2019-09-10 DIAGNOSIS — C78.7 LIVER METASTASES (HCC): ICD-10-CM

## 2019-09-10 DIAGNOSIS — T45.1X5A CHEMOTHERAPY INDUCED NEUTROPENIA (HCC): ICD-10-CM

## 2019-09-10 DIAGNOSIS — D70.1 CHEMOTHERAPY INDUCED NEUTROPENIA (HCC): ICD-10-CM

## 2019-09-10 DIAGNOSIS — C18.2 PRIMARY ADENOCARCINOMA OF ASCENDING COLON (HCC): ICD-10-CM

## 2019-09-10 LAB
ALBUMIN SERPL BCP-MCNC: 3.8 G/DL (ref 3.5–5)
ALP SERPL-CCNC: 114 U/L (ref 46–116)
ALT SERPL W P-5'-P-CCNC: 29 U/L (ref 12–78)
ANION GAP SERPL CALCULATED.3IONS-SCNC: 3 MMOL/L (ref 4–13)
AST SERPL W P-5'-P-CCNC: 13 U/L (ref 5–45)
BASOPHILS # BLD AUTO: 0.05 THOUSANDS/ΜL (ref 0–0.1)
BASOPHILS NFR BLD AUTO: 1 % (ref 0–1)
BILIRUB SERPL-MCNC: 0.39 MG/DL (ref 0.2–1)
BUN SERPL-MCNC: 12 MG/DL (ref 5–25)
CALCIUM SERPL-MCNC: 9 MG/DL (ref 8.3–10.1)
CHLORIDE SERPL-SCNC: 108 MMOL/L (ref 100–108)
CO2 SERPL-SCNC: 28 MMOL/L (ref 21–32)
CREAT SERPL-MCNC: 0.94 MG/DL (ref 0.6–1.3)
EOSINOPHIL # BLD AUTO: 0.21 THOUSAND/ΜL (ref 0–0.61)
EOSINOPHIL NFR BLD AUTO: 2 % (ref 0–6)
ERYTHROCYTE [DISTWIDTH] IN BLOOD BY AUTOMATED COUNT: 17 % (ref 11.6–15.1)
GFR SERPL CREATININE-BSD FRML MDRD: 70 ML/MIN/1.73SQ M
GLUCOSE SERPL-MCNC: 90 MG/DL (ref 65–140)
HCT VFR BLD AUTO: 39 % (ref 34.8–46.1)
HGB BLD-MCNC: 11.8 G/DL (ref 11.5–15.4)
IMM GRANULOCYTES # BLD AUTO: 0.06 THOUSAND/UL (ref 0–0.2)
IMM GRANULOCYTES NFR BLD AUTO: 1 % (ref 0–2)
LYMPHOCYTES # BLD AUTO: 1.52 THOUSANDS/ΜL (ref 0.6–4.47)
LYMPHOCYTES NFR BLD AUTO: 17 % (ref 14–44)
MCH RBC QN AUTO: 32.2 PG (ref 26.8–34.3)
MCHC RBC AUTO-ENTMCNC: 30.3 G/DL (ref 31.4–37.4)
MCV RBC AUTO: 107 FL (ref 82–98)
MONOCYTES # BLD AUTO: 1.03 THOUSAND/ΜL (ref 0.17–1.22)
MONOCYTES NFR BLD AUTO: 12 % (ref 4–12)
NEUTROPHILS # BLD AUTO: 5.97 THOUSANDS/ΜL (ref 1.85–7.62)
NEUTS SEG NFR BLD AUTO: 67 % (ref 43–75)
NRBC BLD AUTO-RTO: 0 /100 WBCS
PLATELET # BLD AUTO: 189 THOUSANDS/UL (ref 149–390)
PMV BLD AUTO: 10.4 FL (ref 8.9–12.7)
POTASSIUM SERPL-SCNC: 3.9 MMOL/L (ref 3.5–5.3)
PROT SERPL-MCNC: 7 G/DL (ref 6.4–8.2)
RBC # BLD AUTO: 3.66 MILLION/UL (ref 3.81–5.12)
SODIUM SERPL-SCNC: 139 MMOL/L (ref 136–145)
WBC # BLD AUTO: 8.84 THOUSAND/UL (ref 4.31–10.16)

## 2019-09-10 PROCEDURE — 80053 COMPREHEN METABOLIC PANEL: CPT

## 2019-09-10 PROCEDURE — 36415 COLL VENOUS BLD VENIPUNCTURE: CPT

## 2019-09-10 PROCEDURE — 85025 COMPLETE CBC W/AUTO DIFF WBC: CPT

## 2019-09-12 ENCOUNTER — HOSPITAL ENCOUNTER (OUTPATIENT)
Dept: INFUSION CENTER | Facility: CLINIC | Age: 54
Discharge: HOME | End: 2019-09-12
Payer: COMMERCIAL

## 2019-09-12 VITALS
RESPIRATION RATE: 14 BRPM | HEART RATE: 70 BPM | BODY MASS INDEX: 25.96 KG/M2 | HEIGHT: 66 IN | OXYGEN SATURATION: 99 % | TEMPERATURE: 96.6 F | WEIGHT: 161.5 LBS | DIASTOLIC BLOOD PRESSURE: 80 MMHG | SYSTOLIC BLOOD PRESSURE: 122 MMHG

## 2019-09-12 DIAGNOSIS — C18.2 PRIMARY ADENOCARCINOMA OF ASCENDING COLON (HCC): ICD-10-CM

## 2019-09-12 DIAGNOSIS — D70.1 CHEMOTHERAPY INDUCED NEUTROPENIA (HCC): ICD-10-CM

## 2019-09-12 DIAGNOSIS — T45.1X5A CHEMOTHERAPY INDUCED NEUTROPENIA (HCC): ICD-10-CM

## 2019-09-12 DIAGNOSIS — C78.7 LIVER METASTASES (HCC): Primary | ICD-10-CM

## 2019-09-12 PROCEDURE — 96375 TX/PRO/DX INJ NEW DRUG ADDON: CPT

## 2019-09-12 PROCEDURE — 96417 CHEMO IV INFUS EACH ADDL SEQ: CPT

## 2019-09-12 PROCEDURE — G0498 CHEMO EXTEND IV INFUS W/PUMP: HCPCS

## 2019-09-12 PROCEDURE — 96367 TX/PROPH/DG ADDL SEQ IV INF: CPT

## 2019-09-12 PROCEDURE — 96415 CHEMO IV INFUSION ADDL HR: CPT

## 2019-09-12 PROCEDURE — 96413 CHEMO IV INFUSION 1 HR: CPT

## 2019-09-12 PROCEDURE — 96368 THER/DIAG CONCURRENT INF: CPT

## 2019-09-12 RX ORDER — ATROPINE SULFATE 1 MG/ML
0.25 INJECTION, SOLUTION INTRAMUSCULAR; INTRAVENOUS; SUBCUTANEOUS ONCE
Status: COMPLETED | OUTPATIENT
Start: 2019-09-12 | End: 2019-09-12

## 2019-09-12 RX ORDER — SODIUM CHLORIDE 9 MG/ML
20 INJECTION, SOLUTION INTRAVENOUS ONCE
Status: COMPLETED | OUTPATIENT
Start: 2019-09-12 | End: 2019-09-12

## 2019-09-12 RX ORDER — ATROPINE SULFATE 1 MG/ML
0.25 INJECTION, SOLUTION INTRAMUSCULAR; INTRAVENOUS; SUBCUTANEOUS ONCE AS NEEDED
Status: DISCONTINUED | OUTPATIENT
Start: 2019-09-12 | End: 2019-09-15 | Stop reason: HOSPADM

## 2019-09-12 RX ADMIN — BEVACIZUMAB 355 MG: 400 INJECTION, SOLUTION INTRAVENOUS at 12:55

## 2019-09-12 RX ADMIN — ATROPINE SULFATE 0.25 MG: 1 INJECTION, SOLUTION INTRAMUSCULAR; INTRAVENOUS; SUBCUTANEOUS at 12:54

## 2019-09-12 RX ADMIN — DEXAMETHASONE SODIUM PHOSPHATE: 10 INJECTION, SOLUTION INTRAMUSCULAR; INTRAVENOUS at 12:26

## 2019-09-12 RX ADMIN — IRINOTECAN HYDROCHLORIDE 320 MG: 20 INJECTION, SOLUTION INTRAVENOUS at 13:50

## 2019-09-12 RX ADMIN — LEUCOVORIN CALCIUM 700 MG: 200 INJECTION, POWDER, LYOPHILIZED, FOR SUSPENSION INTRAMUSCULAR; INTRAVENOUS at 13:57

## 2019-09-12 RX ADMIN — SODIUM CHLORIDE 20 ML/HR: 0.9 INJECTION, SOLUTION INTRAVENOUS at 12:27

## 2019-09-12 NOTE — PROGRESS NOTES
Patient here today for chemo, patient offers no complaints, port accessed without incident blood return noted,  Vitals are stable and labs are with in defined parameters to treat

## 2019-09-12 NOTE — PROGRESS NOTES
Patient tolerated infusion well and without incident, patient offers no complaints, port flushed and blood return noted, CADD pump connected, running and flashing green, patient D/C in stable condition

## 2019-09-14 ENCOUNTER — HOSPITAL ENCOUNTER (OUTPATIENT)
Dept: INFUSION CENTER | Facility: CLINIC | Age: 54
Discharge: HOME | End: 2019-09-14
Payer: COMMERCIAL

## 2019-09-14 DIAGNOSIS — C18.2 PRIMARY ADENOCARCINOMA OF ASCENDING COLON (HCC): ICD-10-CM

## 2019-09-14 DIAGNOSIS — D70.1 CHEMOTHERAPY INDUCED NEUTROPENIA (HCC): ICD-10-CM

## 2019-09-14 DIAGNOSIS — T45.1X5A CHEMOTHERAPY INDUCED NEUTROPENIA (HCC): ICD-10-CM

## 2019-09-14 DIAGNOSIS — C78.7 LIVER METASTASES (HCC): Primary | ICD-10-CM

## 2019-09-14 PROCEDURE — 96372 THER/PROPH/DIAG INJ SC/IM: CPT

## 2019-09-14 RX ADMIN — PEGFILGRASTIM 6 MG: KIT SUBCUTANEOUS at 14:00

## 2019-09-14 NOTE — PROGRESS NOTES
Nurse disconnected pt  From cadd pump/res vol=0/nurse applied neulasta onpro to pts  ANGE & instructed on what time to remove  Pt  Is aware & verbalized understanding  Confirmed pts  Next appt   Pt  Declined AVS

## 2019-09-24 ENCOUNTER — APPOINTMENT (OUTPATIENT)
Dept: LAB | Facility: MEDICAL CENTER | Age: 54
End: 2019-09-24
Payer: COMMERCIAL

## 2019-09-24 DIAGNOSIS — C18.2 PRIMARY ADENOCARCINOMA OF ASCENDING COLON (HCC): ICD-10-CM

## 2019-09-24 DIAGNOSIS — C78.7 LIVER METASTASES (HCC): ICD-10-CM

## 2019-09-24 DIAGNOSIS — T45.1X5A CHEMOTHERAPY INDUCED NEUTROPENIA (HCC): ICD-10-CM

## 2019-09-24 DIAGNOSIS — D70.1 CHEMOTHERAPY INDUCED NEUTROPENIA (HCC): ICD-10-CM

## 2019-09-24 LAB
ALBUMIN SERPL BCP-MCNC: 3.8 G/DL (ref 3.5–5)
ALP SERPL-CCNC: 108 U/L (ref 46–116)
ALT SERPL W P-5'-P-CCNC: 38 U/L (ref 12–78)
ANION GAP SERPL CALCULATED.3IONS-SCNC: 3 MMOL/L (ref 4–13)
AST SERPL W P-5'-P-CCNC: 18 U/L (ref 5–45)
BASOPHILS # BLD AUTO: 0.05 THOUSANDS/ΜL (ref 0–0.1)
BASOPHILS NFR BLD AUTO: 1 % (ref 0–1)
BILIRUB SERPL-MCNC: 0.27 MG/DL (ref 0.2–1)
BUN SERPL-MCNC: 8 MG/DL (ref 5–25)
CALCIUM SERPL-MCNC: 8.7 MG/DL (ref 8.3–10.1)
CHLORIDE SERPL-SCNC: 109 MMOL/L (ref 100–108)
CO2 SERPL-SCNC: 28 MMOL/L (ref 21–32)
CREAT SERPL-MCNC: 0.98 MG/DL (ref 0.6–1.3)
EOSINOPHIL # BLD AUTO: 0.16 THOUSAND/ΜL (ref 0–0.61)
EOSINOPHIL NFR BLD AUTO: 2 % (ref 0–6)
ERYTHROCYTE [DISTWIDTH] IN BLOOD BY AUTOMATED COUNT: 16.9 % (ref 11.6–15.1)
GFR SERPL CREATININE-BSD FRML MDRD: 67 ML/MIN/1.73SQ M
GLUCOSE P FAST SERPL-MCNC: 88 MG/DL (ref 65–99)
HCT VFR BLD AUTO: 40.2 % (ref 34.8–46.1)
HGB BLD-MCNC: 12.3 G/DL (ref 11.5–15.4)
IMM GRANULOCYTES # BLD AUTO: 0.1 THOUSAND/UL (ref 0–0.2)
IMM GRANULOCYTES NFR BLD AUTO: 1 % (ref 0–2)
LYMPHOCYTES # BLD AUTO: 1.74 THOUSANDS/ΜL (ref 0.6–4.47)
LYMPHOCYTES NFR BLD AUTO: 16 % (ref 14–44)
MCH RBC QN AUTO: 33 PG (ref 26.8–34.3)
MCHC RBC AUTO-ENTMCNC: 30.6 G/DL (ref 31.4–37.4)
MCV RBC AUTO: 108 FL (ref 82–98)
MONOCYTES # BLD AUTO: 1.19 THOUSAND/ΜL (ref 0.17–1.22)
MONOCYTES NFR BLD AUTO: 11 % (ref 4–12)
NEUTROPHILS # BLD AUTO: 7.42 THOUSANDS/ΜL (ref 1.85–7.62)
NEUTS SEG NFR BLD AUTO: 69 % (ref 43–75)
NRBC BLD AUTO-RTO: 0 /100 WBCS
PLATELET # BLD AUTO: 214 THOUSANDS/UL (ref 149–390)
PMV BLD AUTO: 10 FL (ref 8.9–12.7)
POTASSIUM SERPL-SCNC: 3.7 MMOL/L (ref 3.5–5.3)
PROT SERPL-MCNC: 6.8 G/DL (ref 6.4–8.2)
RBC # BLD AUTO: 3.73 MILLION/UL (ref 3.81–5.12)
SODIUM SERPL-SCNC: 140 MMOL/L (ref 136–145)
WBC # BLD AUTO: 10.66 THOUSAND/UL (ref 4.31–10.16)

## 2019-09-24 PROCEDURE — 80053 COMPREHEN METABOLIC PANEL: CPT

## 2019-09-24 PROCEDURE — 85025 COMPLETE CBC W/AUTO DIFF WBC: CPT

## 2019-09-24 PROCEDURE — 36415 COLL VENOUS BLD VENIPUNCTURE: CPT

## 2019-09-26 ENCOUNTER — HOSPITAL ENCOUNTER (OUTPATIENT)
Dept: INFUSION CENTER | Facility: CLINIC | Age: 54
Discharge: HOME | End: 2019-09-26
Payer: COMMERCIAL

## 2019-09-26 VITALS
HEART RATE: 72 BPM | SYSTOLIC BLOOD PRESSURE: 136 MMHG | TEMPERATURE: 98.5 F | OXYGEN SATURATION: 100 % | HEIGHT: 66 IN | RESPIRATION RATE: 18 BRPM | DIASTOLIC BLOOD PRESSURE: 82 MMHG | WEIGHT: 161.38 LBS | BODY MASS INDEX: 25.94 KG/M2

## 2019-09-26 DIAGNOSIS — C78.7 LIVER METASTASES (HCC): Primary | ICD-10-CM

## 2019-09-26 DIAGNOSIS — T45.1X5A CHEMOTHERAPY INDUCED NEUTROPENIA (HCC): ICD-10-CM

## 2019-09-26 DIAGNOSIS — C18.2 PRIMARY ADENOCARCINOMA OF ASCENDING COLON (HCC): ICD-10-CM

## 2019-09-26 DIAGNOSIS — D70.1 CHEMOTHERAPY INDUCED NEUTROPENIA (HCC): ICD-10-CM

## 2019-09-26 PROCEDURE — 96367 TX/PROPH/DG ADDL SEQ IV INF: CPT

## 2019-09-26 PROCEDURE — 96413 CHEMO IV INFUSION 1 HR: CPT

## 2019-09-26 PROCEDURE — 96368 THER/DIAG CONCURRENT INF: CPT

## 2019-09-26 PROCEDURE — 96417 CHEMO IV INFUS EACH ADDL SEQ: CPT

## 2019-09-26 PROCEDURE — G0498 CHEMO EXTEND IV INFUS W/PUMP: HCPCS

## 2019-09-26 RX ORDER — SODIUM CHLORIDE 9 MG/ML
20 INJECTION, SOLUTION INTRAVENOUS ONCE
Status: COMPLETED | OUTPATIENT
Start: 2019-09-26 | End: 2019-09-26

## 2019-09-26 RX ORDER — ATROPINE SULFATE 1 MG/ML
0.25 INJECTION, SOLUTION INTRAMUSCULAR; INTRAVENOUS; SUBCUTANEOUS ONCE
Status: COMPLETED | OUTPATIENT
Start: 2019-09-26 | End: 2019-09-26

## 2019-09-26 RX ADMIN — DEXAMETHASONE SODIUM PHOSPHATE: 10 INJECTION, SOLUTION INTRAMUSCULAR; INTRAVENOUS at 12:14

## 2019-09-26 RX ADMIN — SODIUM CHLORIDE 20 ML/HR: 0.9 INJECTION, SOLUTION INTRAVENOUS at 12:08

## 2019-09-26 RX ADMIN — LEUCOVORIN CALCIUM 700 MG: 200 INJECTION, POWDER, LYOPHILIZED, FOR SUSPENSION INTRAMUSCULAR; INTRAVENOUS at 13:33

## 2019-09-26 RX ADMIN — BEVACIZUMAB 355 MG: 400 INJECTION, SOLUTION INTRAVENOUS at 12:41

## 2019-09-26 RX ADMIN — IRINOTECAN HYDROCHLORIDE 320 MG: 20 INJECTION INTRAVENOUS at 13:36

## 2019-09-26 RX ADMIN — ATROPINE SULFATE 0.25 MG: 1 INJECTION, SOLUTION INTRAMUSCULAR; INTRAVENOUS; SUBCUTANEOUS at 13:25

## 2019-09-26 NOTE — PROGRESS NOTES
Pt  tolerated treatment without incident  Connected to CADD pump after independent verification of dose and settings by 2nd RN  Good blood return noted prior to connection  To return for disconnect @  13:15 on Sat  9/28/19

## 2019-09-28 ENCOUNTER — HOSPITAL ENCOUNTER (OUTPATIENT)
Dept: INFUSION CENTER | Facility: CLINIC | Age: 54
Discharge: HOME | End: 2019-09-28
Payer: COMMERCIAL

## 2019-09-28 VITALS — TEMPERATURE: 97.8 F

## 2019-09-28 DIAGNOSIS — D70.1 CHEMOTHERAPY INDUCED NEUTROPENIA (HCC): ICD-10-CM

## 2019-09-28 DIAGNOSIS — C78.7 LIVER METASTASES (HCC): Primary | ICD-10-CM

## 2019-09-28 DIAGNOSIS — T45.1X5A CHEMOTHERAPY INDUCED NEUTROPENIA (HCC): ICD-10-CM

## 2019-09-28 DIAGNOSIS — C18.2 PRIMARY ADENOCARCINOMA OF ASCENDING COLON (HCC): ICD-10-CM

## 2019-09-28 PROCEDURE — 96372 THER/PROPH/DIAG INJ SC/IM: CPT

## 2019-09-28 RX ADMIN — PEGFILGRASTIM 6 MG: KIT SUBCUTANEOUS at 13:30

## 2019-09-28 NOTE — PROGRESS NOTES
Patient to [facility] for CADD Pump Disconnect / Port maintenance / Neulasta On Pro: Complaining of fatigue:  Tolerated infusion without incident: No adverse reactions noted: Res volume - 0 0 ml: Neulasta On Pro per MD order: Applied to Right UA: Verified follow up appt with patient: Declined AVS

## 2019-09-30 RX ORDER — SODIUM CHLORIDE 9 MG/ML
20 INJECTION, SOLUTION INTRAVENOUS ONCE
Status: CANCELLED | OUTPATIENT
Start: 2019-10-10

## 2019-09-30 RX ORDER — ATROPINE SULFATE 1 MG/ML
0.25 INJECTION, SOLUTION INTRAMUSCULAR; INTRAVENOUS; SUBCUTANEOUS ONCE
Status: CANCELLED | OUTPATIENT
Start: 2019-10-10

## 2019-09-30 RX ORDER — ATROPINE SULFATE 1 MG/ML
0.25 INJECTION, SOLUTION INTRAMUSCULAR; INTRAVENOUS; SUBCUTANEOUS ONCE AS NEEDED
Status: CANCELLED | OUTPATIENT
Start: 2019-10-10

## 2019-10-01 DIAGNOSIS — C78.7 LIVER METASTASES (HCC): ICD-10-CM

## 2019-10-01 DIAGNOSIS — C18.2 PRIMARY ADENOCARCINOMA OF ASCENDING COLON (HCC): Primary | ICD-10-CM

## 2019-10-09 ENCOUNTER — APPOINTMENT (OUTPATIENT)
Dept: LAB | Facility: MEDICAL CENTER | Age: 54
End: 2019-10-09
Payer: COMMERCIAL

## 2019-10-09 DIAGNOSIS — C18.2 PRIMARY ADENOCARCINOMA OF ASCENDING COLON (HCC): ICD-10-CM

## 2019-10-09 DIAGNOSIS — C78.7 LIVER METASTASES (HCC): ICD-10-CM

## 2019-10-09 DIAGNOSIS — D70.1 CHEMOTHERAPY INDUCED NEUTROPENIA (HCC): ICD-10-CM

## 2019-10-09 DIAGNOSIS — T45.1X5A CHEMOTHERAPY INDUCED NEUTROPENIA (HCC): ICD-10-CM

## 2019-10-09 LAB
ALBUMIN SERPL BCP-MCNC: 3.8 G/DL (ref 3.5–5)
ALP SERPL-CCNC: 91 U/L (ref 46–116)
ALT SERPL W P-5'-P-CCNC: 24 U/L (ref 12–78)
ANION GAP SERPL CALCULATED.3IONS-SCNC: 6 MMOL/L (ref 4–13)
AST SERPL W P-5'-P-CCNC: 14 U/L (ref 5–45)
BASOPHILS # BLD AUTO: 0.05 THOUSANDS/ΜL (ref 0–0.1)
BASOPHILS NFR BLD AUTO: 1 % (ref 0–1)
BILIRUB SERPL-MCNC: 0.31 MG/DL (ref 0.2–1)
BUN SERPL-MCNC: 11 MG/DL (ref 5–25)
CALCIUM SERPL-MCNC: 8.9 MG/DL (ref 8.3–10.1)
CHLORIDE SERPL-SCNC: 110 MMOL/L (ref 100–108)
CO2 SERPL-SCNC: 25 MMOL/L (ref 21–32)
CREAT SERPL-MCNC: 0.95 MG/DL (ref 0.6–1.3)
EOSINOPHIL # BLD AUTO: 0.15 THOUSAND/ΜL (ref 0–0.61)
EOSINOPHIL NFR BLD AUTO: 2 % (ref 0–6)
ERYTHROCYTE [DISTWIDTH] IN BLOOD BY AUTOMATED COUNT: 16.7 % (ref 11.6–15.1)
GFR SERPL CREATININE-BSD FRML MDRD: 69 ML/MIN/1.73SQ M
GLUCOSE SERPL-MCNC: 90 MG/DL (ref 65–140)
HCT VFR BLD AUTO: 41.3 % (ref 34.8–46.1)
HGB BLD-MCNC: 12.5 G/DL (ref 11.5–15.4)
IMM GRANULOCYTES # BLD AUTO: 0.18 THOUSAND/UL (ref 0–0.2)
IMM GRANULOCYTES NFR BLD AUTO: 2 % (ref 0–2)
LYMPHOCYTES # BLD AUTO: 1.57 THOUSANDS/ΜL (ref 0.6–4.47)
LYMPHOCYTES NFR BLD AUTO: 21 % (ref 14–44)
MCH RBC QN AUTO: 32.8 PG (ref 26.8–34.3)
MCHC RBC AUTO-ENTMCNC: 30.3 G/DL (ref 31.4–37.4)
MCV RBC AUTO: 108 FL (ref 82–98)
MONOCYTES # BLD AUTO: 0.67 THOUSAND/ΜL (ref 0.17–1.22)
MONOCYTES NFR BLD AUTO: 9 % (ref 4–12)
NEUTROPHILS # BLD AUTO: 4.78 THOUSANDS/ΜL (ref 1.85–7.62)
NEUTS SEG NFR BLD AUTO: 65 % (ref 43–75)
NRBC BLD AUTO-RTO: 0 /100 WBCS
PLATELET # BLD AUTO: 207 THOUSANDS/UL (ref 149–390)
PMV BLD AUTO: 10.4 FL (ref 8.9–12.7)
POTASSIUM SERPL-SCNC: 4.1 MMOL/L (ref 3.5–5.3)
PROT SERPL-MCNC: 6.8 G/DL (ref 6.4–8.2)
RBC # BLD AUTO: 3.81 MILLION/UL (ref 3.81–5.12)
SODIUM SERPL-SCNC: 141 MMOL/L (ref 136–145)
WBC # BLD AUTO: 7.4 THOUSAND/UL (ref 4.31–10.16)

## 2019-10-09 PROCEDURE — 36415 COLL VENOUS BLD VENIPUNCTURE: CPT

## 2019-10-09 PROCEDURE — 85025 COMPLETE CBC W/AUTO DIFF WBC: CPT

## 2019-10-09 PROCEDURE — 80053 COMPREHEN METABOLIC PANEL: CPT

## 2019-10-10 ENCOUNTER — HOSPITAL ENCOUNTER (OUTPATIENT)
Dept: INFUSION CENTER | Facility: CLINIC | Age: 54
Discharge: HOME | End: 2019-10-10
Payer: COMMERCIAL

## 2019-10-10 ENCOUNTER — DOCUMENTATION (OUTPATIENT)
Dept: HEMATOLOGY ONCOLOGY | Facility: CLINIC | Age: 54
End: 2019-10-10

## 2019-10-10 VITALS
SYSTOLIC BLOOD PRESSURE: 140 MMHG | OXYGEN SATURATION: 100 % | WEIGHT: 164 LBS | DIASTOLIC BLOOD PRESSURE: 88 MMHG | HEIGHT: 66 IN | TEMPERATURE: 97.2 F | BODY MASS INDEX: 26.36 KG/M2 | RESPIRATION RATE: 18 BRPM | HEART RATE: 72 BPM

## 2019-10-10 DIAGNOSIS — C78.7 LIVER METASTASES (HCC): ICD-10-CM

## 2019-10-10 DIAGNOSIS — D70.1 CHEMOTHERAPY INDUCED NEUTROPENIA (HCC): ICD-10-CM

## 2019-10-10 DIAGNOSIS — C78.7 LIVER METASTASES (HCC): Primary | ICD-10-CM

## 2019-10-10 DIAGNOSIS — C18.2 PRIMARY ADENOCARCINOMA OF ASCENDING COLON (HCC): ICD-10-CM

## 2019-10-10 DIAGNOSIS — T45.1X5A CHEMOTHERAPY INDUCED NEUTROPENIA (HCC): ICD-10-CM

## 2019-10-10 PROCEDURE — 96417 CHEMO IV INFUS EACH ADDL SEQ: CPT

## 2019-10-10 PROCEDURE — 96367 TX/PROPH/DG ADDL SEQ IV INF: CPT

## 2019-10-10 PROCEDURE — 96372 THER/PROPH/DIAG INJ SC/IM: CPT

## 2019-10-10 PROCEDURE — 96413 CHEMO IV INFUSION 1 HR: CPT

## 2019-10-10 PROCEDURE — G0498 CHEMO EXTEND IV INFUS W/PUMP: HCPCS

## 2019-10-10 RX ORDER — SODIUM CHLORIDE 9 MG/ML
20 INJECTION, SOLUTION INTRAVENOUS ONCE
Status: COMPLETED | OUTPATIENT
Start: 2019-10-10 | End: 2019-10-10

## 2019-10-10 RX ORDER — ATROPINE SULFATE 1 MG/ML
0.25 INJECTION, SOLUTION INTRAMUSCULAR; INTRAVENOUS; SUBCUTANEOUS ONCE AS NEEDED
Status: DISCONTINUED | OUTPATIENT
Start: 2019-10-10 | End: 2019-10-13 | Stop reason: HOSPADM

## 2019-10-10 RX ORDER — ATROPINE SULFATE 1 MG/ML
0.25 INJECTION, SOLUTION INTRAMUSCULAR; INTRAVENOUS; SUBCUTANEOUS ONCE
Status: COMPLETED | OUTPATIENT
Start: 2019-10-10 | End: 2019-10-10

## 2019-10-10 RX ADMIN — ATROPINE SULFATE 0.25 MG: 1 INJECTION, SOLUTION INTRAMUSCULAR; INTRAVENOUS; SUBCUTANEOUS at 13:34

## 2019-10-10 RX ADMIN — IRINOTECAN HYDROCHLORIDE 320 MG: 20 INJECTION, SOLUTION INTRAVENOUS at 13:41

## 2019-10-10 RX ADMIN — LEUCOVORIN CALCIUM 700 MG: 200 INJECTION, POWDER, LYOPHILIZED, FOR SUSPENSION INTRAMUSCULAR; INTRAVENOUS at 13:41

## 2019-10-10 RX ADMIN — SODIUM CHLORIDE 20 ML/HR: 0.9 INJECTION, SOLUTION INTRAVENOUS at 12:14

## 2019-10-10 RX ADMIN — DEXAMETHASONE SODIUM PHOSPHATE: 10 INJECTION, SOLUTION INTRAMUSCULAR; INTRAVENOUS at 12:13

## 2019-10-10 RX ADMIN — BEVACIZUMAB 355 MG: 400 INJECTION, SOLUTION INTRAVENOUS at 12:53

## 2019-10-10 NOTE — PROGRESS NOTES
Pt tolerated treatment well  CADD connected, appointment for Saturday at 1330 reviewed  AVS declined

## 2019-10-10 NOTE — PROGRESS NOTES
GI Oncology Nurse Navigator Visit    Met with Carmen in the infusion center, she had questions about disability, contacted Jorge Alberto Clarke as well as he would be able to better help her with this, provided her with a legal resource guide for patients and Britta Park came to meet Carmen and provided his contact information, she also asked about scheduling more infusions as she only had 1 more scheduled, contacted Dr Noriega's team and asked them for assistance, she also asked about setting up a meeting with Dr Lissa Peralta after her PET/CT was resulted, email sent to Dr Lissa Peralta, will work with his  to set appointment up for [facility], as he stated he will be available all week to see her, instructed Carmen to call with any other questions or concerns

## 2019-10-10 NOTE — PROGRESS NOTES
Pt presents for avastin,leucovorin, irinotecan and 5FU for treatment of colon cancer  Vitals within parameters for treatment, labs reviewed  Pt offers no complaints  Will continue to monitor

## 2019-10-12 ENCOUNTER — HOSPITAL ENCOUNTER (OUTPATIENT)
Dept: INFUSION CENTER | Facility: CLINIC | Age: 54
Discharge: HOME | End: 2019-10-12
Payer: COMMERCIAL

## 2019-10-12 DIAGNOSIS — C78.7 LIVER METASTASES (HCC): ICD-10-CM

## 2019-10-12 DIAGNOSIS — T45.1X5A CHEMOTHERAPY INDUCED NEUTROPENIA (HCC): ICD-10-CM

## 2019-10-12 DIAGNOSIS — D70.1 CHEMOTHERAPY INDUCED NEUTROPENIA (HCC): ICD-10-CM

## 2019-10-12 DIAGNOSIS — C18.2 PRIMARY ADENOCARCINOMA OF ASCENDING COLON (HCC): Primary | ICD-10-CM

## 2019-10-12 PROCEDURE — 96372 THER/PROPH/DIAG INJ SC/IM: CPT

## 2019-10-12 RX ADMIN — PEGFILGRASTIM 6 MG: KIT SUBCUTANEOUS at 13:39

## 2019-10-12 NOTE — PROGRESS NOTES
Pt tolerated treatment well  Only complaint is fatigue but able to rest  Gackle volume 0  Cadd pump disconnected  Good blood return noted from port  Port saline locked and deaccessed without issue  Neulasta on-pro applied  Aware of next appt  AVS declined

## 2019-10-14 RX ORDER — ATROPINE SULFATE 1 MG/ML
0.25 INJECTION, SOLUTION INTRAMUSCULAR; INTRAVENOUS; SUBCUTANEOUS ONCE
Status: CANCELLED | OUTPATIENT
Start: 2019-10-24

## 2019-10-14 RX ORDER — SODIUM CHLORIDE 9 MG/ML
20 INJECTION, SOLUTION INTRAVENOUS ONCE
Status: CANCELLED | OUTPATIENT
Start: 2019-10-24

## 2019-10-14 RX ORDER — ATROPINE SULFATE 1 MG/ML
0.25 INJECTION, SOLUTION INTRAMUSCULAR; INTRAVENOUS; SUBCUTANEOUS ONCE AS NEEDED
Status: CANCELLED | OUTPATIENT
Start: 2019-10-24

## 2019-10-15 DIAGNOSIS — C78.7 LIVER METASTASES (HCC): ICD-10-CM

## 2019-10-15 DIAGNOSIS — C18.2 PRIMARY ADENOCARCINOMA OF ASCENDING COLON (HCC): Primary | ICD-10-CM

## 2019-10-16 ENCOUNTER — TELEPHONE (OUTPATIENT)
Dept: HEMATOLOGY ONCOLOGY | Facility: CLINIC | Age: 54
End: 2019-10-16

## 2019-10-16 NOTE — TELEPHONE ENCOUNTER
All appt requests for future treatments are signed  Dean Gary can you please call [facility] and schedule with them for patient for November and please call patient to review  Thank you

## 2019-10-16 NOTE — TELEPHONE ENCOUNTER
Hi!  Carmen called and said that she only has one more infusion appointment scheduled and would like to schedule November infusions for now and then can worry about the rest after her PET/CT, she said she will need them for 11/7 and 11/21, she goes to the [facility] infusion center, can you help with this please? Thanks!

## 2019-10-20 ENCOUNTER — OFFICE VISIT (OUTPATIENT)
Dept: URGENT CARE | Facility: MEDICAL CENTER | Age: 54
End: 2019-10-20
Payer: COMMERCIAL

## 2019-10-20 VITALS
HEIGHT: 66 IN | OXYGEN SATURATION: 100 % | WEIGHT: 162 LBS | TEMPERATURE: 97.6 F | BODY MASS INDEX: 26.03 KG/M2 | HEART RATE: 80 BPM | RESPIRATION RATE: 18 BRPM | DIASTOLIC BLOOD PRESSURE: 89 MMHG | SYSTOLIC BLOOD PRESSURE: 137 MMHG

## 2019-10-20 DIAGNOSIS — H10.9 BACTERIAL CONJUNCTIVITIS: Primary | ICD-10-CM

## 2019-10-20 PROCEDURE — 99213 OFFICE O/P EST LOW 20 MIN: CPT | Performed by: PHYSICIAN ASSISTANT

## 2019-10-20 RX ORDER — ERYTHROMYCIN 5 MG/G
0.5 OINTMENT OPHTHALMIC
Qty: 3.5 G | Refills: 0 | Status: SHIPPED | OUTPATIENT
Start: 2019-10-20 | End: 2019-10-25

## 2019-10-20 NOTE — PATIENT INSTRUCTIONS
Please use antibiotic ointment as directed   follow-up with PCP if symptoms do not improve    Conjunctivitis   WHAT YOU SHOULD KNOW:   Conjunctivitis, or pink eye, is inflammation of your conjunctiva  The conjunctiva is a thin tissue that covers the front of your eye and the back of your eyelids  The conjunctiva helps protect your eye and keep it moist         INSTRUCTIONS:   Medicines:   · Allergy medicine: This medicine helps decrease itchy, red, swollen eyes caused by allergies  It may be given as a pill, eye drops, or nasal spray  · Antibiotics:  You will need antibiotics if your conjunctivitis is caused by bacteria  This medicine may be given as eye drops or eye ointment  · Steroid medicine: This medicine helps decrease inflammation  It may be given as a pill, eye drops, or nasal spray  · Take your medicine as directed  Call your healthcare provider if you think your medicine is not helping or if you have side effects  Tell him if you are allergic to any medicine  Keep a list of the medicines, vitamins, and herbs you take  Include the amounts, and when and why you take them  Bring the list or the pill bottles to follow-up visits  Carry your medicine list with you in case of an emergency  Follow up with your primary healthcare provider as directed: You may need to return for more tests on your eyes  These will help your primary healthcare provider check for eye damage  Write down your questions so you remember to ask them during your visits  Avoid the spread of conjunctivitis:   · Wash your hands often:  Wash your hands before you touch your eyes  Also wash your hands before you prepare or eat food and after you use the bathroom or change a diaper  · Avoid allergens:  Try to avoid the things that cause your allergies, such as pets, dust, or grass  · Avoid contact:  Do not share towels or washcloths  Try to stay away from others as much as possible   Ask when you can return to work or school  · Throw away eye makeup:  Throw away mascara and other eye makeup  Manage your symptoms:  · Apply a cool compress:  Wet a washcloth with cold water and place it on your eye  This will help decrease swelling  · Use eye drops:  Eye drops, or artificial tears, can be bought without a doctor's order  They help keep your eye moist     · Do not wear contact lenses: They can irritate your eye  Throw away the pair you are using and ask when you can wear them again  Use a new pair of lenses when your primary healthcare provider says it is okay  · Flush your eye:  You may need to flush your eye with saline to help decrease your symptoms  Ask for more information on how to flush your eye  Contact your primary healthcare provider if:   · Your eyesight becomes blurry  · You have tiny bumps or spots of blood on your eye  · You have questions or concerns about your condition or care  Return to the emergency department if:   · The swelling in your eye gets worse, even after treatment  · Your vision suddenly becomes worse or you cannot see at all  · Your eye begins to bleed  © 2014 [address] is for End User's use only and may not be sold, redistributed or otherwise used for commercial purposes  All illustrations and images included in CareNotes® are the copyrighted property of [employer]  or Angelito Kerr  The above information is an  only  It is not intended as medical advice for individual conditions or treatments  Talk to your doctor, nurse or pharmacist before following any medical regimen to see if it is safe and effective for you

## 2019-10-20 NOTE — PROGRESS NOTES
[employer] [facility]        NAME: Cinthia Ramirez is a 48 y o  female  : 1965    MRN: [id number]  DATE: 2019  TIME: 10:22 AM    Assessment and Plan   Bacterial conjunctivitis [H10 9]  1  Bacterial conjunctivitis  erythromycin (ILOTYCIN) ophthalmic ointment         Patient Instructions       Please use antibiotic ointment as directed   follow-up with PCP if symptoms do not improve    Chief Complaint     Chief Complaint   Patient presents with    Conjunctivitis     Pt  C/O left eye discharge since last night  History of Present Illness        Patient is a 22-year-old female who presents today with left eye itching and purulent discharge since yesterday  She woke up and her eye was stuck together  She does report mild congestion  No visual disturbance, no eye pain  No cough or fevers  She is currently on chemotherapy treatment for stage IV colon cancer  Review of Systems   Review of Systems   Constitutional: Negative for fever  Eyes: Positive for discharge, redness and itching  Negative for pain and visual disturbance  Respiratory: Negative for shortness of breath  Cardiovascular: Negative for chest pain           Current Medications       Current Outpatient Medications:     acetaminophen (TYLENOL) 500 mg tablet, Take 1,000 mg by mouth every 6 (six) hours as needed for mild pain, Disp: , Rfl:     lidocaine-prilocaine (EMLA) cream, APPLY 1 HOUR PRIOR TO CHEMO, COVER IN WRAP, Disp: 30 g, Rfl: 0    nifedipine 0 2 % OINT, Apply topically every 4 (four) hours 0 3% ointment as typical(disregard 0 2% as auto order) 1 application to anal opening 4-6 times daily, Disp: 1 Tube, Rfl: 0    rivaroxaban (XARELTO) 10 mg tablet, Take 1 tablet (10 mg total) by mouth daily, Disp: 90 tablet, Rfl: 2    erythromycin (ILOTYCIN) ophthalmic ointment, Administer 0 5 inches into the left eye daily at bedtime for 5 days, Disp: 3 5 g, Rfl: 0    predniSONE 5 mg/5 mL solution, Take by mouth daily, Disp: , Rfl:     Current Allergies     Allergies as of 10/20/2019    (No Known Allergies)            The following portions of the patient's history were reviewed and updated as appropriate: allergies, current medications, past family history, past medical history, past social history, past surgical history and problem list      Past Medical History:   Diagnosis Date    Cancer ([facility] )     Colon cancer ([facility] ) 06/08/2018    DVT (deep venous thrombosis) ([place] [id number] )     History of chemotherapy 2019    Kidney disease     Menorrhalgia     RESOLVED 2008    Migraine     Postcoital bleeding     LAST ASSESSED [id number]       Past Surgical History:   Procedure Laterality Date    ABDOMINAL ADHESION SURGERY N/A 6/14/2018    Procedure: LYSIS ADHESIONS OF COLON;  Surgeon: Elida Altman MD;  [facility];  Service: Colorectal    DILATION AND CURETTAGE OF UTERUS      RESOLVED 2008    ENDOMETRIAL ABLATION      THERMAL     NOVASURE  RESOLVED 2008    ENDOMETRIAL BIOPSY      BY SUCTION   DONE 12/13/2008    HYSTERECTOMY  05/21/2018    OMENTECTOMY Right 6/14/2018    Procedure: PARTIAL OMENTECTOMY;  Surgeon: Elida Altman MD;  [facility];  Service: Colorectal    HI COLONOSCOPY FLX DX W/COLLJ SPEC WHEN PFRMD N/A 6/13/2018    Procedure: COLONOSCOPY;  Surgeon: Elida Altman MD;  [facility]; Service: Colorectal    HI ESOPHAGOGASTRODUODENOSCOPY TRANSORAL DIAGNOSTIC N/A 6/13/2018    Procedure: ESOPHAGOGASTRODUODENOSCOPY (EGD); Surgeon: Diana Hurst MD;  [facility];   Service: Gastroenterology    HI INSERT PICC W/ SUB-Q PORT Right 6/28/2018    Procedure: INSERTION VENOUS PORT (PORT-A-CATH) VIA RIGHT SUBCLAVIAN VEIN;  Surgeon: Elida Altman MD;  [facility];  Service: Colorectal    HI LAP,DIAGNOSTIC ABDOMEN N/A 6/14/2018    Procedure: LAPAROSCOPY DIAGNOSTIC;  Surgeon: Elida Altman MD;  [facility];  Service: Colorectal    HI LAP,SURG,COLECTOMY, PARTIAL, W/ANAST Right 6/14/2018    Procedure: LAPAROSCOPIC EXTENDED RIGHT HEMICOLECTOMY ; PERITONEAL BX;  Surgeon: Paty Oakley MD;  [facility];  Service: Colorectal    OH LAP,VAG HYST,UTERUS 250GMS/<,SALP-OOPH Bilateral 5/21/2018    Procedure: HYSTERECTOMY LAPAROSCOPIC ASSISTED VAGINAL (LAVH) , BILATERAL SALPINGECTOMY;  Surgeon: Jyoti Sanchez DO;  [facility];  Service: Gynecology       Family History   Problem Relation Age of Onset   [facility] Migraines Mother     Heart disease Mother     Anemia Father     Arthritis Father     Other Father         BLOOD TRANSFUSION    Migraines Daughter     Heart disease Family     No Known Problems Maternal Grandmother     No Known Problems Maternal Grandfather     No Known Problems Paternal Grandmother     No Known Problems Paternal Grandfather          Medications have been verified  Objective   /89   Pulse 80   Temp 97 6 °F (36 4 °C) (Temporal)   Resp 18   Ht 5' 5 5" (1 664 m)   Wt 73 5 kg (162 lb)   LMP 05/16/2018 (Exact Date)   SpO2 100%   BMI 26 55 kg/m²        Physical Exam     Physical Exam   Constitutional: She appears well-developed and well-nourished  HENT:   Mouth/Throat: Oropharynx is clear and moist    Eyes: Pupils are equal, round, and reactive to light  EOM and lids are normal  Right eye exhibits no chemosis, no discharge, no exudate and no hordeolum  Left eye exhibits discharge  Left eye exhibits no chemosis, no exudate and no hordeolum  Right conjunctiva is not injected  Right conjunctiva has no hemorrhage  Left conjunctiva is injected  Left conjunctiva has no hemorrhage  Neck: Neck supple  Cardiovascular: Normal rate and regular rhythm  Pulmonary/Chest: Effort normal and breath sounds normal    Lymphadenopathy:     She has no cervical adenopathy

## 2019-10-21 ENCOUNTER — HOSPITAL ENCOUNTER (OUTPATIENT)
Dept: RADIOLOGY | Age: 54
Discharge: HOME | End: 2019-10-21
Payer: COMMERCIAL

## 2019-10-21 DIAGNOSIS — C18.2 PRIMARY ADENOCARCINOMA OF ASCENDING COLON (HCC): ICD-10-CM

## 2019-10-21 DIAGNOSIS — C78.7 LIVER METASTASES (HCC): ICD-10-CM

## 2019-10-21 LAB — GLUCOSE SERPL-MCNC: 90 MG/DL (ref 65–140)

## 2019-10-21 PROCEDURE — 82948 REAGENT STRIP/BLOOD GLUCOSE: CPT

## 2019-10-21 PROCEDURE — A9552 F18 FDG: HCPCS

## 2019-10-21 PROCEDURE — 78815 PET IMAGE W/CT SKULL-THIGH: CPT

## 2019-10-22 ENCOUNTER — APPOINTMENT (OUTPATIENT)
Dept: LAB | Facility: MEDICAL CENTER | Age: 54
End: 2019-10-22
Payer: COMMERCIAL

## 2019-10-22 DIAGNOSIS — C78.7 LIVER METASTASES (HCC): ICD-10-CM

## 2019-10-22 DIAGNOSIS — C18.2 PRIMARY ADENOCARCINOMA OF ASCENDING COLON (HCC): ICD-10-CM

## 2019-10-22 LAB
ALBUMIN SERPL BCP-MCNC: 4 G/DL (ref 3.5–5)
ALP SERPL-CCNC: 106 U/L (ref 46–116)
ALT SERPL W P-5'-P-CCNC: 26 U/L (ref 12–78)
ANION GAP SERPL CALCULATED.3IONS-SCNC: 8 MMOL/L (ref 4–13)
AST SERPL W P-5'-P-CCNC: 14 U/L (ref 5–45)
BASOPHILS # BLD AUTO: 0.06 THOUSANDS/ΜL (ref 0–0.1)
BASOPHILS NFR BLD AUTO: 1 % (ref 0–1)
BILIRUB SERPL-MCNC: 0.36 MG/DL (ref 0.2–1)
BUN SERPL-MCNC: 13 MG/DL (ref 5–25)
CALCIUM SERPL-MCNC: 8.9 MG/DL (ref 8.3–10.1)
CHLORIDE SERPL-SCNC: 111 MMOL/L (ref 100–108)
CO2 SERPL-SCNC: 26 MMOL/L (ref 21–32)
CREAT SERPL-MCNC: 1.01 MG/DL (ref 0.6–1.3)
EOSINOPHIL # BLD AUTO: 0.1 THOUSAND/ΜL (ref 0–0.61)
EOSINOPHIL NFR BLD AUTO: 1 % (ref 0–6)
ERYTHROCYTE [DISTWIDTH] IN BLOOD BY AUTOMATED COUNT: 16.4 % (ref 11.6–15.1)
GFR SERPL CREATININE-BSD FRML MDRD: 64 ML/MIN/1.73SQ M
GLUCOSE SERPL-MCNC: 72 MG/DL (ref 65–140)
HCT VFR BLD AUTO: 37.3 % (ref 34.8–46.1)
HGB BLD-MCNC: 11.5 G/DL (ref 11.5–15.4)
IMM GRANULOCYTES # BLD AUTO: 0.16 THOUSAND/UL (ref 0–0.2)
IMM GRANULOCYTES NFR BLD AUTO: 2 % (ref 0–2)
LYMPHOCYTES # BLD AUTO: 1.28 THOUSANDS/ΜL (ref 0.6–4.47)
LYMPHOCYTES NFR BLD AUTO: 14 % (ref 14–44)
MCH RBC QN AUTO: 33.5 PG (ref 26.8–34.3)
MCHC RBC AUTO-ENTMCNC: 30.8 G/DL (ref 31.4–37.4)
MCV RBC AUTO: 109 FL (ref 82–98)
MONOCYTES # BLD AUTO: 1.09 THOUSAND/ΜL (ref 0.17–1.22)
MONOCYTES NFR BLD AUTO: 12 % (ref 4–12)
NEUTROPHILS # BLD AUTO: 6.19 THOUSANDS/ΜL (ref 1.85–7.62)
NEUTS SEG NFR BLD AUTO: 70 % (ref 43–75)
NRBC BLD AUTO-RTO: 0 /100 WBCS
PLATELET # BLD AUTO: 185 THOUSANDS/UL (ref 149–390)
PMV BLD AUTO: 10.5 FL (ref 8.9–12.7)
POTASSIUM SERPL-SCNC: 3.8 MMOL/L (ref 3.5–5.3)
PROT SERPL-MCNC: 6.4 G/DL (ref 6.4–8.2)
RBC # BLD AUTO: 3.43 MILLION/UL (ref 3.81–5.12)
SODIUM SERPL-SCNC: 145 MMOL/L (ref 136–145)
WBC # BLD AUTO: 8.88 THOUSAND/UL (ref 4.31–10.16)

## 2019-10-22 PROCEDURE — 80053 COMPREHEN METABOLIC PANEL: CPT

## 2019-10-22 PROCEDURE — 36415 COLL VENOUS BLD VENIPUNCTURE: CPT

## 2019-10-22 PROCEDURE — 85025 COMPLETE CBC W/AUTO DIFF WBC: CPT

## 2019-10-24 ENCOUNTER — HOSPITAL ENCOUNTER (OUTPATIENT)
Dept: INFUSION CENTER | Facility: CLINIC | Age: 54
Discharge: HOME | End: 2019-10-24
Payer: COMMERCIAL

## 2019-10-24 VITALS
HEIGHT: 66 IN | DIASTOLIC BLOOD PRESSURE: 78 MMHG | BODY MASS INDEX: 26.18 KG/M2 | SYSTOLIC BLOOD PRESSURE: 136 MMHG | TEMPERATURE: 97.6 F | WEIGHT: 162.92 LBS | RESPIRATION RATE: 18 BRPM | HEART RATE: 70 BPM

## 2019-10-24 DIAGNOSIS — D70.1 CHEMOTHERAPY INDUCED NEUTROPENIA (HCC): ICD-10-CM

## 2019-10-24 DIAGNOSIS — T45.1X5A CHEMOTHERAPY INDUCED NEUTROPENIA (HCC): ICD-10-CM

## 2019-10-24 DIAGNOSIS — C18.2 PRIMARY ADENOCARCINOMA OF ASCENDING COLON (HCC): ICD-10-CM

## 2019-10-24 DIAGNOSIS — C78.7 LIVER METASTASES (HCC): Primary | ICD-10-CM

## 2019-10-24 PROCEDURE — 96415 CHEMO IV INFUSION ADDL HR: CPT

## 2019-10-24 PROCEDURE — G0498 CHEMO EXTEND IV INFUS W/PUMP: HCPCS

## 2019-10-24 PROCEDURE — 96375 TX/PRO/DX INJ NEW DRUG ADDON: CPT

## 2019-10-24 PROCEDURE — 96417 CHEMO IV INFUS EACH ADDL SEQ: CPT

## 2019-10-24 PROCEDURE — 96413 CHEMO IV INFUSION 1 HR: CPT

## 2019-10-24 PROCEDURE — 96367 TX/PROPH/DG ADDL SEQ IV INF: CPT

## 2019-10-24 PROCEDURE — 96368 THER/DIAG CONCURRENT INF: CPT

## 2019-10-24 RX ORDER — SODIUM CHLORIDE 9 MG/ML
20 INJECTION, SOLUTION INTRAVENOUS ONCE
Status: COMPLETED | OUTPATIENT
Start: 2019-10-24 | End: 2019-10-24

## 2019-10-24 RX ORDER — ATROPINE SULFATE 1 MG/ML
0.25 INJECTION, SOLUTION INTRAMUSCULAR; INTRAVENOUS; SUBCUTANEOUS ONCE AS NEEDED
Status: DISCONTINUED | OUTPATIENT
Start: 2019-10-24 | End: 2019-10-27 | Stop reason: HOSPADM

## 2019-10-24 RX ORDER — ATROPINE SULFATE 1 MG/ML
0.25 INJECTION, SOLUTION INTRAMUSCULAR; INTRAVENOUS; SUBCUTANEOUS ONCE
Status: COMPLETED | OUTPATIENT
Start: 2019-10-24 | End: 2019-10-24

## 2019-10-24 RX ADMIN — ATROPINE SULFATE 0.25 MG: 1 INJECTION, SOLUTION INTRAMUSCULAR; INTRAVENOUS; SUBCUTANEOUS at 13:01

## 2019-10-24 RX ADMIN — DEXAMETHASONE SODIUM PHOSPHATE: 10 INJECTION, SOLUTION INTRAMUSCULAR; INTRAVENOUS at 12:31

## 2019-10-24 RX ADMIN — SODIUM CHLORIDE 20 ML/HR: 0.9 INJECTION, SOLUTION INTRAVENOUS at 12:21

## 2019-10-24 RX ADMIN — BEVACIZUMAB 355 MG: 400 INJECTION, SOLUTION INTRAVENOUS at 13:04

## 2019-10-24 RX ADMIN — LEUCOVORIN CALCIUM 716 MG: 10 INJECTION INTRAMUSCULAR; INTRAVENOUS at 13:45

## 2019-10-24 RX ADMIN — IRINOTECAN HYDROCHLORIDE 320 MG: 20 INJECTION, SOLUTION INTRAVENOUS at 13:47

## 2019-10-24 NOTE — PROGRESS NOTES
Pt to clinic for avastin leucovorin and irinotecan then % fu Cadd start, pt offers no complaints and tolerated infusioon without complications, aware of time to return on Sat for Cadd dc   avs declined

## 2019-10-24 NOTE — PLAN OF CARE
Problem: Potential for Falls  Goal: Patient will remain free of falls  Description  INTERVENTIONS:  - Assess patient frequently for physical needs  -  Identify cognitive and physical deficits and behaviors that affect risk of falls  -  [place] fall precautions as indicated by assessment   - Educate patient/family on patient safety including physical limitations  - Instruct patient to call for assistance with activity based on assessment  - Modify environment to reduce risk of injury  - Consider OT/PT consult to assist with strengthening/mobility  Outcome: Progressing     Problem: Knowledge Deficit  Goal: Patient/family/caregiver demonstrates understanding of disease process, treatment plan, medications, and discharge instructions  Description  Complete learning assessment and assess knowledge base    Interventions:  - Provide teaching at level of understanding  - Provide teaching via preferred learning methods  Outcome: Progressing

## 2019-10-26 ENCOUNTER — HOSPITAL ENCOUNTER (OUTPATIENT)
Dept: INFUSION CENTER | Facility: CLINIC | Age: 54
Discharge: HOME | End: 2019-10-26
Payer: COMMERCIAL

## 2019-10-26 DIAGNOSIS — C18.2 PRIMARY ADENOCARCINOMA OF ASCENDING COLON (HCC): ICD-10-CM

## 2019-10-26 DIAGNOSIS — C78.7 LIVER METASTASES (HCC): Primary | ICD-10-CM

## 2019-10-26 DIAGNOSIS — T45.1X5A CHEMOTHERAPY INDUCED NEUTROPENIA (HCC): ICD-10-CM

## 2019-10-26 DIAGNOSIS — D70.1 CHEMOTHERAPY INDUCED NEUTROPENIA (HCC): ICD-10-CM

## 2019-10-26 PROCEDURE — 96372 THER/PROPH/DIAG INJ SC/IM: CPT

## 2019-10-26 RX ADMIN — PEGFILGRASTIM 6 MG: KIT SUBCUTANEOUS at 14:15

## 2019-10-29 ENCOUNTER — OFFICE VISIT (OUTPATIENT)
Dept: HEMATOLOGY ONCOLOGY | Facility: CLINIC | Age: 54
End: 2019-10-29
Payer: COMMERCIAL

## 2019-10-29 VITALS
HEART RATE: 86 BPM | DIASTOLIC BLOOD PRESSURE: 72 MMHG | WEIGHT: 165.2 LBS | RESPIRATION RATE: 18 BRPM | BODY MASS INDEX: 26.55 KG/M2 | OXYGEN SATURATION: 97 % | SYSTOLIC BLOOD PRESSURE: 130 MMHG | TEMPERATURE: 98.1 F | HEIGHT: 66 IN

## 2019-10-29 DIAGNOSIS — C18.2 PRIMARY ADENOCARCINOMA OF ASCENDING COLON (HCC): Primary | ICD-10-CM

## 2019-10-29 DIAGNOSIS — C18.2 PRIMARY ADENOCARCINOMA OF ASCENDING COLON (HCC): ICD-10-CM

## 2019-10-29 DIAGNOSIS — C77.2 METASTASIS TO RETROPERITONEAL LYMPH NODE (HCC): ICD-10-CM

## 2019-10-29 DIAGNOSIS — T45.1X5A CHEMOTHERAPY INDUCED NEUTROPENIA (HCC): Primary | ICD-10-CM

## 2019-10-29 DIAGNOSIS — D70.1 CHEMOTHERAPY INDUCED NEUTROPENIA (HCC): ICD-10-CM

## 2019-10-29 DIAGNOSIS — T45.1X5A CHEMOTHERAPY INDUCED NEUTROPENIA (HCC): ICD-10-CM

## 2019-10-29 DIAGNOSIS — D70.1 CHEMOTHERAPY INDUCED NEUTROPENIA (HCC): Primary | ICD-10-CM

## 2019-10-29 DIAGNOSIS — C78.7 LIVER METASTASES (HCC): ICD-10-CM

## 2019-10-29 DIAGNOSIS — I82.531 CHRONIC DEEP VEIN THROMBOSIS (DVT) OF POPLITEAL VEIN OF RIGHT LOWER EXTREMITY (HCC): ICD-10-CM

## 2019-10-29 DIAGNOSIS — C78.6 PERITONEAL METASTASES (HCC): ICD-10-CM

## 2019-10-29 DIAGNOSIS — L27.1 HAND FOOT SYNDROME: ICD-10-CM

## 2019-10-29 PROCEDURE — 99215 OFFICE O/P EST HI 40 MIN: CPT | Performed by: INTERNAL MEDICINE

## 2019-10-29 RX ORDER — PALONOSETRON 0.05 MG/ML
0.25 INJECTION, SOLUTION INTRAVENOUS ONCE
Status: CANCELLED
Start: 2019-11-08

## 2019-10-29 RX ORDER — ATROPINE SULFATE 1 MG/ML
0.25 INJECTION, SOLUTION INTRAMUSCULAR; INTRAVENOUS; SUBCUTANEOUS ONCE AS NEEDED
Status: CANCELLED | OUTPATIENT
Start: 2019-11-07

## 2019-10-29 RX ORDER — SODIUM CHLORIDE 9 MG/ML
20 INJECTION, SOLUTION INTRAVENOUS ONCE
Status: CANCELLED | OUTPATIENT
Start: 2019-11-07

## 2019-10-29 RX ORDER — ATROPINE SULFATE 1 MG/ML
0.25 INJECTION, SOLUTION INTRAMUSCULAR; INTRAVENOUS; SUBCUTANEOUS ONCE
Status: CANCELLED | OUTPATIENT
Start: 2019-11-07

## 2019-10-29 NOTE — PROGRESS NOTES
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 and metastatic disease to liver, abdominal lymph nodes and pelvis  Bolus 5 FU was discontinued because of toxicity  Patient has some tiredness  She has manageable diarrhea     She has developed grade 1 PPE in her hands  She has experienced prolonged nausea lasting for 3-4 days post chemotherapy the last 2 cycles    In May 2018 patient underwent GALI and BSO for uterine bleeding  and there were cancer cells in the fallopian tubes   In June 2018 patient   underwent extended right hemicolectomy, partial omentectomy and biopsy of the peritoneal nodule   Peritoneal nodule came back  positive for metastatic adenocarcinoma from colon    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 and metastatic disease to liver    6 cm T3 G2 right colon cancer with positive margins, lymphovascular invasion and perineural invasion and positive 3 of 27 lymph nodes with extranodal extension and positive peritoneal nodule biopsy   Stage IV disease because of liver and peritoneal metastases   In July 2018 patient was started  on modified FOLFIRI plus Avastin   She had PPE in her hands and bolus 5 FU was discontinued    She has developed grade 1 P PE in her hands      She also had iron deficiency and had Venofer intravenously   She is not on Venofer anymore   She was seen by liver specialist at [facility] and was not felt to be a surgical candidate       Patient is on Xarelto 10 mg daily for history of DVT right lower extremity in 2016         Current Outpatient Medications:     acetaminophen (TYLENOL) 500 mg tablet, Take 1,000 mg by mouth every 6 (six) hours as needed for mild pain, Disp: , Rfl:     lidocaine-prilocaine (EMLA) cream, APPLY 1 HOUR PRIOR TO CHEMO, COVER IN WRAP, Disp: 30 g, Rfl: 0    nifedipine 0 2 % OINT, Apply topically every 4 (four) hours 0 3% ointment as typical(disregard 0 2% as auto order) 1 application to anal opening 4-6 times daily, Disp: 1 Tube, Rfl: 0    predniSONE 5 mg/5 mL solution, Take by mouth daily, Disp: , Rfl:     rivaroxaban (XARELTO) 10 mg tablet, Take 1 tablet (10 mg total) by mouth daily, Disp: 90 tablet, Rfl: 2    No Known Allergies       [facility] ([facility] )    6/14/2018 Initial Diagnosis     Primary adenocarcinoma of ascending colon ([facility] )      7/17/2018 - 8/1/2018 Chemotherapy     camptosar 180 mg/m2 day 1  5  mg/m2 day 1  5 FU 1200 mg/m2 day 1-2   Leucovorin 400 mg/m2     Venofer 100 mg (first 10 treatments) -- all every 2 weeks          7/17/2018 -  Chemotherapy     fluorouracil (ADRUCIL) injection 715 mg, 400 mg/m2, Intravenous, Once, 7 of 7 cycles  pegfilgrastim (NEULASTA ONPRO) subcutaneous injection kit 6 mg, 6 mg, Subcutaneous, Once, 12 of 18 cycles  Administration: 6 mg (5/22/2019), 6 mg (6/5/2019), 6 mg (6/22/2019), 6 mg (7/5/2019), 6 mg (7/20/2019), 6 mg (8/2/2019), 6 mg (8/17/2019), 6 mg (8/31/2019), 6 mg (9/14/2019), 6 mg (9/28/2019), 6 mg (10/12/2019), 6 mg (10/26/2019)  bevacizumab (AVASTIN) 355 mg in sodium chloride 0 9 % 100 mL IVPB, 5 mg/kg, Intravenous, Once, 19 of 25 cycles  Administration: 355 mg (5/20/2019), 355 mg (6/3/2019), 355 mg (6/20/2019), 355 mg (7/3/2019), 355 mg (7/18/2019), 355 mg (7/31/2019), 355 mg (8/15/2019), 355 mg (8/29/2019), 355 mg (9/12/2019), 355 mg (9/26/2019), 355 mg (10/10/2019), 355 mg (10/24/2019)  irinotecan (CAMPTOSAR) 322 mg in sodium chloride 0 9 % 500 mL chemo infusion, 180 mg/m2, Intravenous, Once, 19 of 25 cycles  Administration: 322 mg (5/20/2019), 322 mg (6/3/2019), 320 mg (6/20/2019), 320 mg (7/3/2019), 320 mg (7/18/2019), 320 mg (7/31/2019), 320 mg (8/15/2019), 320 mg (8/29/2019), 320 mg (9/12/2019), 320 mg (9/26/2019), 320 mg (10/10/2019), 320 mg (10/24/2019)  leucovorin 716 mg in sodium chloride 0 9 % 250 mL IVPB, 400 mg/m2, Intravenous, Once, 19 of 25 cycles  Administration: 716 mg (5/20/2019), 716 mg (6/3/2019), 700 mg (6/20/2019), 700 mg (7/3/2019), 700 mg (7/18/2019), 700 mg (7/31/2019), 700 mg (8/15/2019), 700 mg (8/29/2019), 700 mg (9/12/2019), 700 mg (9/26/2019), 700 mg (10/10/2019), 716 mg (10/24/2019)      8/1/2018 Adverse Reaction     Needed granix 300 mcg due to [address] of 1 01       8/14/2018 -  Chemotherapy     camptosar 180 mg/m2 day 1  5  mg/m2 day 1  5 FU 1200 mg/m2 day 1-2   Leucovorin 400 mg/m2  Avastin 5 mg/kg day 1   Venofer 100 mg (first 10 treatments)  Neulasta on Pro 6 mg day 3      -- every 2 weeks             Liver metastases ([facility] )    6/26/2018 Initial Diagnosis     Liver metastases ([facility] )      7/17/2018 -  Chemotherapy     fluorouracil (ADRUCIL) injection 715 mg, 400 mg/m2, Intravenous, Once, 7 of 7 cycles  pegfilgrastim (NEULASTA ONPRO) subcutaneous injection kit 6 mg, 6 mg, Subcutaneous, Once, 12 of 18 cycles  Administration: 6 mg (5/22/2019), 6 mg (6/5/2019), 6 mg (6/22/2019), 6 mg (7/5/2019), 6 mg (7/20/2019), 6 mg (8/2/2019), 6 mg (8/17/2019), 6 mg (8/31/2019), 6 mg (9/14/2019), 6 mg (9/28/2019), 6 mg (10/12/2019), 6 mg (10/26/2019)  bevacizumab (AVASTIN) 355 mg in sodium chloride 0 9 % 100 mL IVPB, 5 mg/kg, Intravenous, Once, 19 of 25 cycles  Administration: 355 mg (5/20/2019), 355 mg (6/3/2019), 355 mg (6/20/2019), 355 mg (7/3/2019), 355 mg (7/18/2019), 355 mg (7/31/2019), 355 mg (8/15/2019), 355 mg (8/29/2019), 355 mg (9/12/2019), 355 mg (9/26/2019), 355 mg (10/10/2019), 355 mg (10/24/2019)  irinotecan (CAMPTOSAR) 322 mg in sodium chloride 0 9 % 500 mL chemo infusion, 180 mg/m2, Intravenous, Once, 19 of 25 cycles  Administration: 322 mg (5/20/2019), 322 mg (6/3/2019), 320 mg (6/20/2019), 320 mg (7/3/2019), 320 mg (7/18/2019), 320 mg (7/31/2019), 320 mg (8/15/2019), 320 mg (8/29/2019), 320 mg (9/12/2019), 320 mg (9/26/2019), 320 mg (10/10/2019), 320 mg (10/24/2019)  leucovorin 716 mg in sodium chloride 0 9 % 250 mL IVPB, 400 mg/m2, Intravenous, Once, 19 of 25 cycles  Administration: 716 mg (5/20/2019), 716 mg (6/3/2019), 700 mg (6/20/2019), 700 mg (7/3/2019), 700 mg (7/18/2019), 700 mg (7/31/2019), 700 mg (8/15/2019), 700 mg (8/29/2019), 700 mg (9/12/2019), 700 mg (9/26/2019), 700 mg (10/10/2019), 716 mg (10/24/2019)         ROS:  10/29/19 Reviewed 13 systems:  Presently no other neurological, cardiac, pulmonary, GI and  symptoms other than mentioned above  No other symptoms like fever, chills, bleeding, bone pains, skin rash except PPE, weight loss, arthritic symptoms,    weakness, numbness,  claudication and gait problem  No frequent infections  Not unusually sensitive to heat or cold  No swelling of the ankles  No swollen glands  Patient is anxious  Other symptoms are in HPI        /72 (BP Location: Left arm, Patient Position: Sitting, Cuff Size: Adult)   Pulse 86   Temp 98 1 °F (36 7 °C)   Resp 18   Ht 5' 5 78" (1 671 m)   Wt 74 9 kg (165 lb 3 2 oz)   LMP 05/16/2018 (Exact Date)   SpO2 97%   BMI 26 84 kg/m²     Physical Exam:    Patient is alert and oriented  Patient is not in distress  Vital signs are as above  There is no scleral icterus,   no oral thrush, no palpable neck mass, clear lung fields, regular heart rate, abdomen  soft and non tender, no palpable abdominal mass, no ascites, no edema of ankles, no calf tenderness, no focal neurological deficit, no skin rash except for grade 1 P PE in her hands, no palpable lymphadenopathy in the neck and axillary areas, good arterial pulses, no clubbing  Patient is anxious  Performance status 1  IMAGING:  IMPRESSION:  1  No hypermetabolic liver metastases visualized  2   Previously seen activity posterior to the bladder in the vaginal cuff region has significantly decreased, suggesting response to therapy    3   No new hypermetabolic metastases visualized               Workstation performed: [id number]      Imaging     NM PET CT skull base to mid thigh (Order: [id number]) - 10/21/2019       LABS:  Results for orders placed or performed in visit on 10/22/19   CBC and differential   Result Value Ref Range    WBC 8 88 4 31 - 10 16 Thousand/uL    RBC 3 43 (L) 3 81 - 5 12 Million/uL    Hemoglobin 11 5 11 5 - 15 4 g/dL    Hematocrit 37 3 34 8 - 46 1 %     (H) 82 - 98 fL    MCH 33 5 26 8 - 34 3 pg    MCHC 30 8 (L) 31 4 - 37 4 g/dL    RDW 16 4 (H) 11 6 - 15 1 %    MPV 10 5 8 9 - 12 7 fL    Platelets 683 161 - 465 Thousands/uL    nRBC 0 /100 WBCs    Neutrophils Relative 70 43 - 75 %    Immat GRANS % 2 0 - 2 %    Lymphocytes Relative 14 14 - 44 %    Monocytes Relative 12 4 - 12 %    Eosinophils Relative 1 0 - 6 %    Basophils Relative 1 0 - 1 %    Neutrophils Absolute 6 19 1 85 - 7 62 Thousands/µL    Immature Grans Absolute 0 16 0 00 - 0 20 Thousand/uL    Lymphocytes Absolute 1 28 0 60 - 4 47 Thousands/µL    Monocytes Absolute 1 09 0 17 - 1 22 Thousand/µL    Eosinophils Absolute 0 10 0 00 - 0 61 Thousand/µL    Basophils Absolute 0 06 0 00 - 0 10 Thousands/µL   Comprehensive metabolic panel   Result Value Ref Range    Sodium 145 136 - 145 mmol/L    Potassium 3 8 3 5 - 5 3 mmol/L    Chloride 111 (H) 100 - 108 mmol/L    CO2 26 21 - 32 mmol/L    ANION GAP 8 4 - 13 mmol/L    BUN 13 5 - 25 mg/dL    Creatinine 1 01 0 60 - 1 30 mg/dL    Glucose 72 65 - 140 mg/dL    Calcium 8 9 8 3 - 10 1 mg/dL    AST 14 5 - 45 U/L    ALT 26 12 - 78 U/L    Alkaline Phosphatase 106 46 - 116 U/L    Total Protein 6 4 6 4 - 8 2 g/dL    Albumin 4 0 3 5 - 5 0 g/dL    Total Bilirubin 0 36 0 20 - 1 00 mg/dL    eGFR 64 ml/min/1 73sq m     Labs, Imaging, & Other studies:   All pertinent labs and imaging studies were personally reviewed    Lab Results   Component Value Date     03/18/2015    K 3 8 10/22/2019     (H) 10/22/2019    CO2 26 10/22/2019    ANIONGAP 9 03/18/2015    BUN 13 10/22/2019    CREATININE 1 01 10/22/2019    GLUCOSE 100 03/18/2015    GLUF 88 09/24/2019    CALCIUM 8 9 10/22/2019    AST 14 10/22/2019    ALT 26 10/22/2019    ALKPHOS 106 10/22/2019    PROT 6 6 03/18/2015    BILITOT 0 65 03/18/2015    EGFR 64 10/22/2019     Lab Results   Component Value Date    WBC 8 88 10/22/2019    HGB 11 5 10/22/2019    HCT 37 3 10/22/2019     (H) 10/22/2019     10/22/2019       Reviewed and discussed with patient  Patient is here with her   Patient has responded to Avastin plus FOLFIRI that she receives every 2 weeks for  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 and metastatic disease to liver, abdominal lymph nodes and pelvis  Bolus 5 FU was discontinued because of toxicity  Patient has some tiredness  She has manageable diarrhea     She has developed grade 1 PPE in her hands  She has experienced prolonged nausea lasting for 3-4 days post chemotherapy the last 2 cycles    In May 2018 patient underwent GALI and BSO for uterine bleeding  and there were cancer cells in the fallopian tubes   In June 2018 patient   underwent extended right hemicolectomy, partial omentectomy and biopsy of the peritoneal nodule   Peritoneal nodule came back  positive for metastatic adenocarcinoma from colon    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 and metastatic disease to liver    6 cm T3 G2 right colon cancer with positive margins, lymphovascular invasion and perineural invasion and positive 3 of 27 lymph nodes with extranodal extension and positive peritoneal nodule biopsy   Stage IV disease because of liver and peritoneal metastases   In July 2018 patient was started  on modified FOLFIRI plus Avastin   She had PPE in her hands and bolus 5 FU was discontinued    She has developed grade 1 P PE in her hands      She also had iron deficiency and had Venofer intravenously   She is not on Venofer anymore   She was seen by liver specialist at [facility] and was not felt to be a surgical candidate       Patient is on Xarelto 10 mg daily for history of DVT right lower extremity in 2016     Physical examination and test results are as recorded and discussed in detail  There has been a significant improvement on PET-CT scan and patient and her  have reviewed PET-CT scan films with the radiologist in person  They even asked me if treatments could be stopped and in my opinion that would not be a good idea  Chemo holidays could be considered for short time or some adjustments could be made in chemo doses and in anti nausea medications  We had a long discussion and after that that is seen was to lower the dose of irinotecan to 150 milligram/meter squared, add emend and change Zofran to Aloxi and that will be done  She will keep me informed about her condition and symptoms especially PPE because she will not be coming for follow-up for 3 months  She will like to come back with MRI scans of abdomen and pelvis and to  that I added CT chest      Condition, plan and changes discussed in detail and explained   Questions answered  Giles Howell is prolongation of survival from colon cancer and no more blood clot and bleeding                       Discussed the importance of self-breast examination, eating healthy foods, staying active and health screening tests    Keith Shaw is capable of Self care      1  Primary adenocarcinoma of ascending colon (HCC)    - UA (URINE) with reflex to Scope; Standing  - UA (URINE) with reflex to Scope  - CT chest wo contrast; Future  - MRI abdomen w wo contrast; Future  - MRI pelvis female wo and w contrast; Future    2  Liver metastases (HCC)    - UA (URINE) with reflex to Scope; Standing  - UA (URINE) with reflex to Scope  - CT chest wo contrast; Future  - MRI abdomen w wo contrast; Future  - MRI pelvis female wo and w contrast; Future    3   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)    - UA (URINE) with reflex to Scope; Standing  - UA (URINE) with reflex to Scope  - CT chest wo contrast; Future  - MRI abdomen w wo contrast; Future  - MRI pelvis female wo and w contrast; Future    4  Chronic deep vein thrombosis (DVT) of popliteal vein of right lower extremity (HCC)    - UA (URINE) with reflex to Scope; Standing  - UA (URINE) with reflex to Scope  - CT chest wo contrast; Future  - MRI abdomen w wo contrast; Future  - MRI pelvis female wo and w contrast; Future    5  Peritoneal metastases ([address] )    - UA (URINE) with reflex to Scope; Standing  - UA (URINE) with reflex to Scope  - CT chest wo contrast; Future  - MRI abdomen w wo contrast; Future  - MRI pelvis female wo and w contrast; Future    6  Chemotherapy induced neutropenia (HCC)    - UA (URINE) with reflex to Scope; Standing  - UA (URINE) with reflex to Scope    7  PPE in the hands              Patient voiced understanding and agreement in the discussion  Counseling / Coordination of Care   Greater than 50% of total time was spent with the patient and / or family counseling and / or coordination of care

## 2019-10-29 NOTE — PATIENT INSTRUCTIONS
Lowering dose of irinotecan to 150 milligram/meter squared  CT chest and MRI abdomen and pelvis prior to next visit in January 2020  Changing Zofran to Aloxi

## 2019-11-05 ENCOUNTER — APPOINTMENT (OUTPATIENT)
Dept: LAB | Facility: MEDICAL CENTER | Age: 54
End: 2019-11-05
Payer: COMMERCIAL

## 2019-11-05 DIAGNOSIS — C18.2 PRIMARY ADENOCARCINOMA OF ASCENDING COLON (HCC): ICD-10-CM

## 2019-11-05 DIAGNOSIS — C78.7 LIVER METASTASES (HCC): ICD-10-CM

## 2019-11-05 LAB
BASOPHILS # BLD AUTO: 0.06 THOUSANDS/ΜL (ref 0–0.1)
BASOPHILS NFR BLD AUTO: 1 % (ref 0–1)
BILIRUB UR QL STRIP: ABNORMAL
CLARITY UR: ABNORMAL
COLOR UR: ABNORMAL
EOSINOPHIL # BLD AUTO: 0.17 THOUSAND/ΜL (ref 0–0.61)
EOSINOPHIL NFR BLD AUTO: 2 % (ref 0–6)
ERYTHROCYTE [DISTWIDTH] IN BLOOD BY AUTOMATED COUNT: 16.5 % (ref 11.6–15.1)
GLUCOSE UR STRIP-MCNC: NEGATIVE MG/DL
HCT VFR BLD AUTO: 38.4 % (ref 34.8–46.1)
HGB BLD-MCNC: 11.7 G/DL (ref 11.5–15.4)
HGB UR QL STRIP.AUTO: NEGATIVE
IMM GRANULOCYTES # BLD AUTO: 0.06 THOUSAND/UL (ref 0–0.2)
IMM GRANULOCYTES NFR BLD AUTO: 1 % (ref 0–2)
KETONES UR STRIP-MCNC: NEGATIVE MG/DL
LEUKOCYTE ESTERASE UR QL STRIP: NEGATIVE
LYMPHOCYTES # BLD AUTO: 1.57 THOUSANDS/ΜL (ref 0.6–4.47)
LYMPHOCYTES NFR BLD AUTO: 17 % (ref 14–44)
MCH RBC QN AUTO: 33.2 PG (ref 26.8–34.3)
MCHC RBC AUTO-ENTMCNC: 30.5 G/DL (ref 31.4–37.4)
MCV RBC AUTO: 109 FL (ref 82–98)
MONOCYTES # BLD AUTO: 1.07 THOUSAND/ΜL (ref 0.17–1.22)
MONOCYTES NFR BLD AUTO: 11 % (ref 4–12)
NEUTROPHILS # BLD AUTO: 6.55 THOUSANDS/ΜL (ref 1.85–7.62)
NEUTS SEG NFR BLD AUTO: 68 % (ref 43–75)
NITRITE UR QL STRIP: NEGATIVE
NRBC BLD AUTO-RTO: 0 /100 WBCS
PH UR STRIP.AUTO: 5.5 [PH]
PLATELET # BLD AUTO: 186 THOUSANDS/UL (ref 149–390)
PMV BLD AUTO: 10.7 FL (ref 8.9–12.7)
PROT UR STRIP-MCNC: NEGATIVE MG/DL
RBC # BLD AUTO: 3.52 MILLION/UL (ref 3.81–5.12)
SP GR UR STRIP.AUTO: 1.03 (ref 1–1.03)
UROBILINOGEN UR QL STRIP.AUTO: 0.2 E.U./DL
WBC # BLD AUTO: 9.48 THOUSAND/UL (ref 4.31–10.16)

## 2019-11-05 PROCEDURE — 85025 COMPLETE CBC W/AUTO DIFF WBC: CPT

## 2019-11-05 PROCEDURE — 81003 URINALYSIS AUTO W/O SCOPE: CPT | Performed by: INTERNAL MEDICINE

## 2019-11-07 ENCOUNTER — HOSPITAL ENCOUNTER (OUTPATIENT)
Dept: INFUSION CENTER | Facility: CLINIC | Age: 54
Discharge: HOME | End: 2019-11-07
Payer: COMMERCIAL

## 2019-11-07 VITALS
TEMPERATURE: 97.2 F | BODY MASS INDEX: 25.96 KG/M2 | HEIGHT: 66 IN | SYSTOLIC BLOOD PRESSURE: 148 MMHG | RESPIRATION RATE: 18 BRPM | WEIGHT: 161.5 LBS | DIASTOLIC BLOOD PRESSURE: 82 MMHG

## 2019-11-07 DIAGNOSIS — C78.7 LIVER METASTASES (HCC): Primary | ICD-10-CM

## 2019-11-07 DIAGNOSIS — T45.1X5A CHEMOTHERAPY INDUCED NEUTROPENIA (HCC): ICD-10-CM

## 2019-11-07 DIAGNOSIS — D70.1 CHEMOTHERAPY INDUCED NEUTROPENIA (HCC): ICD-10-CM

## 2019-11-07 DIAGNOSIS — C18.2 PRIMARY ADENOCARCINOMA OF ASCENDING COLON (HCC): ICD-10-CM

## 2019-11-07 PROCEDURE — G0498 CHEMO EXTEND IV INFUS W/PUMP: HCPCS

## 2019-11-07 PROCEDURE — 96367 TX/PROPH/DG ADDL SEQ IV INF: CPT

## 2019-11-07 PROCEDURE — 96368 THER/DIAG CONCURRENT INF: CPT

## 2019-11-07 PROCEDURE — 96417 CHEMO IV INFUS EACH ADDL SEQ: CPT

## 2019-11-07 PROCEDURE — 96415 CHEMO IV INFUSION ADDL HR: CPT

## 2019-11-07 PROCEDURE — 96413 CHEMO IV INFUSION 1 HR: CPT

## 2019-11-07 PROCEDURE — 96375 TX/PRO/DX INJ NEW DRUG ADDON: CPT

## 2019-11-07 RX ORDER — ATROPINE SULFATE 1 MG/ML
0.25 INJECTION, SOLUTION INTRAMUSCULAR; INTRAVENOUS; SUBCUTANEOUS ONCE AS NEEDED
Status: DISCONTINUED | OUTPATIENT
Start: 2019-11-07 | End: 2019-11-10 | Stop reason: HOSPADM

## 2019-11-07 RX ORDER — PALONOSETRON 0.05 MG/ML
0.25 INJECTION, SOLUTION INTRAVENOUS ONCE
Status: DISCONTINUED | OUTPATIENT
Start: 2019-11-07 | End: 2019-11-07

## 2019-11-07 RX ORDER — ATROPINE SULFATE 1 MG/ML
0.25 INJECTION, SOLUTION INTRAMUSCULAR; INTRAVENOUS; SUBCUTANEOUS ONCE
Status: COMPLETED | OUTPATIENT
Start: 2019-11-07 | End: 2019-11-07

## 2019-11-07 RX ORDER — PALONOSETRON 0.05 MG/ML
0.25 INJECTION, SOLUTION INTRAVENOUS ONCE
Status: DISCONTINUED | OUTPATIENT
Start: 2019-11-08 | End: 2019-11-07

## 2019-11-07 RX ORDER — SODIUM CHLORIDE 9 MG/ML
20 INJECTION, SOLUTION INTRAVENOUS ONCE
Status: COMPLETED | OUTPATIENT
Start: 2019-11-07 | End: 2019-11-07

## 2019-11-07 RX ORDER — PALONOSETRON 0.05 MG/ML
0.25 INJECTION, SOLUTION INTRAVENOUS ONCE
Status: COMPLETED | OUTPATIENT
Start: 2019-11-07 | End: 2019-11-07

## 2019-11-07 RX ADMIN — SODIUM CHLORIDE 20 ML/HR: 0.9 INJECTION, SOLUTION INTRAVENOUS at 13:10

## 2019-11-07 RX ADMIN — PALONOSETRON 0.25 MG: 0.05 INJECTION, SOLUTION INTRAVENOUS at 14:46

## 2019-11-07 RX ADMIN — SODIUM CHLORIDE 150 MG: 0.9 INJECTION, SOLUTION INTRAVENOUS at 14:08

## 2019-11-07 RX ADMIN — ATROPINE SULFATE 0.25 MG: 1 INJECTION, SOLUTION INTRAMUSCULAR; INTRAVENOUS; SUBCUTANEOUS at 14:50

## 2019-11-07 RX ADMIN — BEVACIZUMAB 355 MG: 400 INJECTION, SOLUTION INTRAVENOUS at 16:37

## 2019-11-07 RX ADMIN — LEUCOVORIN CALCIUM 700 MG: 200 INJECTION, POWDER, LYOPHILIZED, FOR SUSPENSION INTRAMUSCULAR; INTRAVENOUS at 14:54

## 2019-11-07 RX ADMIN — IRINOTECAN HYDROCHLORIDE 269 MG: 20 INJECTION INTRAVENOUS at 14:57

## 2019-11-07 NOTE — PROGRESS NOTES
Pt arrived for [facility] & Avastin infusion  BP on arrival 150/80's  Rechecked 15 min later, BP-160/80  Notified Anthony Alexander RN   Per Dr Orlena Litten, give Irinotecan & Leucovorin & recheck BP

## 2019-11-07 NOTE — PROGRESS NOTES
Pt tolerated treatment well  CADD connected after two RN check  Green light flashing, screen displays running upon discharge  Pt aware to return 11/9 for disconnect  AVS declined

## 2019-11-08 ENCOUNTER — TELEPHONE (OUTPATIENT)
Dept: HEMATOLOGY ONCOLOGY | Facility: CLINIC | Age: 54
End: 2019-11-08

## 2019-11-09 ENCOUNTER — HOSPITAL ENCOUNTER (OUTPATIENT)
Dept: INFUSION CENTER | Facility: CLINIC | Age: 54
Discharge: HOME | End: 2019-11-09
Payer: COMMERCIAL

## 2019-11-09 DIAGNOSIS — C18.2 PRIMARY ADENOCARCINOMA OF ASCENDING COLON (HCC): ICD-10-CM

## 2019-11-09 DIAGNOSIS — C78.7 LIVER METASTASES (HCC): Primary | ICD-10-CM

## 2019-11-09 DIAGNOSIS — T45.1X5A CHEMOTHERAPY INDUCED NEUTROPENIA (HCC): ICD-10-CM

## 2019-11-09 DIAGNOSIS — D70.1 CHEMOTHERAPY INDUCED NEUTROPENIA (HCC): ICD-10-CM

## 2019-11-09 PROCEDURE — 96372 THER/PROPH/DIAG INJ SC/IM: CPT

## 2019-11-09 RX ADMIN — PEGFILGRASTIM 6 MG: KIT SUBCUTANEOUS at 15:36

## 2019-11-09 NOTE — PROGRESS NOTES
CADD pump d/c, res vol=0  Port flushed per protocol, Good blood return noted  Neulasta on-pro placed on ANGE, offers no complaints   Declined AVS

## 2019-11-11 ENCOUNTER — TELEPHONE (OUTPATIENT)
Dept: HEMATOLOGY ONCOLOGY | Facility: CLINIC | Age: 54
End: 2019-11-11

## 2019-11-11 RX ORDER — SODIUM CHLORIDE 9 MG/ML
20 INJECTION, SOLUTION INTRAVENOUS ONCE
Status: CANCELLED | OUTPATIENT
Start: 2019-11-21

## 2019-11-11 RX ORDER — ATROPINE SULFATE 1 MG/ML
0.25 INJECTION, SOLUTION INTRAMUSCULAR; INTRAVENOUS; SUBCUTANEOUS ONCE AS NEEDED
Status: CANCELLED | OUTPATIENT
Start: 2019-11-21

## 2019-11-11 RX ORDER — ATROPINE SULFATE 1 MG/ML
0.25 INJECTION, SOLUTION INTRAMUSCULAR; INTRAVENOUS; SUBCUTANEOUS ONCE
Status: CANCELLED | OUTPATIENT
Start: 2019-11-21

## 2019-11-11 RX ORDER — PALONOSETRON 0.05 MG/ML
0.25 INJECTION, SOLUTION INTRAVENOUS ONCE
Status: CANCELLED
Start: 2019-11-21

## 2019-11-11 NOTE — TELEPHONE ENCOUNTER
Called patient on Monday morning at 10:36am but could not leave a voicemail due to it being full  Will try again to reach patient later in the week

## 2019-11-11 NOTE — TELEPHONE ENCOUNTER
Called Radha Flowers to follow up with her, asking her if she needed anything at all while Mattie Ramos is out  I did let her know she will be out for this week  Radha Flowers stated that was completely fine, it was nothing emergent and she will try to catch Laura next week

## 2019-11-12 DIAGNOSIS — C18.2 PRIMARY ADENOCARCINOMA OF ASCENDING COLON (HCC): Primary | ICD-10-CM

## 2019-11-12 DIAGNOSIS — C78.7 LIVER METASTASES (HCC): ICD-10-CM

## 2019-11-19 ENCOUNTER — APPOINTMENT (OUTPATIENT)
Dept: LAB | Facility: MEDICAL CENTER | Age: 54
End: 2019-11-19
Payer: COMMERCIAL

## 2019-11-19 DIAGNOSIS — C18.2 PRIMARY ADENOCARCINOMA OF ASCENDING COLON (HCC): ICD-10-CM

## 2019-11-19 DIAGNOSIS — C78.7 LIVER METASTASES (HCC): ICD-10-CM

## 2019-11-19 LAB
ALBUMIN SERPL BCP-MCNC: 4 G/DL (ref 3.5–5)
ALP SERPL-CCNC: 93 U/L (ref 46–116)
ALT SERPL W P-5'-P-CCNC: 20 U/L (ref 12–78)
ANION GAP SERPL CALCULATED.3IONS-SCNC: 9 MMOL/L (ref 4–13)
AST SERPL W P-5'-P-CCNC: 9 U/L (ref 5–45)
BASOPHILS # BLD AUTO: 0.04 THOUSANDS/ΜL (ref 0–0.1)
BASOPHILS NFR BLD AUTO: 1 % (ref 0–1)
BILIRUB SERPL-MCNC: 0.29 MG/DL (ref 0.2–1)
BUN SERPL-MCNC: 8 MG/DL (ref 5–25)
CALCIUM SERPL-MCNC: 9 MG/DL (ref 8.3–10.1)
CHLORIDE SERPL-SCNC: 110 MMOL/L (ref 100–108)
CO2 SERPL-SCNC: 24 MMOL/L (ref 21–32)
CREAT SERPL-MCNC: 0.91 MG/DL (ref 0.6–1.3)
EOSINOPHIL # BLD AUTO: 0.11 THOUSAND/ΜL (ref 0–0.61)
EOSINOPHIL NFR BLD AUTO: 2 % (ref 0–6)
ERYTHROCYTE [DISTWIDTH] IN BLOOD BY AUTOMATED COUNT: 16.3 % (ref 11.6–15.1)
GFR SERPL CREATININE-BSD FRML MDRD: 73 ML/MIN/1.73SQ M
GLUCOSE SERPL-MCNC: 96 MG/DL (ref 65–140)
HCT VFR BLD AUTO: 38.3 % (ref 34.8–46.1)
HGB BLD-MCNC: 11.7 G/DL (ref 11.5–15.4)
IMM GRANULOCYTES # BLD AUTO: 0.04 THOUSAND/UL (ref 0–0.2)
IMM GRANULOCYTES NFR BLD AUTO: 1 % (ref 0–2)
LYMPHOCYTES # BLD AUTO: 1.33 THOUSANDS/ΜL (ref 0.6–4.47)
LYMPHOCYTES NFR BLD AUTO: 21 % (ref 14–44)
MCH RBC QN AUTO: 33.3 PG (ref 26.8–34.3)
MCHC RBC AUTO-ENTMCNC: 30.5 G/DL (ref 31.4–37.4)
MCV RBC AUTO: 109 FL (ref 82–98)
MONOCYTES # BLD AUTO: 0.8 THOUSAND/ΜL (ref 0.17–1.22)
MONOCYTES NFR BLD AUTO: 13 % (ref 4–12)
NEUTROPHILS # BLD AUTO: 3.99 THOUSANDS/ΜL (ref 1.85–7.62)
NEUTS SEG NFR BLD AUTO: 62 % (ref 43–75)
NRBC BLD AUTO-RTO: 0 /100 WBCS
PLATELET # BLD AUTO: 165 THOUSANDS/UL (ref 149–390)
PMV BLD AUTO: 10.2 FL (ref 8.9–12.7)
POTASSIUM SERPL-SCNC: 4.3 MMOL/L (ref 3.5–5.3)
PROT SERPL-MCNC: 6.6 G/DL (ref 6.4–8.2)
RBC # BLD AUTO: 3.51 MILLION/UL (ref 3.81–5.12)
SODIUM SERPL-SCNC: 143 MMOL/L (ref 136–145)
WBC # BLD AUTO: 6.31 THOUSAND/UL (ref 4.31–10.16)

## 2019-11-19 PROCEDURE — 36415 COLL VENOUS BLD VENIPUNCTURE: CPT

## 2019-11-19 PROCEDURE — 80053 COMPREHEN METABOLIC PANEL: CPT

## 2019-11-19 PROCEDURE — 85025 COMPLETE CBC W/AUTO DIFF WBC: CPT

## 2019-11-21 ENCOUNTER — HOSPITAL ENCOUNTER (OUTPATIENT)
Dept: INFUSION CENTER | Facility: CLINIC | Age: 54
Discharge: HOME | End: 2019-11-21
Payer: COMMERCIAL

## 2019-11-21 VITALS
TEMPERATURE: 96.6 F | DIASTOLIC BLOOD PRESSURE: 80 MMHG | OXYGEN SATURATION: 98 % | BODY MASS INDEX: 25.88 KG/M2 | WEIGHT: 161 LBS | HEART RATE: 75 BPM | RESPIRATION RATE: 14 BRPM | SYSTOLIC BLOOD PRESSURE: 114 MMHG | HEIGHT: 66 IN

## 2019-11-21 DIAGNOSIS — C78.7 LIVER METASTASES (HCC): Primary | ICD-10-CM

## 2019-11-21 DIAGNOSIS — T45.1X5A CHEMOTHERAPY INDUCED NEUTROPENIA (HCC): ICD-10-CM

## 2019-11-21 DIAGNOSIS — D70.1 CHEMOTHERAPY INDUCED NEUTROPENIA (HCC): ICD-10-CM

## 2019-11-21 DIAGNOSIS — C18.2 PRIMARY ADENOCARCINOMA OF ASCENDING COLON (HCC): ICD-10-CM

## 2019-11-21 PROCEDURE — G0498 CHEMO EXTEND IV INFUS W/PUMP: HCPCS

## 2019-11-21 PROCEDURE — 96375 TX/PRO/DX INJ NEW DRUG ADDON: CPT

## 2019-11-21 PROCEDURE — 96368 THER/DIAG CONCURRENT INF: CPT

## 2019-11-21 PROCEDURE — 96417 CHEMO IV INFUS EACH ADDL SEQ: CPT

## 2019-11-21 PROCEDURE — 96413 CHEMO IV INFUSION 1 HR: CPT

## 2019-11-21 PROCEDURE — 96367 TX/PROPH/DG ADDL SEQ IV INF: CPT

## 2019-11-21 RX ORDER — PALONOSETRON 0.05 MG/ML
0.25 INJECTION, SOLUTION INTRAVENOUS ONCE
Status: COMPLETED | OUTPATIENT
Start: 2019-11-21 | End: 2019-11-21

## 2019-11-21 RX ORDER — ATROPINE SULFATE 1 MG/ML
0.25 INJECTION, SOLUTION INTRAMUSCULAR; INTRAVENOUS; SUBCUTANEOUS ONCE AS NEEDED
Status: DISCONTINUED | OUTPATIENT
Start: 2019-11-21 | End: 2019-11-24 | Stop reason: HOSPADM

## 2019-11-21 RX ORDER — PALONOSETRON 0.05 MG/ML
0.25 INJECTION, SOLUTION INTRAVENOUS ONCE
Status: DISCONTINUED | OUTPATIENT
Start: 2019-11-22 | End: 2019-11-21

## 2019-11-21 RX ORDER — SODIUM CHLORIDE 9 MG/ML
20 INJECTION, SOLUTION INTRAVENOUS ONCE
Status: COMPLETED | OUTPATIENT
Start: 2019-11-21 | End: 2019-11-21

## 2019-11-21 RX ORDER — ATROPINE SULFATE 1 MG/ML
0.25 INJECTION, SOLUTION INTRAMUSCULAR; INTRAVENOUS; SUBCUTANEOUS ONCE
Status: COMPLETED | OUTPATIENT
Start: 2019-11-21 | End: 2019-11-21

## 2019-11-21 RX ADMIN — LEUCOVORIN CALCIUM 700 MG: 500 INJECTION, POWDER, LYOPHILIZED, FOR SOLUTION INTRAMUSCULAR; INTRAVENOUS at 13:33

## 2019-11-21 RX ADMIN — SODIUM CHLORIDE 150 MG: 0.9 INJECTION, SOLUTION INTRAVENOUS at 12:00

## 2019-11-21 RX ADMIN — PALONOSETRON 0.25 MG: 0.05 INJECTION, SOLUTION INTRAVENOUS at 12:00

## 2019-11-21 RX ADMIN — SODIUM CHLORIDE 20 ML/HR: 0.9 INJECTION, SOLUTION INTRAVENOUS at 11:30

## 2019-11-21 RX ADMIN — ATROPINE SULFATE 0.25 MG: 1 INJECTION, SOLUTION INTRAMUSCULAR; INTRAVENOUS; SUBCUTANEOUS at 13:27

## 2019-11-21 RX ADMIN — BEVACIZUMAB 355 MG: 400 INJECTION, SOLUTION INTRAVENOUS at 12:40

## 2019-11-21 RX ADMIN — IRINOTECAN HYDROCHLORIDE 269 MG: 20 INJECTION, SOLUTION INTRAVENOUS at 13:33

## 2019-11-21 NOTE — PLAN OF CARE
Problem: Potential for Falls  Goal: Patient will remain free of falls  Description  INTERVENTIONS:  - Assess patient frequently for physical needs  -  Identify cognitive and physical deficits and behaviors that affect risk of falls    -  [place] fall precautions as indicated by assessment   - Educate patient/family on patient safety including physical limitations  - Instruct patient to call for assistance with activity based on assessment  - Modify environment to reduce risk of injury  - Consider OT/PT consult to assist with strengthening/mobility  Outcome: Progressing

## 2019-11-23 ENCOUNTER — HOSPITAL ENCOUNTER (OUTPATIENT)
Dept: INFUSION CENTER | Facility: CLINIC | Age: 54
Discharge: HOME | End: 2019-11-23
Payer: COMMERCIAL

## 2019-11-23 VITALS — TEMPERATURE: 98.2 F

## 2019-11-23 DIAGNOSIS — C18.2 PRIMARY ADENOCARCINOMA OF ASCENDING COLON (HCC): ICD-10-CM

## 2019-11-23 DIAGNOSIS — C78.7 LIVER METASTASES (HCC): Primary | ICD-10-CM

## 2019-11-23 PROCEDURE — 96372 THER/PROPH/DIAG INJ SC/IM: CPT

## 2019-11-23 RX ADMIN — PEGFILGRASTIM 6 MG: KIT SUBCUTANEOUS at 13:34

## 2019-11-23 NOTE — PROGRESS NOTES
Pt arrived for cadd d/c, res vol=0  Neulasta on-pro placed on ANGE  Offers no complaints   Confirmed next appt, declined AVS

## 2019-11-25 DIAGNOSIS — C18.2 PRIMARY ADENOCARCINOMA OF ASCENDING COLON (HCC): Primary | ICD-10-CM

## 2019-11-25 DIAGNOSIS — C78.7 LIVER METASTASES (HCC): ICD-10-CM

## 2019-11-25 RX ORDER — PALONOSETRON 0.05 MG/ML
0.25 INJECTION, SOLUTION INTRAVENOUS ONCE
Status: CANCELLED
Start: 2019-12-06

## 2019-11-25 RX ORDER — ATROPINE SULFATE 1 MG/ML
0.25 INJECTION, SOLUTION INTRAMUSCULAR; INTRAVENOUS; SUBCUTANEOUS ONCE
Status: CANCELLED | OUTPATIENT
Start: 2019-12-05

## 2019-11-25 RX ORDER — SODIUM CHLORIDE 9 MG/ML
20 INJECTION, SOLUTION INTRAVENOUS ONCE
Status: CANCELLED | OUTPATIENT
Start: 2019-12-05

## 2019-11-25 RX ORDER — ATROPINE SULFATE 1 MG/ML
0.25 INJECTION, SOLUTION INTRAMUSCULAR; INTRAVENOUS; SUBCUTANEOUS ONCE AS NEEDED
Status: CANCELLED | OUTPATIENT
Start: 2019-12-05

## 2019-12-03 ENCOUNTER — APPOINTMENT (OUTPATIENT)
Dept: LAB | Facility: MEDICAL CENTER | Age: 54
End: 2019-12-03
Payer: COMMERCIAL

## 2019-12-03 ENCOUNTER — TELEPHONE (OUTPATIENT)
Dept: HEMATOLOGY ONCOLOGY | Facility: CLINIC | Age: 54
End: 2019-12-03

## 2019-12-03 DIAGNOSIS — N39.0 URINARY TRACT INFECTION WITHOUT HEMATURIA, SITE UNSPECIFIED: Primary | ICD-10-CM

## 2019-12-03 DIAGNOSIS — C18.2 PRIMARY ADENOCARCINOMA OF ASCENDING COLON (HCC): ICD-10-CM

## 2019-12-03 DIAGNOSIS — C78.7 LIVER METASTASES (HCC): ICD-10-CM

## 2019-12-03 LAB
BASOPHILS # BLD AUTO: 0.05 THOUSANDS/ΜL (ref 0–0.1)
BASOPHILS NFR BLD AUTO: 0 % (ref 0–1)
EOSINOPHIL # BLD AUTO: 0.15 THOUSAND/ΜL (ref 0–0.61)
EOSINOPHIL NFR BLD AUTO: 1 % (ref 0–6)
ERYTHROCYTE [DISTWIDTH] IN BLOOD BY AUTOMATED COUNT: 16.2 % (ref 11.6–15.1)
HCT VFR BLD AUTO: 39.6 % (ref 34.8–46.1)
HGB BLD-MCNC: 11.9 G/DL (ref 11.5–15.4)
IMM GRANULOCYTES # BLD AUTO: 0.1 THOUSAND/UL (ref 0–0.2)
IMM GRANULOCYTES NFR BLD AUTO: 1 % (ref 0–2)
LYMPHOCYTES # BLD AUTO: 1.36 THOUSANDS/ΜL (ref 0.6–4.47)
LYMPHOCYTES NFR BLD AUTO: 12 % (ref 14–44)
MCH RBC QN AUTO: 32.6 PG (ref 26.8–34.3)
MCHC RBC AUTO-ENTMCNC: 30.1 G/DL (ref 31.4–37.4)
MCV RBC AUTO: 109 FL (ref 82–98)
MONOCYTES # BLD AUTO: 0.85 THOUSAND/ΜL (ref 0.17–1.22)
MONOCYTES NFR BLD AUTO: 7 % (ref 4–12)
NEUTROPHILS # BLD AUTO: 9.18 THOUSANDS/ΜL (ref 1.85–7.62)
NEUTS SEG NFR BLD AUTO: 79 % (ref 43–75)
NRBC BLD AUTO-RTO: 0 /100 WBCS
PLATELET # BLD AUTO: 148 THOUSANDS/UL (ref 149–390)
PMV BLD AUTO: 10.3 FL (ref 8.9–12.7)
RBC # BLD AUTO: 3.65 MILLION/UL (ref 3.81–5.12)
WBC # BLD AUTO: 11.69 THOUSAND/UL (ref 4.31–10.16)

## 2019-12-03 PROCEDURE — 85025 COMPLETE CBC W/AUTO DIFF WBC: CPT

## 2019-12-04 ENCOUNTER — DOCUMENTATION (OUTPATIENT)
Dept: HEMATOLOGY ONCOLOGY | Facility: CLINIC | Age: 54
End: 2019-12-04

## 2019-12-04 ENCOUNTER — TELEPHONE (OUTPATIENT)
Dept: HEMATOLOGY ONCOLOGY | Facility: CLINIC | Age: 54
End: 2019-12-04

## 2019-12-04 DIAGNOSIS — I82.4Y1 DEEP VEIN THROMBOSIS (DVT) OF PROXIMAL VEIN OF RIGHT LOWER EXTREMITY, UNSPECIFIED CHRONICITY (HCC): ICD-10-CM

## 2019-12-04 NOTE — PROGRESS NOTES
12/5/2019 received notification from [employer] that the xarelto needs to be re-authorized  Pt has [employer]  ID #[id number]    Submitted for authorization through cover my meds  Received approval letter  Case id # [id number] is valid from 11/4/2019 through 12/3/2020  Called [facility] @ 9:42 ([phone]) spoke with Sp Cassidy who stated that they also need a new rx  The one they have does not have any refills left  He stated that they don't need the auth information  Notified clinical  Requested a new rx be sent

## 2019-12-05 ENCOUNTER — HOSPITAL ENCOUNTER (OUTPATIENT)
Dept: INFUSION CENTER | Facility: CLINIC | Age: 54
Discharge: HOME | End: 2019-12-05
Payer: COMMERCIAL

## 2019-12-05 VITALS
DIASTOLIC BLOOD PRESSURE: 82 MMHG | HEART RATE: 81 BPM | HEIGHT: 66 IN | SYSTOLIC BLOOD PRESSURE: 136 MMHG | WEIGHT: 161 LBS | TEMPERATURE: 96 F | BODY MASS INDEX: 25.88 KG/M2 | OXYGEN SATURATION: 99 % | RESPIRATION RATE: 17 BRPM

## 2019-12-05 DIAGNOSIS — N39.0 URINARY TRACT INFECTION WITHOUT HEMATURIA, SITE UNSPECIFIED: ICD-10-CM

## 2019-12-05 DIAGNOSIS — T45.1X5A CHEMOTHERAPY INDUCED NEUTROPENIA (HCC): ICD-10-CM

## 2019-12-05 DIAGNOSIS — D70.1 CHEMOTHERAPY INDUCED NEUTROPENIA (HCC): ICD-10-CM

## 2019-12-05 DIAGNOSIS — C18.2 PRIMARY ADENOCARCINOMA OF ASCENDING COLON (HCC): ICD-10-CM

## 2019-12-05 DIAGNOSIS — C78.7 LIVER METASTASES (HCC): Primary | ICD-10-CM

## 2019-12-05 PROCEDURE — 87086 URINE CULTURE/COLONY COUNT: CPT

## 2019-12-05 PROCEDURE — 96413 CHEMO IV INFUSION 1 HR: CPT

## 2019-12-05 PROCEDURE — 96375 TX/PRO/DX INJ NEW DRUG ADDON: CPT

## 2019-12-05 PROCEDURE — 96415 CHEMO IV INFUSION ADDL HR: CPT

## 2019-12-05 PROCEDURE — 96367 TX/PROPH/DG ADDL SEQ IV INF: CPT

## 2019-12-05 PROCEDURE — 96368 THER/DIAG CONCURRENT INF: CPT

## 2019-12-05 PROCEDURE — 96417 CHEMO IV INFUS EACH ADDL SEQ: CPT

## 2019-12-05 RX ORDER — ATROPINE SULFATE 1 MG/ML
0.25 INJECTION, SOLUTION INTRAMUSCULAR; INTRAVENOUS; SUBCUTANEOUS ONCE
Status: COMPLETED | OUTPATIENT
Start: 2019-12-05 | End: 2019-12-05

## 2019-12-05 RX ORDER — PALONOSETRON 0.05 MG/ML
0.25 INJECTION, SOLUTION INTRAVENOUS ONCE
Status: COMPLETED | OUTPATIENT
Start: 2019-12-06 | End: 2019-12-05

## 2019-12-05 RX ORDER — ATROPINE SULFATE 1 MG/ML
0.25 INJECTION, SOLUTION INTRAMUSCULAR; INTRAVENOUS; SUBCUTANEOUS ONCE AS NEEDED
Status: DISCONTINUED | OUTPATIENT
Start: 2019-12-05 | End: 2019-12-08 | Stop reason: HOSPADM

## 2019-12-05 RX ORDER — SODIUM CHLORIDE 9 MG/ML
20 INJECTION, SOLUTION INTRAVENOUS ONCE
Status: COMPLETED | OUTPATIENT
Start: 2019-12-05 | End: 2019-12-05

## 2019-12-05 RX ADMIN — BEVACIZUMAB 355 MG: 400 INJECTION, SOLUTION INTRAVENOUS at 12:51

## 2019-12-05 RX ADMIN — IRINOTECAN HYDROCHLORIDE 269 MG: 20 INJECTION, SOLUTION INTRAVENOUS at 13:31

## 2019-12-05 RX ADMIN — PALONOSETRON 0.25 MG: 0.05 INJECTION, SOLUTION INTRAVENOUS at 12:40

## 2019-12-05 RX ADMIN — LEUCOVORIN CALCIUM 700 MG: 500 INJECTION, POWDER, LYOPHILIZED, FOR SOLUTION INTRAMUSCULAR; INTRAVENOUS at 13:31

## 2019-12-05 RX ADMIN — SODIUM CHLORIDE 150 MG: 0.9 INJECTION, SOLUTION INTRAVENOUS at 12:03

## 2019-12-05 RX ADMIN — SODIUM CHLORIDE 20 ML/HR: 0.9 INJECTION, SOLUTION INTRAVENOUS at 11:45

## 2019-12-05 RX ADMIN — ATROPINE SULFATE 0.25 MG: 1 INJECTION, SOLUTION INTRAMUSCULAR; INTRAVENOUS; SUBCUTANEOUS at 13:31

## 2019-12-05 NOTE — PLAN OF CARE
Problem: PAIN - ADULT  Goal: Verbalizes/displays adequate comfort level or baseline comfort level  Description  Interventions:  - Encourage patient to monitor pain and request assistance  - Assess pain using appropriate pain scale  - Administer analgesics based on type and severity of pain and evaluate response  - Implement non-pharmacological measures as appropriate and evaluate response  - Consider cultural and social influences on pain and pain management  - Notify physician/advanced practitioner if interventions unsuccessful or patient reports new pain  Outcome: Progressing     Problem: INFECTION - ADULT  Goal: Absence or prevention of progression during hospitalization  Description  INTERVENTIONS:  - Assess and monitor for signs and symptoms of infection  - Monitor lab/diagnostic results  - Monitor all insertion sites, i e  indwelling lines, tubes, and drains  - Monitor endotracheal if appropriate and nasal secretions for changes in amount and color  - [place] appropriate cooling/warming therapies per order  - Administer medications as ordered  - Instruct and encourage patient and family to use good hand hygiene technique  - Identify and instruct in appropriate isolation precautions for identified infection/condition  Outcome: Progressing  Goal: Absence of fever/infection during neutropenic period  Description  INTERVENTIONS:  - Monitor WBC    Outcome: Progressing     Problem: SAFETY ADULT  Goal: Patient will remain free of falls  Description  INTERVENTIONS:  - Assess patient frequently for physical needs  -  Identify cognitive and physical deficits and behaviors that affect risk of falls    -  [place] fall precautions as indicated by assessment   - Educate patient/family on patient safety including physical limitations  - Instruct patient to call for assistance with activity based on assessment  - Modify environment to reduce risk of injury  - Consider OT/PT consult to assist with strengthening/mobility  Outcome: Progressing  Goal: Maintain or return to baseline ADL function  Description  INTERVENTIONS:  -  Assess patient's ability to carry out ADLs; assess patient's baseline for ADL function and identify physical deficits which impact ability to perform ADLs (bathing, care of mouth/teeth, toileting, grooming, dressing, etc )  - Assess/evaluate cause of self-care deficits   - Assess range of motion  - Assess patient's mobility; develop plan if impaired  - Assess patient's need for assistive devices and provide as appropriate  - Encourage maximum independence but intervene and supervise when necessary  - Involve family in performance of ADLs  - Assess for home care needs following discharge   - Consider OT consult to assist with ADL evaluation and planning for discharge  - Provide patient education as appropriate  Outcome: Progressing  Goal: Maintain or return mobility status to optimal level  Description  INTERVENTIONS:  - Assess patient's baseline mobility status (ambulation, transfers, stairs, etc )    - Identify cognitive and physical deficits and behaviors that affect mobility  - Identify mobility aids required to assist with transfers and/or ambulation (gait belt, sit-to-stand, lift, walker, cane, etc )  - [place] fall precautions as indicated by assessment  - Record patient progress and toleration of activity level on Mobility SBAR; progress patient to next Phase/Stage  - Instruct patient to call for assistance with activity based on assessment  - Consider rehabilitation consult to assist with strengthening/weightbearing, etc   Outcome: Progressing     Problem: DISCHARGE PLANNING  Goal: Discharge to home or other facility with appropriate resources  Description  INTERVENTIONS:  - Identify barriers to discharge w/patient and caregiver  - Arrange for needed discharge resources and transportation as appropriate  - Identify discharge learning needs (meds, wound care, etc )  - Arrange for interpretive services to assist at discharge as needed  - Refer to Case Management Department for coordinating discharge planning if the patient needs post-hospital services based on physician/advanced practitioner order or complex needs related to functional status, cognitive ability, or social support system  Outcome: Progressing     Problem: Knowledge Deficit  Goal: Patient/family/caregiver demonstrates understanding of disease process, treatment plan, medications, and discharge instructions  Description  Complete learning assessment and assess knowledge base    Interventions:  - Provide teaching at level of understanding  - Provide teaching via preferred learning methods  Outcome: Progressing

## 2019-12-05 NOTE — PROGRESS NOTES
Patient tolerated treatment without complications  Patient connected to CADD pump, all connections secured with tape  Green indicator light flashing  Patient aware of disconnect time on Saturday   Patient declined AVS

## 2019-12-05 NOTE — PROGRESS NOTES
Patient here for Avastin, Camptosar and 5FU CADD pump connect for treatment of colon cancer  Patient resting with no complaints  Vitals stable  BP within parameters  Callbell within reach  Will continue to monitor

## 2019-12-06 LAB — BACTERIA UR CULT: NORMAL

## 2019-12-07 ENCOUNTER — HOSPITAL ENCOUNTER (OUTPATIENT)
Dept: INFUSION CENTER | Facility: CLINIC | Age: 54
Discharge: HOME | End: 2019-12-07
Payer: COMMERCIAL

## 2019-12-07 DIAGNOSIS — C18.2 PRIMARY ADENOCARCINOMA OF ASCENDING COLON (HCC): ICD-10-CM

## 2019-12-07 DIAGNOSIS — C78.7 LIVER METASTASES (HCC): Primary | ICD-10-CM

## 2019-12-07 PROCEDURE — 96372 THER/PROPH/DIAG INJ SC/IM: CPT

## 2019-12-07 RX ADMIN — PEGFILGRASTIM 6 MG: KIT SUBCUTANEOUS at 13:41

## 2019-12-07 NOTE — PROGRESS NOTES
Pt arrived to ctr with c/o of fatigue, " not a new sx" she reports   CADD pump at zero res volume     pt tolerated Neulasta On pro application to ANGE as ordered   Pt has verb her undersatnding of indications and poss side effects     pt dcd stable and ambulatory   Has declined AVS  Confirmed return appt

## 2019-12-09 ENCOUNTER — TELEPHONE (OUTPATIENT)
Dept: HEMATOLOGY ONCOLOGY | Facility: CLINIC | Age: 54
End: 2019-12-09

## 2019-12-09 RX ORDER — PALONOSETRON 0.05 MG/ML
0.25 INJECTION, SOLUTION INTRAVENOUS ONCE
Status: CANCELLED
Start: 2019-12-19

## 2019-12-09 RX ORDER — SODIUM CHLORIDE 9 MG/ML
20 INJECTION, SOLUTION INTRAVENOUS ONCE
Status: CANCELLED | OUTPATIENT
Start: 2019-12-19

## 2019-12-09 RX ORDER — ATROPINE SULFATE 1 MG/ML
0.25 INJECTION, SOLUTION INTRAMUSCULAR; INTRAVENOUS; SUBCUTANEOUS ONCE AS NEEDED
Status: CANCELLED | OUTPATIENT
Start: 2019-12-19

## 2019-12-09 RX ORDER — ATROPINE SULFATE 1 MG/ML
0.25 INJECTION, SOLUTION INTRAMUSCULAR; INTRAVENOUS; SUBCUTANEOUS ONCE
Status: CANCELLED | OUTPATIENT
Start: 2019-12-19

## 2019-12-10 DIAGNOSIS — C18.2 PRIMARY ADENOCARCINOMA OF ASCENDING COLON (HCC): Primary | ICD-10-CM

## 2019-12-10 DIAGNOSIS — C78.7 LIVER METASTASES (HCC): ICD-10-CM

## 2019-12-16 ENCOUNTER — APPOINTMENT (OUTPATIENT)
Dept: LAB | Facility: MEDICAL CENTER | Age: 54
End: 2019-12-16
Payer: COMMERCIAL

## 2019-12-16 DIAGNOSIS — C18.2 PRIMARY ADENOCARCINOMA OF ASCENDING COLON (HCC): ICD-10-CM

## 2019-12-16 DIAGNOSIS — C78.7 LIVER METASTASES (HCC): ICD-10-CM

## 2019-12-16 LAB
ALBUMIN SERPL BCP-MCNC: 3.8 G/DL (ref 3.5–5)
ALP SERPL-CCNC: 124 U/L (ref 46–116)
ALT SERPL W P-5'-P-CCNC: 19 U/L (ref 12–78)
ANION GAP SERPL CALCULATED.3IONS-SCNC: 3 MMOL/L (ref 4–13)
AST SERPL W P-5'-P-CCNC: 7 U/L (ref 5–45)
BASOPHILS # BLD AUTO: 0.06 THOUSANDS/ΜL (ref 0–0.1)
BASOPHILS NFR BLD AUTO: 1 % (ref 0–1)
BILIRUB SERPL-MCNC: 0.29 MG/DL (ref 0.2–1)
BUN SERPL-MCNC: 8 MG/DL (ref 5–25)
CALCIUM SERPL-MCNC: 9 MG/DL (ref 8.3–10.1)
CHLORIDE SERPL-SCNC: 107 MMOL/L (ref 100–108)
CO2 SERPL-SCNC: 30 MMOL/L (ref 21–32)
CREAT SERPL-MCNC: 0.97 MG/DL (ref 0.6–1.3)
EOSINOPHIL # BLD AUTO: 0.14 THOUSAND/ΜL (ref 0–0.61)
EOSINOPHIL NFR BLD AUTO: 1 % (ref 0–6)
ERYTHROCYTE [DISTWIDTH] IN BLOOD BY AUTOMATED COUNT: 16.2 % (ref 11.6–15.1)
GFR SERPL CREATININE-BSD FRML MDRD: 67 ML/MIN/1.73SQ M
GLUCOSE SERPL-MCNC: 86 MG/DL (ref 65–140)
HCT VFR BLD AUTO: 40.3 % (ref 34.8–46.1)
HGB BLD-MCNC: 12.3 G/DL (ref 11.5–15.4)
IMM GRANULOCYTES # BLD AUTO: 0.09 THOUSAND/UL (ref 0–0.2)
IMM GRANULOCYTES NFR BLD AUTO: 1 % (ref 0–2)
LYMPHOCYTES # BLD AUTO: 1.41 THOUSANDS/ΜL (ref 0.6–4.47)
LYMPHOCYTES NFR BLD AUTO: 11 % (ref 14–44)
MCH RBC QN AUTO: 33.2 PG (ref 26.8–34.3)
MCHC RBC AUTO-ENTMCNC: 30.5 G/DL (ref 31.4–37.4)
MCV RBC AUTO: 109 FL (ref 82–98)
MONOCYTES # BLD AUTO: 1.32 THOUSAND/ΜL (ref 0.17–1.22)
MONOCYTES NFR BLD AUTO: 10 % (ref 4–12)
NEUTROPHILS # BLD AUTO: 9.75 THOUSANDS/ΜL (ref 1.85–7.62)
NEUTS SEG NFR BLD AUTO: 76 % (ref 43–75)
NRBC BLD AUTO-RTO: 0 /100 WBCS
PLATELET # BLD AUTO: 202 THOUSANDS/UL (ref 149–390)
PMV BLD AUTO: 9.9 FL (ref 8.9–12.7)
POTASSIUM SERPL-SCNC: 4.1 MMOL/L (ref 3.5–5.3)
PROT SERPL-MCNC: 6.8 G/DL (ref 6.4–8.2)
RBC # BLD AUTO: 3.7 MILLION/UL (ref 3.81–5.12)
SODIUM SERPL-SCNC: 140 MMOL/L (ref 136–145)
WBC # BLD AUTO: 12.77 THOUSAND/UL (ref 4.31–10.16)

## 2019-12-16 PROCEDURE — 36415 COLL VENOUS BLD VENIPUNCTURE: CPT

## 2019-12-16 PROCEDURE — 85025 COMPLETE CBC W/AUTO DIFF WBC: CPT

## 2019-12-16 PROCEDURE — 80053 COMPREHEN METABOLIC PANEL: CPT

## 2019-12-19 ENCOUNTER — HOSPITAL ENCOUNTER (OUTPATIENT)
Dept: INFUSION CENTER | Facility: CLINIC | Age: 54
Discharge: HOME | End: 2019-12-19
Payer: COMMERCIAL

## 2019-12-19 ENCOUNTER — DOCUMENTATION (OUTPATIENT)
Dept: HEMATOLOGY ONCOLOGY | Facility: CLINIC | Age: 54
End: 2019-12-19

## 2019-12-19 VITALS
HEIGHT: 66 IN | HEART RATE: 79 BPM | SYSTOLIC BLOOD PRESSURE: 120 MMHG | WEIGHT: 158 LBS | DIASTOLIC BLOOD PRESSURE: 84 MMHG | TEMPERATURE: 97.6 F | BODY MASS INDEX: 25.39 KG/M2 | OXYGEN SATURATION: 99 % | RESPIRATION RATE: 14 BRPM

## 2019-12-19 DIAGNOSIS — C78.7 LIVER METASTASES (HCC): Primary | ICD-10-CM

## 2019-12-19 DIAGNOSIS — T45.1X5A CHEMOTHERAPY INDUCED NEUTROPENIA (HCC): ICD-10-CM

## 2019-12-19 DIAGNOSIS — C18.2 PRIMARY ADENOCARCINOMA OF ASCENDING COLON (HCC): ICD-10-CM

## 2019-12-19 DIAGNOSIS — D70.1 CHEMOTHERAPY INDUCED NEUTROPENIA (HCC): ICD-10-CM

## 2019-12-19 PROCEDURE — G0498 CHEMO EXTEND IV INFUS W/PUMP: HCPCS

## 2019-12-19 PROCEDURE — 96368 THER/DIAG CONCURRENT INF: CPT

## 2019-12-19 PROCEDURE — 96375 TX/PRO/DX INJ NEW DRUG ADDON: CPT

## 2019-12-19 PROCEDURE — 96367 TX/PROPH/DG ADDL SEQ IV INF: CPT

## 2019-12-19 PROCEDURE — 96417 CHEMO IV INFUS EACH ADDL SEQ: CPT

## 2019-12-19 PROCEDURE — 96413 CHEMO IV INFUSION 1 HR: CPT

## 2019-12-19 RX ORDER — ATROPINE SULFATE 1 MG/ML
0.25 INJECTION, SOLUTION INTRAMUSCULAR; INTRAVENOUS; SUBCUTANEOUS ONCE
Status: COMPLETED | OUTPATIENT
Start: 2019-12-19 | End: 2019-12-19

## 2019-12-19 RX ORDER — ATROPINE SULFATE 1 MG/ML
0.25 INJECTION, SOLUTION INTRAMUSCULAR; INTRAVENOUS; SUBCUTANEOUS ONCE AS NEEDED
Status: DISCONTINUED | OUTPATIENT
Start: 2019-12-19 | End: 2019-12-22 | Stop reason: HOSPADM

## 2019-12-19 RX ORDER — PALONOSETRON 0.05 MG/ML
0.25 INJECTION, SOLUTION INTRAVENOUS ONCE
Status: COMPLETED | OUTPATIENT
Start: 2019-12-19 | End: 2019-12-19

## 2019-12-19 RX ORDER — SODIUM CHLORIDE 9 MG/ML
20 INJECTION, SOLUTION INTRAVENOUS ONCE
Status: COMPLETED | OUTPATIENT
Start: 2019-12-19 | End: 2019-12-19

## 2019-12-19 RX ADMIN — SODIUM CHLORIDE 150 MG: 0.9 INJECTION, SOLUTION INTRAVENOUS at 11:50

## 2019-12-19 RX ADMIN — PALONOSETRON HYDROCHLORIDE 0.25 MG: 0.25 INJECTION, SOLUTION INTRAVENOUS at 11:45

## 2019-12-19 RX ADMIN — ATROPINE SULFATE 0.25 MG: 1 INJECTION, SOLUTION INTRAMUSCULAR; INTRAVENOUS; SUBCUTANEOUS at 13:15

## 2019-12-19 RX ADMIN — IRINOTECAN HYDROCHLORIDE 269 MG: 20 INJECTION, SOLUTION INTRAVENOUS at 13:24

## 2019-12-19 RX ADMIN — LEUCOVORIN CALCIUM 700 MG: 500 INJECTION, POWDER, LYOPHILIZED, FOR SOLUTION INTRAMUSCULAR; INTRAVENOUS at 13:24

## 2019-12-19 RX ADMIN — BEVACIZUMAB 355 MG: 400 INJECTION, SOLUTION INTRAVENOUS at 12:28

## 2019-12-19 RX ADMIN — SODIUM CHLORIDE 20 ML/HR: 0.9 INJECTION, SOLUTION INTRAVENOUS at 11:37

## 2019-12-19 NOTE — PROGRESS NOTES
Pt tolerated treatment well  CADD connected after two RN check  Green light flashing, screen displays running upon discharge  Pt aware to return 12/20/2019 for CADD disconnect  AVS declined

## 2019-12-19 NOTE — PROGRESS NOTES
GI Oncology Nurse Navigator Note    Met with Carmen in the [facility] center today while she was getting treatment, she had some questions, she said she was scheduled for some scans in January and she was wondering why a PET/CT was not included in that, explained that would be a question for Dr Marisa Zuniga as I was unsure when her last one was, she then became tearful and we spoke about her treatment, she asked when she would be done, we talked about her diagnosis and that with a stage IV cancer, chemotherapy would be lifelong unless she would decide to stop, we did discuss speaking with Dr Marisa Zuniga about possibly taking a "chemotherapy holiday" in order to give her a break as she stated she was on cycle 39 and she is just "tired of this", she does not want to quit treatment entirely but would like to take a break, we discussed that this would be reasonable to talk to him about at the next visit she has with him as all of the scans will be done, she then asked about the possibility of having a liver transplant to remove the cancer from her liver, explained to her that the primary cancer is in her colon and it has spread to her liver, this means the cancer cells know how to travel, so even with a new liver, the cancer cells will eventually, again, travel to the liver and this is why that was not given as an option, she again became tearful, I provided a significant amount of emotional support and sat with her, I offered to come along to her visit with Dr Marisa Zuniga on 2/4, she will let me know, I asked her to call me on Monday to let me know how she is and if there is anything I can do for her, I told her that I don't know how she is feeling or what she is going through but I will be here to support her and help her however I can, she was very appreciative for the time spent with her today and talking to her about everything, instructed her to call anytime

## 2019-12-19 NOTE — PROGRESS NOTES
Pt here for chemotherapy + CADD connect, offers no complaints, resting comfortably  Vitals stable  Labs reviewed from 12/16/2019, within parameters for treatment  Call bell in reach, will continue to monitor

## 2019-12-21 ENCOUNTER — HOSPITAL ENCOUNTER (OUTPATIENT)
Dept: INFUSION CENTER | Facility: CLINIC | Age: 54
Discharge: HOME | End: 2019-12-21
Payer: COMMERCIAL

## 2019-12-21 VITALS — TEMPERATURE: 97.8 F

## 2019-12-21 DIAGNOSIS — C78.7 LIVER METASTASES (HCC): Primary | ICD-10-CM

## 2019-12-21 DIAGNOSIS — C18.2 PRIMARY ADENOCARCINOMA OF ASCENDING COLON (HCC): ICD-10-CM

## 2019-12-21 PROCEDURE — 96372 THER/PROPH/DIAG INJ SC/IM: CPT

## 2019-12-21 RX ADMIN — PEGFILGRASTIM 6 MG: KIT SUBCUTANEOUS at 13:35

## 2019-12-21 NOTE — PROGRESS NOTES
Patient is here for CADD D/C and neulasta  On pro  She complains of feeling tired  She states she does get diarrhea with her treatments  She will not take imodium because it makes her constipated and makes her bleed then because she has fissures  CADD D/C'd per protocol with a res vol of 0mls  Patient is afebrile  neulasta onpro placed on right arm  Confirmed it was working and green light flashing  Patient verbalized understanding of how it works   Next appointment confirmed and she declined avs

## 2019-12-23 DIAGNOSIS — C78.7 LIVER METASTASES (HCC): ICD-10-CM

## 2019-12-23 DIAGNOSIS — C18.2 PRIMARY ADENOCARCINOMA OF ASCENDING COLON (HCC): Primary | ICD-10-CM

## 2019-12-23 RX ORDER — ATROPINE SULFATE 1 MG/ML
0.25 INJECTION, SOLUTION INTRAMUSCULAR; INTRAVENOUS; SUBCUTANEOUS ONCE AS NEEDED
Status: CANCELLED | OUTPATIENT
Start: 2020-01-02

## 2019-12-23 RX ORDER — ATROPINE SULFATE 1 MG/ML
0.25 INJECTION, SOLUTION INTRAMUSCULAR; INTRAVENOUS; SUBCUTANEOUS ONCE
Status: CANCELLED | OUTPATIENT
Start: 2020-01-02

## 2019-12-23 RX ORDER — PALONOSETRON 0.05 MG/ML
0.25 INJECTION, SOLUTION INTRAVENOUS ONCE
Status: CANCELLED
Start: 2020-01-02

## 2019-12-23 RX ORDER — SODIUM CHLORIDE 9 MG/ML
20 INJECTION, SOLUTION INTRAVENOUS ONCE
Status: CANCELLED | OUTPATIENT
Start: 2020-01-02

## 2019-12-30 ENCOUNTER — APPOINTMENT (OUTPATIENT)
Dept: LAB | Facility: MEDICAL CENTER | Age: 54
End: 2019-12-30
Payer: COMMERCIAL

## 2019-12-30 DIAGNOSIS — C78.7 LIVER METASTASES (HCC): ICD-10-CM

## 2019-12-30 DIAGNOSIS — C18.2 PRIMARY ADENOCARCINOMA OF ASCENDING COLON (HCC): ICD-10-CM

## 2019-12-30 LAB
BASOPHILS # BLD AUTO: 0.06 THOUSANDS/ΜL (ref 0–0.1)
BASOPHILS NFR BLD AUTO: 1 % (ref 0–1)
EOSINOPHIL # BLD AUTO: 0.14 THOUSAND/ΜL (ref 0–0.61)
EOSINOPHIL NFR BLD AUTO: 1 % (ref 0–6)
ERYTHROCYTE [DISTWIDTH] IN BLOOD BY AUTOMATED COUNT: 15.9 % (ref 11.6–15.1)
HCT VFR BLD AUTO: 41.8 % (ref 34.8–46.1)
HGB BLD-MCNC: 12.8 G/DL (ref 11.5–15.4)
IMM GRANULOCYTES # BLD AUTO: 0.08 THOUSAND/UL (ref 0–0.2)
IMM GRANULOCYTES NFR BLD AUTO: 1 % (ref 0–2)
LYMPHOCYTES # BLD AUTO: 1.4 THOUSANDS/ΜL (ref 0.6–4.47)
LYMPHOCYTES NFR BLD AUTO: 11 % (ref 14–44)
MCH RBC QN AUTO: 33.5 PG (ref 26.8–34.3)
MCHC RBC AUTO-ENTMCNC: 30.6 G/DL (ref 31.4–37.4)
MCV RBC AUTO: 109 FL (ref 82–98)
MONOCYTES # BLD AUTO: 1.21 THOUSAND/ΜL (ref 0.17–1.22)
MONOCYTES NFR BLD AUTO: 9 % (ref 4–12)
NEUTROPHILS # BLD AUTO: 9.98 THOUSANDS/ΜL (ref 1.85–7.62)
NEUTS SEG NFR BLD AUTO: 77 % (ref 43–75)
NRBC BLD AUTO-RTO: 0 /100 WBCS
PLATELET # BLD AUTO: 172 THOUSANDS/UL (ref 149–390)
PMV BLD AUTO: 10.2 FL (ref 8.9–12.7)
RBC # BLD AUTO: 3.82 MILLION/UL (ref 3.81–5.12)
WBC # BLD AUTO: 12.87 THOUSAND/UL (ref 4.31–10.16)

## 2019-12-30 PROCEDURE — 85025 COMPLETE CBC W/AUTO DIFF WBC: CPT

## 2020-01-02 ENCOUNTER — HOSPITAL ENCOUNTER (OUTPATIENT)
Dept: INFUSION CENTER | Facility: CLINIC | Age: 55
Discharge: HOME | End: 2020-01-02
Payer: COMMERCIAL

## 2020-01-02 ENCOUNTER — TELEPHONE (OUTPATIENT)
Dept: HEMATOLOGY ONCOLOGY | Facility: CLINIC | Age: 55
End: 2020-01-02

## 2020-01-02 VITALS
HEIGHT: 66 IN | RESPIRATION RATE: 18 BRPM | SYSTOLIC BLOOD PRESSURE: 120 MMHG | TEMPERATURE: 97.9 F | DIASTOLIC BLOOD PRESSURE: 78 MMHG | OXYGEN SATURATION: 98 % | WEIGHT: 157.5 LBS | HEART RATE: 86 BPM | BODY MASS INDEX: 25.31 KG/M2

## 2020-01-02 DIAGNOSIS — C18.2 PRIMARY ADENOCARCINOMA OF ASCENDING COLON (HCC): ICD-10-CM

## 2020-01-02 DIAGNOSIS — C78.7 LIVER METASTASES (HCC): Primary | ICD-10-CM

## 2020-01-02 DIAGNOSIS — T45.1X5A CHEMOTHERAPY INDUCED NEUTROPENIA (HCC): ICD-10-CM

## 2020-01-02 DIAGNOSIS — D70.1 CHEMOTHERAPY INDUCED NEUTROPENIA (HCC): ICD-10-CM

## 2020-01-02 PROCEDURE — 96368 THER/DIAG CONCURRENT INF: CPT

## 2020-01-02 PROCEDURE — G0498 CHEMO EXTEND IV INFUS W/PUMP: HCPCS

## 2020-01-02 PROCEDURE — 96413 CHEMO IV INFUSION 1 HR: CPT

## 2020-01-02 PROCEDURE — 96415 CHEMO IV INFUSION ADDL HR: CPT

## 2020-01-02 PROCEDURE — 96367 TX/PROPH/DG ADDL SEQ IV INF: CPT

## 2020-01-02 PROCEDURE — 96375 TX/PRO/DX INJ NEW DRUG ADDON: CPT

## 2020-01-02 PROCEDURE — 96417 CHEMO IV INFUS EACH ADDL SEQ: CPT

## 2020-01-02 RX ORDER — SODIUM CHLORIDE 9 MG/ML
20 INJECTION, SOLUTION INTRAVENOUS ONCE
Status: COMPLETED | OUTPATIENT
Start: 2020-01-02 | End: 2020-01-02

## 2020-01-02 RX ORDER — ATROPINE SULFATE 1 MG/ML
0.25 INJECTION, SOLUTION INTRAMUSCULAR; INTRAVENOUS; SUBCUTANEOUS ONCE
Status: COMPLETED | OUTPATIENT
Start: 2020-01-02 | End: 2020-01-02

## 2020-01-02 RX ORDER — PALONOSETRON 0.05 MG/ML
0.25 INJECTION, SOLUTION INTRAVENOUS ONCE
Status: COMPLETED | OUTPATIENT
Start: 2020-01-02 | End: 2020-01-02

## 2020-01-02 RX ORDER — ATROPINE SULFATE 1 MG/ML
0.25 INJECTION, SOLUTION INTRAMUSCULAR; INTRAVENOUS; SUBCUTANEOUS ONCE AS NEEDED
Status: DISCONTINUED | OUTPATIENT
Start: 2020-01-02 | End: 2020-01-05 | Stop reason: HOSPADM

## 2020-01-02 RX ADMIN — SODIUM CHLORIDE 20 ML/HR: 0.9 INJECTION, SOLUTION INTRAVENOUS at 11:40

## 2020-01-02 RX ADMIN — PALONOSETRON 0.25 MG: 0.05 INJECTION, SOLUTION INTRAVENOUS at 11:51

## 2020-01-02 RX ADMIN — FOSAPREPITANT DIMEGLUMINE 150 MG: 150 INJECTION, POWDER, LYOPHILIZED, FOR SOLUTION INTRAVENOUS at 11:57

## 2020-01-02 RX ADMIN — ATROPINE SULFATE 0.25 MG: 1 INJECTION, SOLUTION INTRAMUSCULAR; INTRAVENOUS; SUBCUTANEOUS at 12:30

## 2020-01-02 RX ADMIN — BEVACIZUMAB 355 MG: 400 INJECTION, SOLUTION INTRAVENOUS at 12:33

## 2020-01-02 RX ADMIN — LEUCOVORIN CALCIUM 700 MG: 10 INJECTION INTRAMUSCULAR; INTRAVENOUS at 13:25

## 2020-01-02 RX ADMIN — IRINOTECAN HYDROCHLORIDE 269 MG: 20 INJECTION, SOLUTION INTRAVENOUS at 13:25

## 2020-01-02 NOTE — PROGRESS NOTES
Pt to clinic for avastin, camptosar with leucovorin, and CADD pump hook-up, tolerated infusions and pump hook-up without complications, aware of when to return for CADD pump disconnect, yeny declined

## 2020-01-02 NOTE — TELEPHONE ENCOUNTER
I called patient and left my name and phone number and requested a return phone call to discuss result of blood test   She has high alkaline phosphatase

## 2020-01-02 NOTE — PLAN OF CARE
Problem: Potential for Falls  Goal: Patient will remain free of falls  Description  INTERVENTIONS:  - Assess patient frequently for physical needs  -  Identify cognitive and physical deficits and behaviors that affect risk of falls  -  [place] fall precautions as indicated by assessment   - Educate patient/family on patient safety including physical limitations  - Instruct patient to call for assistance with activity based on assessment  - Modify environment to reduce risk of injury  - Consider OT/PT consult to assist with strengthening/mobility  Outcome: Progressing     Problem: Knowledge Deficit  Goal: Patient/family/caregiver demonstrates understanding of disease process, treatment plan, medications, and discharge instructions  Description  Complete learning assessment and assess knowledge base    Interventions:  - Provide teaching at level of understanding  - Provide teaching via preferred learning methods  Outcome: Progressing

## 2020-01-03 ENCOUNTER — TELEPHONE (OUTPATIENT)
Dept: HEMATOLOGY ONCOLOGY | Facility: CLINIC | Age: 55
End: 2020-01-03

## 2020-01-03 NOTE — TELEPHONE ENCOUNTER
I spoke with patient about high alkaline phosphatase  Could this be secondary to medication like irinotecan? Could this be secondary to metastatic disease? Patient had FOLFIRI plus Avastin yesterday  Next 1 will be in 2 weeks  Patient will have blood test before that  Patient mentioned that she is scheduled to have CT scans by the end of this month  If there is no answer she could have bone scan also  If alkaline phosphatase came back higher she could have scans sooner and also we can skip the irinotecan from next treatment  Discussed all this with the patient

## 2020-01-03 NOTE — TELEPHONE ENCOUNTER
Carmen called returning a phone call she received from Dr Yashira Li yesterday evening to discuss her lab results  Please review and give the pt a call back

## 2020-01-04 ENCOUNTER — HOSPITAL ENCOUNTER (OUTPATIENT)
Dept: INFUSION CENTER | Facility: CLINIC | Age: 55
Discharge: HOME | End: 2020-01-04
Payer: COMMERCIAL

## 2020-01-04 DIAGNOSIS — C18.2 PRIMARY ADENOCARCINOMA OF ASCENDING COLON (HCC): ICD-10-CM

## 2020-01-04 DIAGNOSIS — C78.7 LIVER METASTASES (HCC): Primary | ICD-10-CM

## 2020-01-04 PROCEDURE — 96372 THER/PROPH/DIAG INJ SC/IM: CPT

## 2020-01-04 RX ADMIN — PEGFILGRASTIM 6 MG: KIT SUBCUTANEOUS at 13:09

## 2020-01-04 NOTE — PROGRESS NOTES
Pt tolerated treatment well except for extreme fatigue and nausea  Reservoir volume 0  Cadd pump disconnected  Good blood return noted from port  Port saline locked and deaccessed without issue  Neulasta on-pro applied  Aware of next appt  AVS declined

## 2020-01-07 DIAGNOSIS — C18.2 PRIMARY ADENOCARCINOMA OF ASCENDING COLON (HCC): Primary | ICD-10-CM

## 2020-01-08 DIAGNOSIS — C18.2 PRIMARY ADENOCARCINOMA OF ASCENDING COLON (HCC): Primary | ICD-10-CM

## 2020-01-08 DIAGNOSIS — C78.7 LIVER METASTASES (HCC): ICD-10-CM

## 2020-01-13 ENCOUNTER — APPOINTMENT (OUTPATIENT)
Dept: LAB | Facility: MEDICAL CENTER | Age: 55
End: 2020-01-13
Payer: COMMERCIAL

## 2020-01-13 LAB
ALBUMIN SERPL BCP-MCNC: 3.6 G/DL (ref 3.5–5)
ALP SERPL-CCNC: 104 U/L (ref 46–116)
ALT SERPL W P-5'-P-CCNC: 16 U/L (ref 12–78)
ANION GAP SERPL CALCULATED.3IONS-SCNC: 3 MMOL/L (ref 4–13)
AST SERPL W P-5'-P-CCNC: 9 U/L (ref 5–45)
BASOPHILS # BLD AUTO: 0.05 THOUSANDS/ΜL (ref 0–0.1)
BASOPHILS NFR BLD AUTO: 1 % (ref 0–1)
BILIRUB SERPL-MCNC: 0.26 MG/DL (ref 0.2–1)
BUN SERPL-MCNC: 10 MG/DL (ref 5–25)
CALCIUM SERPL-MCNC: 8.8 MG/DL (ref 8.3–10.1)
CHLORIDE SERPL-SCNC: 110 MMOL/L (ref 100–108)
CO2 SERPL-SCNC: 27 MMOL/L (ref 21–32)
CREAT SERPL-MCNC: 0.86 MG/DL (ref 0.6–1.3)
EOSINOPHIL # BLD AUTO: 0.14 THOUSAND/ΜL (ref 0–0.61)
EOSINOPHIL NFR BLD AUTO: 1 % (ref 0–6)
ERYTHROCYTE [DISTWIDTH] IN BLOOD BY AUTOMATED COUNT: 15.7 % (ref 11.6–15.1)
GFR SERPL CREATININE-BSD FRML MDRD: 78 ML/MIN/1.73SQ M
GLUCOSE SERPL-MCNC: 79 MG/DL (ref 65–140)
HCT VFR BLD AUTO: 40.2 % (ref 34.8–46.1)
HGB BLD-MCNC: 12.5 G/DL (ref 11.5–15.4)
IMM GRANULOCYTES # BLD AUTO: 0.08 THOUSAND/UL (ref 0–0.2)
IMM GRANULOCYTES NFR BLD AUTO: 1 % (ref 0–2)
LYMPHOCYTES # BLD AUTO: 1.41 THOUSANDS/ΜL (ref 0.6–4.47)
LYMPHOCYTES NFR BLD AUTO: 13 % (ref 14–44)
MCH RBC QN AUTO: 34.1 PG (ref 26.8–34.3)
MCHC RBC AUTO-ENTMCNC: 31.1 G/DL (ref 31.4–37.4)
MCV RBC AUTO: 110 FL (ref 82–98)
MONOCYTES # BLD AUTO: 1.13 THOUSAND/ΜL (ref 0.17–1.22)
MONOCYTES NFR BLD AUTO: 10 % (ref 4–12)
NEUTROPHILS # BLD AUTO: 8.16 THOUSANDS/ΜL (ref 1.85–7.62)
NEUTS SEG NFR BLD AUTO: 74 % (ref 43–75)
NRBC BLD AUTO-RTO: 0 /100 WBCS
PLATELET # BLD AUTO: 158 THOUSANDS/UL (ref 149–390)
PMV BLD AUTO: 10.3 FL (ref 8.9–12.7)
POTASSIUM SERPL-SCNC: 4.1 MMOL/L (ref 3.5–5.3)
PROT SERPL-MCNC: 6.6 G/DL (ref 6.4–8.2)
RBC # BLD AUTO: 3.67 MILLION/UL (ref 3.81–5.12)
SODIUM SERPL-SCNC: 140 MMOL/L (ref 136–145)
WBC # BLD AUTO: 10.97 THOUSAND/UL (ref 4.31–10.16)

## 2020-01-13 PROCEDURE — 85025 COMPLETE CBC W/AUTO DIFF WBC: CPT

## 2020-01-13 PROCEDURE — 80053 COMPREHEN METABOLIC PANEL: CPT

## 2020-01-13 PROCEDURE — 36415 COLL VENOUS BLD VENIPUNCTURE: CPT

## 2020-01-13 RX ORDER — SODIUM CHLORIDE 9 MG/ML
20 INJECTION, SOLUTION INTRAVENOUS ONCE
Status: CANCELLED | OUTPATIENT
Start: 2020-01-16

## 2020-01-13 RX ORDER — ATROPINE SULFATE 1 MG/ML
0.25 INJECTION, SOLUTION INTRAMUSCULAR; INTRAVENOUS; SUBCUTANEOUS ONCE AS NEEDED
Status: CANCELLED | OUTPATIENT
Start: 2020-01-16

## 2020-01-13 RX ORDER — PALONOSETRON 0.05 MG/ML
0.25 INJECTION, SOLUTION INTRAVENOUS ONCE
Status: CANCELLED
Start: 2020-01-16

## 2020-01-13 RX ORDER — ATROPINE SULFATE 1 MG/ML
0.25 INJECTION, SOLUTION INTRAMUSCULAR; INTRAVENOUS; SUBCUTANEOUS ONCE
Status: CANCELLED | OUTPATIENT
Start: 2020-01-16

## 2020-01-16 ENCOUNTER — HOSPITAL ENCOUNTER (OUTPATIENT)
Dept: INFUSION CENTER | Facility: CLINIC | Age: 55
Discharge: HOME | End: 2020-01-16
Payer: COMMERCIAL

## 2020-01-16 VITALS
WEIGHT: 161 LBS | RESPIRATION RATE: 18 BRPM | DIASTOLIC BLOOD PRESSURE: 76 MMHG | HEIGHT: 66 IN | HEART RATE: 76 BPM | SYSTOLIC BLOOD PRESSURE: 124 MMHG | BODY MASS INDEX: 25.88 KG/M2 | OXYGEN SATURATION: 99 % | TEMPERATURE: 97.6 F

## 2020-01-16 DIAGNOSIS — C78.7 LIVER METASTASES (HCC): ICD-10-CM

## 2020-01-16 DIAGNOSIS — T45.1X5A CHEMOTHERAPY INDUCED NEUTROPENIA (HCC): ICD-10-CM

## 2020-01-16 DIAGNOSIS — C18.2 PRIMARY ADENOCARCINOMA OF ASCENDING COLON (HCC): ICD-10-CM

## 2020-01-16 DIAGNOSIS — D70.1 CHEMOTHERAPY INDUCED NEUTROPENIA (HCC): ICD-10-CM

## 2020-01-16 DIAGNOSIS — C78.7 LIVER METASTASES (HCC): Primary | ICD-10-CM

## 2020-01-16 PROCEDURE — G0498 CHEMO EXTEND IV INFUS W/PUMP: HCPCS

## 2020-01-16 PROCEDURE — 96415 CHEMO IV INFUSION ADDL HR: CPT

## 2020-01-16 PROCEDURE — 96375 TX/PRO/DX INJ NEW DRUG ADDON: CPT

## 2020-01-16 PROCEDURE — 96417 CHEMO IV INFUS EACH ADDL SEQ: CPT

## 2020-01-16 PROCEDURE — 96413 CHEMO IV INFUSION 1 HR: CPT

## 2020-01-16 PROCEDURE — 96368 THER/DIAG CONCURRENT INF: CPT

## 2020-01-16 PROCEDURE — 96367 TX/PROPH/DG ADDL SEQ IV INF: CPT

## 2020-01-16 RX ORDER — ATROPINE SULFATE 1 MG/ML
0.25 INJECTION, SOLUTION INTRAMUSCULAR; INTRAVENOUS; SUBCUTANEOUS ONCE
Status: COMPLETED | OUTPATIENT
Start: 2020-01-16 | End: 2020-01-16

## 2020-01-16 RX ORDER — ATROPINE SULFATE 1 MG/ML
0.25 INJECTION, SOLUTION INTRAMUSCULAR; INTRAVENOUS; SUBCUTANEOUS ONCE
Status: CANCELLED | OUTPATIENT
Start: 2020-01-30

## 2020-01-16 RX ORDER — SODIUM CHLORIDE 9 MG/ML
20 INJECTION, SOLUTION INTRAVENOUS ONCE
Status: CANCELLED | OUTPATIENT
Start: 2020-01-30

## 2020-01-16 RX ORDER — ATROPINE SULFATE 1 MG/ML
0.25 INJECTION, SOLUTION INTRAMUSCULAR; INTRAVENOUS; SUBCUTANEOUS ONCE AS NEEDED
Status: DISCONTINUED | OUTPATIENT
Start: 2020-01-16 | End: 2020-01-19 | Stop reason: HOSPADM

## 2020-01-16 RX ORDER — PALONOSETRON 0.05 MG/ML
0.25 INJECTION, SOLUTION INTRAVENOUS ONCE
Status: COMPLETED | OUTPATIENT
Start: 2020-01-16 | End: 2020-01-16

## 2020-01-16 RX ORDER — PALONOSETRON 0.05 MG/ML
0.25 INJECTION, SOLUTION INTRAVENOUS ONCE
Status: CANCELLED | OUTPATIENT
Start: 2020-01-30

## 2020-01-16 RX ORDER — ATROPINE SULFATE 1 MG/ML
0.25 INJECTION, SOLUTION INTRAMUSCULAR; INTRAVENOUS; SUBCUTANEOUS ONCE AS NEEDED
Status: CANCELLED | OUTPATIENT
Start: 2020-01-30

## 2020-01-16 RX ORDER — SODIUM CHLORIDE 9 MG/ML
20 INJECTION, SOLUTION INTRAVENOUS ONCE
Status: COMPLETED | OUTPATIENT
Start: 2020-01-16 | End: 2020-01-16

## 2020-01-16 RX ADMIN — LEUCOVORIN CALCIUM 700 MG: 200 INJECTION, POWDER, LYOPHILIZED, FOR SOLUTION INTRAMUSCULAR; INTRAVENOUS at 13:34

## 2020-01-16 RX ADMIN — FOSAPREPITANT DIMEGLUMINE 150 MG: 150 INJECTION, POWDER, LYOPHILIZED, FOR SOLUTION INTRAVENOUS at 12:09

## 2020-01-16 RX ADMIN — PALONOSETRON 0.25 MG: 0.05 INJECTION, SOLUTION INTRAVENOUS at 12:02

## 2020-01-16 RX ADMIN — BEVACIZUMAB 355 MG: 400 INJECTION, SOLUTION INTRAVENOUS at 12:56

## 2020-01-16 RX ADMIN — IRINOTECAN HYDROCHLORIDE 269 MG: 20 INJECTION INTRAVENOUS at 13:34

## 2020-01-16 RX ADMIN — ATROPINE SULFATE 0.25 MG: 1 INJECTION, SOLUTION INTRAMUSCULAR; INTRAVENOUS; SUBCUTANEOUS at 12:50

## 2020-01-16 RX ADMIN — SODIUM CHLORIDE 20 ML/HR: 0.9 INJECTION, SOLUTION INTRAVENOUS at 11:20

## 2020-01-16 NOTE — PROGRESS NOTES
Patient tolerated all treatment without incident and was discharged post with 5FU running via CADD over 46 hours  Good blood return via port prior to CADD connect  All clamps ensured open and connections taped secure prior to patient discharge  She offers no c/o and will RTO 1/18/20 at 1330 for disconnect and neulasta onpro  Appt confirmed

## 2020-01-16 NOTE — PROGRESS NOTES
Patient came to infusion center for Camptosar, Avastin, and Fluorouracil  Patients vitals WNL  Patient offers no complaints  Patient denies pain  Patient confirmed next appt  Patient declined AVS at this time  Will continue to monitor

## 2020-01-16 NOTE — PATIENT INSTRUCTIONS
January 2020 Sunday Monday Tuesday Wednesday Thursday Friday Saturday                  1     2    INF ONCOLOGY TX-TREATMENT PLAN  11:00 AM   (215 min )   AN INF CHAIR 2   [address] 3     4    INF ONCOLOGY TX-TREATMENT PLAN   1:00 PM   (30 min )   AN INF QUICK CHAIR [facility]       Cycle 39, Day 1  Cycle 39, Day 3   5     6     7     8     9     10     11                12     13    LAB WALK IN  12:45 PM   (5 min )   AN WINDGP CHAIR 1   [facility] [place] 14     15     16    INF ONCOLOGY TX-TREATMENT PLAN  11:00 AM   (215 min )   AN INF CHAIR 3   St  [address] [place] [address] 17     18    INF ONCOLOGY TX-TREATMENT PLAN   1:30 PM   (30 min )   AN INF QUICK CHAIR [address] [place] [address]       Cycle 40, Day 1  Cycle 40, Day 3   19     20     21     22     23     24     25                26     27    CT CHEST WO CON  11:30 AM   (30 min )   AN CT 1   [facility] CAT Scan    MRI ABDOMEN W WO CON  12:15 PM   (45 min )   AM MRI 1   [address] MRI 28    MRI PELV FEMALE WO AND W CON  11:30 AM   (45 min )   AM MRI 1   [facility] MRI 29     30     31                Cycle 41, Day 1          Treatment Details       1/2/2020 - Cycle 39, Day 1      Chemotherapy: ONCBCN PROVIDER COMMUNICATION5, BEVACIZUMAB IVPB, IRINOTECAN INFUSION, LEUCOVORIN IVPB      Take-Home Chemo: ONCBCN PROVIDER COMMUNICATION8, FLUOROURACIL AMBULATORY INFUSION    1/4/2020 - Cycle 39, Day 3      Supportive Care: ONCBCN PROVIDER COMMUNICATION7, pegfilgrastim (NEULASTA ONPRO)    1/16/2020 - Cycle 40, Day 1      Chemotherapy: ONCBCN PROVIDER COMMUNICATION5, BEVACIZUMAB IVPB, IRINOTECAN INFUSION, LEUCOVORIN IVPB      Take-Home Chemo: ONCBCN PROVIDER COMMUNICATION8, FLUOROURACIL AMBULATORY INFUSION    1/18/2020 - Cycle 40, Day 3      Supportive Care: [facility] PROVIDER COMMUNICATION7, pegfilgrastim ([place])    1/30/2020 - Cycle 41, Day 1      Chemotherapy: ONCBCN PROVIDER COMMUNICATION5, BEVACIZUMAB IVPB, IRINOTECAN INFUSION, LEUCOVORIN IVPB      Take-Home Chemo: ONCBCN PROVIDER [place] AMBULATORY INFUSION        February 2020 Sunday Monday Tuesday Wednesday Thursday Friday Saturday                                 1             Cycle 41, Day 3   2     3     4    FOLLOW UP PG   2:25 PM   (20 min )   MD Hever Oglesby [facility] [place] 5     6     7     8                9     10     11     12     13     14     15           Cycle 42, Day 1  Cycle 42, Day 3   16     17     18     19     20     21     22                23     24     25     26     27     28     29           Cycle 43, Day 1  Cycle 43, Day 3        Treatment Details       2/1/2020 - Cycle 41, Day 3      Supportive Care: [facility] PROVIDER COMMUNICATION7, pegfilgrastim (NEULASTA ONPRO)    2/13/2020 - Cycle 42, Day 1      Chemotherapy: ONCBCN PROVIDER COMMUNICATION5, BEVACIZUMAB IVPB, IRINOTECAN INFUSION, LEUCOVORIN IVPB      Take-Home Chemo: ONCBCN PROVIDER COMMUNICATION8, FLUOROURACIL AMBULATORY INFUSION    2/15/2020 - Cycle 42, Day 3      Supportive Care: ONCBCN PROVIDER COMMUNICATION7, pegfilgrastim (NEULASTA ONPRO)    2/27/2020 - Cycle 43, Day 1      Chemotherapy: ONCBCN PROVIDER COMMUNICATION5, BEVACIZUMAB IVPB, IRINOTECAN INFUSION, LEUCOVORIN IVPB      Take-Home Chemo: ONCBCN PROVIDER COMMUNICATION8, FLUOROURACIL AMBULATORY INFUSION    2/29/2020 - Cycle 43, Day 3      Supportive Care: [address] [place] [address], pegfilgrastim ([place]ober[place]me)

## 2020-01-18 ENCOUNTER — HOSPITAL ENCOUNTER (OUTPATIENT)
Dept: INFUSION CENTER | Facility: CLINIC | Age: 55
Discharge: HOME | End: 2020-01-18
Payer: COMMERCIAL

## 2020-01-18 DIAGNOSIS — C78.7 LIVER METASTASES (HCC): Primary | ICD-10-CM

## 2020-01-18 DIAGNOSIS — C18.2 PRIMARY ADENOCARCINOMA OF ASCENDING COLON (HCC): ICD-10-CM

## 2020-01-18 PROCEDURE — 96372 THER/PROPH/DIAG INJ SC/IM: CPT

## 2020-01-18 RX ADMIN — PEGFILGRASTIM 6 MG: KIT SUBCUTANEOUS at 13:30

## 2020-01-18 NOTE — PROGRESS NOTES
Pt arrived to unit for CADD dc  Pt has no c/o -- feels well    Pt res volume is zero  Port flushed and de accessed,   pt tolerated Neulasta On Pro applied to ANGE    Pt next appt confirmed and pt declined AVS

## 2020-01-20 DIAGNOSIS — C18.2 PRIMARY ADENOCARCINOMA OF ASCENDING COLON (HCC): Primary | ICD-10-CM

## 2020-01-20 DIAGNOSIS — C78.7 LIVER METASTASES (HCC): ICD-10-CM

## 2020-01-23 ENCOUNTER — TELEPHONE (OUTPATIENT)
Dept: HEMATOLOGY ONCOLOGY | Facility: CLINIC | Age: 55
End: 2020-01-23

## 2020-01-23 NOTE — TELEPHONE ENCOUNTER
Received a voicemail message from [facility], she would like assistance scheduling an appointment with Dr Sheryl Ryan to review imaging she is having done next week, email sent to him to set this up, will wait to hear back and get this set up for her    Set up 2/6 3pm, left Carmen a voicemail message with this appointment date/time

## 2020-01-27 ENCOUNTER — HOSPITAL ENCOUNTER (OUTPATIENT)
Dept: MRI IMAGING | Facility: HOSPITAL | Age: 55
Discharge: HOME | End: 2020-01-27
Attending: INTERNAL MEDICINE
Payer: COMMERCIAL

## 2020-01-27 ENCOUNTER — HOSPITAL ENCOUNTER (OUTPATIENT)
Dept: CT IMAGING | Facility: HOSPITAL | Age: 55
Discharge: HOME | End: 2020-01-27
Attending: INTERNAL MEDICINE
Payer: COMMERCIAL

## 2020-01-27 DIAGNOSIS — C18.2 PRIMARY ADENOCARCINOMA OF ASCENDING COLON (HCC): ICD-10-CM

## 2020-01-27 DIAGNOSIS — I82.531 CHRONIC DEEP VEIN THROMBOSIS (DVT) OF POPLITEAL VEIN OF RIGHT LOWER EXTREMITY (HCC): ICD-10-CM

## 2020-01-27 DIAGNOSIS — C78.6 PERITONEAL METASTASES (HCC): ICD-10-CM

## 2020-01-27 DIAGNOSIS — C78.7 LIVER METASTASES (HCC): ICD-10-CM

## 2020-01-27 DIAGNOSIS — C77.2 METASTASIS TO RETROPERITONEAL LYMPH NODE (HCC): ICD-10-CM

## 2020-01-27 PROCEDURE — 71250 CT THORAX DX C-: CPT

## 2020-01-27 PROCEDURE — A9581 GADOXETATE DISODIUM INJ: HCPCS | Performed by: INTERNAL MEDICINE

## 2020-01-27 PROCEDURE — 74183 MRI ABD W/O CNTR FLWD CNTR: CPT

## 2020-01-27 RX ADMIN — GADOXETATE DISODIUM 10 ML: 181.43 INJECTION, SOLUTION INTRAVENOUS at 10:00

## 2020-01-28 ENCOUNTER — HOSPITAL ENCOUNTER (OUTPATIENT)
Dept: MRI IMAGING | Facility: HOSPITAL | Age: 55
Discharge: HOME | End: 2020-01-28
Attending: INTERNAL MEDICINE
Payer: COMMERCIAL

## 2020-01-28 DIAGNOSIS — C78.6 PERITONEAL METASTASES (HCC): ICD-10-CM

## 2020-01-28 DIAGNOSIS — I82.531 CHRONIC DEEP VEIN THROMBOSIS (DVT) OF POPLITEAL VEIN OF RIGHT LOWER EXTREMITY (HCC): ICD-10-CM

## 2020-01-28 DIAGNOSIS — C18.2 PRIMARY ADENOCARCINOMA OF ASCENDING COLON (HCC): ICD-10-CM

## 2020-01-28 DIAGNOSIS — C78.7 LIVER METASTASES (HCC): ICD-10-CM

## 2020-01-28 DIAGNOSIS — C77.2 METASTASIS TO RETROPERITONEAL LYMPH NODE (HCC): ICD-10-CM

## 2020-01-28 PROCEDURE — 72197 MRI PELVIS W/O & W/DYE: CPT

## 2020-01-28 PROCEDURE — A9585 GADOBUTROL INJECTION: HCPCS | Performed by: INTERNAL MEDICINE

## 2020-01-28 RX ADMIN — GADOBUTROL 7 ML: 604.72 INJECTION INTRAVENOUS at 12:26

## 2020-01-29 ENCOUNTER — APPOINTMENT (OUTPATIENT)
Dept: LAB | Facility: MEDICAL CENTER | Age: 55
End: 2020-01-29
Payer: COMMERCIAL

## 2020-01-29 DIAGNOSIS — D70.1 CHEMOTHERAPY INDUCED NEUTROPENIA (HCC): ICD-10-CM

## 2020-01-29 DIAGNOSIS — T45.1X5A CHEMOTHERAPY INDUCED NEUTROPENIA (HCC): ICD-10-CM

## 2020-01-29 DIAGNOSIS — C77.2 METASTASIS TO RETROPERITONEAL LYMPH NODE (HCC): ICD-10-CM

## 2020-01-29 DIAGNOSIS — I82.531 CHRONIC DEEP VEIN THROMBOSIS (DVT) OF POPLITEAL VEIN OF RIGHT LOWER EXTREMITY (HCC): ICD-10-CM

## 2020-01-29 DIAGNOSIS — C78.7 LIVER METASTASES (HCC): ICD-10-CM

## 2020-01-29 DIAGNOSIS — C18.2 PRIMARY ADENOCARCINOMA OF ASCENDING COLON (HCC): ICD-10-CM

## 2020-01-29 DIAGNOSIS — C78.6 PERITONEAL METASTASES (HCC): ICD-10-CM

## 2020-01-29 LAB
ALBUMIN SERPL BCP-MCNC: 3.9 G/DL (ref 3.5–5)
ALP SERPL-CCNC: 118 U/L (ref 46–116)
ALT SERPL W P-5'-P-CCNC: 22 U/L (ref 12–78)
ANION GAP SERPL CALCULATED.3IONS-SCNC: 6 MMOL/L (ref 4–13)
AST SERPL W P-5'-P-CCNC: 12 U/L (ref 5–45)
BASOPHILS # BLD AUTO: 0.05 THOUSANDS/ΜL (ref 0–0.1)
BASOPHILS NFR BLD AUTO: 1 % (ref 0–1)
BILIRUB SERPL-MCNC: 0.3 MG/DL (ref 0.2–1)
BILIRUB UR QL STRIP: NEGATIVE
BUN SERPL-MCNC: 10 MG/DL (ref 5–25)
CALCIUM SERPL-MCNC: 8.8 MG/DL (ref 8.3–10.1)
CHLORIDE SERPL-SCNC: 106 MMOL/L (ref 100–108)
CLARITY UR: CLEAR
CO2 SERPL-SCNC: 26 MMOL/L (ref 21–32)
COLOR UR: YELLOW
CREAT SERPL-MCNC: 0.88 MG/DL (ref 0.6–1.3)
EOSINOPHIL # BLD AUTO: 0.15 THOUSAND/ΜL (ref 0–0.61)
EOSINOPHIL NFR BLD AUTO: 2 % (ref 0–6)
ERYTHROCYTE [DISTWIDTH] IN BLOOD BY AUTOMATED COUNT: 15.8 % (ref 11.6–15.1)
GFR SERPL CREATININE-BSD FRML MDRD: 76 ML/MIN/1.73SQ M
GLUCOSE SERPL-MCNC: 66 MG/DL (ref 65–140)
GLUCOSE UR STRIP-MCNC: NEGATIVE MG/DL
HCT VFR BLD AUTO: 42 % (ref 34.8–46.1)
HGB BLD-MCNC: 13.1 G/DL (ref 11.5–15.4)
HGB UR QL STRIP.AUTO: NEGATIVE
IMM GRANULOCYTES # BLD AUTO: 0.05 THOUSAND/UL (ref 0–0.2)
IMM GRANULOCYTES NFR BLD AUTO: 1 % (ref 0–2)
KETONES UR STRIP-MCNC: NEGATIVE MG/DL
LEUKOCYTE ESTERASE UR QL STRIP: NEGATIVE
LYMPHOCYTES # BLD AUTO: 1.45 THOUSANDS/ΜL (ref 0.6–4.47)
LYMPHOCYTES NFR BLD AUTO: 16 % (ref 14–44)
MCH RBC QN AUTO: 34.1 PG (ref 26.8–34.3)
MCHC RBC AUTO-ENTMCNC: 31.2 G/DL (ref 31.4–37.4)
MCV RBC AUTO: 109 FL (ref 82–98)
MONOCYTES # BLD AUTO: 0.95 THOUSAND/ΜL (ref 0.17–1.22)
MONOCYTES NFR BLD AUTO: 10 % (ref 4–12)
NEUTROPHILS # BLD AUTO: 6.52 THOUSANDS/ΜL (ref 1.85–7.62)
NEUTS SEG NFR BLD AUTO: 70 % (ref 43–75)
NITRITE UR QL STRIP: NEGATIVE
NRBC BLD AUTO-RTO: 0 /100 WBCS
PH UR STRIP.AUTO: 6 [PH]
PLATELET # BLD AUTO: 224 THOUSANDS/UL (ref 149–390)
PMV BLD AUTO: 9.9 FL (ref 8.9–12.7)
POTASSIUM SERPL-SCNC: 3.9 MMOL/L (ref 3.5–5.3)
PROT SERPL-MCNC: 7 G/DL (ref 6.4–8.2)
PROT UR STRIP-MCNC: NEGATIVE MG/DL
RBC # BLD AUTO: 3.84 MILLION/UL (ref 3.81–5.12)
SODIUM SERPL-SCNC: 138 MMOL/L (ref 136–145)
SP GR UR STRIP.AUTO: 1.01 (ref 1–1.03)
UROBILINOGEN UR QL STRIP.AUTO: 0.2 E.U./DL
WBC # BLD AUTO: 9.17 THOUSAND/UL (ref 4.31–10.16)

## 2020-01-29 PROCEDURE — 80053 COMPREHEN METABOLIC PANEL: CPT

## 2020-01-29 PROCEDURE — 85025 COMPLETE CBC W/AUTO DIFF WBC: CPT

## 2020-01-29 PROCEDURE — 81003 URINALYSIS AUTO W/O SCOPE: CPT

## 2020-01-29 PROCEDURE — 36415 COLL VENOUS BLD VENIPUNCTURE: CPT

## 2020-01-30 ENCOUNTER — TELEPHONE (OUTPATIENT)
Dept: HEMATOLOGY ONCOLOGY | Facility: CLINIC | Age: 55
End: 2020-01-30

## 2020-01-30 ENCOUNTER — HOSPITAL ENCOUNTER (OUTPATIENT)
Dept: INFUSION CENTER | Facility: CLINIC | Age: 55
Discharge: HOME | End: 2020-01-30
Payer: COMMERCIAL

## 2020-01-30 ENCOUNTER — TELEPHONE (OUTPATIENT)
Dept: INFUSION CENTER | Facility: HOSPITAL | Age: 55
End: 2020-01-30

## 2020-01-30 VITALS
TEMPERATURE: 97.6 F | HEIGHT: 66 IN | RESPIRATION RATE: 18 BRPM | OXYGEN SATURATION: 99 % | HEART RATE: 83 BPM | WEIGHT: 160 LBS | DIASTOLIC BLOOD PRESSURE: 86 MMHG | SYSTOLIC BLOOD PRESSURE: 128 MMHG | BODY MASS INDEX: 25.71 KG/M2

## 2020-01-30 DIAGNOSIS — T45.1X5A CHEMOTHERAPY INDUCED NEUTROPENIA (HCC): ICD-10-CM

## 2020-01-30 DIAGNOSIS — C18.2 PRIMARY ADENOCARCINOMA OF ASCENDING COLON (HCC): ICD-10-CM

## 2020-01-30 DIAGNOSIS — D70.1 CHEMOTHERAPY INDUCED NEUTROPENIA (HCC): ICD-10-CM

## 2020-01-30 DIAGNOSIS — C78.7 LIVER METASTASES (HCC): Primary | ICD-10-CM

## 2020-01-30 PROCEDURE — 96367 TX/PROPH/DG ADDL SEQ IV INF: CPT

## 2020-01-30 PROCEDURE — 96375 TX/PRO/DX INJ NEW DRUG ADDON: CPT

## 2020-01-30 PROCEDURE — 96368 THER/DIAG CONCURRENT INF: CPT

## 2020-01-30 PROCEDURE — 96413 CHEMO IV INFUSION 1 HR: CPT

## 2020-01-30 PROCEDURE — G0498 CHEMO EXTEND IV INFUS W/PUMP: HCPCS

## 2020-01-30 PROCEDURE — 96417 CHEMO IV INFUS EACH ADDL SEQ: CPT

## 2020-01-30 RX ORDER — SODIUM CHLORIDE 9 MG/ML
20 INJECTION, SOLUTION INTRAVENOUS ONCE
Status: COMPLETED | OUTPATIENT
Start: 2020-01-30 | End: 2020-01-30

## 2020-01-30 RX ORDER — ATROPINE SULFATE 1 MG/ML
0.25 INJECTION, SOLUTION INTRAMUSCULAR; INTRAVENOUS; SUBCUTANEOUS ONCE
Status: COMPLETED | OUTPATIENT
Start: 2020-01-30 | End: 2020-01-30

## 2020-01-30 RX ORDER — PALONOSETRON 0.05 MG/ML
0.25 INJECTION, SOLUTION INTRAVENOUS ONCE
Status: COMPLETED | OUTPATIENT
Start: 2020-01-30 | End: 2020-01-30

## 2020-01-30 RX ORDER — ATROPINE SULFATE 1 MG/ML
0.25 INJECTION, SOLUTION INTRAMUSCULAR; INTRAVENOUS; SUBCUTANEOUS ONCE AS NEEDED
Status: DISCONTINUED | OUTPATIENT
Start: 2020-01-30 | End: 2020-02-02 | Stop reason: HOSPADM

## 2020-01-30 RX ADMIN — IRINOTECAN HYDROCHLORIDE 269 MG: 20 INJECTION, SOLUTION INTRAVENOUS at 13:23

## 2020-01-30 RX ADMIN — SODIUM CHLORIDE 20 ML/HR: 0.9 INJECTION, SOLUTION INTRAVENOUS at 11:50

## 2020-01-30 RX ADMIN — BEVACIZUMAB 355 MG: 400 INJECTION, SOLUTION INTRAVENOUS at 12:35

## 2020-01-30 RX ADMIN — ATROPINE SULFATE 0.25 MG: 1 INJECTION, SOLUTION INTRAMUSCULAR; INTRAVENOUS; SUBCUTANEOUS at 13:16

## 2020-01-30 RX ADMIN — PALONOSETRON 0.25 MG: 0.05 INJECTION, SOLUTION INTRAVENOUS at 11:50

## 2020-01-30 RX ADMIN — FOSAPREPITANT DIMEGLUMINE 150 MG: 150 INJECTION, POWDER, LYOPHILIZED, FOR SOLUTION INTRAVENOUS at 11:54

## 2020-01-30 RX ADMIN — LEUCOVORIN CALCIUM 700 MG: 10 INJECTION INTRAMUSCULAR; INTRAVENOUS at 13:17

## 2020-01-30 NOTE — TELEPHONE ENCOUNTER
Call received from Radiology department    with clinically significant results on 1/28/20 MRI  Results in [facility]  Alley Martinez RN  tasked and IM

## 2020-01-30 NOTE — PLAN OF CARE
Problem: Potential for Falls  Goal: Patient will remain free of falls  Description  INTERVENTIONS:  - Assess patient frequently for physical needs  -  Identify cognitive and physical deficits and behaviors that affect risk of falls  -  [place] fall precautions as indicated by assessment   - Educate patient/family on patient safety including physical limitations  - Instruct patient to call for assistance with activity based on assessment  - Modify environment to reduce risk of injury  - Consider OT/PT consult to assist with strengthening/mobility  Outcome: Progressing     Problem: SAFETY ADULT  Goal: Patient will remain free of falls  Description  INTERVENTIONS:  - Assess patient frequently for physical needs  -  Identify cognitive and physical deficits and behaviors that affect risk of falls  -  [place] fall precautions as indicated by assessment   - Educate patient/family on patient safety including physical limitations  - Instruct patient to call for assistance with activity based on assessment  - Modify environment to reduce risk of injury  - Consider OT/PT consult to assist with strengthening/mobility  Outcome: Progressing     Problem: Knowledge Deficit  Goal: Patient/family/caregiver demonstrates understanding of disease process, treatment plan, medications, and discharge instructions  Description  Complete learning assessment and assess knowledge base    Interventions:  - Provide teaching at level of understanding  - Provide teaching via preferred learning methods  Outcome: Progressing

## 2020-01-30 NOTE — PROGRESS NOTES
Patient connected to CADD pump for 46 hour infusion of 5FU  Excellent blood return before connection  All connections secured with tape  Green indicator light verified flashing before d/c  Patient aware to return on Saturday at 1300 for disconnect

## 2020-01-30 NOTE — TELEPHONE ENCOUNTER
I called patient to discuss results of her MRI scan  I left my name and office phone number on her answering machine/voicemail and requested a phone call back tomorrow

## 2020-01-30 NOTE — PROGRESS NOTES
Pt to clinic for FOLFIRI and avastin, tolerated infusion and CADD pump hook-up without complications, aware of when to return for CADD pump disconnect, avs declined

## 2020-01-30 NOTE — TELEPHONE ENCOUNTER
CT chestwith significant findings from 1/27/2020:    IMPRESSION:     Multiple 5 mm and smaller bilateral lung nodules, some visible only in retrospect on the PET CT from 10/21/2019 (extremely difficult to see due to respiratory motion and slice thickness), nearly all new since June 2019, due to metastatic disease      See abdominal MRI from 1/27/2020 for evaluation of liver metastases      The study was marked in EPIC for significant notification  Patient has appt 2/4/2020 with Dr Abhilash Noriega's team aware

## 2020-02-01 ENCOUNTER — HOSPITAL ENCOUNTER (OUTPATIENT)
Dept: INFUSION CENTER | Facility: CLINIC | Age: 55
Discharge: HOME | End: 2020-02-01
Payer: COMMERCIAL

## 2020-02-01 DIAGNOSIS — C78.7 LIVER METASTASES (HCC): Primary | ICD-10-CM

## 2020-02-01 DIAGNOSIS — C18.2 PRIMARY ADENOCARCINOMA OF ASCENDING COLON (HCC): ICD-10-CM

## 2020-02-01 PROCEDURE — 96372 THER/PROPH/DIAG INJ SC/IM: CPT

## 2020-02-01 RX ADMIN — PEGFILGRASTIM 6 MG: KIT SUBCUTANEOUS at 13:16

## 2020-02-01 NOTE — PROGRESS NOTES
Patient is here for a cadd d/c neulasta onpro  She offers no complaints at this time  CADD D/C per protocol with res vol of 0mls  onpro placed on right arm and confirmed it was working  Patient verbalized understanding of how it works and when to remove it   Next appointment confirmed and she declined avs

## 2020-02-02 ENCOUNTER — TELEPHONE (OUTPATIENT)
Dept: HEMATOLOGY ONCOLOGY | Facility: CLINIC | Age: 55
End: 2020-02-02

## 2020-02-04 ENCOUNTER — OFFICE VISIT (OUTPATIENT)
Dept: HEMATOLOGY ONCOLOGY | Facility: CLINIC | Age: 55
End: 2020-02-04
Payer: COMMERCIAL

## 2020-02-04 VITALS
TEMPERATURE: 97.8 F | BODY MASS INDEX: 25.39 KG/M2 | RESPIRATION RATE: 16 BRPM | OXYGEN SATURATION: 99 % | DIASTOLIC BLOOD PRESSURE: 72 MMHG | HEIGHT: 66 IN | SYSTOLIC BLOOD PRESSURE: 126 MMHG | WEIGHT: 158 LBS | HEART RATE: 88 BPM

## 2020-02-04 DIAGNOSIS — C78.6 PERITONEAL METASTASES (HCC): ICD-10-CM

## 2020-02-04 DIAGNOSIS — C78.7 LIVER METASTASES (HCC): ICD-10-CM

## 2020-02-04 DIAGNOSIS — C18.2 PRIMARY ADENOCARCINOMA OF ASCENDING COLON (HCC): Primary | ICD-10-CM

## 2020-02-04 DIAGNOSIS — C77.2 METASTASIS TO RETROPERITONEAL LYMPH NODE (HCC): ICD-10-CM

## 2020-02-04 DIAGNOSIS — I82.531 CHRONIC DEEP VEIN THROMBOSIS (DVT) OF POPLITEAL VEIN OF RIGHT LOWER EXTREMITY (HCC): ICD-10-CM

## 2020-02-04 DIAGNOSIS — C18.2 PRIMARY ADENOCARCINOMA OF ASCENDING COLON (HCC): ICD-10-CM

## 2020-02-04 DIAGNOSIS — C79.9 ADENOCARCINOMA, METASTATIC (HCC): ICD-10-CM

## 2020-02-04 PROCEDURE — 99215 OFFICE O/P EST HI 40 MIN: CPT | Performed by: INTERNAL MEDICINE

## 2020-02-04 NOTE — PROGRESS NOTES
Deferred treatment plan to 3/12 as patient may potentially go for surgery  New treatment plan was discussed and information was provided on FOLFOX + AVASTIN versus XELOX + AVASTIN for patient to review  For now treatment plan remains as FOLFIRI however before next treatment more than likely will be changed due to progression  Patient will let Dr Tyler Alarcon know on treatment decision

## 2020-02-04 NOTE — PATIENT INSTRUCTIONS
Patient to be evaluated for pelvic surgery    Patient to be seen at THE [facility] and she will let Beau Dove know where to send the records

## 2020-02-05 ENCOUNTER — TELEPHONE (OUTPATIENT)
Dept: HEMATOLOGY ONCOLOGY | Facility: CLINIC | Age: 55
End: 2020-02-05

## 2020-02-05 NOTE — TELEPHONE ENCOUNTER
Received a call from [facility], she was asking for assistance in having her imaging sent to [facility] for a second opinion, she requested the following:    MRIs from 7/24/19, 7/25/19, 1/27/20, 1/28/20  CT Chest from 1/27/20  PET/CT from 10/19/18, 10/21/19    To:  Dr Sigrid Romero  [facility]    Request emailed to medical records and asked that they please provide tracking number once they receive one, will then inform Carmen, she said she was waiting to hear what Dr Ke Bailon thought about the new metastasis, informed her that her case will be presented at the Rectal/GI Multidisciplinary Case Review next week where all of the disciplines discuss her case and options, once they make a decision, someone will reach out to her and let her know and if any appointments need to be made, I will make them for her, she was glad to know this, instructed her to call with any other questions or concerns

## 2020-02-05 NOTE — PROGRESS NOTES
HPI:   Patient is here with her   Follow-up visit for stage IV colon cancer with disease progression in the lungs, liver and pelvis  Bulky disease is in the pelvis with she has slight discomfort occasional   Patient is wondering if she could have debulking of the pelvic disease? Jim Sanchez She also has history of abdominal carcinomatosis and metastatic disease to  abdominal lymph nodes in addition to lungs, liver and pelvis  Presently she is on   Avastin plus FOLFIRI  every 2 weeks without bolus 5 FU and that was stopped because of toxicity  Her systemic therapy   will have to changed    Naseem Montague has some tiredness   She has manageable diarrhea     She has developed grade 1 PPE in her hands  She has experienced prolonged nausea and fatigue lasting for 3-4 days post chemotherapy   She is wondering if infusion 5 FU could be changed  She could have FOLFOX plus Avastin  She could have XELOX plus Avastin as second-line of therapy  Alyssa Overcast May 2018 patient underwent GALI and BSO for uterine bleeding  and there were cancer cells in the fallopian tubes   In June 2018 patient   underwent extended right hemicolectomy, partial omentectomy and biopsy of the peritoneal nodule   Peritoneal nodule came back  positive for metastatic adenocarcinoma from colon    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 and metastatic disease to liver    6 cm T3 G2 right colon cancer with positive margins, lymphovascular invasion and perineural invasion and positive 3 of 27 lymph nodes with extranodal extension and positive peritoneal nodule biopsy   Stage IV disease because of liver and peritoneal metastases   In July 2018 patient was started  on modified FOLFIRI plus Avastin   She had PPE in her hands and bolus 5 FU was discontinued    She has developed grade 1 P PE in her hands      She also had iron deficiency and had Venofer intravenously   She is not on Venofer anymore   She was seen by liver specialist at [facility] and was not felt to be a surgical candidate       Patient is on Xarelto 10 mg daily for history of DVT right lower extremity in 2016                           Current Outpatient Medications:     acetaminophen (TYLENOL) 500 mg tablet, Take 1,000 mg by mouth every 6 (six) hours as needed for mild pain, Disp: , Rfl:     rivaroxaban (XARELTO) 10 mg tablet, Take 1 tablet (10 mg total) by mouth daily, Disp: 90 tablet, Rfl: 3    lidocaine-prilocaine (EMLA) cream, APPLY 1 HOUR PRIOR TO CHEMO, COVER IN WRAP (Patient not taking: Reported on 2/4/2020), Disp: 30 g, Rfl: 0    nifedipine 0 2 % OINT, Apply topically every 4 (four) hours 0 3% ointment as typical(disregard 0 2% as auto order) 1 application to anal opening 4-6 times daily (Patient not taking: Reported on 2/4/2020), Disp: 1 Tube, Rfl: 0    predniSONE 5 mg/5 mL solution, Take by mouth daily, Disp: , Rfl:     No Known Allergies       Primary adenocarcinoma of ascending colon ([facility] [address] )    6/14/2018 Initial Diagnosis     Primary adenocarcinoma of ascending colon (HCC)      7/17/2018 - 8/1/2018 Chemotherapy     camptosar 180 mg/m2 day 1  5  mg/m2 day 1  5 FU 1200 mg/m2 day 1-2   Leucovorin 400 mg/m2     Venofer 100 mg (first 10 treatments) -- all every 2 weeks          7/17/2018 -  Chemotherapy     palonosetron (ALOXI) injection 0 25 mg, 0 25 mg, Intravenous, Once, 8 of 16 cycles  Administration: 0 25 mg (11/21/2019), 0 25 mg (12/5/2019), 0 25 mg (12/19/2019), 0 25 mg (1/2/2020), 0 25 mg (1/16/2020), 0 25 mg (1/30/2020)  fluorouracil (ADRUCIL) injection 715 mg, 400 mg/m2, Intravenous, Once, 7 of 7 cycles  pegfilgrastim (NEULASTA ONPRO) subcutaneous injection kit 6 mg, 6 mg, Subcutaneous, Once, 20 of 28 cycles  Administration: 6 mg (5/22/2019), 6 mg (6/5/2019), 6 mg (6/22/2019), 6 mg (7/5/2019), 6 mg (7/20/2019), 6 mg (8/2/2019), 6 mg (8/17/2019), 6 mg (8/31/2019), 6 mg (9/14/2019), 6 mg (9/28/2019), 6 mg (10/12/2019), 6 mg (10/26/2019), 6 mg (11/9/2019), 6 mg (11/23/2019), 6 mg (12/7/2019), 6 mg (12/21/2019), 6 mg (1/4/2020), 6 mg (1/18/2020), 6 mg (2/1/2020)  fosaprepitant (EMEND) 150 mg in sodium chloride 0 9 % 250 mL IVPB, 150 mg, Intravenous, Once, 8 of 16 cycles  Administration: 150 mg (11/7/2019), 150 mg (11/21/2019), 150 mg (12/5/2019), 150 mg (12/19/2019), 150 mg (1/2/2020), 150 mg (1/16/2020), 150 mg (1/30/2020)  bevacizumab (AVASTIN) 355 mg in sodium chloride 0 9 % 100 mL IVPB, 5 mg/kg, Intravenous, Once, 27 of 35 cycles  Administration: 355 mg (5/20/2019), 355 mg (6/3/2019), 355 mg (6/20/2019), 355 mg (7/3/2019), 355 mg (7/18/2019), 355 mg (7/31/2019), 355 mg (8/15/2019), 355 mg (8/29/2019), 355 mg (9/12/2019), 355 mg (9/26/2019), 355 mg (10/10/2019), 355 mg (10/24/2019), 355 mg (11/7/2019), 355 mg (11/21/2019), 355 mg (12/5/2019), 355 mg (12/19/2019), 355 mg (1/2/2020), 355 mg (1/16/2020), 355 mg (1/30/2020)  irinotecan (CAMPTOSAR) 322 mg in sodium chloride 0 9 % 500 mL chemo infusion, 180 mg/m2, Intravenous, Once, 27 of 35 cycles  Dose modification: 150 mg/m2 (original dose 180 mg/m2, Cycle 20, Reason: Dose Not Tolerated)  Administration: 322 mg (5/20/2019), 322 mg (6/3/2019), 320 mg (6/20/2019), 320 mg (7/3/2019), 320 mg (7/18/2019), 320 mg (7/31/2019), 320 mg (8/15/2019), 320 mg (8/29/2019), 320 mg (9/12/2019), 320 mg (9/26/2019), 320 mg (10/10/2019), 320 mg (10/24/2019), 269 mg (11/7/2019), 269 mg (11/21/2019), 269 mg (12/5/2019), 269 mg (12/19/2019), 269 mg (1/2/2020), 269 mg (1/16/2020), 269 mg (1/30/2020)  leucovorin 716 mg in sodium chloride 0 9 % 250 mL IVPB, 400 mg/m2, Intravenous, Once, 27 of 35 cycles  Administration: 716 mg (5/20/2019), 716 mg (6/3/2019), 700 mg (6/20/2019), 700 mg (7/3/2019), 700 mg (7/18/2019), 700 mg (7/31/2019), 700 mg (8/15/2019), 700 mg (8/29/2019), 700 mg (9/12/2019), 700 mg (9/26/2019), 700 mg (10/10/2019), 716 mg (10/24/2019), 700 mg (11/7/2019), 700 mg (11/21/2019), 700 mg (12/5/2019), 700 mg (12/19/2019), 700 mg (1/2/2020), 700 mg (1/16/2020), 700 mg (1/30/2020)      8/1/2018 Adverse Reaction     Needed granix 300 mcg due to [address] of 1 01       8/14/2018 -  Chemotherapy     camptosar 180 mg/m2 day 1  5  mg/m2 day 1  5 FU 1200 mg/m2 day 1-2   Leucovorin 400 mg/m2  Avastin 5 mg/kg day 1   Venofer 100 mg (first 10 treatments)  Neulasta on Pro 6 mg day 3      -- every 2 weeks             Liver metastases ([facility] [address] )    6/26/2018 Initial Diagnosis     Liver metastases ([facility] [address] )      7/17/2018 -  Chemotherapy     palonosetron (ALOXI) injection 0 25 mg, 0 25 mg, Intravenous, Once, 8 of 16 cycles  Administration: 0 25 mg (11/21/2019), 0 25 mg (12/5/2019), 0 25 mg (12/19/2019), 0 25 mg (1/2/2020), 0 25 mg (1/16/2020), 0 25 mg (1/30/2020)  fluorouracil (ADRUCIL) injection 715 mg, 400 mg/m2, Intravenous, Once, 7 of 7 cycles  pegfilgrastim (NEULASTA ONPRO) subcutaneous injection kit 6 mg, 6 mg, Subcutaneous, Once, 20 of 28 cycles  Administration: 6 mg (5/22/2019), 6 mg (6/5/2019), 6 mg (6/22/2019), 6 mg (7/5/2019), 6 mg (7/20/2019), 6 mg (8/2/2019), 6 mg (8/17/2019), 6 mg (8/31/2019), 6 mg (9/14/2019), 6 mg (9/28/2019), 6 mg (10/12/2019), 6 mg (10/26/2019), 6 mg (11/9/2019), 6 mg (11/23/2019), 6 mg (12/7/2019), 6 mg (12/21/2019), 6 mg (1/4/2020), 6 mg (1/18/2020), 6 mg (2/1/2020)  fosaprepitant (EMEND) 150 mg in sodium chloride 0 9 % 250 mL IVPB, 150 mg, Intravenous, Once, 8 of 16 cycles  Administration: 150 mg (11/7/2019), 150 mg (11/21/2019), 150 mg (12/5/2019), 150 mg (12/19/2019), 150 mg (1/2/2020), 150 mg (1/16/2020), 150 mg (1/30/2020)  bevacizumab (AVASTIN) 355 mg in sodium chloride 0 9 % 100 mL IVPB, 5 mg/kg, Intravenous, Once, 27 of 35 cycles  Administration: 355 mg (5/20/2019), 355 mg (6/3/2019), 355 mg (6/20/2019), 355 mg (7/3/2019), 355 mg (7/18/2019), 355 mg (7/31/2019), 355 mg (8/15/2019), 355 mg (8/29/2019), 355 mg (9/12/2019), 355 mg (9/26/2019), 355 mg (10/10/2019), 355 mg (10/24/2019), 355 mg (11/7/2019), 355 mg (11/21/2019), 355 mg (12/5/2019), 355 mg (12/19/2019), 355 mg (1/2/2020), 355 mg (1/16/2020), 355 mg (1/30/2020)  irinotecan (CAMPTOSAR) 322 mg in sodium chloride 0 9 % 500 mL chemo infusion, 180 mg/m2, Intravenous, Once, 27 of 35 cycles  Dose modification: 150 mg/m2 (original dose 180 mg/m2, Cycle 20, Reason: Dose Not Tolerated)  Administration: 322 mg (5/20/2019), 322 mg (6/3/2019), 320 mg (6/20/2019), 320 mg (7/3/2019), 320 mg (7/18/2019), 320 mg (7/31/2019), 320 mg (8/15/2019), 320 mg (8/29/2019), 320 mg (9/12/2019), 320 mg (9/26/2019), 320 mg (10/10/2019), 320 mg (10/24/2019), 269 mg (11/7/2019), 269 mg (11/21/2019), 269 mg (12/5/2019), 269 mg (12/19/2019), 269 mg (1/2/2020), 269 mg (1/16/2020), 269 mg (1/30/2020)  leucovorin 716 mg in sodium chloride 0 9 % 250 mL IVPB, 400 mg/m2, Intravenous, Once, 27 of 35 cycles  Administration: 716 mg (5/20/2019), 716 mg (6/3/2019), 700 mg (6/20/2019), 700 mg (7/3/2019), 700 mg (7/18/2019), 700 mg (7/31/2019), 700 mg (8/15/2019), 700 mg (8/29/2019), 700 mg (9/12/2019), 700 mg (9/26/2019), 700 mg (10/10/2019), 716 mg (10/24/2019), 700 mg (11/7/2019), 700 mg (11/21/2019), 700 mg (12/5/2019), 700 mg (12/19/2019), 700 mg (1/2/2020), 700 mg (1/16/2020), 700 mg (1/30/2020)         ROS:   Reviewed 13 systems:  Presently no headaches, seizures, dizziness, diplopia, dysphagia, hoarseness, chest pain, palpitations, shortness of breath, cough, hemoptysis,  nausea,  melena, hematuria, fever, chills, bleeding, bone pains, skin rash except for PPE, weight loss, arthritic symptoms,  numbness,  claudication and gait problem  No frequent infections  Not unusually sensitive to heat or cold  No swelling of the ankles  No swollen glands  Patient is anxious   Other symptoms are in HPI        /72 (BP Location: Right arm, Patient Position: Sitting, Cuff Size: Adult)   Pulse 88   Temp 97 8 °F (36 6 °C)   Resp 16   Ht 5' 5 5" (1 664 m)   Wt 71 7 kg (158 lb)   LMP 05/16/2018   SpO2 99%   BMI 25 89 kg/m²     Physical Exam:  Alert, oriented, not in distress, no icterus, no oral thrush, no palpable neck mass, clear lung fields, regular heart rate, abdomen  soft and non tender, no palpable abdominal mass, no ascites, no edema of ankles, no calf tenderness, no focal neurological deficit, no skin rash, no palpable lymphadenopathy in the neck and axillary areas, good arterial pulses, no clubbing  Patient is anxious  Performance status 1  IMAGING:  IMPRESSION:     In this patient with history of stage IV colon cancer with metastases to the liver, most of the liver lesions are stable compared to 7/24/2019, consistent with treated metastases, but a right hepatic dome lesion has increased in size and demonstrates   more diffuse enhancement, currently 1 6 x 0 9 cm, compared to 1 2 x 0 8 cm on the 7/24/2019 MRI  This is consistent with a worsening metastasis (series 9 image 21 )     Please see pelvic MR report for description of the new pelvic masses      Workstation performed: [id number]      Imaging     MRI abdomen w wo contrast (Order: [id number]) - 1/27/2020     IMPRESSION:     In this patient with history of stage IV right colon cancer, there is a new, large complex cystic right adnexal mass measuring 7 4 x 6 3 x 9 2 cm (series 12 image 14)  and a new predominantly solid left adnexal mass measuring 4 1 x 2 3 x 2 9 cm (series   12 image 63 )  These are highly suspicious for metastases  (Please note based on the op note from 5/21/2018, patient has had total abdominal hysterectomy and bilateral salpingectomy, but there was no clear mention of oophorectomy  Therefore other   adnexal masses seen currently may represent colon cancer metastases to the ovaries  )     There is also trace amount ascites      The study was marked in [facility] for significant notification         Workstation performed: [id number]      Imaging     MRI pelvis bony wo and w contrast (Order: [id number]) - 1/28/2020  IMPRESSION:     Multiple 5 mm and smaller bilateral lung nodules, some visible only in retrospect on the PET CT from 10/21/2019 (extremely difficult to see due to respiratory motion and slice thickness), nearly all new since June 2019, due to metastatic disease      See abdominal MRI from 1/27/2020 for evaluation of liver metastases      The study was marked in EPIC for significant notification               Workstation performed: [id number]      Imaging     CT chest wo contrast (Order: [id number]) - 1/27/2020     LABS:  Results for orders placed or performed in visit on 01/29/20   Comprehensive metabolic panel   Result Value Ref Range    Sodium 138 136 - 145 mmol/L    Potassium 3 9 3 5 - 5 3 mmol/L    Chloride 106 100 - 108 mmol/L    CO2 26 21 - 32 mmol/L    ANION GAP 6 4 - 13 mmol/L    BUN 10 5 - 25 mg/dL    Creatinine 0 88 0 60 - 1 30 mg/dL    Glucose 66 65 - 140 mg/dL    Calcium 8 8 8 3 - 10 1 mg/dL    AST 12 5 - 45 U/L    ALT 22 12 - 78 U/L    Alkaline Phosphatase 118 (H) 46 - 116 U/L    Total Protein 7 0 6 4 - 8 2 g/dL    Albumin 3 9 3 5 - 5 0 g/dL    Total Bilirubin 0 30 0 20 - 1 00 mg/dL    eGFR 76 ml/min/1 73sq m   CBC and differential   Result Value Ref Range    WBC 9 17 4 31 - 10 16 Thousand/uL    RBC 3 84 3 81 - 5 12 Million/uL    Hemoglobin 13 1 11 5 - 15 4 g/dL    Hematocrit 42 0 34 8 - 46 1 %     (H) 82 - 98 fL    MCH 34 1 26 8 - 34 3 pg    MCHC 31 2 (L) 31 4 - 37 4 g/dL    RDW 15 8 (H) 11 6 - 15 1 %    MPV 9 9 8 9 - 12 7 fL    Platelets 853 751 - 925 Thousands/uL    nRBC 0 /100 WBCs    Neutrophils Relative 70 43 - 75 %    Immat GRANS % 1 0 - 2 %    Lymphocytes Relative 16 14 - 44 %    Monocytes Relative 10 4 - 12 %    Eosinophils Relative 2 0 - 6 %    Basophils Relative 1 0 - 1 %    Neutrophils Absolute 6 52 1 85 - 7 62 Thousands/µL    Immature Grans Absolute 0 05 0 00 - 0 20 Thousand/uL    Lymphocytes Absolute 1 45 0 60 - 4 47 Thousands/µL    Monocytes Absolute 0 95 0 17 - 1 22 Thousand/µL    Eosinophils Absolute 0 15 0 00 - 0 61 Thousand/µL    Basophils Absolute 0 05 0 00 - 0 10 Thousands/µL   UA (URINE) with reflex to Scope   Result Value Ref Range    Color, UA Yellow     Clarity, UA Clear     Specific [place], UA 1 010 1 003 - 1 030    pH, UA 6 0 4 5, 5 0, 5 5, 6 0, 6 5, 7 0, 7 5, 8 0    Leukocytes, UA Negative Negative    Nitrite, UA Negative Negative    Protein, UA Negative Negative mg/dl    Glucose, UA Negative Negative mg/dl    Ketones, UA Negative Negative mg/dl    Urobilinogen, UA 0 2 0 2, 1 0 E U /dl E U /dl    Bilirubin, UA Negative Negative    Blood, UA Negative Negative     Labs, Imaging, & Other studies:   All pertinent labs and imaging studies were personally reviewed    Lab Results   Component Value Date     03/18/2015    K 3 9 01/29/2020     01/29/2020    CO2 26 01/29/2020    ANIONGAP 9 03/18/2015    BUN 10 01/29/2020    CREATININE 0 88 01/29/2020    GLUCOSE 100 03/18/2015    GLUF 88 09/24/2019    CALCIUM 8 8 01/29/2020    AST 12 01/29/2020    ALT 22 01/29/2020    ALKPHOS 118 (H) 01/29/2020    PROT 6 6 03/18/2015    BILITOT 0 65 03/18/2015    EGFR 76 01/29/2020     Lab Results   Component Value Date    WBC 9 17 01/29/2020    HGB 13 1 01/29/2020    HCT 42 0 01/29/2020     (H) 01/29/2020     01/29/2020       Reviewed and discussed with patient  Assessment and plan:   Patient is here with her   Follow-up visit for stage IV colon cancer with disease progression in the lungs, liver and pelvis  Bulky disease is in the pelvis with she has slight discomfort occasional   Patient is wondering if she could have debulking of the pelvic disease? Jim Sanchez She also has history of abdominal carcinomatosis and metastatic disease to  abdominal lymph nodes in addition to lungs, liver and pelvis  Presently she is on   Avastin plus FOLFIRI  every 2 weeks without bolus 5 FU and that was stopped because of toxicity  Her systemic therapy   will have to changed  Jim Sanchez  Patient has some tiredness   She has manageable diarrhea     She has developed grade 1 PPE in her hands  She has experienced prolonged nausea and fatigue lasting for 3-4 days post chemotherapy   She is wondering if infusion 5 FU could be changed  She could have FOLFOX plus Avastin  She could have XELOX plus Avastin as second-line of therapy  Alyssa Overcast May 2018 patient underwent GALI and BSO for uterine bleeding  and there were cancer cells in the fallopian tubes   In June 2018 patient   underwent extended right hemicolectomy, partial omentectomy and biopsy of the peritoneal nodule   Peritoneal nodule came back  positive for metastatic adenocarcinoma from colon    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 and metastatic disease to liver    6 cm T3 G2 right colon cancer with positive margins, lymphovascular invasion and perineural invasion and positive 3 of 27 lymph nodes with extranodal extension and positive peritoneal nodule biopsy   Stage IV disease because of liver and peritoneal metastases   In July 2018 patient was started  on modified FOLFIRI plus Avastin   She had PPE in her hands and bolus 5 FU was discontinued  She has developed grade 1 P PE in her hands      She also had iron deficiency and had Venofer intravenously   She is not on Venofer anymore   She was seen by liver specialist at [facility] and was not felt to be a surgical candidate       Patient is on Xarelto 10 mg daily for history of DVT right lower extremity in 2016     Physical examination and test results are as recorded and discussed in detail especially findings on MRI scans and CT chest   Systemic therapy will be changed as above  Patient had been to [facility] and she might like to go back there for consultation  She asked about debulking of the pelvic disease and I passed that information to patient's rectal surgeon and he will look into that    His immediate response was probably not because of progression of disease in lungs and liver  Patient has appointment with radiologist to review her films       We had a long discussion about disease status, disease progression and treatment options     Questions answered   Goal is response and prolongation of survival from colon cancer and no more blood clot and bleeding  To check to see if she had molecular marker testing or not?                      Discussed the importance of self-breast examination, eating healthy foods, staying active and health screening tests  Oj Truong is capable of Self care  1  Primary adenocarcinoma of ascending colon ([facility] [address] )      2  Liver metastases ([facility] [address] )      3  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[facility] [address] )      4  Peritoneal metastases ([facility] [address] )      5  Adenocarcinoma, metastatic ([facility] [address] ) to lungs and pelvic adnexal areas      6  Chronic deep vein thrombosis (DVT) of popliteal vein of right lower extremity [facility])            Patient voiced understanding and agreement in the discussion  Counseling / Coordination of Care   Greater than 50% of total time was spent with the patient and / or family counseling and / or coordination of care

## 2020-02-13 ENCOUNTER — DOCUMENTATION (OUTPATIENT)
Dept: HEMATOLOGY ONCOLOGY | Facility: CLINIC | Age: 55
End: 2020-02-13

## 2020-02-13 ENCOUNTER — HOSPITAL ENCOUNTER (OUTPATIENT)
Dept: INFUSION CENTER | Facility: CLINIC | Age: 55
End: 2020-02-13

## 2020-02-13 NOTE — PROGRESS NOTES
Rectal/GI Multidisciplinary Case Review date: 2/13/2020      Presenting Doctor: Dr Celia Sandoval      Diagnosis: Metastatic Colon Cancer      Pennie Gupta was presented at the Rectal/GI Multidisciplinary Conference today  PHYSICIAN RECOMMENDED PLAN:    -Not a surgical candidate due to metastatic disease    Team agreed to plan

## 2020-02-14 ENCOUNTER — TELEPHONE (OUTPATIENT)
Dept: HEMATOLOGY ONCOLOGY | Facility: CLINIC | Age: 55
End: 2020-02-14

## 2020-02-14 NOTE — TELEPHONE ENCOUNTER
Received a call from [facility] with questions for Dr Lulu Keyes and Dr Sigrid Navarro, she would like to speak to them, message sent to them both in regards to this and asked them to address this

## 2020-02-17 ENCOUNTER — TELEPHONE (OUTPATIENT)
Dept: HEMATOLOGY ONCOLOGY | Facility: CLINIC | Age: 55
End: 2020-02-17

## 2020-02-17 NOTE — TELEPHONE ENCOUNTER
Neeraj Truong or Fernando Tinoco please review if Dr Gertrude Cash is ok with patient being see on 2/28 [facility]  Patient will review with Proothi at this appt treatment/rescheduling treatments at this time

## 2020-02-17 NOTE — TELEPHONE ENCOUNTER
Received a voicemail message from [facility], she was requesting to move up her appointment with Dr Tyler Alarcon, she would like to reschedule from [place] on 2/28 to Dr Tyler Alarcon on 2/21 if possible so she may resume treatment, she does not care the location, can you please call her and discuss/arrange with her? Also, see if Dr Tyler Alarcon is agreeable to this as well? Thank you!

## 2020-02-17 NOTE — TELEPHONE ENCOUNTER
Per Dr Gaston Glover, patient is to have her appt with Sarah Somers on 2/24/20  Once that appt is completed she can call our office to let us know what was reccommended as her treatment plan  Once we received that information she will be scheduled prior to her office F/U for treatment  She is to F/U with us on 2/28/20 as scheduled  I spoke to the patient and informed her of this information, patient voiced understanding

## 2020-02-25 ENCOUNTER — TELEPHONE (OUTPATIENT)
Dept: HEMATOLOGY ONCOLOGY | Facility: CLINIC | Age: 55
End: 2020-02-25

## 2020-02-25 ENCOUNTER — TELEPHONE (OUTPATIENT)
Dept: HEMATOLOGY ONCOLOGY | Facility: MEDICAL CENTER | Age: 55
End: 2020-02-25

## 2020-02-25 ENCOUNTER — PATIENT OUTREACH (OUTPATIENT)
Dept: HEMATOLOGY ONCOLOGY | Facility: CLINIC | Age: 55
End: 2020-02-25

## 2020-02-25 NOTE — TELEPHONE ENCOUNTER
KIMBERLYM instructing patient to call our office back so we can discuss her concerns  Please ask the patient what questions she has so that I can review them with Dr Romana Herter

## 2020-02-25 NOTE — TELEPHONE ENCOUNTER
Patient called in stated that she was just on the phone with Haylee Sifuentes and has additional questions   A good call back number is [phone]

## 2020-02-25 NOTE — TELEPHONE ENCOUNTER
Per Dr Osman Mendoza, when she comes into her appt on Friday 2/28/20 he will have her sign the consent forms for her to received Folfox and the Avastin  Dr Osman Mendoza stated once she is seen in office we will have her scheduled for treatment early next week, I spoke to the patient and informed her of this information  I instructed patient to call the office if she has any other questions or any other issues arise, patient voiced understanding of all information provided

## 2020-02-25 NOTE — TELEPHONE ENCOUNTER
Per Dr Lulu Keyes, when she comes into her appt on Friday 2/28/20 he will have her sign the consent forms for her to received Folfox and the Avastin  Dr Lulu Keyes stated once she is seen in office we will have her scheduled for treatment early next week, I spoke to the patient and informed her of this information  I instructed patient to call the office if she has any other questions or any other issues arise, patient voiced understanding of all information provided

## 2020-02-25 NOTE — TELEPHONE ENCOUNTER
Patient called requesting to speak with Rocio Dean regarding her appt yesterday at [facility] and her treatment plan  Best call back [phone]

## 2020-02-25 NOTE — TELEPHONE ENCOUNTER
Deandre Mc -- Please schedule patient's new chemo regimen, FOLFOX, beginning next week at [facility]  Orders are in [place]  Please confirm with patient what date or time she would prefer  If [facility] does not have openings next week, she will need to go to another location  Also confirm if she needs blood draw appts through her port  Neo Fall -- this is is just an [place]  Patient is coming to [facility] Friday for an appt and consent but needs to get started on chemo next week so we are scheduling ahead of time due to availability

## 2020-02-25 NOTE — TELEPHONE ENCOUNTER
Patient states Chaitanya Barlow stated surgery is not a option    From Chaitanya Barlow would like patient to meet with Medical Oncologist and he couldn't make a decision on treatment plan  Patient would like to schedule her infusion for this week  Patient can be reached at [phone]

## 2020-02-26 ENCOUNTER — PATIENT OUTREACH (OUTPATIENT)
Dept: HEMATOLOGY ONCOLOGY | Facility: CLINIC | Age: 55
End: 2020-02-26

## 2020-02-26 DIAGNOSIS — C18.2 PRIMARY ADENOCARCINOMA OF ASCENDING COLON (HCC): ICD-10-CM

## 2020-02-26 DIAGNOSIS — D70.1 CHEMOTHERAPY INDUCED NEUTROPENIA (HCC): Primary | ICD-10-CM

## 2020-02-26 DIAGNOSIS — C78.7 LIVER METASTASES (HCC): ICD-10-CM

## 2020-02-26 DIAGNOSIS — T45.1X5A CHEMOTHERAPY INDUCED NEUTROPENIA (HCC): Primary | ICD-10-CM

## 2020-02-26 NOTE — PROGRESS NOTES
Received voicemail message from [facility] with concerns to her treatment, called her back with no answer, left her a message to return my call

## 2020-02-26 NOTE — PROGRESS NOTES
Received call back from Alida Mcneal, she stated she was frustrated as she feels she is not getting answers from anyone, asked her to explain what it is she is in need of, she said she was looking to resume her chemotherapy treatments this week and was told it would be next week, explained to her that her treatment plan was being changed and that she would have to come in prior to that to have the new treatment reviewed, symptoms and side effects discussed and consent signed and she has an appointment with Lorenzo on Friday so she wouldn't be able to resume treatment until next week, she was then agreeable, she also said she went to [facility] and saw a Surgical Oncologist who did not seem to answer her questions but would like her to see a Medical Oncologist there so he will be setting up that appointment for her, she said she was also wondering about the option to take chemo pills that was discussed with her as an option, we talked about some of the potential side effects and after some discussion she stated she would like to try the pills if this was still an option for her, told her I would present this to the team for her so they would be aware of this prior to her office visit on Friday, she was grateful for the help and support, instructed her to call with any other questions or concerns

## 2020-02-28 ENCOUNTER — APPOINTMENT (OUTPATIENT)
Dept: LAB | Facility: MEDICAL CENTER | Age: 55
End: 2020-02-28
Payer: COMMERCIAL

## 2020-02-28 ENCOUNTER — DOCUMENTATION (OUTPATIENT)
Dept: HEMATOLOGY ONCOLOGY | Facility: CLINIC | Age: 55
End: 2020-02-28

## 2020-02-28 ENCOUNTER — OFFICE VISIT (OUTPATIENT)
Dept: HEMATOLOGY ONCOLOGY | Facility: CLINIC | Age: 55
End: 2020-02-28
Payer: COMMERCIAL

## 2020-02-28 VITALS
DIASTOLIC BLOOD PRESSURE: 72 MMHG | HEIGHT: 66 IN | SYSTOLIC BLOOD PRESSURE: 126 MMHG | OXYGEN SATURATION: 99 % | TEMPERATURE: 97.9 F | WEIGHT: 154 LBS | HEART RATE: 88 BPM | RESPIRATION RATE: 17 BRPM | BODY MASS INDEX: 24.75 KG/M2

## 2020-02-28 DIAGNOSIS — T45.1X5A CHEMOTHERAPY INDUCED NEUTROPENIA (HCC): Primary | ICD-10-CM

## 2020-02-28 DIAGNOSIS — C18.2 PRIMARY ADENOCARCINOMA OF ASCENDING COLON (HCC): ICD-10-CM

## 2020-02-28 DIAGNOSIS — D70.1 CHEMOTHERAPY INDUCED NEUTROPENIA (HCC): Primary | ICD-10-CM

## 2020-02-28 DIAGNOSIS — C78.7 LIVER METASTASES (HCC): ICD-10-CM

## 2020-02-28 DIAGNOSIS — D70.1 CHEMOTHERAPY INDUCED NEUTROPENIA (HCC): ICD-10-CM

## 2020-02-28 DIAGNOSIS — T45.1X5A CHEMOTHERAPY INDUCED NEUTROPENIA (HCC): ICD-10-CM

## 2020-02-28 PROCEDURE — 99215 OFFICE O/P EST HI 40 MIN: CPT | Performed by: PHYSICIAN ASSISTANT

## 2020-02-28 RX ORDER — SODIUM CHLORIDE 9 MG/ML
20 INJECTION, SOLUTION INTRAVENOUS ONCE AS NEEDED
Status: CANCELLED | OUTPATIENT
Start: 2020-03-03

## 2020-02-28 RX ORDER — DEXTROSE MONOHYDRATE 50 MG/ML
20 INJECTION, SOLUTION INTRAVENOUS ONCE
Status: CANCELLED | OUTPATIENT
Start: 2020-03-03

## 2020-02-28 RX ORDER — FLUOROURACIL 50 MG/ML
400 INJECTION, SOLUTION INTRAVENOUS ONCE
Status: CANCELLED | OUTPATIENT
Start: 2020-03-03

## 2020-02-28 NOTE — PROGRESS NOTES
Completed nursing teach with patient and her  on MODIFIED FOLFOX + AVASTIN for next week 3/3 and then patient will be changing to XELOX + AVASTIN  Reviewed with patient route of admission, possible side effects and symptom management, when to call the doctor versus when to go to the ER, etc  All questions answered  Consents signed and scanned into chart  Handouts provided  Email sent to oral chemo team to review new Xeloda start

## 2020-03-02 ENCOUNTER — DOCUMENTATION (OUTPATIENT)
Dept: HEMATOLOGY ONCOLOGY | Facility: CLINIC | Age: 55
End: 2020-03-02

## 2020-03-02 ENCOUNTER — TELEPHONE (OUTPATIENT)
Dept: HEMATOLOGY ONCOLOGY | Facility: CLINIC | Age: 55
End: 2020-03-02

## 2020-03-02 NOTE — PROGRESS NOTES
2-28-20  Received new oral start email from provider    Auth needed per homestar    Need copay to determine if assistance is needed

## 2020-03-02 NOTE — TELEPHONE ENCOUNTER
Spoke with patient today around lunchtime regarding clinical trials  Clinical trials that were found all required oxaliplatin to be given prior to being eligible  She was recommended to follow-up with [facility] today to decide if there was anything that she would be eligible for there  If not she will plan to proceed with therapy as scheduled for tomorrow

## 2020-03-03 ENCOUNTER — HOSPITAL ENCOUNTER (OUTPATIENT)
Dept: INFUSION CENTER | Facility: CLINIC | Age: 55
End: 2020-03-03

## 2020-03-03 ENCOUNTER — HOSPITAL ENCOUNTER (OUTPATIENT)
Dept: INFUSION CENTER | Facility: CLINIC | Age: 55
Discharge: HOME | End: 2020-03-03
Payer: COMMERCIAL

## 2020-03-03 VITALS
OXYGEN SATURATION: 100 % | BODY MASS INDEX: 26.52 KG/M2 | TEMPERATURE: 97.2 F | DIASTOLIC BLOOD PRESSURE: 88 MMHG | HEIGHT: 66 IN | HEART RATE: 74 BPM | WEIGHT: 165 LBS | SYSTOLIC BLOOD PRESSURE: 150 MMHG | RESPIRATION RATE: 14 BRPM

## 2020-03-03 DIAGNOSIS — C18.2 PRIMARY ADENOCARCINOMA OF ASCENDING COLON (HCC): ICD-10-CM

## 2020-03-03 DIAGNOSIS — D70.1 CHEMOTHERAPY INDUCED NEUTROPENIA (HCC): ICD-10-CM

## 2020-03-03 DIAGNOSIS — C78.7 LIVER METASTASES (HCC): Primary | ICD-10-CM

## 2020-03-03 DIAGNOSIS — T45.1X5A CHEMOTHERAPY INDUCED NEUTROPENIA (HCC): ICD-10-CM

## 2020-03-03 PROCEDURE — 96368 THER/DIAG CONCURRENT INF: CPT

## 2020-03-03 PROCEDURE — G0498 CHEMO EXTEND IV INFUS W/PUMP: HCPCS

## 2020-03-03 PROCEDURE — 96367 TX/PROPH/DG ADDL SEQ IV INF: CPT

## 2020-03-03 PROCEDURE — 96413 CHEMO IV INFUSION 1 HR: CPT

## 2020-03-03 PROCEDURE — 96417 CHEMO IV INFUS EACH ADDL SEQ: CPT

## 2020-03-03 PROCEDURE — 96415 CHEMO IV INFUSION ADDL HR: CPT

## 2020-03-03 RX ORDER — SODIUM CHLORIDE 9 MG/ML
20 INJECTION, SOLUTION INTRAVENOUS ONCE AS NEEDED
Status: DISCONTINUED | OUTPATIENT
Start: 2020-03-03 | End: 2020-03-06 | Stop reason: HOSPADM

## 2020-03-03 RX ORDER — DEXTROSE MONOHYDRATE 50 MG/ML
20 INJECTION, SOLUTION INTRAVENOUS ONCE
Status: COMPLETED | OUTPATIENT
Start: 2020-03-03 | End: 2020-03-03

## 2020-03-03 RX ADMIN — DEXTROSE 20 ML/HR: 5 SOLUTION INTRAVENOUS at 14:15

## 2020-03-03 RX ADMIN — BEVACIZUMAB 357.5 MG: 400 INJECTION, SOLUTION INTRAVENOUS at 13:30

## 2020-03-03 RX ADMIN — OXALIPLATIN 150 MG: 5 INJECTION, SOLUTION INTRAVENOUS at 14:21

## 2020-03-03 RX ADMIN — DEXAMETHASONE SODIUM PHOSPHATE: 10 INJECTION, SOLUTION INTRAMUSCULAR; INTRAVENOUS at 12:54

## 2020-03-03 RX ADMIN — LEUCOVORIN CALCIUM 700 MG: 200 INJECTION, POWDER, LYOPHILIZED, FOR SUSPENSION INTRAMUSCULAR; INTRAVENOUS at 14:21

## 2020-03-03 RX ADMIN — SODIUM CHLORIDE 20 ML/HR: 0.9 INJECTION, SOLUTION INTRAVENOUS at 12:40

## 2020-03-03 NOTE — PROGRESS NOTES
Patient here for avastin, oxaliplatin, 5FU CADD connect for treatment of metastatic colon cancer  Patient resting with no complaints  /88  Labs within parameters  Spoke with Dion in med onc about patient's blood pressure, per Dr Espinal Estimable okay to proceed with treatment today  Callbell within reach  Will continue to monitor

## 2020-03-04 ENCOUNTER — PATIENT OUTREACH (OUTPATIENT)
Dept: HEMATOLOGY ONCOLOGY | Facility: CLINIC | Age: 55
End: 2020-03-04

## 2020-03-04 ENCOUNTER — TELEPHONE (OUTPATIENT)
Dept: HEMATOLOGY ONCOLOGY | Facility: CLINIC | Age: 55
End: 2020-03-04

## 2020-03-04 DIAGNOSIS — C18.2 PRIMARY ADENOCARCINOMA OF ASCENDING COLON (HCC): Primary | ICD-10-CM

## 2020-03-04 NOTE — PROGRESS NOTES
Patient tolerated treatment without complications  Patient connected to CADD pump, all connections secured  CADD infusing without issue  Patient aware of disconnect date and time   Patient declined AVS

## 2020-03-04 NOTE — TELEPHONE ENCOUNTER
Genetics New Patient Intake Form   Patient Details:     Salomón Lozada    1965    [id number]    Background Information:    "On Hold (Urgent Slot)" is set aside for Pancreatic, Ovarian, and Scheduled Surgery Patients  If it is not scheduled by the previous Friday, any patient can be scheduled in it  Who is calling to schedule? If not self, relationship to patient? [facility]   Referring Provider Wendy Randhawa   Is this a personal or family history? personal   If personal history, what age were you at diagnosis? 52   What is the type of tumor? Primary endocarcinoma of assending colon    Pancreatic and Ovarian needs to be scheduled in the "On Hold (Urgent Slot)"   Do you have a pending surgery for your cancer diagnosis? No    If yes: needs to be scheduled in the "On Hold (Urgent Slot)"   Referring Provider [address] [place] [address]   Did the patient schedule an appointment? Yes   List Appointment Date and Provider Name  Araceli  6/23/2020   Scheduling Information:   Preferred [place]:   [facility]   Are there any dates/time the patient cannot be seen? n/a   Miscellaneous: n/a   After completing the above information, please route to Oncology Genetics

## 2020-03-04 NOTE — PROGRESS NOTES
Spoke to Kendy Silverio and she provided me with the fax #: [phone]  I faxed over records from 5/2019- Present of office notes, labs, scans, and treatment plans from our office  Emily MCNULTY with instructions to call our office if additional records or information would be needed for her appt

## 2020-03-05 ENCOUNTER — TELEPHONE (OUTPATIENT)
Dept: INFUSION CENTER | Facility: HOSPITAL | Age: 55
End: 2020-03-05

## 2020-03-05 ENCOUNTER — TELEPHONE (OUTPATIENT)
Dept: HEMATOLOGY ONCOLOGY | Facility: MEDICAL CENTER | Age: 55
End: 2020-03-05

## 2020-03-05 ENCOUNTER — HOSPITAL ENCOUNTER (OUTPATIENT)
Dept: INFUSION CENTER | Facility: CLINIC | Age: 55
Discharge: HOME | End: 2020-03-05
Payer: COMMERCIAL

## 2020-03-05 DIAGNOSIS — C18.2 PRIMARY ADENOCARCINOMA OF ASCENDING COLON (HCC): ICD-10-CM

## 2020-03-05 DIAGNOSIS — T45.1X5A CHEMOTHERAPY INDUCED NEUTROPENIA (HCC): ICD-10-CM

## 2020-03-05 DIAGNOSIS — D70.1 CHEMOTHERAPY INDUCED NEUTROPENIA (HCC): ICD-10-CM

## 2020-03-05 DIAGNOSIS — C78.7 LIVER METASTASES (HCC): Primary | ICD-10-CM

## 2020-03-05 PROCEDURE — 96372 THER/PROPH/DIAG INJ SC/IM: CPT

## 2020-03-05 RX ADMIN — PEGFILGRASTIM 6 MG: KIT SUBCUTANEOUS at 14:47

## 2020-03-05 NOTE — PROGRESS NOTES
Pt to clinic for cadd dc and neulasta on pro, pt offers no complaints at this time, aware of when to remove on pro and next appointment, declined avs

## 2020-03-05 NOTE — TELEPHONE ENCOUNTER
Patient called in stated that she may have a side effect due to the medication, a good call back number is [phone]

## 2020-03-05 NOTE — TELEPHONE ENCOUNTER
Patient had oxaliplatin on 3/3/2020  Patient called with c/o flushing of face starting at 1500 on 3/4/2020, that resolved within 45 minutes, then at 1800, flushing reappeared but was worse in intensity  Patient applied cold compresses and flushing resolved  Then at 2245 patient had pain in cheeks, teeth and eye sockets and face became flushed again  These symptoms have totally subsided  Patient has 5FU pump infusing and due for disconnect today  Will pass on to Dr Noriega's RN to discuss with Dr Eagle Luna

## 2020-03-05 NOTE — PROGRESS NOTES
Spoke with Carmen and confirmed what she needed us to send to [facility]  Reports have been faxed  I also told Carmen that I would save the originals if she would like to pick them up at our office

## 2020-03-05 NOTE — PROGRESS NOTES
Left message for pt to ask if it is just her most recent colonoscopy that is needed for her upcoming [facility] visit  Asked her to call the office to confirm

## 2020-03-06 ENCOUNTER — TELEPHONE (OUTPATIENT)
Dept: HEMATOLOGY ONCOLOGY | Facility: CLINIC | Age: 55
End: 2020-03-06

## 2020-03-06 ENCOUNTER — DOCUMENTATION (OUTPATIENT)
Dept: HEMATOLOGY ONCOLOGY | Facility: CLINIC | Age: 55
End: 2020-03-06

## 2020-03-06 DIAGNOSIS — D70.1 CHEMOTHERAPY INDUCED NEUTROPENIA (HCC): ICD-10-CM

## 2020-03-06 DIAGNOSIS — C18.2 PRIMARY ADENOCARCINOMA OF ASCENDING COLON (HCC): Primary | ICD-10-CM

## 2020-03-06 DIAGNOSIS — T45.1X5A CHEMOTHERAPY INDUCED NEUTROPENIA (HCC): ICD-10-CM

## 2020-03-06 DIAGNOSIS — C78.7 LIVER METASTASES (HCC): ICD-10-CM

## 2020-03-06 DIAGNOSIS — C18.2 PRIMARY ADENOCARCINOMA OF ASCENDING COLON (HCC): ICD-10-CM

## 2020-03-06 RX ORDER — CAPECITABINE 500 MG/1
500 TABLET, FILM COATED ORAL 2 TIMES DAILY
Qty: 28 TABLET | Refills: 0 | Status: SHIPPED | OUTPATIENT
Start: 2020-03-06 | End: 2020-03-27 | Stop reason: ALTCHOICE

## 2020-03-06 RX ORDER — CAPECITABINE 150 MG/1
1350 TABLET, FILM COATED ORAL 2 TIMES DAILY
Qty: 252 TABLET | Refills: 0 | Status: SHIPPED | OUTPATIENT
Start: 2020-03-06 | End: 2020-03-27 | Stop reason: ALTCHOICE

## 2020-03-06 NOTE — PROGRESS NOTES
2/28/2020 received the notification pt will be starting xeloda      3/6/2020 received notification from clinical pt is scheduled to start xeloda (capecitabine) on 3/16/2020 and was checking the status of the auth  Per homestar, the claim went through without an [place]  However, this must be obtained from [address]  Sent the pt information over to [facility] and requested that this be expedited  Asked that the rx be sent there as well

## 2020-03-09 ENCOUNTER — TELEPHONE (OUTPATIENT)
Dept: HEMATOLOGY ONCOLOGY | Facility: CLINIC | Age: 55
End: 2020-03-09

## 2020-03-09 ENCOUNTER — HOSPITAL ENCOUNTER (OUTPATIENT)
Dept: NON INVASIVE DIAGNOSTICS | Facility: CLINIC | Age: 55
Discharge: HOME | End: 2020-03-09
Payer: COMMERCIAL

## 2020-03-09 DIAGNOSIS — C18.2 MALIGNANT NEOPLASM OF ASCENDING COLON (HCC): ICD-10-CM

## 2020-03-09 DIAGNOSIS — Z76.89 ENCOUNTER FOR ASSESSMENT FOR DEEP VEIN THROMBOSIS (DVT): ICD-10-CM

## 2020-03-09 DIAGNOSIS — C18.2 MALIGNANT NEOPLASM OF ASCENDING COLON (HCC): Primary | ICD-10-CM

## 2020-03-09 PROCEDURE — 93971 EXTREMITY STUDY: CPT

## 2020-03-09 NOTE — TELEPHONE ENCOUNTER
Spoke with pt and let her know that we are ordering a STAT doppler  Pt denies any swelling, redness, warmth, or swelling to the sight  A  is working on this and will call once scheduled  Pt voiced understanding

## 2020-03-09 NOTE — TELEPHONE ENCOUNTER
Please schedule STAT bilateral lower extremity venous doppler for today  Jermaine Hung would be closest for the patient but she can go anywhere to have done today

## 2020-03-09 NOTE — TELEPHONE ENCOUNTER
Patient called stating that she has been  experiencing pain in her right leg and is concerned she may have a blood chinedu and would like to know if London Montaño could order a Ultrasound to have this checked out  Best call back  [phone]

## 2020-03-10 ENCOUNTER — TELEPHONE (OUTPATIENT)
Dept: HEMATOLOGY ONCOLOGY | Facility: CLINIC | Age: 55
End: 2020-03-10

## 2020-03-10 DIAGNOSIS — T45.1X5A CHEMOTHERAPY-INDUCED NAUSEA: Primary | ICD-10-CM

## 2020-03-10 DIAGNOSIS — R11.0 CHEMOTHERAPY-INDUCED NAUSEA: Primary | ICD-10-CM

## 2020-03-10 PROCEDURE — 93971 EXTREMITY STUDY: CPT | Performed by: SURGERY

## 2020-03-10 RX ORDER — ONDANSETRON HYDROCHLORIDE 8 MG/1
8 TABLET, FILM COATED ORAL EVERY 8 HOURS PRN
Qty: 30 TABLET | Refills: 0 | Status: SHIPPED | OUTPATIENT
Start: 2020-03-10 | End: 2020-05-19

## 2020-03-10 NOTE — TELEPHONE ENCOUNTER
Spoke to Carla Crespo, she verified her pharmacy [place] [address]  She also stated she needs the letter due to her not being able to receive her money back for her trip to [facility]  I will write letter and have Dr Theresa Hutson sign it and will mail letter out to patient's house  Patient voiced understanding of above

## 2020-03-10 NOTE — TELEPHONE ENCOUNTER
Tc spoke with pt to ask about pain in legs  She stated that it has improved as she has been up and walking more today  Updated her that the doppler was negative  She stated she has not heard from [facility] about the xeloda  Explained I will call the pharmacy  She also is requesting a letter explaining that she is ongoing chemo therapy treatments scheduled until end of July and that it is not advisable to travel  She had a trip out of the country in that time period and needs to cancel  Explained we will provide that  Tc to pharmacy spoke with [facility] pharmacist [address]   [phone]  She was questioning xeloda rx  Discussed with Adam GEIGER and Dr Marisa Zuniga  [address]  took verbal order for a lower dose  Pt to start on 1500 mg  Which will be 3 tablets of 500 mg, 2 x day, for 14 days on 21 day cycle with 2 refills  [address]  stated they will fill today and call the pt to set up delivery

## 2020-03-10 NOTE — TELEPHONE ENCOUNTER
LVM for Zoie Martín to call our office to verify her pharmacy for her script for Zofran and we would also like to know where she is traveling to due to her requesting us to write a letter stating that she cannot travel

## 2020-03-12 RX ORDER — SODIUM CHLORIDE 9 MG/ML
20 INJECTION, SOLUTION INTRAVENOUS ONCE
Status: CANCELLED | OUTPATIENT
Start: 2020-03-16

## 2020-03-12 RX ORDER — DEXTROSE MONOHYDRATE 50 MG/ML
20 INJECTION, SOLUTION INTRAVENOUS ONCE
Status: CANCELLED | OUTPATIENT
Start: 2020-03-16

## 2020-03-13 ENCOUNTER — TELEPHONE (OUTPATIENT)
Dept: HEMATOLOGY ONCOLOGY | Facility: MEDICAL CENTER | Age: 55
End: 2020-03-13

## 2020-03-13 ENCOUNTER — TELEPHONE (OUTPATIENT)
Dept: HEMATOLOGY ONCOLOGY | Facility: CLINIC | Age: 55
End: 2020-03-13

## 2020-03-13 NOTE — TELEPHONE ENCOUNTER
Pt to start on 1500 mg  Which will be 3 tablets of 500 mg, 2 x day, for 14 days on 21 day cycle with 2 refills  Reviewed Vanessa Henao RN's note with patient, she verbalizes understanding

## 2020-03-13 NOTE — TELEPHONE ENCOUNTER
Patient called in stated that she has questions in regards to chemo medication that she starts this Monday, a good call back number is [phone]

## 2020-03-13 NOTE — TELEPHONE ENCOUNTER
Abdi Gatica from [facility] called requesting medical records      Location/Dr's name:  [facility]   Please fax medical records to fax #  [phone]    Requesting Pathology report and hysterectomy report       call back # [phone]

## 2020-03-16 ENCOUNTER — HOSPITAL ENCOUNTER (OUTPATIENT)
Dept: INFUSION CENTER | Facility: CLINIC | Age: 55
Discharge: HOME | End: 2020-03-16
Payer: COMMERCIAL

## 2020-03-16 ENCOUNTER — HOSPITAL ENCOUNTER (OUTPATIENT)
Dept: INFUSION CENTER | Facility: HOSPITAL | Age: 55
Discharge: HOME | End: 2020-03-16
Attending: INTERNAL MEDICINE

## 2020-03-16 VITALS
HEART RATE: 82 BPM | HEIGHT: 66 IN | SYSTOLIC BLOOD PRESSURE: 172 MMHG | DIASTOLIC BLOOD PRESSURE: 94 MMHG | TEMPERATURE: 98.1 F | OXYGEN SATURATION: 98 % | WEIGHT: 164 LBS | RESPIRATION RATE: 16 BRPM | BODY MASS INDEX: 26.36 KG/M2

## 2020-03-16 DIAGNOSIS — D70.1 CHEMOTHERAPY INDUCED NEUTROPENIA (HCC): ICD-10-CM

## 2020-03-16 DIAGNOSIS — T45.1X5A CHEMOTHERAPY INDUCED NEUTROPENIA (HCC): ICD-10-CM

## 2020-03-16 DIAGNOSIS — C78.7 LIVER METASTASES (HCC): Primary | ICD-10-CM

## 2020-03-16 DIAGNOSIS — C18.2 PRIMARY ADENOCARCINOMA OF ASCENDING COLON (HCC): ICD-10-CM

## 2020-03-16 LAB
ALBUMIN SERPL BCP-MCNC: 3.2 G/DL (ref 3.5–5)
ALP SERPL-CCNC: 98 U/L (ref 46–116)
ALT SERPL W P-5'-P-CCNC: 13 U/L (ref 12–78)
ANION GAP SERPL CALCULATED.3IONS-SCNC: 9 MMOL/L (ref 4–13)
AST SERPL W P-5'-P-CCNC: 19 U/L (ref 5–45)
BASOPHILS # BLD AUTO: 0.03 THOUSANDS/ΜL (ref 0–0.1)
BASOPHILS NFR BLD AUTO: 0 % (ref 0–1)
BILIRUB SERPL-MCNC: 0.28 MG/DL (ref 0.2–1)
BILIRUB UR QL STRIP: NEGATIVE
BUN SERPL-MCNC: 8 MG/DL (ref 5–25)
CALCIUM SERPL-MCNC: 8.3 MG/DL (ref 8.3–10.1)
CHLORIDE SERPL-SCNC: 105 MMOL/L (ref 100–108)
CLARITY UR: CLEAR
CO2 SERPL-SCNC: 25 MMOL/L (ref 21–32)
COLOR UR: YELLOW
CREAT SERPL-MCNC: 0.92 MG/DL (ref 0.6–1.3)
EOSINOPHIL # BLD AUTO: 0.2 THOUSAND/ΜL (ref 0–0.61)
EOSINOPHIL NFR BLD AUTO: 2 % (ref 0–6)
ERYTHROCYTE [DISTWIDTH] IN BLOOD BY AUTOMATED COUNT: 13.9 % (ref 11.6–15.1)
GFR SERPL CREATININE-BSD FRML MDRD: 72 ML/MIN/1.73SQ M
GLUCOSE SERPL-MCNC: 118 MG/DL (ref 65–140)
GLUCOSE UR STRIP-MCNC: NEGATIVE MG/DL
HCT VFR BLD AUTO: 40.5 % (ref 34.8–46.1)
HGB BLD-MCNC: 12.8 G/DL (ref 11.5–15.4)
HGB UR QL STRIP.AUTO: NEGATIVE
IMM GRANULOCYTES # BLD AUTO: 0.06 THOUSAND/UL (ref 0–0.2)
IMM GRANULOCYTES NFR BLD AUTO: 1 % (ref 0–2)
KETONES UR STRIP-MCNC: NEGATIVE MG/DL
LEUKOCYTE ESTERASE UR QL STRIP: NEGATIVE
LYMPHOCYTES # BLD AUTO: 1.23 THOUSANDS/ΜL (ref 0.6–4.47)
LYMPHOCYTES NFR BLD AUTO: 12 % (ref 14–44)
MCH RBC QN AUTO: 33.4 PG (ref 26.8–34.3)
MCHC RBC AUTO-ENTMCNC: 31.6 G/DL (ref 31.4–37.4)
MCV RBC AUTO: 106 FL (ref 82–98)
MONOCYTES # BLD AUTO: 0.5 THOUSAND/ΜL (ref 0.17–1.22)
MONOCYTES NFR BLD AUTO: 5 % (ref 4–12)
NEUTROPHILS # BLD AUTO: 8.21 THOUSANDS/ΜL (ref 1.85–7.62)
NEUTS SEG NFR BLD AUTO: 80 % (ref 43–75)
NITRITE UR QL STRIP: NEGATIVE
NRBC BLD AUTO-RTO: 0 /100 WBCS
PH UR STRIP.AUTO: 6 [PH]
PLATELET # BLD AUTO: 153 THOUSANDS/UL (ref 149–390)
PMV BLD AUTO: 9.4 FL (ref 8.9–12.7)
POTASSIUM SERPL-SCNC: 3.8 MMOL/L (ref 3.5–5.3)
PROT SERPL-MCNC: 6.3 G/DL (ref 6.4–8.2)
PROT UR STRIP-MCNC: NEGATIVE MG/DL
RBC # BLD AUTO: 3.83 MILLION/UL (ref 3.81–5.12)
SODIUM SERPL-SCNC: 139 MMOL/L (ref 136–145)
SP GR UR STRIP.AUTO: <=1.005 (ref 1–1.03)
UROBILINOGEN UR QL STRIP.AUTO: 0.2 E.U./DL
WBC # BLD AUTO: 10.23 THOUSAND/UL (ref 4.31–10.16)

## 2020-03-16 PROCEDURE — 81003 URINALYSIS AUTO W/O SCOPE: CPT | Performed by: INTERNAL MEDICINE

## 2020-03-16 PROCEDURE — 96367 TX/PROPH/DG ADDL SEQ IV INF: CPT

## 2020-03-16 PROCEDURE — 80053 COMPREHEN METABOLIC PANEL: CPT | Performed by: INTERNAL MEDICINE

## 2020-03-16 PROCEDURE — 85025 COMPLETE CBC W/AUTO DIFF WBC: CPT | Performed by: INTERNAL MEDICINE

## 2020-03-16 PROCEDURE — 96413 CHEMO IV INFUSION 1 HR: CPT

## 2020-03-16 PROCEDURE — 96415 CHEMO IV INFUSION ADDL HR: CPT

## 2020-03-16 RX ORDER — SODIUM CHLORIDE 9 MG/ML
20 INJECTION, SOLUTION INTRAVENOUS ONCE
Status: DISCONTINUED | OUTPATIENT
Start: 2020-03-16 | End: 2020-03-19 | Stop reason: HOSPADM

## 2020-03-16 RX ORDER — DEXTROSE MONOHYDRATE 50 MG/ML
20 INJECTION, SOLUTION INTRAVENOUS ONCE
Status: COMPLETED | OUTPATIENT
Start: 2020-03-16 | End: 2020-03-16

## 2020-03-16 RX ADMIN — OXALIPLATIN 239.2 MG: 5 INJECTION, SOLUTION INTRAVENOUS at 11:06

## 2020-03-16 RX ADMIN — DEXAMETHASONE SODIUM PHOSPHATE: 10 INJECTION, SOLUTION INTRAMUSCULAR; INTRAVENOUS at 10:33

## 2020-03-16 RX ADMIN — DEXTROSE 20 ML/HR: 5 SOLUTION INTRAVENOUS at 10:25

## 2020-03-16 NOTE — PROGRESS NOTES
Pt to clinic for avastin and oxaliplatin, labs drawn from port a cath  spoke with Fran Chacon RN, no avastin given today due to elevated blood pressure, pt aware to call family dr regarding BP   ok to proceed without urine results, will continue to monitor

## 2020-03-16 NOTE — PROGRESS NOTES
Pt tolerated infusion without complications, then before de accessing pt went into bathroom and vomited  Chest was red but not itchy, called and spoke with Ranjana Carbajal RN regarding this, pt started oral chemo this morning and could be due to that, pt stated she felt fine before discharging her and redness on chest went away  Sabrina [place] is going to follow up with pt later today   Pt aware of next appointment, declined avs

## 2020-03-17 ENCOUNTER — TELEPHONE (OUTPATIENT)
Dept: HEMATOLOGY ONCOLOGY | Facility: CLINIC | Age: 55
End: 2020-03-17

## 2020-03-17 NOTE — TELEPHONE ENCOUNTER
Patient is calling in to inform that due to her not being able to keep anything down she did not take her oral chemotherapy   Please call her back at [phone]

## 2020-03-26 ENCOUNTER — TELEPHONE (OUTPATIENT)
Dept: HEMATOLOGY ONCOLOGY | Facility: CLINIC | Age: 55
End: 2020-03-26

## 2020-03-26 NOTE — TELEPHONE ENCOUNTER
Called [phone] spoke with patient confirmed appointment, advised she would like a Facetime appointment  Preferred number is [phone] no changes to insurance

## 2020-03-27 ENCOUNTER — TELEMEDICINE (OUTPATIENT)
Dept: HEMATOLOGY ONCOLOGY | Facility: CLINIC | Age: 55
End: 2020-03-27
Payer: COMMERCIAL

## 2020-03-27 DIAGNOSIS — C78.7 LIVER METASTASES (HCC): ICD-10-CM

## 2020-03-27 DIAGNOSIS — C77.2 METASTASIS TO RETROPERITONEAL LYMPH NODE (HCC): ICD-10-CM

## 2020-03-27 DIAGNOSIS — D70.1 CHEMOTHERAPY INDUCED NEUTROPENIA (HCC): ICD-10-CM

## 2020-03-27 DIAGNOSIS — C18.2 PRIMARY ADENOCARCINOMA OF ASCENDING COLON (HCC): ICD-10-CM

## 2020-03-27 DIAGNOSIS — T45.1X5A CHEMOTHERAPY INDUCED NEUTROPENIA (HCC): ICD-10-CM

## 2020-03-27 PROCEDURE — 99213 OFFICE O/P EST LOW 20 MIN: CPT | Performed by: PHYSICIAN ASSISTANT

## 2020-03-27 RX ORDER — AMLODIPINE BESYLATE 5 MG/1
5 TABLET ORAL DAILY
COMMUNITY

## 2020-03-27 RX ORDER — FLUOROURACIL 50 MG/ML
400 INJECTION, SOLUTION INTRAVENOUS ONCE
Status: CANCELLED | OUTPATIENT
Start: 2020-04-06

## 2020-03-27 RX ORDER — SODIUM CHLORIDE 9 MG/ML
20 INJECTION, SOLUTION INTRAVENOUS ONCE
Status: CANCELLED | OUTPATIENT
Start: 2020-04-06

## 2020-03-27 RX ORDER — DEXTROSE MONOHYDRATE 50 MG/ML
20 INJECTION, SOLUTION INTRAVENOUS ONCE
Status: CANCELLED | OUTPATIENT
Start: 2020-04-06

## 2020-03-27 NOTE — PROGRESS NOTES
Virtual Regular Visit    Problem List Items Addressed This Visit     None               Reason for visit is follow up colon cancer  Encounter provider Lissette Piña PA-C    Provider located at [address] [address] [address] [place] [id number]      Recent Visits  No visits were found meeting these conditions  Showing recent visits within past 7 days and meeting all other requirements     Future Appointments  No visits were found meeting these conditions  Showing future appointments within next 150 days and meeting all other requirements        After connecting through [employer], the patient was identified by name and date of birth  David Meyer was informed that this is a telemedicine visit and that the visit is being conducted through [employer] and patient was informed that this is not a secure, HIPAA-complaint platform  she agrees to proceed  which may not be secure and therefore, might not be HIPAA-compliant  My office door was closed  No one else was in the room  She acknowledged consent and understanding of privacy and security of the video platform  The patient has agreed to participate and understands they can discontinue the visit at any time  Subjective  David Meyer is a 48 y o  female present for telemedicine follow up regarding metastatic colon cancer  48 year old female with history of DVT right lower leg below the knee in May 2016 and that happened after taking hormonal therapy in April 2016  In 2016 she tested positive once for lupus anticoagulant and that was negative on repeat test     She stayed on Xarelto until end of September 2016  She had heavy periods in in January 2018 and she was in bed for 3 days and she developed a clot in the left lower leg distally below the knee        She had GALI in May 2016  Ovaries were left in    Pathology of bilateral fallopian tube showed invasive adenocarcinoma      She had a CT of the chest, abdomen and pelvis on 06/06/2018 which showed a possible lesion on the right abdomen,  Suspicious for underlying colon mass   Also, multiple hepatic metastases were noted  Almaz Harmon, scattered retroperitoneal nodes also noted;   Largest measuring 1 1 x 0 9 cm      Patient brought to the OR on 06/14/2018  Claudio Jones was found to have a proximal transverse colon tumor with peritoneal deposits pelvic and right diaphragm, palpable liver metastases    she had a right hemicolectomy with ileocolonic anastomosis      Final pathology showed stage IVC- pT3, pN1b, pM1c, G2     7/26/18 - molecular testing resulted  NRAS negative   BRAF negative   KRAS mutation positive      She was seen by colon surgeon at [facility] in Jan 2019  Due to her liver mets, no further surgery was recommended  She was advised to continue chemotherapy alone       July 2018 she was started on FOLFIRI plus Avastin       CT scan chest on 01/27/2020 showed multiple 5 mm and smaller bilateral nodules all new since June 2019 indicating metastatic disease  MRI of the pelvis on 01/27/2020 showed mostly stable liver lesions compared to July 2019  1 lesion in the liver in the right hepatic dome increased in size in showed more diffuse enhancement  MRI of the pelvis showed a new large complex cystic right adnexal mass measuring 7 4 x 6 3 x 9 8 cm and a new predominantly solid left adnexal mass measuring 4 1 x 2 3 x 2 9 cm highly suspicious for metastasis  Interval history:  Was unable to take the dose  Blood pressure medicine started by Dr Eagle Castillo  She is taking amlodipine 5 mg PO once daily  She was taking nausea pills Tuesday and Weds  Since starting taking the pills she was having worse side effects on the stomach  She states she weighed her self 2 days ago and she was 155 lb      occassional twinge/cramping in the pelvis       Past Medical History:   Diagnosis Date    Cancer [facility])     Colon cancer ([facility] [address] ) 06/08/2018    DVT [facility]deep venous thrombosis) ([facility] [address] )     History of chemotherapy 2019    Kidney disease     Menorrhalgia     RESOLVED 2008    Migraine     Postcoital bleeding     LAST ASSESSED [id number]       Past Surgical History:   Procedure Laterality Date    ABDOMINAL ADHESION SURGERY N/A 6/14/2018    Procedure: LYSIS ADHESIONS OF COLON;  Surgeon: Alyssa Calvo MD;  [facility];  Service: Colorectal    DILATION AND CURETTAGE OF UTERUS      RESOLVED 2008    ENDOMETRIAL ABLATION      THERMAL     NOVASURE  RESOLVED 2008    ENDOMETRIAL BIOPSY      BY SUCTION   DONE 12/13/2008    HYSTERECTOMY  05/21/2018    OMENTECTOMY Right 6/14/2018    Procedure: PARTIAL OMENTECTOMY;  Surgeon: Alyssa Calvo MD;  [facility];  Service: Colorectal    CO COLONOSCOPY FLX DX W/COLLJ SPEC WHEN PFRMD N/A 6/13/2018    Procedure: COLONOSCOPY;  Surgeon: Alyssa Calvo MD;  [facility]; Service: Colorectal    CO ESOPHAGOGASTRODUODENOSCOPY TRANSORAL DIAGNOSTIC N/A 6/13/2018    Procedure: ESOPHAGOGASTRODUODENOSCOPY (EGD); Surgeon: Teresita Gillespie MD;  [facility];   Service: Gastroenterology    CO INSERT PICC W/ SUB-Q PORT Right 6/28/2018    Procedure: INSERTION VENOUS PORT (PORT-A-CATH) VIA RIGHT SUBCLAVIAN VEIN;  Surgeon: Alyssa Calvo MD;  [facility];  Service: Colorectal    CO LAP,DIAGNOSTIC ABDOMEN N/A 6/14/2018    Procedure: LAPAROSCOPY DIAGNOSTIC;  Surgeon: Alyssa Calvo MD;  [facility];  Service: Colorectal    CO LAP,SURG,COLECTOMY, PARTIAL, W/ANAST Right 6/14/2018    Procedure: LAPAROSCOPIC EXTENDED RIGHT HEMICOLECTOMY ; PERITONEAL BX;  Surgeon: Alyssa Calvo MD;  [facility];  Service: Colorectal    CO LAP,VAG HYST,UTERUS 250GMS/<,SALP-OOPH Bilateral 5/21/2018    Procedure: HYSTERECTOMY LAPAROSCOPIC ASSISTED VAGINAL (LAVH) , BILATERAL SALPINGECTOMY;  Surgeon: Sloan Rios DO;  [facility];  Service: Gynecology       Current Outpatient Medications   Medication Sig Dispense Refill    acetaminophen (TYLENOL) 500 mg tablet Take 1,000 mg by mouth every 6 (six) hours as needed for mild pain      capecitabine (XELODA) 150 MG tablet Take 9 tablets (1,350 mg total) by mouth 2 (two) times a day with 1 other capecitabine prescription for 1,850 mg (1,000 mg/m2 x 1 84 m2) total 252 tablet 0    capecitabine (XELODA) 500 MG tablet Take 1 tablet (500 mg total) by mouth 2 (two) times a day with 1 other capecitabine prescription for 1,850 mg (1,000 mg/m2 x 1 84 m2) total 28 tablet 0    lidocaine-prilocaine (EMLA) cream APPLY 1 HOUR PRIOR TO CHEMO, COVER IN WRAP (Patient not taking: Reported on 2/4/2020) 30 g 0    nifedipine 0 2 % OINT Apply topically every 4 (four) hours 0 3% ointment as typical(disregard 0 2% as auto order)  1 application to anal opening 4-6 times daily (Patient not taking: Reported on 2/4/2020) 1 Tube 0    ondansetron (ZOFRAN) 8 mg tablet Take 1 tablet (8 mg total) by mouth every 8 (eight) hours as needed for nausea or vomiting 30 tablet 0    predniSONE 5 mg/5 mL solution Take by mouth daily      rivaroxaban (XARELTO) 10 mg tablet Take 1 tablet (10 mg total) by mouth daily 90 tablet 3     No current facility-administered medications for this visit  No Known Allergies    Review of Systems   Constitutional: Positive for appetite change (improving) and fatigue  Negative for chills and fever  HENT: Negative for trouble swallowing  Respiratory: Negative for cough and shortness of breath  Cardiovascular: Negative for chest pain, palpitations and leg swelling  Gastrointestinal: Positive for abdominal pain (intermittent lower pevlic discomfrt) and nausea  Negative for constipation, diarrhea and vomiting  Loose bowels after infusion, did not take anything   Genitourinary: Negative for difficulty urinating, dysuria and hematuria  Musculoskeletal: Negative for arthralgias     Skin:        Palmar surfaces of hands are better, less dry   Neurological: Positive for headaches (occassional, pressure in frontal area)  Physical Exam   Constitutional: She appears well-developed and well-nourished  No distress  HENT:   Head: Normocephalic and atraumatic  Eyes: No scleral icterus  Neck: Normal range of motion  Skin:   Palmar surfaces of hands appear less dry      Assessment and Plan:  Colon cancer with liver and lymph node metastases:    59-year-old female presents for review visit follow-up  Patient was noted to have disease progression in February 2020  She previously was receiving FOLFIRI plus Avastin  This was switched to FOLFOX plus Avastin  She had 1 cycle of FOLFOX plus Avastin  She tolerated this well  She then wish to switch to XELOX plus Avastin to avoid 5 FU pump and pills instead  Unfortunately she states that she has had poor tolerability with Xeloda  She states that it causing her a lot of stomach upset  She states she had but intolerability with FOLFOX plus Avastin  We will switch back to this  Her next cycle is 04/06/2020 and change will be made  She also had significant amount of nausea following infusion appointment  Will also add Emend to future infusions  She asked about contact with [facility]  I did review the medical oncologist did contact Dr Theresa Hutson  We will repeat imaging after 3-4 cycles  At this point her nausea is well managed  She does not need any refills  We reviewed COVID-19 precautions  She is aware  Also reviewed this some outpatient labs are close  She is to review prior to going to lab for blood work prior to next infusion  I spent 30 minutes with the patient during this visit  If the patient was seen in the office, level 4 follow up charge

## 2020-03-30 ENCOUNTER — TELEPHONE (OUTPATIENT)
Dept: OBGYN CLINIC | Facility: MEDICAL CENTER | Age: 55
End: 2020-03-30

## 2020-03-30 ENCOUNTER — TELEPHONE (OUTPATIENT)
Dept: HEMATOLOGY ONCOLOGY | Facility: CLINIC | Age: 55
End: 2020-03-30

## 2020-03-30 DIAGNOSIS — T45.1X5A CHEMOTHERAPY INDUCED NEUTROPENIA (HCC): Primary | ICD-10-CM

## 2020-03-30 DIAGNOSIS — C77.2 METASTASIS TO RETROPERITONEAL LYMPH NODE (HCC): ICD-10-CM

## 2020-03-30 DIAGNOSIS — C18.2 PRIMARY ADENOCARCINOMA OF ASCENDING COLON (HCC): ICD-10-CM

## 2020-03-30 DIAGNOSIS — C78.7 LIVER METASTASES (HCC): ICD-10-CM

## 2020-03-30 DIAGNOSIS — D70.1 CHEMOTHERAPY INDUCED NEUTROPENIA (HCC): Primary | ICD-10-CM

## 2020-03-30 DIAGNOSIS — B37.9 YEAST INFECTION: Primary | ICD-10-CM

## 2020-03-30 NOTE — TELEPHONE ENCOUNTER
Previous Dr Rajani Culp patient, states that she is on chemo and is having burning when urine touches the skin

## 2020-03-30 NOTE — TELEPHONE ENCOUNTER
Telephone Triage-Symptom Call      Oscar Bush  1965  [phone]    Caller:Self  Chief Complaint: Labia redness and discomfort  Current Treatment patient:  Yes  Name of treatment: Xeloda  Date of last treatment: 3/29/20  Recent illness or Surgery: No      Description of symptoms per ONS Guidelines review (charting by exception): Patient called to report burning when urine hits the skin on her labia and vaginal area  Patient denies vaginal discharge or odor  Scant pruritus to labia area  Reports that skin in vaginal area + mild erythema  Denies fevers  Patient reports that she does not feel the urge to void  Patient reports that she voiding every few hours  Patient feels like she is emptying her bladder  Denies hematuria or vaginal bleeding  Patient reports that the above symptoms started on 3/27/20  Patient denies new personal hygiene products or soap                  Guidelines followed: Yes    Disposition: Homecare, Please review with FELY Figueroa    NEXT STEPS REQUIRED:  Please review with FELY Figueroa    Patient verbalizes understanding and is in agreement with plan: YES    Verified that guidelines were completed and documented: YES

## 2020-03-30 NOTE — TELEPHONE ENCOUNTER
Returned Pt's call today  Pt informed that her reported symptoms were addressed by Dr Tri Frye (on call Jerri HERRERA [address]) via "tiger text"  Pt further informed that Rx for Diflucan 150 mg tablet (Take 1 tablet PO once for 1 dose - dispense 1 refills 0) was electronically forwarded to Pt's pharmacy in EHR by telephone per Dr Trent Miranda established protocol for yeast symptoms  Pt encouraged to include Greek yogurt in her diet  Reiterated to Pt that she should call back office if her symptoms worsen or do not improve in the next three days  "Patient Call" routed to Dr Tri Frye for Rx signature

## 2020-03-30 NOTE — TELEPHONE ENCOUNTER
Tc spoke with pt  Per Dr Eagle Luna pt to make appointment/call GYN  Pt stated she understands and will call for appt

## 2020-03-30 NOTE — TELEPHONE ENCOUNTER
Dear Dr Shilpa Garcia:    Pt called office today c/o burning located in vulvar area  Pt is a former patient of Dr Raffi Lopez, saw him on 7/6/18 ([facility] - vaginitis)  Pt states she is currently undergoing oral chemotherapy treatment  Pt further states she has been experiencing symptoms for approximately 4 days now  Pt denies burning during urination and vaginal burning at this time  Pt states she feels burning whenever urine hits skin in vulvar area (labia etc )  Pt further denies vaginal discharge, vaginal foaminess, vaginal odor, and urinary symptoms  Pt states she has mild vaginal and vulvar itching accompanied by vaginal dryness at this time  Pt further states she had hysterectomy on 5/2018 performed by Dr Raffi Loepz  Pt states "she has not been sexually active for quite awhile now "  Pt further states she has no known allergies to medications  Pt's name is Ying Swan ([birth year])  Please advise  Thank you!     Oleksandr Isaac MA

## 2020-04-02 RX ORDER — FLUCONAZOLE 150 MG/1
150 TABLET ORAL ONCE
Qty: 1 TABLET | Refills: 0 | OUTPATIENT
Start: 2020-04-02 | End: 2020-04-02

## 2020-04-03 ENCOUNTER — APPOINTMENT (OUTPATIENT)
Dept: LAB | Facility: MEDICAL CENTER | Age: 55
End: 2020-04-03
Payer: COMMERCIAL

## 2020-04-06 ENCOUNTER — HOSPITAL ENCOUNTER (OUTPATIENT)
Dept: INFUSION CENTER | Facility: CLINIC | Age: 55
Discharge: HOME | End: 2020-04-06
Payer: COMMERCIAL

## 2020-04-06 VITALS
DIASTOLIC BLOOD PRESSURE: 88 MMHG | OXYGEN SATURATION: 99 % | BODY MASS INDEX: 25.79 KG/M2 | TEMPERATURE: 98.6 F | HEIGHT: 66 IN | HEART RATE: 88 BPM | WEIGHT: 160.5 LBS | SYSTOLIC BLOOD PRESSURE: 128 MMHG | RESPIRATION RATE: 14 BRPM

## 2020-04-06 DIAGNOSIS — C77.2 METASTASIS TO RETROPERITONEAL LYMPH NODE (HCC): Primary | ICD-10-CM

## 2020-04-06 DIAGNOSIS — C18.2 PRIMARY ADENOCARCINOMA OF ASCENDING COLON (HCC): ICD-10-CM

## 2020-04-06 DIAGNOSIS — T45.1X5A CHEMOTHERAPY INDUCED NEUTROPENIA (HCC): ICD-10-CM

## 2020-04-06 DIAGNOSIS — I82.531 CHRONIC DEEP VEIN THROMBOSIS (DVT) OF POPLITEAL VEIN OF RIGHT LOWER EXTREMITY (HCC): ICD-10-CM

## 2020-04-06 DIAGNOSIS — C78.7 LIVER METASTASES (HCC): ICD-10-CM

## 2020-04-06 DIAGNOSIS — D70.1 CHEMOTHERAPY INDUCED NEUTROPENIA (HCC): ICD-10-CM

## 2020-04-06 DIAGNOSIS — C78.6 PERITONEAL METASTASES (HCC): ICD-10-CM

## 2020-04-06 LAB
BILIRUB UR QL STRIP: NEGATIVE
CLARITY UR: CLEAR
COLOR UR: YELLOW
GLUCOSE UR STRIP-MCNC: NEGATIVE MG/DL
HGB UR QL STRIP.AUTO: NEGATIVE
KETONES UR STRIP-MCNC: NEGATIVE MG/DL
LEUKOCYTE ESTERASE UR QL STRIP: NEGATIVE
NITRITE UR QL STRIP: NEGATIVE
PH UR STRIP.AUTO: 6 [PH]
PROT UR STRIP-MCNC: NEGATIVE MG/DL
SP GR UR STRIP.AUTO: 1.01 (ref 1–1.03)
UROBILINOGEN UR QL STRIP.AUTO: 0.2 E.U./DL

## 2020-04-06 PROCEDURE — 96413 CHEMO IV INFUSION 1 HR: CPT

## 2020-04-06 PROCEDURE — 96417 CHEMO IV INFUS EACH ADDL SEQ: CPT

## 2020-04-06 PROCEDURE — 81003 URINALYSIS AUTO W/O SCOPE: CPT

## 2020-04-06 PROCEDURE — 96368 THER/DIAG CONCURRENT INF: CPT

## 2020-04-06 PROCEDURE — 96415 CHEMO IV INFUSION ADDL HR: CPT

## 2020-04-06 PROCEDURE — 96367 TX/PROPH/DG ADDL SEQ IV INF: CPT

## 2020-04-06 RX ORDER — FLUOROURACIL 50 MG/ML
400 INJECTION, SOLUTION INTRAVENOUS ONCE
Status: DISCONTINUED | OUTPATIENT
Start: 2020-04-06 | End: 2020-04-09 | Stop reason: HOSPADM

## 2020-04-06 RX ORDER — DEXTROSE MONOHYDRATE 50 MG/ML
20 INJECTION, SOLUTION INTRAVENOUS ONCE
Status: COMPLETED | OUTPATIENT
Start: 2020-04-06 | End: 2020-04-06

## 2020-04-06 RX ORDER — SODIUM CHLORIDE 9 MG/ML
20 INJECTION, SOLUTION INTRAVENOUS ONCE
Status: COMPLETED | OUTPATIENT
Start: 2020-04-06 | End: 2020-04-06

## 2020-04-06 RX ADMIN — SODIUM CHLORIDE 150 MG: 0.9 INJECTION, SOLUTION INTRAVENOUS at 09:21

## 2020-04-06 RX ADMIN — BEVACIZUMAB 372.5 MG: 400 INJECTION, SOLUTION INTRAVENOUS at 10:17

## 2020-04-06 RX ADMIN — LEUCOVORIN CALCIUM 750 MG: 200 INJECTION, POWDER, LYOPHILIZED, FOR SUSPENSION INTRAMUSCULAR; INTRAVENOUS at 11:06

## 2020-04-06 RX ADMIN — DEXTROSE 20 ML/HR: 5 SOLUTION INTRAVENOUS at 11:02

## 2020-04-06 RX ADMIN — OXALIPLATIN 150 MG: 5 INJECTION, SOLUTION INTRAVENOUS at 11:03

## 2020-04-06 RX ADMIN — SODIUM CHLORIDE 20 ML/HR: 0.9 INJECTION, SOLUTION INTRAVENOUS at 08:20

## 2020-04-06 RX ADMIN — DEXAMETHASONE SODIUM PHOSPHATE: 10 INJECTION, SOLUTION INTRAMUSCULAR; INTRAVENOUS at 08:57

## 2020-04-08 ENCOUNTER — HOSPITAL ENCOUNTER (OUTPATIENT)
Dept: INFUSION CENTER | Facility: CLINIC | Age: 55
Discharge: HOME | End: 2020-04-08
Payer: COMMERCIAL

## 2020-04-08 VITALS — TEMPERATURE: 98.2 F

## 2020-04-08 DIAGNOSIS — C18.2 PRIMARY ADENOCARCINOMA OF ASCENDING COLON (HCC): ICD-10-CM

## 2020-04-08 DIAGNOSIS — C78.7 LIVER METASTASES (HCC): ICD-10-CM

## 2020-04-08 DIAGNOSIS — D70.1 CHEMOTHERAPY INDUCED NEUTROPENIA (HCC): ICD-10-CM

## 2020-04-08 DIAGNOSIS — T45.1X5A CHEMOTHERAPY INDUCED NEUTROPENIA (HCC): ICD-10-CM

## 2020-04-08 DIAGNOSIS — C77.2 METASTASIS TO RETROPERITONEAL LYMPH NODE (HCC): Primary | ICD-10-CM

## 2020-04-08 PROCEDURE — 96372 THER/PROPH/DIAG INJ SC/IM: CPT

## 2020-04-08 RX ADMIN — PEGFILGRASTIM 6 MG: KIT SUBCUTANEOUS at 12:15

## 2020-04-15 RX ORDER — DEXTROSE MONOHYDRATE 50 MG/ML
20 INJECTION, SOLUTION INTRAVENOUS ONCE
Status: CANCELLED | OUTPATIENT
Start: 2020-04-23

## 2020-04-15 RX ORDER — FLUOROURACIL 50 MG/ML
400 INJECTION, SOLUTION INTRAVENOUS ONCE
Status: CANCELLED | OUTPATIENT
Start: 2020-04-23

## 2020-04-15 RX ORDER — SODIUM CHLORIDE 9 MG/ML
20 INJECTION, SOLUTION INTRAVENOUS ONCE
Status: CANCELLED | OUTPATIENT
Start: 2020-04-23

## 2020-04-16 DIAGNOSIS — T45.1X5A CHEMOTHERAPY INDUCED NEUTROPENIA (HCC): Primary | ICD-10-CM

## 2020-04-16 DIAGNOSIS — C77.2 METASTASIS TO RETROPERITONEAL LYMPH NODE (HCC): ICD-10-CM

## 2020-04-16 DIAGNOSIS — D70.1 CHEMOTHERAPY INDUCED NEUTROPENIA (HCC): Primary | ICD-10-CM

## 2020-04-16 DIAGNOSIS — C78.7 LIVER METASTASES (HCC): ICD-10-CM

## 2020-04-16 DIAGNOSIS — C18.2 PRIMARY ADENOCARCINOMA OF ASCENDING COLON (HCC): ICD-10-CM

## 2020-04-21 ENCOUNTER — APPOINTMENT (OUTPATIENT)
Dept: LAB | Facility: MEDICAL CENTER | Age: 55
End: 2020-04-21
Payer: COMMERCIAL

## 2020-04-21 ENCOUNTER — TELEMEDICINE (OUTPATIENT)
Dept: HEMATOLOGY ONCOLOGY | Facility: CLINIC | Age: 55
End: 2020-04-21
Payer: COMMERCIAL

## 2020-04-21 DIAGNOSIS — C79.82: ICD-10-CM

## 2020-04-21 DIAGNOSIS — D70.1 CHEMOTHERAPY INDUCED NEUTROPENIA (HCC): ICD-10-CM

## 2020-04-21 DIAGNOSIS — C78.7 LIVER METASTASES (HCC): ICD-10-CM

## 2020-04-21 DIAGNOSIS — T45.1X5A CHEMOTHERAPY INDUCED NEUTROPENIA (HCC): ICD-10-CM

## 2020-04-21 DIAGNOSIS — C18.2 PRIMARY ADENOCARCINOMA OF ASCENDING COLON (HCC): ICD-10-CM

## 2020-04-21 DIAGNOSIS — C78.00 MALIGNANT NEOPLASM METASTATIC TO LUNG, UNSPECIFIED LATERALITY (HCC): ICD-10-CM

## 2020-04-21 DIAGNOSIS — C18.2 PRIMARY ADENOCARCINOMA OF ASCENDING COLON (HCC): Primary | ICD-10-CM

## 2020-04-21 DIAGNOSIS — C77.2 METASTASIS TO RETROPERITONEAL LYMPH NODE (HCC): ICD-10-CM

## 2020-04-21 DIAGNOSIS — C79.9 ADENOCARCINOMA, METASTATIC (HCC): ICD-10-CM

## 2020-04-21 PROCEDURE — 99214 OFFICE O/P EST MOD 30 MIN: CPT | Performed by: PHYSICIAN ASSISTANT

## 2020-04-23 ENCOUNTER — TELEPHONE (OUTPATIENT)
Dept: HEMATOLOGY ONCOLOGY | Facility: CLINIC | Age: 55
End: 2020-04-23

## 2020-04-23 ENCOUNTER — HOSPITAL ENCOUNTER (OUTPATIENT)
Dept: INFUSION CENTER | Facility: CLINIC | Age: 55
Discharge: HOME | End: 2020-04-23
Payer: COMMERCIAL

## 2020-04-23 VITALS
HEIGHT: 66 IN | BODY MASS INDEX: 26.36 KG/M2 | SYSTOLIC BLOOD PRESSURE: 152 MMHG | OXYGEN SATURATION: 99 % | RESPIRATION RATE: 18 BRPM | WEIGHT: 164 LBS | TEMPERATURE: 97.8 F | HEART RATE: 89 BPM | DIASTOLIC BLOOD PRESSURE: 78 MMHG

## 2020-04-23 DIAGNOSIS — T45.1X5A CHEMOTHERAPY INDUCED NEUTROPENIA (HCC): ICD-10-CM

## 2020-04-23 DIAGNOSIS — C77.2 METASTASIS TO RETROPERITONEAL LYMPH NODE (HCC): Primary | ICD-10-CM

## 2020-04-23 DIAGNOSIS — D70.1 CHEMOTHERAPY INDUCED NEUTROPENIA (HCC): ICD-10-CM

## 2020-04-23 DIAGNOSIS — C18.2 PRIMARY ADENOCARCINOMA OF ASCENDING COLON (HCC): ICD-10-CM

## 2020-04-23 DIAGNOSIS — C78.7 LIVER METASTASES (HCC): ICD-10-CM

## 2020-04-23 PROCEDURE — 96415 CHEMO IV INFUSION ADDL HR: CPT

## 2020-04-23 PROCEDURE — 96413 CHEMO IV INFUSION 1 HR: CPT

## 2020-04-23 PROCEDURE — 96368 THER/DIAG CONCURRENT INF: CPT

## 2020-04-23 PROCEDURE — G0498 CHEMO EXTEND IV INFUS W/PUMP: HCPCS

## 2020-04-23 PROCEDURE — 96417 CHEMO IV INFUS EACH ADDL SEQ: CPT

## 2020-04-23 PROCEDURE — 96367 TX/PROPH/DG ADDL SEQ IV INF: CPT

## 2020-04-23 RX ORDER — SODIUM CHLORIDE 9 MG/ML
20 INJECTION, SOLUTION INTRAVENOUS ONCE
Status: COMPLETED | OUTPATIENT
Start: 2020-04-23 | End: 2020-04-23

## 2020-04-23 RX ORDER — DEXTROSE MONOHYDRATE 50 MG/ML
20 INJECTION, SOLUTION INTRAVENOUS ONCE
Status: COMPLETED | OUTPATIENT
Start: 2020-04-23 | End: 2020-04-23

## 2020-04-23 RX ADMIN — SODIUM CHLORIDE 150 MG: 0.9 INJECTION, SOLUTION INTRAVENOUS at 12:57

## 2020-04-23 RX ADMIN — SODIUM CHLORIDE 20 ML/HR: 0.9 INJECTION, SOLUTION INTRAVENOUS at 12:35

## 2020-04-23 RX ADMIN — DEXTROSE 20 ML/HR: 5 SOLUTION INTRAVENOUS at 13:37

## 2020-04-23 RX ADMIN — DEXAMETHASONE SODIUM PHOSPHATE: 10 INJECTION, SOLUTION INTRAMUSCULAR; INTRAVENOUS at 12:35

## 2020-04-23 RX ADMIN — OXALIPLATIN 150 MG: 5 INJECTION, SOLUTION INTRAVENOUS at 13:44

## 2020-04-23 RX ADMIN — BEVACIZUMAB 372.5 MG: 400 INJECTION, SOLUTION INTRAVENOUS at 15:45

## 2020-04-23 RX ADMIN — LEUCOVORIN CALCIUM 750 MG: 200 INJECTION, POWDER, LYOPHILIZED, FOR SUSPENSION INTRAMUSCULAR; INTRAVENOUS at 13:44

## 2020-04-25 ENCOUNTER — HOSPITAL ENCOUNTER (OUTPATIENT)
Dept: INFUSION CENTER | Facility: CLINIC | Age: 55
Discharge: HOME | End: 2020-04-25
Payer: COMMERCIAL

## 2020-04-25 VITALS — TEMPERATURE: 98.2 F

## 2020-04-25 DIAGNOSIS — D70.1 CHEMOTHERAPY INDUCED NEUTROPENIA (HCC): ICD-10-CM

## 2020-04-25 DIAGNOSIS — T45.1X5A CHEMOTHERAPY INDUCED NEUTROPENIA (HCC): ICD-10-CM

## 2020-04-25 DIAGNOSIS — C18.2 PRIMARY ADENOCARCINOMA OF ASCENDING COLON (HCC): ICD-10-CM

## 2020-04-25 DIAGNOSIS — C77.2 METASTASIS TO RETROPERITONEAL LYMPH NODE (HCC): Primary | ICD-10-CM

## 2020-04-25 DIAGNOSIS — C78.7 LIVER METASTASES (HCC): ICD-10-CM

## 2020-04-25 PROCEDURE — 96372 THER/PROPH/DIAG INJ SC/IM: CPT

## 2020-04-25 RX ADMIN — PEGFILGRASTIM 6 MG: KIT SUBCUTANEOUS at 14:45

## 2020-04-28 ENCOUNTER — HOSPITAL ENCOUNTER (OUTPATIENT)
Dept: MRI IMAGING | Facility: HOSPITAL | Age: 55
Discharge: HOME | End: 2020-04-28
Payer: COMMERCIAL

## 2020-04-28 DIAGNOSIS — C79.82: ICD-10-CM

## 2020-04-28 DIAGNOSIS — C77.2 METASTASIS TO RETROPERITONEAL LYMPH NODE (HCC): ICD-10-CM

## 2020-04-28 DIAGNOSIS — C78.00 MALIGNANT NEOPLASM METASTATIC TO LUNG, UNSPECIFIED LATERALITY (HCC): ICD-10-CM

## 2020-04-28 DIAGNOSIS — C79.9 ADENOCARCINOMA, METASTATIC (HCC): ICD-10-CM

## 2020-04-28 DIAGNOSIS — C18.2 PRIMARY ADENOCARCINOMA OF ASCENDING COLON (HCC): ICD-10-CM

## 2020-04-28 DIAGNOSIS — C78.7 LIVER METASTASES (HCC): ICD-10-CM

## 2020-04-28 PROCEDURE — 74183 MRI ABD W/O CNTR FLWD CNTR: CPT

## 2020-04-28 PROCEDURE — A9581 GADOXETATE DISODIUM INJ: HCPCS | Performed by: PHYSICIAN ASSISTANT

## 2020-04-28 RX ORDER — SODIUM CHLORIDE 9 MG/ML
20 INJECTION, SOLUTION INTRAVENOUS ONCE
Status: CANCELLED | OUTPATIENT
Start: 2020-05-06

## 2020-04-28 RX ORDER — DEXTROSE MONOHYDRATE 50 MG/ML
20 INJECTION, SOLUTION INTRAVENOUS ONCE
Status: CANCELLED | OUTPATIENT
Start: 2020-05-06

## 2020-04-28 RX ADMIN — GADOXETATE DISODIUM 7 ML: 181.43 INJECTION, SOLUTION INTRAVENOUS at 15:56

## 2020-04-29 DIAGNOSIS — C77.2 METASTASIS TO RETROPERITONEAL LYMPH NODE (HCC): ICD-10-CM

## 2020-04-29 DIAGNOSIS — C18.2 PRIMARY ADENOCARCINOMA OF ASCENDING COLON (HCC): ICD-10-CM

## 2020-04-29 DIAGNOSIS — D70.1 CHEMOTHERAPY INDUCED NEUTROPENIA (HCC): Primary | ICD-10-CM

## 2020-04-29 DIAGNOSIS — C78.7 LIVER METASTASES (HCC): ICD-10-CM

## 2020-04-29 DIAGNOSIS — T45.1X5A CHEMOTHERAPY INDUCED NEUTROPENIA (HCC): Primary | ICD-10-CM

## 2020-05-03 ENCOUNTER — HOSPITAL ENCOUNTER (OUTPATIENT)
Dept: MRI IMAGING | Facility: HOSPITAL | Age: 55
Discharge: HOME | End: 2020-05-03
Payer: COMMERCIAL

## 2020-05-03 ENCOUNTER — APPOINTMENT (OUTPATIENT)
Dept: MRI IMAGING | Facility: HOSPITAL | Age: 55
End: 2020-05-03
Payer: COMMERCIAL

## 2020-05-03 ENCOUNTER — HOSPITAL ENCOUNTER (OUTPATIENT)
Dept: CT IMAGING | Facility: HOSPITAL | Age: 55
Discharge: HOME | End: 2020-05-03
Payer: COMMERCIAL

## 2020-05-03 DIAGNOSIS — C79.9 ADENOCARCINOMA, METASTATIC (HCC): ICD-10-CM

## 2020-05-03 DIAGNOSIS — C78.00 MALIGNANT NEOPLASM METASTATIC TO LUNG, UNSPECIFIED LATERALITY (HCC): ICD-10-CM

## 2020-05-03 DIAGNOSIS — C77.2 METASTASIS TO RETROPERITONEAL LYMPH NODE (HCC): ICD-10-CM

## 2020-05-03 DIAGNOSIS — C18.2 PRIMARY ADENOCARCINOMA OF ASCENDING COLON (HCC): ICD-10-CM

## 2020-05-03 DIAGNOSIS — C79.82: ICD-10-CM

## 2020-05-03 DIAGNOSIS — C78.7 LIVER METASTASES (HCC): ICD-10-CM

## 2020-05-03 PROCEDURE — A9585 GADOBUTROL INJECTION: HCPCS | Performed by: PHYSICIAN ASSISTANT

## 2020-05-03 PROCEDURE — 72197 MRI PELVIS W/O & W/DYE: CPT

## 2020-05-03 PROCEDURE — 71250 CT THORAX DX C-: CPT

## 2020-05-03 RX ADMIN — GADOBUTROL 7 ML: 604.72 INJECTION INTRAVENOUS at 12:57

## 2020-05-04 ENCOUNTER — APPOINTMENT (OUTPATIENT)
Dept: LAB | Facility: MEDICAL CENTER | Age: 55
End: 2020-05-04
Payer: COMMERCIAL

## 2020-05-04 ENCOUNTER — TELEPHONE (OUTPATIENT)
Dept: HEMATOLOGY ONCOLOGY | Facility: CLINIC | Age: 55
End: 2020-05-04

## 2020-05-04 ENCOUNTER — TELEPHONE (OUTPATIENT)
Dept: HEMATOLOGY ONCOLOGY | Facility: MEDICAL CENTER | Age: 55
End: 2020-05-04

## 2020-05-04 DIAGNOSIS — C77.2 METASTASIS TO RETROPERITONEAL LYMPH NODE (HCC): ICD-10-CM

## 2020-05-04 DIAGNOSIS — C18.2 PRIMARY ADENOCARCINOMA OF ASCENDING COLON (HCC): ICD-10-CM

## 2020-05-04 DIAGNOSIS — D70.1 CHEMOTHERAPY INDUCED NEUTROPENIA (HCC): ICD-10-CM

## 2020-05-04 DIAGNOSIS — T45.1X5A CHEMOTHERAPY INDUCED NEUTROPENIA (HCC): ICD-10-CM

## 2020-05-04 DIAGNOSIS — C78.7 LIVER METASTASES (HCC): ICD-10-CM

## 2020-05-04 LAB
ALBUMIN SERPL BCP-MCNC: 3.5 G/DL (ref 3.5–5)
ALP SERPL-CCNC: 138 U/L (ref 46–116)
ALT SERPL W P-5'-P-CCNC: 24 U/L (ref 12–78)
ANION GAP SERPL CALCULATED.3IONS-SCNC: 5 MMOL/L (ref 4–13)
AST SERPL W P-5'-P-CCNC: 22 U/L (ref 5–45)
BASOPHILS # BLD AUTO: 0.06 THOUSANDS/ΜL (ref 0–0.1)
BASOPHILS NFR BLD AUTO: 0 % (ref 0–1)
BILIRUB SERPL-MCNC: 0.33 MG/DL (ref 0.2–1)
BUN SERPL-MCNC: 9 MG/DL (ref 5–25)
CALCIUM SERPL-MCNC: 9.2 MG/DL (ref 8.3–10.1)
CHLORIDE SERPL-SCNC: 108 MMOL/L (ref 100–108)
CO2 SERPL-SCNC: 25 MMOL/L (ref 21–32)
CREAT SERPL-MCNC: 0.84 MG/DL (ref 0.6–1.3)
EOSINOPHIL # BLD AUTO: 0.07 THOUSAND/ΜL (ref 0–0.61)
EOSINOPHIL NFR BLD AUTO: 1 % (ref 0–6)
ERYTHROCYTE [DISTWIDTH] IN BLOOD BY AUTOMATED COUNT: 18.1 % (ref 11.6–15.1)
GFR SERPL CREATININE-BSD FRML MDRD: 80 ML/MIN/1.73SQ M
GLUCOSE SERPL-MCNC: 103 MG/DL (ref 65–140)
HCT VFR BLD AUTO: 38.5 % (ref 34.8–46.1)
HGB BLD-MCNC: 12.1 G/DL (ref 11.5–15.4)
IMM GRANULOCYTES # BLD AUTO: 0.13 THOUSAND/UL (ref 0–0.2)
IMM GRANULOCYTES NFR BLD AUTO: 1 % (ref 0–2)
LYMPHOCYTES # BLD AUTO: 1.35 THOUSANDS/ΜL (ref 0.6–4.47)
LYMPHOCYTES NFR BLD AUTO: 10 % (ref 14–44)
MCH RBC QN AUTO: 34.6 PG (ref 26.8–34.3)
MCHC RBC AUTO-ENTMCNC: 31.4 G/DL (ref 31.4–37.4)
MCV RBC AUTO: 110 FL (ref 82–98)
MONOCYTES # BLD AUTO: 1.25 THOUSAND/ΜL (ref 0.17–1.22)
MONOCYTES NFR BLD AUTO: 9 % (ref 4–12)
NEUTROPHILS # BLD AUTO: 10.73 THOUSANDS/ΜL (ref 1.85–7.62)
NEUTS SEG NFR BLD AUTO: 79 % (ref 43–75)
NRBC BLD AUTO-RTO: 0 /100 WBCS
PLATELET # BLD AUTO: 129 THOUSANDS/UL (ref 149–390)
PMV BLD AUTO: 9.9 FL (ref 8.9–12.7)
POTASSIUM SERPL-SCNC: 4 MMOL/L (ref 3.5–5.3)
PROT SERPL-MCNC: 6.7 G/DL (ref 6.4–8.2)
RBC # BLD AUTO: 3.5 MILLION/UL (ref 3.81–5.12)
SODIUM SERPL-SCNC: 138 MMOL/L (ref 136–145)
WBC # BLD AUTO: 13.59 THOUSAND/UL (ref 4.31–10.16)

## 2020-05-04 PROCEDURE — 80053 COMPREHEN METABOLIC PANEL: CPT

## 2020-05-04 PROCEDURE — 85025 COMPLETE CBC W/AUTO DIFF WBC: CPT

## 2020-05-04 PROCEDURE — 36415 COLL VENOUS BLD VENIPUNCTURE: CPT

## 2020-05-05 ENCOUNTER — TELEMEDICINE (OUTPATIENT)
Dept: HEMATOLOGY ONCOLOGY | Facility: CLINIC | Age: 55
End: 2020-05-05
Payer: COMMERCIAL

## 2020-05-05 ENCOUNTER — TELEPHONE (OUTPATIENT)
Dept: HEMATOLOGY ONCOLOGY | Facility: CLINIC | Age: 55
End: 2020-05-05

## 2020-05-05 DIAGNOSIS — N94.89 ADNEXAL MASS: Primary | ICD-10-CM

## 2020-05-05 DIAGNOSIS — C79.9 ADENOCARCINOMA, METASTATIC (HCC): ICD-10-CM

## 2020-05-05 DIAGNOSIS — C18.2 PRIMARY ADENOCARCINOMA OF ASCENDING COLON (HCC): Primary | ICD-10-CM

## 2020-05-05 DIAGNOSIS — C78.7 LIVER METASTASES (HCC): ICD-10-CM

## 2020-05-05 PROCEDURE — 99214 OFFICE O/P EST MOD 30 MIN: CPT | Performed by: PHYSICIAN ASSISTANT

## 2020-05-06 ENCOUNTER — TELEPHONE (OUTPATIENT)
Dept: HEMATOLOGY ONCOLOGY | Facility: CLINIC | Age: 55
End: 2020-05-06

## 2020-05-06 ENCOUNTER — HOSPITAL ENCOUNTER (OUTPATIENT)
Dept: INFUSION CENTER | Facility: CLINIC | Age: 55
Discharge: HOME | End: 2020-05-06
Payer: COMMERCIAL

## 2020-05-06 VITALS
HEIGHT: 66 IN | DIASTOLIC BLOOD PRESSURE: 93 MMHG | OXYGEN SATURATION: 99 % | HEART RATE: 74 BPM | SYSTOLIC BLOOD PRESSURE: 138 MMHG | WEIGHT: 163.5 LBS | BODY MASS INDEX: 26.28 KG/M2 | RESPIRATION RATE: 18 BRPM | TEMPERATURE: 98 F

## 2020-05-06 DIAGNOSIS — C18.2 PRIMARY ADENOCARCINOMA OF ASCENDING COLON (HCC): ICD-10-CM

## 2020-05-06 DIAGNOSIS — D70.1 CHEMOTHERAPY INDUCED NEUTROPENIA (HCC): ICD-10-CM

## 2020-05-06 DIAGNOSIS — T45.1X5A CHEMOTHERAPY INDUCED NEUTROPENIA (HCC): ICD-10-CM

## 2020-05-06 DIAGNOSIS — C77.2 METASTASIS TO RETROPERITONEAL LYMPH NODE (HCC): Primary | ICD-10-CM

## 2020-05-06 DIAGNOSIS — C78.7 LIVER METASTASES (HCC): ICD-10-CM

## 2020-05-06 PROCEDURE — 96368 THER/DIAG CONCURRENT INF: CPT

## 2020-05-06 PROCEDURE — 96415 CHEMO IV INFUSION ADDL HR: CPT

## 2020-05-06 PROCEDURE — 96367 TX/PROPH/DG ADDL SEQ IV INF: CPT

## 2020-05-06 PROCEDURE — 96413 CHEMO IV INFUSION 1 HR: CPT

## 2020-05-06 PROCEDURE — G0498 CHEMO EXTEND IV INFUS W/PUMP: HCPCS

## 2020-05-06 RX ORDER — DEXTROSE MONOHYDRATE 50 MG/ML
20 INJECTION, SOLUTION INTRAVENOUS ONCE
Status: COMPLETED | OUTPATIENT
Start: 2020-05-06 | End: 2020-05-06

## 2020-05-06 RX ORDER — SODIUM CHLORIDE 9 MG/ML
20 INJECTION, SOLUTION INTRAVENOUS ONCE
Status: COMPLETED | OUTPATIENT
Start: 2020-05-06 | End: 2020-05-06

## 2020-05-06 RX ADMIN — OXALIPLATIN 150 MG: 5 INJECTION, SOLUTION INTRAVENOUS at 09:36

## 2020-05-06 RX ADMIN — LEUCOVORIN CALCIUM 750 MG: 500 INJECTION, POWDER, LYOPHILIZED, FOR SOLUTION INTRAMUSCULAR; INTRAVENOUS at 09:28

## 2020-05-06 RX ADMIN — SODIUM CHLORIDE 20 ML/HR: 0.9 INJECTION, SOLUTION INTRAVENOUS at 08:26

## 2020-05-06 RX ADMIN — DEXTROSE 20 ML/HR: 5 SOLUTION INTRAVENOUS at 09:27

## 2020-05-06 RX ADMIN — DEXAMETHASONE SODIUM PHOSPHATE: 10 INJECTION, SOLUTION INTRAMUSCULAR; INTRAVENOUS at 08:30

## 2020-05-06 RX ADMIN — SODIUM CHLORIDE 150 MG: 0.9 INJECTION, SOLUTION INTRAVENOUS at 08:51

## 2020-05-07 ENCOUNTER — CONSULT (OUTPATIENT)
Dept: GYNECOLOGIC ONCOLOGY | Facility: CLINIC | Age: 55
End: 2020-05-07
Payer: COMMERCIAL

## 2020-05-07 VITALS
WEIGHT: 166 LBS | HEART RATE: 84 BPM | HEIGHT: 66 IN | TEMPERATURE: 98.2 F | RESPIRATION RATE: 18 BRPM | SYSTOLIC BLOOD PRESSURE: 160 MMHG | BODY MASS INDEX: 26.68 KG/M2 | DIASTOLIC BLOOD PRESSURE: 100 MMHG

## 2020-05-07 DIAGNOSIS — C79.60 MALIGNANT NEOPLASM METASTATIC TO OVARY, UNSPECIFIED LATERALITY (HCC): Primary | ICD-10-CM

## 2020-05-07 DIAGNOSIS — N94.89 ADNEXAL MASS: ICD-10-CM

## 2020-05-07 PROCEDURE — 99245 OFF/OP CONSLTJ NEW/EST HI 55: CPT | Performed by: OBSTETRICS & GYNECOLOGY

## 2020-05-07 RX ORDER — SODIUM CHLORIDE, SODIUM LACTATE, POTASSIUM CHLORIDE, CALCIUM CHLORIDE 600; 310; 30; 20 MG/100ML; MG/100ML; MG/100ML; MG/100ML
125 INJECTION, SOLUTION INTRAVENOUS CONTINUOUS
Status: CANCELLED | OUTPATIENT
Start: 2020-05-07

## 2020-05-07 RX ORDER — HEPARIN SODIUM 5000 [USP'U]/ML
5000 INJECTION, SOLUTION INTRAVENOUS; SUBCUTANEOUS
Status: CANCELLED | OUTPATIENT
Start: 2020-05-08 | End: 2020-05-09

## 2020-05-08 ENCOUNTER — HOSPITAL ENCOUNTER (OUTPATIENT)
Dept: INFUSION CENTER | Facility: CLINIC | Age: 55
Discharge: HOME | End: 2020-05-08
Payer: COMMERCIAL

## 2020-05-08 VITALS — TEMPERATURE: 97.4 F

## 2020-05-08 DIAGNOSIS — C77.2 METASTASIS TO RETROPERITONEAL LYMPH NODE (HCC): Primary | ICD-10-CM

## 2020-05-08 DIAGNOSIS — C18.2 PRIMARY ADENOCARCINOMA OF ASCENDING COLON (HCC): ICD-10-CM

## 2020-05-08 DIAGNOSIS — D70.1 CHEMOTHERAPY INDUCED NEUTROPENIA (HCC): ICD-10-CM

## 2020-05-08 DIAGNOSIS — T45.1X5A CHEMOTHERAPY INDUCED NEUTROPENIA (HCC): ICD-10-CM

## 2020-05-08 DIAGNOSIS — C78.7 LIVER METASTASES (HCC): ICD-10-CM

## 2020-05-08 PROCEDURE — 96372 THER/PROPH/DIAG INJ SC/IM: CPT

## 2020-05-08 RX ADMIN — PEGFILGRASTIM 6 MG: KIT SUBCUTANEOUS at 09:53

## 2020-05-15 ENCOUNTER — OFFICE VISIT (OUTPATIENT)
Dept: HEMATOLOGY ONCOLOGY | Facility: CLINIC | Age: 55
End: 2020-05-15
Payer: COMMERCIAL

## 2020-05-15 VITALS
DIASTOLIC BLOOD PRESSURE: 92 MMHG | RESPIRATION RATE: 16 BRPM | HEIGHT: 66 IN | BODY MASS INDEX: 25.88 KG/M2 | WEIGHT: 161 LBS | OXYGEN SATURATION: 100 % | SYSTOLIC BLOOD PRESSURE: 142 MMHG | TEMPERATURE: 98.1 F | HEART RATE: 73 BPM

## 2020-05-15 DIAGNOSIS — D70.1 CHEMOTHERAPY INDUCED NEUTROPENIA (HCC): ICD-10-CM

## 2020-05-15 DIAGNOSIS — C78.7 LIVER METASTASES (HCC): ICD-10-CM

## 2020-05-15 DIAGNOSIS — C78.6 PERITONEAL METASTASES (HCC): ICD-10-CM

## 2020-05-15 DIAGNOSIS — C18.2 PRIMARY ADENOCARCINOMA OF ASCENDING COLON (HCC): ICD-10-CM

## 2020-05-15 DIAGNOSIS — C79.60 MALIGNANT NEOPLASM METASTATIC TO OVARY, UNSPECIFIED LATERALITY (HCC): ICD-10-CM

## 2020-05-15 DIAGNOSIS — C77.2 METASTASIS TO RETROPERITONEAL LYMPH NODE (HCC): ICD-10-CM

## 2020-05-15 DIAGNOSIS — I82.531 CHRONIC DEEP VEIN THROMBOSIS (DVT) OF POPLITEAL VEIN OF RIGHT LOWER EXTREMITY (HCC): ICD-10-CM

## 2020-05-15 DIAGNOSIS — T45.1X5A CHEMOTHERAPY INDUCED NEUTROPENIA (HCC): ICD-10-CM

## 2020-05-15 DIAGNOSIS — C18.2 PRIMARY ADENOCARCINOMA OF ASCENDING COLON (HCC): Primary | ICD-10-CM

## 2020-05-15 PROCEDURE — 99214 OFFICE O/P EST MOD 30 MIN: CPT | Performed by: INTERNAL MEDICINE

## 2020-05-15 RX ORDER — METOPROLOL SUCCINATE 50 MG/1
50 TABLET, EXTENDED RELEASE ORAL 2 TIMES DAILY
COMMUNITY
Start: 2020-05-13

## 2020-05-19 ENCOUNTER — TRANSCRIBE ORDERS (OUTPATIENT)
Dept: LAB | Facility: CLINIC | Age: 55
End: 2020-05-19

## 2020-05-19 ENCOUNTER — ANESTHESIA EVENT (OUTPATIENT)
Dept: PERIOP | Facility: HOSPITAL | Age: 55
DRG: 737 | End: 2020-05-19
Payer: COMMERCIAL

## 2020-05-19 ENCOUNTER — LAB (OUTPATIENT)
Dept: LAB | Facility: CLINIC | Age: 55
End: 2020-05-19
Payer: COMMERCIAL

## 2020-05-19 DIAGNOSIS — C79.60: Primary | ICD-10-CM

## 2020-05-19 DIAGNOSIS — C79.60 MALIGNANT NEOPLASM METASTATIC TO OVARY, UNSPECIFIED LATERALITY (HCC): ICD-10-CM

## 2020-05-19 DIAGNOSIS — C79.60: ICD-10-CM

## 2020-05-19 DIAGNOSIS — C80.1: Primary | ICD-10-CM

## 2020-05-19 DIAGNOSIS — C80.1: ICD-10-CM

## 2020-05-19 LAB
ABO GROUP BLD: NORMAL
ALBUMIN SERPL BCP-MCNC: 3.4 G/DL (ref 3.5–5)
ALP SERPL-CCNC: 140 U/L (ref 46–116)
ALT SERPL W P-5'-P-CCNC: 27 U/L (ref 12–78)
ANION GAP SERPL CALCULATED.3IONS-SCNC: 7 MMOL/L (ref 4–13)
APTT PPP: 32 SECONDS (ref 23–37)
AST SERPL W P-5'-P-CCNC: 30 U/L (ref 5–45)
ATRIAL RATE: 67 BPM
BASOPHILS # BLD AUTO: 0.06 THOUSANDS/ΜL (ref 0–0.1)
BASOPHILS NFR BLD AUTO: 1 % (ref 0–1)
BILIRUB SERPL-MCNC: 0.35 MG/DL (ref 0.2–1)
BLD GP AB SCN SERPL QL: NEGATIVE
BUN SERPL-MCNC: 9 MG/DL (ref 5–25)
CALCIUM SERPL-MCNC: 8.6 MG/DL (ref 8.3–10.1)
CHLORIDE SERPL-SCNC: 102 MMOL/L (ref 100–108)
CO2 SERPL-SCNC: 27 MMOL/L (ref 21–32)
CREAT SERPL-MCNC: 0.83 MG/DL (ref 0.6–1.3)
EOSINOPHIL # BLD AUTO: 0.14 THOUSAND/ΜL (ref 0–0.61)
EOSINOPHIL NFR BLD AUTO: 1 % (ref 0–6)
ERYTHROCYTE [DISTWIDTH] IN BLOOD BY AUTOMATED COUNT: 18.4 % (ref 11.6–15.1)
EST. AVERAGE GLUCOSE BLD GHB EST-MCNC: 100 MG/DL
GFR SERPL CREATININE-BSD FRML MDRD: 81 ML/MIN/1.73SQ M
GLUCOSE P FAST SERPL-MCNC: 83 MG/DL (ref 65–99)
HBA1C MFR BLD: 5.1 %
HCT VFR BLD AUTO: 39.7 % (ref 34.8–46.1)
HGB BLD-MCNC: 12.7 G/DL (ref 11.5–15.4)
IMM GRANULOCYTES # BLD AUTO: 0.11 THOUSAND/UL (ref 0–0.2)
IMM GRANULOCYTES NFR BLD AUTO: 1 % (ref 0–2)
INR PPP: 1.39 (ref 0.84–1.19)
LYMPHOCYTES # BLD AUTO: 1.12 THOUSANDS/ΜL (ref 0.6–4.47)
LYMPHOCYTES NFR BLD AUTO: 10 % (ref 14–44)
MCH RBC QN AUTO: 35.3 PG (ref 26.8–34.3)
MCHC RBC AUTO-ENTMCNC: 32 G/DL (ref 31.4–37.4)
MCV RBC AUTO: 110 FL (ref 82–98)
MONOCYTES # BLD AUTO: 1.08 THOUSAND/ΜL (ref 0.17–1.22)
MONOCYTES NFR BLD AUTO: 10 % (ref 4–12)
NEUTROPHILS # BLD AUTO: 8.24 THOUSANDS/ΜL (ref 1.85–7.62)
NEUTS SEG NFR BLD AUTO: 77 % (ref 43–75)
NRBC BLD AUTO-RTO: 0 /100 WBCS
P AXIS: 63 DEGREES
PLATELET # BLD AUTO: 127 THOUSANDS/UL (ref 149–390)
PMV BLD AUTO: 10.3 FL (ref 8.9–12.7)
POTASSIUM SERPL-SCNC: 4 MMOL/L (ref 3.5–5.3)
PR INTERVAL: 152 MS
PROT SERPL-MCNC: 6.8 G/DL (ref 6.4–8.2)
PROTHROMBIN TIME: 16.4 SECONDS (ref 11.6–14.5)
QRS AXIS: -34 DEGREES
QRSD INTERVAL: 78 MS
QT INTERVAL: 376 MS
QTC INTERVAL: 397 MS
RBC # BLD AUTO: 3.6 MILLION/UL (ref 3.81–5.12)
RH BLD: POSITIVE
SODIUM SERPL-SCNC: 136 MMOL/L (ref 136–145)
SPECIMEN EXPIRATION DATE: NORMAL
T WAVE AXIS: 47 DEGREES
VENTRICULAR RATE: 67 BPM
WBC # BLD AUTO: 10.75 THOUSAND/UL (ref 4.31–10.16)

## 2020-05-19 PROCEDURE — 86923 COMPATIBILITY TEST ELECTRIC: CPT

## 2020-05-19 PROCEDURE — 93010 ELECTROCARDIOGRAM REPORT: CPT | Performed by: INTERNAL MEDICINE

## 2020-05-19 PROCEDURE — 85025 COMPLETE CBC W/AUTO DIFF WBC: CPT

## 2020-05-19 PROCEDURE — 83036 HEMOGLOBIN GLYCOSYLATED A1C: CPT

## 2020-05-19 PROCEDURE — 86900 BLOOD TYPING SEROLOGIC ABO: CPT

## 2020-05-19 PROCEDURE — 85610 PROTHROMBIN TIME: CPT

## 2020-05-19 PROCEDURE — 85730 THROMBOPLASTIN TIME PARTIAL: CPT

## 2020-05-19 PROCEDURE — U0003 INFECTIOUS AGENT DETECTION BY NUCLEIC ACID (DNA OR RNA); SEVERE ACUTE RESPIRATORY SYNDROME CORONAVIRUS 2 (SARS-COV-2) (CORONAVIRUS DISEASE [COVID-19]), AMPLIFIED PROBE TECHNIQUE, MAKING USE OF HIGH THROUGHPUT TECHNOLOGIES AS DESCRIBED BY CMS-2020-01-R: HCPCS

## 2020-05-19 PROCEDURE — 80053 COMPREHEN METABOLIC PANEL: CPT

## 2020-05-19 PROCEDURE — 86901 BLOOD TYPING SEROLOGIC RH(D): CPT

## 2020-05-19 PROCEDURE — 36415 COLL VENOUS BLD VENIPUNCTURE: CPT

## 2020-05-19 PROCEDURE — 86850 RBC ANTIBODY SCREEN: CPT

## 2020-05-19 PROCEDURE — 93005 ELECTROCARDIOGRAM TRACING: CPT

## 2020-05-20 ENCOUNTER — HOSPITAL ENCOUNTER (OUTPATIENT)
Dept: INFUSION CENTER | Facility: CLINIC | Age: 55
End: 2020-05-20

## 2020-05-20 LAB — SARS-COV-2 RNA SPEC QL NAA+PROBE: NOT DETECTED

## 2020-05-21 ENCOUNTER — TELEPHONE (OUTPATIENT)
Dept: GYNECOLOGIC ONCOLOGY | Facility: CLINIC | Age: 55
End: 2020-05-21

## 2020-05-26 ENCOUNTER — HOSPITAL ENCOUNTER (INPATIENT)
Facility: HOSPITAL | Age: 55
LOS: 3 days | Discharge: HOME | DRG: 737 | End: 2020-05-29
Attending: OBSTETRICS & GYNECOLOGY | Admitting: OBSTETRICS & GYNECOLOGY
Payer: COMMERCIAL

## 2020-05-26 ENCOUNTER — ANESTHESIA (OUTPATIENT)
Dept: PERIOP | Facility: HOSPITAL | Age: 55
DRG: 737 | End: 2020-05-26
Payer: COMMERCIAL

## 2020-05-26 DIAGNOSIS — C79.60 MALIGNANT NEOPLASM METASTATIC TO OVARY, UNSPECIFIED LATERALITY (HCC): ICD-10-CM

## 2020-05-26 DIAGNOSIS — Z90.722 S/P BILATERAL OOPHORECTOMY: Primary | ICD-10-CM

## 2020-05-26 DIAGNOSIS — C18.2 PRIMARY ADENOCARCINOMA OF ASCENDING COLON (HCC): ICD-10-CM

## 2020-05-26 LAB
ANISOCYTOSIS BLD QL SMEAR: PRESENT
BASOPHILS # BLD MANUAL: 0 THOUSAND/UL (ref 0–0.1)
BASOPHILS NFR MAR MANUAL: 0 % (ref 0–1)
EOSINOPHIL # BLD MANUAL: 0 THOUSAND/UL (ref 0–0.4)
EOSINOPHIL NFR BLD MANUAL: 0 % (ref 0–6)
ERYTHROCYTE [DISTWIDTH] IN BLOOD BY AUTOMATED COUNT: 17.9 % (ref 11.6–15.1)
HCT VFR BLD AUTO: 31.5 % (ref 34.8–46.1)
HGB BLD-MCNC: 10.2 G/DL (ref 11.5–15.4)
LYMPHOCYTES # BLD AUTO: 0.14 THOUSAND/UL (ref 0.6–4.47)
LYMPHOCYTES # BLD AUTO: 1 % (ref 14–44)
MACROCYTES BLD QL AUTO: PRESENT
MCH RBC QN AUTO: 35.5 PG (ref 26.8–34.3)
MCHC RBC AUTO-ENTMCNC: 32.4 G/DL (ref 31.4–37.4)
MCV RBC AUTO: 110 FL (ref 82–98)
METAMYELOCYTES NFR BLD MANUAL: 1 % (ref 0–1)
MONOCYTES # BLD AUTO: 0 THOUSAND/UL (ref 0–1.22)
MONOCYTES NFR BLD: 0 % (ref 4–12)
NEUTROPHILS # BLD MANUAL: 13.49 THOUSAND/UL (ref 1.85–7.62)
NEUTS BAND NFR BLD MANUAL: 3 % (ref 0–8)
NEUTS SEG NFR BLD AUTO: 95 % (ref 43–75)
NRBC BLD AUTO-RTO: 0 /100 WBCS
PLATELET # BLD AUTO: 116 THOUSANDS/UL (ref 149–390)
PLATELET BLD QL SMEAR: ABNORMAL
PMV BLD AUTO: 9.3 FL (ref 8.9–12.7)
POLYCHROMASIA BLD QL SMEAR: PRESENT
RBC # BLD AUTO: 2.87 MILLION/UL (ref 3.81–5.12)
RBC MORPH BLD: PRESENT
WBC # BLD AUTO: 13.77 THOUSAND/UL (ref 4.31–10.16)

## 2020-05-26 PROCEDURE — 0DJD8ZZ INSPECTION OF LOWER INTESTINAL TRACT, VIA NATURAL OR ARTIFICIAL OPENING ENDOSCOPIC: ICD-10-PCS | Performed by: OBSTETRICS & GYNECOLOGY

## 2020-05-26 PROCEDURE — 58940 REMOVAL OF OVARY(S): CPT | Performed by: OBSTETRICS & GYNECOLOGY

## 2020-05-26 PROCEDURE — 88341 IMHCHEM/IMCYTCHM EA ADD ANTB: CPT | Performed by: PATHOLOGY

## 2020-05-26 PROCEDURE — 0DNW0ZZ RELEASE PERITONEUM, OPEN APPROACH: ICD-10-PCS | Performed by: OBSTETRICS & GYNECOLOGY

## 2020-05-26 PROCEDURE — C9290 INJ, BUPIVACAINE LIPOSOME: HCPCS | Performed by: STUDENT IN AN ORGANIZED HEALTH CARE EDUCATION/TRAINING PROGRAM

## 2020-05-26 PROCEDURE — 88342 IMHCHEM/IMCYTCHM 1ST ANTB: CPT | Performed by: PATHOLOGY

## 2020-05-26 PROCEDURE — 0WBH0ZX EXCISION OF RETROPERITONEUM, OPEN APPROACH, DIAGNOSTIC: ICD-10-PCS | Performed by: OBSTETRICS & GYNECOLOGY

## 2020-05-26 PROCEDURE — 0UT10ZZ RESECTION OF LEFT OVARY, OPEN APPROACH: ICD-10-PCS | Performed by: OBSTETRICS & GYNECOLOGY

## 2020-05-26 PROCEDURE — 0DNW3ZZ RELEASE PERITONEUM, PERCUTANEOUS APPROACH: ICD-10-PCS | Performed by: OBSTETRICS & GYNECOLOGY

## 2020-05-26 PROCEDURE — 0TJB8ZZ INSPECTION OF BLADDER, VIA NATURAL OR ARTIFICIAL OPENING ENDOSCOPIC: ICD-10-PCS | Performed by: OBSTETRICS & GYNECOLOGY

## 2020-05-26 PROCEDURE — 8E0W4CZ ROBOTIC ASSISTED PROCEDURE OF TRUNK REGION, PERCUTANEOUS ENDOSCOPIC APPROACH: ICD-10-PCS | Performed by: OBSTETRICS & GYNECOLOGY

## 2020-05-26 PROCEDURE — 85007 BL SMEAR W/DIFF WBC COUNT: CPT | Performed by: OBSTETRICS & GYNECOLOGY

## 2020-05-26 PROCEDURE — 88307 TISSUE EXAM BY PATHOLOGIST: CPT | Performed by: PATHOLOGY

## 2020-05-26 PROCEDURE — 88305 TISSUE EXAM BY PATHOLOGIST: CPT | Performed by: PATHOLOGY

## 2020-05-26 PROCEDURE — 0UT04ZZ RESECTION OF RIGHT OVARY, PERCUTANEOUS ENDOSCOPIC APPROACH: ICD-10-PCS | Performed by: OBSTETRICS & GYNECOLOGY

## 2020-05-26 PROCEDURE — 85027 COMPLETE CBC AUTOMATED: CPT | Performed by: OBSTETRICS & GYNECOLOGY

## 2020-05-26 RX ORDER — BUPIVACAINE HYDROCHLORIDE 2.5 MG/ML
INJECTION, SOLUTION EPIDURAL; INFILTRATION; INTRACAUDAL AS NEEDED
Status: DISCONTINUED | OUTPATIENT
Start: 2020-05-26 | End: 2020-05-26 | Stop reason: SURG

## 2020-05-26 RX ORDER — FENTANYL CITRATE 50 UG/ML
INJECTION, SOLUTION INTRAMUSCULAR; INTRAVENOUS AS NEEDED
Status: DISCONTINUED | OUTPATIENT
Start: 2020-05-26 | End: 2020-05-26 | Stop reason: SURG

## 2020-05-26 RX ORDER — DEXTROSE, SODIUM CHLORIDE, AND POTASSIUM CHLORIDE 5; .9; .15 G/100ML; G/100ML; G/100ML
125 INJECTION INTRAVENOUS CONTINUOUS
Status: DISCONTINUED | OUTPATIENT
Start: 2020-05-26 | End: 2020-05-27

## 2020-05-26 RX ORDER — OXYCODONE HYDROCHLORIDE 10 MG/1
10 TABLET ORAL EVERY 4 HOURS PRN
Status: DISCONTINUED | OUTPATIENT
Start: 2020-05-26 | End: 2020-05-29 | Stop reason: HOSPADM

## 2020-05-26 RX ORDER — HEPARIN SODIUM 5000 [USP'U]/ML
5000 INJECTION, SOLUTION INTRAVENOUS; SUBCUTANEOUS
Status: COMPLETED | OUTPATIENT
Start: 2020-05-26 | End: 2020-05-26

## 2020-05-26 RX ORDER — SODIUM CHLORIDE, SODIUM LACTATE, POTASSIUM CHLORIDE, CALCIUM CHLORIDE 600; 310; 30; 20 MG/100ML; MG/100ML; MG/100ML; MG/100ML
INJECTION, SOLUTION INTRAVENOUS CONTINUOUS PRN
Status: DISCONTINUED | OUTPATIENT
Start: 2020-05-26 | End: 2020-05-26 | Stop reason: SURG

## 2020-05-26 RX ORDER — HEPARIN SODIUM 5000 [USP'U]/ML
5000 INJECTION, SOLUTION INTRAVENOUS; SUBCUTANEOUS EVERY 8 HOURS SCHEDULED
Status: COMPLETED | OUTPATIENT
Start: 2020-05-26 | End: 2020-05-27

## 2020-05-26 RX ORDER — DOCUSATE SODIUM 100 MG/1
100 CAPSULE, LIQUID FILLED ORAL 2 TIMES DAILY
Status: DISCONTINUED | OUTPATIENT
Start: 2020-05-26 | End: 2020-05-29 | Stop reason: HOSPADM

## 2020-05-26 RX ORDER — ACETAMINOPHEN 325 MG/1
975 TABLET ORAL EVERY 6 HOURS SCHEDULED
Status: DISCONTINUED | OUTPATIENT
Start: 2020-05-26 | End: 2020-05-26

## 2020-05-26 RX ORDER — PROMETHAZINE HYDROCHLORIDE 25 MG/ML
12.5 INJECTION, SOLUTION INTRAMUSCULAR; INTRAVENOUS
Status: DISCONTINUED | OUTPATIENT
Start: 2020-05-26 | End: 2020-05-26 | Stop reason: HOSPADM

## 2020-05-26 RX ORDER — SUCCINYLCHOLINE/SOD CL,ISO/PF 100 MG/5ML
SYRINGE (ML) INTRAVENOUS AS NEEDED
Status: DISCONTINUED | OUTPATIENT
Start: 2020-05-26 | End: 2020-05-26 | Stop reason: SURG

## 2020-05-26 RX ORDER — MAGNESIUM HYDROXIDE 1200 MG/15ML
LIQUID ORAL AS NEEDED
Status: DISCONTINUED | OUTPATIENT
Start: 2020-05-26 | End: 2020-05-26 | Stop reason: HOSPADM

## 2020-05-26 RX ORDER — ROCURONIUM BROMIDE 10 MG/ML
INJECTION, SOLUTION INTRAVENOUS AS NEEDED
Status: DISCONTINUED | OUTPATIENT
Start: 2020-05-26 | End: 2020-05-26 | Stop reason: SURG

## 2020-05-26 RX ORDER — HYDROMORPHONE HCL/PF 1 MG/ML
0.5 SYRINGE (ML) INJECTION
Status: DISCONTINUED | OUTPATIENT
Start: 2020-05-26 | End: 2020-05-26 | Stop reason: HOSPADM

## 2020-05-26 RX ORDER — ONDANSETRON 2 MG/ML
INJECTION INTRAMUSCULAR; INTRAVENOUS AS NEEDED
Status: DISCONTINUED | OUTPATIENT
Start: 2020-05-26 | End: 2020-05-26 | Stop reason: SURG

## 2020-05-26 RX ORDER — ONDANSETRON 2 MG/ML
4 INJECTION INTRAMUSCULAR; INTRAVENOUS ONCE AS NEEDED
Status: DISCONTINUED | OUTPATIENT
Start: 2020-05-26 | End: 2020-05-26 | Stop reason: HOSPADM

## 2020-05-26 RX ORDER — HYDROMORPHONE HCL/PF 1 MG/ML
0.5 SYRINGE (ML) INJECTION
Status: DISCONTINUED | OUTPATIENT
Start: 2020-05-26 | End: 2020-05-26

## 2020-05-26 RX ORDER — SODIUM CHLORIDE 9 MG/ML
INJECTION, SOLUTION INTRAVENOUS CONTINUOUS PRN
Status: DISCONTINUED | OUTPATIENT
Start: 2020-05-26 | End: 2020-05-26 | Stop reason: SURG

## 2020-05-26 RX ORDER — BUPIVACAINE HYDROCHLORIDE 2.5 MG/ML
INJECTION, SOLUTION EPIDURAL; INFILTRATION; INTRACAUDAL AS NEEDED
Status: DISCONTINUED | OUTPATIENT
Start: 2020-05-26 | End: 2020-05-26 | Stop reason: HOSPADM

## 2020-05-26 RX ORDER — METOPROLOL SUCCINATE 50 MG/1
50 TABLET, EXTENDED RELEASE ORAL
Status: DISCONTINUED | OUTPATIENT
Start: 2020-05-26 | End: 2020-05-29 | Stop reason: HOSPADM

## 2020-05-26 RX ORDER — ONDANSETRON 2 MG/ML
4 INJECTION INTRAMUSCULAR; INTRAVENOUS EVERY 6 HOURS PRN
Status: DISCONTINUED | OUTPATIENT
Start: 2020-05-26 | End: 2020-05-29 | Stop reason: HOSPADM

## 2020-05-26 RX ORDER — FENTANYL CITRATE/PF 50 MCG/ML
50 SYRINGE (ML) INJECTION
Status: DISCONTINUED | OUTPATIENT
Start: 2020-05-26 | End: 2020-05-26 | Stop reason: HOSPADM

## 2020-05-26 RX ORDER — HYDROMORPHONE HCL/PF 1 MG/ML
1 SYRINGE (ML) INJECTION EVERY 4 HOURS PRN
Status: DISCONTINUED | OUTPATIENT
Start: 2020-05-26 | End: 2020-05-29 | Stop reason: HOSPADM

## 2020-05-26 RX ORDER — OXYCODONE HYDROCHLORIDE 5 MG/1
5 TABLET ORAL EVERY 4 HOURS PRN
Status: DISCONTINUED | OUTPATIENT
Start: 2020-05-26 | End: 2020-05-29 | Stop reason: HOSPADM

## 2020-05-26 RX ORDER — ACETAMINOPHEN 325 MG/1
975 TABLET ORAL EVERY 6 HOURS
Status: DISCONTINUED | OUTPATIENT
Start: 2020-05-26 | End: 2020-05-29 | Stop reason: HOSPADM

## 2020-05-26 RX ORDER — AMLODIPINE BESYLATE 5 MG/1
5 TABLET ORAL DAILY
Status: DISCONTINUED | OUTPATIENT
Start: 2020-05-27 | End: 2020-05-29 | Stop reason: HOSPADM

## 2020-05-26 RX ORDER — NEOSTIGMINE METHYLSULFATE 1 MG/ML
INJECTION INTRAVENOUS AS NEEDED
Status: DISCONTINUED | OUTPATIENT
Start: 2020-05-26 | End: 2020-05-26 | Stop reason: SURG

## 2020-05-26 RX ORDER — DEXAMETHASONE SODIUM PHOSPHATE 10 MG/ML
INJECTION, SOLUTION INTRAMUSCULAR; INTRAVENOUS AS NEEDED
Status: DISCONTINUED | OUTPATIENT
Start: 2020-05-26 | End: 2020-05-26 | Stop reason: SURG

## 2020-05-26 RX ORDER — OXYCODONE HYDROCHLORIDE 5 MG/1
5 TABLET ORAL EVERY 4 HOURS PRN
Status: DISCONTINUED | OUTPATIENT
Start: 2020-05-26 | End: 2020-05-26

## 2020-05-26 RX ORDER — DIPHENHYDRAMINE HYDROCHLORIDE 50 MG/ML
25 INJECTION INTRAMUSCULAR; INTRAVENOUS EVERY 6 HOURS PRN
Status: DISCONTINUED | OUTPATIENT
Start: 2020-05-26 | End: 2020-05-26

## 2020-05-26 RX ORDER — SODIUM CHLORIDE, SODIUM LACTATE, POTASSIUM CHLORIDE, CALCIUM CHLORIDE 600; 310; 30; 20 MG/100ML; MG/100ML; MG/100ML; MG/100ML
125 INJECTION, SOLUTION INTRAVENOUS CONTINUOUS
Status: DISCONTINUED | OUTPATIENT
Start: 2020-05-26 | End: 2020-05-26

## 2020-05-26 RX ORDER — MIDAZOLAM HYDROCHLORIDE 2 MG/2ML
INJECTION, SOLUTION INTRAMUSCULAR; INTRAVENOUS AS NEEDED
Status: DISCONTINUED | OUTPATIENT
Start: 2020-05-26 | End: 2020-05-26 | Stop reason: SURG

## 2020-05-26 RX ORDER — OXYCODONE HYDROCHLORIDE 10 MG/1
10 TABLET ORAL EVERY 4 HOURS PRN
Status: DISCONTINUED | OUTPATIENT
Start: 2020-05-26 | End: 2020-05-26

## 2020-05-26 RX ORDER — GLYCOPYRROLATE 0.2 MG/ML
INJECTION INTRAMUSCULAR; INTRAVENOUS AS NEEDED
Status: DISCONTINUED | OUTPATIENT
Start: 2020-05-26 | End: 2020-05-26 | Stop reason: SURG

## 2020-05-26 RX ORDER — PROPOFOL 10 MG/ML
INJECTION, EMULSION INTRAVENOUS AS NEEDED
Status: DISCONTINUED | OUTPATIENT
Start: 2020-05-26 | End: 2020-05-26 | Stop reason: SURG

## 2020-05-26 RX ORDER — HYDROMORPHONE HCL/PF 1 MG/ML
0.5 SYRINGE (ML) INJECTION EVERY 2 HOUR PRN
Status: DISCONTINUED | OUTPATIENT
Start: 2020-05-26 | End: 2020-05-26

## 2020-05-26 RX ORDER — CEFAZOLIN SODIUM 1 G/50ML
1000 SOLUTION INTRAVENOUS ONCE
Status: COMPLETED | OUTPATIENT
Start: 2020-05-26 | End: 2020-05-26

## 2020-05-26 RX ORDER — ALBUMIN, HUMAN INJ 5% 5 %
SOLUTION INTRAVENOUS CONTINUOUS PRN
Status: DISCONTINUED | OUTPATIENT
Start: 2020-05-26 | End: 2020-05-26 | Stop reason: SURG

## 2020-05-26 RX ORDER — EPHEDRINE SULFATE 50 MG/ML
INJECTION INTRAVENOUS AS NEEDED
Status: DISCONTINUED | OUTPATIENT
Start: 2020-05-26 | End: 2020-05-26 | Stop reason: SURG

## 2020-05-26 RX ORDER — HYDROMORPHONE HCL/PF 1 MG/ML
SYRINGE (ML) INJECTION AS NEEDED
Status: DISCONTINUED | OUTPATIENT
Start: 2020-05-26 | End: 2020-05-26 | Stop reason: SURG

## 2020-05-26 RX ADMIN — ALBUMIN (HUMAN): 12.5 SOLUTION INTRAVENOUS at 09:41

## 2020-05-26 RX ADMIN — ACETAMINOPHEN 975 MG: 325 TABLET ORAL at 13:49

## 2020-05-26 RX ADMIN — ONDANSETRON 4 MG: 2 INJECTION INTRAMUSCULAR; INTRAVENOUS at 10:45

## 2020-05-26 RX ADMIN — DEXTROSE, SODIUM CHLORIDE, AND POTASSIUM CHLORIDE 125 ML/HR: 5; .9; .15 INJECTION INTRAVENOUS at 13:49

## 2020-05-26 RX ADMIN — SODIUM CHLORIDE: 0.9 INJECTION, SOLUTION INTRAVENOUS at 07:56

## 2020-05-26 RX ADMIN — NEOSTIGMINE METHYLSULFATE 3 MG: 1 INJECTION, SOLUTION INTRAVENOUS at 11:27

## 2020-05-26 RX ADMIN — FENTANYL CITRATE 50 MCG: 50 INJECTION, SOLUTION INTRAMUSCULAR; INTRAVENOUS at 09:15

## 2020-05-26 RX ADMIN — DEXTROSE, SODIUM CHLORIDE, AND POTASSIUM CHLORIDE 125 ML/HR: 5; .9; .15 INJECTION INTRAVENOUS at 22:39

## 2020-05-26 RX ADMIN — EPHEDRINE SULFATE 5 MG: 50 INJECTION, SOLUTION INTRAVENOUS at 09:39

## 2020-05-26 RX ADMIN — SODIUM CHLORIDE, SODIUM LACTATE, POTASSIUM CHLORIDE, AND CALCIUM CHLORIDE: .6; .31; .03; .02 INJECTION, SOLUTION INTRAVENOUS at 07:49

## 2020-05-26 RX ADMIN — DEXAMETHASONE SODIUM PHOSPHATE 10 MG: 10 INJECTION, SOLUTION INTRAMUSCULAR; INTRAVENOUS at 08:31

## 2020-05-26 RX ADMIN — SODIUM CHLORIDE, SODIUM LACTATE, POTASSIUM CHLORIDE, AND CALCIUM CHLORIDE: .6; .31; .03; .02 INJECTION, SOLUTION INTRAVENOUS at 09:32

## 2020-05-26 RX ADMIN — PROPOFOL 180 MG: 10 INJECTION, EMULSION INTRAVENOUS at 07:54

## 2020-05-26 RX ADMIN — FENTANYL CITRATE 50 MCG: 50 INJECTION, SOLUTION INTRAMUSCULAR; INTRAVENOUS at 08:13

## 2020-05-26 RX ADMIN — ROCURONIUM BROMIDE 10 MG: 10 INJECTION, SOLUTION INTRAVENOUS at 07:54

## 2020-05-26 RX ADMIN — MIDAZOLAM 2 MG: 1 INJECTION INTRAMUSCULAR; INTRAVENOUS at 07:49

## 2020-05-26 RX ADMIN — HEPARIN SODIUM 5000 UNITS: 5000 INJECTION INTRAVENOUS; SUBCUTANEOUS at 17:06

## 2020-05-26 RX ADMIN — FENTANYL CITRATE 50 MCG: 50 INJECTION, SOLUTION INTRAMUSCULAR; INTRAVENOUS at 07:54

## 2020-05-26 RX ADMIN — SODIUM CHLORIDE, SODIUM LACTATE, POTASSIUM CHLORIDE, AND CALCIUM CHLORIDE 125 ML/HR: .6; .31; .03; .02 INJECTION, SOLUTION INTRAVENOUS at 12:10

## 2020-05-26 RX ADMIN — HEPARIN SODIUM 5000 UNITS: 5000 INJECTION INTRAVENOUS; SUBCUTANEOUS at 07:45

## 2020-05-26 RX ADMIN — ROCURONIUM BROMIDE 20 MG: 10 INJECTION, SOLUTION INTRAVENOUS at 10:35

## 2020-05-26 RX ADMIN — BUPIVACAINE 10 ML: 13.3 INJECTION, SUSPENSION, LIPOSOMAL INFILTRATION at 11:20

## 2020-05-26 RX ADMIN — HYDROMORPHONE HYDROCHLORIDE 0.5 MG: 1 INJECTION, SOLUTION INTRAMUSCULAR; INTRAVENOUS; SUBCUTANEOUS at 10:35

## 2020-05-26 RX ADMIN — ROCURONIUM BROMIDE 40 MG: 10 INJECTION, SOLUTION INTRAVENOUS at 07:56

## 2020-05-26 RX ADMIN — FENTANYL CITRATE 50 MCG: 50 INJECTION INTRAMUSCULAR; INTRAVENOUS at 12:07

## 2020-05-26 RX ADMIN — ALBUMIN (HUMAN): 12.5 SOLUTION INTRAVENOUS at 09:34

## 2020-05-26 RX ADMIN — GLYCOPYRROLATE 0.4 MG: 0.2 INJECTION, SOLUTION INTRAMUSCULAR; INTRAVENOUS at 11:27

## 2020-05-26 RX ADMIN — Medication 100 MG: at 07:54

## 2020-05-26 RX ADMIN — EPHEDRINE SULFATE 5 MG: 50 INJECTION, SOLUTION INTRAVENOUS at 10:43

## 2020-05-26 RX ADMIN — METOPROLOL SUCCINATE 50 MG: 50 TABLET, EXTENDED RELEASE ORAL at 22:38

## 2020-05-26 RX ADMIN — SODIUM CHLORIDE: 0.9 INJECTION, SOLUTION INTRAVENOUS at 10:18

## 2020-05-26 RX ADMIN — BUPIVACAINE HYDROCHLORIDE 10 ML: 2.5 INJECTION, SOLUTION EPIDURAL; INFILTRATION; INTRACAUDAL at 11:20

## 2020-05-26 RX ADMIN — FENTANYL CITRATE 50 MCG: 50 INJECTION, SOLUTION INTRAMUSCULAR; INTRAVENOUS at 07:49

## 2020-05-26 RX ADMIN — HYDROMORPHONE HYDROCHLORIDE 1 MG: 1 INJECTION, SOLUTION INTRAMUSCULAR; INTRAVENOUS; SUBCUTANEOUS at 22:38

## 2020-05-26 RX ADMIN — BUPIVACAINE HYDROCHLORIDE 10 ML: 2.5 INJECTION, SOLUTION EPIDURAL; INFILTRATION; INTRACAUDAL at 11:25

## 2020-05-26 RX ADMIN — ROCURONIUM BROMIDE 20 MG: 10 INJECTION, SOLUTION INTRAVENOUS at 09:15

## 2020-05-26 RX ADMIN — CEFAZOLIN SODIUM 1000 MG: 1 SOLUTION INTRAVENOUS at 07:59

## 2020-05-26 RX ADMIN — ROCURONIUM BROMIDE 20 MG: 10 INJECTION, SOLUTION INTRAVENOUS at 09:28

## 2020-05-26 RX ADMIN — DOCUSATE SODIUM 100 MG: 100 CAPSULE, LIQUID FILLED ORAL at 17:06

## 2020-05-26 RX ADMIN — BUPIVACAINE 10 ML: 13.3 INJECTION, SUSPENSION, LIPOSOMAL INFILTRATION at 11:25

## 2020-05-26 RX ADMIN — ACETAMINOPHEN 975 MG: 325 TABLET ORAL at 20:09

## 2020-05-27 LAB
ABO GROUP BLD BPU: NORMAL
ABO GROUP BLD BPU: NORMAL
ANION GAP SERPL CALCULATED.3IONS-SCNC: 6 MMOL/L (ref 4–13)
BASOPHILS # BLD AUTO: 0.03 THOUSANDS/ΜL (ref 0–0.1)
BASOPHILS NFR BLD AUTO: 0 % (ref 0–1)
BPU ID: NORMAL
BPU ID: NORMAL
BUN SERPL-MCNC: 6 MG/DL (ref 5–25)
CALCIUM SERPL-MCNC: 7.8 MG/DL (ref 8.3–10.1)
CHLORIDE SERPL-SCNC: 113 MMOL/L (ref 100–108)
CO2 SERPL-SCNC: 22 MMOL/L (ref 21–32)
CREAT SERPL-MCNC: 0.67 MG/DL (ref 0.6–1.3)
CROSSMATCH: NORMAL
CROSSMATCH: NORMAL
EOSINOPHIL # BLD AUTO: 0.03 THOUSAND/ΜL (ref 0–0.61)
EOSINOPHIL NFR BLD AUTO: 0 % (ref 0–6)
ERYTHROCYTE [DISTWIDTH] IN BLOOD BY AUTOMATED COUNT: 18.5 % (ref 11.6–15.1)
GFR SERPL CREATININE-BSD FRML MDRD: 101 ML/MIN/1.73SQ M
GLUCOSE SERPL-MCNC: 127 MG/DL (ref 65–140)
HCT VFR BLD AUTO: 29.3 % (ref 34.8–46.1)
HGB BLD-MCNC: 9.3 G/DL (ref 11.5–15.4)
IMM GRANULOCYTES # BLD AUTO: 0.07 THOUSAND/UL (ref 0–0.2)
IMM GRANULOCYTES NFR BLD AUTO: 1 % (ref 0–2)
LYMPHOCYTES # BLD AUTO: 1.66 THOUSANDS/ΜL (ref 0.6–4.47)
LYMPHOCYTES NFR BLD AUTO: 11 % (ref 14–44)
MCH RBC QN AUTO: 35.4 PG (ref 26.8–34.3)
MCHC RBC AUTO-ENTMCNC: 31.7 G/DL (ref 31.4–37.4)
MCV RBC AUTO: 111 FL (ref 82–98)
MONOCYTES # BLD AUTO: 1.5 THOUSAND/ΜL (ref 0.17–1.22)
MONOCYTES NFR BLD AUTO: 10 % (ref 4–12)
NEUTROPHILS # BLD AUTO: 11.34 THOUSANDS/ΜL (ref 1.85–7.62)
NEUTS SEG NFR BLD AUTO: 78 % (ref 43–75)
NRBC BLD AUTO-RTO: 0 /100 WBCS
PLATELET # BLD AUTO: 153 THOUSANDS/UL (ref 149–390)
PMV BLD AUTO: 9.3 FL (ref 8.9–12.7)
POTASSIUM SERPL-SCNC: 4.2 MMOL/L (ref 3.5–5.3)
RBC # BLD AUTO: 2.63 MILLION/UL (ref 3.81–5.12)
SODIUM SERPL-SCNC: 141 MMOL/L (ref 136–145)
UNIT DISPENSE STATUS: NORMAL
UNIT DISPENSE STATUS: NORMAL
UNIT PRODUCT CODE: NORMAL
UNIT PRODUCT CODE: NORMAL
UNIT RH: NORMAL
UNIT RH: NORMAL
WBC # BLD AUTO: 14.63 THOUSAND/UL (ref 4.31–10.16)

## 2020-05-27 PROCEDURE — 99024 POSTOP FOLLOW-UP VISIT: CPT | Performed by: OBSTETRICS & GYNECOLOGY

## 2020-05-27 PROCEDURE — 80048 BASIC METABOLIC PNL TOTAL CA: CPT | Performed by: OBSTETRICS & GYNECOLOGY

## 2020-05-27 PROCEDURE — 85025 COMPLETE CBC W/AUTO DIFF WBC: CPT | Performed by: OBSTETRICS & GYNECOLOGY

## 2020-05-27 PROCEDURE — 99233 SBSQ HOSP IP/OBS HIGH 50: CPT | Performed by: ANESTHESIOLOGY

## 2020-05-27 RX ORDER — SODIUM CHLORIDE 9 MG/ML
20 INJECTION, SOLUTION INTRAVENOUS ONCE
Status: CANCELLED | OUTPATIENT
Start: 2020-06-17

## 2020-05-27 RX ORDER — DEXTROSE MONOHYDRATE 50 MG/ML
20 INJECTION, SOLUTION INTRAVENOUS ONCE
Status: CANCELLED | OUTPATIENT
Start: 2020-06-17

## 2020-05-27 RX ADMIN — DOCUSATE SODIUM 100 MG: 100 CAPSULE, LIQUID FILLED ORAL at 09:03

## 2020-05-27 RX ADMIN — ACETAMINOPHEN 975 MG: 325 TABLET ORAL at 02:48

## 2020-05-27 RX ADMIN — ACETAMINOPHEN 975 MG: 325 TABLET ORAL at 09:04

## 2020-05-27 RX ADMIN — OXYCODONE HYDROCHLORIDE 5 MG: 5 TABLET ORAL at 14:13

## 2020-05-27 RX ADMIN — OXYCODONE HYDROCHLORIDE 5 MG: 5 TABLET ORAL at 09:04

## 2020-05-27 RX ADMIN — METOPROLOL SUCCINATE 50 MG: 50 TABLET, EXTENDED RELEASE ORAL at 22:07

## 2020-05-27 RX ADMIN — ENOXAPARIN SODIUM 40 MG: 40 INJECTION SUBCUTANEOUS at 09:04

## 2020-05-27 RX ADMIN — HYDROMORPHONE HYDROCHLORIDE 1 MG: 1 INJECTION, SOLUTION INTRAMUSCULAR; INTRAVENOUS; SUBCUTANEOUS at 18:30

## 2020-05-27 RX ADMIN — HEPARIN SODIUM 5000 UNITS: 5000 INJECTION INTRAVENOUS; SUBCUTANEOUS at 00:21

## 2020-05-27 RX ADMIN — ACETAMINOPHEN 975 MG: 325 TABLET ORAL at 20:15

## 2020-05-27 RX ADMIN — HYDROMORPHONE HYDROCHLORIDE 1 MG: 1 INJECTION, SOLUTION INTRAMUSCULAR; INTRAVENOUS; SUBCUTANEOUS at 05:32

## 2020-05-27 RX ADMIN — DOCUSATE SODIUM 100 MG: 100 CAPSULE, LIQUID FILLED ORAL at 17:10

## 2020-05-27 RX ADMIN — AMLODIPINE BESYLATE 5 MG: 5 TABLET ORAL at 09:03

## 2020-05-27 RX ADMIN — OXYCODONE HYDROCHLORIDE 10 MG: 10 TABLET ORAL at 17:52

## 2020-05-27 RX ADMIN — ACETAMINOPHEN 975 MG: 325 TABLET ORAL at 14:13

## 2020-05-27 RX ADMIN — DEXTROSE, SODIUM CHLORIDE, AND POTASSIUM CHLORIDE 125 ML/HR: 5; .9; .15 INJECTION INTRAVENOUS at 07:18

## 2020-05-28 VITALS
RESPIRATION RATE: 19 BRPM | OXYGEN SATURATION: 97 % | HEIGHT: 65 IN | SYSTOLIC BLOOD PRESSURE: 141 MMHG | TEMPERATURE: 99 F | BODY MASS INDEX: 26.66 KG/M2 | WEIGHT: 160 LBS | HEART RATE: 73 BPM | DIASTOLIC BLOOD PRESSURE: 89 MMHG

## 2020-05-28 LAB
ANION GAP SERPL CALCULATED.3IONS-SCNC: 5 MMOL/L (ref 4–13)
BASOPHILS # BLD AUTO: 0.03 THOUSANDS/ΜL (ref 0–0.1)
BASOPHILS NFR BLD AUTO: 0 % (ref 0–1)
BUN SERPL-MCNC: 7 MG/DL (ref 5–25)
CALCIUM SERPL-MCNC: 7.4 MG/DL (ref 8.3–10.1)
CHLORIDE SERPL-SCNC: 112 MMOL/L (ref 100–108)
CO2 SERPL-SCNC: 22 MMOL/L (ref 21–32)
CREAT SERPL-MCNC: 0.54 MG/DL (ref 0.6–1.3)
EOSINOPHIL # BLD AUTO: 0.09 THOUSAND/ΜL (ref 0–0.61)
EOSINOPHIL NFR BLD AUTO: 1 % (ref 0–6)
ERYTHROCYTE [DISTWIDTH] IN BLOOD BY AUTOMATED COUNT: 18.4 % (ref 11.6–15.1)
GFR SERPL CREATININE-BSD FRML MDRD: 109 ML/MIN/1.73SQ M
GLUCOSE SERPL-MCNC: 91 MG/DL (ref 65–140)
HCT VFR BLD AUTO: 27.7 % (ref 34.8–46.1)
HGB BLD-MCNC: 8.7 G/DL (ref 11.5–15.4)
IMM GRANULOCYTES # BLD AUTO: 0.03 THOUSAND/UL (ref 0–0.2)
IMM GRANULOCYTES NFR BLD AUTO: 0 % (ref 0–2)
LYMPHOCYTES # BLD AUTO: 1.11 THOUSANDS/ΜL (ref 0.6–4.47)
LYMPHOCYTES NFR BLD AUTO: 16 % (ref 14–44)
MCH RBC QN AUTO: 35.5 PG (ref 26.8–34.3)
MCHC RBC AUTO-ENTMCNC: 31.4 G/DL (ref 31.4–37.4)
MCV RBC AUTO: 113 FL (ref 82–98)
MONOCYTES # BLD AUTO: 0.93 THOUSAND/ΜL (ref 0.17–1.22)
MONOCYTES NFR BLD AUTO: 14 % (ref 4–12)
NEUTROPHILS # BLD AUTO: 4.6 THOUSANDS/ΜL (ref 1.85–7.62)
NEUTS SEG NFR BLD AUTO: 69 % (ref 43–75)
NRBC BLD AUTO-RTO: 0 /100 WBCS
PLATELET # BLD AUTO: 108 THOUSANDS/UL (ref 149–390)
PMV BLD AUTO: 9.4 FL (ref 8.9–12.7)
POTASSIUM SERPL-SCNC: 3.8 MMOL/L (ref 3.5–5.3)
RBC # BLD AUTO: 2.45 MILLION/UL (ref 3.81–5.12)
SODIUM SERPL-SCNC: 139 MMOL/L (ref 136–145)
WBC # BLD AUTO: 6.79 THOUSAND/UL (ref 4.31–10.16)

## 2020-05-28 PROCEDURE — 99024 POSTOP FOLLOW-UP VISIT: CPT | Performed by: OBSTETRICS & GYNECOLOGY

## 2020-05-28 PROCEDURE — 85025 COMPLETE CBC W/AUTO DIFF WBC: CPT | Performed by: OBSTETRICS & GYNECOLOGY

## 2020-05-28 PROCEDURE — 80048 BASIC METABOLIC PNL TOTAL CA: CPT | Performed by: OBSTETRICS & GYNECOLOGY

## 2020-05-28 RX ORDER — IBUPROFEN 600 MG/1
600 TABLET ORAL EVERY 6 HOURS SCHEDULED
Status: DISCONTINUED | OUTPATIENT
Start: 2020-05-28 | End: 2020-05-29 | Stop reason: HOSPADM

## 2020-05-28 RX ORDER — OXYCODONE HYDROCHLORIDE 5 MG/1
5 TABLET ORAL EVERY 4 HOURS PRN
Qty: 20 TABLET | Refills: 0 | Status: SHIPPED | OUTPATIENT
Start: 2020-05-28 | End: 2020-06-07

## 2020-05-28 RX ADMIN — DOCUSATE SODIUM 100 MG: 100 CAPSULE, LIQUID FILLED ORAL at 09:00

## 2020-05-28 RX ADMIN — ACETAMINOPHEN 975 MG: 325 TABLET ORAL at 20:16

## 2020-05-28 RX ADMIN — DOCUSATE SODIUM 100 MG: 100 CAPSULE, LIQUID FILLED ORAL at 17:29

## 2020-05-28 RX ADMIN — IBUPROFEN 600 MG: 600 TABLET, FILM COATED ORAL at 12:40

## 2020-05-28 RX ADMIN — IBUPROFEN 600 MG: 600 TABLET, FILM COATED ORAL at 09:00

## 2020-05-28 RX ADMIN — ENOXAPARIN SODIUM 40 MG: 40 INJECTION SUBCUTANEOUS at 09:00

## 2020-05-28 RX ADMIN — OXYCODONE HYDROCHLORIDE 5 MG: 5 TABLET ORAL at 04:47

## 2020-05-28 RX ADMIN — AMLODIPINE BESYLATE 5 MG: 5 TABLET ORAL at 09:00

## 2020-05-28 RX ADMIN — METOPROLOL SUCCINATE 50 MG: 50 TABLET, EXTENDED RELEASE ORAL at 21:22

## 2020-05-28 RX ADMIN — IBUPROFEN 600 MG: 600 TABLET, FILM COATED ORAL at 17:29

## 2020-05-29 PROBLEM — Z90.722 STATUS POST BILATERAL SALPINGO-OOPHORECTOMY (BSO): Status: ACTIVE | Noted: 2020-05-29

## 2020-05-29 LAB
ANION GAP SERPL CALCULATED.3IONS-SCNC: 3 MMOL/L (ref 4–13)
BASOPHILS # BLD AUTO: 0.04 THOUSANDS/ΜL (ref 0–0.1)
BASOPHILS NFR BLD AUTO: 1 % (ref 0–1)
BUN SERPL-MCNC: 9 MG/DL (ref 5–25)
CALCIUM SERPL-MCNC: 8.2 MG/DL (ref 8.3–10.1)
CHLORIDE SERPL-SCNC: 114 MMOL/L (ref 100–108)
CO2 SERPL-SCNC: 24 MMOL/L (ref 21–32)
CREAT SERPL-MCNC: 0.62 MG/DL (ref 0.6–1.3)
EOSINOPHIL # BLD AUTO: 0.13 THOUSAND/ΜL (ref 0–0.61)
EOSINOPHIL NFR BLD AUTO: 3 % (ref 0–6)
ERYTHROCYTE [DISTWIDTH] IN BLOOD BY AUTOMATED COUNT: 17.7 % (ref 11.6–15.1)
GFR SERPL CREATININE-BSD FRML MDRD: 104 ML/MIN/1.73SQ M
GLUCOSE SERPL-MCNC: 87 MG/DL (ref 65–140)
HCT VFR BLD AUTO: 28.7 % (ref 34.8–46.1)
HGB BLD-MCNC: 9 G/DL (ref 11.5–15.4)
IMM GRANULOCYTES # BLD AUTO: 0.02 THOUSAND/UL (ref 0–0.2)
IMM GRANULOCYTES NFR BLD AUTO: 1 % (ref 0–2)
LYMPHOCYTES # BLD AUTO: 0.96 THOUSANDS/ΜL (ref 0.6–4.47)
LYMPHOCYTES NFR BLD AUTO: 25 % (ref 14–44)
MCH RBC QN AUTO: 35 PG (ref 26.8–34.3)
MCHC RBC AUTO-ENTMCNC: 31.4 G/DL (ref 31.4–37.4)
MCV RBC AUTO: 112 FL (ref 82–98)
MONOCYTES # BLD AUTO: 0.54 THOUSAND/ΜL (ref 0.17–1.22)
MONOCYTES NFR BLD AUTO: 14 % (ref 4–12)
NEUTROPHILS # BLD AUTO: 2.13 THOUSANDS/ΜL (ref 1.85–7.62)
NEUTS SEG NFR BLD AUTO: 56 % (ref 43–75)
NRBC BLD AUTO-RTO: 0 /100 WBCS
PLATELET # BLD AUTO: 106 THOUSANDS/UL (ref 149–390)
PMV BLD AUTO: 9.6 FL (ref 8.9–12.7)
POTASSIUM SERPL-SCNC: 3.9 MMOL/L (ref 3.5–5.3)
RBC # BLD AUTO: 2.57 MILLION/UL (ref 3.81–5.12)
SODIUM SERPL-SCNC: 141 MMOL/L (ref 136–145)
WBC # BLD AUTO: 3.82 THOUSAND/UL (ref 4.31–10.16)

## 2020-05-29 PROCEDURE — 80048 BASIC METABOLIC PNL TOTAL CA: CPT | Performed by: OBSTETRICS & GYNECOLOGY

## 2020-05-29 PROCEDURE — 99024 POSTOP FOLLOW-UP VISIT: CPT | Performed by: OBSTETRICS & GYNECOLOGY

## 2020-05-29 PROCEDURE — 85025 COMPLETE CBC W/AUTO DIFF WBC: CPT | Performed by: OBSTETRICS & GYNECOLOGY

## 2020-05-29 RX ORDER — DOCUSATE SODIUM 100 MG/1
100 CAPSULE, LIQUID FILLED ORAL 2 TIMES DAILY
Qty: 10 EACH | Refills: 0 | Status: SHIPPED | OUTPATIENT
Start: 2020-05-29 | End: 2021-06-14 | Stop reason: ALTCHOICE

## 2020-05-29 RX ORDER — IBUPROFEN 600 MG/1
600 TABLET ORAL EVERY 6 HOURS SCHEDULED
Qty: 30 TABLET | Refills: 0 | Status: SHIPPED | OUTPATIENT
Start: 2020-05-29 | End: 2020-11-21

## 2020-05-29 RX ADMIN — IBUPROFEN 600 MG: 600 TABLET, FILM COATED ORAL at 05:48

## 2020-05-29 RX ADMIN — IBUPROFEN 600 MG: 600 TABLET, FILM COATED ORAL at 01:08

## 2020-05-29 RX ADMIN — DOCUSATE SODIUM 100 MG: 100 CAPSULE, LIQUID FILLED ORAL at 09:54

## 2020-05-29 RX ADMIN — ACETAMINOPHEN 975 MG: 325 TABLET ORAL at 01:07

## 2020-05-29 RX ADMIN — AMLODIPINE BESYLATE 5 MG: 5 TABLET ORAL at 09:54

## 2020-06-02 ENCOUNTER — PATIENT OUTREACH (OUTPATIENT)
Dept: HEMATOLOGY ONCOLOGY | Facility: CLINIC | Age: 55
End: 2020-06-02

## 2020-06-02 ENCOUNTER — DOCUMENTATION (OUTPATIENT)
Dept: HEMATOLOGY ONCOLOGY | Facility: CLINIC | Age: 55
End: 2020-06-02

## 2020-06-02 ENCOUNTER — TELEPHONE (OUTPATIENT)
Dept: GYNECOLOGIC ONCOLOGY | Facility: CLINIC | Age: 55
End: 2020-06-02

## 2020-06-03 ENCOUNTER — DOCUMENTATION (OUTPATIENT)
Dept: HEMATOLOGY ONCOLOGY | Facility: CLINIC | Age: 55
End: 2020-06-03

## 2020-06-04 ENCOUNTER — DOCUMENTATION (OUTPATIENT)
Dept: HEMATOLOGY ONCOLOGY | Facility: CLINIC | Age: 55
End: 2020-06-04

## 2020-06-05 ENCOUNTER — DOCUMENTATION (OUTPATIENT)
Dept: GYNECOLOGIC ONCOLOGY | Facility: CLINIC | Age: 55
End: 2020-06-05

## 2020-06-12 ENCOUNTER — OFFICE VISIT (OUTPATIENT)
Dept: HEMATOLOGY ONCOLOGY | Facility: CLINIC | Age: 55
End: 2020-06-12
Payer: COMMERCIAL

## 2020-06-12 ENCOUNTER — TELEPHONE (OUTPATIENT)
Dept: HEMATOLOGY ONCOLOGY | Facility: CLINIC | Age: 55
End: 2020-06-12

## 2020-06-12 VITALS
BODY MASS INDEX: 26.16 KG/M2 | RESPIRATION RATE: 18 BRPM | DIASTOLIC BLOOD PRESSURE: 82 MMHG | TEMPERATURE: 98.7 F | HEIGHT: 65 IN | HEART RATE: 82 BPM | SYSTOLIC BLOOD PRESSURE: 130 MMHG | WEIGHT: 157 LBS | OXYGEN SATURATION: 99 %

## 2020-06-12 DIAGNOSIS — I82.531 CHRONIC DEEP VEIN THROMBOSIS (DVT) OF POPLITEAL VEIN OF RIGHT LOWER EXTREMITY (HCC): ICD-10-CM

## 2020-06-12 DIAGNOSIS — C78.7 LIVER METASTASES (HCC): ICD-10-CM

## 2020-06-12 DIAGNOSIS — C79.60 MALIGNANT NEOPLASM METASTATIC TO OVARY, UNSPECIFIED LATERALITY (HCC): ICD-10-CM

## 2020-06-12 DIAGNOSIS — T45.1X5A CHEMOTHERAPY INDUCED NEUTROPENIA (HCC): Primary | ICD-10-CM

## 2020-06-12 DIAGNOSIS — C77.2 METASTASIS TO RETROPERITONEAL LYMPH NODE (HCC): ICD-10-CM

## 2020-06-12 DIAGNOSIS — C18.2 PRIMARY ADENOCARCINOMA OF ASCENDING COLON (HCC): Primary | ICD-10-CM

## 2020-06-12 DIAGNOSIS — D70.1 CHEMOTHERAPY INDUCED NEUTROPENIA (HCC): Primary | ICD-10-CM

## 2020-06-12 DIAGNOSIS — C18.2 PRIMARY ADENOCARCINOMA OF ASCENDING COLON (HCC): ICD-10-CM

## 2020-06-12 DIAGNOSIS — D70.1 CHEMOTHERAPY INDUCED NEUTROPENIA (HCC): ICD-10-CM

## 2020-06-12 DIAGNOSIS — C78.6 PERITONEAL METASTASES (HCC): ICD-10-CM

## 2020-06-12 DIAGNOSIS — T45.1X5A CHEMOTHERAPY INDUCED NEUTROPENIA (HCC): ICD-10-CM

## 2020-06-12 PROCEDURE — 99214 OFFICE O/P EST MOD 30 MIN: CPT | Performed by: INTERNAL MEDICINE

## 2020-06-15 ENCOUNTER — APPOINTMENT (OUTPATIENT)
Dept: LAB | Facility: MEDICAL CENTER | Age: 55
End: 2020-06-15
Payer: COMMERCIAL

## 2020-06-15 DIAGNOSIS — C18.2 PRIMARY ADENOCARCINOMA OF ASCENDING COLON (HCC): ICD-10-CM

## 2020-06-15 DIAGNOSIS — C78.7 LIVER METASTASES (HCC): ICD-10-CM

## 2020-06-15 DIAGNOSIS — T45.1X5A CHEMOTHERAPY INDUCED NEUTROPENIA (HCC): ICD-10-CM

## 2020-06-15 DIAGNOSIS — D70.1 CHEMOTHERAPY INDUCED NEUTROPENIA (HCC): ICD-10-CM

## 2020-06-15 DIAGNOSIS — C77.2 METASTASIS TO RETROPERITONEAL LYMPH NODE (HCC): ICD-10-CM

## 2020-06-15 LAB
ALBUMIN SERPL BCP-MCNC: 3.8 G/DL (ref 3.5–5)
ALP SERPL-CCNC: 74 U/L (ref 46–116)
ALT SERPL W P-5'-P-CCNC: 34 U/L (ref 12–78)
ANION GAP SERPL CALCULATED.3IONS-SCNC: 5 MMOL/L (ref 4–13)
AST SERPL W P-5'-P-CCNC: 25 U/L (ref 5–45)
BASOPHILS # BLD AUTO: 0.02 THOUSANDS/ΜL (ref 0–0.1)
BASOPHILS NFR BLD AUTO: 1 % (ref 0–1)
BILIRUB SERPL-MCNC: 0.54 MG/DL (ref 0.2–1)
BUN SERPL-MCNC: 12 MG/DL (ref 5–25)
CALCIUM SERPL-MCNC: 8.9 MG/DL (ref 8.3–10.1)
CHLORIDE SERPL-SCNC: 109 MMOL/L (ref 100–108)
CO2 SERPL-SCNC: 26 MMOL/L (ref 21–32)
CREAT SERPL-MCNC: 0.81 MG/DL (ref 0.6–1.3)
EOSINOPHIL # BLD AUTO: 0.2 THOUSAND/ΜL (ref 0–0.61)
EOSINOPHIL NFR BLD AUTO: 6 % (ref 0–6)
ERYTHROCYTE [DISTWIDTH] IN BLOOD BY AUTOMATED COUNT: 13.9 % (ref 11.6–15.1)
GFR SERPL CREATININE-BSD FRML MDRD: 83 ML/MIN/1.73SQ M
GLUCOSE SERPL-MCNC: 79 MG/DL (ref 65–140)
HCT VFR BLD AUTO: 37.4 % (ref 34.8–46.1)
HGB BLD-MCNC: 11.9 G/DL (ref 11.5–15.4)
IMM GRANULOCYTES # BLD AUTO: 0 THOUSAND/UL (ref 0–0.2)
IMM GRANULOCYTES NFR BLD AUTO: 0 % (ref 0–2)
LYMPHOCYTES # BLD AUTO: 1.03 THOUSANDS/ΜL (ref 0.6–4.47)
LYMPHOCYTES NFR BLD AUTO: 29 % (ref 14–44)
MCH RBC QN AUTO: 35.4 PG (ref 26.8–34.3)
MCHC RBC AUTO-ENTMCNC: 31.8 G/DL (ref 31.4–37.4)
MCV RBC AUTO: 111 FL (ref 82–98)
MONOCYTES # BLD AUTO: 0.57 THOUSAND/ΜL (ref 0.17–1.22)
MONOCYTES NFR BLD AUTO: 16 % (ref 4–12)
NEUTROPHILS # BLD AUTO: 1.78 THOUSANDS/ΜL (ref 1.85–7.62)
NEUTS SEG NFR BLD AUTO: 48 % (ref 43–75)
NRBC BLD AUTO-RTO: 0 /100 WBCS
PLATELET # BLD AUTO: 137 THOUSANDS/UL (ref 149–390)
PMV BLD AUTO: 9.6 FL (ref 8.9–12.7)
POTASSIUM SERPL-SCNC: 4.3 MMOL/L (ref 3.5–5.3)
PROT SERPL-MCNC: 6.6 G/DL (ref 6.4–8.2)
RBC # BLD AUTO: 3.36 MILLION/UL (ref 3.81–5.12)
SODIUM SERPL-SCNC: 140 MMOL/L (ref 136–145)
WBC # BLD AUTO: 3.6 THOUSAND/UL (ref 4.31–10.16)

## 2020-06-15 PROCEDURE — 85025 COMPLETE CBC W/AUTO DIFF WBC: CPT

## 2020-06-15 PROCEDURE — 80053 COMPREHEN METABOLIC PANEL: CPT

## 2020-06-15 PROCEDURE — 36415 COLL VENOUS BLD VENIPUNCTURE: CPT

## 2020-06-17 ENCOUNTER — HOSPITAL ENCOUNTER (OUTPATIENT)
Dept: INFUSION CENTER | Facility: CLINIC | Age: 55
Discharge: HOME | End: 2020-06-17
Payer: COMMERCIAL

## 2020-06-17 VITALS
DIASTOLIC BLOOD PRESSURE: 78 MMHG | HEART RATE: 66 BPM | HEIGHT: 65 IN | BODY MASS INDEX: 26.23 KG/M2 | SYSTOLIC BLOOD PRESSURE: 154 MMHG | RESPIRATION RATE: 18 BRPM | WEIGHT: 157.45 LBS

## 2020-06-17 DIAGNOSIS — C78.7 LIVER METASTASES (HCC): ICD-10-CM

## 2020-06-17 DIAGNOSIS — C77.2 METASTASIS TO RETROPERITONEAL LYMPH NODE (HCC): Primary | ICD-10-CM

## 2020-06-17 DIAGNOSIS — D70.1 CHEMOTHERAPY INDUCED NEUTROPENIA (HCC): ICD-10-CM

## 2020-06-17 DIAGNOSIS — T45.1X5A CHEMOTHERAPY INDUCED NEUTROPENIA (HCC): ICD-10-CM

## 2020-06-17 DIAGNOSIS — C18.2 PRIMARY ADENOCARCINOMA OF ASCENDING COLON (HCC): ICD-10-CM

## 2020-06-17 PROCEDURE — 96368 THER/DIAG CONCURRENT INF: CPT

## 2020-06-17 PROCEDURE — 96413 CHEMO IV INFUSION 1 HR: CPT

## 2020-06-17 PROCEDURE — G0498 CHEMO EXTEND IV INFUS W/PUMP: HCPCS

## 2020-06-17 PROCEDURE — 96415 CHEMO IV INFUSION ADDL HR: CPT

## 2020-06-17 PROCEDURE — 96367 TX/PROPH/DG ADDL SEQ IV INF: CPT

## 2020-06-17 RX ORDER — DEXTROSE MONOHYDRATE 50 MG/ML
20 INJECTION, SOLUTION INTRAVENOUS ONCE
Status: COMPLETED | OUTPATIENT
Start: 2020-06-17 | End: 2020-06-17

## 2020-06-17 RX ORDER — SODIUM CHLORIDE 9 MG/ML
20 INJECTION, SOLUTION INTRAVENOUS ONCE
Status: COMPLETED | OUTPATIENT
Start: 2020-06-17 | End: 2020-06-17

## 2020-06-17 RX ADMIN — DEXTROSE 20 ML/HR: 5 SOLUTION INTRAVENOUS at 09:25

## 2020-06-17 RX ADMIN — OXALIPLATIN 150 MG: 5 INJECTION, SOLUTION INTRAVENOUS at 09:25

## 2020-06-17 RX ADMIN — SODIUM CHLORIDE 150 MG: 0.9 INJECTION, SOLUTION INTRAVENOUS at 08:50

## 2020-06-17 RX ADMIN — LEUCOVORIN CALCIUM 750 MG: 200 INJECTION, POWDER, LYOPHILIZED, FOR SUSPENSION INTRAMUSCULAR; INTRAVENOUS at 09:25

## 2020-06-17 RX ADMIN — SODIUM CHLORIDE 20 ML/HR: 0.9 INJECTION, SOLUTION INTRAVENOUS at 08:10

## 2020-06-17 RX ADMIN — DEXAMETHASONE SODIUM PHOSPHATE: 10 INJECTION, SOLUTION INTRAMUSCULAR; INTRAVENOUS at 08:25

## 2020-06-19 ENCOUNTER — HOSPITAL ENCOUNTER (OUTPATIENT)
Dept: INFUSION CENTER | Facility: CLINIC | Age: 55
Discharge: HOME | End: 2020-06-19
Payer: COMMERCIAL

## 2020-06-19 VITALS — TEMPERATURE: 97.8 F

## 2020-06-19 DIAGNOSIS — C78.7 LIVER METASTASES (HCC): ICD-10-CM

## 2020-06-19 DIAGNOSIS — C77.2 METASTASIS TO RETROPERITONEAL LYMPH NODE (HCC): Primary | ICD-10-CM

## 2020-06-19 DIAGNOSIS — T45.1X5A CHEMOTHERAPY INDUCED NEUTROPENIA (HCC): ICD-10-CM

## 2020-06-19 DIAGNOSIS — C18.2 PRIMARY ADENOCARCINOMA OF ASCENDING COLON (HCC): ICD-10-CM

## 2020-06-19 DIAGNOSIS — D70.1 CHEMOTHERAPY INDUCED NEUTROPENIA (HCC): ICD-10-CM

## 2020-06-19 PROCEDURE — 96372 THER/PROPH/DIAG INJ SC/IM: CPT

## 2020-06-19 RX ADMIN — PEGFILGRASTIM 6 MG: KIT SUBCUTANEOUS at 13:28

## 2020-06-22 ENCOUNTER — TELEPHONE (OUTPATIENT)
Dept: HEMATOLOGY ONCOLOGY | Facility: CLINIC | Age: 55
End: 2020-06-22

## 2020-06-22 RX ORDER — SODIUM CHLORIDE 9 MG/ML
20 INJECTION, SOLUTION INTRAVENOUS ONCE
Status: CANCELLED | OUTPATIENT
Start: 2020-06-30

## 2020-06-22 RX ORDER — DEXTROSE MONOHYDRATE 50 MG/ML
20 INJECTION, SOLUTION INTRAVENOUS ONCE
Status: CANCELLED | OUTPATIENT
Start: 2020-06-30

## 2020-06-23 ENCOUNTER — TELEPHONE (OUTPATIENT)
Dept: GYNECOLOGIC ONCOLOGY | Facility: CLINIC | Age: 55
End: 2020-06-23

## 2020-06-23 ENCOUNTER — CONSULT (OUTPATIENT)
Dept: GYNECOLOGIC ONCOLOGY | Facility: CLINIC | Age: 55
End: 2020-06-23

## 2020-06-23 DIAGNOSIS — Z80.42 FAMILY HISTORY OF PROSTATE CANCER: Primary | ICD-10-CM

## 2020-06-23 DIAGNOSIS — C77.2 METASTASIS TO RETROPERITONEAL LYMPH NODE (HCC): ICD-10-CM

## 2020-06-23 DIAGNOSIS — C18.2 PRIMARY ADENOCARCINOMA OF ASCENDING COLON (HCC): ICD-10-CM

## 2020-06-23 DIAGNOSIS — D70.1 CHEMOTHERAPY INDUCED NEUTROPENIA (HCC): Primary | ICD-10-CM

## 2020-06-23 DIAGNOSIS — T45.1X5A CHEMOTHERAPY INDUCED NEUTROPENIA (HCC): Primary | ICD-10-CM

## 2020-06-23 DIAGNOSIS — C78.7 LIVER METASTASES (HCC): ICD-10-CM

## 2020-06-23 PROCEDURE — NC001 PR NO CHARGE: Performed by: GENETIC COUNSELOR, MS

## 2020-06-25 ENCOUNTER — PATIENT OUTREACH (OUTPATIENT)
Dept: HEMATOLOGY ONCOLOGY | Facility: CLINIC | Age: 55
End: 2020-06-25

## 2020-06-25 ENCOUNTER — OFFICE VISIT (OUTPATIENT)
Dept: GYNECOLOGIC ONCOLOGY | Facility: CLINIC | Age: 55
End: 2020-06-25

## 2020-06-25 VITALS
HEIGHT: 65 IN | WEIGHT: 154 LBS | DIASTOLIC BLOOD PRESSURE: 82 MMHG | OXYGEN SATURATION: 99 % | HEART RATE: 96 BPM | BODY MASS INDEX: 25.66 KG/M2 | SYSTOLIC BLOOD PRESSURE: 146 MMHG | TEMPERATURE: 97.8 F

## 2020-06-25 DIAGNOSIS — C18.2 PRIMARY ADENOCARCINOMA OF ASCENDING COLON (HCC): ICD-10-CM

## 2020-06-25 DIAGNOSIS — C79.60 MALIGNANT NEOPLASM METASTATIC TO OVARY, UNSPECIFIED LATERALITY (HCC): ICD-10-CM

## 2020-06-25 DIAGNOSIS — Z90.722 STATUS POST BILATERAL SALPINGO-OOPHORECTOMY (BSO): Primary | ICD-10-CM

## 2020-06-25 PROCEDURE — 99024 POSTOP FOLLOW-UP VISIT: CPT | Performed by: PHYSICIAN ASSISTANT

## 2020-06-26 ENCOUNTER — APPOINTMENT (OUTPATIENT)
Dept: LAB | Facility: MEDICAL CENTER | Age: 55
End: 2020-06-26
Payer: COMMERCIAL

## 2020-06-26 ENCOUNTER — TRANSCRIBE ORDERS (OUTPATIENT)
Dept: ADMINISTRATIVE | Facility: HOSPITAL | Age: 55
End: 2020-06-26

## 2020-06-26 DIAGNOSIS — C18.2 MALIGNANT NEOPLASM OF ASCENDING COLON (HCC): ICD-10-CM

## 2020-06-26 DIAGNOSIS — Z80.42 FAMILY HISTORY OF MALIGNANT NEOPLASM OF PROSTATE: ICD-10-CM

## 2020-06-26 DIAGNOSIS — C18.2 MALIGNANT NEOPLASM OF ASCENDING COLON (HCC): Primary | ICD-10-CM

## 2020-06-30 ENCOUNTER — HOSPITAL ENCOUNTER (OUTPATIENT)
Dept: INFUSION CENTER | Facility: CLINIC | Age: 55
Discharge: HOME | End: 2020-06-30
Payer: COMMERCIAL

## 2020-06-30 VITALS
RESPIRATION RATE: 14 BRPM | HEIGHT: 66 IN | DIASTOLIC BLOOD PRESSURE: 90 MMHG | TEMPERATURE: 97.1 F | WEIGHT: 159 LBS | HEART RATE: 78 BPM | OXYGEN SATURATION: 98 % | SYSTOLIC BLOOD PRESSURE: 142 MMHG | BODY MASS INDEX: 25.55 KG/M2

## 2020-06-30 DIAGNOSIS — C77.2 METASTASIS TO RETROPERITONEAL LYMPH NODE (HCC): Primary | ICD-10-CM

## 2020-06-30 DIAGNOSIS — C78.7 LIVER METASTASES (HCC): ICD-10-CM

## 2020-06-30 DIAGNOSIS — T45.1X5A CHEMOTHERAPY INDUCED NEUTROPENIA (HCC): ICD-10-CM

## 2020-06-30 DIAGNOSIS — D70.1 CHEMOTHERAPY INDUCED NEUTROPENIA (HCC): ICD-10-CM

## 2020-06-30 DIAGNOSIS — C18.2 PRIMARY ADENOCARCINOMA OF ASCENDING COLON (HCC): ICD-10-CM

## 2020-06-30 PROCEDURE — 96417 CHEMO IV INFUS EACH ADDL SEQ: CPT

## 2020-06-30 PROCEDURE — 96413 CHEMO IV INFUSION 1 HR: CPT

## 2020-06-30 PROCEDURE — G0498 CHEMO EXTEND IV INFUS W/PUMP: HCPCS

## 2020-06-30 PROCEDURE — 96368 THER/DIAG CONCURRENT INF: CPT

## 2020-06-30 PROCEDURE — 96415 CHEMO IV INFUSION ADDL HR: CPT

## 2020-06-30 PROCEDURE — 96367 TX/PROPH/DG ADDL SEQ IV INF: CPT

## 2020-06-30 RX ORDER — DEXTROSE MONOHYDRATE 50 MG/ML
20 INJECTION, SOLUTION INTRAVENOUS ONCE
Status: COMPLETED | OUTPATIENT
Start: 2020-06-30 | End: 2020-06-30

## 2020-06-30 RX ORDER — SODIUM CHLORIDE 9 MG/ML
20 INJECTION, SOLUTION INTRAVENOUS ONCE
Status: COMPLETED | OUTPATIENT
Start: 2020-06-30 | End: 2020-06-30

## 2020-06-30 RX ADMIN — DEXTROSE 20 ML/HR: 5 SOLUTION INTRAVENOUS at 12:21

## 2020-06-30 RX ADMIN — OXALIPLATIN 150 MG: 5 INJECTION, SOLUTION INTRAVENOUS at 12:25

## 2020-06-30 RX ADMIN — DEXAMETHASONE SODIUM PHOSPHATE: 10 INJECTION, SOLUTION INTRAMUSCULAR; INTRAVENOUS at 10:38

## 2020-06-30 RX ADMIN — BEVACIZUMAB 357.5 MG: 400 INJECTION, SOLUTION INTRAVENOUS at 11:39

## 2020-06-30 RX ADMIN — SODIUM CHLORIDE 150 MG: 0.9 INJECTION, SOLUTION INTRAVENOUS at 11:00

## 2020-06-30 RX ADMIN — LEUCOVORIN CALCIUM 700 MG: 200 INJECTION, POWDER, LYOPHILIZED, FOR SUSPENSION INTRAMUSCULAR; INTRAVENOUS at 12:26

## 2020-06-30 RX ADMIN — SODIUM CHLORIDE 20 ML/HR: 0.9 INJECTION, SOLUTION INTRAVENOUS at 10:38

## 2020-06-30 NOTE — PLAN OF CARE
Problem: Potential for Falls  Goal: Patient will remain free of falls  Description  INTERVENTIONS:  - Assess patient frequently for physical needs  -  Identify cognitive and physical deficits and behaviors that affect risk of falls  -  [place] fall precautions as indicated by assessment   - Educate patient/family on patient safety including physical limitations  - Instruct patient to call for assistance with activity based on assessment  - Modify environment to reduce risk of injury  - Consider OT/PT consult to assist with strengthening/mobility  Outcome: Progressing     Problem: Knowledge Deficit  Goal: Patient/family/caregiver demonstrates understanding of disease process, treatment plan, medications, and discharge instructions  Description  Complete learning assessment and assess knowledge base    Interventions:  - Provide teaching at level of understanding  - Provide teaching via preferred learning methods  Outcome: Progressing

## 2020-06-30 NOTE — PROGRESS NOTES
Pt to clinic for avastin, oxaliplatin, leucovorin, 5 FU cadd start  Spoke with Shirlene Hennessy RN regarding BP of 142/90  Mayank Stone stated ok to treat per Tin Kenney PA   Will continue to monitor

## 2020-07-01 ENCOUNTER — TELEPHONE (OUTPATIENT)
Dept: HEMATOLOGY ONCOLOGY | Facility: CLINIC | Age: 55
End: 2020-07-01

## 2020-07-01 DIAGNOSIS — R80.9 PROTEINURIA, UNSPECIFIED TYPE: Primary | ICD-10-CM

## 2020-07-01 NOTE — TELEPHONE ENCOUNTER
Called patient and reviewed Dr Bert Crystal would like patient to get a 24 hour urine collection done  Patient will wait until after her chemo disconnect to go for 24 hour urine  Reviewed the information regarding instructions for 24 hour urine  Patient verbalized understanding  Patient did review until yesterday she has not had avastin since late April/Early May before she went for surgery  Stated I would review with Dr Bert Crystal

## 2020-07-02 ENCOUNTER — HOSPITAL ENCOUNTER (OUTPATIENT)
Dept: INFUSION CENTER | Facility: CLINIC | Age: 55
Discharge: HOME | End: 2020-07-02
Payer: COMMERCIAL

## 2020-07-02 VITALS — TEMPERATURE: 97.8 F

## 2020-07-02 DIAGNOSIS — C18.2 PRIMARY ADENOCARCINOMA OF ASCENDING COLON (HCC): ICD-10-CM

## 2020-07-02 DIAGNOSIS — D70.1 CHEMOTHERAPY INDUCED NEUTROPENIA (HCC): ICD-10-CM

## 2020-07-02 DIAGNOSIS — C77.2 METASTASIS TO RETROPERITONEAL LYMPH NODE (HCC): Primary | ICD-10-CM

## 2020-07-02 DIAGNOSIS — C78.7 LIVER METASTASES (HCC): ICD-10-CM

## 2020-07-02 DIAGNOSIS — T45.1X5A CHEMOTHERAPY INDUCED NEUTROPENIA (HCC): ICD-10-CM

## 2020-07-02 PROCEDURE — 96372 THER/PROPH/DIAG INJ SC/IM: CPT

## 2020-07-02 RX ADMIN — PEGFILGRASTIM 6 MG: KIT SUBCUTANEOUS at 12:50

## 2020-07-02 NOTE — PROGRESS NOTES
Patient here for CADD d/c and is doing ok  She offers no c/o other than fatigue at this time  She tolerated CADD d/c without incident and neulasta onpro then placed to her ANGE for injection tomorrow about 1550  Patient familiar with injection and removal of onpro  She was discharged once onpro activated and will RTO 7/15/20 for her next cycle of chemotherapy

## 2020-07-02 NOTE — PATIENT INSTRUCTIONS
July 2020 Sunday Monday Tuesday Wednesday Thursday Friday Saturday                  1     2    INF ONCOLOGY TX-TREATMENT PLAN  12:30 PM   (30 min )   AN INF QUICK CHAIR [facility] 3     4           Cycle 5, Day 3     5     6     7     8     9     10     11                12     13     14     15    INF ONCOLOGY TX-TREATMENT PLAN   9:00 AM   (320 min )   AN INF CHAIR [facility] 16     17    INF ONCOLOGY TX-TREATMENT PLAN  12:00 PM   (30 min )   AN INF QUICK CHAIR [facility] 18         Cycle 6, Day 1  Cycle 6, Day 3     19     20     21     22     23     24     25                26     27     28     29    INF ONCOLOGY TX-TREATMENT PLAN   8:30 AM   (320 min )   AN INF CHAIR [facility] 30     31    INF ONCOLOGY TX-TREATMENT PLAN  12:00 PM   (30 min )   AN INF QUICK CHAIR [address] [facility]          Cycle 7, Day 1  Cycle 7, Day 3          Treatment Details       7/2/2020 - Cycle 5, Day 3      Supportive Care: [facility] PROVIDER COMMUNICATION7, pegfilgrastim (NEULASTA ONPRO)    7/14/2020 - Cycle 6, Day 1      Chemotherapy: ONCBCN PROVIDER COMMUNICATION8, BEVACIZUMAB IVPB, OXALIPLATIN INFUSION, LEUCOVORIN IVPB      Take-Home Chemo: ONCBCN PROVIDER COMMUNICATION8, FLUOROURACIL AMBULATORY INFUSION    7/16/2020 - Cycle 6, Day 3      Supportive Care: [facility] PROVIDER COMMUNICATION7, pegfilgrastim (NEULASTA ONPRO)    7/28/2020 - Cycle 7, Day 1      Chemotherapy: ONCBCN PROVIDER COMMUNICATION8, BEVACIZUMAB IVPB, OXALIPLATIN INFUSION, LEUCOVORIN IVPB      Take-Home Chemo: ONCBCN PROVIDER COMMUNICATION8, FLUOROURACIL AMBULATORY INFUSION    7/30/2020 - Cycle 7, Day 3      Supportive Care: [address] [place] [address], pegfilgrastim (Maria T Mancini)

## 2020-07-07 DIAGNOSIS — D70.1 CHEMOTHERAPY INDUCED NEUTROPENIA (HCC): Primary | ICD-10-CM

## 2020-07-07 DIAGNOSIS — T45.1X5A CHEMOTHERAPY INDUCED NEUTROPENIA (HCC): Primary | ICD-10-CM

## 2020-07-07 DIAGNOSIS — C78.7 LIVER METASTASES (HCC): ICD-10-CM

## 2020-07-07 DIAGNOSIS — C18.2 PRIMARY ADENOCARCINOMA OF ASCENDING COLON (HCC): ICD-10-CM

## 2020-07-07 DIAGNOSIS — C77.2 METASTASIS TO RETROPERITONEAL LYMPH NODE (HCC): ICD-10-CM

## 2020-07-07 RX ORDER — DEXTROSE MONOHYDRATE 50 MG/ML
20 INJECTION, SOLUTION INTRAVENOUS ONCE
Status: CANCELLED | OUTPATIENT
Start: 2020-07-15

## 2020-07-07 RX ORDER — SODIUM CHLORIDE 9 MG/ML
20 INJECTION, SOLUTION INTRAVENOUS ONCE
Status: CANCELLED | OUTPATIENT
Start: 2020-07-15

## 2020-07-09 ENCOUNTER — APPOINTMENT (OUTPATIENT)
Dept: LAB | Facility: MEDICAL CENTER | Age: 55
End: 2020-07-09
Payer: COMMERCIAL

## 2020-07-09 DIAGNOSIS — R80.9 PROTEINURIA, UNSPECIFIED TYPE: Primary | ICD-10-CM

## 2020-07-09 LAB
CREAT 24H UR-MRATE: 1 G/24HR (ref 0.6–1.8)
PROT 24H UR-MCNC: 1039.35 MG/24 HRS (ref 40–150)
SPECIMEN VOL UR: 2665 ML
SPECIMEN VOL UR: 2665 ML

## 2020-07-09 PROCEDURE — 84156 ASSAY OF PROTEIN URINE: CPT

## 2020-07-09 PROCEDURE — 82570 ASSAY OF URINE CREATININE: CPT

## 2020-07-10 ENCOUNTER — TELEPHONE (OUTPATIENT)
Dept: HEMATOLOGY ONCOLOGY | Facility: CLINIC | Age: 55
End: 2020-07-10

## 2020-07-10 DIAGNOSIS — C18.2 PRIMARY ADENOCARCINOMA OF ASCENDING COLON (HCC): ICD-10-CM

## 2020-07-10 DIAGNOSIS — R80.9 PROTEINURIA, UNSPECIFIED TYPE: Primary | ICD-10-CM

## 2020-07-10 NOTE — TELEPHONE ENCOUNTER
Called and left message to review patient's 24 hour urine is positive for protein and we will hold Avastin for 3 chemotherapy cycles per Dr Keerthi De Jesus  Patient will need to repeat 24 hour urine beginning of August to review if problem has resolved  New order placed and reviewed in voicemail  Encouraged patient to call back with additional questions

## 2020-07-10 NOTE — TELEPHONE ENCOUNTER
Patient has additional questions  Has been off Avastin for a while as she had surgery end of May and it came back as a +1  Not sure what the value is as Dion did not say  Also wants to know if she needs to call the PCP as her blood pressure has been high, should she call to have that adjusted  Please call patient back on [phone]

## 2020-07-10 NOTE — TELEPHONE ENCOUNTER
Reviewed S/E of avastin  Patient also concerned that B/P has been elevated with avastin  Patient will call her PCP and notify him of B/P levels to adjust B/P meds if necessary   Patient concerned that protein is elevated even though she has only had one dose in approx 10 weeks  What will holding the avastin due to patient's overal treamtment response  ?  Will pass on to Dr Noriega's RN to discuss with MD and get back to patient

## 2020-07-10 NOTE — TELEPHONE ENCOUNTER
I called but did not leave the message on her voicemail / answering machine  I will call her again over the weekend

## 2020-07-10 NOTE — TELEPHONE ENCOUNTER
Reviewed the elevated BP and protein in her urine can be long terms cumulative effects from being on avastin long-term even though she has not had more than 1 dose in the past 10 weeks  Reviewed she should recheck a 24 hour urine the first week of August and until then we will hold Avastin  Patient is still waiting for her PCP to call her back regarding BP/med adjustment      Patient is concerned in regards to cancer what holding Avastin will mean in terms of diagnosis/progression, etc   Stted I would have ZACKARY Daugherty to review concerns over holding Avastin, etc

## 2020-07-10 NOTE — TELEPHONE ENCOUNTER
Dr Ashley Mcgee please call patient this evening or Monday to review concerns over holding avastin from her regimen in regards to cancer progression/etc

## 2020-07-10 NOTE — PROGRESS NOTES
For Cycle #6 due 7/15 and for Cycle #7 and Cycle #8 I removed Avastin per Dr Doe Araujo due to protein urea from 24 hour urine (1,039 35mg/24 hours)    Repeat 24hr urine beginning of August

## 2020-07-11 ENCOUNTER — DOCUMENTATION (OUTPATIENT)
Dept: HEMATOLOGY ONCOLOGY | Facility: CLINIC | Age: 55
End: 2020-07-11

## 2020-07-11 NOTE — PROGRESS NOTES
I discussed protein urea secondary to Avastin with the patient  We can follow guidelines  Stop Avastin man protein urea is 2 gram or higher and let that come down and then resume Avastin  24 hour urine protein test to be monitored frequently  To discontinue Avastin permanently if patient developed nephrotic syndrome  Patient has spilled 1040 milligram protein in 24 hour urine  Based on above guideline she would like to stay on Avastin and have 24 hour urine for protein monitored frequently

## 2020-07-13 ENCOUNTER — APPOINTMENT (OUTPATIENT)
Dept: LAB | Facility: MEDICAL CENTER | Age: 55
End: 2020-07-13
Payer: COMMERCIAL

## 2020-07-13 DIAGNOSIS — R80.9 PROTEINURIA, UNSPECIFIED TYPE: Primary | ICD-10-CM

## 2020-07-13 DIAGNOSIS — C79.9 ADENOCARCINOMA, METASTATIC (HCC): ICD-10-CM

## 2020-07-15 ENCOUNTER — HOSPITAL ENCOUNTER (OUTPATIENT)
Dept: INFUSION CENTER | Facility: CLINIC | Age: 55
Discharge: HOME | End: 2020-07-15
Payer: COMMERCIAL

## 2020-07-15 VITALS
RESPIRATION RATE: 20 BRPM | OXYGEN SATURATION: 99 % | HEART RATE: 85 BPM | HEIGHT: 66 IN | BODY MASS INDEX: 25.15 KG/M2 | DIASTOLIC BLOOD PRESSURE: 88 MMHG | WEIGHT: 156.5 LBS | SYSTOLIC BLOOD PRESSURE: 138 MMHG | TEMPERATURE: 97.9 F

## 2020-07-15 DIAGNOSIS — D70.1 CHEMOTHERAPY INDUCED NEUTROPENIA (HCC): ICD-10-CM

## 2020-07-15 DIAGNOSIS — C77.2 METASTASIS TO RETROPERITONEAL LYMPH NODE (HCC): Primary | ICD-10-CM

## 2020-07-15 DIAGNOSIS — C18.2 PRIMARY ADENOCARCINOMA OF ASCENDING COLON (HCC): ICD-10-CM

## 2020-07-15 DIAGNOSIS — T45.1X5A CHEMOTHERAPY INDUCED NEUTROPENIA (HCC): ICD-10-CM

## 2020-07-15 DIAGNOSIS — C78.7 LIVER METASTASES (HCC): ICD-10-CM

## 2020-07-15 PROCEDURE — G0498 CHEMO EXTEND IV INFUS W/PUMP: HCPCS

## 2020-07-15 PROCEDURE — 96413 CHEMO IV INFUSION 1 HR: CPT

## 2020-07-15 PROCEDURE — 96368 THER/DIAG CONCURRENT INF: CPT

## 2020-07-15 PROCEDURE — 96415 CHEMO IV INFUSION ADDL HR: CPT

## 2020-07-15 PROCEDURE — 96367 TX/PROPH/DG ADDL SEQ IV INF: CPT

## 2020-07-15 PROCEDURE — 96417 CHEMO IV INFUS EACH ADDL SEQ: CPT

## 2020-07-15 RX ORDER — DEXTROSE MONOHYDRATE 50 MG/ML
20 INJECTION, SOLUTION INTRAVENOUS ONCE
Status: COMPLETED | OUTPATIENT
Start: 2020-07-15 | End: 2020-07-15

## 2020-07-15 RX ORDER — SODIUM CHLORIDE 9 MG/ML
20 INJECTION, SOLUTION INTRAVENOUS ONCE
Status: COMPLETED | OUTPATIENT
Start: 2020-07-15 | End: 2020-07-15

## 2020-07-15 RX ADMIN — OXALIPLATIN 150 MG: 5 INJECTION, SOLUTION, CONCENTRATE INTRAVENOUS at 12:19

## 2020-07-15 RX ADMIN — DEXAMETHASONE SODIUM PHOSPHATE: 10 INJECTION, SOLUTION INTRAMUSCULAR; INTRAVENOUS at 10:17

## 2020-07-15 RX ADMIN — SODIUM CHLORIDE 150 MG: 0.9 INJECTION, SOLUTION INTRAVENOUS at 10:51

## 2020-07-15 RX ADMIN — DEXTROSE 20 ML/HR: 5 SOLUTION INTRAVENOUS at 12:14

## 2020-07-15 RX ADMIN — BEVACIZUMAB 357.5 MG: 400 INJECTION, SOLUTION INTRAVENOUS at 11:32

## 2020-07-15 RX ADMIN — LEUCOVORIN CALCIUM 700 MG: 200 INJECTION, POWDER, LYOPHILIZED, FOR SUSPENSION INTRAMUSCULAR; INTRAVENOUS at 12:19

## 2020-07-15 RX ADMIN — SODIUM CHLORIDE 20 ML/HR: 0.9 INJECTION, SOLUTION INTRAVENOUS at 10:00

## 2020-07-15 NOTE — PROGRESS NOTES
Patient here for avastin and folfox  Patient resting with no complaints  Vitals stable, BP within parameters  Labs within parameters for treatment however LFT's elevated  Spoke with Dion SUAREZ in med onc, okay per Dr Viraj Hilario to proceed with treatement today  Reviewed notes from Dr Viraj Hilario and Dion SUAREZ regarding patient's 24 hour urine testing  Confirmed with patient she is do do 24 hours urine every 2 weeks and is due next week  Per patient she already has supplies  [place] within reach  Will continue to monitor

## 2020-07-17 ENCOUNTER — HOSPITAL ENCOUNTER (OUTPATIENT)
Dept: INFUSION CENTER | Facility: CLINIC | Age: 55
Discharge: HOME | End: 2020-07-17
Payer: COMMERCIAL

## 2020-07-17 VITALS — TEMPERATURE: 97.2 F

## 2020-07-17 DIAGNOSIS — C77.2 METASTASIS TO RETROPERITONEAL LYMPH NODE (HCC): Primary | ICD-10-CM

## 2020-07-17 DIAGNOSIS — C18.2 PRIMARY ADENOCARCINOMA OF ASCENDING COLON (HCC): ICD-10-CM

## 2020-07-17 DIAGNOSIS — C78.7 LIVER METASTASES (HCC): ICD-10-CM

## 2020-07-17 DIAGNOSIS — T45.1X5A CHEMOTHERAPY INDUCED NEUTROPENIA (HCC): ICD-10-CM

## 2020-07-17 DIAGNOSIS — D70.1 CHEMOTHERAPY INDUCED NEUTROPENIA (HCC): ICD-10-CM

## 2020-07-17 PROCEDURE — 96372 THER/PROPH/DIAG INJ SC/IM: CPT

## 2020-07-17 RX ADMIN — PEGFILGRASTIM 6 MG: KIT SUBCUTANEOUS at 12:42

## 2020-07-17 NOTE — PROGRESS NOTES
Pt  offers no complaints  CADD pump disconnected,  res  Vol  0 mL , good blood return noted  Neulasta Onpro applied to ANGE  AVS declined  Next appt  confirmed

## 2020-07-20 RX ORDER — DEXTROSE MONOHYDRATE 50 MG/ML
20 INJECTION, SOLUTION INTRAVENOUS ONCE
Status: CANCELLED | OUTPATIENT
Start: 2020-07-29

## 2020-07-20 RX ORDER — SODIUM CHLORIDE 9 MG/ML
20 INJECTION, SOLUTION INTRAVENOUS ONCE
Status: CANCELLED | OUTPATIENT
Start: 2020-07-29

## 2020-07-21 DIAGNOSIS — D70.1 CHEMOTHERAPY INDUCED NEUTROPENIA (HCC): Primary | ICD-10-CM

## 2020-07-21 DIAGNOSIS — T45.1X5A CHEMOTHERAPY INDUCED NEUTROPENIA (HCC): Primary | ICD-10-CM

## 2020-07-21 DIAGNOSIS — C78.7 LIVER METASTASES (HCC): ICD-10-CM

## 2020-07-21 DIAGNOSIS — C18.2 PRIMARY ADENOCARCINOMA OF ASCENDING COLON (HCC): ICD-10-CM

## 2020-07-21 DIAGNOSIS — C77.2 METASTASIS TO RETROPERITONEAL LYMPH NODE (HCC): ICD-10-CM

## 2020-07-24 ENCOUNTER — APPOINTMENT (OUTPATIENT)
Dept: LAB | Facility: MEDICAL CENTER | Age: 55
End: 2020-07-24
Payer: COMMERCIAL

## 2020-07-24 DIAGNOSIS — C18.2 PRIMARY ADENOCARCINOMA OF ASCENDING COLON (HCC): ICD-10-CM

## 2020-07-24 DIAGNOSIS — C79.9 ADENOCARCINOMA, METASTATIC (HCC): ICD-10-CM

## 2020-07-24 DIAGNOSIS — R80.9 PROTEINURIA, UNSPECIFIED TYPE: ICD-10-CM

## 2020-07-24 LAB
CREAT 24H UR-MRATE: 1 G/24HR (ref 0.6–1.8)
PROT 24H UR-MCNC: 952 MG/24 HRS (ref 40–150)
SPECIMEN VOL UR: 2975 ML
SPECIMEN VOL UR: 2975 ML

## 2020-07-24 PROCEDURE — 84156 ASSAY OF PROTEIN URINE: CPT

## 2020-07-24 PROCEDURE — 82570 ASSAY OF URINE CREATININE: CPT

## 2020-07-27 ENCOUNTER — APPOINTMENT (OUTPATIENT)
Dept: LAB | Facility: MEDICAL CENTER | Age: 55
End: 2020-07-27
Payer: COMMERCIAL

## 2020-07-27 ENCOUNTER — TELEPHONE (OUTPATIENT)
Dept: GYNECOLOGIC ONCOLOGY | Facility: CLINIC | Age: 55
End: 2020-07-27

## 2020-07-27 DIAGNOSIS — T45.1X5A CHEMOTHERAPY INDUCED NEUTROPENIA (HCC): ICD-10-CM

## 2020-07-27 DIAGNOSIS — C77.2 METASTASIS TO RETROPERITONEAL LYMPH NODE (HCC): ICD-10-CM

## 2020-07-27 DIAGNOSIS — C18.2 PRIMARY ADENOCARCINOMA OF ASCENDING COLON (HCC): ICD-10-CM

## 2020-07-27 DIAGNOSIS — C78.7 LIVER METASTASES (HCC): ICD-10-CM

## 2020-07-27 DIAGNOSIS — D70.1 CHEMOTHERAPY INDUCED NEUTROPENIA (HCC): ICD-10-CM

## 2020-07-27 LAB
ALBUMIN SERPL BCP-MCNC: 3.6 G/DL (ref 3.5–5)
ALP SERPL-CCNC: 192 U/L (ref 46–116)
ALT SERPL W P-5'-P-CCNC: 110 U/L (ref 12–78)
ANION GAP SERPL CALCULATED.3IONS-SCNC: 5 MMOL/L (ref 4–13)
AST SERPL W P-5'-P-CCNC: 63 U/L (ref 5–45)
BASOPHILS # BLD AUTO: 0.03 THOUSANDS/ΜL (ref 0–0.1)
BASOPHILS NFR BLD AUTO: 0 % (ref 0–1)
BILIRUB SERPL-MCNC: 0.36 MG/DL (ref 0.2–1)
BUN SERPL-MCNC: 9 MG/DL (ref 5–25)
CALCIUM SERPL-MCNC: 9 MG/DL (ref 8.3–10.1)
CHLORIDE SERPL-SCNC: 109 MMOL/L (ref 100–108)
CO2 SERPL-SCNC: 29 MMOL/L (ref 21–32)
CREAT SERPL-MCNC: 0.81 MG/DL (ref 0.6–1.3)
EOSINOPHIL # BLD AUTO: 0.12 THOUSAND/ΜL (ref 0–0.61)
EOSINOPHIL NFR BLD AUTO: 1 % (ref 0–6)
ERYTHROCYTE [DISTWIDTH] IN BLOOD BY AUTOMATED COUNT: 14.3 % (ref 11.6–15.1)
GFR SERPL CREATININE-BSD FRML MDRD: 83 ML/MIN/1.73SQ M
GLUCOSE SERPL-MCNC: 101 MG/DL (ref 65–140)
HCT VFR BLD AUTO: 40.1 % (ref 34.8–46.1)
HGB BLD-MCNC: 12.7 G/DL (ref 11.5–15.4)
IMM GRANULOCYTES # BLD AUTO: 0.06 THOUSAND/UL (ref 0–0.2)
IMM GRANULOCYTES NFR BLD AUTO: 1 % (ref 0–2)
LYMPHOCYTES # BLD AUTO: 1.47 THOUSANDS/ΜL (ref 0.6–4.47)
LYMPHOCYTES NFR BLD AUTO: 13 % (ref 14–44)
MCH RBC QN AUTO: 34.1 PG (ref 26.8–34.3)
MCHC RBC AUTO-ENTMCNC: 31.7 G/DL (ref 31.4–37.4)
MCV RBC AUTO: 108 FL (ref 82–98)
MONOCYTES # BLD AUTO: 1.25 THOUSAND/ΜL (ref 0.17–1.22)
MONOCYTES NFR BLD AUTO: 11 % (ref 4–12)
NEUTROPHILS # BLD AUTO: 8.77 THOUSANDS/ΜL (ref 1.85–7.62)
NEUTS SEG NFR BLD AUTO: 74 % (ref 43–75)
NRBC BLD AUTO-RTO: 0 /100 WBCS
PLATELET # BLD AUTO: 110 THOUSANDS/UL (ref 149–390)
PMV BLD AUTO: 10.7 FL (ref 8.9–12.7)
POTASSIUM SERPL-SCNC: 4 MMOL/L (ref 3.5–5.3)
PROT SERPL-MCNC: 6.7 G/DL (ref 6.4–8.2)
RBC # BLD AUTO: 3.72 MILLION/UL (ref 3.81–5.12)
SODIUM SERPL-SCNC: 143 MMOL/L (ref 136–145)
WBC # BLD AUTO: 11.7 THOUSAND/UL (ref 4.31–10.16)

## 2020-07-27 PROCEDURE — 80053 COMPREHEN METABOLIC PANEL: CPT

## 2020-07-27 PROCEDURE — 85025 COMPLETE CBC W/AUTO DIFF WBC: CPT

## 2020-07-27 PROCEDURE — 36415 COLL VENOUS BLD VENIPUNCTURE: CPT

## 2020-07-27 NOTE — TELEPHONE ENCOUNTER
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); heterozygous; uncertain significance     Assessment:   A variant of uncertain significance (VUS) means that a change was identified in a specific gene but it cannot be determined whether the variant is associated with an increased risk of cancer or is a harmless genetic change  It is possible that the variant was seen in only a handful of individuals, or there may be conflicting or incomplete information in the medical literature about the variant and its association with hereditary cancer  Variant 1  The significance of the NTHL1 variant is currently not known (and is typically only associated with an increased risk of cancer when in the homozygous state) and therefore this test result cannot be used to help determine Marcelino Hilliard cancer risks  Variant 1 Familial VUS testing  - Familial VUS testing was not offered at this time  The laboratory will continue to accumulate information on this variant and will reclassify it as either a positive or negative genetic test result when they are confident that they have adequate information  As updated information is obtained, we will notify Marcelino Hilliard with the updated information  It is important to note that the majority of variants of uncertain significance are reclassified as likely benign or benign as additional information about the variant becomes available        Risk Based on Family History:  The significance of this variant is currently not known and therefore this test result cannot be used to help determine Devin Esquivel cancer risks  Rather her personal medical history and family history of cancer are the most important factors used to estimate her risk for developing certain cancers and to direct her medical management  We discussed that Amairani's children's risk of developing colon cancer may be elevated, compared to the general population risk, based on her family history  Based on their family history they can consider increased colon cancer screening, beginning at age 38y  Testing Family Members:   Genetic testing for this variant is not recommended for relatives who wish to determine their cancer risks for purposes of determining medical management  The presence or absence of this variant in a relative is not clinically meaningful unless the variant is reclassified in the future  Recommendations:  Recommendations for Devin Esquivel are outlined below however the surveillance and medical management should continue as clinically indicated and as determined appropriate by her healthcare providers  Breast Cancer Screening:  -Population-based screening guidelines are appropriate  Gynecologic Cancer Screening/Risk Reduction  -Routine gynecologic screening     Skin Cancer Screening   -Continue skin cancer screening as recommended by your healthcare provider  Devin Esquivel was strongly encouraged to contact us regarding any changes in her personal or family history of cancer as these changes could alter our recommendation regarding genetic testing and/or cancer screening  Devin Esquivel was also encouraged to follow up with us on an annual basis as variant classifications are subject to change

## 2020-07-29 ENCOUNTER — HOSPITAL ENCOUNTER (OUTPATIENT)
Dept: INFUSION CENTER | Facility: CLINIC | Age: 55
Discharge: HOME | End: 2020-07-29
Payer: COMMERCIAL

## 2020-07-29 VITALS
SYSTOLIC BLOOD PRESSURE: 130 MMHG | BODY MASS INDEX: 25.07 KG/M2 | WEIGHT: 156 LBS | RESPIRATION RATE: 18 BRPM | OXYGEN SATURATION: 99 % | TEMPERATURE: 96.8 F | DIASTOLIC BLOOD PRESSURE: 82 MMHG | HEART RATE: 86 BPM | HEIGHT: 66 IN

## 2020-07-29 DIAGNOSIS — T45.1X5A CHEMOTHERAPY INDUCED NEUTROPENIA (HCC): ICD-10-CM

## 2020-07-29 DIAGNOSIS — C77.2 METASTASIS TO RETROPERITONEAL LYMPH NODE (HCC): Primary | ICD-10-CM

## 2020-07-29 DIAGNOSIS — C78.7 LIVER METASTASES (HCC): ICD-10-CM

## 2020-07-29 DIAGNOSIS — C18.2 PRIMARY ADENOCARCINOMA OF ASCENDING COLON (HCC): ICD-10-CM

## 2020-07-29 DIAGNOSIS — D70.1 CHEMOTHERAPY INDUCED NEUTROPENIA (HCC): ICD-10-CM

## 2020-07-29 PROCEDURE — 96413 CHEMO IV INFUSION 1 HR: CPT

## 2020-07-29 PROCEDURE — 96417 CHEMO IV INFUS EACH ADDL SEQ: CPT

## 2020-07-29 PROCEDURE — 96415 CHEMO IV INFUSION ADDL HR: CPT

## 2020-07-29 PROCEDURE — 96367 TX/PROPH/DG ADDL SEQ IV INF: CPT

## 2020-07-29 PROCEDURE — G0498 CHEMO EXTEND IV INFUS W/PUMP: HCPCS

## 2020-07-29 PROCEDURE — 96368 THER/DIAG CONCURRENT INF: CPT

## 2020-07-29 RX ORDER — DEXTROSE MONOHYDRATE 50 MG/ML
20 INJECTION, SOLUTION INTRAVENOUS ONCE
Status: COMPLETED | OUTPATIENT
Start: 2020-07-29 | End: 2020-07-29

## 2020-07-29 RX ORDER — SODIUM CHLORIDE 9 MG/ML
20 INJECTION, SOLUTION INTRAVENOUS ONCE
Status: COMPLETED | OUTPATIENT
Start: 2020-07-29 | End: 2020-07-29

## 2020-07-29 RX ADMIN — LEUCOVORIN CALCIUM 700 MG: 200 INJECTION, POWDER, LYOPHILIZED, FOR SUSPENSION INTRAMUSCULAR; INTRAVENOUS at 11:05

## 2020-07-29 RX ADMIN — BEVACIZUMAB 357.5 MG: 400 INJECTION, SOLUTION INTRAVENOUS at 10:05

## 2020-07-29 RX ADMIN — SODIUM CHLORIDE 150 MG: 0.9 INJECTION, SOLUTION INTRAVENOUS at 09:20

## 2020-07-29 RX ADMIN — OXALIPLATIN 150 MG: 5 INJECTION, SOLUTION INTRAVENOUS at 11:05

## 2020-07-29 RX ADMIN — SODIUM CHLORIDE 20 ML/HR: 0.9 INJECTION, SOLUTION INTRAVENOUS at 08:30

## 2020-07-29 RX ADMIN — DEXTROSE 20 ML/HR: 5 SOLUTION INTRAVENOUS at 10:45

## 2020-07-29 RX ADMIN — DEXAMETHASONE SODIUM PHOSPHATE: 10 INJECTION, SOLUTION INTRAMUSCULAR; INTRAVENOUS at 08:40

## 2020-07-29 NOTE — PROGRESS NOTES
Patient to [facility] for Avastin / FOLFOX: Offers no complaints at present time: Lab work ( 07/27/20 ) reviewed:  Within parameters to treat: Right PAC accessed without difficulty: Good blood return noted

## 2020-07-29 NOTE — PROGRESS NOTES
Tolerated infusion without incident: No adverse reactions noted: Connected to CADD Pump per MD order: Verified follow up appt with patient: AVS offered and declined

## 2020-07-31 ENCOUNTER — HOSPITAL ENCOUNTER (OUTPATIENT)
Dept: INFUSION CENTER | Facility: CLINIC | Age: 55
Discharge: HOME | End: 2020-07-31
Payer: COMMERCIAL

## 2020-07-31 VITALS — TEMPERATURE: 96.7 F

## 2020-07-31 DIAGNOSIS — C77.2 METASTASIS TO RETROPERITONEAL LYMPH NODE (HCC): Primary | ICD-10-CM

## 2020-07-31 DIAGNOSIS — D70.1 CHEMOTHERAPY INDUCED NEUTROPENIA (HCC): ICD-10-CM

## 2020-07-31 DIAGNOSIS — C78.7 LIVER METASTASES (HCC): ICD-10-CM

## 2020-07-31 DIAGNOSIS — T45.1X5A CHEMOTHERAPY INDUCED NEUTROPENIA (HCC): ICD-10-CM

## 2020-07-31 DIAGNOSIS — C18.2 PRIMARY ADENOCARCINOMA OF ASCENDING COLON (HCC): ICD-10-CM

## 2020-07-31 LAB — MISCELLANEOUS LAB TEST RESULT: NORMAL

## 2020-07-31 PROCEDURE — 96372 THER/PROPH/DIAG INJ SC/IM: CPT

## 2020-07-31 RX ADMIN — PEGFILGRASTIM 6 MG: KIT SUBCUTANEOUS at 11:40

## 2020-07-31 NOTE — PROGRESS NOTES
Pt tolerated treatment well  Res vol 0mls  Cadd d/c'd and port saline-locked without any issue  Pt declined AVS; Next appt reviewed c/ pt

## 2020-08-04 DIAGNOSIS — C18.2 PRIMARY ADENOCARCINOMA OF ASCENDING COLON (HCC): ICD-10-CM

## 2020-08-04 DIAGNOSIS — T45.1X5A CHEMOTHERAPY INDUCED NEUTROPENIA (HCC): Primary | ICD-10-CM

## 2020-08-04 DIAGNOSIS — C78.7 LIVER METASTASES (HCC): ICD-10-CM

## 2020-08-04 DIAGNOSIS — D70.1 CHEMOTHERAPY INDUCED NEUTROPENIA (HCC): Primary | ICD-10-CM

## 2020-08-04 DIAGNOSIS — C77.2 METASTASIS TO RETROPERITONEAL LYMPH NODE (HCC): ICD-10-CM

## 2020-08-04 RX ORDER — SODIUM CHLORIDE 9 MG/ML
20 INJECTION, SOLUTION INTRAVENOUS ONCE
Status: CANCELLED | OUTPATIENT
Start: 2020-08-12

## 2020-08-04 RX ORDER — DEXTROSE MONOHYDRATE 50 MG/ML
20 INJECTION, SOLUTION INTRAVENOUS ONCE
Status: CANCELLED | OUTPATIENT
Start: 2020-08-12

## 2020-08-06 ENCOUNTER — TELEPHONE (OUTPATIENT)
Dept: GYNECOLOGIC ONCOLOGY | Facility: CLINIC | Age: 55
End: 2020-08-06

## 2020-08-06 ENCOUNTER — TELEPHONE (OUTPATIENT)
Dept: HEMATOLOGY ONCOLOGY | Facility: CLINIC | Age: 55
End: 2020-08-06

## 2020-08-06 DIAGNOSIS — C78.00 MALIGNANT NEOPLASM METASTATIC TO LUNG, UNSPECIFIED LATERALITY (HCC): ICD-10-CM

## 2020-08-06 DIAGNOSIS — C79.82: ICD-10-CM

## 2020-08-06 DIAGNOSIS — C78.7 LIVER METASTASES (HCC): ICD-10-CM

## 2020-08-06 DIAGNOSIS — C77.2 METASTASIS TO RETROPERITONEAL LYMPH NODE (HCC): ICD-10-CM

## 2020-08-06 DIAGNOSIS — C18.2 PRIMARY ADENOCARCINOMA OF ASCENDING COLON (HCC): Primary | ICD-10-CM

## 2020-08-06 DIAGNOSIS — C79.60 MALIGNANT NEOPLASM METASTATIC TO OVARY, UNSPECIFIED LATERALITY (HCC): ICD-10-CM

## 2020-08-06 DIAGNOSIS — C79.9 ADENOCARCINOMA, METASTATIC (HCC): ICD-10-CM

## 2020-08-06 NOTE — TELEPHONE ENCOUNTER
Patient would like to know if a MRI can ordered for up coming appt on 8-14-20   Please call patient back at [phone]

## 2020-08-06 NOTE — TELEPHONE ENCOUNTER
Return call made to patient regarding intermittent mid-abdominal discomfort just lateral to her incision  She notes a "pulling, twinge-like sensation" that is worse with movement in bed at night  She has been afebrile  Bowel/bladder function stable  We discussed that is is unlikely to be a post-operative complication greater than 8 weeks from surgery, and she could have developed a ventral hernia  I offered the patient CT abd/pelvis  She notes she is awaiting a call back from Dr Hermilo Mills office to determine if they require repeating imaging prior to her appt next week  If they do not pursue imaging, patient will call back and I will order CT abd/pelvis

## 2020-08-06 NOTE — TELEPHONE ENCOUNTER
All orders placed for imaging, two MRI and a CT  Patient cannot have imaging all done on the same day, she has treatment on Wednesday so no imaging can be completed that day, patient is willing to go to any location but prefers [facility], She WILL NOT got to [facility]  Please schedule all imaging and contact patient with the appt details

## 2020-08-07 ENCOUNTER — DOCUMENTATION (OUTPATIENT)
Dept: HEMATOLOGY ONCOLOGY | Facility: CLINIC | Age: 55
End: 2020-08-07

## 2020-08-07 ENCOUNTER — APPOINTMENT (OUTPATIENT)
Dept: LAB | Facility: MEDICAL CENTER | Age: 55
End: 2020-08-07
Payer: COMMERCIAL

## 2020-08-07 DIAGNOSIS — R80.9 PROTEINURIA, UNSPECIFIED TYPE: ICD-10-CM

## 2020-08-07 DIAGNOSIS — C79.9 ADENOCARCINOMA, METASTATIC (HCC): ICD-10-CM

## 2020-08-07 PROCEDURE — 82570 ASSAY OF URINE CREATININE: CPT

## 2020-08-07 PROCEDURE — 84156 ASSAY OF PROTEIN URINE: CPT

## 2020-08-07 NOTE — TELEPHONE ENCOUNTER
Sat  8/8 CT Chest scheduled   Mon  8/10 MRI Abdomen scheduled  Thurs  8/13 MRI Pelvis scheduled but needs to be rescheduled until after the pump is completed per Artem Triana  Message left for patient to call back so that we can reschedule this  Patient should still keep her follow up on 8/14

## 2020-08-07 NOTE — PROGRESS NOTES
Left vm for patient to call us back with a date after pump is completed to reschedule her MRI pelvis (right now scheduled for 8/13)  Patient has an appointment scheduled with Dr Nilda Ayala on 8/14 that she should still keep

## 2020-08-08 LAB
CREAT 24H UR-MRATE: 1 G/24HR (ref 0.6–1.8)
PROT 24H UR-MCNC: 873 MG/24 HRS (ref 40–150)
SPECIMEN VOL UR: 2425 ML
SPECIMEN VOL UR: 2425 ML

## 2020-08-10 ENCOUNTER — TELEPHONE (OUTPATIENT)
Dept: HEMATOLOGY ONCOLOGY | Facility: CLINIC | Age: 55
End: 2020-08-10

## 2020-08-10 ENCOUNTER — HOSPITAL ENCOUNTER (OUTPATIENT)
Dept: RADIOLOGY | Age: 55
Discharge: HOME | End: 2020-08-10
Payer: COMMERCIAL

## 2020-08-10 ENCOUNTER — APPOINTMENT (OUTPATIENT)
Dept: LAB | Facility: MEDICAL CENTER | Age: 55
End: 2020-08-10
Payer: COMMERCIAL

## 2020-08-10 DIAGNOSIS — C79.60 MALIGNANT NEOPLASM METASTATIC TO OVARY, UNSPECIFIED LATERALITY (HCC): ICD-10-CM

## 2020-08-10 DIAGNOSIS — C79.82: ICD-10-CM

## 2020-08-10 DIAGNOSIS — T45.1X5A CHEMOTHERAPY INDUCED NEUTROPENIA (HCC): ICD-10-CM

## 2020-08-10 DIAGNOSIS — C18.2 PRIMARY ADENOCARCINOMA OF ASCENDING COLON (HCC): ICD-10-CM

## 2020-08-10 DIAGNOSIS — C78.7 LIVER METASTASES (HCC): ICD-10-CM

## 2020-08-10 DIAGNOSIS — C78.00 MALIGNANT NEOPLASM METASTATIC TO LUNG, UNSPECIFIED LATERALITY (HCC): ICD-10-CM

## 2020-08-10 DIAGNOSIS — D70.1 CHEMOTHERAPY INDUCED NEUTROPENIA (HCC): ICD-10-CM

## 2020-08-10 DIAGNOSIS — C77.2 METASTASIS TO RETROPERITONEAL LYMPH NODE (HCC): ICD-10-CM

## 2020-08-10 DIAGNOSIS — C79.9 ADENOCARCINOMA, METASTATIC (HCC): ICD-10-CM

## 2020-08-10 LAB
ALBUMIN SERPL BCP-MCNC: 3.7 G/DL (ref 3.5–5)
ALP SERPL-CCNC: 216 U/L (ref 46–116)
ALT SERPL W P-5'-P-CCNC: 91 U/L (ref 12–78)
ANION GAP SERPL CALCULATED.3IONS-SCNC: 3 MMOL/L (ref 4–13)
AST SERPL W P-5'-P-CCNC: 45 U/L (ref 5–45)
BASOPHILS # BLD AUTO: 0.14 THOUSAND/UL (ref 0–0.1)
BASOPHILS NFR MAR MANUAL: 1 % (ref 0–1)
BILIRUB SERPL-MCNC: 0.4 MG/DL (ref 0.2–1)
BUN SERPL-MCNC: 11 MG/DL (ref 5–25)
CALCIUM SERPL-MCNC: 8.9 MG/DL (ref 8.3–10.1)
CHLORIDE SERPL-SCNC: 109 MMOL/L (ref 100–108)
CO2 SERPL-SCNC: 30 MMOL/L (ref 21–32)
CREAT SERPL-MCNC: 0.88 MG/DL (ref 0.6–1.3)
EOSINOPHIL # BLD AUTO: 0.14 THOUSAND/UL (ref 0–0.61)
EOSINOPHIL NFR BLD MANUAL: 1 % (ref 0–6)
ERYTHROCYTE [DISTWIDTH] IN BLOOD BY AUTOMATED COUNT: 15.9 % (ref 11.6–15.1)
GFR SERPL CREATININE-BSD FRML MDRD: 75 ML/MIN/1.73SQ M
GLUCOSE SERPL-MCNC: 129 MG/DL (ref 65–140)
HCT VFR BLD AUTO: 41.7 % (ref 34.8–46.1)
HGB BLD-MCNC: 13.1 G/DL (ref 11.5–15.4)
LYMPHOCYTES # BLD AUTO: 1.4 THOUSAND/UL (ref 0.6–4.47)
LYMPHOCYTES # BLD AUTO: 10 %
MCH RBC QN AUTO: 33.9 PG (ref 26.8–34.3)
MCHC RBC AUTO-ENTMCNC: 31.4 G/DL (ref 31.4–37.4)
MCV RBC AUTO: 108 FL (ref 82–98)
MONOCYTES # BLD AUTO: 1.4 THOUSAND/UL (ref 0–1.22)
MONOCYTES NFR BLD AUTO: 10 % (ref 4–12)
NEUTS SEG # BLD: 10.94 THOUSAND/UL (ref 1.81–6.82)
NEUTS SEG NFR BLD AUTO: 78 %
PLATELET # BLD AUTO: 90 THOUSANDS/UL (ref 149–390)
PMV BLD AUTO: 11.2 FL (ref 8.9–12.7)
POTASSIUM SERPL-SCNC: 3.8 MMOL/L (ref 3.5–5.3)
PROT SERPL-MCNC: 7 G/DL (ref 6.4–8.2)
RBC # BLD AUTO: 3.86 MILLION/UL (ref 3.81–5.12)
SODIUM SERPL-SCNC: 142 MMOL/L (ref 136–145)
TOTAL CELLS COUNTED SPEC: 100
WBC # BLD AUTO: 14.02 THOUSAND/UL (ref 4.31–10.16)

## 2020-08-10 PROCEDURE — 36415 COLL VENOUS BLD VENIPUNCTURE: CPT

## 2020-08-10 PROCEDURE — 85007 BL SMEAR W/DIFF WBC COUNT: CPT

## 2020-08-10 PROCEDURE — 80053 COMPREHEN METABOLIC PANEL: CPT

## 2020-08-10 PROCEDURE — 71250 CT THORAX DX C-: CPT

## 2020-08-10 PROCEDURE — G1004 CDSM NDSC: HCPCS

## 2020-08-10 PROCEDURE — 85027 COMPLETE CBC AUTOMATED: CPT

## 2020-08-10 NOTE — TELEPHONE ENCOUNTER
Shi López from [facility] is calling in to inform that the MRI that this patient is scheduled for tomorrow can not be performed since they have a Raimundo Green machine and her test should really be done on a [facility] and they do not have that machine there        Shi López can be reached at [phone]

## 2020-08-10 NOTE — TELEPHONE ENCOUNTER
Spoke to patient and informed her of this, I r/s her MRI for the soonest available in [place] for 8/29/20 at 5 PM  I LVM for patient informing her of her new appointment date and time

## 2020-08-12 ENCOUNTER — HOSPITAL ENCOUNTER (OUTPATIENT)
Dept: INFUSION CENTER | Facility: CLINIC | Age: 55
Discharge: HOME | End: 2020-08-12
Payer: COMMERCIAL

## 2020-08-12 VITALS
DIASTOLIC BLOOD PRESSURE: 90 MMHG | BODY MASS INDEX: 25.99 KG/M2 | RESPIRATION RATE: 14 BRPM | HEART RATE: 74 BPM | HEIGHT: 65 IN | WEIGHT: 156 LBS | SYSTOLIC BLOOD PRESSURE: 140 MMHG | TEMPERATURE: 96.1 F | OXYGEN SATURATION: 98 %

## 2020-08-12 DIAGNOSIS — C18.2 PRIMARY ADENOCARCINOMA OF ASCENDING COLON (HCC): ICD-10-CM

## 2020-08-12 DIAGNOSIS — D70.1 CHEMOTHERAPY INDUCED NEUTROPENIA (HCC): ICD-10-CM

## 2020-08-12 DIAGNOSIS — C77.2 METASTASIS TO RETROPERITONEAL LYMPH NODE (HCC): Primary | ICD-10-CM

## 2020-08-12 DIAGNOSIS — T45.1X5A CHEMOTHERAPY INDUCED NEUTROPENIA (HCC): ICD-10-CM

## 2020-08-12 DIAGNOSIS — C78.7 LIVER METASTASES (HCC): ICD-10-CM

## 2020-08-12 PROCEDURE — 96413 CHEMO IV INFUSION 1 HR: CPT

## 2020-08-12 PROCEDURE — 96417 CHEMO IV INFUS EACH ADDL SEQ: CPT

## 2020-08-12 PROCEDURE — 96415 CHEMO IV INFUSION ADDL HR: CPT

## 2020-08-12 PROCEDURE — G0498 CHEMO EXTEND IV INFUS W/PUMP: HCPCS

## 2020-08-12 PROCEDURE — 96368 THER/DIAG CONCURRENT INF: CPT

## 2020-08-12 PROCEDURE — 96367 TX/PROPH/DG ADDL SEQ IV INF: CPT

## 2020-08-12 RX ORDER — DEXTROSE MONOHYDRATE 50 MG/ML
20 INJECTION, SOLUTION INTRAVENOUS ONCE
Status: COMPLETED | OUTPATIENT
Start: 2020-08-12 | End: 2020-08-12

## 2020-08-12 RX ORDER — SODIUM CHLORIDE 9 MG/ML
20 INJECTION, SOLUTION INTRAVENOUS ONCE
Status: COMPLETED | OUTPATIENT
Start: 2020-08-12 | End: 2020-08-12

## 2020-08-12 RX ADMIN — BEVACIZUMAB 357.5 MG: 400 INJECTION, SOLUTION INTRAVENOUS at 10:13

## 2020-08-12 RX ADMIN — LEUCOVORIN CALCIUM 700 MG: 200 INJECTION, POWDER, LYOPHILIZED, FOR SUSPENSION INTRAMUSCULAR; INTRAVENOUS at 10:54

## 2020-08-12 RX ADMIN — DEXAMETHASONE SODIUM PHOSPHATE: 10 INJECTION, SOLUTION INTRAMUSCULAR; INTRAVENOUS at 09:03

## 2020-08-12 RX ADMIN — SODIUM CHLORIDE 150 MG: 0.9 INJECTION, SOLUTION INTRAVENOUS at 09:37

## 2020-08-12 RX ADMIN — SODIUM CHLORIDE 20 ML/HR: 0.9 INJECTION, SOLUTION INTRAVENOUS at 08:55

## 2020-08-12 RX ADMIN — DEXTROSE 20 ML/HR: 5 SOLUTION INTRAVENOUS at 10:50

## 2020-08-12 RX ADMIN — OXALIPLATIN 150 MG: 5 INJECTION, SOLUTION INTRAVENOUS at 11:00

## 2020-08-12 NOTE — PROGRESS NOTES
Patient tolerated treatment today without issues  Patient connected to CADD pump for 46 hour infusion of 5FU  All connections secured with tape  Green indicator light verified flashing before d/c  Patient aware to return on Friday at 1110 for disconnect  AVS declined

## 2020-08-13 ENCOUNTER — TELEPHONE (OUTPATIENT)
Dept: HEMATOLOGY ONCOLOGY | Facility: CLINIC | Age: 55
End: 2020-08-13

## 2020-08-13 NOTE — TELEPHONE ENCOUNTER
Patient called to do COVID-19 pre screening  Patient has been tested and it was negative  Patient answered no to all other pre screen questions

## 2020-08-14 ENCOUNTER — TELEPHONE (OUTPATIENT)
Dept: HEMATOLOGY ONCOLOGY | Facility: CLINIC | Age: 55
End: 2020-08-14

## 2020-08-14 ENCOUNTER — HOSPITAL ENCOUNTER (OUTPATIENT)
Dept: INFUSION CENTER | Facility: CLINIC | Age: 55
Discharge: HOME | End: 2020-08-14
Payer: COMMERCIAL

## 2020-08-14 ENCOUNTER — OFFICE VISIT (OUTPATIENT)
Dept: HEMATOLOGY ONCOLOGY | Facility: CLINIC | Age: 55
End: 2020-08-14
Payer: COMMERCIAL

## 2020-08-14 VITALS
SYSTOLIC BLOOD PRESSURE: 140 MMHG | OXYGEN SATURATION: 98 % | RESPIRATION RATE: 18 BRPM | HEART RATE: 82 BPM | WEIGHT: 158 LBS | DIASTOLIC BLOOD PRESSURE: 90 MMHG | HEIGHT: 65 IN | BODY MASS INDEX: 26.33 KG/M2 | TEMPERATURE: 97.6 F

## 2020-08-14 VITALS — TEMPERATURE: 97.1 F

## 2020-08-14 DIAGNOSIS — C78.6 PERITONEAL METASTASES (HCC): ICD-10-CM

## 2020-08-14 DIAGNOSIS — C78.7 LIVER METASTASES (HCC): ICD-10-CM

## 2020-08-14 DIAGNOSIS — C77.2 METASTASIS TO RETROPERITONEAL LYMPH NODE (HCC): ICD-10-CM

## 2020-08-14 DIAGNOSIS — C79.60 MALIGNANT NEOPLASM METASTATIC TO OVARY, UNSPECIFIED LATERALITY (HCC): ICD-10-CM

## 2020-08-14 DIAGNOSIS — C18.2 PRIMARY ADENOCARCINOMA OF ASCENDING COLON (HCC): ICD-10-CM

## 2020-08-14 DIAGNOSIS — T45.1X5A CHEMOTHERAPY INDUCED NEUTROPENIA (HCC): ICD-10-CM

## 2020-08-14 DIAGNOSIS — C77.2 METASTASIS TO RETROPERITONEAL LYMPH NODE (HCC): Primary | ICD-10-CM

## 2020-08-14 DIAGNOSIS — C18.2 PRIMARY ADENOCARCINOMA OF ASCENDING COLON (HCC): Primary | ICD-10-CM

## 2020-08-14 DIAGNOSIS — D70.1 CHEMOTHERAPY INDUCED NEUTROPENIA (HCC): ICD-10-CM

## 2020-08-14 DIAGNOSIS — I82.531 CHRONIC DEEP VEIN THROMBOSIS (DVT) OF POPLITEAL VEIN OF RIGHT LOWER EXTREMITY (HCC): ICD-10-CM

## 2020-08-14 PROCEDURE — 96372 THER/PROPH/DIAG INJ SC/IM: CPT

## 2020-08-14 PROCEDURE — 99214 OFFICE O/P EST MOD 30 MIN: CPT | Performed by: INTERNAL MEDICINE

## 2020-08-14 RX ADMIN — PEGFILGRASTIM 6 MG: KIT SUBCUTANEOUS at 11:31

## 2020-08-14 NOTE — PROGRESS NOTES
HPI:   Patient is here with her   Since July 2018 patient has been on FOLFOX plus Avastin  She has protein urea but that is less than 1 gram in 24 hour and is being monitored  She has been tolerating treatments without much problem  Prior to this she was on FOLFIRI plus Avastin but after initial response disease progressed   Alkaline phosphatase has increased  Patient is in minutes of chemotherapy treatment and has 5 FU pump on and will be getting MRI scans prior to next treatment in case treatment had to be changed  On 05/26/2020 patient had pelvic surgery, removal of ovaries and sample also included peritoneum and small bowel mesentery  Both ovaries and small bowel mesentery showed metastatic disease from colon cancer  Surgery was on 05/26/2020  FOLFOX chemotherapy was resumed on 06/17/20 and 2 weeks later and Avastin was added to FOLFOX         Much less pelvic discomfort  Has some tiredness      She also has history of abdominal carcinomatosis and metastatic disease to  abdominal lymph nodes in addition to lungs, liver and pelvis    Prior to FOLFOX plus Avastin she was on FOLFIRI plus Avastin  In May 2018 patient underwent GALI and BSO? for uterine bleeding  and there were cancer cells in the fallopian tubes     She probably had removal of tubes and not the ovaries   In June 2018 patient   underwent extended right hemicolectomy, partial omentectomy and biopsy of the peritoneal nodule   Peritoneal nodule came back  positive for metastatic adenocarcinoma from colon    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 and metastatic disease to liver    6 cm T3 G2 right colon cancer with positive margins, lymphovascular invasion and perineural invasion and positive 3 of 27 lymph nodes with extranodal extension and positive peritoneal nodule biopsy   Stage IV disease because of liver and peritoneal metastases   In July 2018 patient was started  on modified FOLFIRI plus Avastin   She had PPE in her hands and bolus 5 FU was discontinued   She was seen by liver specialist at [facility] and was not felt to be a surgical candidate       Patient has been on Xarelto 10 mg daily for history of DVT right lower extremity that happened in 2016                   Current Outpatient Medications:     acetaminophen (TYLENOL) 500 mg tablet, Take 1,000 mg by mouth every 6 (six) hours as needed for mild pain, Disp: , Rfl:     amLODIPine (NORVASC) 5 mg tablet, Take 5 mg by mouth daily, Disp: , Rfl:     docusate sodium (COLACE) 100 mg capsule, Take 1 capsule (100 mg total) by mouth 2 (two) times a day, Disp: 10 each, Rfl: 0    metoprolol succinate (TOPROL-XL) 50 mg 24 hr tablet, Take 50 mg by mouth daily at bedtime , Disp: , Rfl:     rivaroxaban (XARELTO) 10 mg tablet, Take 1 tablet (10 mg total) by mouth daily, Disp: 90 tablet, Rfl: 3    ibuprofen (MOTRIN) 600 mg tablet, Take 1 tablet (600 mg total) by mouth every 6 (six) hours (Patient not taking: Reported on 6/12/2020), Disp: 30 tablet, Rfl: 0    No Known Allergies    Oncology History   Primary adenocarcinoma of ascending colon ([facility] [address] )   6/14/2018 Initial Diagnosis    Primary adenocarcinoma of ascending colon ([facility] [address] )     7/17/2018 - 8/1/2018 Chemotherapy    camptosar 180 mg/m2 day 1  5  mg/m2 day 1  5 FU 1200 mg/m2 day 1-2   Leucovorin 400 mg/m2     Venofer 100 mg (first 10 treatments) -- all every 2 weeks         7/17/2018 - 2/3/2020 Chemotherapy    palonosetron (ALOXI) injection 0 25 mg, 0 25 mg, Intravenous, Once, 8 of 16 cycles  Administration: 0 25 mg (11/21/2019), 0 25 mg (12/5/2019), 0 25 mg (12/19/2019), 0 25 mg (1/2/2020), 0 25 mg (1/16/2020), 0 25 mg (1/30/2020)  fluorouracil (ADRUCIL) injection 715 mg, 400 mg/m2, Intravenous, Once, 7 of 7 cycles  pegfilgrastim (NEULASTA ONPRO) subcutaneous injection kit 6 mg, 6 mg, Subcutaneous, Once, 20 of 28 cycles  Administration: 6 mg (5/22/2019), 6 mg (6/5/2019), 6 mg (6/22/2019), 6 mg (7/5/2019), 6 mg (7/20/2019), 6 mg (8/2/2019), 6 mg (8/17/2019), 6 mg (8/31/2019), 6 mg (9/14/2019), 6 mg (9/28/2019), 6 mg (10/12/2019), 6 mg (10/26/2019), 6 mg (11/9/2019), 6 mg (11/23/2019), 6 mg (12/7/2019), 6 mg (12/21/2019), 6 mg (1/4/2020), 6 mg (1/18/2020), 6 mg (2/1/2020)  fosaprepitant (EMEND) 150 mg in sodium chloride 0 9 % 250 mL IVPB, 150 mg, Intravenous, Once, 8 of 16 cycles  Administration: 150 mg (11/7/2019), 150 mg (11/21/2019), 150 mg (12/5/2019), 150 mg (12/19/2019), 150 mg (1/2/2020), 150 mg (1/16/2020), 150 mg (1/30/2020)  bevacizumab (AVASTIN) 355 mg in sodium chloride 0 9 % 100 mL IVPB, 5 mg/kg, Intravenous, Once, 27 of 35 cycles  Administration: 355 mg (5/20/2019), 355 mg (6/3/2019), 355 mg (6/20/2019), 355 mg (7/3/2019), 355 mg (7/18/2019), 355 mg (7/31/2019), 355 mg (8/15/2019), 355 mg (8/29/2019), 355 mg (9/12/2019), 355 mg (9/26/2019), 355 mg (10/10/2019), 355 mg (10/24/2019), 355 mg (11/7/2019), 355 mg (11/21/2019), 355 mg (12/5/2019), 355 mg (12/19/2019), 355 mg (1/2/2020), 355 mg (1/16/2020), 355 mg (1/30/2020)  irinotecan (CAMPTOSAR) 322 mg in sodium chloride 0 9 % 500 mL chemo infusion, 180 mg/m2, Intravenous, Once, 27 of 35 cycles  Dose modification: 150 mg/m2 (original dose 180 mg/m2, Cycle 20, Reason: Dose Not Tolerated)  Administration: 322 mg (5/20/2019), 322 mg (6/3/2019), 320 mg (6/20/2019), 320 mg (7/3/2019), 320 mg (7/18/2019), 320 mg (7/31/2019), 320 mg (8/15/2019), 320 mg (8/29/2019), 320 mg (9/12/2019), 320 mg (9/26/2019), 320 mg (10/10/2019), 320 mg (10/24/2019), 269 mg (11/7/2019), 269 mg (11/21/2019), 269 mg (12/5/2019), 269 mg (12/19/2019), 269 mg (1/2/2020), 269 mg (1/16/2020), 269 mg (1/30/2020)  leucovorin 716 mg in sodium chloride 0 9 % 250 mL IVPB, 400 mg/m2, Intravenous, Once, 27 of 35 cycles  Administration: 716 mg (5/20/2019), 716 mg (6/3/2019), 700 mg (6/20/2019), 700 mg (7/3/2019), 700 mg (7/18/2019), 700 mg (7/31/2019), 700 mg (8/15/2019), 700 mg (8/29/2019), 700 mg (9/12/2019), 700 mg (9/26/2019), 700 mg (10/10/2019), 716 mg (10/24/2019), 700 mg (11/7/2019), 700 mg (11/21/2019), 700 mg (12/5/2019), 700 mg (12/19/2019), 700 mg (1/2/2020), 700 mg (1/16/2020), 700 mg (1/30/2020)     8/1/2018 Adverse Reaction    Needed granix 300 mcg due to [address] of 1 01      8/14/2018 -  Chemotherapy    camptosar 180 mg/m2 day 1  5  mg/m2 day 1  5 FU 1200 mg/m2 day 1-2   Leucovorin 400 mg/m2  Avastin 5 mg/kg day 1   Venofer 100 mg (first 10 treatments)  Neulasta on Pro 6 mg day 3      -- every 2 weeks          3/3/2020 - 3/16/2020 Chemotherapy    pegfilgrastim (NEULASTA ONPRO) subcutaneous injection kit 6 mg, 6 mg, Subcutaneous, Once, 1 of 12 cycles  Administration: 6 mg (3/5/2020)  bevacizumab (AVASTIN) 357 5 mg in sodium chloride 0 9 % 100 mL IVPB, 5 mg/kg = 357 5 mg, Intravenous, Once, 1 of 12 cycles  Administration: 357 5 mg (3/3/2020)  leucovorin 700 mg in dextrose 5 % 250 mL IVPB, 720 mg, Intravenous, Once, 1 of 12 cycles  Administration: 700 mg (3/3/2020)  oxaliplatin (ELOXATIN) 150 mg in dextrose 5 % 250 mL chemo infusion, 153 mg, Intravenous, Once, 1 of 12 cycles  Administration: 150 mg (3/3/2020)     3/16/2020 - 4/5/2020 Chemotherapy    bevacizumab (AVASTIN) 560 mg in sodium chloride 0 9 % 100 mL IVPB, 7 5 mg/kg, Intravenous, Once, 0 of 5 cycles  oxaliplatin (ELOXATIN) 239 2 mg in dextrose 5 % 500 mL chemo infusion, 130 mg/m2 = 239 2 mg, Intravenous, Once, 1 of 6 cycles  Administration: 239 2 mg (3/16/2020)     4/6/2020 -  Chemotherapy    fluorouracil (ADRUCIL) injection 730 mg, 400 mg/m2 = 730 mg, Intravenous, Once, 1 of 1 cycle  pegfilgrastim (NEULASTA ONPRO) subcutaneous injection kit 6 mg, 6 mg, Subcutaneous, Once, 8 of 10 cycles  Administration: 6 mg (4/8/2020), 6 mg (4/25/2020), 6 mg (5/8/2020), 6 mg (6/19/2020), 6 mg (7/2/2020), 6 mg (7/17/2020), 6 mg (7/31/2020)  fosaprepitant (EMEND) 150 mg in sodium chloride 0 9 % 250 mL IVPB, 150 mg, Intravenous, Once, 8 of 10 cycles  Administration: 150 mg (4/6/2020), 150 mg (4/23/2020), 150 mg (5/6/2020), 150 mg (6/17/2020), 150 mg (6/30/2020), 150 mg (7/15/2020), 150 mg (7/29/2020), 150 mg (8/12/2020)  bevacizumab (AVASTIN) 372 5 mg in sodium chloride 0 9 % 100 mL IVPB, 5 mg/kg = 372 5 mg, Intravenous, Once, 6 of 8 cycles  Administration: 372 5 mg (4/6/2020), 372 5 mg (4/23/2020), 357 5 mg (6/30/2020), 357 5 mg (7/15/2020), 357 5 mg (7/29/2020), 357 5 mg (8/12/2020)  leucovorin 750 mg in dextrose 5 % 250 mL IVPB, 732 mg, Intravenous, Once, 8 of 10 cycles  Administration: 750 mg (4/6/2020), 750 mg (4/23/2020), 750 mg (5/6/2020), 750 mg (6/17/2020), 700 mg (6/30/2020), 700 mg (7/15/2020), 700 mg (7/29/2020), 700 mg (8/12/2020)  oxaliplatin (ELOXATIN) 150 mg in dextrose 5 % 250 mL chemo infusion, 155 55 mg, Intravenous, Once, 8 of 10 cycles  Administration: 150 mg (4/6/2020), 150 mg (4/23/2020), 150 mg (5/6/2020), 150 mg (6/17/2020), 150 mg (6/30/2020), 150 mg (7/15/2020), 150 mg (7/29/2020), 150 mg (8/12/2020)     Liver metastases ([address] )   6/26/2018 Initial Diagnosis    Liver metastases ([address] )     7/17/2018 - 2/3/2020 Chemotherapy    palonosetron (ALOXI) injection 0 25 mg, 0 25 mg, Intravenous, Once, 8 of 16 cycles  Administration: 0 25 mg (11/21/2019), 0 25 mg (12/5/2019), 0 25 mg (12/19/2019), 0 25 mg (1/2/2020), 0 25 mg (1/16/2020), 0 25 mg (1/30/2020)  fluorouracil (ADRUCIL) injection 715 mg, 400 mg/m2, Intravenous, Once, 7 of 7 cycles  pegfilgrastim (NEULASTA ONPRO) subcutaneous injection kit 6 mg, 6 mg, Subcutaneous, Once, 20 of 28 cycles  Administration: 6 mg (5/22/2019), 6 mg (6/5/2019), 6 mg (6/22/2019), 6 mg (7/5/2019), 6 mg (7/20/2019), 6 mg (8/2/2019), 6 mg (8/17/2019), 6 mg (8/31/2019), 6 mg (9/14/2019), 6 mg (9/28/2019), 6 mg (10/12/2019), 6 mg (10/26/2019), 6 mg (11/9/2019), 6 mg (11/23/2019), 6 mg (12/7/2019), 6 mg (12/21/2019), 6 mg (1/4/2020), 6 mg (1/18/2020), 6 mg (2/1/2020)  fosaprepitant (EMEND) 150 mg in sodium chloride 0 9 % 250 mL IVPB, 150 mg, Intravenous, Once, 8 of 16 cycles  Administration: 150 mg (11/7/2019), 150 mg (11/21/2019), 150 mg (12/5/2019), 150 mg (12/19/2019), 150 mg (1/2/2020), 150 mg (1/16/2020), 150 mg (1/30/2020)  bevacizumab (AVASTIN) 355 mg in sodium chloride 0 9 % 100 mL IVPB, 5 mg/kg, Intravenous, Once, 27 of 35 cycles  Administration: 355 mg (5/20/2019), 355 mg (6/3/2019), 355 mg (6/20/2019), 355 mg (7/3/2019), 355 mg (7/18/2019), 355 mg (7/31/2019), 355 mg (8/15/2019), 355 mg (8/29/2019), 355 mg (9/12/2019), 355 mg (9/26/2019), 355 mg (10/10/2019), 355 mg (10/24/2019), 355 mg (11/7/2019), 355 mg (11/21/2019), 355 mg (12/5/2019), 355 mg (12/19/2019), 355 mg (1/2/2020), 355 mg (1/16/2020), 355 mg (1/30/2020)  irinotecan (CAMPTOSAR) 322 mg in sodium chloride 0 9 % 500 mL chemo infusion, 180 mg/m2, Intravenous, Once, 27 of 35 cycles  Dose modification: 150 mg/m2 (original dose 180 mg/m2, Cycle 20, Reason: Dose Not Tolerated)  Administration: 322 mg (5/20/2019), 322 mg (6/3/2019), 320 mg (6/20/2019), 320 mg (7/3/2019), 320 mg (7/18/2019), 320 mg (7/31/2019), 320 mg (8/15/2019), 320 mg (8/29/2019), 320 mg (9/12/2019), 320 mg (9/26/2019), 320 mg (10/10/2019), 320 mg (10/24/2019), 269 mg (11/7/2019), 269 mg (11/21/2019), 269 mg (12/5/2019), 269 mg (12/19/2019), 269 mg (1/2/2020), 269 mg (1/16/2020), 269 mg (1/30/2020)  leucovorin 716 mg in sodium chloride 0 9 % 250 mL IVPB, 400 mg/m2, Intravenous, Once, 27 of 35 cycles  Administration: 716 mg (5/20/2019), 716 mg (6/3/2019), 700 mg (6/20/2019), 700 mg (7/3/2019), 700 mg (7/18/2019), 700 mg (7/31/2019), 700 mg (8/15/2019), 700 mg (8/29/2019), 700 mg (9/12/2019), 700 mg (9/26/2019), 700 mg (10/10/2019), 716 mg (10/24/2019), 700 mg (11/7/2019), 700 mg (11/21/2019), 700 mg (12/5/2019), 700 mg (12/19/2019), 700 mg (1/2/2020), 700 mg (1/16/2020), 700 mg (1/30/2020)     3/3/2020 - 3/16/2020 Chemotherapy    pegfilgrastim (NEULASTA ONPRO) subcutaneous injection kit 6 mg, 6 mg, Subcutaneous, Once, 1 of 12 cycles  Administration: 6 mg (3/5/2020)  bevacizumab (AVASTIN) 357 5 mg in sodium chloride 0 9 % 100 mL IVPB, 5 mg/kg = 357 5 mg, Intravenous, Once, 1 of 12 cycles  Administration: 357 5 mg (3/3/2020)  leucovorin 700 mg in dextrose 5 % 250 mL IVPB, 720 mg, Intravenous, Once, 1 of 12 cycles  Administration: 700 mg (3/3/2020)  oxaliplatin (ELOXATIN) 150 mg in dextrose 5 % 250 mL chemo infusion, 153 mg, Intravenous, Once, 1 of 12 cycles  Administration: 150 mg (3/3/2020)     3/16/2020 - 4/5/2020 Chemotherapy    bevacizumab (AVASTIN) 560 mg in sodium chloride 0 9 % 100 mL IVPB, 7 5 mg/kg, Intravenous, Once, 0 of 5 cycles  oxaliplatin (ELOXATIN) 239 2 mg in dextrose 5 % 500 mL chemo infusion, 130 mg/m2 = 239 2 mg, Intravenous, Once, 1 of 6 cycles  Administration: 239 2 mg (3/16/2020)     4/6/2020 -  Chemotherapy    fluorouracil (ADRUCIL) injection 730 mg, 400 mg/m2 = 730 mg, Intravenous, Once, 1 of 1 cycle  pegfilgrastim (NEULASTA ONPRO) subcutaneous injection kit 6 mg, 6 mg, Subcutaneous, Once, 7 of 10 cycles  Administration: 6 mg (4/8/2020), 6 mg (4/25/2020), 6 mg (5/8/2020), 6 mg (6/19/2020), 6 mg (7/2/2020), 6 mg (7/17/2020), 6 mg (7/31/2020)  fosaprepitant (EMEND) 150 mg in sodium chloride 0 9 % 250 mL IVPB, 150 mg, Intravenous, Once, 7 of 10 cycles  Administration: 150 mg (4/6/2020), 150 mg (4/23/2020), 150 mg (5/6/2020), 150 mg (6/17/2020), 150 mg (6/30/2020), 150 mg (7/15/2020), 150 mg (7/29/2020)  bevacizumab (AVASTIN) 372 5 mg in sodium chloride 0 9 % 100 mL IVPB, 5 mg/kg = 372 5 mg, Intravenous, Once, 5 of 8 cycles  Administration: 372 5 mg (4/6/2020), 372 5 mg (4/23/2020), 357 5 mg (6/30/2020), 357 5 mg (7/15/2020), 357 5 mg (7/29/2020)  leucovorin 750 mg in dextrose 5 % 250 mL IVPB, 732 mg, Intravenous, Once, 7 of 10 cycles  Administration: 750 mg (4/6/2020), 750 mg (4/23/2020), 750 mg (5/6/2020), 750 mg (6/17/2020), 700 mg (6/30/2020), 700 mg (7/15/2020), 700 mg (7/29/2020)  oxaliplatin (ELOXATIN) 150 mg in dextrose 5 % 250 mL chemo infusion, 155 55 mg, Intravenous, Once, 7 of 10 cycles  Administration: 150 mg (4/6/2020), 150 mg (4/23/2020), 150 mg (5/6/2020), 150 mg (6/17/2020), 150 mg (6/30/2020), 150 mg (7/15/2020), 150 mg (7/29/2020)     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[facility] [address] )   6/26/2018 Initial Diagnosis    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[facility] [address] )     4/6/2020 -  Chemotherapy    fluorouracil (ADRUCIL) injection 730 mg, 400 mg/m2 = 730 mg, Intravenous, Once, 1 of 1 cycle  pegfilgrastim (NEULASTA ONPRO) subcutaneous injection kit 6 mg, 6 mg, Subcutaneous, Once, 7 of 10 cycles  Administration: 6 mg (4/8/2020), 6 mg (4/25/2020), 6 mg (5/8/2020), 6 mg (6/19/2020), 6 mg (7/2/2020), 6 mg (7/17/2020), 6 mg (7/31/2020)  fosaprepitant (EMEND) 150 mg in sodium chloride 0 9 % 250 mL IVPB, 150 mg, Intravenous, Once, 7 of 10 cycles  Administration: 150 mg (4/6/2020), 150 mg (4/23/2020), 150 mg (5/6/2020), 150 mg (6/17/2020), 150 mg (6/30/2020), 150 mg (7/15/2020), 150 mg (7/29/2020)  bevacizumab (AVASTIN) 372 5 mg in sodium chloride 0 9 % 100 mL IVPB, 5 mg/kg = 372 5 mg, Intravenous, Once, 5 of 8 cycles  Administration: 372 5 mg (4/6/2020), 372 5 mg (4/23/2020), 357 5 mg (6/30/2020), 357 5 mg (7/15/2020), 357 5 mg (7/29/2020)  leucovorin 750 mg in dextrose 5 % 250 mL IVPB, 732 mg, Intravenous, Once, 7 of 10 cycles  Administration: 750 mg (4/6/2020), 750 mg (4/23/2020), 750 mg (5/6/2020), 750 mg (6/17/2020), 700 mg (6/30/2020), 700 mg (7/15/2020), 700 mg (7/29/2020)  oxaliplatin (ELOXATIN) 150 mg in dextrose 5 % 250 mL chemo infusion, 155 55 mg, Intravenous, Once, 7 of 10 cycles  Administration: 150 mg (4/6/2020), 150 mg (4/23/2020), 150 mg (5/6/2020), 150 mg (6/17/2020), 150 mg (6/30/2020), 150 mg (7/15/2020), 150 mg (7/29/2020)         ROS:  08/14/20 Reviewed 13 systems: See symptoms in HPI:   Pelvic discomfort  Tiredness     Presently no headaches, seizures, dizziness, diplopia, dysphagia, hoarseness, chest pain, palpitations, shortness of breath, cough, hemoptysis,  nausea, vomiting, change in bowel habits, melena, hematuria, fever, chills, bleeding, bone pains, skin rash , weight loss, arthritic symptoms,   weakness, numbness,  claudication and gait problem  No frequent infections  Not unusually sensitive to heat or cold  No swelling of the ankles  No swollen glands  Patient is anxious  /90 (BP Location: Left arm, Patient Position: Sitting, Cuff Size: Adult)   Pulse 82   Temp 97 6 °F (36 4 °C)   Resp 18   Ht 5' 5 35" (1 66 m)   Wt 71 7 kg (158 lb)   LMP 05/16/2018   SpO2 98%   BMI 26 01 kg/m²     Physical Exam:  Alert, oriented, not in distress, no icterus, no oral thrush, no palpable neck mass, clear lung fields, regular heart rate, abdomen  soft and non tender, no palpable abdominal mass, no ascites, no edema of ankles, no calf tenderness, no focal neurological deficit, no skin rash, no palpable lymphadenopathy, good arterial pulses, no clubbing  Patient is anxious  Performance status 1      IMAGING:  No orders to display       LABS:  Results for orders placed or performed in visit on 08/10/20   CBC and differential   Result Value Ref Range    WBC 14 02 (H) 4 31 - 10 16 Thousand/uL    RBC 3 86 3 81 - 5 12 Million/uL    Hemoglobin 13 1 11 5 - 15 4 g/dL    Hematocrit 41 7 34 8 - 46 1 %     (H) 82 - 98 fL    MCH 33 9 26 8 - 34 3 pg    MCHC 31 4 31 4 - 37 4 g/dL    RDW 15 9 (H) 11 6 - 15 1 %    MPV 11 2 8 9 - 12 7 fL    Platelets 90 (L) 785 - 390 Thousands/uL   Comprehensive metabolic panel   Result Value Ref Range    Sodium 142 136 - 145 mmol/L    Potassium 3 8 3 5 - 5 3 mmol/L    Chloride 109 (H) 100 - 108 mmol/L    CO2 30 21 - 32 mmol/L    ANION GAP 3 (L) 4 - 13 mmol/L    BUN 11 5 - 25 mg/dL    Creatinine 0 88 0 60 - 1 30 mg/dL    Glucose 129 65 - 140 mg/dL    Calcium 8 9 8 3 - 10 1 mg/dL    AST 45 5 - 45 U/L ALT 91 (H) 12 - 78 U/L    Alkaline Phosphatase 216 (H) 46 - 116 U/L    Total Protein 7 0 6 4 - 8 2 g/dL    Albumin 3 7 3 5 - 5 0 g/dL    Total Bilirubin 0 40 0 20 - 1 00 mg/dL    eGFR 75 ml/min/1 73sq m   Manual Differential (Non Wam)   Result Value Ref Range    Segmented % 78 %    Lymphocytes % 10 %    Monocytes % 10 4 - 12 %    Eosinophils, % 1 0 - 6 %    Basophils % 1 0 - 1 %    Neutrophils Absolute 10 94 (H) 1 81 - 6 82 Thousand/uL    Lymphocytes Absolute 1 40 0 60 - 4 47 Thousand/uL    Monocytes Absolute 1 40 (H) 0 00 - 1 22 Thousand/uL    Eosinophils Absolute 0 14 0 00 - 0 61 Thousand/uL    Basophils Absolute 0 14 (H) 0 00 - 0 10 Thousand/uL    Total Counted 100     RBC Morphology       Labs, Imaging, & Other studies:   All pertinent labs and imaging studies were personally reviewed    Lab Results   Component Value Date     03/18/2015    K 3 8 08/10/2020     (H) 08/10/2020    CO2 30 08/10/2020    ANIONGAP 9 03/18/2015    BUN 11 08/10/2020    CREATININE 0 88 08/10/2020    GLUCOSE 100 03/18/2015    GLUF 83 05/19/2020    CALCIUM 8 9 08/10/2020    AST 45 08/10/2020    ALT 91 (H) 08/10/2020    ALKPHOS 216 (H) 08/10/2020    PROT 6 6 03/18/2015    BILITOT 0 65 03/18/2015    EGFR 75 08/10/2020     Lab Results   Component Value Date    WBC 14 02 (H) 08/10/2020    HGB 13 1 08/10/2020    HCT 41 7 08/10/2020     (H) 08/10/2020    PLT 90 (L) 08/10/2020       Reviewed and discussed with patient  Assessment and plan:  Patient is here with her   Since July 2018 patient has been on FOLFOX plus Avastin  She has protein urea but that is less than 1 gram in 24 hour and is being monitored  She has been tolerating treatments without much problem  Prior to this she was on FOLFIRI plus Avastin but after initial response disease progressed   Alkaline phosphatase has increased    Patient is in minutes of chemotherapy treatment and has 5 FU pump on and will be getting MRI scans prior to next treatment in case treatment had to be changed  On 05/26/2020 patient had pelvic surgery, removal of ovaries and sample also included peritoneum and small bowel mesentery  Both ovaries and small bowel mesentery showed metastatic disease from colon cancer  Surgery was on 05/26/2020  FOLFOX chemotherapy was resumed on 06/17/20 and 2 weeks later and Avastin was added to FOLFOX         Much less pelvic discomfort  Has some tiredness      She also has history of abdominal carcinomatosis and metastatic disease to  abdominal lymph nodes in addition to lungs, liver and pelvis    Prior to FOLFOX plus Avastin she was on FOLFIRI plus Avastin  In May 2018 patient underwent GALI and BSO? for uterine bleeding  and there were cancer cells in the fallopian tubes     She probably had removal of tubes and not the ovaries   In June 2018 patient   underwent extended right hemicolectomy, partial omentectomy and biopsy of the peritoneal nodule   Peritoneal nodule came back  positive for metastatic adenocarcinoma from colon    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 and metastatic disease to liver    6 cm T3 G2 right colon cancer with positive margins, lymphovascular invasion and perineural invasion and positive 3 of 27 lymph nodes with extranodal extension and positive peritoneal nodule biopsy   Stage IV disease because of liver and peritoneal metastases   In July 2018 patient was started  on modified FOLFIRI plus Avastin   She had PPE in her hands and bolus 5 FU was discontinued   She was seen by liver specialist at [facility] and was not felt to be a surgical candidate       Patient has been on Xarelto 10 mg daily for history of DVT right lower extremity that happened in 2016          Physical examination and test results  are as recorded and discussed  Plan is as above  [facility] MRI scans   Goal is prolongation of survival   Condition and plan discussed in detail   Questions answered    Discussed the importance of self-breast examination, eating healthy foods, staying active as tolerated and health screening test   Patient is capable of self-care  Discussed  Prevention of Coronavirus  1  Primary adenocarcinoma [facility] ascending colon ([facility] )      2  Liver metastases ([email or web address] )      3  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, unspecified laterality ([email or web address] )      4  Peritoneal metastases ([email or web address] )      5  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

## 2020-08-14 NOTE — TELEPHONE ENCOUNTER
Left a message for the patient to go the time and locations of her MRI appointments  I explained she could call the [facility] line and ask for me and that I would be at the [facility] location for the rest of the day  I will try and reach out again

## 2020-08-14 NOTE — PROGRESS NOTES
Patient is here for a cadd d/c and neulasta onpro  She offers no complaints at this time  CADD D/C'd per protocol with res vol of 0 mls  neulasta onpro placed on right arm, confirmed it was working with green light flashing and patient verbalized understanding of how it works and when to remove it   Next appointment confirmed and she declined avs

## 2020-08-14 NOTE — TELEPHONE ENCOUNTER
Spoke with patient and went over the new day sand times of her MRI's and follow up with Dr Ruff Newer

## 2020-08-17 ENCOUNTER — HOSPITAL ENCOUNTER (OUTPATIENT)
Dept: MRI IMAGING | Facility: HOSPITAL | Age: 55
Discharge: HOME | End: 2020-08-17
Payer: COMMERCIAL

## 2020-08-17 DIAGNOSIS — C18.2 PRIMARY ADENOCARCINOMA OF ASCENDING COLON (HCC): ICD-10-CM

## 2020-08-17 DIAGNOSIS — C78.7 LIVER METASTASES (HCC): ICD-10-CM

## 2020-08-17 DIAGNOSIS — C79.9 ADENOCARCINOMA, METASTATIC (HCC): ICD-10-CM

## 2020-08-17 DIAGNOSIS — C79.82: ICD-10-CM

## 2020-08-17 DIAGNOSIS — C79.60 MALIGNANT NEOPLASM METASTATIC TO OVARY, UNSPECIFIED LATERALITY (HCC): ICD-10-CM

## 2020-08-17 DIAGNOSIS — C77.2 METASTASIS TO RETROPERITONEAL LYMPH NODE (HCC): ICD-10-CM

## 2020-08-17 DIAGNOSIS — C78.00 MALIGNANT NEOPLASM METASTATIC TO LUNG, UNSPECIFIED LATERALITY (HCC): ICD-10-CM

## 2020-08-17 PROCEDURE — 74183 MRI ABD W/O CNTR FLWD CNTR: CPT

## 2020-08-17 PROCEDURE — G1004 CDSM NDSC: HCPCS

## 2020-08-17 PROCEDURE — A9585 GADOBUTROL INJECTION: HCPCS | Performed by: RADIOLOGY

## 2020-08-17 RX ADMIN — GADOBUTROL 7 ML: 604.72 INJECTION INTRAVENOUS at 12:27

## 2020-08-19 ENCOUNTER — HOSPITAL ENCOUNTER (OUTPATIENT)
Dept: MRI IMAGING | Facility: HOSPITAL | Age: 55
Discharge: HOME | End: 2020-08-19
Payer: COMMERCIAL

## 2020-08-19 DIAGNOSIS — C77.2 METASTASIS TO RETROPERITONEAL LYMPH NODE (HCC): ICD-10-CM

## 2020-08-19 DIAGNOSIS — C79.60 MALIGNANT NEOPLASM METASTATIC TO OVARY, UNSPECIFIED LATERALITY (HCC): ICD-10-CM

## 2020-08-19 DIAGNOSIS — C79.82: ICD-10-CM

## 2020-08-19 DIAGNOSIS — C78.7 LIVER METASTASES (HCC): ICD-10-CM

## 2020-08-19 DIAGNOSIS — C18.2 PRIMARY ADENOCARCINOMA OF ASCENDING COLON (HCC): ICD-10-CM

## 2020-08-19 DIAGNOSIS — C78.00 MALIGNANT NEOPLASM METASTATIC TO LUNG, UNSPECIFIED LATERALITY (HCC): ICD-10-CM

## 2020-08-19 DIAGNOSIS — C79.9 ADENOCARCINOMA, METASTATIC (HCC): ICD-10-CM

## 2020-08-19 PROCEDURE — A9585 GADOBUTROL INJECTION: HCPCS | Performed by: PHYSICIAN ASSISTANT

## 2020-08-19 PROCEDURE — G1004 CDSM NDSC: HCPCS

## 2020-08-19 PROCEDURE — 72197 MRI PELVIS W/O & W/DYE: CPT

## 2020-08-19 RX ADMIN — GADOBUTROL 7 ML: 604.72 INJECTION INTRAVENOUS at 20:32

## 2020-08-20 DIAGNOSIS — C77.2 METASTASIS TO RETROPERITONEAL LYMPH NODE (HCC): ICD-10-CM

## 2020-08-20 DIAGNOSIS — C18.2 PRIMARY ADENOCARCINOMA OF ASCENDING COLON (HCC): ICD-10-CM

## 2020-08-20 DIAGNOSIS — T45.1X5A CHEMOTHERAPY INDUCED NEUTROPENIA (HCC): Primary | ICD-10-CM

## 2020-08-20 DIAGNOSIS — D70.1 CHEMOTHERAPY INDUCED NEUTROPENIA (HCC): Primary | ICD-10-CM

## 2020-08-20 DIAGNOSIS — C78.7 LIVER METASTASES (HCC): ICD-10-CM

## 2020-08-20 RX ORDER — DEXTROSE MONOHYDRATE 50 MG/ML
20 INJECTION, SOLUTION INTRAVENOUS ONCE
Status: CANCELLED | OUTPATIENT
Start: 2020-08-26

## 2020-08-20 RX ORDER — SODIUM CHLORIDE 9 MG/ML
20 INJECTION, SOLUTION INTRAVENOUS ONCE
Status: CANCELLED | OUTPATIENT
Start: 2020-08-26

## 2020-08-21 ENCOUNTER — TELEPHONE (OUTPATIENT)
Dept: GYNECOLOGIC ONCOLOGY | Facility: CLINIC | Age: 55
End: 2020-08-21

## 2020-08-21 ENCOUNTER — TELEPHONE (OUTPATIENT)
Dept: HEMATOLOGY ONCOLOGY | Facility: CLINIC | Age: 55
End: 2020-08-21

## 2020-08-21 NOTE — TELEPHONE ENCOUNTER
Patient called requesting the   MRI pelvis w wo contrast complete 8/19/2020  MRI abdomen w wo contrast completed 8/17/2020  CT chest wo contrast completed 8/10/2020 sent to [facility] fax # [phone]  Patient also requesting disk be sent as well [facility] Phone: [phone]

## 2020-08-21 NOTE — TELEPHONE ENCOUNTER
Patient called today and asked us to send over her Op Note & Pathology from her surgery with Dr Juana Churchill   I sent it over to [facility] F: [phone]

## 2020-08-24 ENCOUNTER — APPOINTMENT (OUTPATIENT)
Dept: LAB | Facility: MEDICAL CENTER | Age: 55
End: 2020-08-24
Payer: COMMERCIAL

## 2020-08-24 DIAGNOSIS — T45.1X5A CHEMOTHERAPY INDUCED NEUTROPENIA (HCC): ICD-10-CM

## 2020-08-24 DIAGNOSIS — C78.7 LIVER METASTASES (HCC): ICD-10-CM

## 2020-08-24 DIAGNOSIS — C79.9 ADENOCARCINOMA, METASTATIC (HCC): ICD-10-CM

## 2020-08-24 DIAGNOSIS — D70.1 CHEMOTHERAPY INDUCED NEUTROPENIA (HCC): ICD-10-CM

## 2020-08-24 DIAGNOSIS — C77.2 METASTASIS TO RETROPERITONEAL LYMPH NODE (HCC): ICD-10-CM

## 2020-08-24 DIAGNOSIS — C18.2 PRIMARY ADENOCARCINOMA OF ASCENDING COLON (HCC): ICD-10-CM

## 2020-08-24 DIAGNOSIS — R80.9 PROTEINURIA, UNSPECIFIED TYPE: ICD-10-CM

## 2020-08-24 LAB
ALBUMIN SERPL BCP-MCNC: 3.6 G/DL (ref 3.5–5)
ALP SERPL-CCNC: 203 U/L (ref 46–116)
ALT SERPL W P-5'-P-CCNC: 108 U/L (ref 12–78)
ANION GAP SERPL CALCULATED.3IONS-SCNC: 6 MMOL/L (ref 4–13)
AST SERPL W P-5'-P-CCNC: 50 U/L (ref 5–45)
BASOPHILS # BLD AUTO: 0.03 THOUSANDS/ΜL (ref 0–0.1)
BASOPHILS NFR BLD AUTO: 0 % (ref 0–1)
BILIRUB SERPL-MCNC: 0.27 MG/DL (ref 0.2–1)
BUN SERPL-MCNC: 9 MG/DL (ref 5–25)
CALCIUM SERPL-MCNC: 8.7 MG/DL (ref 8.3–10.1)
CHLORIDE SERPL-SCNC: 110 MMOL/L (ref 100–108)
CO2 SERPL-SCNC: 28 MMOL/L (ref 21–32)
CREAT SERPL-MCNC: 0.84 MG/DL (ref 0.6–1.3)
EOSINOPHIL # BLD AUTO: 0.09 THOUSAND/ΜL (ref 0–0.61)
EOSINOPHIL NFR BLD AUTO: 1 % (ref 0–6)
ERYTHROCYTE [DISTWIDTH] IN BLOOD BY AUTOMATED COUNT: 17.1 % (ref 11.6–15.1)
GFR SERPL CREATININE-BSD FRML MDRD: 80 ML/MIN/1.73SQ M
GLUCOSE SERPL-MCNC: 110 MG/DL (ref 65–140)
HCT VFR BLD AUTO: 40 % (ref 34.8–46.1)
HGB BLD-MCNC: 12.9 G/DL (ref 11.5–15.4)
IMM GRANULOCYTES # BLD AUTO: 0.06 THOUSAND/UL (ref 0–0.2)
IMM GRANULOCYTES NFR BLD AUTO: 1 % (ref 0–2)
LYMPHOCYTES # BLD AUTO: 1.01 THOUSANDS/ΜL (ref 0.6–4.47)
LYMPHOCYTES NFR BLD AUTO: 14 % (ref 14–44)
MCH RBC QN AUTO: 34.8 PG (ref 26.8–34.3)
MCHC RBC AUTO-ENTMCNC: 32.3 G/DL (ref 31.4–37.4)
MCV RBC AUTO: 108 FL (ref 82–98)
MONOCYTES # BLD AUTO: 0.76 THOUSAND/ΜL (ref 0.17–1.22)
MONOCYTES NFR BLD AUTO: 11 % (ref 4–12)
NEUTROPHILS # BLD AUTO: 5.29 THOUSANDS/ΜL (ref 1.85–7.62)
NEUTS SEG NFR BLD AUTO: 73 % (ref 43–75)
NRBC BLD AUTO-RTO: 0 /100 WBCS
PLATELET # BLD AUTO: 74 THOUSANDS/UL (ref 149–390)
PMV BLD AUTO: 11.2 FL (ref 8.9–12.7)
POTASSIUM SERPL-SCNC: 3.9 MMOL/L (ref 3.5–5.3)
PROT SERPL-MCNC: 6.9 G/DL (ref 6.4–8.2)
RBC # BLD AUTO: 3.71 MILLION/UL (ref 3.81–5.12)
SODIUM SERPL-SCNC: 144 MMOL/L (ref 136–145)
WBC # BLD AUTO: 7.24 THOUSAND/UL (ref 4.31–10.16)

## 2020-08-24 PROCEDURE — 36415 COLL VENOUS BLD VENIPUNCTURE: CPT

## 2020-08-24 PROCEDURE — 80053 COMPREHEN METABOLIC PANEL: CPT

## 2020-08-24 PROCEDURE — 85025 COMPLETE CBC W/AUTO DIFF WBC: CPT

## 2020-08-25 ENCOUNTER — DOCUMENTATION (OUTPATIENT)
Dept: HEMATOLOGY ONCOLOGY | Facility: CLINIC | Age: 55
End: 2020-08-25

## 2020-08-25 NOTE — TELEPHONE ENCOUNTER
Called patient and provided her with Medical Record's number [phone]  Shameka Jewell voiced understanding

## 2020-08-25 NOTE — TELEPHONE ENCOUNTER
Patient called back to follow up on if the disk was sent along with the report of her records  Best call back [phone]

## 2020-08-26 ENCOUNTER — HOSPITAL ENCOUNTER (OUTPATIENT)
Dept: INFUSION CENTER | Facility: CLINIC | Age: 55
Discharge: HOME | End: 2020-08-26
Payer: COMMERCIAL

## 2020-08-26 VITALS
TEMPERATURE: 96.7 F | HEART RATE: 84 BPM | WEIGHT: 154.5 LBS | RESPIRATION RATE: 18 BRPM | BODY MASS INDEX: 25.74 KG/M2 | SYSTOLIC BLOOD PRESSURE: 128 MMHG | HEIGHT: 65 IN | DIASTOLIC BLOOD PRESSURE: 80 MMHG

## 2020-08-26 DIAGNOSIS — C78.7 LIVER METASTASES (HCC): ICD-10-CM

## 2020-08-26 DIAGNOSIS — C77.2 METASTASIS TO RETROPERITONEAL LYMPH NODE (HCC): Primary | ICD-10-CM

## 2020-08-26 DIAGNOSIS — C18.2 PRIMARY ADENOCARCINOMA OF ASCENDING COLON (HCC): ICD-10-CM

## 2020-08-26 DIAGNOSIS — D70.1 CHEMOTHERAPY INDUCED NEUTROPENIA (HCC): Primary | ICD-10-CM

## 2020-08-26 DIAGNOSIS — D70.1 CHEMOTHERAPY INDUCED NEUTROPENIA (HCC): ICD-10-CM

## 2020-08-26 DIAGNOSIS — T45.1X5A CHEMOTHERAPY INDUCED NEUTROPENIA (HCC): ICD-10-CM

## 2020-08-26 DIAGNOSIS — T45.1X5A CHEMOTHERAPY INDUCED NEUTROPENIA (HCC): Primary | ICD-10-CM

## 2020-08-26 DIAGNOSIS — C77.2 METASTASIS TO RETROPERITONEAL LYMPH NODE (HCC): ICD-10-CM

## 2020-08-26 PROCEDURE — 96413 CHEMO IV INFUSION 1 HR: CPT

## 2020-08-26 PROCEDURE — 96417 CHEMO IV INFUS EACH ADDL SEQ: CPT

## 2020-08-26 PROCEDURE — 96368 THER/DIAG CONCURRENT INF: CPT

## 2020-08-26 PROCEDURE — 96367 TX/PROPH/DG ADDL SEQ IV INF: CPT

## 2020-08-26 PROCEDURE — G0498 CHEMO EXTEND IV INFUS W/PUMP: HCPCS

## 2020-08-26 PROCEDURE — 96415 CHEMO IV INFUSION ADDL HR: CPT

## 2020-08-26 RX ORDER — DEXTROSE MONOHYDRATE 50 MG/ML
20 INJECTION, SOLUTION INTRAVENOUS ONCE
Status: COMPLETED | OUTPATIENT
Start: 2020-08-26 | End: 2020-08-26

## 2020-08-26 RX ORDER — SODIUM CHLORIDE 9 MG/ML
20 INJECTION, SOLUTION INTRAVENOUS ONCE
Status: COMPLETED | OUTPATIENT
Start: 2020-08-26 | End: 2020-08-26

## 2020-08-26 RX ADMIN — SODIUM CHLORIDE 150 MG: 0.9 INJECTION, SOLUTION INTRAVENOUS at 09:30

## 2020-08-26 RX ADMIN — DEXTROSE 20 ML/HR: 5 SOLUTION INTRAVENOUS at 11:10

## 2020-08-26 RX ADMIN — OXALIPLATIN 150 MG: 5 INJECTION, SOLUTION INTRAVENOUS at 11:15

## 2020-08-26 RX ADMIN — DEXAMETHASONE SODIUM PHOSPHATE: 10 INJECTION, SOLUTION INTRAMUSCULAR; INTRAVENOUS at 08:50

## 2020-08-26 RX ADMIN — BEVACIZUMAB 357.5 MG: 400 INJECTION, SOLUTION INTRAVENOUS at 10:25

## 2020-08-26 RX ADMIN — SODIUM CHLORIDE 20 ML/HR: 0.9 INJECTION, SOLUTION INTRAVENOUS at 08:40

## 2020-08-26 RX ADMIN — LEUCOVORIN CALCIUM 700 MG: 200 INJECTION, POWDER, LYOPHILIZED, FOR SUSPENSION INTRAMUSCULAR; INTRAVENOUS at 11:15

## 2020-08-26 NOTE — PROGRESS NOTES
Patient to [facility] for Avastin / FOLFOX: Offers no complaints at present time: Lab work ( 08/24/20 ) reviewed: Platelet Count - 74: Parameters not met: Dr Madeleine Hamilton aware:  May proceed with treatment today: Right PAC accessed without difficulty: Good blood return noted

## 2020-08-28 ENCOUNTER — HOSPITAL ENCOUNTER (OUTPATIENT)
Dept: INFUSION CENTER | Facility: CLINIC | Age: 55
Discharge: HOME | End: 2020-08-28
Payer: COMMERCIAL

## 2020-08-28 VITALS — TEMPERATURE: 97.3 F

## 2020-08-28 DIAGNOSIS — C18.2 PRIMARY ADENOCARCINOMA OF ASCENDING COLON (HCC): ICD-10-CM

## 2020-08-28 DIAGNOSIS — C77.2 METASTASIS TO RETROPERITONEAL LYMPH NODE (HCC): Primary | ICD-10-CM

## 2020-08-28 DIAGNOSIS — T45.1X5A CHEMOTHERAPY INDUCED NEUTROPENIA (HCC): ICD-10-CM

## 2020-08-28 DIAGNOSIS — D70.1 CHEMOTHERAPY INDUCED NEUTROPENIA (HCC): ICD-10-CM

## 2020-08-28 DIAGNOSIS — C78.7 LIVER METASTASES (HCC): ICD-10-CM

## 2020-08-28 PROCEDURE — 96372 THER/PROPH/DIAG INJ SC/IM: CPT

## 2020-08-28 RX ADMIN — PEGFILGRASTIM 6 MG: KIT SUBCUTANEOUS at 11:45

## 2020-08-28 NOTE — PROGRESS NOTES
Patient at [facility] for CADD pump d/c  Pump disconnected without complications  [place] volume 0  Port flushed without issue  Blood return noted  Neulasta on pro applied to RUE, green light flashing, patient aware of medication injection and completion times  Patient declined AVS  Appointments reviewed with patient

## 2020-08-31 ENCOUNTER — DOCUMENTATION (OUTPATIENT)
Dept: HEMATOLOGY ONCOLOGY | Facility: CLINIC | Age: 55
End: 2020-08-31

## 2020-08-31 RX ORDER — DEXTROSE MONOHYDRATE 50 MG/ML
20 INJECTION, SOLUTION INTRAVENOUS ONCE
Status: CANCELLED | OUTPATIENT
Start: 2020-09-23

## 2020-08-31 RX ORDER — SODIUM CHLORIDE 9 MG/ML
20 INJECTION, SOLUTION INTRAVENOUS ONCE
Status: CANCELLED | OUTPATIENT
Start: 2020-09-23

## 2020-08-31 NOTE — PROGRESS NOTES
I asked patient if she heard back from VIRY Arambula   She said she called today and she will call again tomorrow  Doctor was off today

## 2020-09-01 ENCOUNTER — TELEPHONE (OUTPATIENT)
Dept: HEMATOLOGY ONCOLOGY | Facility: CLINIC | Age: 55
End: 2020-09-01

## 2020-09-01 NOTE — TELEPHONE ENCOUNTER
Patient called to request her treatment record be faxed to Dr Kasi Brooks office - I faxed Dr Irena Oneil most recent office note to fax number provided @ [phone]    Patient also requesting that Dr Blade Ma speak with Dr Alec Damon  Patient aware he will be back in the office tomorrow 9/2/20  Patient requesting Dr Blade Ma call office tomorrow to coordinate conversation between physicians    Office number     IOPM forward to RN with Dr Blade Ma to relay msg

## 2020-09-02 DIAGNOSIS — T45.1X5A CHEMOTHERAPY INDUCED NEUTROPENIA (HCC): Primary | ICD-10-CM

## 2020-09-02 DIAGNOSIS — C77.2 METASTASIS TO RETROPERITONEAL LYMPH NODE (HCC): ICD-10-CM

## 2020-09-02 DIAGNOSIS — C18.2 PRIMARY ADENOCARCINOMA OF ASCENDING COLON (HCC): ICD-10-CM

## 2020-09-02 DIAGNOSIS — C78.7 LIVER METASTASES (HCC): ICD-10-CM

## 2020-09-02 DIAGNOSIS — D70.1 CHEMOTHERAPY INDUCED NEUTROPENIA (HCC): Primary | ICD-10-CM

## 2020-09-02 NOTE — TELEPHONE ENCOUNTER
I called the doctor twice but could not leave message because there was something wrong with their system    Will try again tomorrow

## 2020-09-03 ENCOUNTER — DOCUMENTATION (OUTPATIENT)
Dept: HEMATOLOGY ONCOLOGY | Facility: CLINIC | Age: 55
End: 2020-09-03

## 2020-09-03 NOTE — TELEPHONE ENCOUNTER
Office is open from 9 to 5  Today at [id number] left voice message stating pt is requesting Dr Sudha Todd and Dr David Jeter speak about pt's plan of care  Please have Dr David Jeter call Dr Sudha Todd on his cell phone at [phone]

## 2020-09-03 NOTE — PROGRESS NOTES
I called office of Dr Lynda Galvez @ [phone]  The person who answered said that   has my cell number and she will call me this coming Tuesday because she is away until then

## 2020-09-05 ENCOUNTER — APPOINTMENT (OUTPATIENT)
Dept: LAB | Facility: MEDICAL CENTER | Age: 55
End: 2020-09-05
Payer: COMMERCIAL

## 2020-09-05 DIAGNOSIS — D70.1 CHEMOTHERAPY INDUCED NEUTROPENIA (HCC): ICD-10-CM

## 2020-09-05 DIAGNOSIS — C78.7 LIVER METASTASES (HCC): ICD-10-CM

## 2020-09-05 DIAGNOSIS — R80.9 PROTEINURIA, UNSPECIFIED TYPE: ICD-10-CM

## 2020-09-05 DIAGNOSIS — C79.9 ADENOCARCINOMA, METASTATIC (HCC): ICD-10-CM

## 2020-09-05 DIAGNOSIS — C18.2 PRIMARY ADENOCARCINOMA OF ASCENDING COLON (HCC): ICD-10-CM

## 2020-09-05 DIAGNOSIS — C77.2 METASTASIS TO RETROPERITONEAL LYMPH NODE (HCC): ICD-10-CM

## 2020-09-05 DIAGNOSIS — T45.1X5A CHEMOTHERAPY INDUCED NEUTROPENIA (HCC): ICD-10-CM

## 2020-09-05 LAB
ALBUMIN SERPL BCP-MCNC: 3.7 G/DL (ref 3.5–5)
ALP SERPL-CCNC: 221 U/L (ref 46–116)
ALT SERPL W P-5'-P-CCNC: 125 U/L (ref 12–78)
ANION GAP SERPL CALCULATED.3IONS-SCNC: 6 MMOL/L (ref 4–13)
AST SERPL W P-5'-P-CCNC: 63 U/L (ref 5–45)
BASOPHILS # BLD AUTO: 0.04 THOUSANDS/ΜL (ref 0–0.1)
BASOPHILS NFR BLD AUTO: 1 % (ref 0–1)
BILIRUB SERPL-MCNC: 0.41 MG/DL (ref 0.2–1)
BUN SERPL-MCNC: 11 MG/DL (ref 5–25)
CALCIUM SERPL-MCNC: 8.9 MG/DL (ref 8.3–10.1)
CHLORIDE SERPL-SCNC: 111 MMOL/L (ref 100–108)
CO2 SERPL-SCNC: 25 MMOL/L (ref 21–32)
CREAT 24H UR-MRATE: 1.1 G/24HR (ref 0.6–1.8)
CREAT SERPL-MCNC: 0.88 MG/DL (ref 0.6–1.3)
EOSINOPHIL # BLD AUTO: 0.04 THOUSAND/ΜL (ref 0–0.61)
EOSINOPHIL NFR BLD AUTO: 1 % (ref 0–6)
ERYTHROCYTE [DISTWIDTH] IN BLOOD BY AUTOMATED COUNT: 17.3 % (ref 11.6–15.1)
GFR SERPL CREATININE-BSD FRML MDRD: 75 ML/MIN/1.73SQ M
GLUCOSE SERPL-MCNC: 96 MG/DL (ref 65–140)
HCT VFR BLD AUTO: 38.5 % (ref 34.8–46.1)
HGB BLD-MCNC: 12.2 G/DL (ref 11.5–15.4)
IMM GRANULOCYTES # BLD AUTO: 0.08 THOUSAND/UL (ref 0–0.2)
IMM GRANULOCYTES NFR BLD AUTO: 1 % (ref 0–2)
LYMPHOCYTES # BLD AUTO: 1.1 THOUSANDS/ΜL (ref 0.6–4.47)
LYMPHOCYTES NFR BLD AUTO: 14 % (ref 14–44)
MCH RBC QN AUTO: 34 PG (ref 26.8–34.3)
MCHC RBC AUTO-ENTMCNC: 31.7 G/DL (ref 31.4–37.4)
MCV RBC AUTO: 107 FL (ref 82–98)
MONOCYTES # BLD AUTO: 1.17 THOUSAND/ΜL (ref 0.17–1.22)
MONOCYTES NFR BLD AUTO: 15 % (ref 4–12)
NEUTROPHILS # BLD AUTO: 5.63 THOUSANDS/ΜL (ref 1.85–7.62)
NEUTS SEG NFR BLD AUTO: 68 % (ref 43–75)
NRBC BLD AUTO-RTO: 0 /100 WBCS
PLATELET # BLD AUTO: 69 THOUSANDS/UL (ref 149–390)
PMV BLD AUTO: 11.8 FL (ref 8.9–12.7)
POTASSIUM SERPL-SCNC: 4.2 MMOL/L (ref 3.5–5.3)
PROT 24H UR-MCNC: 1014 MG/24 HRS (ref 40–150)
PROT SERPL-MCNC: 6.6 G/DL (ref 6.4–8.2)
RBC # BLD AUTO: 3.59 MILLION/UL (ref 3.81–5.12)
SODIUM SERPL-SCNC: 142 MMOL/L (ref 136–145)
SPECIMEN VOL UR: 2600 ML
SPECIMEN VOL UR: 2600 ML
WBC # BLD AUTO: 8.06 THOUSAND/UL (ref 4.31–10.16)

## 2020-09-05 PROCEDURE — 84156 ASSAY OF PROTEIN URINE: CPT

## 2020-09-05 PROCEDURE — 85025 COMPLETE CBC W/AUTO DIFF WBC: CPT

## 2020-09-05 PROCEDURE — 36415 COLL VENOUS BLD VENIPUNCTURE: CPT

## 2020-09-05 PROCEDURE — 82570 ASSAY OF URINE CREATININE: CPT

## 2020-09-05 PROCEDURE — 80053 COMPREHEN METABOLIC PANEL: CPT

## 2020-09-08 ENCOUNTER — TELEPHONE (OUTPATIENT)
Dept: HEMATOLOGY ONCOLOGY | Facility: CLINIC | Age: 55
End: 2020-09-08

## 2020-09-08 ENCOUNTER — DOCUMENTATION (OUTPATIENT)
Dept: HEMATOLOGY ONCOLOGY | Facility: CLINIC | Age: 55
End: 2020-09-08

## 2020-09-08 DIAGNOSIS — C78.7 LIVER METASTASES (HCC): Primary | ICD-10-CM

## 2020-09-08 NOTE — PROGRESS NOTES
I spoke with patient's Dr  At [facility]  Plan is to hold Avastin  Continue with FOLFOX and try RFA or resection of liver lesion  I discussed this with the patient and she agrees  My nurse Giovana Abreu will let [facility] center know not to give her Avastin tomorrow

## 2020-09-08 NOTE — TELEPHONE ENCOUNTER
Patient called stating that Dr Matilda Rizo from [facility] tried to reach Dr Oleksandr Akins today, left a message for him , and would like to know if he received the message  Dr Matilda Rizo office # [phone]

## 2020-09-08 NOTE — PROGRESS NOTES
*TIME OUT*    Time out done with Scottie Avila  Per Dr Gerda He, Avastin to be held for treatment on 9/9/2020 and 9/23/2020  Herlinda to delete Avastin from the orders

## 2020-09-08 NOTE — TELEPHONE ENCOUNTER
Reviewed with Dr Ashley Mcgee he did not receive call or message he will be calling before the end of the day

## 2020-09-09 ENCOUNTER — HOSPITAL ENCOUNTER (OUTPATIENT)
Dept: INFUSION CENTER | Facility: CLINIC | Age: 55
Discharge: HOME | End: 2020-09-09
Payer: COMMERCIAL

## 2020-09-09 VITALS
TEMPERATURE: 96.4 F | DIASTOLIC BLOOD PRESSURE: 90 MMHG | BODY MASS INDEX: 25.66 KG/M2 | SYSTOLIC BLOOD PRESSURE: 140 MMHG | HEART RATE: 88 BPM | HEIGHT: 65 IN | RESPIRATION RATE: 18 BRPM | WEIGHT: 154 LBS

## 2020-09-09 DIAGNOSIS — C77.2 METASTASIS TO RETROPERITONEAL LYMPH NODE (HCC): ICD-10-CM

## 2020-09-09 DIAGNOSIS — C18.2 PRIMARY ADENOCARCINOMA OF ASCENDING COLON (HCC): ICD-10-CM

## 2020-09-09 DIAGNOSIS — T45.1X5A CHEMOTHERAPY INDUCED NEUTROPENIA (HCC): ICD-10-CM

## 2020-09-09 DIAGNOSIS — D70.1 CHEMOTHERAPY INDUCED NEUTROPENIA (HCC): Primary | ICD-10-CM

## 2020-09-09 DIAGNOSIS — D70.1 CHEMOTHERAPY INDUCED NEUTROPENIA (HCC): ICD-10-CM

## 2020-09-09 DIAGNOSIS — C78.7 LIVER METASTASES (HCC): ICD-10-CM

## 2020-09-09 DIAGNOSIS — T45.1X5A CHEMOTHERAPY INDUCED NEUTROPENIA (HCC): Primary | ICD-10-CM

## 2020-09-09 LAB
BASOPHILS # BLD AUTO: 0.04 THOUSANDS/ΜL (ref 0–0.1)
BASOPHILS NFR BLD AUTO: 1 % (ref 0–1)
EOSINOPHIL # BLD AUTO: 0.05 THOUSAND/ΜL (ref 0–0.61)
EOSINOPHIL NFR BLD AUTO: 1 % (ref 0–6)
ERYTHROCYTE [DISTWIDTH] IN BLOOD BY AUTOMATED COUNT: 18.6 % (ref 11.6–15.1)
HCT VFR BLD AUTO: 37.3 % (ref 34.8–46.1)
HGB BLD-MCNC: 12.1 G/DL (ref 11.5–15.4)
IMM GRANULOCYTES # BLD AUTO: 0.07 THOUSAND/UL (ref 0–0.2)
IMM GRANULOCYTES NFR BLD AUTO: 1 % (ref 0–2)
LYMPHOCYTES # BLD AUTO: 0.76 THOUSANDS/ΜL (ref 0.6–4.47)
LYMPHOCYTES NFR BLD AUTO: 13 % (ref 14–44)
MCH RBC QN AUTO: 35.1 PG (ref 26.8–34.3)
MCHC RBC AUTO-ENTMCNC: 32.4 G/DL (ref 31.4–37.4)
MCV RBC AUTO: 108 FL (ref 82–98)
MONOCYTES # BLD AUTO: 0.62 THOUSAND/ΜL (ref 0.17–1.22)
MONOCYTES NFR BLD AUTO: 10 % (ref 4–12)
NEUTROPHILS # BLD AUTO: 4.53 THOUSANDS/ΜL (ref 1.85–7.62)
NEUTS SEG NFR BLD AUTO: 74 % (ref 43–75)
NRBC BLD AUTO-RTO: 0 /100 WBCS
PLATELET # BLD AUTO: 69 THOUSANDS/UL (ref 149–390)
PMV BLD AUTO: 10.6 FL (ref 8.9–12.7)
RBC # BLD AUTO: 3.45 MILLION/UL (ref 3.81–5.12)
WBC # BLD AUTO: 6.07 THOUSAND/UL (ref 4.31–10.16)

## 2020-09-09 PROCEDURE — 85025 COMPLETE CBC W/AUTO DIFF WBC: CPT | Performed by: INTERNAL MEDICINE

## 2020-09-09 NOTE — PROGRESS NOTES
Spoke to [employer] RN: Hold treatment today: Delay by one week: Patient to see Dr Antoine Spencer on Friday ( 09/11/20 ): Verified follow up appt: AVS offered and declined

## 2020-09-09 NOTE — PROGRESS NOTES
Lab work ( 09/09/20 ) reviewed: Platelet Count - 69: Parameters no met: Dr Noriega's office made aware: Awaiting call back

## 2020-09-09 NOTE — PROGRESS NOTES
Patient to [facility] for Lab testing / FOLFOX: Offers no complaints at present time: Lab work ( 09/05/20 ) reviewed: Platelet Count - 71: Parameters not met: Dr Noriega's office made aware: Awaiting return call: Right PAC accessed without difficulty: Good blood return noted

## 2020-09-11 ENCOUNTER — OFFICE VISIT (OUTPATIENT)
Dept: HEMATOLOGY ONCOLOGY | Facility: CLINIC | Age: 55
End: 2020-09-11
Payer: COMMERCIAL

## 2020-09-11 VITALS
SYSTOLIC BLOOD PRESSURE: 138 MMHG | HEART RATE: 76 BPM | HEIGHT: 65 IN | BODY MASS INDEX: 26.33 KG/M2 | WEIGHT: 158 LBS | TEMPERATURE: 98.2 F | DIASTOLIC BLOOD PRESSURE: 84 MMHG | RESPIRATION RATE: 16 BRPM | OXYGEN SATURATION: 99 %

## 2020-09-11 DIAGNOSIS — D70.1 CHEMOTHERAPY INDUCED NEUTROPENIA (HCC): Primary | ICD-10-CM

## 2020-09-11 DIAGNOSIS — C18.2 PRIMARY ADENOCARCINOMA OF ASCENDING COLON (HCC): ICD-10-CM

## 2020-09-11 DIAGNOSIS — C18.2 PRIMARY ADENOCARCINOMA OF ASCENDING COLON (HCC): Primary | ICD-10-CM

## 2020-09-11 DIAGNOSIS — C77.2 METASTASIS TO RETROPERITONEAL LYMPH NODE (HCC): ICD-10-CM

## 2020-09-11 DIAGNOSIS — C78.7 LIVER METASTASES (HCC): ICD-10-CM

## 2020-09-11 DIAGNOSIS — T45.1X5A CHEMOTHERAPY INDUCED NEUTROPENIA (HCC): Primary | ICD-10-CM

## 2020-09-11 PROCEDURE — 99215 OFFICE O/P EST HI 40 MIN: CPT | Performed by: INTERNAL MEDICINE

## 2020-09-11 NOTE — PROGRESS NOTES
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 liver, lungs and ovaries  Presently patient is on FOLFOX plus Avastin  There is slight increase in metastatic disease in the liver and maybe lungs  Question of adding liver directed therapy in addition to FOLFOX plus Avastin  RFA cannot be done for technical reasons according to IR  She could have TACE or Sir sphere  I have been communicating with patient's oncologist at [facility] and waiting to hear from her  Patient is not a candidate for Erbitux because of KRAS mutation  When time came to change systemic therapy she could have Lonsurf or Stivarga in the 3rd line  MSI came back stable  Not a candidate for Keytruda  Since July 2018 patient has been on FOLFOX plus Avastin  She has protein urea but that is less than 1 gram in 24 hour and is being monitored  She has been tolerating treatments without much problem  Prior to this she was on FOLFIRI plus Avastin but after initial response disease progressed   On 05/26/2020 patient had pelvic surgery, removal of ovaries and sample also included peritoneum and small bowel mesentery   Both ovaries and small bowel mesentery showed metastatic disease from colon cancer   Surgery was on 05/26/2020    FOLFOX chemotherapy was resumed on 06/17/20 and 2 weeks later and Avastin was added to FOLFOX       In May 2018 patient underwent GALI and BSO? for uterine bleeding  and there were cancer cells in the fallopian tubes  Bogdan Washburn probably had removal of tubes and not the ovaries   In June 2018 patient   underwent extended right hemicolectomy, partial omentectomy and biopsy of the peritoneal nodule   Peritoneal nodule came back  positive for metastatic adenocarcinoma from colon    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 and metastatic disease to liver    6 cm T3 G2 right colon cancer with positive margins, lymphovascular invasion and perineural invasion and positive 3 of 27 lymph nodes with extranodal extension and positive peritoneal nodule biopsy   Stage IV disease because of liver and peritoneal metastases   In July 2018 patient was started  on modified FOLFIRI plus Avastin   She had PPE in her hands and bolus 5 FU was discontinued   She was seen by liver specialist at [facility] and was not felt to be a surgical candidate       Patient has been on Xarelto 10 mg daily for history of DVT right lower extremity that happened in 2016     No bleeding or blood clot on Xarelto    Patient has some tiredness                      Current Outpatient Medications:     acetaminophen (TYLENOL) 500 mg tablet, Take 1,000 mg by mouth every 6 (six) hours as needed for mild pain, Disp: , Rfl:     amLODIPine (NORVASC) 5 mg tablet, Take 5 mg by mouth daily, Disp: , Rfl:     docusate sodium (COLACE) 100 mg capsule, Take 1 capsule (100 mg total) by mouth 2 (two) times a day, Disp: 10 each, Rfl: 0    fluorouracil 4,295 mg in CADD infusion pump, Infuse 4,295 mg (1,200 mg/m2/day x 1 79 m2 (Treatment plan recorded BSA)) into a venous catheter over 46 hours for 2 days, Disp: 1 Device, Rfl: 0    [START ON 9/16/2020] fluorouracil 4,295 mg in CADD infusion pump, Infuse 4,295 mg (1,200 mg/m2/day x 1 79 m2 (Treatment plan recorded BSA)) into a venous catheter over 46 hours for 2 days, Disp: 1 Device, Rfl: 0    metoprolol succinate (TOPROL-XL) 50 mg 24 hr tablet, Take 50 mg by mouth daily at bedtime , Disp: , Rfl:     rivaroxaban (XARELTO) 10 mg tablet, Take 1 tablet (10 mg total) by mouth daily, Disp: 90 tablet, Rfl: 3    ibuprofen (MOTRIN) 600 mg tablet, Take 1 tablet (600 mg total) by mouth every 6 (six) hours (Patient not taking: Reported on 6/12/2020), Disp: 30 tablet, Rfl: 0    No Known Allergies    Oncology History   Primary adenocarcinoma of ascending colon ([facility] [address] )   6/14/2018 Initial Diagnosis    Primary adenocarcinoma of ascending colon ([facility] [address] )     7/17/2018 - 8/1/2018 Chemotherapy    camptosar 180 mg/m2 day 1  5  mg/m2 day 1  5 FU 1200 mg/m2 day 1-2   Leucovorin 400 mg/m2     Venofer 100 mg (first 10 treatments) -- all every 2 weeks         7/17/2018 - 2/3/2020 Chemotherapy    palonosetron (ALOXI) injection 0 25 mg, 0 25 mg, Intravenous, Once, 8 of 16 cycles  Administration: 0 25 mg (11/21/2019), 0 25 mg (12/5/2019), 0 25 mg (12/19/2019), 0 25 mg (1/2/2020), 0 25 mg (1/16/2020), 0 25 mg (1/30/2020)  fluorouracil (ADRUCIL) injection 715 mg, 400 mg/m2, Intravenous, Once, 7 of 7 cycles  pegfilgrastim (NEULASTA ONPRO) subcutaneous injection kit 6 mg, 6 mg, Subcutaneous, Once, 20 of 28 cycles  Administration: 6 mg (5/22/2019), 6 mg (6/5/2019), 6 mg (6/22/2019), 6 mg (7/5/2019), 6 mg (7/20/2019), 6 mg (8/2/2019), 6 mg (8/17/2019), 6 mg (8/31/2019), 6 mg (9/14/2019), 6 mg (9/28/2019), 6 mg (10/12/2019), 6 mg (10/26/2019), 6 mg (11/9/2019), 6 mg (11/23/2019), 6 mg (12/7/2019), 6 mg (12/21/2019), 6 mg (1/4/2020), 6 mg (1/18/2020), 6 mg (2/1/2020)  fosaprepitant (EMEND) 150 mg in sodium chloride 0 9 % 250 mL IVPB, 150 mg, Intravenous, Once, 8 of 16 cycles  Administration: 150 mg (11/7/2019), 150 mg (11/21/2019), 150 mg (12/5/2019), 150 mg (12/19/2019), 150 mg (1/2/2020), 150 mg (1/16/2020), 150 mg (1/30/2020)  bevacizumab (AVASTIN) 355 mg in sodium chloride 0 9 % 100 mL IVPB, 5 mg/kg, Intravenous, Once, 27 of 35 cycles  Administration: 355 mg (5/20/2019), 355 mg (6/3/2019), 355 mg (6/20/2019), 355 mg (7/3/2019), 355 mg (7/18/2019), 355 mg (7/31/2019), 355 mg (8/15/2019), 355 mg (8/29/2019), 355 mg (9/12/2019), 355 mg (9/26/2019), 355 mg (10/10/2019), 355 mg (10/24/2019), 355 mg (11/7/2019), 355 mg (11/21/2019), 355 mg (12/5/2019), 355 mg (12/19/2019), 355 mg (1/2/2020), 355 mg (1/16/2020), 355 mg (1/30/2020)  irinotecan (CAMPTOSAR) 322 mg in sodium chloride 0 9 % 500 mL chemo infusion, 180 mg/m2, Intravenous, Once, 27 of 35 cycles  Dose modification: 150 mg/m2 (original dose 180 mg/m2, Cycle 20, Reason: Dose Not Tolerated)  Administration: 322 mg (5/20/2019), 322 mg (6/3/2019), 320 mg (6/20/2019), 320 mg (7/3/2019), 320 mg (7/18/2019), 320 mg (7/31/2019), 320 mg (8/15/2019), 320 mg (8/29/2019), 320 mg (9/12/2019), 320 mg (9/26/2019), 320 mg (10/10/2019), 320 mg (10/24/2019), 269 mg (11/7/2019), 269 mg (11/21/2019), 269 mg (12/5/2019), 269 mg (12/19/2019), 269 mg (1/2/2020), 269 mg (1/16/2020), 269 mg (1/30/2020)  leucovorin 716 mg in sodium chloride 0 9 % 250 mL IVPB, 400 mg/m2, Intravenous, Once, 27 of 35 cycles  Administration: 716 mg (5/20/2019), 716 mg (6/3/2019), 700 mg (6/20/2019), 700 mg (7/3/2019), 700 mg (7/18/2019), 700 mg (7/31/2019), 700 mg (8/15/2019), 700 mg (8/29/2019), 700 mg (9/12/2019), 700 mg (9/26/2019), 700 mg (10/10/2019), 716 mg (10/24/2019), 700 mg (11/7/2019), 700 mg (11/21/2019), 700 mg (12/5/2019), 700 mg (12/19/2019), 700 mg (1/2/2020), 700 mg (1/16/2020), 700 mg (1/30/2020)     8/1/2018 Adverse Reaction    Needed granix 300 mcg due to [address] of 1 01      8/14/2018 -  Chemotherapy    camptosar 180 mg/m2 day 1  5  mg/m2 day 1  5 FU 1200 mg/m2 day 1-2   Leucovorin 400 mg/m2  Avastin 5 mg/kg day 1   Venofer 100 mg (first 10 treatments)  Neulasta on Pro 6 mg day 3      -- every 2 weeks          3/3/2020 - 3/16/2020 Chemotherapy    pegfilgrastim (NEULASTA ONPRO) subcutaneous injection kit 6 mg, 6 mg, Subcutaneous, Once, 1 of 12 cycles  Administration: 6 mg (3/5/2020)  bevacizumab (AVASTIN) 357 5 mg in sodium chloride 0 9 % 100 mL IVPB, 5 mg/kg = 357 5 mg, Intravenous, Once, 1 of 12 cycles  Administration: 357 5 mg (3/3/2020)  leucovorin 700 mg in dextrose 5 % 250 mL IVPB, 720 mg, Intravenous, Once, 1 of 12 cycles  Administration: 700 mg (3/3/2020)  oxaliplatin (ELOXATIN) 150 mg in dextrose 5 % 250 mL chemo infusion, 153 mg, Intravenous, Once, 1 of 12 cycles  Administration: 150 mg (3/3/2020) 3/16/2020 - 4/5/2020 Chemotherapy    bevacizumab (AVASTIN) 560 mg in sodium chloride 0 9 % 100 mL IVPB, 7 5 mg/kg, Intravenous, Once, 0 of 5 cycles  oxaliplatin (ELOXATIN) 239 2 mg in dextrose 5 % 500 mL chemo infusion, 130 mg/m2 = 239 2 mg, Intravenous, Once, 1 of 6 cycles  Administration: 239 2 mg (3/16/2020)     4/6/2020 -  Chemotherapy    fluorouracil (ADRUCIL) injection 730 mg, 400 mg/m2 = 730 mg, Intravenous, Once, 1 of 1 cycle  pegfilgrastim (NEULASTA ONPRO) subcutaneous injection kit 6 mg, 6 mg, Subcutaneous, Once, 10 of 15 cycles  Administration: 6 mg (4/8/2020), 6 mg (4/25/2020), 6 mg (5/8/2020), 6 mg (6/19/2020), 6 mg (7/2/2020), 6 mg (7/17/2020), 6 mg (7/31/2020), 6 mg (8/14/2020), 6 mg (8/28/2020)  fosaprepitant (EMEND) 150 mg in sodium chloride 0 9 % 250 mL IVPB, 150 mg, Intravenous, Once, 10 of 15 cycles  Administration: 150 mg (4/6/2020), 150 mg (4/23/2020), 150 mg (5/6/2020), 150 mg (6/17/2020), 150 mg (6/30/2020), 150 mg (7/15/2020), 150 mg (7/29/2020), 150 mg (8/12/2020), 150 mg (8/26/2020)  bevacizumab (AVASTIN) 372 5 mg in sodium chloride 0 9 % 100 mL IVPB, 5 mg/kg = 372 5 mg, Intravenous, Once, 7 of 11 cycles  Administration: 372 5 mg (4/6/2020), 372 5 mg (4/23/2020), 357 5 mg (6/30/2020), 357 5 mg (7/15/2020), 357 5 mg (7/29/2020), 357 5 mg (8/12/2020), 357 5 mg (8/26/2020)  leucovorin 750 mg in dextrose 5 % 250 mL IVPB, 732 mg, Intravenous, Once, 10 of 15 cycles  Administration: 750 mg (4/6/2020), 750 mg (4/23/2020), 750 mg (5/6/2020), 750 mg (6/17/2020), 700 mg (6/30/2020), 700 mg (7/15/2020), 700 mg (7/29/2020), 700 mg (8/12/2020), 700 mg (8/26/2020)  oxaliplatin (ELOXATIN) 150 mg in dextrose 5 % 250 mL chemo infusion, 155 55 mg, Intravenous, Once, 10 of 15 cycles  Administration: 150 mg (4/6/2020), 150 mg (4/23/2020), 150 mg (5/6/2020), 150 mg (6/17/2020), 150 mg (6/30/2020), 150 mg (7/15/2020), 150 mg (7/29/2020), 150 mg (8/12/2020), 150 mg (8/26/2020)     Liver metastases (HCC) 6/26/2018 Initial Diagnosis    Liver metastases ([facility] [address] )     7/17/2018 - 2/3/2020 Chemotherapy    palonosetron (ALOXI) injection 0 25 mg, 0 25 mg, Intravenous, Once, 8 of 16 cycles  Administration: 0 25 mg (11/21/2019), 0 25 mg (12/5/2019), 0 25 mg (12/19/2019), 0 25 mg (1/2/2020), 0 25 mg (1/16/2020), 0 25 mg (1/30/2020)  fluorouracil (ADRUCIL) injection 715 mg, 400 mg/m2, Intravenous, Once, 7 of 7 cycles  pegfilgrastim (NEULASTA ONPRO) subcutaneous injection kit 6 mg, 6 mg, Subcutaneous, Once, 20 of 28 cycles  Administration: 6 mg (5/22/2019), 6 mg (6/5/2019), 6 mg (6/22/2019), 6 mg (7/5/2019), 6 mg (7/20/2019), 6 mg (8/2/2019), 6 mg (8/17/2019), 6 mg (8/31/2019), 6 mg (9/14/2019), 6 mg (9/28/2019), 6 mg (10/12/2019), 6 mg (10/26/2019), 6 mg (11/9/2019), 6 mg (11/23/2019), 6 mg (12/7/2019), 6 mg (12/21/2019), 6 mg (1/4/2020), 6 mg (1/18/2020), 6 mg (2/1/2020)  fosaprepitant (EMEND) 150 mg in sodium chloride 0 9 % 250 mL IVPB, 150 mg, Intravenous, Once, 8 of 16 cycles  Administration: 150 mg (11/7/2019), 150 mg (11/21/2019), 150 mg (12/5/2019), 150 mg (12/19/2019), 150 mg (1/2/2020), 150 mg (1/16/2020), 150 mg (1/30/2020)  bevacizumab (AVASTIN) 355 mg in sodium chloride 0 9 % 100 mL IVPB, 5 mg/kg, Intravenous, Once, 27 of 35 cycles  Administration: 355 mg (5/20/2019), 355 mg (6/3/2019), 355 mg (6/20/2019), 355 mg (7/3/2019), 355 mg (7/18/2019), 355 mg (7/31/2019), 355 mg (8/15/2019), 355 mg (8/29/2019), 355 mg (9/12/2019), 355 mg (9/26/2019), 355 mg (10/10/2019), 355 mg (10/24/2019), 355 mg (11/7/2019), 355 mg (11/21/2019), 355 mg (12/5/2019), 355 mg (12/19/2019), 355 mg (1/2/2020), 355 mg (1/16/2020), 355 mg (1/30/2020)  irinotecan (CAMPTOSAR) 322 mg in sodium chloride 0 9 % 500 mL chemo infusion, 180 mg/m2, Intravenous, Once, 27 of 35 cycles  Dose modification: 150 mg/m2 (original dose 180 mg/m2, Cycle 20, Reason: Dose Not Tolerated)  Administration: 322 mg (5/20/2019), 322 mg (6/3/2019), 320 mg (6/20/2019), 320 mg (7/3/2019), 320 mg (7/18/2019), 320 mg (7/31/2019), 320 mg (8/15/2019), 320 mg (8/29/2019), 320 mg (9/12/2019), 320 mg (9/26/2019), 320 mg (10/10/2019), 320 mg (10/24/2019), 269 mg (11/7/2019), 269 mg (11/21/2019), 269 mg (12/5/2019), 269 mg (12/19/2019), 269 mg (1/2/2020), 269 mg (1/16/2020), 269 mg (1/30/2020)  leucovorin 716 mg in sodium chloride 0 9 % 250 mL IVPB, 400 mg/m2, Intravenous, Once, 27 of 35 cycles  Administration: 716 mg (5/20/2019), 716 mg (6/3/2019), 700 mg (6/20/2019), 700 mg (7/3/2019), 700 mg (7/18/2019), 700 mg (7/31/2019), 700 mg (8/15/2019), 700 mg (8/29/2019), 700 mg (9/12/2019), 700 mg (9/26/2019), 700 mg (10/10/2019), 716 mg (10/24/2019), 700 mg (11/7/2019), 700 mg (11/21/2019), 700 mg (12/5/2019), 700 mg (12/19/2019), 700 mg (1/2/2020), 700 mg (1/16/2020), 700 mg (1/30/2020)     3/3/2020 - 3/16/2020 Chemotherapy    pegfilgrastim (NEULASTA ONPRO) subcutaneous injection kit 6 mg, 6 mg, Subcutaneous, Once, 1 of 12 cycles  Administration: 6 mg (3/5/2020)  bevacizumab (AVASTIN) 357 5 mg in sodium chloride 0 9 % 100 mL IVPB, 5 mg/kg = 357 5 mg, Intravenous, Once, 1 of 12 cycles  Administration: 357 5 mg (3/3/2020)  leucovorin 700 mg in dextrose 5 % 250 mL IVPB, 720 mg, Intravenous, Once, 1 of 12 cycles  Administration: 700 mg (3/3/2020)  oxaliplatin (ELOXATIN) 150 mg in dextrose 5 % 250 mL chemo infusion, 153 mg, Intravenous, Once, 1 of 12 cycles  Administration: 150 mg (3/3/2020)     3/16/2020 - 4/5/2020 Chemotherapy    bevacizumab (AVASTIN) 560 mg in sodium chloride 0 9 % 100 mL IVPB, 7 5 mg/kg, Intravenous, Once, 0 of 5 cycles  oxaliplatin (ELOXATIN) 239 2 mg in dextrose 5 % 500 mL chemo infusion, 130 mg/m2 = 239 2 mg, Intravenous, Once, 1 of 6 cycles  Administration: 239 2 mg (3/16/2020)     4/6/2020 -  Chemotherapy    fluorouracil (ADRUCIL) injection 730 mg, 400 mg/m2 = 730 mg, Intravenous, Once, 1 of 1 cycle  pegfilgrastim (NEULASTA ONPRO) subcutaneous injection kit 6 mg, 6 mg, Subcutaneous, Once, 10 of 15 cycles  Administration: 6 mg (4/8/2020), 6 mg (4/25/2020), 6 mg (5/8/2020), 6 mg (6/19/2020), 6 mg (7/2/2020), 6 mg (7/17/2020), 6 mg (7/31/2020), 6 mg (8/14/2020), 6 mg (8/28/2020)  fosaprepitant (EMEND) 150 mg in sodium chloride 0 9 % 250 mL IVPB, 150 mg, Intravenous, Once, 10 of 15 cycles  Administration: 150 mg (4/6/2020), 150 mg (4/23/2020), 150 mg (5/6/2020), 150 mg (6/17/2020), 150 mg (6/30/2020), 150 mg (7/15/2020), 150 mg (7/29/2020), 150 mg (8/12/2020), 150 mg (8/26/2020)  bevacizumab (AVASTIN) 372 5 mg in sodium chloride 0 9 % 100 mL IVPB, 5 mg/kg = 372 5 mg, Intravenous, Once, 7 of 11 cycles  Administration: 372 5 mg (4/6/2020), 372 5 mg (4/23/2020), 357 5 mg (6/30/2020), 357 5 mg (7/15/2020), 357 5 mg (7/29/2020), 357 5 mg (8/12/2020), 357 5 mg (8/26/2020)  leucovorin 750 mg in dextrose 5 % 250 mL IVPB, 732 mg, Intravenous, Once, 10 of 15 cycles  Administration: 750 mg (4/6/2020), 750 mg (4/23/2020), 750 mg (5/6/2020), 750 mg (6/17/2020), 700 mg (6/30/2020), 700 mg (7/15/2020), 700 mg (7/29/2020), 700 mg (8/12/2020), 700 mg (8/26/2020)  oxaliplatin (ELOXATIN) 150 mg in dextrose 5 % 250 mL chemo infusion, 155 55 mg, Intravenous, Once, 10 of 15 cycles  Administration: 150 mg (4/6/2020), 150 mg (4/23/2020), 150 mg (5/6/2020), 150 mg (6/17/2020), 150 mg (6/30/2020), 150 mg (7/15/2020), 150 mg (7/29/2020), 150 mg (8/12/2020), 150 mg (8/26/2020)     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[facility] [address] )   6/26/2018 Initial Diagnosis    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[facility] [address] )     4/6/2020 -  Chemotherapy    fluorouracil (ADRUCIL) injection 730 mg, 400 mg/m2 = 730 mg, Intravenous, Once, 1 of 1 cycle  pegfilgrastim (NEULASTA ONPRO) subcutaneous injection kit 6 mg, 6 mg, Subcutaneous, Once, 7 of 10 cycles  Administration: 6 mg (4/8/2020), 6 mg (4/25/2020), 6 mg (5/8/2020), 6 mg (6/19/2020), 6 mg (7/2/2020), 6 mg (7/17/2020), 6 mg (7/31/2020)  fosaprepitant (EMEND) 150 mg in sodium chloride 0 9 % 250 mL IVPB, 150 mg, Intravenous, Once, 7 of 10 cycles  Administration: 150 mg (4/6/2020), 150 mg (4/23/2020), 150 mg (5/6/2020), 150 mg (6/17/2020), 150 mg (6/30/2020), 150 mg (7/15/2020), 150 mg (7/29/2020)  bevacizumab (AVASTIN) 372 5 mg in sodium chloride 0 9 % 100 mL IVPB, 5 mg/kg = 372 5 mg, Intravenous, Once, 5 of 8 cycles  Administration: 372 5 mg (4/6/2020), 372 5 mg (4/23/2020), 357 5 mg (6/30/2020), 357 5 mg (7/15/2020), 357 5 mg (7/29/2020)  leucovorin 750 mg in dextrose 5 % 250 mL IVPB, 732 mg, Intravenous, Once, 7 of 10 cycles  Administration: 750 mg (4/6/2020), 750 mg (4/23/2020), 750 mg (5/6/2020), 750 mg (6/17/2020), 700 mg (6/30/2020), 700 mg (7/15/2020), 700 mg (7/29/2020)  oxaliplatin (ELOXATIN) 150 mg in dextrose 5 % 250 mL chemo infusion, 155 55 mg, Intravenous, Once, 7 of 10 cycles  Administration: 150 mg (4/6/2020), 150 mg (4/23/2020), 150 mg (5/6/2020), 150 mg (6/17/2020), 150 mg (6/30/2020), 150 mg (7/15/2020), 150 mg (7/29/2020)         ROS:  09/11/20 Reviewed 13 systems: See symptoms in HPI:  Tiredness  Presently no headaches, seizures, dizziness, diplopia, dysphagia, hoarseness, chest pain, palpitations, shortness of breath, cough, hemoptysis, abdominal pain, nausea, vomiting, change in bowel habits, melena, hematuria, fever, chills, bleeding, bone pains, skin rash, weight loss, arthritic symptoms,  weakness, numbness,  claudication and gait problem  No frequent infections  Not unusually sensitive to heat or cold  No swelling of the ankles  No swollen glands  Patient is anxious         /84 (BP Location: Right arm, Patient Position: Sitting, Cuff Size: Adult)   Pulse 76   Temp 98 2 °F (36 8 °C)   Resp 16   Ht 5' 5 35" (1 66 m)   Wt 71 7 kg (158 lb)   LMP 05/16/2018   SpO2 99%   BMI 26 01 kg/m²     Physical Exam:  Alert, oriented, not in distress, no icterus, no oral thrush, no palpable neck mass, clear lung fields, regular heart rate, abdomen  soft and non tender, no palpable abdominal mass, no ascites, no edema of ankles, no calf tenderness, no focal neurological deficit, no skin rash, no palpable lymphadenopathy in the neck and axillary areas, good arterial pulses, no clubbing  Patient is anxious  Performance status 1  IMAGING:  IMPRESSION:     Expected postoperative change following removal of bilateral ovarian metastases            Workstation performed: [id number]      Imaging     MRI pelvis w wo contrast (Order: [id number]) - 8/19/2020   IMPRESSION:     There is an enlarging 2 4 x 2 4 cm right hepatic dome segment 8 metastasis (series 10 image 32 ) On 4/28/2020, this was 1 6 x 0 9 cm (series 6 image 151 of the 4/28/2020 study )  This lesion had been growing on the 1/27/2020 MR, but had stabilized on the   4/28/2020 MR        Several other small liver metastases are unchanged               Workstation performed: [id number]      Imaging     MRI abdomen w wo contrast (Order: [id number]) - 8/17/2020     IMPRESSION:     Numerous small nodules in the lungs are identified  Most are unchanged  One may be new and one may be slightly larger in the left lung      Multiple liver lesions are identified    Some of which may be increased in size      Based on current Fleischner Society 2017 Guidelines on incidental pulmonary nodule, patients with a known malignancy are at increased risk of metastasis and should receive initial three month follow-up chest CT      The study was marked in [facility] for significant notification                  Workstation performed: [id number]      Imaging     CT chest wo contrast (Order: [id number]) - 8/10/2020     LABS:  Results for orders placed or performed during the hospital encounter of 09/09/20   CBC and differential   Result Value Ref Range    WBC 6 07 4 31 - 10 16 Thousand/uL    RBC 3 45 (L) 3 81 - 5 12 Million/uL    Hemoglobin 12 1 11 5 - 15 4 g/dL    Hematocrit 37 3 34 8 - 46 1 %     (H) 82 - 98 fL    MCH 35 1 (H) 26 8 - 34 3 pg    MCHC 32 4 31 4 - 37 4 g/dL    RDW 18 6 (H) 11 6 - 15 1 %    MPV 10 6 8 9 - 12 7 fL    Platelets 69 (L) 315 - 390 Thousands/uL    nRBC 0 /100 WBCs    Neutrophils Relative 74 43 - 75 %    Immat GRANS % 1 0 - 2 %    Lymphocytes Relative 13 (L) 14 - 44 %    Monocytes Relative 10 4 - 12 %    Eosinophils Relative 1 0 - 6 %    Basophils Relative 1 0 - 1 %    Neutrophils Absolute 4 53 1 85 - 7 62 Thousands/µL    Immature Grans Absolute 0 07 0 00 - 0 20 Thousand/uL    Lymphocytes Absolute 0 76 0 60 - 4 47 Thousands/µL    Monocytes Absolute 0 62 0 17 - 1 22 Thousand/µL    Eosinophils Absolute 0 05 0 00 - 0 61 Thousand/µL    Basophils Absolute 0 04 0 00 - 0 10 Thousands/µL     Labs, Imaging, & Other studies:   All pertinent labs and imaging studies were personally reviewed    Lab Results   Component Value Date     03/18/2015    K 4 2 09/05/2020     (H) 09/05/2020    CO2 25 09/05/2020    ANIONGAP 9 03/18/2015    BUN 11 09/05/2020    CREATININE 0 88 09/05/2020    GLUCOSE 100 03/18/2015    GLUF 83 05/19/2020    CALCIUM 8 9 09/05/2020    AST 63 (H) 09/05/2020     (H) 09/05/2020    ALKPHOS 221 (H) 09/05/2020    PROT 6 6 03/18/2015    BILITOT 0 65 03/18/2015    EGFR 75 09/05/2020     Lab Results   Component Value Date    WBC 6 07 09/09/2020    HGB 12 1 09/09/2020    HCT 37 3 09/09/2020     (H) 09/09/2020    PLT 69 (L) 09/09/2020       Reviewed and discussed with patient  Assessment and plan:  Patient is here with her   Patient is being treated for 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 and metastatic disease to liver, lungs and ovaries  Presently patient is on FOLFOX plus Avastin  There is slight increase in metastatic disease in the liver and maybe lungs  Question of adding liver directed therapy in addition to FOLFOX plus Avastin  RFA cannot be done for technical reasons according to IR  She could have TACE or Sir sphere    I have been communicating with patient's oncologist at [facility] and waiting to hear from her  Patient is not a candidate for Erbitux because of KRAS mutation  When time came to change systemic therapy she could have Lonsurf or Stivarga in the 3rd line  MSI came back stable  Not a candidate for Keytruda  Since July 2018 patient has been on FOLFOX plus Avastin  She has protein urea but that is less than 1 gram in 24 hour and is being monitored  She has been tolerating treatments without much problem  Prior to this she was on FOLFIRI plus Avastin but after initial response disease progressed   On 05/26/2020 patient had pelvic surgery, removal of ovaries and sample also included peritoneum and small bowel mesentery   Both ovaries and small bowel mesentery showed metastatic disease from colon cancer   Surgery was on 05/26/2020    FOLFOX chemotherapy was resumed on 06/17/20 and 2 weeks later and Avastin was added to FOLFOX       In May 2018 patient underwent GALI and BSO? 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 metastatic disease to liver    6 cm T3 G2 right colon cancer with positive margins, lymphovascular invasion and perineural invasion and positive 3 of 27 lymph nodes with extranodal extension and positive peritoneal nodule biopsy   Stage IV disease because of liver and peritoneal metastases   In July 2018 patient was started  on modified FOLFIRI plus Avastin   She had PPE in her hands and bolus 5 FU was discontinued   She was seen by liver specialist at [facility] and was not felt to be a surgical candidate       Patient has been on Xarelto 10 mg daily for history of DVT right lower extremity that happened in 2016     No bleeding or blood clot on Xarelto  Patient has some tiredness           Physical examination and test results  are as recorded and discussed  Plan is as above     Discussed treatment options as above    Goal is prolongation of survival   Condition and plan discussed in detail   Questions answered  Discussed the importance of self-breast examination, eating healthy foods, staying active as tolerated and health screening test   Patient is capable of self-care  Discussed  precautions against Coronavirus    Patient will be missing chemotherapy this week because of low platelet counts of 61,932  She is going to be away next week  Treatment will be resumed in 2 weeks with or without Avastin depending  if she will be going for any procedure for liver directed therapy  Discussed all in detail  Questions answered  1  Primary adenocarcinoma of ascending colon ([facility] [address] )      2  Liver metastases ([facility] [address] )       3  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

## 2020-09-15 ENCOUNTER — DOCUMENTATION (OUTPATIENT)
Dept: HEMATOLOGY ONCOLOGY | Facility: CLINIC | Age: 55
End: 2020-09-15

## 2020-09-15 NOTE — PROGRESS NOTES
Per 9/11/20 office visit pt will be away for the week of 9/14/20, therefore, next tx will be 9/23/20

## 2020-09-21 ENCOUNTER — APPOINTMENT (OUTPATIENT)
Dept: LAB | Facility: MEDICAL CENTER | Age: 55
End: 2020-09-21
Payer: COMMERCIAL

## 2020-09-21 DIAGNOSIS — C18.2 PRIMARY ADENOCARCINOMA OF ASCENDING COLON (HCC): ICD-10-CM

## 2020-09-21 DIAGNOSIS — T45.1X5A CHEMOTHERAPY INDUCED NEUTROPENIA (HCC): ICD-10-CM

## 2020-09-21 DIAGNOSIS — D70.1 CHEMOTHERAPY INDUCED NEUTROPENIA (HCC): ICD-10-CM

## 2020-09-21 DIAGNOSIS — C78.7 LIVER METASTASES (HCC): ICD-10-CM

## 2020-09-21 DIAGNOSIS — C77.2 METASTASIS TO RETROPERITONEAL LYMPH NODE (HCC): ICD-10-CM

## 2020-09-21 LAB
ALBUMIN SERPL BCP-MCNC: 3.6 G/DL (ref 3.5–5)
ALP SERPL-CCNC: 202 U/L (ref 46–116)
ALT SERPL W P-5'-P-CCNC: 142 U/L (ref 12–78)
ANION GAP SERPL CALCULATED.3IONS-SCNC: 4 MMOL/L (ref 4–13)
AST SERPL W P-5'-P-CCNC: 66 U/L (ref 5–45)
BASOPHILS # BLD AUTO: 0.05 THOUSANDS/ΜL (ref 0–0.1)
BASOPHILS NFR BLD AUTO: 1 % (ref 0–1)
BILIRUB SERPL-MCNC: 0.74 MG/DL (ref 0.2–1)
BUN SERPL-MCNC: 16 MG/DL (ref 5–25)
CALCIUM SERPL-MCNC: 9.5 MG/DL (ref 8.3–10.1)
CHLORIDE SERPL-SCNC: 108 MMOL/L (ref 100–108)
CO2 SERPL-SCNC: 28 MMOL/L (ref 21–32)
CREAT SERPL-MCNC: 0.8 MG/DL (ref 0.6–1.3)
EOSINOPHIL # BLD AUTO: 0.08 THOUSAND/ΜL (ref 0–0.61)
EOSINOPHIL NFR BLD AUTO: 1 % (ref 0–6)
ERYTHROCYTE [DISTWIDTH] IN BLOOD BY AUTOMATED COUNT: 17.2 % (ref 11.6–15.1)
GFR SERPL CREATININE-BSD FRML MDRD: 84 ML/MIN/1.73SQ M
GLUCOSE SERPL-MCNC: 92 MG/DL (ref 65–140)
HCT VFR BLD AUTO: 41.9 % (ref 34.8–46.1)
HGB BLD-MCNC: 13.6 G/DL (ref 11.5–15.4)
IMM GRANULOCYTES # BLD AUTO: 0.03 THOUSAND/UL (ref 0–0.2)
IMM GRANULOCYTES NFR BLD AUTO: 1 % (ref 0–2)
LYMPHOCYTES # BLD AUTO: 1.1 THOUSANDS/ΜL (ref 0.6–4.47)
LYMPHOCYTES NFR BLD AUTO: 17 % (ref 14–44)
MCH RBC QN AUTO: 35.5 PG (ref 26.8–34.3)
MCHC RBC AUTO-ENTMCNC: 32.5 G/DL (ref 31.4–37.4)
MCV RBC AUTO: 109 FL (ref 82–98)
MONOCYTES # BLD AUTO: 1.06 THOUSAND/ΜL (ref 0.17–1.22)
MONOCYTES NFR BLD AUTO: 17 % (ref 4–12)
NEUTROPHILS # BLD AUTO: 4.02 THOUSANDS/ΜL (ref 1.85–7.62)
NEUTS SEG NFR BLD AUTO: 63 % (ref 43–75)
NRBC BLD AUTO-RTO: 0 /100 WBCS
PLATELET # BLD AUTO: 142 THOUSANDS/UL (ref 149–390)
PMV BLD AUTO: 10.2 FL (ref 8.9–12.7)
POTASSIUM SERPL-SCNC: 4.8 MMOL/L (ref 3.5–5.3)
PROT SERPL-MCNC: 6.9 G/DL (ref 6.4–8.2)
RBC # BLD AUTO: 3.83 MILLION/UL (ref 3.81–5.12)
SODIUM SERPL-SCNC: 140 MMOL/L (ref 136–145)
WBC # BLD AUTO: 6.34 THOUSAND/UL (ref 4.31–10.16)

## 2020-09-21 PROCEDURE — 80053 COMPREHEN METABOLIC PANEL: CPT

## 2020-09-21 PROCEDURE — 85025 COMPLETE CBC W/AUTO DIFF WBC: CPT

## 2020-09-21 PROCEDURE — 36415 COLL VENOUS BLD VENIPUNCTURE: CPT

## 2020-09-22 ENCOUNTER — TELEPHONE (OUTPATIENT)
Dept: HEMATOLOGY ONCOLOGY | Facility: CLINIC | Age: 55
End: 2020-09-22

## 2020-09-22 ENCOUNTER — DOCUMENTATION (OUTPATIENT)
Dept: HEMATOLOGY ONCOLOGY | Facility: CLINIC | Age: 55
End: 2020-09-22

## 2020-09-22 NOTE — PROGRESS NOTES
I called Dr Stacey Madison again today and office person will request the doctor to call me back  They have my cell number    Dr Cori Perez office number [phone]

## 2020-09-22 NOTE — TELEPHONE ENCOUNTER
Spoke with patient  Per note:  Progress Notes          I called Dr Yandy Soares again today and office person will request the doctor to call me back  They have my cell number  Dr [facility] office number [phone]      Electronically signed by Jamel Nicholas MD at 9/22/2020 11:49 AM        Patient informed of above, plt count now 142,000  Chemo has not been dose reduced as of now

## 2020-09-22 NOTE — TELEPHONE ENCOUNTER
Patient called to see if dosing of chemo has been lowered starting with Infusion on 9-   Please call patient back at [phone]

## 2020-09-23 ENCOUNTER — HOSPITAL ENCOUNTER (OUTPATIENT)
Dept: INFUSION CENTER | Facility: CLINIC | Age: 55
Discharge: HOME | End: 2020-09-23
Payer: COMMERCIAL

## 2020-09-23 ENCOUNTER — DOCUMENTATION (OUTPATIENT)
Dept: HEMATOLOGY ONCOLOGY | Facility: CLINIC | Age: 55
End: 2020-09-23

## 2020-09-23 VITALS
HEART RATE: 70 BPM | TEMPERATURE: 96.4 F | SYSTOLIC BLOOD PRESSURE: 140 MMHG | HEIGHT: 66 IN | WEIGHT: 157 LBS | RESPIRATION RATE: 16 BRPM | DIASTOLIC BLOOD PRESSURE: 82 MMHG | BODY MASS INDEX: 25.23 KG/M2

## 2020-09-23 DIAGNOSIS — C78.7 LIVER METASTASES (HCC): ICD-10-CM

## 2020-09-23 DIAGNOSIS — D70.1 CHEMOTHERAPY INDUCED NEUTROPENIA (HCC): ICD-10-CM

## 2020-09-23 DIAGNOSIS — C77.2 METASTASIS TO RETROPERITONEAL LYMPH NODE (HCC): ICD-10-CM

## 2020-09-23 DIAGNOSIS — C77.2 METASTASIS TO RETROPERITONEAL LYMPH NODE (HCC): Primary | ICD-10-CM

## 2020-09-23 DIAGNOSIS — T45.1X5A CHEMOTHERAPY INDUCED NEUTROPENIA (HCC): Primary | ICD-10-CM

## 2020-09-23 DIAGNOSIS — T45.1X5A CHEMOTHERAPY INDUCED NEUTROPENIA (HCC): ICD-10-CM

## 2020-09-23 DIAGNOSIS — C18.2 PRIMARY ADENOCARCINOMA OF ASCENDING COLON (HCC): ICD-10-CM

## 2020-09-23 DIAGNOSIS — D70.1 CHEMOTHERAPY INDUCED NEUTROPENIA (HCC): Primary | ICD-10-CM

## 2020-09-23 PROCEDURE — 96413 CHEMO IV INFUSION 1 HR: CPT

## 2020-09-23 PROCEDURE — 96367 TX/PROPH/DG ADDL SEQ IV INF: CPT

## 2020-09-23 PROCEDURE — 96415 CHEMO IV INFUSION ADDL HR: CPT

## 2020-09-23 PROCEDURE — G0498 CHEMO EXTEND IV INFUS W/PUMP: HCPCS

## 2020-09-23 PROCEDURE — 96368 THER/DIAG CONCURRENT INF: CPT

## 2020-09-23 RX ORDER — DEXTROSE MONOHYDRATE 50 MG/ML
20 INJECTION, SOLUTION INTRAVENOUS ONCE
Status: COMPLETED | OUTPATIENT
Start: 2020-09-23 | End: 2020-09-23

## 2020-09-23 RX ORDER — SODIUM CHLORIDE 9 MG/ML
20 INJECTION, SOLUTION INTRAVENOUS ONCE
Status: COMPLETED | OUTPATIENT
Start: 2020-09-23 | End: 2020-09-23

## 2020-09-23 RX ADMIN — LEUCOVORIN CALCIUM 700 MG: 200 INJECTION, POWDER, LYOPHILIZED, FOR SUSPENSION INTRAMUSCULAR; INTRAVENOUS at 10:46

## 2020-09-23 RX ADMIN — DEXAMETHASONE SODIUM PHOSPHATE: 10 INJECTION, SOLUTION INTRAMUSCULAR; INTRAVENOUS at 09:41

## 2020-09-23 RX ADMIN — DEXTROSE 20 ML/HR: 5 SOLUTION INTRAVENOUS at 10:40

## 2020-09-23 RX ADMIN — SODIUM CHLORIDE 20 ML/HR: 0.9 INJECTION, SOLUTION INTRAVENOUS at 09:30

## 2020-09-23 RX ADMIN — SODIUM CHLORIDE 150 MG: 0.9 INJECTION, SOLUTION INTRAVENOUS at 10:02

## 2020-09-23 RX ADMIN — OXALIPLATIN 150 MG: 5 INJECTION, SOLUTION INTRAVENOUS at 10:46

## 2020-09-23 NOTE — PROGRESS NOTES
Pt to clinic for oxaliplatin, leucovorin, and cadd start, pt offers no complaints at this time, will continue to monitor

## 2020-09-23 NOTE — PROGRESS NOTES
Patient connected to CADD pump for 46 hour infusion of 5FU at home  All connections secured with tape  Green indicator light verified flashing before d/c  Patient aware to return on Friday at 6 for disconnect  AVS Declined

## 2020-09-23 NOTE — PROGRESS NOTES
Dr Paresh Araujo called back this morning let me know that interventional radiologist at [facility] will be reviewing the liver images to decide liver directed therapy  Patient can continue with chemo without Avastin in the meantime    I think she said she saw the patient last week

## 2020-09-25 ENCOUNTER — HOSPITAL ENCOUNTER (OUTPATIENT)
Dept: INFUSION CENTER | Facility: CLINIC | Age: 55
Discharge: HOME | End: 2020-09-25
Payer: COMMERCIAL

## 2020-09-25 VITALS — TEMPERATURE: 97.5 F

## 2020-09-25 DIAGNOSIS — D70.1 CHEMOTHERAPY INDUCED NEUTROPENIA (HCC): ICD-10-CM

## 2020-09-25 DIAGNOSIS — C78.7 LIVER METASTASES (HCC): ICD-10-CM

## 2020-09-25 DIAGNOSIS — T45.1X5A CHEMOTHERAPY INDUCED NEUTROPENIA (HCC): ICD-10-CM

## 2020-09-25 DIAGNOSIS — C18.2 PRIMARY ADENOCARCINOMA OF ASCENDING COLON (HCC): ICD-10-CM

## 2020-09-25 DIAGNOSIS — C77.2 METASTASIS TO RETROPERITONEAL LYMPH NODE (HCC): Primary | ICD-10-CM

## 2020-09-25 PROCEDURE — 96372 THER/PROPH/DIAG INJ SC/IM: CPT

## 2020-09-25 RX ADMIN — PEGFILGRASTIM 6 MG: KIT SUBCUTANEOUS at 11:24

## 2020-09-25 NOTE — PROGRESS NOTES
Pt arrived to unit for CADD disconnect and offers no c/o   she is feeling well  CADD res volume is zero   Pt s port flushed per protocol and deaccessed    Pt tolerated Neulasta ON Pro application to ANGE   Verb understanding of indications and delivery time  declined AVS

## 2020-09-25 NOTE — PLAN OF CARE
Problem: DISCHARGE PLANNING  Goal: Discharge to home or other facility with appropriate resources  Description: INTERVENTIONS:  - Identify barriers to discharge w/patient and caregiver  - Arrange for needed discharge resources and transportation as appropriate  - Identify discharge learning needs (meds, wound care, etc )  - Arrange for interpretive services to assist at discharge as needed  - Refer to Case Management Department for coordinating discharge planning if the patient needs post-hospital services based on physician/advanced practitioner order or complex needs related to functional status, cognitive ability, or social support system  Outcome: Progressing     Problem: Knowledge Deficit  Goal: Patient/family/caregiver demonstrates understanding of disease process, treatment plan, medications, and discharge instructions  Description: Complete learning assessment and assess knowledge base    Interventions:  - Provide teaching at level of understanding  - Provide teaching via preferred learning methods  Outcome: Progressing

## 2020-09-30 DIAGNOSIS — C78.7 LIVER METASTASES (HCC): ICD-10-CM

## 2020-09-30 DIAGNOSIS — C77.2 METASTASIS TO RETROPERITONEAL LYMPH NODE (HCC): ICD-10-CM

## 2020-09-30 DIAGNOSIS — C18.2 PRIMARY ADENOCARCINOMA OF ASCENDING COLON (HCC): ICD-10-CM

## 2020-09-30 DIAGNOSIS — T45.1X5A CHEMOTHERAPY INDUCED NEUTROPENIA (HCC): Primary | ICD-10-CM

## 2020-09-30 DIAGNOSIS — D70.1 CHEMOTHERAPY INDUCED NEUTROPENIA (HCC): Primary | ICD-10-CM

## 2020-09-30 RX ORDER — SODIUM CHLORIDE 9 MG/ML
20 INJECTION, SOLUTION INTRAVENOUS ONCE
Status: CANCELLED | OUTPATIENT
Start: 2020-10-07

## 2020-09-30 RX ORDER — DEXTROSE MONOHYDRATE 50 MG/ML
20 INJECTION, SOLUTION INTRAVENOUS ONCE
Status: CANCELLED | OUTPATIENT
Start: 2020-10-13

## 2020-10-01 DIAGNOSIS — C18.2 PRIMARY ADENOCARCINOMA OF ASCENDING COLON (HCC): ICD-10-CM

## 2020-10-01 DIAGNOSIS — D70.1 CHEMOTHERAPY INDUCED NEUTROPENIA (HCC): Primary | ICD-10-CM

## 2020-10-01 DIAGNOSIS — T45.1X5A CHEMOTHERAPY INDUCED NEUTROPENIA (HCC): Primary | ICD-10-CM

## 2020-10-01 DIAGNOSIS — C78.7 LIVER METASTASES (HCC): ICD-10-CM

## 2020-10-01 DIAGNOSIS — C77.2 METASTASIS TO RETROPERITONEAL LYMPH NODE (HCC): ICD-10-CM

## 2020-10-05 ENCOUNTER — APPOINTMENT (OUTPATIENT)
Dept: LAB | Facility: MEDICAL CENTER | Age: 55
End: 2020-10-05
Payer: COMMERCIAL

## 2020-10-05 DIAGNOSIS — D70.1 CHEMOTHERAPY INDUCED NEUTROPENIA (HCC): ICD-10-CM

## 2020-10-05 DIAGNOSIS — R80.9 PROTEINURIA, UNSPECIFIED TYPE: ICD-10-CM

## 2020-10-05 DIAGNOSIS — C77.2 METASTASIS TO RETROPERITONEAL LYMPH NODE (HCC): ICD-10-CM

## 2020-10-05 DIAGNOSIS — C78.7 LIVER METASTASES (HCC): ICD-10-CM

## 2020-10-05 DIAGNOSIS — C79.9 ADENOCARCINOMA, METASTATIC (HCC): ICD-10-CM

## 2020-10-05 DIAGNOSIS — T45.1X5A CHEMOTHERAPY INDUCED NEUTROPENIA (HCC): ICD-10-CM

## 2020-10-05 DIAGNOSIS — C18.2 PRIMARY ADENOCARCINOMA OF ASCENDING COLON (HCC): ICD-10-CM

## 2020-10-05 LAB
ALBUMIN SERPL BCP-MCNC: 3.4 G/DL (ref 3.5–5)
ALP SERPL-CCNC: 237 U/L (ref 46–116)
ALT SERPL W P-5'-P-CCNC: 98 U/L (ref 12–78)
ANION GAP SERPL CALCULATED.3IONS-SCNC: 6 MMOL/L (ref 4–13)
AST SERPL W P-5'-P-CCNC: 55 U/L (ref 5–45)
BASOPHILS # BLD AUTO: 0.04 THOUSANDS/ΜL (ref 0–0.1)
BASOPHILS NFR BLD AUTO: 0 % (ref 0–1)
BILIRUB SERPL-MCNC: 0.52 MG/DL (ref 0.2–1)
BUN SERPL-MCNC: 9 MG/DL (ref 5–25)
CALCIUM ALBUM COR SERPL-MCNC: 9.4 MG/DL (ref 8.3–10.1)
CALCIUM SERPL-MCNC: 8.9 MG/DL (ref 8.3–10.1)
CHLORIDE SERPL-SCNC: 110 MMOL/L (ref 100–108)
CO2 SERPL-SCNC: 27 MMOL/L (ref 21–32)
CREAT SERPL-MCNC: 1.03 MG/DL (ref 0.6–1.3)
EOSINOPHIL # BLD AUTO: 0.11 THOUSAND/ΜL (ref 0–0.61)
EOSINOPHIL NFR BLD AUTO: 1 % (ref 0–6)
ERYTHROCYTE [DISTWIDTH] IN BLOOD BY AUTOMATED COUNT: 15.8 % (ref 11.6–15.1)
GFR SERPL CREATININE-BSD FRML MDRD: 62 ML/MIN/1.73SQ M
GLUCOSE SERPL-MCNC: 100 MG/DL (ref 65–140)
HCT VFR BLD AUTO: 38.6 % (ref 34.8–46.1)
HGB BLD-MCNC: 12.6 G/DL (ref 11.5–15.4)
IMM GRANULOCYTES # BLD AUTO: 0.06 THOUSAND/UL (ref 0–0.2)
IMM GRANULOCYTES NFR BLD AUTO: 1 % (ref 0–2)
LYMPHOCYTES # BLD AUTO: 0.98 THOUSANDS/ΜL (ref 0.6–4.47)
LYMPHOCYTES NFR BLD AUTO: 8 % (ref 14–44)
MCH RBC QN AUTO: 35.9 PG (ref 26.8–34.3)
MCHC RBC AUTO-ENTMCNC: 32.6 G/DL (ref 31.4–37.4)
MCV RBC AUTO: 110 FL (ref 82–98)
MONOCYTES # BLD AUTO: 0.94 THOUSAND/ΜL (ref 0.17–1.22)
MONOCYTES NFR BLD AUTO: 8 % (ref 4–12)
NEUTROPHILS # BLD AUTO: 9.48 THOUSANDS/ΜL (ref 1.85–7.62)
NEUTS SEG NFR BLD AUTO: 82 % (ref 43–75)
NRBC BLD AUTO-RTO: 0 /100 WBCS
POTASSIUM SERPL-SCNC: 4 MMOL/L (ref 3.5–5.3)
PROT 24H UR-MCNC: 836 MG/24 HRS (ref 40–150)
PROT SERPL-MCNC: 6.5 G/DL (ref 6.4–8.2)
RBC # BLD AUTO: 3.51 MILLION/UL (ref 3.81–5.12)
SODIUM SERPL-SCNC: 143 MMOL/L (ref 136–145)
SPECIMEN VOL UR: 2200 ML
WBC # BLD AUTO: 11.61 THOUSAND/UL (ref 4.31–10.16)

## 2020-10-05 PROCEDURE — 36415 COLL VENOUS BLD VENIPUNCTURE: CPT

## 2020-10-05 PROCEDURE — 80053 COMPREHEN METABOLIC PANEL: CPT

## 2020-10-05 PROCEDURE — 84156 ASSAY OF PROTEIN URINE: CPT

## 2020-10-05 PROCEDURE — 85025 COMPLETE CBC W/AUTO DIFF WBC: CPT

## 2020-10-06 DIAGNOSIS — C78.7 LIVER METASTASES (HCC): ICD-10-CM

## 2020-10-06 DIAGNOSIS — C18.2 PRIMARY ADENOCARCINOMA OF ASCENDING COLON (HCC): Primary | ICD-10-CM

## 2020-10-06 DIAGNOSIS — C77.2 METASTASIS TO RETROPERITONEAL LYMPH NODE (HCC): ICD-10-CM

## 2020-10-07 ENCOUNTER — TELEPHONE (OUTPATIENT)
Dept: HEMATOLOGY ONCOLOGY | Facility: CLINIC | Age: 55
End: 2020-10-07

## 2020-10-07 ENCOUNTER — HOSPITAL ENCOUNTER (OUTPATIENT)
Dept: INFUSION CENTER | Facility: CLINIC | Age: 55
Discharge: HOME | End: 2020-10-07
Payer: COMMERCIAL

## 2020-10-07 VITALS
SYSTOLIC BLOOD PRESSURE: 136 MMHG | WEIGHT: 156 LBS | BODY MASS INDEX: 25.56 KG/M2 | DIASTOLIC BLOOD PRESSURE: 78 MMHG | HEART RATE: 72 BPM | TEMPERATURE: 96.9 F | RESPIRATION RATE: 16 BRPM | OXYGEN SATURATION: 98 %

## 2020-10-07 DIAGNOSIS — C78.7 LIVER METASTASES (HCC): ICD-10-CM

## 2020-10-07 DIAGNOSIS — D70.1 CHEMOTHERAPY INDUCED NEUTROPENIA (HCC): ICD-10-CM

## 2020-10-07 DIAGNOSIS — T45.1X5A CHEMOTHERAPY INDUCED NEUTROPENIA (HCC): ICD-10-CM

## 2020-10-07 DIAGNOSIS — C18.2 PRIMARY ADENOCARCINOMA OF ASCENDING COLON (HCC): ICD-10-CM

## 2020-10-07 DIAGNOSIS — T45.1X5A CHEMOTHERAPY INDUCED NEUTROPENIA (HCC): Primary | ICD-10-CM

## 2020-10-07 DIAGNOSIS — C77.2 METASTASIS TO RETROPERITONEAL LYMPH NODE (HCC): Primary | ICD-10-CM

## 2020-10-07 DIAGNOSIS — D70.1 CHEMOTHERAPY INDUCED NEUTROPENIA (HCC): Primary | ICD-10-CM

## 2020-10-07 DIAGNOSIS — C77.2 METASTASIS TO RETROPERITONEAL LYMPH NODE (HCC): ICD-10-CM

## 2020-10-07 RX ORDER — SODIUM CHLORIDE 9 MG/ML
20 INJECTION, SOLUTION INTRAVENOUS ONCE
Status: CANCELLED | OUTPATIENT
Start: 2020-10-13

## 2020-10-07 RX ORDER — SODIUM CHLORIDE 9 MG/ML
20 INJECTION, SOLUTION INTRAVENOUS ONCE
Status: COMPLETED | OUTPATIENT
Start: 2020-10-07 | End: 2020-10-07

## 2020-10-07 RX ADMIN — SODIUM CHLORIDE 20 ML/HR: 0.9 INJECTION, SOLUTION INTRAVENOUS at 12:06

## 2020-10-08 ENCOUNTER — TELEPHONE (OUTPATIENT)
Dept: HEMATOLOGY ONCOLOGY | Facility: CLINIC | Age: 55
End: 2020-10-08

## 2020-10-09 ENCOUNTER — TELEPHONE (OUTPATIENT)
Dept: HEMATOLOGY ONCOLOGY | Facility: CLINIC | Age: 55
End: 2020-10-09

## 2020-10-09 ENCOUNTER — HOSPITAL ENCOUNTER (OUTPATIENT)
Dept: INFUSION CENTER | Facility: CLINIC | Age: 55
End: 2020-10-09

## 2020-10-09 ENCOUNTER — OFFICE VISIT (OUTPATIENT)
Dept: HEMATOLOGY ONCOLOGY | Facility: CLINIC | Age: 55
End: 2020-10-09
Payer: COMMERCIAL

## 2020-10-09 VITALS
BODY MASS INDEX: 25.15 KG/M2 | DIASTOLIC BLOOD PRESSURE: 100 MMHG | HEIGHT: 66 IN | HEART RATE: 85 BPM | RESPIRATION RATE: 18 BRPM | SYSTOLIC BLOOD PRESSURE: 168 MMHG | OXYGEN SATURATION: 100 % | TEMPERATURE: 96.9 F | WEIGHT: 156.5 LBS

## 2020-10-09 DIAGNOSIS — C77.2 METASTASIS TO RETROPERITONEAL LYMPH NODE (HCC): ICD-10-CM

## 2020-10-09 DIAGNOSIS — C78.00 MALIGNANT NEOPLASM METASTATIC TO LUNG, UNSPECIFIED LATERALITY (HCC): ICD-10-CM

## 2020-10-09 DIAGNOSIS — I82.531 CHRONIC DEEP VEIN THROMBOSIS (DVT) OF POPLITEAL VEIN OF RIGHT LOWER EXTREMITY (HCC): ICD-10-CM

## 2020-10-09 DIAGNOSIS — T45.1X5A CHEMOTHERAPY INDUCED NEUTROPENIA (HCC): Primary | ICD-10-CM

## 2020-10-09 DIAGNOSIS — C78.7 LIVER METASTASES (HCC): ICD-10-CM

## 2020-10-09 DIAGNOSIS — C78.6 PERITONEAL METASTASES (HCC): ICD-10-CM

## 2020-10-09 DIAGNOSIS — C18.2 PRIMARY ADENOCARCINOMA OF ASCENDING COLON (HCC): ICD-10-CM

## 2020-10-09 DIAGNOSIS — C18.2 PRIMARY ADENOCARCINOMA OF ASCENDING COLON (HCC): Primary | ICD-10-CM

## 2020-10-09 DIAGNOSIS — D70.1 CHEMOTHERAPY INDUCED NEUTROPENIA (HCC): Primary | ICD-10-CM

## 2020-10-09 PROCEDURE — 99214 OFFICE O/P EST MOD 30 MIN: CPT | Performed by: INTERNAL MEDICINE

## 2020-10-10 PROBLEM — C78.00 LUNG METASTASIS (HCC): Status: ACTIVE | Noted: 2020-10-10

## 2020-10-12 ENCOUNTER — APPOINTMENT (OUTPATIENT)
Dept: LAB | Facility: MEDICAL CENTER | Age: 55
End: 2020-10-12
Payer: COMMERCIAL

## 2020-10-12 DIAGNOSIS — C18.2 PRIMARY ADENOCARCINOMA OF ASCENDING COLON (HCC): ICD-10-CM

## 2020-10-12 DIAGNOSIS — C77.2 METASTASIS TO RETROPERITONEAL LYMPH NODE (HCC): ICD-10-CM

## 2020-10-12 DIAGNOSIS — T45.1X5A CHEMOTHERAPY INDUCED NEUTROPENIA (HCC): ICD-10-CM

## 2020-10-12 DIAGNOSIS — C78.7 LIVER METASTASES (HCC): ICD-10-CM

## 2020-10-12 DIAGNOSIS — D70.1 CHEMOTHERAPY INDUCED NEUTROPENIA (HCC): ICD-10-CM

## 2020-10-12 LAB
ALBUMIN SERPL BCP-MCNC: 3.7 G/DL (ref 3.5–5)
ALP SERPL-CCNC: 184 U/L (ref 46–116)
ALT SERPL W P-5'-P-CCNC: 86 U/L (ref 12–78)
ANION GAP SERPL CALCULATED.3IONS-SCNC: 3 MMOL/L (ref 4–13)
AST SERPL W P-5'-P-CCNC: 50 U/L (ref 5–45)
BASOPHILS # BLD AUTO: 0.05 THOUSANDS/ΜL (ref 0–0.1)
BASOPHILS NFR BLD AUTO: 1 % (ref 0–1)
BILIRUB SERPL-MCNC: 0.58 MG/DL (ref 0.2–1)
BUN SERPL-MCNC: 11 MG/DL (ref 5–25)
CALCIUM SERPL-MCNC: 9.4 MG/DL (ref 8.3–10.1)
CHLORIDE SERPL-SCNC: 108 MMOL/L (ref 100–108)
CO2 SERPL-SCNC: 30 MMOL/L (ref 21–32)
CREAT SERPL-MCNC: 0.86 MG/DL (ref 0.6–1.3)
EOSINOPHIL # BLD AUTO: 0.08 THOUSAND/ΜL (ref 0–0.61)
EOSINOPHIL NFR BLD AUTO: 1 % (ref 0–6)
ERYTHROCYTE [DISTWIDTH] IN BLOOD BY AUTOMATED COUNT: 15 % (ref 11.6–15.1)
GFR SERPL CREATININE-BSD FRML MDRD: 77 ML/MIN/1.73SQ M
GLUCOSE P FAST SERPL-MCNC: 88 MG/DL (ref 65–99)
HCT VFR BLD AUTO: 41.3 % (ref 34.8–46.1)
HGB BLD-MCNC: 13.2 G/DL (ref 11.5–15.4)
IMM GRANULOCYTES # BLD AUTO: 0.02 THOUSAND/UL (ref 0–0.2)
IMM GRANULOCYTES NFR BLD AUTO: 0 % (ref 0–2)
LYMPHOCYTES # BLD AUTO: 1.02 THOUSANDS/ΜL (ref 0.6–4.47)
LYMPHOCYTES NFR BLD AUTO: 16 % (ref 14–44)
MCH RBC QN AUTO: 35.7 PG (ref 26.8–34.3)
MCHC RBC AUTO-ENTMCNC: 32 G/DL (ref 31.4–37.4)
MCV RBC AUTO: 112 FL (ref 82–98)
MONOCYTES # BLD AUTO: 0.79 THOUSAND/ΜL (ref 0.17–1.22)
MONOCYTES NFR BLD AUTO: 12 % (ref 4–12)
NEUTROPHILS # BLD AUTO: 4.4 THOUSANDS/ΜL (ref 1.85–7.62)
NEUTS SEG NFR BLD AUTO: 70 % (ref 43–75)
NRBC BLD AUTO-RTO: 0 /100 WBCS
PLATELET # BLD AUTO: 98 THOUSANDS/UL (ref 149–390)
PMV BLD AUTO: 10.4 FL (ref 8.9–12.7)
POTASSIUM SERPL-SCNC: 4.1 MMOL/L (ref 3.5–5.3)
PROT SERPL-MCNC: 6.8 G/DL (ref 6.4–8.2)
RBC # BLD AUTO: 3.7 MILLION/UL (ref 3.81–5.12)
SODIUM SERPL-SCNC: 141 MMOL/L (ref 136–145)
WBC # BLD AUTO: 6.36 THOUSAND/UL (ref 4.31–10.16)

## 2020-10-12 PROCEDURE — 85025 COMPLETE CBC W/AUTO DIFF WBC: CPT

## 2020-10-12 PROCEDURE — 36415 COLL VENOUS BLD VENIPUNCTURE: CPT

## 2020-10-12 PROCEDURE — 80053 COMPREHEN METABOLIC PANEL: CPT

## 2020-10-13 ENCOUNTER — HOSPITAL ENCOUNTER (OUTPATIENT)
Dept: INFUSION CENTER | Facility: CLINIC | Age: 55
Discharge: HOME | End: 2020-10-13
Payer: COMMERCIAL

## 2020-10-13 VITALS
OXYGEN SATURATION: 95 % | HEIGHT: 65 IN | SYSTOLIC BLOOD PRESSURE: 124 MMHG | BODY MASS INDEX: 26.49 KG/M2 | HEART RATE: 83 BPM | RESPIRATION RATE: 20 BRPM | DIASTOLIC BLOOD PRESSURE: 88 MMHG | TEMPERATURE: 97 F | WEIGHT: 159 LBS

## 2020-10-13 DIAGNOSIS — C18.2 PRIMARY ADENOCARCINOMA OF ASCENDING COLON (HCC): ICD-10-CM

## 2020-10-13 DIAGNOSIS — D70.1 CHEMOTHERAPY INDUCED NEUTROPENIA (HCC): ICD-10-CM

## 2020-10-13 DIAGNOSIS — C77.2 METASTASIS TO RETROPERITONEAL LYMPH NODE (HCC): Primary | ICD-10-CM

## 2020-10-13 DIAGNOSIS — C78.7 LIVER METASTASES (HCC): ICD-10-CM

## 2020-10-13 DIAGNOSIS — T45.1X5A CHEMOTHERAPY INDUCED NEUTROPENIA (HCC): ICD-10-CM

## 2020-10-13 PROCEDURE — G0498 CHEMO EXTEND IV INFUS W/PUMP: HCPCS

## 2020-10-13 PROCEDURE — 96367 TX/PROPH/DG ADDL SEQ IV INF: CPT

## 2020-10-13 PROCEDURE — 96415 CHEMO IV INFUSION ADDL HR: CPT

## 2020-10-13 PROCEDURE — 96413 CHEMO IV INFUSION 1 HR: CPT

## 2020-10-13 PROCEDURE — 96368 THER/DIAG CONCURRENT INF: CPT

## 2020-10-13 RX ORDER — SODIUM CHLORIDE 9 MG/ML
20 INJECTION, SOLUTION INTRAVENOUS ONCE
Status: COMPLETED | OUTPATIENT
Start: 2020-10-13 | End: 2020-10-13

## 2020-10-13 RX ORDER — DEXTROSE MONOHYDRATE 50 MG/ML
20 INJECTION, SOLUTION INTRAVENOUS ONCE
Status: COMPLETED | OUTPATIENT
Start: 2020-10-13 | End: 2020-10-13

## 2020-10-13 RX ADMIN — DEXAMETHASONE SODIUM PHOSPHATE: 10 INJECTION, SOLUTION INTRAMUSCULAR; INTRAVENOUS at 08:40

## 2020-10-13 RX ADMIN — LEUCOVORIN CALCIUM 700 MG: 100 INJECTION, POWDER, LYOPHILIZED, FOR SUSPENSION INTRAMUSCULAR; INTRAVENOUS at 09:53

## 2020-10-13 RX ADMIN — DEXTROSE 20 ML/HR: 5 SOLUTION INTRAVENOUS at 09:52

## 2020-10-13 RX ADMIN — SODIUM CHLORIDE 20 ML/HR: 0.9 INJECTION, SOLUTION INTRAVENOUS at 08:30

## 2020-10-13 RX ADMIN — OXALIPLATIN 125.3 MG: 5 INJECTION, SOLUTION INTRAVENOUS at 10:00

## 2020-10-13 RX ADMIN — SODIUM CHLORIDE 150 MG: 0.9 INJECTION, SOLUTION INTRAVENOUS at 09:12

## 2020-10-15 ENCOUNTER — HOSPITAL ENCOUNTER (OUTPATIENT)
Dept: INFUSION CENTER | Facility: CLINIC | Age: 55
Discharge: HOME | End: 2020-10-15
Payer: COMMERCIAL

## 2020-10-15 VITALS — TEMPERATURE: 96.5 F

## 2020-10-15 DIAGNOSIS — C77.2 METASTASIS TO RETROPERITONEAL LYMPH NODE (HCC): Primary | ICD-10-CM

## 2020-10-15 DIAGNOSIS — C78.7 LIVER METASTASES (HCC): ICD-10-CM

## 2020-10-15 DIAGNOSIS — D70.1 CHEMOTHERAPY INDUCED NEUTROPENIA (HCC): ICD-10-CM

## 2020-10-15 DIAGNOSIS — T45.1X5A CHEMOTHERAPY INDUCED NEUTROPENIA (HCC): ICD-10-CM

## 2020-10-15 DIAGNOSIS — C18.2 PRIMARY ADENOCARCINOMA OF ASCENDING COLON (HCC): ICD-10-CM

## 2020-10-15 PROCEDURE — 96372 THER/PROPH/DIAG INJ SC/IM: CPT

## 2020-10-15 RX ADMIN — PEGFILGRASTIM 6 MG: KIT SUBCUTANEOUS at 10:28

## 2020-10-16 ENCOUNTER — TELEPHONE (OUTPATIENT)
Dept: HEMATOLOGY ONCOLOGY | Facility: CLINIC | Age: 55
End: 2020-10-16

## 2020-10-20 NOTE — PROGRESS NOTES
[facility] Emergency Department  Arrival Date/Time: 10/20/2020 @  4:54 PM      Heron Joaquin  MRN: [id number]      69 y.o. female    YOB: 1946   Telephone Information:   Mobile [phone] (home)     [facility] EMERGENCY DEPT ER17/17  Seen on 10/20/2020 @ 5:35 PM      Today's Chief Complaint:   Chief Complaint   Patient presents with    Vomiting     HPI (3): 62-year-old female end-stage renal disease patient on peritoneal dialysis presents to the emergency department with nausea vomiting and diarrhea. Onset several days ago. Getting progressively worse. Went to dialysis today for a check and was referred to the ED    Family states that she had a fluid check yesterday and that her fluid was clear with no signs of infection. She is not complaining of abdominal pain. No fever    Decreased appetite. All she has had was a little sip of Mountain Dew earlier       HPI    Review of Systems (10+): Review of Systems   Constitutional: Positive for appetite change and fatigue. Negative for activity change, chills, diaphoresis and fever. HENT: Negative. Respiratory: Negative for shortness of breath. Cardiovascular: Negative for chest pain, palpitations and leg swelling. Gastrointestinal: Positive for diarrhea, nausea and vomiting. Negative for abdominal pain. Genitourinary: Negative. Musculoskeletal: Negative. Skin: Negative. Neurological: Negative. Psychiatric/Behavioral: Negative. Past Medical History: Primary Care Doctor: Alfonso Allen MD    [X] Meds, Medical Hx,Surgical Hx, Family Hx, Social Hx are reviewed and at end of this note     Allergies: Allergies   Allergen Reactions    Codeine Nausea and Vomiting    Hydralazine Nausea and Vomiting         Key Anti-Platelet Anticoagulant Meds             aspirin delayed-release 81 mg tablet Take 81 mg by mouth nightly. Physical Exam (8+):  [X] Nursing documentation reviewed. Pt to clinic for avastin, leucovorin, camptosar, 5 FU Cadd pump start, pt offers no complaints at this time, will continue to monitor, aware of next appointment, declines avs Patient Vitals for the past 24 hrs:   Temp Pulse Resp BP SpO2   10/20/20 1625 97.9 °F (36.6 °C) (!) 106 18 105/69 96 %    Vital signs were reviewed. Physical Exam  Vitals signs and nursing note reviewed. Constitutional:       General: She is not in acute distress. Appearance: Normal appearance. She is not ill-appearing or toxic-appearing. HENT:      Head: Normocephalic and atraumatic. Right Ear: External ear normal.      Left Ear: External ear normal.      Nose: Nose normal. No congestion. Mouth/Throat:      Mouth: Mucous membranes are moist.      Pharynx: No oropharyngeal exudate. Eyes:      Extraocular Movements: Extraocular movements intact. Pupils: Pupils are equal, round, and reactive to light. Neck:      Musculoskeletal: Normal range of motion. Cardiovascular:      Rate and Rhythm: Normal rate and regular rhythm. Pulses: Normal pulses. Heart sounds: Normal heart sounds. Pulmonary:      Effort: Pulmonary effort is normal. No respiratory distress. Breath sounds: Normal breath sounds. No wheezing or rales. Abdominal:      General: Abdomen is flat. There is no distension. Tenderness: There is no abdominal tenderness. There is no guarding or rebound. Musculoskeletal: Normal range of motion. Skin:     General: Skin is warm and dry. Capillary Refill: Capillary refill takes less than 2 seconds. Neurological:      General: No focal deficit present. Mental Status: She is alert and oriented to person, place, and time. Psychiatric:         Mood and Affect: Mood normal.         Behavior: Behavior normal.         Thought Content: Thought content normal.         Judgment: Judgment normal.         MEDICAL DECISION MAKING: (Including Differential Dx, and Plan)   79-year-old female dialysis patient with abdominal pain nausea vomiting diarrhea. No abdominal tenderness. No fever.    Differential Diagnosis: Infection, electrolyte abnormality, colitis, uremia. Plan: Labs. Gently hydrate. Monitor for fever or changes. Abdomen is soft and nontender and she had a fluid check yesterday that did not show any cloudiness or signs of infection. This is a new problem that does need additional workup   Labs/Radiographs/ECG were ordered: yes   CT/US/XRay/MRI were visualized by me: yes   Obtained and Reviewed Old Records or History from someone other than the patient: yes     The patient's problem is:  moderate    Diagnostic Options are:  minimal risk    Management Options are:  moderate risk     Data/Management:  (ty, Sully abbott)   Lab findings during this visit:   Recent Results (from the past 48 hour(s))   CBC WITH AUTOMATED DIFF    Collection Time: 10/20/20  4:27 PM   Result Value Ref Range    WBC 9.8 4.3 - 11.1 K/uL    RBC 3.81 (L) 4.05 - 5.2 M/uL    HGB 12.1 11.7 - 15.4 g/dL    HCT 35.8 35.8 - 46.3 %    MCV 94.0 79.6 - 97.8 FL    MCH 31.8 26.1 - 32.9 PG    MCHC 33.8 31.4 - 35.0 g/dL    RDW 14.3 11.9 - 14.6 %    PLATELET 435 897 - 223 K/uL    MPV 12.4 (H) 9.4 - 12.3 FL    ABSOLUTE NRBC 0.02 0.0 - 0.2 K/uL    DF AUTOMATED      NEUTROPHILS 60 43 - 78 %    LYMPHOCYTES 32 13 - 44 %    MONOCYTES 6 4.0 - 12.0 %    EOSINOPHILS 1 0.5 - 7.8 %    BASOPHILS 1 0.0 - 2.0 %    IMMATURE GRANULOCYTES 1 0.0 - 5.0 %    ABS. NEUTROPHILS 5.8 1.7 - 8.2 K/UL    ABS. LYMPHOCYTES 3.1 0.5 - 4.6 K/UL    ABS. MONOCYTES 0.6 0.1 - 1.3 K/UL    ABS. EOSINOPHILS 0.1 0.0 - 0.8 K/UL    ABS. BASOPHILS 0.1 0.0 - 0.2 K/UL    ABS. IMM.  GRANS. 0.1 0.0 - 0.5 K/UL   METABOLIC PANEL, COMPREHENSIVE    Collection Time: 10/20/20  4:27 PM   Result Value Ref Range    Sodium 136 136 - 145 mmol/L    Potassium 3.4 (L) 3.5 - 5.1 mmol/L    Chloride 95 (L) 98 - 107 mmol/L    CO2 29 21 - 32 mmol/L    Anion gap 12 7 - 16 mmol/L    Glucose 95 65 - 100 mg/dL    BUN 33 (H) 8 - 23 MG/DL    Creatinine 13.80 (H) 0.6 - 1.0 MG/DL    GFR est AA 3 (L) >60 ml/min/1.73m2    GFR est non-AA 3 (L) >60 ml/min/1.73m2    Calcium 9.3 8.3 - 10.4 MG/DL    Bilirubin, total 0.5 0.2 - 1.1 MG/DL    ALT (SGPT) 18 12 - 65 U/L    AST (SGOT) 23 15 - 37 U/L    Alk. phosphatase 59 50 - 136 U/L    Protein, total 8.9 (H) 6.3 - 8.2 g/dL    Albumin 3.1 (L) 3.2 - 4.6 g/dL    Globulin 5.8 (H) 2.3 - 3.5 g/dL    A-G Ratio 0.5 (L) 1.2 - 3.5     LACTIC ACID    Collection Time: 10/20/20  5:56 PM   Result Value Ref Range    Lactic acid 2.5 (HH) 0.4 - 2.0 MMOL/L   EKG, 12 LEAD, INITIAL    Collection Time: 10/20/20  5:58 PM   Result Value Ref Range    Ventricular Rate 93 BPM    Atrial Rate 93 BPM    P-R Interval 148 ms    QRS Duration 74 ms    Q-T Interval 354 ms    QTC Calculation (Bezet) 440 ms    Calculated P Axis 70 degrees    Calculated R Axis 60 degrees    Calculated T Axis -115 degrees    Diagnosis       Normal sinus rhythm  Possible Left atrial enlargement  Left ventricular hypertrophy with repolarization abnormality  Abnormal ECG  When compared with ECG of 11-OCT-2020 21:13,  No significant change was found       Radiology studies during this visit: Xr Chest Pa Lat    Result Date: 10/20/2020  Impression: No acute abnormality     Medications given in the ED:   Medications   0.9% sodium chloride infusion 500 mL (500 mL IntraVENous New Bag 10/20/20 1759)   ondansetron (ZOFRAN) injection 4 mg (4 mg IntraVENous Given 10/20/20 1947)        Procedure Documentation:    (.addlac  .addabscess   . addreduction   . addintubation    . addprocdoc)      Procedures    Recheck and Additional Documentation:  (use .addrecheck  . addsepsis   . addstroke   . addhip  . addhandoff  . addcctime . emergcnt )     Patient sitting up. Eating a bowl of broccoli and cheddar soup for Jorje[employer]. She appears much improved. Her daughter agrees    Patient feels better. Will discharge home.      Other ED Course Notes:     ED Course as of Oct 20 2035   Tue Oct 20, 2020   4039 Lactic acid(!!): 2.5 [GH]      ED Course User Index  [GH] Dalila Ravi MD       I wore appropriate PPE throughout this patient's ED encounter. Assessment and Plan:      Disposition:   Discharged    Condition @ Disposition   stable   Time of Disposition   8:35 PM          Impression:     ICD-10-CM ICD-9-CM   1. Non-intractable vomiting with nausea, unspecified vomiting type  R11.2 787.01   2. Diarrhea of presumed infectious origin  R19.7 009.3        Follow-up:   Follow-up Information     Follow up With Specialties Details Why Lion Bernal, [address]  [phone]      [facility] EMERGENCY DEPT Emergency Medicine  Come back to the ED if you get worse or develop any new problems [address] [facility]    Matthew Lozano MD Nephrology Call   [facility] Nephrology KAREN [address]   [phone]           Discharge Medications:   Current Discharge Medication List      CONTINUE these medications which have CHANGED    Details   ondansetron (Zofran ODT) 8 mg disintegrating tablet Take 1 Tab by mouth every eight (8) hours as needed for Nausea.   Qty: 12 Tab, Refills: 0              Past Medical History:      Past Medical History:   Diagnosis Date    Anemia     denies hx of blood transfusion    Arrhythmia     murmur; sinus tach    Aspirin long-term use SINAN    continue    CAD (coronary artery disease) 2012    MI, 1 stent 2012-    Chronic kidney disease     peritoneal dialysis qPM    Chronic obstructive pulmonary disease (HCC)     no inhalers; no treatment; smokes 1 ppd since age 23    CKD (chronic kidney disease) stage 3, GFR 30-59 ml/min ([facility])           [facility]D on [facility] [address]     Flat affect     GERD (gastroesophageal reflux disease)     resolved per patient; no current meds 7/30/19    Heart murmur not followed per cardiology    EF 65-70 %-- on echo 11/2017    History of MI (myocardial infarction) 2012    stents x 1---- per pt-- does not see a cardiologist    Hx of colonic polyps 2016    adenoma    Hydronephrosis     Hypertension     daily meds    Left-sided weakness 2012    left leg r/t cva    Loss of appetite     states within the last several months    Peritoneal dialysis catheter in place [facility])     patient does dialysis QPM    Poor historian     Renal atrophy, right     Renal insufficiency     ? --- pt unsure if sees nephrologist    Smoker     50 yr hx/ 1 pk per day    Smoker     1 ppd sicne age 23    Stented coronary artery Xience SINAN    proximal LAD 2012-- pt states does not see a cardio    Stroke ([address] 2012    with slight left  sided weakness residual     Past Surgical History:   Procedure Laterality Date    COLONOSCOPY N/A 9/26/2016    Pedro--one desc TA, one rectal hyperplastic--5 year recall    HX CAROTID ENDARTERECTOMY Right 11/07/2018    HX UROLOGICAL      stent to left kidney--multi times    IR INSERT TUNL CVC W/O PORT OVER 5 YR  8/7/2019    VT LEFT HEART CATH,PERCUTANEOUS  4/2012    Xience LAD    VASCULAR SURGERY PROCEDURE UNLIST  02/28/2019    PD catheter placement    VASCULAR SURGERY PROCEDURE UNLIST  07/01/2019    ULTRASOUND GUIDED ACCESS BILATERAL LOWER EXTREMITY ARTERIOGRAM (Left Groin)    VASCULAR SURGERY PROCEDURE UNLIST  08/06/2019    Left common femoral and profunda endarterectomy with CorMatrix patch angioplasty. ,Left femoral to above-knee popliteal artery with reverse saphenous vein graft     Family History   Problem Relation Age of Onset    Hypertension Mother     Heart Disease Mother     Stroke Father     No Known Problems Sister     No Known Problems Brother     No Known Problems Sister     No Known Problems Sister     No Known Problems Sister     No Known Problems Brother     No Known Problems Brother       Social History     Tobacco Use    Smoking status: Current Every Day Smoker     Packs/day: 1.00     Years: 51.00     Pack years: 51.00    Smokeless tobacco: Never Used   Substance Use Topics    Alcohol use: No    Drug use: No     Prior to Admission Medications   Prescriptions Last Dose Informant Patient Reported? Taking? albuterol (PROVENTIL HFA, VENTOLIN HFA, PROAIR HFA) 90 mcg/actuation inhaler   No No   Sig: Take 2 Puffs by inhalation every six (6) hours as needed for Wheezing or Shortness of Breath. amLODIPine (NORVASC) 10 mg tablet   No No   Sig: Take 1 Tab by mouth daily. Patient taking differently: Take 10 mg by mouth nightly. aspirin delayed-release 81 mg tablet   Yes No   Sig: Take 81 mg by mouth nightly. atorvastatin (LIPITOR) 80 mg tablet   Yes No   Sig: Take 80 mg by mouth nightly. hydrALAZINE (APRESOLINE) 10 mg tablet   Yes No   Sig: Take 10 mg by mouth daily. lanolin-mineral oil-nacl-w.pet (LUBRIDERM) lotion   No No   Sig: Apply  to affected area two (2) times a day. metoprolol succinate (TOPROL-XL) 50 mg XL tablet   No No   Sig: Take 1 Tab by mouth daily. Patient taking differently: Take 50 mg by mouth nightly. ondansetron (ZOFRAN ODT) 8 mg disintegrating tablet   No No   Sig: Take 1 Tab by mouth every eight (8) hours as needed for Nausea. polyethylene glycol (MIRALAX) 17 gram packet   No No   Sig: Take 1 Packet by mouth daily as needed (constipation). potassium chloride (K-DUR, KLOR-CON) 20 mEq tablet   Yes No   Sig: Take 20 mEq by mouth daily. senna-docusate (PERICOLACE) 8.6-50 mg per tablet   No No   Sig: Take 2 Tabs by mouth daily.       Facility-Administered Medications: None

## 2020-10-23 ENCOUNTER — TELEPHONE (OUTPATIENT)
Dept: HEMATOLOGY ONCOLOGY | Facility: CLINIC | Age: 55
End: 2020-10-23

## 2020-10-23 DIAGNOSIS — C18.2 PRIMARY ADENOCARCINOMA OF ASCENDING COLON (HCC): ICD-10-CM

## 2020-10-23 DIAGNOSIS — T45.1X5A CHEMOTHERAPY INDUCED NEUTROPENIA (HCC): Primary | ICD-10-CM

## 2020-10-23 DIAGNOSIS — C78.7 LIVER METASTASES (HCC): ICD-10-CM

## 2020-10-23 DIAGNOSIS — C77.2 METASTASIS TO RETROPERITONEAL LYMPH NODE (HCC): ICD-10-CM

## 2020-10-23 DIAGNOSIS — D70.1 CHEMOTHERAPY INDUCED NEUTROPENIA (HCC): Primary | ICD-10-CM

## 2020-10-26 ENCOUNTER — DOCUMENTATION (OUTPATIENT)
Dept: HEMATOLOGY ONCOLOGY | Facility: CLINIC | Age: 55
End: 2020-10-26

## 2020-10-27 RX ORDER — SODIUM CHLORIDE 9 MG/ML
20 INJECTION, SOLUTION INTRAVENOUS ONCE
Status: CANCELLED | OUTPATIENT
Start: 2020-11-03

## 2020-10-27 RX ORDER — DEXTROSE MONOHYDRATE 50 MG/ML
20 INJECTION, SOLUTION INTRAVENOUS ONCE
Status: CANCELLED | OUTPATIENT
Start: 2020-11-03

## 2020-10-28 ENCOUNTER — TELEPHONE (OUTPATIENT)
Dept: HEMATOLOGY ONCOLOGY | Facility: CLINIC | Age: 55
End: 2020-10-28

## 2020-10-29 DIAGNOSIS — D70.1 CHEMOTHERAPY INDUCED NEUTROPENIA (HCC): Primary | ICD-10-CM

## 2020-10-29 DIAGNOSIS — C77.2 METASTASIS TO RETROPERITONEAL LYMPH NODE (HCC): ICD-10-CM

## 2020-10-29 DIAGNOSIS — C18.2 PRIMARY ADENOCARCINOMA OF ASCENDING COLON (HCC): ICD-10-CM

## 2020-10-29 DIAGNOSIS — C78.7 LIVER METASTASES (HCC): ICD-10-CM

## 2020-10-29 DIAGNOSIS — T45.1X5A CHEMOTHERAPY INDUCED NEUTROPENIA (HCC): Primary | ICD-10-CM

## 2020-11-02 ENCOUNTER — APPOINTMENT (OUTPATIENT)
Dept: LAB | Facility: MEDICAL CENTER | Age: 55
End: 2020-11-02
Payer: COMMERCIAL

## 2020-11-02 DIAGNOSIS — C77.2 METASTASIS TO RETROPERITONEAL LYMPH NODE (HCC): ICD-10-CM

## 2020-11-02 DIAGNOSIS — T45.1X5A CHEMOTHERAPY INDUCED NEUTROPENIA (HCC): ICD-10-CM

## 2020-11-02 DIAGNOSIS — C78.7 LIVER METASTASES (HCC): ICD-10-CM

## 2020-11-02 DIAGNOSIS — D70.1 CHEMOTHERAPY INDUCED NEUTROPENIA (HCC): ICD-10-CM

## 2020-11-02 DIAGNOSIS — C18.2 PRIMARY ADENOCARCINOMA OF ASCENDING COLON (HCC): ICD-10-CM

## 2020-11-02 LAB
ALBUMIN SERPL BCP-MCNC: 3.6 G/DL (ref 3.5–5)
ALP SERPL-CCNC: 239 U/L (ref 46–116)
ALT SERPL W P-5'-P-CCNC: 291 U/L (ref 12–78)
ANION GAP SERPL CALCULATED.3IONS-SCNC: 6 MMOL/L (ref 4–13)
AST SERPL W P-5'-P-CCNC: 103 U/L (ref 5–45)
BASOPHILS # BLD AUTO: 0.03 THOUSANDS/ΜL (ref 0–0.1)
BASOPHILS NFR BLD AUTO: 0 % (ref 0–1)
BILIRUB SERPL-MCNC: 0.82 MG/DL (ref 0.2–1)
BUN SERPL-MCNC: 14 MG/DL (ref 5–25)
CALCIUM SERPL-MCNC: 9 MG/DL (ref 8.3–10.1)
CHLORIDE SERPL-SCNC: 104 MMOL/L (ref 100–108)
CO2 SERPL-SCNC: 29 MMOL/L (ref 21–32)
CREAT SERPL-MCNC: 0.79 MG/DL (ref 0.6–1.3)
EOSINOPHIL # BLD AUTO: 0.01 THOUSAND/ΜL (ref 0–0.61)
EOSINOPHIL NFR BLD AUTO: 0 % (ref 0–6)
ERYTHROCYTE [DISTWIDTH] IN BLOOD BY AUTOMATED COUNT: 14 % (ref 11.6–15.1)
GFR SERPL CREATININE-BSD FRML MDRD: 86 ML/MIN/1.73SQ M
GLUCOSE SERPL-MCNC: 130 MG/DL (ref 65–140)
HCT VFR BLD AUTO: 45.3 % (ref 34.8–46.1)
HGB BLD-MCNC: 13.8 G/DL (ref 11.5–15.4)
IMM GRANULOCYTES # BLD AUTO: 0.06 THOUSAND/UL (ref 0–0.2)
IMM GRANULOCYTES NFR BLD AUTO: 1 % (ref 0–2)
LYMPHOCYTES # BLD AUTO: 0.67 THOUSANDS/ΜL (ref 0.6–4.47)
LYMPHOCYTES NFR BLD AUTO: 5 % (ref 14–44)
MCH RBC QN AUTO: 34.6 PG (ref 26.8–34.3)
MCHC RBC AUTO-ENTMCNC: 30.5 G/DL (ref 31.4–37.4)
MCV RBC AUTO: 114 FL (ref 82–98)
MONOCYTES # BLD AUTO: 1.38 THOUSAND/ΜL (ref 0.17–1.22)
MONOCYTES NFR BLD AUTO: 11 % (ref 4–12)
NEUTROPHILS # BLD AUTO: 10.48 THOUSANDS/ΜL (ref 1.85–7.62)
NEUTS SEG NFR BLD AUTO: 83 % (ref 43–75)
NRBC BLD AUTO-RTO: 0 /100 WBCS
PLATELET # BLD AUTO: 109 THOUSANDS/UL (ref 149–390)
PMV BLD AUTO: 10.4 FL (ref 8.9–12.7)
POTASSIUM SERPL-SCNC: 4 MMOL/L (ref 3.5–5.3)
PROT SERPL-MCNC: 7.1 G/DL (ref 6.4–8.2)
RBC # BLD AUTO: 3.99 MILLION/UL (ref 3.81–5.12)
SODIUM SERPL-SCNC: 139 MMOL/L (ref 136–145)
WBC # BLD AUTO: 12.63 THOUSAND/UL (ref 4.31–10.16)

## 2020-11-02 PROCEDURE — 80053 COMPREHEN METABOLIC PANEL: CPT

## 2020-11-02 PROCEDURE — 36415 COLL VENOUS BLD VENIPUNCTURE: CPT

## 2020-11-02 PROCEDURE — 85025 COMPLETE CBC W/AUTO DIFF WBC: CPT

## 2020-11-03 ENCOUNTER — HOSPITAL ENCOUNTER (OUTPATIENT)
Dept: INFUSION CENTER | Facility: CLINIC | Age: 55
Discharge: HOME | End: 2020-11-03
Payer: COMMERCIAL

## 2020-11-03 VITALS
WEIGHT: 154.5 LBS | RESPIRATION RATE: 20 BRPM | OXYGEN SATURATION: 98 % | HEIGHT: 66 IN | HEART RATE: 90 BPM | SYSTOLIC BLOOD PRESSURE: 184 MMHG | BODY MASS INDEX: 24.83 KG/M2 | DIASTOLIC BLOOD PRESSURE: 96 MMHG

## 2020-11-03 DIAGNOSIS — C78.7 LIVER METASTASES (HCC): ICD-10-CM

## 2020-11-03 DIAGNOSIS — C18.2 PRIMARY ADENOCARCINOMA OF ASCENDING COLON (HCC): Primary | ICD-10-CM

## 2020-11-03 DIAGNOSIS — C18.2 PRIMARY ADENOCARCINOMA OF ASCENDING COLON (HCC): ICD-10-CM

## 2020-11-03 DIAGNOSIS — T45.1X5A CHEMOTHERAPY INDUCED NEUTROPENIA (HCC): ICD-10-CM

## 2020-11-03 DIAGNOSIS — C77.2 METASTASIS TO RETROPERITONEAL LYMPH NODE (HCC): Primary | ICD-10-CM

## 2020-11-03 DIAGNOSIS — D70.1 CHEMOTHERAPY INDUCED NEUTROPENIA (HCC): ICD-10-CM

## 2020-11-03 PROCEDURE — 96413 CHEMO IV INFUSION 1 HR: CPT

## 2020-11-03 PROCEDURE — 96415 CHEMO IV INFUSION ADDL HR: CPT

## 2020-11-03 PROCEDURE — 96367 TX/PROPH/DG ADDL SEQ IV INF: CPT

## 2020-11-03 PROCEDURE — G0498 CHEMO EXTEND IV INFUS W/PUMP: HCPCS

## 2020-11-03 PROCEDURE — 96368 THER/DIAG CONCURRENT INF: CPT

## 2020-11-03 RX ORDER — SODIUM CHLORIDE 9 MG/ML
20 INJECTION, SOLUTION INTRAVENOUS ONCE
Status: COMPLETED | OUTPATIENT
Start: 2020-11-03 | End: 2020-11-03

## 2020-11-03 RX ORDER — DEXTROSE MONOHYDRATE 50 MG/ML
20 INJECTION, SOLUTION INTRAVENOUS ONCE
Status: COMPLETED | OUTPATIENT
Start: 2020-11-03 | End: 2020-11-03

## 2020-11-03 RX ADMIN — DEXAMETHASONE SODIUM PHOSPHATE: 10 INJECTION, SOLUTION INTRAMUSCULAR; INTRAVENOUS at 11:22

## 2020-11-03 RX ADMIN — FOSAPREPITANT 150 MG: 150 INJECTION, POWDER, LYOPHILIZED, FOR SOLUTION INTRAVENOUS at 11:44

## 2020-11-03 RX ADMIN — LEUCOVORIN CALCIUM 700 MG: 500 INJECTION, POWDER, LYOPHILIZED, FOR SOLUTION INTRAMUSCULAR; INTRAVENOUS at 12:26

## 2020-11-03 RX ADMIN — OXALIPLATIN 125.3 MG: 5 INJECTION, SOLUTION INTRAVENOUS at 12:29

## 2020-11-03 RX ADMIN — DEXTROSE 20 ML/HR: 5 SOLUTION INTRAVENOUS at 12:21

## 2020-11-03 RX ADMIN — SODIUM CHLORIDE 20 ML/HR: 0.9 INJECTION, SOLUTION INTRAVENOUS at 11:22

## 2020-11-05 ENCOUNTER — HOSPITAL ENCOUNTER (OUTPATIENT)
Dept: INFUSION CENTER | Facility: CLINIC | Age: 55
Discharge: HOME | End: 2020-11-05
Payer: COMMERCIAL

## 2020-11-05 ENCOUNTER — PATIENT OUTREACH (OUTPATIENT)
Dept: CASE MANAGEMENT | Facility: HOSPITAL | Age: 55
End: 2020-11-05

## 2020-11-05 VITALS — TEMPERATURE: 96.4 F

## 2020-11-05 DIAGNOSIS — D70.1 CHEMOTHERAPY INDUCED NEUTROPENIA (HCC): ICD-10-CM

## 2020-11-05 DIAGNOSIS — C77.2 METASTASIS TO RETROPERITONEAL LYMPH NODE (HCC): Primary | ICD-10-CM

## 2020-11-05 DIAGNOSIS — T45.1X5A CHEMOTHERAPY INDUCED NEUTROPENIA (HCC): ICD-10-CM

## 2020-11-05 DIAGNOSIS — C78.7 LIVER METASTASES (HCC): ICD-10-CM

## 2020-11-05 DIAGNOSIS — C18.2 PRIMARY ADENOCARCINOMA OF ASCENDING COLON (HCC): ICD-10-CM

## 2020-11-05 PROCEDURE — 96372 THER/PROPH/DIAG INJ SC/IM: CPT

## 2020-11-05 RX ADMIN — PEGFILGRASTIM 6 MG: KIT SUBCUTANEOUS at 12:54

## 2020-11-06 ENCOUNTER — OFFICE VISIT (OUTPATIENT)
Dept: HEMATOLOGY ONCOLOGY | Facility: CLINIC | Age: 55
End: 2020-11-06
Payer: COMMERCIAL

## 2020-11-06 VITALS
HEIGHT: 66 IN | WEIGHT: 154 LBS | RESPIRATION RATE: 12 BRPM | SYSTOLIC BLOOD PRESSURE: 128 MMHG | TEMPERATURE: 97.2 F | BODY MASS INDEX: 24.75 KG/M2 | OXYGEN SATURATION: 97 % | HEART RATE: 93 BPM | DIASTOLIC BLOOD PRESSURE: 84 MMHG

## 2020-11-06 DIAGNOSIS — C78.6 PERITONEAL METASTASES (HCC): ICD-10-CM

## 2020-11-06 DIAGNOSIS — C77.2 METASTASIS TO RETROPERITONEAL LYMPH NODE (HCC): ICD-10-CM

## 2020-11-06 DIAGNOSIS — C79.9 ADENOCARCINOMA, METASTATIC (HCC): ICD-10-CM

## 2020-11-06 DIAGNOSIS — C18.2 PRIMARY ADENOCARCINOMA OF ASCENDING COLON (HCC): ICD-10-CM

## 2020-11-06 DIAGNOSIS — T45.1X5A CHEMOTHERAPY INDUCED NEUTROPENIA (HCC): Primary | ICD-10-CM

## 2020-11-06 DIAGNOSIS — I82.531 CHRONIC DEEP VEIN THROMBOSIS (DVT) OF POPLITEAL VEIN OF RIGHT LOWER EXTREMITY (HCC): ICD-10-CM

## 2020-11-06 DIAGNOSIS — C78.00 MALIGNANT NEOPLASM METASTATIC TO LUNG, UNSPECIFIED LATERALITY (HCC): ICD-10-CM

## 2020-11-06 DIAGNOSIS — C79.60 MALIGNANT NEOPLASM METASTATIC TO OVARY, UNSPECIFIED LATERALITY (HCC): ICD-10-CM

## 2020-11-06 DIAGNOSIS — C18.2 PRIMARY ADENOCARCINOMA OF ASCENDING COLON (HCC): Primary | ICD-10-CM

## 2020-11-06 DIAGNOSIS — C78.7 LIVER METASTASES (HCC): ICD-10-CM

## 2020-11-06 DIAGNOSIS — D70.1 CHEMOTHERAPY INDUCED NEUTROPENIA (HCC): Primary | ICD-10-CM

## 2020-11-06 PROCEDURE — 99214 OFFICE O/P EST MOD 30 MIN: CPT | Performed by: INTERNAL MEDICINE

## 2020-11-06 RX ORDER — OMEPRAZOLE 20 MG/1
CAPSULE, DELAYED RELEASE ORAL
Status: ON HOLD | COMMUNITY
Start: 2020-10-22 | End: 2021-06-15

## 2020-11-11 ENCOUNTER — LAB (OUTPATIENT)
Dept: LAB | Facility: CLINIC | Age: 55
End: 2020-11-11
Payer: COMMERCIAL

## 2020-11-11 ENCOUNTER — TRANSCRIBE ORDERS (OUTPATIENT)
Dept: LAB | Facility: CLINIC | Age: 55
End: 2020-11-11

## 2020-11-11 DIAGNOSIS — C18.9 MALIGNANT NEOPLASM OF COLON, UNSPECIFIED PART OF COLON (HCC): Primary | ICD-10-CM

## 2020-11-11 DIAGNOSIS — C18.9 MALIGNANT NEOPLASM OF COLON, UNSPECIFIED PART OF COLON (HCC): ICD-10-CM

## 2020-11-11 LAB
ALBUMIN SERPL BCP-MCNC: 3.3 G/DL (ref 3.5–5)
ALP SERPL-CCNC: 334 U/L (ref 46–116)
ALT SERPL W P-5'-P-CCNC: 165 U/L (ref 12–78)
ANION GAP SERPL CALCULATED.3IONS-SCNC: 10 MMOL/L (ref 4–13)
ANISOCYTOSIS BLD QL SMEAR: PRESENT
APTT PPP: 29 SECONDS (ref 23–37)
AST SERPL W P-5'-P-CCNC: 82 U/L (ref 5–45)
BASOPHILS # BLD MANUAL: 0 THOUSAND/UL (ref 0–0.1)
BASOPHILS NFR MAR MANUAL: 0 % (ref 0–1)
BILIRUB SERPL-MCNC: 0.62 MG/DL (ref 0.2–1)
BUN SERPL-MCNC: 11 MG/DL (ref 5–25)
CALCIUM ALBUM COR SERPL-MCNC: 9.8 MG/DL (ref 8.3–10.1)
CALCIUM SERPL-MCNC: 9.2 MG/DL (ref 8.3–10.1)
CEA SERPL-MCNC: 1.7 NG/ML (ref 0–3)
CHLORIDE SERPL-SCNC: 105 MMOL/L (ref 100–108)
CO2 SERPL-SCNC: 26 MMOL/L (ref 21–32)
CREAT SERPL-MCNC: 0.76 MG/DL (ref 0.6–1.3)
EOSINOPHIL # BLD MANUAL: 0 THOUSAND/UL (ref 0–0.4)
EOSINOPHIL NFR BLD MANUAL: 0 % (ref 0–6)
ERYTHROCYTE [DISTWIDTH] IN BLOOD BY AUTOMATED COUNT: 14.3 % (ref 11.6–15.1)
GFR SERPL CREATININE-BSD FRML MDRD: 90 ML/MIN/1.73SQ M
GLUCOSE SERPL-MCNC: 94 MG/DL (ref 65–140)
HCT VFR BLD AUTO: 42 % (ref 34.8–46.1)
HGB BLD-MCNC: 13.5 G/DL (ref 11.5–15.4)
INR PPP: 1.31 (ref 0.84–1.19)
LYMPHOCYTES # BLD AUTO: 0 % (ref 14–44)
LYMPHOCYTES # BLD AUTO: 0 THOUSAND/UL (ref 0.6–4.47)
MCH RBC QN AUTO: 35.7 PG (ref 26.8–34.3)
MCHC RBC AUTO-ENTMCNC: 32.1 G/DL (ref 31.4–37.4)
MCV RBC AUTO: 111 FL (ref 82–98)
MONOCYTES # BLD AUTO: 1.95 THOUSAND/UL (ref 0–1.22)
MONOCYTES NFR BLD: 13 % (ref 4–12)
NEUTROPHILS # BLD MANUAL: 13.03 THOUSAND/UL (ref 1.85–7.62)
NEUTS BAND NFR BLD MANUAL: 14 % (ref 0–8)
NEUTS SEG NFR BLD AUTO: 73 % (ref 43–75)
NRBC BLD AUTO-RTO: 0 /100 WBCS
PLATELET # BLD AUTO: 86 THOUSANDS/UL (ref 149–390)
PLATELET BLD QL SMEAR: ABNORMAL
PMV BLD AUTO: 10.3 FL (ref 8.9–12.7)
POLYCHROMASIA BLD QL SMEAR: PRESENT
POTASSIUM SERPL-SCNC: 4.4 MMOL/L (ref 3.5–5.3)
PROT SERPL-MCNC: 6.8 G/DL (ref 6.4–8.2)
PROTHROMBIN TIME: 16.4 SECONDS (ref 11.6–14.5)
RBC # BLD AUTO: 3.78 MILLION/UL (ref 3.81–5.12)
RBC MORPH BLD: PRESENT
SODIUM SERPL-SCNC: 141 MMOL/L (ref 136–145)
TOTAL CELLS COUNTED SPEC: 100
WBC # BLD AUTO: 14.98 THOUSAND/UL (ref 4.31–10.16)

## 2020-11-11 PROCEDURE — 82378 CARCINOEMBRYONIC ANTIGEN: CPT

## 2020-11-11 PROCEDURE — 80053 COMPREHEN METABOLIC PANEL: CPT

## 2020-11-11 PROCEDURE — 85007 BL SMEAR W/DIFF WBC COUNT: CPT

## 2020-11-11 PROCEDURE — 85610 PROTHROMBIN TIME: CPT

## 2020-11-11 PROCEDURE — 85730 THROMBOPLASTIN TIME PARTIAL: CPT

## 2020-11-11 PROCEDURE — 36415 COLL VENOUS BLD VENIPUNCTURE: CPT

## 2020-11-11 PROCEDURE — 85027 COMPLETE CBC AUTOMATED: CPT

## 2020-11-11 RX ORDER — DEXTROSE MONOHYDRATE 50 MG/ML
20 INJECTION, SOLUTION INTRAVENOUS ONCE
Status: CANCELLED | OUTPATIENT
Start: 2020-11-16

## 2020-11-11 RX ORDER — SODIUM CHLORIDE 9 MG/ML
20 INJECTION, SOLUTION INTRAVENOUS ONCE
Status: CANCELLED | OUTPATIENT
Start: 2020-11-16

## 2020-11-12 DIAGNOSIS — T45.1X5A CHEMOTHERAPY INDUCED NEUTROPENIA (HCC): Primary | ICD-10-CM

## 2020-11-12 DIAGNOSIS — D70.1 CHEMOTHERAPY INDUCED NEUTROPENIA (HCC): Primary | ICD-10-CM

## 2020-11-12 DIAGNOSIS — C77.2 METASTASIS TO RETROPERITONEAL LYMPH NODE (HCC): ICD-10-CM

## 2020-11-12 DIAGNOSIS — C78.7 LIVER METASTASES (HCC): ICD-10-CM

## 2020-11-12 DIAGNOSIS — C18.2 PRIMARY ADENOCARCINOMA OF ASCENDING COLON (HCC): ICD-10-CM

## 2020-11-14 ENCOUNTER — LAB (OUTPATIENT)
Dept: LAB | Facility: MEDICAL CENTER | Age: 55
End: 2020-11-14
Payer: COMMERCIAL

## 2020-11-14 DIAGNOSIS — T45.1X5A CHEMOTHERAPY INDUCED NEUTROPENIA (HCC): ICD-10-CM

## 2020-11-14 DIAGNOSIS — C78.7 LIVER METASTASES (HCC): ICD-10-CM

## 2020-11-14 DIAGNOSIS — D70.1 CHEMOTHERAPY INDUCED NEUTROPENIA (HCC): ICD-10-CM

## 2020-11-14 DIAGNOSIS — C77.2 METASTASIS TO RETROPERITONEAL LYMPH NODE (HCC): ICD-10-CM

## 2020-11-14 DIAGNOSIS — C18.2 PRIMARY ADENOCARCINOMA OF ASCENDING COLON (HCC): ICD-10-CM

## 2020-11-14 LAB
ALBUMIN SERPL BCP-MCNC: 3.5 G/DL (ref 3.5–5)
ALP SERPL-CCNC: 322 U/L (ref 46–116)
ALT SERPL W P-5'-P-CCNC: 142 U/L (ref 12–78)
ANION GAP SERPL CALCULATED.3IONS-SCNC: 5 MMOL/L (ref 4–13)
AST SERPL W P-5'-P-CCNC: 75 U/L (ref 5–45)
BASOPHILS # BLD AUTO: 0.06 THOUSANDS/ΜL (ref 0–0.1)
BASOPHILS NFR BLD AUTO: 1 % (ref 0–1)
BILIRUB SERPL-MCNC: 0.59 MG/DL (ref 0.2–1)
BUN SERPL-MCNC: 8 MG/DL (ref 5–25)
CALCIUM SERPL-MCNC: 9.4 MG/DL (ref 8.3–10.1)
CHLORIDE SERPL-SCNC: 105 MMOL/L (ref 100–108)
CO2 SERPL-SCNC: 30 MMOL/L (ref 21–32)
CREAT SERPL-MCNC: 0.8 MG/DL (ref 0.6–1.3)
EOSINOPHIL # BLD AUTO: 0.08 THOUSAND/ΜL (ref 0–0.61)
EOSINOPHIL NFR BLD AUTO: 1 % (ref 0–6)
ERYTHROCYTE [DISTWIDTH] IN BLOOD BY AUTOMATED COUNT: 14.8 % (ref 11.6–15.1)
GFR SERPL CREATININE-BSD FRML MDRD: 84 ML/MIN/1.73SQ M
GLUCOSE SERPL-MCNC: 69 MG/DL (ref 65–140)
HCT VFR BLD AUTO: 43.1 % (ref 34.8–46.1)
HGB BLD-MCNC: 13.2 G/DL (ref 11.5–15.4)
IMM GRANULOCYTES # BLD AUTO: 0.11 THOUSAND/UL (ref 0–0.2)
IMM GRANULOCYTES NFR BLD AUTO: 1 % (ref 0–2)
LYMPHOCYTES # BLD AUTO: 0.66 THOUSANDS/ΜL (ref 0.6–4.47)
LYMPHOCYTES NFR BLD AUTO: 6 % (ref 14–44)
MCH RBC QN AUTO: 35 PG (ref 26.8–34.3)
MCHC RBC AUTO-ENTMCNC: 30.6 G/DL (ref 31.4–37.4)
MCV RBC AUTO: 114 FL (ref 82–98)
MONOCYTES # BLD AUTO: 1.3 THOUSAND/ΜL (ref 0.17–1.22)
MONOCYTES NFR BLD AUTO: 11 % (ref 4–12)
NEUTROPHILS # BLD AUTO: 9.5 THOUSANDS/ΜL (ref 1.85–7.62)
NEUTS SEG NFR BLD AUTO: 80 % (ref 43–75)
NRBC BLD AUTO-RTO: 0 /100 WBCS
PLATELET # BLD AUTO: 103 THOUSANDS/UL (ref 149–390)
PMV BLD AUTO: 10.4 FL (ref 8.9–12.7)
POTASSIUM SERPL-SCNC: 4.1 MMOL/L (ref 3.5–5.3)
PROT SERPL-MCNC: 6.8 G/DL (ref 6.4–8.2)
RBC # BLD AUTO: 3.77 MILLION/UL (ref 3.81–5.12)
SODIUM SERPL-SCNC: 140 MMOL/L (ref 136–145)
WBC # BLD AUTO: 11.71 THOUSAND/UL (ref 4.31–10.16)

## 2020-11-14 PROCEDURE — 85025 COMPLETE CBC W/AUTO DIFF WBC: CPT

## 2020-11-14 PROCEDURE — 80053 COMPREHEN METABOLIC PANEL: CPT

## 2020-11-14 PROCEDURE — 36415 COLL VENOUS BLD VENIPUNCTURE: CPT

## 2020-11-16 ENCOUNTER — HOSPITAL ENCOUNTER (OUTPATIENT)
Dept: INFUSION CENTER | Facility: CLINIC | Age: 55
Discharge: HOME | End: 2020-11-16
Payer: COMMERCIAL

## 2020-11-16 VITALS
DIASTOLIC BLOOD PRESSURE: 72 MMHG | HEART RATE: 84 BPM | SYSTOLIC BLOOD PRESSURE: 138 MMHG | WEIGHT: 154 LBS | RESPIRATION RATE: 16 BRPM | TEMPERATURE: 96.9 F | HEIGHT: 66 IN | BODY MASS INDEX: 24.75 KG/M2

## 2020-11-16 DIAGNOSIS — C18.2 PRIMARY ADENOCARCINOMA OF ASCENDING COLON (HCC): ICD-10-CM

## 2020-11-16 DIAGNOSIS — C77.2 METASTASIS TO RETROPERITONEAL LYMPH NODE (HCC): Primary | ICD-10-CM

## 2020-11-16 DIAGNOSIS — T45.1X5A CHEMOTHERAPY INDUCED NEUTROPENIA (HCC): ICD-10-CM

## 2020-11-16 DIAGNOSIS — D70.1 CHEMOTHERAPY INDUCED NEUTROPENIA (HCC): ICD-10-CM

## 2020-11-16 DIAGNOSIS — C78.7 LIVER METASTASES (HCC): ICD-10-CM

## 2020-11-16 PROCEDURE — 96417 CHEMO IV INFUS EACH ADDL SEQ: CPT

## 2020-11-16 PROCEDURE — 96415 CHEMO IV INFUSION ADDL HR: CPT

## 2020-11-16 PROCEDURE — 96368 THER/DIAG CONCURRENT INF: CPT

## 2020-11-16 PROCEDURE — G0498 CHEMO EXTEND IV INFUS W/PUMP: HCPCS

## 2020-11-16 PROCEDURE — 96413 CHEMO IV INFUSION 1 HR: CPT

## 2020-11-16 PROCEDURE — 96367 TX/PROPH/DG ADDL SEQ IV INF: CPT

## 2020-11-16 RX ORDER — DEXTROSE MONOHYDRATE 50 MG/ML
20 INJECTION, SOLUTION INTRAVENOUS ONCE
Status: COMPLETED | OUTPATIENT
Start: 2020-11-16 | End: 2020-11-16

## 2020-11-16 RX ORDER — SODIUM CHLORIDE 9 MG/ML
20 INJECTION, SOLUTION INTRAVENOUS ONCE
Status: COMPLETED | OUTPATIENT
Start: 2020-11-16 | End: 2020-11-16

## 2020-11-16 RX ADMIN — OXALIPLATIN 125.3 MG: 5 INJECTION, SOLUTION INTRAVENOUS at 12:45

## 2020-11-16 RX ADMIN — FOSAPREPITANT 150 MG: 150 INJECTION, POWDER, LYOPHILIZED, FOR SOLUTION INTRAVENOUS at 10:50

## 2020-11-16 RX ADMIN — DEXAMETHASONE SODIUM PHOSPHATE: 10 INJECTION, SOLUTION INTRAMUSCULAR; INTRAVENOUS at 10:20

## 2020-11-16 RX ADMIN — BEVACIZUMAB 357.5 MG: 400 INJECTION, SOLUTION INTRAVENOUS at 11:50

## 2020-11-16 RX ADMIN — SODIUM CHLORIDE 20 ML/HR: 0.9 INJECTION, SOLUTION INTRAVENOUS at 10:15

## 2020-11-16 RX ADMIN — DEXTROSE 20 ML/HR: 5 SOLUTION INTRAVENOUS at 12:40

## 2020-11-16 RX ADMIN — LEUCOVORIN CALCIUM 700 MG: 500 INJECTION, POWDER, LYOPHILIZED, FOR SOLUTION INTRAMUSCULAR; INTRAVENOUS at 12:40

## 2020-11-18 ENCOUNTER — HOSPITAL ENCOUNTER (OUTPATIENT)
Dept: INFUSION CENTER | Facility: CLINIC | Age: 55
Discharge: HOME | End: 2020-11-18
Payer: COMMERCIAL

## 2020-11-18 VITALS — TEMPERATURE: 97.1 F

## 2020-11-18 DIAGNOSIS — C78.7 LIVER METASTASES (HCC): ICD-10-CM

## 2020-11-18 DIAGNOSIS — D70.1 CHEMOTHERAPY INDUCED NEUTROPENIA (HCC): ICD-10-CM

## 2020-11-18 DIAGNOSIS — T45.1X5A CHEMOTHERAPY INDUCED NEUTROPENIA (HCC): ICD-10-CM

## 2020-11-18 DIAGNOSIS — C77.2 METASTASIS TO RETROPERITONEAL LYMPH NODE (HCC): Primary | ICD-10-CM

## 2020-11-18 DIAGNOSIS — C18.2 PRIMARY ADENOCARCINOMA OF ASCENDING COLON (HCC): ICD-10-CM

## 2020-11-18 PROCEDURE — 96372 THER/PROPH/DIAG INJ SC/IM: CPT

## 2020-11-18 RX ADMIN — PEGFILGRASTIM 6 MG: KIT SUBCUTANEOUS at 13:12

## 2020-11-21 DIAGNOSIS — I82.4Y1 DEEP VEIN THROMBOSIS (DVT) OF PROXIMAL VEIN OF RIGHT LOWER EXTREMITY, UNSPECIFIED CHRONICITY (HCC): ICD-10-CM

## 2020-11-21 RX ORDER — RIVAROXABAN 10 MG/1
TABLET, FILM COATED ORAL
Qty: 90 TABLET | Refills: 3 | Status: SHIPPED | OUTPATIENT
Start: 2020-11-21 | End: 2021-04-23 | Stop reason: SDUPTHER

## 2020-11-23 RX ORDER — DEXTROSE MONOHYDRATE 50 MG/ML
20 INJECTION, SOLUTION INTRAVENOUS ONCE
Status: CANCELLED | OUTPATIENT
Start: 2020-11-30

## 2020-11-23 RX ORDER — SODIUM CHLORIDE 9 MG/ML
20 INJECTION, SOLUTION INTRAVENOUS ONCE
Status: CANCELLED | OUTPATIENT
Start: 2020-11-30

## 2020-11-25 DIAGNOSIS — D70.1 CHEMOTHERAPY INDUCED NEUTROPENIA (HCC): Primary | ICD-10-CM

## 2020-11-25 DIAGNOSIS — C18.2 PRIMARY ADENOCARCINOMA OF ASCENDING COLON (HCC): ICD-10-CM

## 2020-11-25 DIAGNOSIS — C78.7 LIVER METASTASES (HCC): ICD-10-CM

## 2020-11-25 DIAGNOSIS — C77.2 METASTASIS TO RETROPERITONEAL LYMPH NODE (HCC): ICD-10-CM

## 2020-11-25 DIAGNOSIS — T45.1X5A CHEMOTHERAPY INDUCED NEUTROPENIA (HCC): Primary | ICD-10-CM

## 2020-11-28 ENCOUNTER — LAB (OUTPATIENT)
Dept: LAB | Facility: MEDICAL CENTER | Age: 55
End: 2020-11-28
Payer: COMMERCIAL

## 2020-11-28 DIAGNOSIS — T45.1X5A CHEMOTHERAPY INDUCED NEUTROPENIA (HCC): ICD-10-CM

## 2020-11-28 DIAGNOSIS — C18.2 PRIMARY ADENOCARCINOMA OF ASCENDING COLON (HCC): ICD-10-CM

## 2020-11-28 DIAGNOSIS — C78.7 LIVER METASTASES (HCC): ICD-10-CM

## 2020-11-28 DIAGNOSIS — D70.1 CHEMOTHERAPY INDUCED NEUTROPENIA (HCC): ICD-10-CM

## 2020-11-28 DIAGNOSIS — C77.2 METASTASIS TO RETROPERITONEAL LYMPH NODE (HCC): ICD-10-CM

## 2020-11-28 LAB
ALBUMIN SERPL BCP-MCNC: 3.1 G/DL (ref 3.5–5)
ALP SERPL-CCNC: 323 U/L (ref 46–116)
ALT SERPL W P-5'-P-CCNC: 115 U/L (ref 12–78)
ANION GAP SERPL CALCULATED.3IONS-SCNC: 1 MMOL/L (ref 4–13)
AST SERPL W P-5'-P-CCNC: 81 U/L (ref 5–45)
BASOPHILS # BLD AUTO: 0.02 THOUSANDS/ΜL (ref 0–0.1)
BASOPHILS NFR BLD AUTO: 0 % (ref 0–1)
BILIRUB SERPL-MCNC: 0.49 MG/DL (ref 0.2–1)
BUN SERPL-MCNC: 6 MG/DL (ref 5–25)
CALCIUM ALBUM COR SERPL-MCNC: 9.6 MG/DL (ref 8.3–10.1)
CALCIUM SERPL-MCNC: 8.9 MG/DL (ref 8.3–10.1)
CHLORIDE SERPL-SCNC: 108 MMOL/L (ref 100–108)
CO2 SERPL-SCNC: 30 MMOL/L (ref 21–32)
CREAT SERPL-MCNC: 0.78 MG/DL (ref 0.6–1.3)
EOSINOPHIL # BLD AUTO: 0.06 THOUSAND/ΜL (ref 0–0.61)
EOSINOPHIL NFR BLD AUTO: 1 % (ref 0–6)
ERYTHROCYTE [DISTWIDTH] IN BLOOD BY AUTOMATED COUNT: 15.9 % (ref 11.6–15.1)
GFR SERPL CREATININE-BSD FRML MDRD: 87 ML/MIN/1.73SQ M
GLUCOSE SERPL-MCNC: 111 MG/DL (ref 65–140)
HCT VFR BLD AUTO: 39 % (ref 34.8–46.1)
HGB BLD-MCNC: 12.3 G/DL (ref 11.5–15.4)
IMM GRANULOCYTES # BLD AUTO: 0.08 THOUSAND/UL (ref 0–0.2)
IMM GRANULOCYTES NFR BLD AUTO: 1 % (ref 0–2)
LYMPHOCYTES # BLD AUTO: 0.63 THOUSANDS/ΜL (ref 0.6–4.47)
LYMPHOCYTES NFR BLD AUTO: 9 % (ref 14–44)
MCH RBC QN AUTO: 35.5 PG (ref 26.8–34.3)
MCHC RBC AUTO-ENTMCNC: 31.5 G/DL (ref 31.4–37.4)
MCV RBC AUTO: 113 FL (ref 82–98)
MONOCYTES # BLD AUTO: 0.76 THOUSAND/ΜL (ref 0.17–1.22)
MONOCYTES NFR BLD AUTO: 10 % (ref 4–12)
NEUTROPHILS # BLD AUTO: 5.78 THOUSANDS/ΜL (ref 1.85–7.62)
NEUTS SEG NFR BLD AUTO: 79 % (ref 43–75)
NRBC BLD AUTO-RTO: 0 /100 WBCS
PLATELET # BLD AUTO: 80 THOUSANDS/UL (ref 149–390)
PMV BLD AUTO: 11.2 FL (ref 8.9–12.7)
POTASSIUM SERPL-SCNC: 4 MMOL/L (ref 3.5–5.3)
PROT SERPL-MCNC: 6.2 G/DL (ref 6.4–8.2)
RBC # BLD AUTO: 3.46 MILLION/UL (ref 3.81–5.12)
SODIUM SERPL-SCNC: 139 MMOL/L (ref 136–145)
WBC # BLD AUTO: 7.33 THOUSAND/UL (ref 4.31–10.16)

## 2020-11-28 PROCEDURE — 36415 COLL VENOUS BLD VENIPUNCTURE: CPT

## 2020-11-28 PROCEDURE — 85025 COMPLETE CBC W/AUTO DIFF WBC: CPT

## 2020-11-28 PROCEDURE — 80053 COMPREHEN METABOLIC PANEL: CPT

## 2020-11-30 ENCOUNTER — HOSPITAL ENCOUNTER (OUTPATIENT)
Dept: INFUSION CENTER | Facility: CLINIC | Age: 55
Discharge: HOME | End: 2020-11-30
Payer: COMMERCIAL

## 2020-11-30 VITALS
RESPIRATION RATE: 16 BRPM | SYSTOLIC BLOOD PRESSURE: 148 MMHG | BODY MASS INDEX: 25.07 KG/M2 | DIASTOLIC BLOOD PRESSURE: 78 MMHG | OXYGEN SATURATION: 99 % | TEMPERATURE: 96.3 F | HEIGHT: 66 IN | HEART RATE: 72 BPM | WEIGHT: 156 LBS

## 2020-11-30 DIAGNOSIS — T45.1X5A CHEMOTHERAPY INDUCED NEUTROPENIA (HCC): Primary | ICD-10-CM

## 2020-11-30 DIAGNOSIS — C77.2 METASTASIS TO RETROPERITONEAL LYMPH NODE (HCC): Primary | ICD-10-CM

## 2020-11-30 DIAGNOSIS — C18.2 PRIMARY ADENOCARCINOMA OF ASCENDING COLON (HCC): ICD-10-CM

## 2020-11-30 DIAGNOSIS — T45.1X5A CHEMOTHERAPY INDUCED NEUTROPENIA (HCC): ICD-10-CM

## 2020-11-30 DIAGNOSIS — D70.1 CHEMOTHERAPY INDUCED NEUTROPENIA (HCC): ICD-10-CM

## 2020-11-30 DIAGNOSIS — C77.2 METASTASIS TO RETROPERITONEAL LYMPH NODE (HCC): ICD-10-CM

## 2020-11-30 DIAGNOSIS — D70.1 CHEMOTHERAPY INDUCED NEUTROPENIA (HCC): Primary | ICD-10-CM

## 2020-11-30 DIAGNOSIS — C78.7 LIVER METASTASES (HCC): ICD-10-CM

## 2020-11-30 LAB
BASOPHILS # BLD AUTO: 0.03 THOUSANDS/ΜL (ref 0–0.1)
BASOPHILS NFR BLD AUTO: 1 % (ref 0–1)
EOSINOPHIL # BLD AUTO: 0.05 THOUSAND/ΜL (ref 0–0.61)
EOSINOPHIL NFR BLD AUTO: 1 % (ref 0–6)
ERYTHROCYTE [DISTWIDTH] IN BLOOD BY AUTOMATED COUNT: 16.1 % (ref 11.6–15.1)
HCT VFR BLD AUTO: 38.6 % (ref 34.8–46.1)
HGB BLD-MCNC: 12.2 G/DL (ref 11.5–15.4)
IMM GRANULOCYTES # BLD AUTO: 0.07 THOUSAND/UL (ref 0–0.2)
IMM GRANULOCYTES NFR BLD AUTO: 1 % (ref 0–2)
LYMPHOCYTES # BLD AUTO: 0.59 THOUSANDS/ΜL (ref 0.6–4.47)
LYMPHOCYTES NFR BLD AUTO: 10 % (ref 14–44)
MCH RBC QN AUTO: 35.1 PG (ref 26.8–34.3)
MCHC RBC AUTO-ENTMCNC: 31.6 G/DL (ref 31.4–37.4)
MCV RBC AUTO: 111 FL (ref 82–98)
MONOCYTES # BLD AUTO: 0.62 THOUSAND/ΜL (ref 0.17–1.22)
MONOCYTES NFR BLD AUTO: 11 % (ref 4–12)
NEUTROPHILS # BLD AUTO: 4.45 THOUSANDS/ΜL (ref 1.85–7.62)
NEUTS SEG NFR BLD AUTO: 76 % (ref 43–75)
NRBC BLD AUTO-RTO: 0 /100 WBCS
PLATELET # BLD AUTO: 82 THOUSANDS/UL (ref 149–390)
PMV BLD AUTO: 10.3 FL (ref 8.9–12.7)
RBC # BLD AUTO: 3.48 MILLION/UL (ref 3.81–5.12)
WBC # BLD AUTO: 5.81 THOUSAND/UL (ref 4.31–10.16)

## 2020-11-30 PROCEDURE — 96417 CHEMO IV INFUS EACH ADDL SEQ: CPT

## 2020-11-30 PROCEDURE — 96367 TX/PROPH/DG ADDL SEQ IV INF: CPT

## 2020-11-30 PROCEDURE — 96415 CHEMO IV INFUSION ADDL HR: CPT

## 2020-11-30 PROCEDURE — 96368 THER/DIAG CONCURRENT INF: CPT

## 2020-11-30 PROCEDURE — G0498 CHEMO EXTEND IV INFUS W/PUMP: HCPCS

## 2020-11-30 PROCEDURE — 96413 CHEMO IV INFUSION 1 HR: CPT

## 2020-11-30 PROCEDURE — 85025 COMPLETE CBC W/AUTO DIFF WBC: CPT | Performed by: INTERNAL MEDICINE

## 2020-11-30 RX ORDER — SODIUM CHLORIDE 9 MG/ML
20 INJECTION, SOLUTION INTRAVENOUS ONCE
Status: COMPLETED | OUTPATIENT
Start: 2020-11-30 | End: 2020-11-30

## 2020-11-30 RX ORDER — DEXTROSE MONOHYDRATE 50 MG/ML
20 INJECTION, SOLUTION INTRAVENOUS ONCE
Status: COMPLETED | OUTPATIENT
Start: 2020-11-30 | End: 2020-11-30

## 2020-11-30 RX ADMIN — DEXAMETHASONE SODIUM PHOSPHATE: 10 INJECTION, SOLUTION INTRAMUSCULAR; INTRAVENOUS at 10:18

## 2020-11-30 RX ADMIN — SODIUM CHLORIDE 20 ML/HR: 0.9 INJECTION, SOLUTION INTRAVENOUS at 10:15

## 2020-11-30 RX ADMIN — LEUCOVORIN CALCIUM 700 MG: 10 INJECTION INTRAMUSCULAR; INTRAVENOUS at 12:13

## 2020-11-30 RX ADMIN — BEVACIZUMAB 357.5 MG: 400 INJECTION, SOLUTION INTRAVENOUS at 11:22

## 2020-11-30 RX ADMIN — DEXTROSE 20 ML/HR: 5 SOLUTION INTRAVENOUS at 12:07

## 2020-11-30 RX ADMIN — FOSAPREPITANT 150 MG: 150 INJECTION, POWDER, LYOPHILIZED, FOR SOLUTION INTRAVENOUS at 10:42

## 2020-11-30 RX ADMIN — OXALIPLATIN 116.35 MG: 5 INJECTION, SOLUTION INTRAVENOUS at 12:13

## 2020-12-02 ENCOUNTER — HOSPITAL ENCOUNTER (OUTPATIENT)
Dept: INFUSION CENTER | Facility: CLINIC | Age: 55
Discharge: HOME | End: 2020-12-02
Payer: COMMERCIAL

## 2020-12-02 VITALS — TEMPERATURE: 96.7 F

## 2020-12-02 DIAGNOSIS — C77.2 METASTASIS TO RETROPERITONEAL LYMPH NODE (HCC): Primary | ICD-10-CM

## 2020-12-02 DIAGNOSIS — C18.2 PRIMARY ADENOCARCINOMA OF ASCENDING COLON (HCC): ICD-10-CM

## 2020-12-02 DIAGNOSIS — C78.7 LIVER METASTASES (HCC): ICD-10-CM

## 2020-12-02 DIAGNOSIS — T45.1X5A CHEMOTHERAPY INDUCED NEUTROPENIA (HCC): ICD-10-CM

## 2020-12-02 DIAGNOSIS — D70.1 CHEMOTHERAPY INDUCED NEUTROPENIA (HCC): ICD-10-CM

## 2020-12-02 PROCEDURE — 96372 THER/PROPH/DIAG INJ SC/IM: CPT

## 2020-12-02 RX ADMIN — PEGFILGRASTIM 6 MG: KIT SUBCUTANEOUS at 09:36

## 2020-12-04 ENCOUNTER — OFFICE VISIT (OUTPATIENT)
Dept: HEMATOLOGY ONCOLOGY | Facility: CLINIC | Age: 55
End: 2020-12-04
Payer: COMMERCIAL

## 2020-12-04 VITALS
HEIGHT: 66 IN | DIASTOLIC BLOOD PRESSURE: 84 MMHG | WEIGHT: 154 LBS | RESPIRATION RATE: 18 BRPM | SYSTOLIC BLOOD PRESSURE: 140 MMHG | OXYGEN SATURATION: 100 % | HEART RATE: 93 BPM | TEMPERATURE: 97.2 F | BODY MASS INDEX: 24.75 KG/M2

## 2020-12-04 DIAGNOSIS — I82.531 CHRONIC DEEP VEIN THROMBOSIS (DVT) OF POPLITEAL VEIN OF RIGHT LOWER EXTREMITY (HCC): ICD-10-CM

## 2020-12-04 DIAGNOSIS — C78.6 PERITONEAL METASTASES (HCC): ICD-10-CM

## 2020-12-04 DIAGNOSIS — C18.2 PRIMARY ADENOCARCINOMA OF ASCENDING COLON (HCC): Primary | ICD-10-CM

## 2020-12-04 DIAGNOSIS — C78.00 MALIGNANT NEOPLASM METASTATIC TO LUNG, UNSPECIFIED LATERALITY (HCC): ICD-10-CM

## 2020-12-04 DIAGNOSIS — C77.2 METASTASIS TO RETROPERITONEAL LYMPH NODE (HCC): ICD-10-CM

## 2020-12-04 DIAGNOSIS — C78.7 LIVER METASTASES (HCC): ICD-10-CM

## 2020-12-04 PROCEDURE — 99214 OFFICE O/P EST MOD 30 MIN: CPT | Performed by: INTERNAL MEDICINE

## 2020-12-08 ENCOUNTER — APPOINTMENT (EMERGENCY)
Dept: CT IMAGING | Facility: HOSPITAL | Age: 55
End: 2020-12-08
Payer: COMMERCIAL

## 2020-12-08 ENCOUNTER — TELEPHONE (OUTPATIENT)
Dept: HEMATOLOGY ONCOLOGY | Facility: CLINIC | Age: 55
End: 2020-12-08

## 2020-12-08 ENCOUNTER — HOSPITAL ENCOUNTER (EMERGENCY)
Facility: HOSPITAL | Age: 55
Discharge: HOME | End: 2020-12-08
Attending: EMERGENCY MEDICINE | Admitting: EMERGENCY MEDICINE
Payer: COMMERCIAL

## 2020-12-08 ENCOUNTER — APPOINTMENT (EMERGENCY)
Dept: ULTRASOUND IMAGING | Facility: HOSPITAL | Age: 55
End: 2020-12-08
Payer: COMMERCIAL

## 2020-12-08 ENCOUNTER — APPOINTMENT (EMERGENCY)
Dept: RADIOLOGY | Facility: HOSPITAL | Age: 55
End: 2020-12-08
Payer: COMMERCIAL

## 2020-12-08 VITALS
OXYGEN SATURATION: 98 % | DIASTOLIC BLOOD PRESSURE: 77 MMHG | SYSTOLIC BLOOD PRESSURE: 188 MMHG | TEMPERATURE: 98.6 F | RESPIRATION RATE: 18 BRPM | WEIGHT: 153 LBS | HEART RATE: 88 BPM | HEIGHT: 66 IN | BODY MASS INDEX: 24.59 KG/M2

## 2020-12-08 DIAGNOSIS — R10.11 RUQ ABDOMINAL PAIN: ICD-10-CM

## 2020-12-08 DIAGNOSIS — R07.9 RIGHT-SIDED CHEST PAIN: Primary | ICD-10-CM

## 2020-12-08 LAB
ALBUMIN SERPL BCP-MCNC: 3.3 G/DL (ref 3.5–5)
ALP SERPL-CCNC: 451 U/L (ref 46–116)
ALT SERPL W P-5'-P-CCNC: 132 U/L (ref 12–78)
ANION GAP SERPL CALCULATED.3IONS-SCNC: 8 MMOL/L (ref 4–13)
AST SERPL W P-5'-P-CCNC: 77 U/L (ref 5–45)
ATRIAL RATE: 83 BPM
BACTERIA UR QL AUTO: NORMAL /HPF
BASOPHILS # BLD AUTO: 0.08 THOUSANDS/ΜL (ref 0–0.1)
BASOPHILS NFR BLD AUTO: 1 % (ref 0–1)
BILIRUB SERPL-MCNC: 0.63 MG/DL (ref 0.2–1)
BILIRUB UR QL STRIP: NEGATIVE
BUN SERPL-MCNC: 8 MG/DL (ref 5–25)
CALCIUM ALBUM COR SERPL-MCNC: 9.8 MG/DL (ref 8.3–10.1)
CALCIUM SERPL-MCNC: 9.2 MG/DL (ref 8.3–10.1)
CHLORIDE SERPL-SCNC: 105 MMOL/L (ref 100–108)
CLARITY UR: CLEAR
CO2 SERPL-SCNC: 25 MMOL/L (ref 21–32)
COLOR UR: YELLOW
CREAT SERPL-MCNC: 0.79 MG/DL (ref 0.6–1.3)
D DIMER PPP FEU-MCNC: 1.43 UG/ML FEU
EOSINOPHIL # BLD AUTO: 0.06 THOUSAND/ΜL (ref 0–0.61)
EOSINOPHIL NFR BLD AUTO: 0 % (ref 0–6)
ERYTHROCYTE [DISTWIDTH] IN BLOOD BY AUTOMATED COUNT: 16.2 % (ref 11.6–15.1)
GFR SERPL CREATININE-BSD FRML MDRD: 86 ML/MIN/1.73SQ M
GLUCOSE SERPL-MCNC: 102 MG/DL (ref 65–140)
GLUCOSE UR STRIP-MCNC: NEGATIVE MG/DL
HCT VFR BLD AUTO: 42 % (ref 34.8–46.1)
HGB BLD-MCNC: 13.4 G/DL (ref 11.5–15.4)
HGB UR QL STRIP.AUTO: ABNORMAL
IMM GRANULOCYTES # BLD AUTO: 0.22 THOUSAND/UL (ref 0–0.2)
IMM GRANULOCYTES NFR BLD AUTO: 2 % (ref 0–2)
KETONES UR STRIP-MCNC: NEGATIVE MG/DL
LEUKOCYTE ESTERASE UR QL STRIP: NEGATIVE
LIPASE SERPL-CCNC: 72 U/L (ref 73–393)
LYMPHOCYTES # BLD AUTO: 0.53 THOUSANDS/ΜL (ref 0.6–4.47)
LYMPHOCYTES NFR BLD AUTO: 4 % (ref 14–44)
MCH RBC QN AUTO: 35.1 PG (ref 26.8–34.3)
MCHC RBC AUTO-ENTMCNC: 31.9 G/DL (ref 31.4–37.4)
MCV RBC AUTO: 110 FL (ref 82–98)
MONOCYTES # BLD AUTO: 2.17 THOUSAND/ΜL (ref 0.17–1.22)
MONOCYTES NFR BLD AUTO: 15 % (ref 4–12)
NEUTROPHILS # BLD AUTO: 11.85 THOUSANDS/ΜL (ref 1.85–7.62)
NEUTS SEG NFR BLD AUTO: 78 % (ref 43–75)
NITRITE UR QL STRIP: NEGATIVE
NON-SQ EPI CELLS URNS QL MICRO: NORMAL /HPF
NRBC BLD AUTO-RTO: 0 /100 WBCS
P AXIS: 58 DEGREES
PH UR STRIP.AUTO: 6.5 [PH] (ref 4.5–8)
PLATELET # BLD AUTO: 64 THOUSANDS/UL (ref 149–390)
PMV BLD AUTO: 11 FL (ref 8.9–12.7)
POTASSIUM SERPL-SCNC: 4.3 MMOL/L (ref 3.5–5.3)
PR INTERVAL: 152 MS
PROT SERPL-MCNC: 6.6 G/DL (ref 6.4–8.2)
PROT UR STRIP-MCNC: ABNORMAL MG/DL
QRS AXIS: -38 DEGREES
QRSD INTERVAL: 78 MS
QT INTERVAL: 352 MS
QTC INTERVAL: 406 MS
RBC # BLD AUTO: 3.82 MILLION/UL (ref 3.81–5.12)
RBC #/AREA URNS AUTO: NORMAL /HPF
SODIUM SERPL-SCNC: 138 MMOL/L (ref 136–145)
SP GR UR STRIP.AUTO: 1.01 (ref 1–1.03)
T WAVE AXIS: 61 DEGREES
UROBILINOGEN UR QL STRIP.AUTO: 0.2 E.U./DL
VENTRICULAR RATE: 80 BPM
WBC # BLD AUTO: 14.91 THOUSAND/UL (ref 4.31–10.16)
WBC #/AREA URNS AUTO: NORMAL /HPF

## 2020-12-08 PROCEDURE — G1004 CDSM NDSC: HCPCS

## 2020-12-08 PROCEDURE — 99285 EMERGENCY DEPT VISIT HI MDM: CPT | Performed by: EMERGENCY MEDICINE

## 2020-12-08 PROCEDURE — 71045 X-RAY EXAM CHEST 1 VIEW: CPT

## 2020-12-08 PROCEDURE — 76705 ECHO EXAM OF ABDOMEN: CPT

## 2020-12-08 PROCEDURE — 71275 CT ANGIOGRAPHY CHEST: CPT

## 2020-12-08 PROCEDURE — 85379 FIBRIN DEGRADATION QUANT: CPT | Performed by: EMERGENCY MEDICINE

## 2020-12-08 PROCEDURE — 83690 ASSAY OF LIPASE: CPT | Performed by: EMERGENCY MEDICINE

## 2020-12-08 PROCEDURE — 74177 CT ABD & PELVIS W/CONTRAST: CPT

## 2020-12-08 PROCEDURE — 93005 ELECTROCARDIOGRAM TRACING: CPT

## 2020-12-08 PROCEDURE — 99284 EMERGENCY DEPT VISIT MOD MDM: CPT

## 2020-12-08 PROCEDURE — 80053 COMPREHEN METABOLIC PANEL: CPT | Performed by: EMERGENCY MEDICINE

## 2020-12-08 PROCEDURE — 36415 COLL VENOUS BLD VENIPUNCTURE: CPT | Performed by: EMERGENCY MEDICINE

## 2020-12-08 PROCEDURE — 81001 URINALYSIS AUTO W/SCOPE: CPT

## 2020-12-08 PROCEDURE — 96365 THER/PROPH/DIAG IV INF INIT: CPT

## 2020-12-08 PROCEDURE — 82272 OCCULT BLD FECES 1-3 TESTS: CPT

## 2020-12-08 PROCEDURE — 93010 ELECTROCARDIOGRAM REPORT: CPT | Performed by: INTERNAL MEDICINE

## 2020-12-08 PROCEDURE — 96366 THER/PROPH/DIAG IV INF ADDON: CPT

## 2020-12-08 PROCEDURE — 85025 COMPLETE CBC W/AUTO DIFF WBC: CPT | Performed by: EMERGENCY MEDICINE

## 2020-12-08 RX ORDER — OXYCODONE HYDROCHLORIDE 5 MG/1
5 CAPSULE ORAL EVERY 6 HOURS PRN
Qty: 10 CAPSULE | Refills: 0 | Status: SHIPPED | OUTPATIENT
Start: 2020-12-08 | End: 2021-06-14 | Stop reason: ALTCHOICE

## 2020-12-08 RX ORDER — LIDOCAINE 50 MG/G
2 PATCH TOPICAL ONCE
Status: DISCONTINUED | OUTPATIENT
Start: 2020-12-08 | End: 2020-12-08 | Stop reason: HOSPADM

## 2020-12-08 RX ADMIN — LIDOCAINE 5% 2 PATCH: 700 PATCH TOPICAL at 10:33

## 2020-12-08 RX ADMIN — SODIUM CHLORIDE, SODIUM LACTATE, POTASSIUM CHLORIDE, AND CALCIUM CHLORIDE 500 ML: .6; .31; .03; .02 INJECTION, SOLUTION INTRAVENOUS at 10:34

## 2020-12-08 RX ADMIN — IOHEXOL 100 ML: 350 INJECTION, SOLUTION INTRAVENOUS at 12:07

## 2020-12-14 ENCOUNTER — DOCUMENTATION (OUTPATIENT)
Dept: HEMATOLOGY ONCOLOGY | Facility: CLINIC | Age: 55
End: 2020-12-14

## 2020-12-15 ENCOUNTER — TELEPHONE (OUTPATIENT)
Dept: HEMATOLOGY ONCOLOGY | Facility: CLINIC | Age: 55
End: 2020-12-15

## 2020-12-16 ENCOUNTER — HOSPITAL ENCOUNTER (OUTPATIENT)
Dept: INFUSION CENTER | Facility: CLINIC | Age: 55
Discharge: HOME | End: 2020-12-16

## 2020-12-16 DIAGNOSIS — D70.1 CHEMOTHERAPY INDUCED NEUTROPENIA (HCC): Primary | ICD-10-CM

## 2020-12-16 DIAGNOSIS — C77.2 METASTASIS TO RETROPERITONEAL LYMPH NODE (HCC): ICD-10-CM

## 2020-12-16 DIAGNOSIS — T45.1X5A CHEMOTHERAPY INDUCED NEUTROPENIA (HCC): Primary | ICD-10-CM

## 2020-12-16 DIAGNOSIS — C78.7 LIVER METASTASES (HCC): ICD-10-CM

## 2020-12-16 DIAGNOSIS — C18.2 PRIMARY ADENOCARCINOMA OF ASCENDING COLON (HCC): ICD-10-CM

## 2020-12-17 ENCOUNTER — TELEPHONE (OUTPATIENT)
Dept: HEMATOLOGY ONCOLOGY | Facility: CLINIC | Age: 55
End: 2020-12-17

## 2020-12-21 RX ORDER — FLUOROURACIL 50 MG/ML
400 INJECTION, SOLUTION INTRAVENOUS ONCE
Status: CANCELLED | OUTPATIENT
Start: 2020-12-28

## 2020-12-21 RX ORDER — DEXTROSE MONOHYDRATE 50 MG/ML
20 INJECTION, SOLUTION INTRAVENOUS ONCE
Status: CANCELLED | OUTPATIENT
Start: 2020-12-28

## 2020-12-21 RX ORDER — SODIUM CHLORIDE 9 MG/ML
20 INJECTION, SOLUTION INTRAVENOUS ONCE
Status: CANCELLED | OUTPATIENT
Start: 2020-12-28

## 2020-12-23 ENCOUNTER — DOCUMENTATION (OUTPATIENT)
Dept: HEMATOLOGY ONCOLOGY | Facility: CLINIC | Age: 55
End: 2020-12-23

## 2020-12-24 ENCOUNTER — DOCUMENTATION (OUTPATIENT)
Dept: HEMATOLOGY ONCOLOGY | Facility: CLINIC | Age: 55
End: 2020-12-24

## 2020-12-24 DIAGNOSIS — C77.2 METASTASIS TO RETROPERITONEAL LYMPH NODE (HCC): ICD-10-CM

## 2020-12-24 DIAGNOSIS — C18.2 PRIMARY ADENOCARCINOMA OF ASCENDING COLON (HCC): ICD-10-CM

## 2020-12-24 DIAGNOSIS — C78.7 LIVER METASTASES (HCC): ICD-10-CM

## 2020-12-24 DIAGNOSIS — T45.1X5A CHEMOTHERAPY INDUCED NEUTROPENIA (HCC): Primary | ICD-10-CM

## 2020-12-24 DIAGNOSIS — D70.1 CHEMOTHERAPY INDUCED NEUTROPENIA (HCC): Primary | ICD-10-CM

## 2020-12-26 ENCOUNTER — LAB (OUTPATIENT)
Dept: LAB | Facility: MEDICAL CENTER | Age: 55
End: 2020-12-26
Payer: COMMERCIAL

## 2020-12-26 DIAGNOSIS — D70.1 CHEMOTHERAPY INDUCED NEUTROPENIA (HCC): ICD-10-CM

## 2020-12-26 DIAGNOSIS — C77.2 METASTASIS TO RETROPERITONEAL LYMPH NODE (HCC): ICD-10-CM

## 2020-12-26 DIAGNOSIS — C78.7 LIVER METASTASES (HCC): ICD-10-CM

## 2020-12-26 DIAGNOSIS — C18.2 PRIMARY ADENOCARCINOMA OF ASCENDING COLON (HCC): ICD-10-CM

## 2020-12-26 DIAGNOSIS — T45.1X5A CHEMOTHERAPY INDUCED NEUTROPENIA (HCC): ICD-10-CM

## 2020-12-26 LAB
ALBUMIN SERPL BCP-MCNC: 3.4 G/DL (ref 3.5–5)
ALP SERPL-CCNC: 336 U/L (ref 46–116)
ALT SERPL W P-5'-P-CCNC: 104 U/L (ref 12–78)
ANION GAP SERPL CALCULATED.3IONS-SCNC: 3 MMOL/L (ref 4–13)
AST SERPL W P-5'-P-CCNC: 77 U/L (ref 5–45)
BASOPHILS # BLD AUTO: 0.02 THOUSANDS/ΜL (ref 0–0.1)
BASOPHILS NFR BLD AUTO: 1 % (ref 0–1)
BILIRUB SERPL-MCNC: 0.73 MG/DL (ref 0.2–1)
BUN SERPL-MCNC: 10 MG/DL (ref 5–25)
CALCIUM ALBUM COR SERPL-MCNC: 10 MG/DL (ref 8.3–10.1)
CALCIUM SERPL-MCNC: 9.5 MG/DL (ref 8.3–10.1)
CHLORIDE SERPL-SCNC: 108 MMOL/L (ref 100–108)
CO2 SERPL-SCNC: 30 MMOL/L (ref 21–32)
CREAT SERPL-MCNC: 0.79 MG/DL (ref 0.6–1.3)
EOSINOPHIL # BLD AUTO: 0.07 THOUSAND/ΜL (ref 0–0.61)
EOSINOPHIL NFR BLD AUTO: 2 % (ref 0–6)
ERYTHROCYTE [DISTWIDTH] IN BLOOD BY AUTOMATED COUNT: 15.1 % (ref 11.6–15.1)
GFR SERPL CREATININE-BSD FRML MDRD: 86 ML/MIN/1.73SQ M
GLUCOSE SERPL-MCNC: 109 MG/DL (ref 65–140)
HCT VFR BLD AUTO: 42.4 % (ref 34.8–46.1)
HGB BLD-MCNC: 13.4 G/DL (ref 11.5–15.4)
IMM GRANULOCYTES # BLD AUTO: 0.01 THOUSAND/UL (ref 0–0.2)
IMM GRANULOCYTES NFR BLD AUTO: 0 % (ref 0–2)
LYMPHOCYTES # BLD AUTO: 0.6 THOUSANDS/ΜL (ref 0.6–4.47)
LYMPHOCYTES NFR BLD AUTO: 16 % (ref 14–44)
MCH RBC QN AUTO: 35.4 PG (ref 26.8–34.3)
MCHC RBC AUTO-ENTMCNC: 31.6 G/DL (ref 31.4–37.4)
MCV RBC AUTO: 112 FL (ref 82–98)
MONOCYTES # BLD AUTO: 0.56 THOUSAND/ΜL (ref 0.17–1.22)
MONOCYTES NFR BLD AUTO: 15 % (ref 4–12)
NEUTROPHILS # BLD AUTO: 2.51 THOUSANDS/ΜL (ref 1.85–7.62)
NEUTS SEG NFR BLD AUTO: 66 % (ref 43–75)
NRBC BLD AUTO-RTO: 0 /100 WBCS
PLATELET # BLD AUTO: 86 THOUSANDS/UL (ref 149–390)
PMV BLD AUTO: 9.8 FL (ref 8.9–12.7)
POTASSIUM SERPL-SCNC: 4.4 MMOL/L (ref 3.5–5.3)
PROT SERPL-MCNC: 6.7 G/DL (ref 6.4–8.2)
RBC # BLD AUTO: 3.78 MILLION/UL (ref 3.81–5.12)
SODIUM SERPL-SCNC: 141 MMOL/L (ref 136–145)
WBC # BLD AUTO: 3.77 THOUSAND/UL (ref 4.31–10.16)

## 2020-12-26 PROCEDURE — 36415 COLL VENOUS BLD VENIPUNCTURE: CPT

## 2020-12-26 PROCEDURE — 85025 COMPLETE CBC W/AUTO DIFF WBC: CPT

## 2020-12-26 PROCEDURE — 80053 COMPREHEN METABOLIC PANEL: CPT

## 2020-12-28 ENCOUNTER — HOSPITAL ENCOUNTER (OUTPATIENT)
Dept: INFUSION CENTER | Facility: CLINIC | Age: 55
Discharge: HOME | End: 2020-12-28
Payer: COMMERCIAL

## 2020-12-28 ENCOUNTER — HOSPITAL ENCOUNTER (OUTPATIENT)
Dept: INFUSION CENTER | Facility: HOSPITAL | Age: 55
Discharge: HOME | End: 2020-12-28
Attending: INTERNAL MEDICINE

## 2020-12-28 VITALS
OXYGEN SATURATION: 98 % | HEIGHT: 66 IN | RESPIRATION RATE: 16 BRPM | HEART RATE: 97 BPM | SYSTOLIC BLOOD PRESSURE: 146 MMHG | TEMPERATURE: 95.6 F | WEIGHT: 152 LBS | DIASTOLIC BLOOD PRESSURE: 80 MMHG | BODY MASS INDEX: 24.43 KG/M2

## 2020-12-28 DIAGNOSIS — C18.2 PRIMARY ADENOCARCINOMA OF ASCENDING COLON (HCC): ICD-10-CM

## 2020-12-28 DIAGNOSIS — T45.1X5A CHEMOTHERAPY INDUCED NEUTROPENIA (HCC): ICD-10-CM

## 2020-12-28 DIAGNOSIS — D70.1 CHEMOTHERAPY INDUCED NEUTROPENIA (HCC): ICD-10-CM

## 2020-12-28 DIAGNOSIS — C78.7 LIVER METASTASES (HCC): ICD-10-CM

## 2020-12-28 DIAGNOSIS — C77.2 METASTASIS TO RETROPERITONEAL LYMPH NODE (HCC): Primary | ICD-10-CM

## 2020-12-28 PROCEDURE — 96415 CHEMO IV INFUSION ADDL HR: CPT

## 2020-12-28 PROCEDURE — 96367 TX/PROPH/DG ADDL SEQ IV INF: CPT

## 2020-12-28 PROCEDURE — G0498 CHEMO EXTEND IV INFUS W/PUMP: HCPCS

## 2020-12-28 PROCEDURE — 96368 THER/DIAG CONCURRENT INF: CPT

## 2020-12-28 PROCEDURE — 96375 TX/PRO/DX INJ NEW DRUG ADDON: CPT

## 2020-12-28 PROCEDURE — 96417 CHEMO IV INFUS EACH ADDL SEQ: CPT

## 2020-12-28 PROCEDURE — 96413 CHEMO IV INFUSION 1 HR: CPT

## 2020-12-28 RX ORDER — FUROSEMIDE 10 MG/ML
20 INJECTION INTRAMUSCULAR; INTRAVENOUS ONCE
Status: CANCELLED
Start: 2020-12-28

## 2020-12-28 RX ORDER — FUROSEMIDE 10 MG/ML
20 INJECTION INTRAMUSCULAR; INTRAVENOUS ONCE
Status: COMPLETED | OUTPATIENT
Start: 2020-12-28 | End: 2020-12-28

## 2020-12-28 RX ORDER — LORAZEPAM 0.5 MG/1
0.5 TABLET ORAL ONCE
Status: CANCELLED
Start: 2020-12-28

## 2020-12-28 RX ORDER — SODIUM CHLORIDE 9 MG/ML
20 INJECTION, SOLUTION INTRAVENOUS ONCE
Status: COMPLETED | OUTPATIENT
Start: 2020-12-28 | End: 2020-12-28

## 2020-12-28 RX ORDER — DEXTROSE MONOHYDRATE 50 MG/ML
20 INJECTION, SOLUTION INTRAVENOUS ONCE
Status: COMPLETED | OUTPATIENT
Start: 2020-12-28 | End: 2020-12-28

## 2020-12-28 RX ORDER — LORAZEPAM 0.5 MG/1
0.5 TABLET ORAL ONCE
Status: COMPLETED | OUTPATIENT
Start: 2020-12-28 | End: 2020-12-28

## 2020-12-28 RX ADMIN — DEXTROSE 20 ML/HR: 5 SOLUTION INTRAVENOUS at 11:57

## 2020-12-28 RX ADMIN — FUROSEMIDE 20 MG: 10 INJECTION, SOLUTION INTRAMUSCULAR; INTRAVENOUS at 09:19

## 2020-12-28 RX ADMIN — DEXAMETHASONE SODIUM PHOSPHATE: 10 INJECTION, SOLUTION INTRAMUSCULAR; INTRAVENOUS at 09:56

## 2020-12-28 RX ADMIN — OXALIPLATIN 115.7 MG: 5 INJECTION, SOLUTION, CONCENTRATE INTRAVENOUS at 12:02

## 2020-12-28 RX ADMIN — LORAZEPAM 0.5 MG: 0.5 TABLET ORAL at 09:18

## 2020-12-28 RX ADMIN — LEUCOVORIN CALCIUM 700 MG: 500 INJECTION, POWDER, LYOPHILIZED, FOR SOLUTION INTRAMUSCULAR; INTRAVENOUS at 11:58

## 2020-12-28 RX ADMIN — FOSAPREPITANT 150 MG: 150 INJECTION, POWDER, LYOPHILIZED, FOR SOLUTION INTRAVENOUS at 10:18

## 2020-12-28 RX ADMIN — SODIUM CHLORIDE 20 ML/HR: 0.9 INJECTION, SOLUTION INTRAVENOUS at 09:18

## 2020-12-28 RX ADMIN — BEVACIZUMAB 347.5 MG: 400 INJECTION, SOLUTION INTRAVENOUS at 11:06

## 2020-12-30 ENCOUNTER — TELEPHONE (OUTPATIENT)
Dept: HEMATOLOGY ONCOLOGY | Facility: CLINIC | Age: 55
End: 2020-12-30

## 2020-12-30 ENCOUNTER — HOSPITAL ENCOUNTER (OUTPATIENT)
Dept: INFUSION CENTER | Facility: HOSPITAL | Age: 55
Discharge: HOME | End: 2020-12-30
Attending: INTERNAL MEDICINE

## 2020-12-30 ENCOUNTER — HOSPITAL ENCOUNTER (OUTPATIENT)
Dept: INFUSION CENTER | Facility: CLINIC | Age: 55
Discharge: HOME | End: 2020-12-30
Payer: COMMERCIAL

## 2020-12-30 VITALS — TEMPERATURE: 96.4 F

## 2020-12-30 DIAGNOSIS — T45.1X5A CHEMOTHERAPY INDUCED NEUTROPENIA (HCC): ICD-10-CM

## 2020-12-30 DIAGNOSIS — C78.7 LIVER METASTASES (HCC): ICD-10-CM

## 2020-12-30 DIAGNOSIS — C77.2 METASTASIS TO RETROPERITONEAL LYMPH NODE (HCC): Primary | ICD-10-CM

## 2020-12-30 DIAGNOSIS — D70.1 CHEMOTHERAPY INDUCED NEUTROPENIA (HCC): ICD-10-CM

## 2020-12-30 DIAGNOSIS — C18.2 PRIMARY ADENOCARCINOMA OF ASCENDING COLON (HCC): ICD-10-CM

## 2020-12-30 PROCEDURE — 96372 THER/PROPH/DIAG INJ SC/IM: CPT

## 2020-12-30 RX ADMIN — PEGFILGRASTIM 6 MG: KIT SUBCUTANEOUS at 13:10

## 2020-12-30 NOTE — PROGRESS NOTES
Pt arrived to infusion for CADD/ pump DC    pts res volume is zero   pt has no c/o    feels ok   pts pump dcd- port needle flushed with excellent brisk blood return and de accessed per protocol   Medical equipment request check  Pt tolerated Neulasta ON Pro to ANGE as ordered     Pt verb understanding of indications and when to removed    pt declined her offered AVS   pt dcd stable

## 2021-01-01 NOTE — PLAN OF CARE
DISCHARGE PLANNING     Discharge to home or other facility with appropriate resources Progressing        DISCHARGE PLANNING - CARE MANAGEMENT     Discharge to post-acute care or home with appropriate resources Progressing        INFECTION - ADULT     Absence or prevention of progression during hospitalization Progressing        PAIN - ADULT     Verbalizes/displays adequate comfort level or baseline comfort level Progressing        Potential for Falls     Patient will remain free of falls Progressing        SAFETY ADULT     Maintain or return to baseline ADL function Progressing     Maintain or return mobility status to optimal level Progressing     Patient will remain free of falls Progressing POST-PARTUM/WIN INITIAL DISCHARGE PLANNING/CARE COORDINATION    Term birth of infant [Z37.0]    Writer met w/ Adriana at bedside to discuss DCP. Adriana is S/P  on 21    Infant name on BC: Tan Juan David. Infant to [facility]. Infant PCP Dr. Andrew Fragoso. FOB: Werner Gibbons    Writer verified name/address/phone number correct on [facility]. Writer notified patient she has 30 days from date of birth to add Louis Finney to insurance policy. Adriana verbalized understanding and will notify- Odell Mccracken states it is not insurance through an employer, rather a [employer]. Adriana verbalized has/have all necessary items for Louis Finney. No previous home care or dme. No Home Care or DME anticipated. Anticipate DC of couplet 21    CM continue to follow for any DC needs.

## 2021-01-04 RX ORDER — SODIUM CHLORIDE 9 MG/ML
20 INJECTION, SOLUTION INTRAVENOUS ONCE
Status: CANCELLED | OUTPATIENT
Start: 2021-01-12

## 2021-01-04 RX ORDER — DEXTROSE MONOHYDRATE 50 MG/ML
20 INJECTION, SOLUTION INTRAVENOUS ONCE
Status: CANCELLED | OUTPATIENT
Start: 2021-01-12

## 2021-01-07 DIAGNOSIS — C77.2 METASTASIS TO RETROPERITONEAL LYMPH NODE (HCC): ICD-10-CM

## 2021-01-07 DIAGNOSIS — C78.7 LIVER METASTASES (HCC): ICD-10-CM

## 2021-01-07 DIAGNOSIS — C18.2 PRIMARY ADENOCARCINOMA OF ASCENDING COLON (HCC): Primary | ICD-10-CM

## 2021-01-08 ENCOUNTER — TELEPHONE (OUTPATIENT)
Dept: HEMATOLOGY ONCOLOGY | Facility: CLINIC | Age: 56
End: 2021-01-08

## 2021-01-08 NOTE — TELEPHONE ENCOUNTER
Patient is requesting if Lane Michaels would recommend her getting the COVID vaccine? She would like to know when it would be available to her? Should her family be vaccinated due to her being compromised? Patient will be having radiation beads placed in her liver at [facility] on 2/1/21  She will be unable to have Avastin prior to this  Patient is questioning if there is anything else other than Avastin that can be given to her to treat her lung lesions while not on Avastin? Avastin will need to be removed from her upcoming treatment  Patient stated that she can not have it 4 weeks prior to her procedure  She is unsure how long after the procedure Avastin will need to be held  Patient is currently undergoing treatment  Please review above with Dr Zita Jon  Patient's call back # [phone] (M)

## 2021-01-08 NOTE — TELEPHONE ENCOUNTER
Returned telephone call spoke with pt  Per Dr Osman eMndoza encouraging you to get the vaccine but we have not heard when the next level will be available  Our office does not give the vaccines go through your PCP  Call us when the vaccine is available and we will be given instructions as best time to get it based on tx schedule  There is no substitution for the Avastin  Fu with you live md as to when you can go back to the avastin  Pt states she will fu with pcp dr Lalo Sterling and the liver specialist [facility] [place]  No other concerns at this time

## 2021-01-11 ENCOUNTER — LAB (OUTPATIENT)
Dept: LAB | Facility: MEDICAL CENTER | Age: 56
End: 2021-01-11
Payer: COMMERCIAL

## 2021-01-11 DIAGNOSIS — D70.1 CHEMOTHERAPY INDUCED NEUTROPENIA (HCC): ICD-10-CM

## 2021-01-11 DIAGNOSIS — T45.1X5A CHEMOTHERAPY INDUCED NEUTROPENIA (HCC): ICD-10-CM

## 2021-01-11 DIAGNOSIS — C18.2 PRIMARY ADENOCARCINOMA OF ASCENDING COLON (HCC): ICD-10-CM

## 2021-01-11 DIAGNOSIS — C77.2 METASTASIS TO RETROPERITONEAL LYMPH NODE (HCC): ICD-10-CM

## 2021-01-11 DIAGNOSIS — C78.7 LIVER METASTASES (HCC): ICD-10-CM

## 2021-01-11 LAB
ALBUMIN SERPL BCP-MCNC: 3.1 G/DL (ref 3.5–5)
ALP SERPL-CCNC: 448 U/L (ref 46–116)
ALT SERPL W P-5'-P-CCNC: 133 U/L (ref 12–78)
ANION GAP SERPL CALCULATED.3IONS-SCNC: 5 MMOL/L (ref 4–13)
AST SERPL W P-5'-P-CCNC: 103 U/L (ref 5–45)
BASOPHILS # BLD AUTO: 0.03 THOUSANDS/ΜL (ref 0–0.1)
BASOPHILS NFR BLD AUTO: 0 % (ref 0–1)
BILIRUB SERPL-MCNC: 0.5 MG/DL (ref 0.2–1)
BUN SERPL-MCNC: 13 MG/DL (ref 5–25)
CALCIUM ALBUM COR SERPL-MCNC: 10 MG/DL (ref 8.3–10.1)
CALCIUM SERPL-MCNC: 9.3 MG/DL (ref 8.3–10.1)
CHLORIDE SERPL-SCNC: 110 MMOL/L (ref 100–108)
CO2 SERPL-SCNC: 28 MMOL/L (ref 21–32)
CREAT SERPL-MCNC: 0.79 MG/DL (ref 0.6–1.3)
EOSINOPHIL # BLD AUTO: 0.1 THOUSAND/ΜL (ref 0–0.61)
EOSINOPHIL NFR BLD AUTO: 1 % (ref 0–6)
ERYTHROCYTE [DISTWIDTH] IN BLOOD BY AUTOMATED COUNT: 14.9 % (ref 11.6–15.1)
GFR SERPL CREATININE-BSD FRML MDRD: 86 ML/MIN/1.73SQ M
GLUCOSE SERPL-MCNC: 103 MG/DL (ref 65–140)
HCT VFR BLD AUTO: 39.8 % (ref 34.8–46.1)
HGB BLD-MCNC: 12.4 G/DL (ref 11.5–15.4)
IMM GRANULOCYTES # BLD AUTO: 0.05 THOUSAND/UL (ref 0–0.2)
IMM GRANULOCYTES NFR BLD AUTO: 1 % (ref 0–2)
LYMPHOCYTES # BLD AUTO: 0.75 THOUSANDS/ΜL (ref 0.6–4.47)
LYMPHOCYTES NFR BLD AUTO: 9 % (ref 14–44)
MCH RBC QN AUTO: 35.3 PG (ref 26.8–34.3)
MCHC RBC AUTO-ENTMCNC: 31.2 G/DL (ref 31.4–37.4)
MCV RBC AUTO: 113 FL (ref 82–98)
MONOCYTES # BLD AUTO: 0.88 THOUSAND/ΜL (ref 0.17–1.22)
MONOCYTES NFR BLD AUTO: 10 % (ref 4–12)
NEUTROPHILS # BLD AUTO: 6.93 THOUSANDS/ΜL (ref 1.85–7.62)
NEUTS SEG NFR BLD AUTO: 79 % (ref 43–75)
NRBC BLD AUTO-RTO: 0 /100 WBCS
PLATELET # BLD AUTO: 83 THOUSANDS/UL (ref 149–390)
PMV BLD AUTO: 10.5 FL (ref 8.9–12.7)
POTASSIUM SERPL-SCNC: 3.9 MMOL/L (ref 3.5–5.3)
PROT SERPL-MCNC: 6.3 G/DL (ref 6.4–8.2)
RBC # BLD AUTO: 3.51 MILLION/UL (ref 3.81–5.12)
SODIUM SERPL-SCNC: 143 MMOL/L (ref 136–145)
WBC # BLD AUTO: 8.74 THOUSAND/UL (ref 4.31–10.16)

## 2021-01-11 PROCEDURE — 85025 COMPLETE CBC W/AUTO DIFF WBC: CPT

## 2021-01-11 PROCEDURE — 36415 COLL VENOUS BLD VENIPUNCTURE: CPT

## 2021-01-11 PROCEDURE — 80053 COMPREHEN METABOLIC PANEL: CPT

## 2021-01-12 ENCOUNTER — HOSPITAL ENCOUNTER (OUTPATIENT)
Dept: INFUSION CENTER | Facility: CLINIC | Age: 56
Discharge: HOME | End: 2021-01-12
Payer: COMMERCIAL

## 2021-01-12 ENCOUNTER — DOCUMENTATION (OUTPATIENT)
Dept: HEMATOLOGY ONCOLOGY | Facility: CLINIC | Age: 56
End: 2021-01-12

## 2021-01-12 VITALS
SYSTOLIC BLOOD PRESSURE: 130 MMHG | HEART RATE: 73 BPM | DIASTOLIC BLOOD PRESSURE: 90 MMHG | WEIGHT: 154 LBS | RESPIRATION RATE: 16 BRPM | HEIGHT: 66 IN | TEMPERATURE: 96.3 F | BODY MASS INDEX: 24.75 KG/M2

## 2021-01-12 DIAGNOSIS — T45.1X5A CHEMOTHERAPY INDUCED NEUTROPENIA (HCC): ICD-10-CM

## 2021-01-12 DIAGNOSIS — C78.7 LIVER METASTASES (HCC): ICD-10-CM

## 2021-01-12 DIAGNOSIS — D70.1 CHEMOTHERAPY INDUCED NEUTROPENIA (HCC): ICD-10-CM

## 2021-01-12 DIAGNOSIS — C18.2 PRIMARY ADENOCARCINOMA OF ASCENDING COLON (HCC): ICD-10-CM

## 2021-01-12 DIAGNOSIS — C77.2 METASTASIS TO RETROPERITONEAL LYMPH NODE (HCC): Primary | ICD-10-CM

## 2021-01-12 PROCEDURE — 96367 TX/PROPH/DG ADDL SEQ IV INF: CPT

## 2021-01-12 PROCEDURE — 96413 CHEMO IV INFUSION 1 HR: CPT

## 2021-01-12 PROCEDURE — 96375 TX/PRO/DX INJ NEW DRUG ADDON: CPT

## 2021-01-12 PROCEDURE — 96415 CHEMO IV INFUSION ADDL HR: CPT

## 2021-01-12 PROCEDURE — 96368 THER/DIAG CONCURRENT INF: CPT

## 2021-01-12 PROCEDURE — G0498 CHEMO EXTEND IV INFUS W/PUMP: HCPCS

## 2021-01-12 RX ORDER — FUROSEMIDE 10 MG/ML
20 INJECTION INTRAMUSCULAR; INTRAVENOUS ONCE
Status: CANCELLED
Start: 2021-01-12

## 2021-01-12 RX ORDER — DEXTROSE MONOHYDRATE 50 MG/ML
20 INJECTION, SOLUTION INTRAVENOUS ONCE
Status: COMPLETED | OUTPATIENT
Start: 2021-01-12 | End: 2021-01-12

## 2021-01-12 RX ORDER — SODIUM CHLORIDE 9 MG/ML
20 INJECTION, SOLUTION INTRAVENOUS ONCE
Status: COMPLETED | OUTPATIENT
Start: 2021-01-12 | End: 2021-01-12

## 2021-01-12 RX ORDER — FUROSEMIDE 10 MG/ML
20 INJECTION INTRAMUSCULAR; INTRAVENOUS ONCE
Status: COMPLETED | OUTPATIENT
Start: 2021-01-12 | End: 2021-01-12

## 2021-01-12 RX ORDER — LORAZEPAM 0.5 MG/1
0.5 TABLET ORAL ONCE
Status: CANCELLED
Start: 2021-01-12

## 2021-01-12 RX ORDER — LORAZEPAM 0.5 MG/1
0.5 TABLET ORAL ONCE
Status: COMPLETED | OUTPATIENT
Start: 2021-01-12 | End: 2021-01-12

## 2021-01-12 RX ADMIN — FOSAPREPITANT 150 MG: 150 INJECTION, POWDER, LYOPHILIZED, FOR SOLUTION INTRAVENOUS at 10:04

## 2021-01-12 RX ADMIN — FUROSEMIDE 20 MG: 10 INJECTION, SOLUTION INTRAMUSCULAR; INTRAVENOUS at 08:53

## 2021-01-12 RX ADMIN — OXALIPLATIN 115.7 MG: 5 INJECTION, SOLUTION, CONCENTRATE INTRAVENOUS at 10:48

## 2021-01-12 RX ADMIN — LORAZEPAM 0.5 MG: 0.5 TABLET ORAL at 08:52

## 2021-01-12 RX ADMIN — DEXAMETHASONE SODIUM PHOSPHATE: 10 INJECTION, SOLUTION INTRAMUSCULAR; INTRAVENOUS at 09:36

## 2021-01-12 RX ADMIN — SODIUM CHLORIDE 20 ML/HR: 0.9 INJECTION, SOLUTION INTRAVENOUS at 08:52

## 2021-01-12 RX ADMIN — LEUCOVORIN CALCIUM 700 MG: 500 INJECTION, POWDER, LYOPHILIZED, FOR SOLUTION INTRAMUSCULAR; INTRAVENOUS at 10:44

## 2021-01-12 RX ADMIN — DEXTROSE 20 ML/HR: 5 SOLUTION INTRAVENOUS at 10:43

## 2021-01-12 NOTE — PROGRESS NOTES
LATE NOTE: Pt to clinic for oxaliplatin and 5 fu cadd start  Spoke with Deanna Martino RN regarding low platelets and elevated BP   After pt received ativan and lasix BP came down to 130/90 and received ok to proceed with treatment, pt tolerated treatment without complications, aware of return time for cadd dc, declined avs

## 2021-01-12 NOTE — PROGRESS NOTES
Per infusion call from [facility] reporting /100 and platelets of 48,880, Dr Zita Jon rx lasix 20 mg IV and ativan 0 5mg PO and wait 30 minutes and reassess v/s prior to giving chemo tx today

## 2021-01-14 ENCOUNTER — HOSPITAL ENCOUNTER (OUTPATIENT)
Dept: INFUSION CENTER | Facility: CLINIC | Age: 56
Discharge: HOME | End: 2021-01-14
Payer: COMMERCIAL

## 2021-01-14 VITALS — TEMPERATURE: 96.1 F

## 2021-01-14 DIAGNOSIS — C78.7 LIVER METASTASES (HCC): ICD-10-CM

## 2021-01-14 DIAGNOSIS — D70.1 CHEMOTHERAPY INDUCED NEUTROPENIA (HCC): ICD-10-CM

## 2021-01-14 DIAGNOSIS — T45.1X5A CHEMOTHERAPY INDUCED NEUTROPENIA (HCC): ICD-10-CM

## 2021-01-14 DIAGNOSIS — C77.2 METASTASIS TO RETROPERITONEAL LYMPH NODE (HCC): Primary | ICD-10-CM

## 2021-01-14 DIAGNOSIS — C18.2 PRIMARY ADENOCARCINOMA OF ASCENDING COLON (HCC): ICD-10-CM

## 2021-01-14 PROCEDURE — 96372 THER/PROPH/DIAG INJ SC/IM: CPT

## 2021-01-14 RX ADMIN — PEGFILGRASTIM 6 MG: KIT SUBCUTANEOUS at 11:44

## 2021-01-14 NOTE — PROGRESS NOTES
Patient arrived for CADD D/C and neulasta on pro  Offers no complaints  Patient tolerated 46 hour infusion of 5FU at home without issues  CADD D/C'd and port flushed per protocol  Neulasta onpro applied to patients right upper arm  Green indicator light verified flashing before d/c  Patient aware of timing of medication and when to remove patch   Patient verified upcoming appointment and declined AVS

## 2021-01-19 ENCOUNTER — HOSPITAL ENCOUNTER (OUTPATIENT)
Dept: INFUSION CENTER | Facility: CLINIC | Age: 56
Discharge: HOME | End: 2021-01-19

## 2021-01-19 ENCOUNTER — TELEPHONE (OUTPATIENT)
Dept: INFUSION CENTER | Facility: CLINIC | Age: 56
End: 2021-01-19

## 2021-01-19 ENCOUNTER — LAB REQUISITION (OUTPATIENT)
Dept: LAB | Facility: HOSPITAL | Age: 56
End: 2021-01-19
Payer: COMMERCIAL

## 2021-01-19 VITALS — TEMPERATURE: 96.1 F

## 2021-01-19 DIAGNOSIS — C18.9 MALIGNANT NEOPLASM OF COLON, UNSPECIFIED (HCC): ICD-10-CM

## 2021-01-19 DIAGNOSIS — Z95.828 PORT-A-CATH IN PLACE: Primary | ICD-10-CM

## 2021-01-19 LAB
ALBUMIN SERPL BCP-MCNC: 3.1 G/DL (ref 3.5–5)
ALP SERPL-CCNC: 490 U/L (ref 46–116)
ALT SERPL W P-5'-P-CCNC: 130 U/L (ref 12–78)
ANION GAP SERPL CALCULATED.3IONS-SCNC: 10 MMOL/L (ref 4–13)
APTT PPP: 32 SECONDS (ref 23–37)
AST SERPL W P-5'-P-CCNC: 68 U/L (ref 5–45)
BILIRUB SERPL-MCNC: 0.57 MG/DL (ref 0.2–1)
BUN SERPL-MCNC: 10 MG/DL (ref 5–25)
CALCIUM ALBUM COR SERPL-MCNC: 9.2 MG/DL (ref 8.3–10.1)
CALCIUM SERPL-MCNC: 8.5 MG/DL (ref 8.3–10.1)
CEA SERPL-MCNC: 1.1 NG/ML (ref 0–3)
CHLORIDE SERPL-SCNC: 104 MMOL/L (ref 100–108)
CO2 SERPL-SCNC: 28 MMOL/L (ref 21–32)
CREAT SERPL-MCNC: 0.84 MG/DL (ref 0.6–1.3)
ERYTHROCYTE [DISTWIDTH] IN BLOOD BY AUTOMATED COUNT: 14.4 % (ref 11.6–15.1)
GFR SERPL CREATININE-BSD FRML MDRD: 80 ML/MIN/1.73SQ M
GLUCOSE SERPL-MCNC: 110 MG/DL (ref 65–140)
HCT VFR BLD AUTO: 38.8 % (ref 34.8–46.1)
HGB BLD-MCNC: 12.6 G/DL (ref 11.5–15.4)
INR PPP: 1.29 (ref 0.84–1.19)
MCH RBC QN AUTO: 36.1 PG (ref 26.8–34.3)
MCHC RBC AUTO-ENTMCNC: 32.5 G/DL (ref 31.4–37.4)
MCV RBC AUTO: 111 FL (ref 82–98)
PLATELET # BLD AUTO: 50 THOUSANDS/UL (ref 149–390)
PMV BLD AUTO: 10 FL (ref 8.9–12.7)
POTASSIUM SERPL-SCNC: 3.6 MMOL/L (ref 3.5–5.3)
PROT SERPL-MCNC: 6.3 G/DL (ref 6.4–8.2)
PROTHROMBIN TIME: 16.2 SECONDS (ref 11.6–14.5)
RBC # BLD AUTO: 3.49 MILLION/UL (ref 3.81–5.12)
SODIUM SERPL-SCNC: 142 MMOL/L (ref 136–145)
WBC # BLD AUTO: 14.55 THOUSAND/UL (ref 4.31–10.16)

## 2021-01-19 PROCEDURE — 85027 COMPLETE CBC AUTOMATED: CPT | Performed by: RADIOLOGY

## 2021-01-19 PROCEDURE — 80053 COMPREHEN METABOLIC PANEL: CPT | Performed by: RADIOLOGY

## 2021-01-19 PROCEDURE — 82378 CARCINOEMBRYONIC ANTIGEN: CPT | Performed by: RADIOLOGY

## 2021-01-19 PROCEDURE — 85730 THROMBOPLASTIN TIME PARTIAL: CPT | Performed by: RADIOLOGY

## 2021-01-19 PROCEDURE — 85610 PROTHROMBIN TIME: CPT | Performed by: RADIOLOGY

## 2021-01-19 RX ORDER — LORAZEPAM 1 MG/1
1 TABLET ORAL DAILY PRN
COMMUNITY

## 2021-01-19 RX ORDER — FUROSEMIDE 40 MG/1
40 TABLET ORAL DAILY
COMMUNITY

## 2021-01-19 RX ORDER — POTASSIUM CHLORIDE 750 MG/1
10 TABLET, EXTENDED RELEASE ORAL DAILY
COMMUNITY
End: 2021-05-21

## 2021-01-19 NOTE — PROGRESS NOTES
Patient here for labs via port for her [place] doctor  All labs drawn as ordered and sent  Patient tolerated well and was discharged post   She will RTO 1/27/21 for her next cycle of chemo

## 2021-01-19 NOTE — TELEPHONE ENCOUNTER
I received a call from [facility] inquiring about getting labs drawn from her port  She stated that she needs labs drawn for [facility] from her port  She will bring the labs slips of what she needs drawn   I had availability for today at 1pm

## 2021-01-19 NOTE — PATIENT INSTRUCTIONS
January 2021 Sunday Monday Tuesday Wednesday Thursday Friday Saturday                            1     2                3     4     5     6     7     8     9                10     11    LAB WALK IN   1:45 PM   (5 min )   AN WINDGP CHAIR 1   [facility] - [place] 12    INF ONCOLOGY TX-TREATMENT PLAN   8:00 AM   (260 min )   AN INF CHAIR [id number] [facility] [address] [facility] 13     14    INF ONCOLOGY TX-TREATMENT PLAN  11:00 AM   (30 min )   AN INF QUICK CHAIR [address] 1936 15     16         Cycle 2, Day 1       17     18     19    INF BLOOD SPECIMENS-CENTRAL   1:00 PM   (60 min )   AN INF QUICK CHAIR   [facility] [address] [facility] 20     21     22    FOLLOW UP PG   2:15 PM   (30 min )   [facility] [place] 23                24     25     26     27    INF ONCOLOGY TX-TREATMENT PLAN  10:30 AM   (320 min )   AN INF CHAIR [address] 1936 28     29    INF ONCOLOGY TX-TREATMENT PLAN  12:00 PM   (30 min )   AN INF QUICK CHAIR [address] 1936 30          Cycle 3, Day 1      31                                                   Treatment Details       1/12/2021 - Cycle 2, Day 1      Chemotherapy: ONCBCN PROVIDER COMMUNICATION8, OXALIPLATIN INFUSION, LEUCOVORIN IVPB      Take-Home Chemo: ONCBCN PROVIDER COMMUNICATION8, FLUOROURACIL AMBULATORY INFUSION    1/27/2021 - Cycle 3, Day 1      Chemotherapy: ONCBCN PROVIDER COMMUNICATION8, BEVACIZUMAB IVPB, OXALIPLATIN INFUSION, LEUCOVORIN IVPB      Take-Home Chemo: ONCBCN PROVIDER Keo Young AMBULATORY INFUSION, FLUOROURACIL AMBULATORY INFUSION        February 2021 Sunday Monday Tuesday Wednesday Thursday Friday Saturday        1     2     3     4     5     6                7     8     9     10     11    INF ONCOLOGY TX-TREATMENT PLAN   9:00 AM   (320 min )   AN INF BED 1   St  [address] [place] [address] 12     13    INF ONCOLOGY TX-TREATMENT PLAN  12:00 PM   (30 min )   AN INF QUICK CHAIR [address] 1936       Cycle 4, Day 1     14     15     16     17     18     19     20                21     22     23     24     25    INF ONCOLOGY TX-TREATMENT PLAN   9:00 AM   (320 min )   AN INF CHAIR [address] 1936 26     27    INF ONCOLOGY TX-TREATMENT PLAN  12:00 PM   (30 min )   AN INF QUICK CHAIR [address] 1936       Cycle 5, Day 1     28                                                   Treatment Details       2/11/2021 - Cycle 4, Day 1      Chemotherapy: ONCBCN PROVIDER COMMUNICATION8, BEVACIZUMAB IVPB, OXALIPLATIN INFUSION, LEUCOVORIN IVPB      Take-Home Chemo: ONCBCN PROVIDER COMMUNICATION8, FLUOROURACIL AMBULATORY INFUSION, FLUOROURACIL AMBULATORY INFUSION    2/25/2021 - Cycle 5, Day 1      Chemotherapy: ONCBCN PROVIDER COMMUNICATION8, BEVACIZUMAB IVPB, OXALIPLATIN INFUSION, LEUCOVORIN IVPB      Take-Home Chemo: ONCBCN PROVIDER COMMUNICATION8, FLUOROURACIL AMBULATORY INFUSION, FLUOROURACIL AMBULATORY INFUSION

## 2021-01-20 ENCOUNTER — TELEPHONE (OUTPATIENT)
Dept: HEMATOLOGY ONCOLOGY | Facility: CLINIC | Age: 56
End: 2021-01-20

## 2021-01-20 NOTE — TELEPHONE ENCOUNTER
I phoned the patient and left a voicemail message reminding the patient to have her labs completed, including the UA, prior to her appointment on 1/22 with Cassandra Bull PA-C in the [facility] [facility] number was provided

## 2021-01-21 ENCOUNTER — TELEPHONE (OUTPATIENT)
Dept: HEMATOLOGY ONCOLOGY | Facility: CLINIC | Age: 56
End: 2021-01-21

## 2021-01-21 ENCOUNTER — TELEPHONE (OUTPATIENT)
Dept: HEMATOLOGY ONCOLOGY | Facility: MEDICAL CENTER | Age: 56
End: 2021-01-21

## 2021-01-21 ENCOUNTER — LAB (OUTPATIENT)
Dept: LAB | Facility: MEDICAL CENTER | Age: 56
End: 2021-01-21
Payer: COMMERCIAL

## 2021-01-21 DIAGNOSIS — C78.7 LIVER METASTASES (HCC): ICD-10-CM

## 2021-01-21 DIAGNOSIS — D70.1 CHEMOTHERAPY INDUCED NEUTROPENIA (HCC): ICD-10-CM

## 2021-01-21 DIAGNOSIS — T45.1X5A CHEMOTHERAPY INDUCED NEUTROPENIA (HCC): ICD-10-CM

## 2021-01-21 DIAGNOSIS — C77.2 METASTASIS TO RETROPERITONEAL LYMPH NODE (HCC): ICD-10-CM

## 2021-01-21 DIAGNOSIS — C18.2 PRIMARY ADENOCARCINOMA OF ASCENDING COLON (HCC): ICD-10-CM

## 2021-01-21 LAB
BACTERIA UR QL AUTO: ABNORMAL /HPF
BILIRUB UR QL STRIP: NEGATIVE
CLARITY UR: ABNORMAL
COLOR UR: ABNORMAL
GLUCOSE UR STRIP-MCNC: NEGATIVE MG/DL
HGB UR QL STRIP.AUTO: ABNORMAL
HYALINE CASTS #/AREA URNS LPF: ABNORMAL /LPF
KETONES UR STRIP-MCNC: NEGATIVE MG/DL
LEUKOCYTE ESTERASE UR QL STRIP: ABNORMAL
NITRITE UR QL STRIP: NEGATIVE
NON-SQ EPI CELLS URNS QL MICRO: ABNORMAL /HPF
PH UR STRIP.AUTO: 6 [PH]
PROT UR STRIP-MCNC: ABNORMAL MG/DL
RBC #/AREA URNS AUTO: ABNORMAL /HPF
SP GR UR STRIP.AUTO: 1.02 (ref 1–1.03)
UROBILINOGEN UR QL STRIP.AUTO: 1 E.U./DL
WBC #/AREA URNS AUTO: ABNORMAL /HPF

## 2021-01-21 PROCEDURE — 81001 URINALYSIS AUTO W/SCOPE: CPT

## 2021-01-21 NOTE — TELEPHONE ENCOUNTER
Marilee Cantrell from [facility] called in for medical records labs  office notes a good call back number [phone]    Fax [phone]

## 2021-01-21 NOTE — TELEPHONE ENCOUNTER
Called Carmen back to let her know she has been scheduled at [address] [place] [address] on 2/4 at [place] were no other openings anywhere else however, the rest of her cycles are scheduled at [facility]  Patient stated she will keep checking with [facility] infusion to see if there is a cancellation and verbalized understanding of the above

## 2021-01-21 NOTE — TELEPHONE ENCOUNTER
Spoke to [facility] - patient needs to be rescheduled for 2/4 and all the subsequent treatments  Called Brittney Cervantes working on it and will call me back

## 2021-01-21 NOTE — TELEPHONE ENCOUNTER
Patient is calling in requesting to have her infusion dates changed due to a procedure she will be getting done at [facility] on 02/01, she can be reached back at [phone]

## 2021-01-22 ENCOUNTER — OFFICE VISIT (OUTPATIENT)
Dept: HEMATOLOGY ONCOLOGY | Facility: CLINIC | Age: 56
End: 2021-01-22
Payer: COMMERCIAL

## 2021-01-22 ENCOUNTER — TELEPHONE (OUTPATIENT)
Dept: HEMATOLOGY ONCOLOGY | Facility: CLINIC | Age: 56
End: 2021-01-22

## 2021-01-22 VITALS
BODY MASS INDEX: 24.59 KG/M2 | HEART RATE: 75 BPM | WEIGHT: 153 LBS | RESPIRATION RATE: 16 BRPM | DIASTOLIC BLOOD PRESSURE: 100 MMHG | OXYGEN SATURATION: 98 % | SYSTOLIC BLOOD PRESSURE: 158 MMHG | TEMPERATURE: 97.1 F | HEIGHT: 66 IN

## 2021-01-22 DIAGNOSIS — C78.00 MALIGNANT NEOPLASM METASTATIC TO LUNG, UNSPECIFIED LATERALITY (HCC): ICD-10-CM

## 2021-01-22 DIAGNOSIS — C79.60 MALIGNANT NEOPLASM METASTATIC TO OVARY, UNSPECIFIED LATERALITY (HCC): ICD-10-CM

## 2021-01-22 DIAGNOSIS — R82.89 ABNORMAL URINE CYTOLOGY: Primary | ICD-10-CM

## 2021-01-22 DIAGNOSIS — C18.2 PRIMARY ADENOCARCINOMA OF ASCENDING COLON (HCC): ICD-10-CM

## 2021-01-22 DIAGNOSIS — Z20.822 ENCOUNTER FOR PREPROCEDURE SCREENING LABORATORY TESTING FOR COVID-19: ICD-10-CM

## 2021-01-22 DIAGNOSIS — Z01.812 ENCOUNTER FOR PREPROCEDURE SCREENING LABORATORY TESTING FOR COVID-19: ICD-10-CM

## 2021-01-22 DIAGNOSIS — C78.7 LIVER METASTASES (HCC): ICD-10-CM

## 2021-01-22 PROCEDURE — 99215 OFFICE O/P EST HI 40 MIN: CPT | Performed by: PHYSICIAN ASSISTANT

## 2021-01-22 NOTE — TELEPHONE ENCOUNTER
Call from [facility] at Dr Gwyn Olmedo office [phone]    Kras mutation result faxed to [phone]

## 2021-01-22 NOTE — PROGRESS NOTES
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, unspecified laterality ([facility] ), 4  Malignant neoplasm metastatic to lung, unspecified laterality [facility])   51-year-old female presents for follow-up regarding history of metastatic adenocarcinoma of the colon with liver and lung metastases at present time  She is following with Dr Rio Patel and Dr Rayray Franklin at [facility]  She is undergoing liver directed therapy on 02/01/2021  Therefore Avastin has been held for 1 month in preparation for this  She also has adjusted chemotherapy schedule to prevent thrombocytopenia prior to procedure  She will be re-initiated FOLFOX plus Avastin on 02/04/2021  She states that both of the above [facility] physicians have recommended re-initiation of Avastin at this visit  On 03/12/2020 she will be undergoing PET/CT as well as MRI scan at [facility]  She needs labs completed on 01/19 and labs to be completed next week fax to [facility]  She will call us next week with phone number for fax and we will fax all at the same time  We reviewed COVID-19 vaccine  She is eligible however priority is still a just 76 and older due to decreased supply  Patient was advised to call [facility] as well as some a local pharmacies to see if that the vaccine is available  Patient should receive the vaccine  It is advised the best time to receive vaccine is prior to chemo treatment  5  Encounter for preprocedure screening laboratory testing for COVID-19  Patient is going for liver directed therapy at [facility]  She needs a COVID screening test prior to procedure  Prior to procedure COVID screening test placed  - Novel Coronavirus (COVID-19), PCR [facility] Collected at [facility] or [facility]; Future    6   Abnormal urine cytology  Patient had a UA  This showed microscopic blood as well as white blood cells and protein  Urine culture to be completed  Patient without any urinary symptoms     - Urine culture;  Future      HPI:  Oncology History   Primary adenocarcinoma of ascending colon ([facility] )   6/14/2018 Initial Diagnosis    Primary adenocarcinoma of ascending colon ([facility] )     7/17/2018 - 8/1/2018 Chemotherapy    camptosar 180 mg/m2 day 1  5  mg/m2 day 1  5 FU 1200 mg/m2 day 1-2   Leucovorin 400 mg/m2     Venofer 100 mg (first 10 treatments) -- all every 2 weeks         7/17/2018 - 2/3/2020 Chemotherapy    palonosetron (ALOXI) injection 0 25 mg, 0 25 mg, Intravenous, Once, 8 of 16 cycles  Administration: 0 25 mg (11/21/2019), 0 25 mg (12/5/2019), 0 25 mg (12/19/2019), 0 25 mg (1/2/2020), 0 25 mg (1/16/2020), 0 25 mg (1/30/2020)  fluorouracil (ADRUCIL) injection 715 mg, 400 mg/m2, Intravenous, Once, 7 of 7 cycles  pegfilgrastim (NEULASTA ONPRO) subcutaneous injection kit 6 mg, 6 mg, Subcutaneous, Once, 20 of 28 cycles  Administration: 6 mg (5/22/2019), 6 mg (6/5/2019), 6 mg (6/22/2019), 6 mg (7/5/2019), 6 mg (7/20/2019), 6 mg (8/2/2019), 6 mg (8/17/2019), 6 mg (8/31/2019), 6 mg (9/14/2019), 6 mg (9/28/2019), 6 mg (10/12/2019), 6 mg (10/26/2019), 6 mg (11/9/2019), 6 mg (11/23/2019), 6 mg (12/7/2019), 6 mg (12/21/2019), 6 mg (1/4/2020), 6 mg (1/18/2020), 6 mg (2/1/2020)  fosaprepitant (EMEND) 150 mg in sodium chloride 0 9 % 250 mL IVPB, 150 mg, Intravenous, Once, 8 of 16 cycles  Administration: 150 mg (11/7/2019), 150 mg (11/21/2019), 150 mg (12/5/2019), 150 mg (12/19/2019), 150 mg (1/2/2020), 150 mg (1/16/2020), 150 mg (1/30/2020)  bevacizumab (AVASTIN) 355 mg in sodium chloride 0 9 % 100 mL IVPB, 5 mg/kg, Intravenous, Once, 27 of 35 cycles  Administration: 355 mg (5/20/2019), 355 mg (6/3/2019), 355 mg (6/20/2019), 355 mg (7/3/2019), 355 mg (7/18/2019), 355 mg (7/31/2019), 355 mg (8/15/2019), 355 mg (8/29/2019), 355 mg (9/12/2019), 355 mg (9/26/2019), 355 mg (10/10/2019), 355 mg (10/24/2019), 355 mg (11/7/2019), 355 mg (11/21/2019), 355 mg (12/5/2019), 355 mg (12/19/2019), 355 mg (1/2/2020), 355 mg (1/16/2020), 355 mg (1/30/2020)  irinotecan (CAMPTOSAR) 322 mg in sodium chloride 0 9 % 500 mL chemo infusion, 180 mg/m2, Intravenous, Once, 27 of 35 cycles  Dose modification: 150 mg/m2 (original dose 180 mg/m2, Cycle 20, Reason: Dose Not Tolerated)  Administration: 322 mg (5/20/2019), 322 mg (6/3/2019), 320 mg (6/20/2019), 320 mg (7/3/2019), 320 mg (7/18/2019), 320 mg (7/31/2019), 320 mg (8/15/2019), 320 mg (8/29/2019), 320 mg (9/12/2019), 320 mg (9/26/2019), 320 mg (10/10/2019), 320 mg (10/24/2019), 269 mg (11/7/2019), 269 mg (11/21/2019), 269 mg (12/5/2019), 269 mg (12/19/2019), 269 mg (1/2/2020), 269 mg (1/16/2020), 269 mg (1/30/2020)  leucovorin 716 mg in sodium chloride 0 9 % 250 mL IVPB, 400 mg/m2, Intravenous, Once, 27 of 35 cycles  Administration: 716 mg (5/20/2019), 716 mg (6/3/2019), 700 mg (6/20/2019), 700 mg (7/3/2019), 700 mg (7/18/2019), 700 mg (7/31/2019), 700 mg (8/15/2019), 700 mg (8/29/2019), 700 mg (9/12/2019), 700 mg (9/26/2019), 700 mg (10/10/2019), 716 mg (10/24/2019), 700 mg (11/7/2019), 700 mg (11/21/2019), 700 mg (12/5/2019), 700 mg (12/19/2019), 700 mg (1/2/2020), 700 mg (1/16/2020), 700 mg (1/30/2020)     8/1/2018 Adverse Reaction    Needed granix 300 mcg due to [address] 01      8/14/2018 -  Chemotherapy    camptosar 180 mg/m2 day 1  5  mg/m2 day 1  5 FU 1200 mg/m2 day 1-2   Leucovorin 400 mg/m2  Avastin 5 mg/kg day 1   Venofer 100 mg (first 10 treatments)  Neulasta on Pro 6 mg day 3      -- every 2 weeks          3/3/2020 - 3/16/2020 Chemotherapy    pegfilgrastim (NEULASTA ONPRO) subcutaneous injection kit 6 mg, 6 mg, Subcutaneous, Once, 1 of 12 cycles  Administration: 6 mg (3/5/2020)  bevacizumab (AVASTIN) 357 5 mg in sodium chloride 0 9 % 100 mL IVPB, 5 mg/kg = 357 5 mg, Intravenous, Once, 1 of 12 cycles  Administration: 357 5 mg (3/3/2020)  leucovorin 700 mg in dextrose 5 % 250 mL IVPB, 720 mg, Intravenous, Once, 1 of 12 cycles  Administration: 700 mg (3/3/2020)  oxaliplatin (ELOXATIN) 150 mg in dextrose 5 % 250 mL chemo infusion, 153 mg, Intravenous, Once, 1 of 12 cycles  Administration: 150 mg (3/3/2020)     3/16/2020 - 4/5/2020 Chemotherapy    bevacizumab (AVASTIN) 560 mg in sodium chloride 0 9 % 100 mL IVPB, 7 5 mg/kg, Intravenous, Once, 0 of 5 cycles  oxaliplatin (ELOXATIN) 239 2 mg in dextrose 5 % 500 mL chemo infusion, 130 mg/m2 = 239 2 mg, Intravenous, Once, 1 of 6 cycles  Administration: 239 2 mg (3/16/2020)     4/6/2020 - 12/13/2020 Chemotherapy    fluorouracil (ADRUCIL) injection 730 mg, 400 mg/m2 = 730 mg, Intravenous, Once, 1 of 1 cycle  pegfilgrastim (NEULASTA ONPRO) subcutaneous injection kit 6 mg, 6 mg, Subcutaneous, Once, 14 of 20 cycles  Administration: 6 mg (4/8/2020), 6 mg (4/25/2020), 6 mg (5/8/2020), 6 mg (6/19/2020), 6 mg (7/2/2020), 6 mg (7/17/2020), 6 mg (7/31/2020), 6 mg (8/14/2020), 6 mg (8/28/2020), 6 mg (9/25/2020), 6 mg (10/15/2020), 6 mg (11/5/2020), 6 mg (11/18/2020), 6 mg (12/2/2020)  fosaprepitant (EMEND) 150 mg in sodium chloride 0 9 % 250 mL IVPB, 150 mg, Intravenous, Once, 14 of 20 cycles  Administration: 150 mg (4/6/2020), 150 mg (4/23/2020), 150 mg (5/6/2020), 150 mg (6/17/2020), 150 mg (6/30/2020), 150 mg (7/15/2020), 150 mg (7/29/2020), 150 mg (8/12/2020), 150 mg (8/26/2020), 150 mg (9/23/2020), 150 mg (10/13/2020), 150 mg (11/3/2020), 150 mg (11/16/2020), 150 mg (11/30/2020)  bevacizumab (AVASTIN) 372 5 mg in sodium chloride 0 9 % 100 mL IVPB, 5 mg/kg = 372 5 mg, Intravenous, Once, 10 of 16 cycles  Administration: 372 5 mg (4/6/2020), 372 5 mg (4/23/2020), 357 5 mg (6/30/2020), 357 5 mg (7/15/2020), 357 5 mg (7/29/2020), 357 5 mg (8/12/2020), 357 5 mg (8/26/2020), 357 5 mg (11/16/2020), 357 5 mg (11/30/2020)  leucovorin 750 mg in dextrose 5 % 250 mL IVPB, 732 mg, Intravenous, Once, 14 of 20 cycles  Administration: 750 mg (4/6/2020), 750 mg (4/23/2020), 750 mg (5/6/2020), 750 mg (6/17/2020), 700 mg (6/30/2020), 700 mg (7/15/2020), 700 mg (7/29/2020), 700 mg (8/12/2020), 700 mg (8/26/2020), 700 mg (9/23/2020), 700 mg (10/13/2020), 700 mg (11/3/2020), 700 mg (11/16/2020), 700 mg (11/30/2020)  oxaliplatin (ELOXATIN) 150 mg in dextrose 5 % 250 mL chemo infusion, 155 55 mg, Intravenous, Once, 14 of 20 cycles  Dose modification: 70 mg/m2 (original dose 85 mg/m2, Cycle 11, Reason: Dose Not Tolerated, Comment: thrombocytopenia), 65 mg/m2 (original dose 85 mg/m2, Cycle 14, Reason: Other (See Comments)), 65 mg/m2 (original dose 85 mg/m2, Cycle 15, Reason: Max Dose Reached)  Administration: 150 mg (4/6/2020), 150 mg (4/23/2020), 150 mg (5/6/2020), 150 mg (6/17/2020), 150 mg (6/30/2020), 150 mg (7/15/2020), 150 mg (7/29/2020), 150 mg (8/12/2020), 150 mg (8/26/2020), 150 mg (9/23/2020), 125 3 mg (10/13/2020), 125 3 mg (11/3/2020), 125 3 mg (11/16/2020), 116 35 mg (11/30/2020)     12/28/2020 -  Chemotherapy    fluorouracil (ADRUCIL) injection 710 mg, 400 mg/m2, Intravenous, Once, 0 of 1 cycle  pegfilgrastim (NEULASTA ONPRO) subcutaneous injection kit 6 mg, 6 mg, Subcutaneous, Once, 2 of 6 cycles  Administration: 6 mg (12/30/2020), 6 mg (1/14/2021)  fosaprepitant (EMEND) 150 mg in sodium chloride 0 9 % 250 mL IVPB, 150 mg, Intravenous, Once, 2 of 6 cycles  Administration: 150 mg (12/28/2020), 150 mg (1/12/2021)  bevacizumab (AVASTIN) 347 5 mg in sodium chloride 0 9 % 100 mL IVPB, 5 mg/kg = 347 5 mg, Intravenous, Once, 1 of 5 cycles  Administration: 347 5 mg (12/28/2020)  leucovorin 700 mg in dextrose 5 % 250 mL IVPB, 712 mg, Intravenous, Once, 2 of 6 cycles  Administration: 700 mg (12/28/2020), 700 mg (1/12/2021)  oxaliplatin (ELOXATIN) 115 7 mg in dextrose 5 % 250 mL chemo infusion, 65 mg/m2 = 115 7 mg (76 5 % of original dose 85 mg/m2), Intravenous, Once, 2 of 6 cycles  Dose modification: 65 mg/m2 (original dose 85 mg/m2, Cycle 1, Reason: Dose Not Tolerated)  Administration: 115 7 mg (12/28/2020), 115 7 mg (1/12/2021)     Liver metastases ([facility] [address] )   6/26/2018 Initial Diagnosis    Liver metastases ([facility] [address] )     7/17/2018 - 2/3/2020 Chemotherapy    palonosetron (ALOXI) injection 0 25 mg, 0 25 mg, Intravenous, Once, 8 of 16 cycles  Administration: 0 25 mg (11/21/2019), 0 25 mg (12/5/2019), 0 25 mg (12/19/2019), 0 25 mg (1/2/2020), 0 25 mg (1/16/2020), 0 25 mg (1/30/2020)  fluorouracil (ADRUCIL) injection 715 mg, 400 mg/m2, Intravenous, Once, 7 of 7 cycles  pegfilgrastim (NEULASTA ONPRO) subcutaneous injection kit 6 mg, 6 mg, Subcutaneous, Once, 20 of 28 cycles  Administration: 6 mg (5/22/2019), 6 mg (6/5/2019), 6 mg (6/22/2019), 6 mg (7/5/2019), 6 mg (7/20/2019), 6 mg (8/2/2019), 6 mg (8/17/2019), 6 mg (8/31/2019), 6 mg (9/14/2019), 6 mg (9/28/2019), 6 mg (10/12/2019), 6 mg (10/26/2019), 6 mg (11/9/2019), 6 mg (11/23/2019), 6 mg (12/7/2019), 6 mg (12/21/2019), 6 mg (1/4/2020), 6 mg (1/18/2020), 6 mg (2/1/2020)  fosaprepitant (EMEND) 150 mg in sodium chloride 0 9 % 250 mL IVPB, 150 mg, Intravenous, Once, 8 of 16 cycles  Administration: 150 mg (11/7/2019), 150 mg (11/21/2019), 150 mg (12/5/2019), 150 mg (12/19/2019), 150 mg (1/2/2020), 150 mg (1/16/2020), 150 mg (1/30/2020)  bevacizumab (AVASTIN) 355 mg in sodium chloride 0 9 % 100 mL IVPB, 5 mg/kg, Intravenous, Once, 27 of 35 cycles  Administration: 355 mg (5/20/2019), 355 mg (6/3/2019), 355 mg (6/20/2019), 355 mg (7/3/2019), 355 mg (7/18/2019), 355 mg (7/31/2019), 355 mg (8/15/2019), 355 mg (8/29/2019), 355 mg (9/12/2019), 355 mg (9/26/2019), 355 mg (10/10/2019), 355 mg (10/24/2019), 355 mg (11/7/2019), 355 mg (11/21/2019), 355 mg (12/5/2019), 355 mg (12/19/2019), 355 mg (1/2/2020), 355 mg (1/16/2020), 355 mg (1/30/2020)  irinotecan (CAMPTOSAR) 322 mg in sodium chloride 0 9 % 500 mL chemo infusion, 180 mg/m2, Intravenous, Once, 27 of 35 cycles  Dose modification: 150 mg/m2 (original dose 180 mg/m2, Cycle 20, Reason: Dose Not Tolerated)  Administration: 322 mg (5/20/2019), 322 mg (6/3/2019), 320 mg (6/20/2019), 320 mg (7/3/2019), 320 mg (7/18/2019), 320 mg (7/31/2019), 320 mg (8/15/2019), 320 mg (8/29/2019), 320 mg (9/12/2019), 320 mg (9/26/2019), 320 mg (10/10/2019), 320 mg (10/24/2019), 269 mg (11/7/2019), 269 mg (11/21/2019), 269 mg (12/5/2019), 269 mg (12/19/2019), 269 mg (1/2/2020), 269 mg (1/16/2020), 269 mg (1/30/2020)  leucovorin 716 mg in sodium chloride 0 9 % 250 mL IVPB, 400 mg/m2, Intravenous, Once, 27 of 35 cycles  Administration: 716 mg (5/20/2019), 716 mg (6/3/2019), 700 mg (6/20/2019), 700 mg (7/3/2019), 700 mg (7/18/2019), 700 mg (7/31/2019), 700 mg (8/15/2019), 700 mg (8/29/2019), 700 mg (9/12/2019), 700 mg (9/26/2019), 700 mg (10/10/2019), 716 mg (10/24/2019), 700 mg (11/7/2019), 700 mg (11/21/2019), 700 mg (12/5/2019), 700 mg (12/19/2019), 700 mg (1/2/2020), 700 mg (1/16/2020), 700 mg (1/30/2020)     3/3/2020 - 3/16/2020 Chemotherapy    pegfilgrastim (NEULASTA ONPRO) subcutaneous injection kit 6 mg, 6 mg, Subcutaneous, Once, 1 of 12 cycles  Administration: 6 mg (3/5/2020)  bevacizumab (AVASTIN) 357 5 mg in sodium chloride 0 9 % 100 mL IVPB, 5 mg/kg = 357 5 mg, Intravenous, Once, 1 of 12 cycles  Administration: 357 5 mg (3/3/2020)  leucovorin 700 mg in dextrose 5 % 250 mL IVPB, 720 mg, Intravenous, Once, 1 of 12 cycles  Administration: 700 mg (3/3/2020)  oxaliplatin (ELOXATIN) 150 mg in dextrose 5 % 250 mL chemo infusion, 153 mg, Intravenous, Once, 1 of 12 cycles  Administration: 150 mg (3/3/2020)     3/16/2020 - 4/5/2020 Chemotherapy    bevacizumab (AVASTIN) 560 mg in sodium chloride 0 9 % 100 mL IVPB, 7 5 mg/kg, Intravenous, Once, 0 of 5 cycles  oxaliplatin (ELOXATIN) 239 2 mg in dextrose 5 % 500 mL chemo infusion, 130 mg/m2 = 239 2 mg, Intravenous, Once, 1 of 6 cycles  Administration: 239 2 mg (3/16/2020)     4/6/2020 - 12/13/2020 Chemotherapy    fluorouracil (ADRUCIL) injection 730 mg, 400 mg/m2 = 730 mg, Intravenous, Once, 1 of 1 cycle  pegfilgrastim (NEULASTA ONPRO) subcutaneous injection kit 6 mg, 6 mg, Subcutaneous, Once, 14 of 20 cycles  Administration: 6 mg (4/8/2020), 6 mg (4/25/2020), 6 mg (5/8/2020), 6 mg (6/19/2020), 6 mg (7/2/2020), 6 mg (7/17/2020), 6 mg (7/31/2020), 6 mg (8/14/2020), 6 mg (8/28/2020), 6 mg (9/25/2020), 6 mg (10/15/2020), 6 mg (11/5/2020), 6 mg (11/18/2020), 6 mg (12/2/2020)  fosaprepitant (EMEND) 150 mg in sodium chloride 0 9 % 250 mL IVPB, 150 mg, Intravenous, Once, 14 of 20 cycles  Administration: 150 mg (4/6/2020), 150 mg (4/23/2020), 150 mg (5/6/2020), 150 mg (6/17/2020), 150 mg (6/30/2020), 150 mg (7/15/2020), 150 mg (7/29/2020), 150 mg (8/12/2020), 150 mg (8/26/2020), 150 mg (9/23/2020), 150 mg (10/13/2020), 150 mg (11/3/2020), 150 mg (11/16/2020), 150 mg (11/30/2020)  bevacizumab (AVASTIN) 372 5 mg in sodium chloride 0 9 % 100 mL IVPB, 5 mg/kg = 372 5 mg, Intravenous, Once, 10 of 16 cycles  Administration: 372 5 mg (4/6/2020), 372 5 mg (4/23/2020), 357 5 mg (6/30/2020), 357 5 mg (7/15/2020), 357 5 mg (7/29/2020), 357 5 mg (8/12/2020), 357 5 mg (8/26/2020), 357 5 mg (11/16/2020), 357 5 mg (11/30/2020)  leucovorin 750 mg in dextrose 5 % 250 mL IVPB, 732 mg, Intravenous, Once, 14 of 20 cycles  Administration: 750 mg (4/6/2020), 750 mg (4/23/2020), 750 mg (5/6/2020), 750 mg (6/17/2020), 700 mg (6/30/2020), 700 mg (7/15/2020), 700 mg (7/29/2020), 700 mg (8/12/2020), 700 mg (8/26/2020), 700 mg (9/23/2020), 700 mg (10/13/2020), 700 mg (11/3/2020), 700 mg (11/16/2020), 700 mg (11/30/2020)  oxaliplatin (ELOXATIN) 150 mg in dextrose 5 % 250 mL chemo infusion, 155 55 mg, Intravenous, Once, 14 of 20 cycles  Dose modification: 70 mg/m2 (original dose 85 mg/m2, Cycle 11, Reason: Dose Not Tolerated, Comment: thrombocytopenia), 65 mg/m2 (original dose 85 mg/m2, Cycle 14, Reason: Other (See Comments)), 65 mg/m2 (original dose 85 mg/m2, Cycle 15, Reason: Max Dose Reached)  Administration: 150 mg (4/6/2020), 150 mg (4/23/2020), 150 mg (5/6/2020), 150 mg (6/17/2020), 150 mg (6/30/2020), 150 mg (7/15/2020), 150 mg (7/29/2020), 150 mg (8/12/2020), 150 mg (8/26/2020), 150 mg (9/23/2020), 125 3 mg (10/13/2020), 125 3 mg (11/3/2020), 125 3 mg (11/16/2020), 116 35 mg (11/30/2020)     12/28/2020 -  Chemotherapy    fluorouracil (ADRUCIL) injection 710 mg, 400 mg/m2, Intravenous, Once, 0 of 1 cycle  pegfilgrastim (NEULASTA ONPRO) subcutaneous injection kit 6 mg, 6 mg, Subcutaneous, Once, 2 of 6 cycles  Administration: 6 mg (12/30/2020), 6 mg (1/14/2021)  fosaprepitant (EMEND) 150 mg in sodium chloride 0 9 % 250 mL IVPB, 150 mg, Intravenous, Once, 2 of 6 cycles  Administration: 150 mg (12/28/2020), 150 mg (1/12/2021)  bevacizumab (AVASTIN) 347 5 mg in sodium chloride 0 9 % 100 mL IVPB, 5 mg/kg = 347 5 mg, Intravenous, Once, 1 of 5 cycles  Administration: 347 5 mg (12/28/2020)  leucovorin 700 mg in dextrose 5 % 250 mL IVPB, 712 mg, Intravenous, Once, 2 of 6 cycles  Administration: 700 mg (12/28/2020), 700 mg (1/12/2021)  oxaliplatin (ELOXATIN) 115 7 mg in dextrose 5 % 250 mL chemo infusion, 65 mg/m2 = 115 7 mg (76 5 % of original dose 85 mg/m2), Intravenous, Once, 2 of 6 cycles  Dose modification: 65 mg/m2 (original dose 85 mg/m2, Cycle 1, Reason: Dose Not Tolerated)  Administration: 115 7 mg (12/28/2020), 115 7 mg (1/12/2021)     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[facility] )   6/26/2018 Initial Diagnosis    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[facility] [address] )     4/6/2020 - 12/13/2020 Chemotherapy    fluorouracil (ADRUCIL) injection 730 mg, 400 mg/m2 = 730 mg, Intravenous, Once, 1 of 1 cycle  pegfilgrastim (NEULASTA ONPRO) subcutaneous injection kit 6 mg, 6 mg, Subcutaneous, Once, 7 of 10 cycles  Administration: 6 mg (4/8/2020), 6 mg (4/25/2020), 6 mg (5/8/2020), 6 mg (6/19/2020), 6 mg (7/2/2020), 6 mg (7/17/2020), 6 mg (7/31/2020)  fosaprepitant (EMEND) 150 mg in sodium chloride 0 9 % 250 mL IVPB, 150 mg, Intravenous, Once, 7 of 10 cycles  Administration: 150 mg (4/6/2020), 150 mg (4/23/2020), 150 mg (5/6/2020), 150 mg (6/17/2020), 150 mg (6/30/2020), 150 mg (7/15/2020), 150 mg (7/29/2020)  bevacizumab (AVASTIN) 372 5 mg in sodium chloride 0 9 % 100 mL IVPB, 5 mg/kg = 372 5 mg, Intravenous, Once, 5 of 8 cycles  Administration: 372 5 mg (4/6/2020), 372 5 mg (4/23/2020), 357 5 mg (6/30/2020), 357 5 mg (7/15/2020), 357 5 mg (7/29/2020)  leucovorin 750 mg in dextrose 5 % 250 mL IVPB, 732 mg, Intravenous, Once, 7 of 10 cycles  Administration: 750 mg (4/6/2020), 750 mg (4/23/2020), 750 mg (5/6/2020), 750 mg (6/17/2020), 700 mg (6/30/2020), 700 mg (7/15/2020), 700 mg (7/29/2020)  oxaliplatin (ELOXATIN) 150 mg in dextrose 5 % 250 mL chemo infusion, 155 55 mg, Intravenous, Once, 7 of 10 cycles  Administration: 150 mg (4/6/2020), 150 mg (4/23/2020), 150 mg (5/6/2020), 150 mg (6/17/2020), 150 mg (6/30/2020), 150 mg (7/15/2020), 150 mg (7/29/2020)     12/28/2020 -  Chemotherapy    fluorouracil (ADRUCIL) injection 710 mg, 400 mg/m2, Intravenous, Once, 0 of 1 cycle  pegfilgrastim (NEULASTA ONPRO) subcutaneous injection kit 6 mg, 6 mg, Subcutaneous, Once, 2 of 6 cycles  Administration: 6 mg (12/30/2020), 6 mg (1/14/2021)  fosaprepitant (EMEND) 150 mg in sodium chloride 0 9 % 250 mL IVPB, 150 mg, Intravenous, Once, 2 of 6 cycles  Administration: 150 mg (12/28/2020), 150 mg (1/12/2021)  bevacizumab (AVASTIN) 347 5 mg in sodium chloride 0 9 % 100 mL IVPB, 5 mg/kg = 347 5 mg, Intravenous, Once, 1 of 5 cycles  Administration: 347 5 mg (12/28/2020)  leucovorin 700 mg in dextrose 5 % 250 mL IVPB, 712 mg, Intravenous, Once, 2 of 6 cycles  Administration: 700 mg (12/28/2020), 700 mg (1/12/2021)  oxaliplatin (ELOXATIN) 115 7 mg in dextrose 5 % 250 mL chemo infusion, 65 mg/m2 = 115 7 mg (76 5 % of original dose 85 mg/m2), Intravenous, Once, 2 of 6 cycles  Dose modification: 65 mg/m2 (original dose 85 mg/m2, Cycle 1, Reason: Dose Not Tolerated)  Administration: 115 7 mg (12/28/2020), 115 7 mg (1/12/2021)       Edgar Bear is a 48 y o  female presents for follow up for metastatic colon cancer       with history of DVT right lower leg below the knee in May 2016 and that happened after taking hormonal therapy in April 2016  In 2016 she tested positive once for lupus anticoagulant and that was negative on repeat test     She stayed on Xarelto until end of September 2016  She had heavy periods in in January 2018 and she was in bed for 3 days and she developed a clot in the left lower leg distally below the knee        She had [facility]  Ovaries were left in   Pathology of bilateral fallopian tube showed invasive adenocarcinoma      She had a CT of the chest, abdomen and pelvis on 06/06/2018 which showed a possible lesion on the right abdomen,  Suspicious for underlying colon mass   Also, multiple hepatic metastases were noted  Osmin Blanco, scattered retroperitoneal nodes also noted;   Largest measuring 1 1 x 0 9 cm      Patient brought to the OR on 06/14/2018  Edgar Mathew was found to have a proximal transverse colon tumor with peritoneal deposits pelvic and right diaphragm, palpable liver metastases    she had a right hemicolectomy with ileocolonic anastomosis      Final pathology showed stage IVC- pT3, pN1b, pM1c, G2     7/26/18 - molecular testing resulted  NRAS negative   BRAF negative   KRAS mutation positive      She was seen by colon surgeon at [facility] in Jan 2019  Due to her liver mets, no further surgery was recommended   She was advised to continue chemotherapy alone       July 2018 she was started on FOLFIRI plus Avastin       CT scan chest on 01/27/2020 showed multiple 5 mm and smaller bilateral nodules all new since June 2019 indicating metastatic disease   MRI of the pelvis on 01/27/2020 showed mostly stable liver lesions compared to July 2019  1 lesion in the liver in the right hepatic dome increased in size in showed more diffuse enhancement      MRI of the pelvis showed a new large complex cystic right adnexal mass measuring 7 4 x 6 3 x 9 8 cm and a new predominantly solid left adnexal mass measuring 4 1 x 2 3 x 2 9 cm highly suspicious for metastasis      Molecular testing with [facility] on 3/19/20         Patient was seen by both Dr Hallie Quiroz and Dr Rogelio Andre [place] [facility]      Dr Marisa Zuniga asked surgeons here to evaluation patient for palliative surgery  Surgery was not recommended by [facility] tumor board conference or [facility] doctors       Patient was recommended change in treatment plan, FOLFOX or XELOX,     She had 1 cycle of FOLFOX so that she could get started on treatment right away  She then had 1 cycle of Xelox  She wished to eliminate 5 FU pump for quality of life  However, Xelox was not well tolerated  She completed the cycle  She then had 3rd cycle of FOLFOX      Repeat imagining showed end of April/beginning of May 2020              MRI pelvis: right side pelvic mass increased to 10 5 x 9 1 cm from 6 3 x 5 6 cm, 9 4 x 5 4 cm, previously 4 3 x 3 cm (compared from Jan 2020)              MRI abdomen showed stable liver lesions  CT chest showed improving lung nodules     Due to above patient had right radical oophorectomy and left oophorectomy on 5/26/20  Biopsy showed metastatic disease in the ovaries as well as samples of the peritoneum and small bowel mesentery with metastatic colon cancer  She was resumed on FOLFOX on 06/17/2020  Avastin to be added later on secondary to recent surgery  Prior to surgery her most recent chemo was on 05/06/2020  She has had history of proteinuria  This had been less than 1 g on 24 hour urine  August 10, 2020 patient repeat imaging  Numerous small nodules in the lungs were noted to be unchanged; 1 May be new N1 maybe slightly larger in the left lung  Multiple liver lesions identified, some with increase in size    MRI of the abdomen on 08/17/2020 showed enlarging 2 4 x 2 4 cm right hepatic lesion, several other small liver metastases unchanged  MRI pelvis on 08/19/2020 showed expected postoperative change following removal of bilateral ovary metastases  Patient saw a physician At [facility] regarding the above disease progression in liver  Dr Latanya Gonzales spoke with this doctor on 09/08/2020  The plan was to continue FOLFOX in try RFA or liver resection  Patient was planned to receive RFA but this could not be done for technical reasons according to IR at [facility]  Patient was then going to receive TACE or SIR-Spheres  Patient is not a candidate for Erbitux secondary to KRAS mutation  MSI stable therefore not candidate for Keytruda  Patient was seen in follow-up with Dr Latanya Gonzales on 10/09/2020  It is noted that patient was to receive liver-directed therapy on 10/30/2020 at [facility]  Chemotherapy on 10/07/2020 was delayed due to a low platelet count of 01390  Doses of oxaliplatin, 5 FU infusion were adjusted due to hematological toxicity  Neulasta was continued  After liver directed therapy Avastin was reinitiated  On 12/08/2020 patient presented to the ED due to right upper quadrant pain that was worsening with deep breaths  CTA of the chest abdomen pelvis was completed  This was negative for PE  This showed numerable pulmonary nodules varying in size diffuse bilaterally, increase in both size and number compared to prior  Liver metastases that were there previously showed no change, there is so suspected lesion in the right lobe  Small amount of ascites in the pelvis  Patient followed up with [facility] physicians regarding the above scan  They recommended continuation of FOLFOX  Recommended additional liver directed therapy  This will be completed on 02/01/2021  Interval history:    ROS: Review of Systems   Constitutional: Positive for fatigue   Negative for appetite change (remains fair), chills and fever    HENT: Positive for postnasal drip  Respiratory: Positive for cough (Related to postnasal drip)  Negative for shortness of breath  Cardiovascular: Negative for leg swelling  Gastrointestinal: Negative for abdominal pain, constipation, diarrhea, nausea and vomiting  Endocrine:        Hot flashes    Genitourinary: Negative for difficulty urinating, dysuria and hematuria  Musculoskeletal: Negative  Skin: Negative  Neurological: Positive for numbness  Negative for dizziness, weakness and headaches  Patient has peripheral neuropathy of fingertips  This is constant but nonpainful  She occasionally drops her fork  She is able to type on the computer  This is worse the day of infusion in the day after but then improves in regards to typing  Neuropathy in feet is also present constantly, nonpainful, no difficulty with walking  Balls of feet feel slightly swollen in regards to neuropathy  Sometimes on the right foot numbnesss extends up into the leg   Hematological: Negative  Psychiatric/Behavioral: Negative          Past Medical History:   Diagnosis Date    Cancer [facility])     Colon cancer ([facility] [address] ) 06/08/2018    DVT (deep venous thrombosis) ([facility] )     History of chemotherapy 2019    Kidney disease     Menorrhalgia     RESOLVED 2008    Migraine     Postcoital bleeding     LAST ASSESSED [id number]       Past Surgical History:   Procedure Laterality Date    ABDOMINAL ADHESION SURGERY N/A 6/14/2018    Procedure: LYSIS ADHESIONS OF COLON;  Surgeon: Carmencita Block MD;  [facility];  Service: Colorectal    CYSTOSCOPY N/A 5/26/2020    Procedure: CYSTOSCOPY;  Surgeon: Jenny Ewing MD;  [facility];  Service: Gynecology Oncology    DILATION AND CURETTAGE OF UTERUS      RESOLVED 2008    ENDOMETRIAL ABLATION      THERMAL     NOVASURE  RESOLVED 2008    ENDOMETRIAL BIOPSY      BY SUCTION   DONE 12/13/2008    HYSTERECTOMY  05/21/2018    OMENTECTOMY Right 6/14/2018 Procedure: PARTIAL OMENTECTOMY;  Surgeon: Semaj Gamboa MD;  [facility];  Service: Colorectal    OOPHORECTOMY N/A 5/26/2020    Procedure: RIGHT RADICAL OOPHORECTOMY, LEFT OOPHORECTOMY, PERITONEAL BIOPSIES, ATTEMPTED ROBOT CONVERTED TO OPEN LAPAROTOMY;  Surgeon: Abilio Salinas MD;  [facility];  Service: Gynecology Oncology    PORTACATH PLACEMENT      RCW    OR COLONOSCOPY FLX DX W/COLLJ [address] WHEN PFRMD N/A 6/13/2018    Procedure: COLONOSCOPY;  Surgeon: Semaj Gamboa MD;  [facility]; Service: Colorectal    OR ESOPHAGOGASTRODUODENOSCOPY TRANSORAL DIAGNOSTIC N/A 6/13/2018    Procedure: ESOPHAGOGASTRODUODENOSCOPY (EGD); Surgeon: Anais Godinez MD;  [facility];   Service: Gastroenterology    OR INSERT PICC W/ SUB-Q PORT Right 6/28/2018    Procedure: INSERTION VENOUS PORT (PORT-A-CATH) VIA RIGHT SUBCLAVIAN VEIN;  Surgeon: Semaj Gamboa MD;  [facility];  Service: Colorectal    OR LAP,DIAGNOSTIC ABDOMEN N/A 6/14/2018    Procedure: LAPAROSCOPY DIAGNOSTIC;  Surgeon: Semaj Gamboa MD;  [facility];  Service: Colorectal    OR LAP,SURG,COLECTOMY, PARTIAL, W/ANAST Right 6/14/2018    Procedure: LAPAROSCOPIC EXTENDED RIGHT HEMICOLECTOMY ; PERITONEAL BX;  Surgeon: Semaj Gamboa MD;  [facility];  Service: Colorectal    OR LAP,VAG HYST,UTERUS 250GMS/<,SALP-OOPH Bilateral 5/21/2018    Procedure: HYSTERECTOMY LAPAROSCOPIC ASSISTED VAGINAL (LAVH) , BILATERAL SALPINGECTOMY;  Surgeon: Nick Cai DO;  [facility];  Service: Gynecology    PROCTOSCOPY N/A 5/26/2020    Procedure: PROCTOSCOPY;  Surgeon: Abilio Salinas MD;  [facility];  Service: Gynecology Oncology    TRANSPERINEAL IMPLANT OF RADIATION SEEDS W/ ULTRASOUND  10/30/2020    Liver, @ [address] History     Socioeconomic History    Marital status: /Civil Union     Spouse name: None    Number of children: 2    Years of education: None    Highest education level: None   Occupational History    None   Social Needs    Financial resource strain: None    Food insecurity     Worry: None     Inability: None    Transportation needs     Medical: None     Non-medical: None   Tobacco Use    Smoking status: Never Smoker    Smokeless tobacco: Never Used   Substance and Sexual Activity    Alcohol use: Never     Alcohol/week: 0 0 standard drinks     Frequency: Never     Drinks per session: Patient refused     Binge frequency: Never     Comment: 0    Drug use: No    Sexual activity: Yes     Partners: Male     Comment: PARTNER HAD VASECTOMY    Lifestyle    Physical activity     Days per week: None     Minutes per session: None    Stress: None   Relationships    Social connections     Talks on phone: None     Gets together: None     Attends Protestant service: None     Active member of club or organization: None     Attends meetings of clubs or organizations: None     Relationship status: None    Intimate partner violence     Fear of current or ex partner: None     Emotionally abused: None     Physically abused: None     Forced sexual activity: None   Other Topics Concern    None   Social History Narrative    ALWAYS USES [employer]     CAFFEINE USE     CURRENTLY WORKS FULL TIME     DAILY COFFEE CONSUMPTION    EXERCISE SPORADICALLY     FEELS SAFE AT HOME     LIVES WITH SPOUSE        Family History   Problem Relation Age of Onset    Migraines Mother     Heart disease Mother     Anemia Father     Arthritis Father     Other Father         BLOOD TRANSFUSION    Migraines Daughter     Heart disease Family     No Known Problems Maternal Grandmother     No Known Problems Maternal Grandfather     No Known Problems Paternal Grandmother     No Known Problems Paternal Grandfather        No Known Allergies      Current Outpatient Medications:     acetaminophen (TYLENOL) 500 mg tablet, Take 1,000 mg by mouth every 6 (six) hours as needed for mild pain, Disp: , Rfl:     amLODIPine (NORVASC) 5 mg tablet, Take 5 mg by mouth daily, Disp: , Rfl:     docusate sodium (COLACE) 100 mg capsule, Take 1 capsule (100 mg total) by mouth 2 (two) times a day (Patient taking differently: Take 100 mg by mouth 2 (two) times a day as needed ), Disp: 10 each, Rfl: 0    furosemide (LASIX) 40 mg tablet, Take 40 mg by mouth daily, Disp: , Rfl:     LORazepam (ATIVAN) 1 mg tablet, Take 1 mg by mouth daily as needed for anxiety, Disp: , Rfl:     metoprolol succinate (TOPROL-XL) 50 mg 24 hr tablet, Take 50 mg by mouth 2 (two) times a day Pt was instructed by her PCP Dr Wendi Bernal, advised pt to increased to BID, Disp: , Rfl:     omeprazole (PriLOSEC) 20 mg delayed release capsule, PLEASE SEE ATTACHED FOR DETAILED DIRECTIONS, Disp: , Rfl:     oxyCODONE (OXY-IR) 5 MG capsule, Take 1 capsule (5 mg total) by mouth every 6 (six) hours as needed for severe pain for up to 10 doses CAN SUBSTITUTE  OXYCODONE TABLETS FOR CAPSULESMax Daily Amount: 20 mg, Disp: 10 capsule, Rfl: 0    potassium chloride (K-DUR,KLOR-CON) 10 mEq tablet, Take 10 mEq by mouth daily, Disp: , Rfl:     Xarelto 10 MG tablet, TAKE 1 TABLET DAILY, Disp: 90 tablet, Rfl: 3      Physical Exam:  /100 (BP Location: Left arm, Patient Position: Sitting, Cuff Size: Standard)   Pulse 75   Temp (!) 97 1 °F (36 2 °C) (Temporal)   Resp 16   Ht 5' 5 5" (1 664 m)   Wt 69 4 kg (153 lb)   LMP 05/16/2018   SpO2 98%   BMI 25 07 kg/m²     Physical Exam  Constitutional:       General: She is not in acute distress  Appearance: She is well-developed  She is not ill-appearing  HENT:      Head: Normocephalic and atraumatic  Eyes:      General: No scleral icterus  Conjunctiva/sclera: Conjunctivae normal    Neck:      Musculoskeletal: Normal range of motion and neck supple  Cardiovascular:      Rate and Rhythm: Normal rate and regular rhythm  Heart sounds: Normal heart sounds  No murmur     Pulmonary: Effort: Pulmonary effort is normal  No respiratory distress  Breath sounds: Normal breath sounds  Abdominal:      Palpations: Abdomen is soft  Tenderness: There is no abdominal tenderness  Musculoskeletal: Normal range of motion  General: No tenderness  Right lower leg: No edema  Left lower leg: No edema  Lymphadenopathy:      Cervical: No cervical adenopathy  Upper Body:      Right upper body: No supraclavicular or axillary adenopathy  Left upper body: No supraclavicular or axillary adenopathy  Skin:     General: Skin is warm and dry  Neurological:      Mental Status: She is alert and oriented to person, place, and time  Cranial Nerves: No cranial nerve deficit  Psychiatric:         Mood and Affect: Mood normal          Behavior: Behavior normal        Labs:  Lab Results   Component Value Date    WBC 14 55 (H) 01/19/2021    HGB 12 6 01/19/2021    HCT 38 8 01/19/2021     (H) 01/19/2021    PLT 50 (L) 01/19/2021     Lab Results   Component Value Date     03/18/2015    K 3 6 01/19/2021     01/19/2021    CO2 28 01/19/2021    ANIONGAP 9 03/18/2015    BUN 10 01/19/2021    CREATININE 0 84 01/19/2021    GLUCOSE 100 03/18/2015    GLUF 88 10/12/2020    CALCIUM 8 5 01/19/2021    CORRECTEDCA 9 2 01/19/2021    AST 68 (H) 01/19/2021     (H) 01/19/2021    ALKPHOS 490 (H) 01/19/2021    PROT 6 6 03/18/2015    BILITOT 0 65 03/18/2015    EGFR 80 01/19/2021     Patient voiced understanding and agreement in the above discussion  Aware to contact our office with questions/symptoms in the interim  This note has been generated by voice recognition software system  Therefore, there may be spelling, grammar, and or syntax errors  Please contact if questions arise

## 2021-01-26 ENCOUNTER — HOSPITAL ENCOUNTER (OUTPATIENT)
Dept: INFUSION CENTER | Facility: CLINIC | Age: 56
End: 2021-01-26

## 2021-01-27 ENCOUNTER — TELEPHONE (OUTPATIENT)
Dept: HEMATOLOGY ONCOLOGY | Facility: CLINIC | Age: 56
End: 2021-01-27

## 2021-01-27 DIAGNOSIS — R80.9 PROTEINURIA, UNSPECIFIED TYPE: Primary | ICD-10-CM

## 2021-01-27 NOTE — TELEPHONE ENCOUNTER
Please call patient and ask if she still needs labs faxed to [facility]  She was suppose to call us and give us fax number  Also she was suppose to have labs from [facility] yesterday for [facility] but cancelled? Dr Tyler Alarcon would also like her to complete 24 hour urine test prior to next infusion  She can  at the lab and complete   She has done this in the past

## 2021-01-27 NOTE — TELEPHONE ENCOUNTER
Spoke with patient  Received fax number for [facility] so her most recent labs can be sent  Patient informed me that her blood specimen appointment at [facility] yesterday was canceled because she his having her pre-procedure labs drawn at [facility] on Friday  Informed patient that Dr Julia Mcdonnell would like her to do a 24 hour urine test done prior to starting her infusions  Patient verbalized understanding

## 2021-01-29 ENCOUNTER — LAB (OUTPATIENT)
Dept: LAB | Facility: MEDICAL CENTER | Age: 56
End: 2021-01-29
Payer: COMMERCIAL

## 2021-01-29 DIAGNOSIS — R80.9 PROTEINURIA, UNSPECIFIED TYPE: ICD-10-CM

## 2021-01-29 LAB
PROT 24H UR-MCNC: 621 MG/24 HRS (ref 40–150)
SPECIMEN VOL UR: 2700 ML

## 2021-01-29 PROCEDURE — 84156 ASSAY OF PROTEIN URINE: CPT

## 2021-02-02 RX ORDER — DEXTROSE MONOHYDRATE 50 MG/ML
20 INJECTION, SOLUTION INTRAVENOUS ONCE
Status: CANCELLED | OUTPATIENT
Start: 2021-02-04

## 2021-02-02 RX ORDER — SODIUM CHLORIDE 9 MG/ML
20 INJECTION, SOLUTION INTRAVENOUS ONCE
Status: CANCELLED | OUTPATIENT
Start: 2021-02-04

## 2021-02-03 ENCOUNTER — LAB (OUTPATIENT)
Dept: LAB | Facility: MEDICAL CENTER | Age: 56
End: 2021-02-03
Payer: COMMERCIAL

## 2021-02-03 DIAGNOSIS — D70.1 CHEMOTHERAPY INDUCED NEUTROPENIA (HCC): Primary | ICD-10-CM

## 2021-02-03 DIAGNOSIS — C77.2 METASTASIS TO RETROPERITONEAL LYMPH NODE (HCC): ICD-10-CM

## 2021-02-03 DIAGNOSIS — C78.7 LIVER METASTASES (HCC): ICD-10-CM

## 2021-02-03 DIAGNOSIS — T45.1X5A CHEMOTHERAPY INDUCED NEUTROPENIA (HCC): Primary | ICD-10-CM

## 2021-02-03 DIAGNOSIS — D70.1 CHEMOTHERAPY INDUCED NEUTROPENIA (HCC): ICD-10-CM

## 2021-02-03 DIAGNOSIS — C18.2 PRIMARY ADENOCARCINOMA OF ASCENDING COLON (HCC): ICD-10-CM

## 2021-02-03 DIAGNOSIS — T45.1X5A CHEMOTHERAPY INDUCED NEUTROPENIA (HCC): ICD-10-CM

## 2021-02-03 LAB
ALBUMIN SERPL BCP-MCNC: 3.5 G/DL (ref 3.5–5)
ALP SERPL-CCNC: 441 U/L (ref 46–116)
ALT SERPL W P-5'-P-CCNC: 171 U/L (ref 12–78)
ANION GAP SERPL CALCULATED.3IONS-SCNC: 3 MMOL/L (ref 4–13)
AST SERPL W P-5'-P-CCNC: 134 U/L (ref 5–45)
BASOPHILS # BLD AUTO: 0.04 THOUSANDS/ΜL (ref 0–0.1)
BASOPHILS NFR BLD AUTO: 0 % (ref 0–1)
BILIRUB SERPL-MCNC: 0.91 MG/DL (ref 0.2–1)
BUN SERPL-MCNC: 10 MG/DL (ref 5–25)
CALCIUM SERPL-MCNC: 9.4 MG/DL (ref 8.3–10.1)
CHLORIDE SERPL-SCNC: 108 MMOL/L (ref 100–108)
CO2 SERPL-SCNC: 31 MMOL/L (ref 21–32)
CREAT SERPL-MCNC: 0.89 MG/DL (ref 0.6–1.3)
EOSINOPHIL # BLD AUTO: 0.01 THOUSAND/ΜL (ref 0–0.61)
EOSINOPHIL NFR BLD AUTO: 0 % (ref 0–6)
ERYTHROCYTE [DISTWIDTH] IN BLOOD BY AUTOMATED COUNT: 14.7 % (ref 11.6–15.1)
GFR SERPL CREATININE-BSD FRML MDRD: 74 ML/MIN/1.73SQ M
GLUCOSE P FAST SERPL-MCNC: 120 MG/DL (ref 65–99)
HCT VFR BLD AUTO: 45.5 % (ref 34.8–46.1)
HGB BLD-MCNC: 14.3 G/DL (ref 11.5–15.4)
IMM GRANULOCYTES # BLD AUTO: 0.06 THOUSAND/UL (ref 0–0.2)
IMM GRANULOCYTES NFR BLD AUTO: 1 % (ref 0–2)
LYMPHOCYTES # BLD AUTO: 0.68 THOUSANDS/ΜL (ref 0.6–4.47)
LYMPHOCYTES NFR BLD AUTO: 6 % (ref 14–44)
MCH RBC QN AUTO: 35.1 PG (ref 26.8–34.3)
MCHC RBC AUTO-ENTMCNC: 31.4 G/DL (ref 31.4–37.4)
MCV RBC AUTO: 112 FL (ref 82–98)
MONOCYTES # BLD AUTO: 1.06 THOUSAND/ΜL (ref 0.17–1.22)
MONOCYTES NFR BLD AUTO: 10 % (ref 4–12)
NEUTROPHILS # BLD AUTO: 9.17 THOUSANDS/ΜL (ref 1.85–7.62)
NEUTS SEG NFR BLD AUTO: 83 % (ref 43–75)
NRBC BLD AUTO-RTO: 0 /100 WBCS
PLATELET # BLD AUTO: 118 THOUSANDS/UL (ref 149–390)
PMV BLD AUTO: 10.1 FL (ref 8.9–12.7)
POTASSIUM SERPL-SCNC: 4.1 MMOL/L (ref 3.5–5.3)
PROT SERPL-MCNC: 7 G/DL (ref 6.4–8.2)
RBC # BLD AUTO: 4.07 MILLION/UL (ref 3.81–5.12)
SODIUM SERPL-SCNC: 142 MMOL/L (ref 136–145)
WBC # BLD AUTO: 11.02 THOUSAND/UL (ref 4.31–10.16)

## 2021-02-03 PROCEDURE — 80053 COMPREHEN METABOLIC PANEL: CPT

## 2021-02-03 PROCEDURE — 36415 COLL VENOUS BLD VENIPUNCTURE: CPT

## 2021-02-03 PROCEDURE — 85025 COMPLETE CBC W/AUTO DIFF WBC: CPT

## 2021-02-04 ENCOUNTER — HOSPITAL ENCOUNTER (OUTPATIENT)
Dept: INFUSION CENTER | Facility: HOSPITAL | Age: 56
Discharge: HOME | End: 2021-02-04
Attending: INTERNAL MEDICINE

## 2021-02-04 ENCOUNTER — HOSPITAL ENCOUNTER (OUTPATIENT)
Dept: INFUSION CENTER | Facility: CLINIC | Age: 56
Discharge: HOME | End: 2021-02-04
Payer: COMMERCIAL

## 2021-02-04 VITALS
DIASTOLIC BLOOD PRESSURE: 74 MMHG | RESPIRATION RATE: 18 BRPM | SYSTOLIC BLOOD PRESSURE: 138 MMHG | HEART RATE: 74 BPM | WEIGHT: 152 LBS | HEIGHT: 66 IN | BODY MASS INDEX: 24.43 KG/M2 | TEMPERATURE: 96.1 F | OXYGEN SATURATION: 98 %

## 2021-02-04 DIAGNOSIS — T45.1X5A CHEMOTHERAPY INDUCED NEUTROPENIA (HCC): ICD-10-CM

## 2021-02-04 DIAGNOSIS — D70.1 CHEMOTHERAPY INDUCED NEUTROPENIA (HCC): ICD-10-CM

## 2021-02-04 DIAGNOSIS — C77.2 METASTASIS TO RETROPERITONEAL LYMPH NODE (HCC): Primary | ICD-10-CM

## 2021-02-04 DIAGNOSIS — C78.7 LIVER METASTASES (HCC): ICD-10-CM

## 2021-02-04 DIAGNOSIS — C18.2 PRIMARY ADENOCARCINOMA OF ASCENDING COLON (HCC): ICD-10-CM

## 2021-02-04 PROCEDURE — 96417 CHEMO IV INFUS EACH ADDL SEQ: CPT

## 2021-02-04 PROCEDURE — 96413 CHEMO IV INFUSION 1 HR: CPT

## 2021-02-04 PROCEDURE — 96368 THER/DIAG CONCURRENT INF: CPT

## 2021-02-04 PROCEDURE — G0498 CHEMO EXTEND IV INFUS W/PUMP: HCPCS

## 2021-02-04 PROCEDURE — 96415 CHEMO IV INFUSION ADDL HR: CPT

## 2021-02-04 PROCEDURE — 96367 TX/PROPH/DG ADDL SEQ IV INF: CPT

## 2021-02-04 RX ORDER — DEXTROSE MONOHYDRATE 50 MG/ML
20 INJECTION, SOLUTION INTRAVENOUS ONCE
Status: COMPLETED | OUTPATIENT
Start: 2021-02-04 | End: 2021-02-04

## 2021-02-04 RX ORDER — SODIUM CHLORIDE 9 MG/ML
20 INJECTION, SOLUTION INTRAVENOUS ONCE
Status: COMPLETED | OUTPATIENT
Start: 2021-02-04 | End: 2021-02-04

## 2021-02-04 RX ADMIN — OXALIPLATIN 115.7 MG: 5 INJECTION, SOLUTION, CONCENTRATE INTRAVENOUS at 11:05

## 2021-02-04 RX ADMIN — DEXAMETHASONE SODIUM PHOSPHATE: 10 INJECTION, SOLUTION INTRAMUSCULAR; INTRAVENOUS at 09:00

## 2021-02-04 RX ADMIN — LEUCOVORIN CALCIUM 700 MG: 500 INJECTION, POWDER, LYOPHILIZED, FOR SOLUTION INTRAMUSCULAR; INTRAVENOUS at 11:05

## 2021-02-04 RX ADMIN — DEXTROSE 20 ML/HR: 5 SOLUTION INTRAVENOUS at 11:05

## 2021-02-04 RX ADMIN — BEVACIZUMAB 347.5 MG: 400 INJECTION, SOLUTION INTRAVENOUS at 10:10

## 2021-02-04 RX ADMIN — SODIUM CHLORIDE 20 ML/HR: 0.9 INJECTION, SOLUTION INTRAVENOUS at 08:40

## 2021-02-04 RX ADMIN — FOSAPREPITANT 150 MG: 150 INJECTION, POWDER, LYOPHILIZED, FOR SOLUTION INTRAVENOUS at 09:20

## 2021-02-04 NOTE — PROGRESS NOTES
Patient to [facility] for Avastin / FOLFOX: Offers no complaints at present time: Lab work ( 02/03/21 ) reviewed:  Within parameters to treat: Right PAC accessed without difficulty: Good blood return noted

## 2021-02-04 NOTE — PROGRESS NOTES
Tolerated infusion without incident: No adverse reactions noted: Connected to CADD Pump per MD order: Verified follow up appt with patient: AVS given per request

## 2021-02-06 ENCOUNTER — HOSPITAL ENCOUNTER (OUTPATIENT)
Dept: INFUSION CENTER | Facility: CLINIC | Age: 56
Discharge: HOME | End: 2021-02-06
Payer: COMMERCIAL

## 2021-02-06 VITALS — TEMPERATURE: 98.2 F

## 2021-02-06 DIAGNOSIS — D70.1 CHEMOTHERAPY INDUCED NEUTROPENIA (HCC): ICD-10-CM

## 2021-02-06 DIAGNOSIS — C18.2 PRIMARY ADENOCARCINOMA OF ASCENDING COLON (HCC): ICD-10-CM

## 2021-02-06 DIAGNOSIS — C77.2 METASTASIS TO RETROPERITONEAL LYMPH NODE (HCC): Primary | ICD-10-CM

## 2021-02-06 DIAGNOSIS — T45.1X5A CHEMOTHERAPY INDUCED NEUTROPENIA (HCC): ICD-10-CM

## 2021-02-06 DIAGNOSIS — C78.7 LIVER METASTASES (HCC): ICD-10-CM

## 2021-02-06 PROCEDURE — 96372 THER/PROPH/DIAG INJ SC/IM: CPT

## 2021-02-06 RX ADMIN — PEGFILGRASTIM 6 MG: KIT SUBCUTANEOUS at 11:36

## 2021-02-06 NOTE — PROGRESS NOTES
Patient presents today for CADD disconnect and Neulasta Onpro placement  Patient offers no complaints  Afebrile  CADD pump reservoir volume noted zero ml  Port flushed and de-accessed as per protocol with excellent blood return noted prior to de-accessing  Neulasta onpro placed onto right arm with green indicator light flashing green  Patient aware of date and time to remove pump  Patient's next appointment confirmed  AVS offered and declined

## 2021-02-10 ENCOUNTER — HOSPITAL ENCOUNTER (OUTPATIENT)
Dept: INFUSION CENTER | Facility: CLINIC | Age: 56
End: 2021-02-10

## 2021-02-15 ENCOUNTER — TELEPHONE (OUTPATIENT)
Dept: HEMATOLOGY ONCOLOGY | Facility: CLINIC | Age: 56
End: 2021-02-15

## 2021-02-15 RX ORDER — DEXTROSE MONOHYDRATE 50 MG/ML
20 INJECTION, SOLUTION INTRAVENOUS ONCE
Status: CANCELLED | OUTPATIENT
Start: 2021-02-18

## 2021-02-15 RX ORDER — SODIUM CHLORIDE 9 MG/ML
20 INJECTION, SOLUTION INTRAVENOUS ONCE
Status: CANCELLED | OUTPATIENT
Start: 2021-02-18

## 2021-02-15 NOTE — TELEPHONE ENCOUNTER
Reviewed with Dr Fisher How and returned call to patient  Per Dr Fisher How, from a hematologic/oncologic standpoint, patient ok to receive Covd vaccine  Patient verbalized understanding

## 2021-02-15 NOTE — TELEPHONE ENCOUNTER
Patient states that she is scheduled 2/17 for the covid 19 Vaccine but is scheduled to have treatment 2/18 and would like to confirm that is ok  Best call back [phone]  Katie Martin

## 2021-02-17 ENCOUNTER — LAB (OUTPATIENT)
Dept: LAB | Facility: MEDICAL CENTER | Age: 56
End: 2021-02-17
Payer: COMMERCIAL

## 2021-02-17 DIAGNOSIS — C77.2 METASTASIS TO RETROPERITONEAL LYMPH NODE (HCC): ICD-10-CM

## 2021-02-17 DIAGNOSIS — C78.7 LIVER METASTASES (HCC): ICD-10-CM

## 2021-02-17 DIAGNOSIS — T45.1X5A CHEMOTHERAPY INDUCED NEUTROPENIA (HCC): ICD-10-CM

## 2021-02-17 DIAGNOSIS — D70.1 CHEMOTHERAPY INDUCED NEUTROPENIA (HCC): ICD-10-CM

## 2021-02-17 DIAGNOSIS — T45.1X5A CHEMOTHERAPY INDUCED NEUTROPENIA (HCC): Primary | ICD-10-CM

## 2021-02-17 DIAGNOSIS — C18.2 PRIMARY ADENOCARCINOMA OF ASCENDING COLON (HCC): ICD-10-CM

## 2021-02-17 DIAGNOSIS — D70.1 CHEMOTHERAPY INDUCED NEUTROPENIA (HCC): Primary | ICD-10-CM

## 2021-02-17 LAB
ALBUMIN SERPL BCP-MCNC: 3.1 G/DL (ref 3.5–5)
ALP SERPL-CCNC: 390 U/L (ref 46–116)
ALT SERPL W P-5'-P-CCNC: 60 U/L (ref 12–78)
ANION GAP SERPL CALCULATED.3IONS-SCNC: 5 MMOL/L (ref 4–13)
AST SERPL W P-5'-P-CCNC: 55 U/L (ref 5–45)
BACTERIA UR QL AUTO: ABNORMAL /HPF
BASOPHILS # BLD AUTO: 0.06 THOUSANDS/ΜL (ref 0–0.1)
BASOPHILS NFR BLD AUTO: 1 % (ref 0–1)
BILIRUB SERPL-MCNC: 0.8 MG/DL (ref 0.2–1)
BILIRUB UR QL STRIP: NEGATIVE
BUN SERPL-MCNC: 9 MG/DL (ref 5–25)
CALCIUM ALBUM COR SERPL-MCNC: 9.9 MG/DL (ref 8.3–10.1)
CALCIUM SERPL-MCNC: 9.2 MG/DL (ref 8.3–10.1)
CHLORIDE SERPL-SCNC: 107 MMOL/L (ref 100–108)
CLARITY UR: CLEAR
CO2 SERPL-SCNC: 30 MMOL/L (ref 21–32)
COLOR UR: YELLOW
CREAT SERPL-MCNC: 0.79 MG/DL (ref 0.6–1.3)
EOSINOPHIL # BLD AUTO: 0.08 THOUSAND/ΜL (ref 0–0.61)
EOSINOPHIL NFR BLD AUTO: 1 % (ref 0–6)
ERYTHROCYTE [DISTWIDTH] IN BLOOD BY AUTOMATED COUNT: 15.6 % (ref 11.6–15.1)
GFR SERPL CREATININE-BSD FRML MDRD: 86 ML/MIN/1.73SQ M
GLUCOSE P FAST SERPL-MCNC: 87 MG/DL (ref 65–99)
GLUCOSE UR STRIP-MCNC: NEGATIVE MG/DL
HCT VFR BLD AUTO: 43.6 % (ref 34.8–46.1)
HGB BLD-MCNC: 13.9 G/DL (ref 11.5–15.4)
HGB UR QL STRIP.AUTO: NEGATIVE
HYALINE CASTS #/AREA URNS LPF: ABNORMAL /LPF
IMM GRANULOCYTES # BLD AUTO: 0.08 THOUSAND/UL (ref 0–0.2)
IMM GRANULOCYTES NFR BLD AUTO: 1 % (ref 0–2)
KETONES UR STRIP-MCNC: NEGATIVE MG/DL
LEUKOCYTE ESTERASE UR QL STRIP: ABNORMAL
LYMPHOCYTES # BLD AUTO: 0.69 THOUSANDS/ΜL (ref 0.6–4.47)
LYMPHOCYTES NFR BLD AUTO: 6 % (ref 14–44)
MCH RBC QN AUTO: 35.5 PG (ref 26.8–34.3)
MCHC RBC AUTO-ENTMCNC: 31.9 G/DL (ref 31.4–37.4)
MCV RBC AUTO: 112 FL (ref 82–98)
MONOCYTES # BLD AUTO: 0.95 THOUSAND/ΜL (ref 0.17–1.22)
MONOCYTES NFR BLD AUTO: 9 % (ref 4–12)
NEUTROPHILS # BLD AUTO: 9.24 THOUSANDS/ΜL (ref 1.85–7.62)
NEUTS SEG NFR BLD AUTO: 82 % (ref 43–75)
NITRITE UR QL STRIP: NEGATIVE
NON-SQ EPI CELLS URNS QL MICRO: ABNORMAL /HPF
NRBC BLD AUTO-RTO: 0 /100 WBCS
PH UR STRIP.AUTO: 6.5 [PH]
PLATELET # BLD AUTO: 104 THOUSANDS/UL (ref 149–390)
PMV BLD AUTO: 10.3 FL (ref 8.9–12.7)
POTASSIUM SERPL-SCNC: 4.2 MMOL/L (ref 3.5–5.3)
PROT SERPL-MCNC: 6.6 G/DL (ref 6.4–8.2)
PROT UR STRIP-MCNC: ABNORMAL MG/DL
RBC # BLD AUTO: 3.91 MILLION/UL (ref 3.81–5.12)
RBC #/AREA URNS AUTO: ABNORMAL /HPF
SODIUM SERPL-SCNC: 142 MMOL/L (ref 136–145)
SP GR UR STRIP.AUTO: 1.01 (ref 1–1.03)
UROBILINOGEN UR QL STRIP.AUTO: 0.2 E.U./DL
WBC # BLD AUTO: 11.1 THOUSAND/UL (ref 4.31–10.16)
WBC #/AREA URNS AUTO: ABNORMAL /HPF

## 2021-02-17 PROCEDURE — 81001 URINALYSIS AUTO W/SCOPE: CPT

## 2021-02-17 PROCEDURE — 36415 COLL VENOUS BLD VENIPUNCTURE: CPT

## 2021-02-17 PROCEDURE — 80053 COMPREHEN METABOLIC PANEL: CPT

## 2021-02-17 PROCEDURE — 85025 COMPLETE CBC W/AUTO DIFF WBC: CPT

## 2021-02-18 ENCOUNTER — HOSPITAL ENCOUNTER (OUTPATIENT)
Dept: INFUSION CENTER | Facility: HOSPITAL | Age: 56
Discharge: HOME | End: 2021-02-18
Attending: INTERNAL MEDICINE

## 2021-02-18 ENCOUNTER — HOSPITAL ENCOUNTER (OUTPATIENT)
Dept: INFUSION CENTER | Facility: CLINIC | Age: 56
Discharge: HOME | End: 2021-02-18
Payer: COMMERCIAL

## 2021-02-18 VITALS
WEIGHT: 154 LBS | BODY MASS INDEX: 24.75 KG/M2 | RESPIRATION RATE: 18 BRPM | SYSTOLIC BLOOD PRESSURE: 134 MMHG | HEIGHT: 66 IN | TEMPERATURE: 96.5 F | OXYGEN SATURATION: 99 % | HEART RATE: 87 BPM | DIASTOLIC BLOOD PRESSURE: 90 MMHG

## 2021-02-18 DIAGNOSIS — T45.1X5A CHEMOTHERAPY INDUCED NEUTROPENIA (HCC): ICD-10-CM

## 2021-02-18 DIAGNOSIS — D70.1 CHEMOTHERAPY INDUCED NEUTROPENIA (HCC): ICD-10-CM

## 2021-02-18 DIAGNOSIS — C18.2 PRIMARY ADENOCARCINOMA OF ASCENDING COLON (HCC): ICD-10-CM

## 2021-02-18 DIAGNOSIS — C77.2 METASTASIS TO RETROPERITONEAL LYMPH NODE (HCC): Primary | ICD-10-CM

## 2021-02-18 DIAGNOSIS — C78.7 LIVER METASTASES (HCC): ICD-10-CM

## 2021-02-18 PROCEDURE — 96368 THER/DIAG CONCURRENT INF: CPT

## 2021-02-18 PROCEDURE — 96413 CHEMO IV INFUSION 1 HR: CPT

## 2021-02-18 PROCEDURE — G0498 CHEMO EXTEND IV INFUS W/PUMP: HCPCS

## 2021-02-18 PROCEDURE — 96417 CHEMO IV INFUS EACH ADDL SEQ: CPT

## 2021-02-18 PROCEDURE — 96415 CHEMO IV INFUSION ADDL HR: CPT

## 2021-02-18 PROCEDURE — 96367 TX/PROPH/DG ADDL SEQ IV INF: CPT

## 2021-02-18 RX ORDER — DEXTROSE MONOHYDRATE 50 MG/ML
20 INJECTION, SOLUTION INTRAVENOUS ONCE
Status: COMPLETED | OUTPATIENT
Start: 2021-02-18 | End: 2021-02-18

## 2021-02-18 RX ORDER — SODIUM CHLORIDE 9 MG/ML
20 INJECTION, SOLUTION INTRAVENOUS ONCE
Status: COMPLETED | OUTPATIENT
Start: 2021-02-18 | End: 2021-02-18

## 2021-02-18 RX ADMIN — OXALIPLATIN 115.7 MG: 5 INJECTION, SOLUTION, CONCENTRATE INTRAVENOUS at 10:25

## 2021-02-18 RX ADMIN — DEXTROSE 20 ML/HR: 5 SOLUTION INTRAVENOUS at 10:18

## 2021-02-18 RX ADMIN — DEXAMETHASONE SODIUM PHOSPHATE: 10 INJECTION, SOLUTION INTRAMUSCULAR; INTRAVENOUS at 08:35

## 2021-02-18 RX ADMIN — BEVACIZUMAB 347.5 MG: 400 INJECTION, SOLUTION INTRAVENOUS at 09:33

## 2021-02-18 RX ADMIN — FOSAPREPITANT 150 MG: 150 INJECTION, POWDER, LYOPHILIZED, FOR SOLUTION INTRAVENOUS at 08:56

## 2021-02-18 RX ADMIN — LEUCOVORIN CALCIUM 700 MG: 10 INJECTION INTRAMUSCULAR; INTRAVENOUS at 10:20

## 2021-02-18 RX ADMIN — SODIUM CHLORIDE 20 ML/HR: 0.9 INJECTION, SOLUTION INTRAVENOUS at 08:35

## 2021-02-18 NOTE — PROGRESS NOTES
Pt to clinic for avastin, oxaliplatin, 5 fu cadd   Pt offers no complaints at this time, will continue to monitor

## 2021-02-18 NOTE — PLAN OF CARE
Problem: Potential for Falls  Goal: Patient will remain free of falls  Description: INTERVENTIONS:  - Assess patient frequently for physical needs  -  Identify cognitive and physical deficits and behaviors that affect risk of falls  -  Jayess fall precautions as indicated by assessment   - Educate patient/family on patient safety including physical limitations  - Instruct patient to call for assistance with activity based on assessment  - Modify environment to reduce risk of injury  - Consider OT/PT consult to assist with strengthening/mobility  Outcome: Progressing     Problem: Knowledge Deficit  Goal: Patient/family/caregiver demonstrates understanding of disease process, treatment plan, medications, and discharge instructions  Description: Complete learning assessment and assess knowledge base    Interventions:  - Provide teaching at level of understanding  - Provide teaching via preferred learning methods  Outcome: Progressing

## 2021-02-20 ENCOUNTER — HOSPITAL ENCOUNTER (OUTPATIENT)
Dept: INFUSION CENTER | Facility: CLINIC | Age: 56
Discharge: HOME | End: 2021-02-20
Payer: COMMERCIAL

## 2021-02-20 VITALS — TEMPERATURE: 96.7 F

## 2021-02-20 DIAGNOSIS — C77.2 METASTASIS TO RETROPERITONEAL LYMPH NODE (HCC): Primary | ICD-10-CM

## 2021-02-20 DIAGNOSIS — D70.1 CHEMOTHERAPY INDUCED NEUTROPENIA (HCC): ICD-10-CM

## 2021-02-20 DIAGNOSIS — C18.2 PRIMARY ADENOCARCINOMA OF ASCENDING COLON (HCC): ICD-10-CM

## 2021-02-20 DIAGNOSIS — C78.7 LIVER METASTASES (HCC): ICD-10-CM

## 2021-02-20 DIAGNOSIS — T45.1X5A CHEMOTHERAPY INDUCED NEUTROPENIA (HCC): ICD-10-CM

## 2021-02-20 PROCEDURE — 96372 THER/PROPH/DIAG INJ SC/IM: CPT

## 2021-02-20 RX ADMIN — PEGFILGRASTIM 6 MG: KIT SUBCUTANEOUS at 10:50

## 2021-02-20 NOTE — PROGRESS NOTES
Pt to clinic for cadd dc and neulasta on pro, pt tolerated without complications, aware of when neulasta will  Be done, py aware of next appointment, declined avs

## 2021-02-25 ENCOUNTER — TELEPHONE (OUTPATIENT)
Dept: HEMATOLOGY ONCOLOGY | Facility: CLINIC | Age: 56
End: 2021-02-25

## 2021-02-25 DIAGNOSIS — C77.2 METASTASIS TO RETROPERITONEAL LYMPH NODE (HCC): ICD-10-CM

## 2021-02-25 DIAGNOSIS — T45.1X5A CHEMOTHERAPY INDUCED NEUTROPENIA (HCC): Primary | ICD-10-CM

## 2021-02-25 DIAGNOSIS — C18.2 PRIMARY ADENOCARCINOMA OF ASCENDING COLON (HCC): ICD-10-CM

## 2021-02-25 DIAGNOSIS — D70.1 CHEMOTHERAPY INDUCED NEUTROPENIA (HCC): Primary | ICD-10-CM

## 2021-02-25 DIAGNOSIS — C78.7 LIVER METASTASES (HCC): ICD-10-CM

## 2021-02-25 RX ORDER — DEXTROSE MONOHYDRATE 50 MG/ML
20 INJECTION, SOLUTION INTRAVENOUS ONCE
Status: CANCELLED | OUTPATIENT
Start: 2021-03-04

## 2021-02-25 RX ORDER — SODIUM CHLORIDE 9 MG/ML
20 INJECTION, SOLUTION INTRAVENOUS ONCE
Status: CANCELLED | OUTPATIENT
Start: 2021-03-04

## 2021-02-25 NOTE — TELEPHONE ENCOUNTER
Telephone call spoke with pt  She had her liver procedure 2/1/21 and does want the avastin this tx and going forward  She has [facility] and a fu appt with them to review results  She is also requesting additional tx plans be scheduled

## 2021-02-27 ENCOUNTER — HOSPITAL ENCOUNTER (OUTPATIENT)
Dept: INFUSION CENTER | Facility: CLINIC | Age: 56
End: 2021-02-27

## 2021-03-03 ENCOUNTER — LAB (OUTPATIENT)
Dept: LAB | Facility: MEDICAL CENTER | Age: 56
End: 2021-03-03
Payer: COMMERCIAL

## 2021-03-03 DIAGNOSIS — C18.2 PRIMARY ADENOCARCINOMA OF ASCENDING COLON (HCC): ICD-10-CM

## 2021-03-03 DIAGNOSIS — C78.7 LIVER METASTASES (HCC): ICD-10-CM

## 2021-03-03 DIAGNOSIS — T45.1X5A CHEMOTHERAPY INDUCED NEUTROPENIA (HCC): ICD-10-CM

## 2021-03-03 DIAGNOSIS — D70.1 CHEMOTHERAPY INDUCED NEUTROPENIA (HCC): ICD-10-CM

## 2021-03-03 DIAGNOSIS — C77.2 METASTASIS TO RETROPERITONEAL LYMPH NODE (HCC): ICD-10-CM

## 2021-03-03 LAB
ALBUMIN SERPL BCP-MCNC: 3.1 G/DL (ref 3.5–5)
ALP SERPL-CCNC: 466 U/L (ref 46–116)
ALT SERPL W P-5'-P-CCNC: 57 U/L (ref 12–78)
ANION GAP SERPL CALCULATED.3IONS-SCNC: 4 MMOL/L (ref 4–13)
AST SERPL W P-5'-P-CCNC: 58 U/L (ref 5–45)
BASOPHILS # BLD AUTO: 0.04 THOUSANDS/ΜL (ref 0–0.1)
BASOPHILS NFR BLD AUTO: 0 % (ref 0–1)
BILIRUB SERPL-MCNC: 0.77 MG/DL (ref 0.2–1)
BUN SERPL-MCNC: 9 MG/DL (ref 5–25)
CALCIUM ALBUM COR SERPL-MCNC: 9.8 MG/DL (ref 8.3–10.1)
CALCIUM SERPL-MCNC: 9.1 MG/DL (ref 8.3–10.1)
CHLORIDE SERPL-SCNC: 109 MMOL/L (ref 100–108)
CO2 SERPL-SCNC: 30 MMOL/L (ref 21–32)
CREAT SERPL-MCNC: 0.79 MG/DL (ref 0.6–1.3)
EOSINOPHIL # BLD AUTO: 0.09 THOUSAND/ΜL (ref 0–0.61)
EOSINOPHIL NFR BLD AUTO: 1 % (ref 0–6)
ERYTHROCYTE [DISTWIDTH] IN BLOOD BY AUTOMATED COUNT: 16 % (ref 11.6–15.1)
GFR SERPL CREATININE-BSD FRML MDRD: 86 ML/MIN/1.73SQ M
GLUCOSE P FAST SERPL-MCNC: 84 MG/DL (ref 65–99)
HCT VFR BLD AUTO: 42.1 % (ref 34.8–46.1)
HGB BLD-MCNC: 13.4 G/DL (ref 11.5–15.4)
IMM GRANULOCYTES # BLD AUTO: 0.12 THOUSAND/UL (ref 0–0.2)
IMM GRANULOCYTES NFR BLD AUTO: 1 % (ref 0–2)
LYMPHOCYTES # BLD AUTO: 0.8 THOUSANDS/ΜL (ref 0.6–4.47)
LYMPHOCYTES NFR BLD AUTO: 6 % (ref 14–44)
MCH RBC QN AUTO: 35.9 PG (ref 26.8–34.3)
MCHC RBC AUTO-ENTMCNC: 31.8 G/DL (ref 31.4–37.4)
MCV RBC AUTO: 113 FL (ref 82–98)
MONOCYTES # BLD AUTO: 1.53 THOUSAND/ΜL (ref 0.17–1.22)
MONOCYTES NFR BLD AUTO: 12 % (ref 4–12)
NEUTROPHILS # BLD AUTO: 10.76 THOUSANDS/ΜL (ref 1.85–7.62)
NEUTS SEG NFR BLD AUTO: 80 % (ref 43–75)
NRBC BLD AUTO-RTO: 0 /100 WBCS
PLATELET # BLD AUTO: 95 THOUSANDS/UL (ref 149–390)
PMV BLD AUTO: 10.8 FL (ref 8.9–12.7)
POTASSIUM SERPL-SCNC: 4 MMOL/L (ref 3.5–5.3)
PROT SERPL-MCNC: 6.6 G/DL (ref 6.4–8.2)
RBC # BLD AUTO: 3.73 MILLION/UL (ref 3.81–5.12)
SODIUM SERPL-SCNC: 143 MMOL/L (ref 136–145)
WBC # BLD AUTO: 13.34 THOUSAND/UL (ref 4.31–10.16)

## 2021-03-03 PROCEDURE — 80053 COMPREHEN METABOLIC PANEL: CPT

## 2021-03-03 PROCEDURE — 36415 COLL VENOUS BLD VENIPUNCTURE: CPT

## 2021-03-03 PROCEDURE — 85025 COMPLETE CBC W/AUTO DIFF WBC: CPT

## 2021-03-04 ENCOUNTER — HOSPITAL ENCOUNTER (OUTPATIENT)
Dept: INFUSION CENTER | Facility: CLINIC | Age: 56
Discharge: HOME | End: 2021-03-04
Payer: COMMERCIAL

## 2021-03-04 VITALS
TEMPERATURE: 96.6 F | SYSTOLIC BLOOD PRESSURE: 138 MMHG | DIASTOLIC BLOOD PRESSURE: 86 MMHG | HEART RATE: 78 BPM | RESPIRATION RATE: 14 BRPM | BODY MASS INDEX: 25.83 KG/M2 | OXYGEN SATURATION: 98 % | WEIGHT: 155 LBS | HEIGHT: 65 IN

## 2021-03-04 DIAGNOSIS — C77.2 METASTASIS TO RETROPERITONEAL LYMPH NODE (HCC): Primary | ICD-10-CM

## 2021-03-04 DIAGNOSIS — C78.7 LIVER METASTASES (HCC): ICD-10-CM

## 2021-03-04 DIAGNOSIS — C18.2 PRIMARY ADENOCARCINOMA OF ASCENDING COLON (HCC): ICD-10-CM

## 2021-03-04 DIAGNOSIS — D70.1 CHEMOTHERAPY INDUCED NEUTROPENIA (HCC): ICD-10-CM

## 2021-03-04 DIAGNOSIS — T45.1X5A CHEMOTHERAPY INDUCED NEUTROPENIA (HCC): ICD-10-CM

## 2021-03-04 PROCEDURE — 96368 THER/DIAG CONCURRENT INF: CPT

## 2021-03-04 PROCEDURE — G0498 CHEMO EXTEND IV INFUS W/PUMP: HCPCS

## 2021-03-04 PROCEDURE — 96417 CHEMO IV INFUS EACH ADDL SEQ: CPT

## 2021-03-04 PROCEDURE — 96415 CHEMO IV INFUSION ADDL HR: CPT

## 2021-03-04 PROCEDURE — 96367 TX/PROPH/DG ADDL SEQ IV INF: CPT

## 2021-03-04 PROCEDURE — 96413 CHEMO IV INFUSION 1 HR: CPT

## 2021-03-04 RX ORDER — DEXTROSE MONOHYDRATE 50 MG/ML
20 INJECTION, SOLUTION INTRAVENOUS ONCE
Status: COMPLETED | OUTPATIENT
Start: 2021-03-04 | End: 2021-03-04

## 2021-03-04 RX ORDER — SODIUM CHLORIDE 9 MG/ML
20 INJECTION, SOLUTION INTRAVENOUS ONCE
Status: COMPLETED | OUTPATIENT
Start: 2021-03-04 | End: 2021-03-04

## 2021-03-04 RX ADMIN — SODIUM CHLORIDE 20 ML/HR: 0.9 INJECTION, SOLUTION INTRAVENOUS at 10:00

## 2021-03-04 RX ADMIN — LEUCOVORIN CALCIUM 700 MG: 10 INJECTION INTRAMUSCULAR; INTRAVENOUS at 12:09

## 2021-03-04 RX ADMIN — OXALIPLATIN 115.7 MG: 5 INJECTION, SOLUTION, CONCENTRATE INTRAVENOUS at 12:13

## 2021-03-04 RX ADMIN — BEVACIZUMAB 347.5 MG: 400 INJECTION, SOLUTION INTRAVENOUS at 11:15

## 2021-03-04 RX ADMIN — DEXTROSE 20 ML/HR: 5 SOLUTION INTRAVENOUS at 12:00

## 2021-03-04 RX ADMIN — DEXAMETHASONE SODIUM PHOSPHATE: 10 INJECTION, SOLUTION INTRAMUSCULAR; INTRAVENOUS at 10:03

## 2021-03-04 RX ADMIN — FOSAPREPITANT 150 MG: 150 INJECTION, POWDER, LYOPHILIZED, FOR SOLUTION INTRAVENOUS at 10:25

## 2021-03-04 NOTE — PROGRESS NOTES
Patient here for chemotherapy treatment  Patient resting with no complaints  Vitals stable, BP within parameters  Labs reviewed, platelets below parameters, spoke with [facility] RN in med onc office, per Dr Gaston Glover, kathe to treat today with platelets of [phone],082  Callbell within reach of patient  Will continue to monitor

## 2021-03-04 NOTE — PROGRESS NOTES
Patient tolerated treatment without complications  Patient connected to CADD pump, all connections secured  CADD infusing without issue, pump running and green light flashing  Patient aware of disconnect date and time   Patient declined AVS

## 2021-03-05 ENCOUNTER — OFFICE VISIT (OUTPATIENT)
Dept: HEMATOLOGY ONCOLOGY | Facility: CLINIC | Age: 56
End: 2021-03-05
Payer: COMMERCIAL

## 2021-03-05 VITALS
DIASTOLIC BLOOD PRESSURE: 80 MMHG | OXYGEN SATURATION: 99 % | BODY MASS INDEX: 25.83 KG/M2 | WEIGHT: 155 LBS | HEIGHT: 65 IN | TEMPERATURE: 98.2 F | RESPIRATION RATE: 14 BRPM | HEART RATE: 83 BPM | SYSTOLIC BLOOD PRESSURE: 124 MMHG

## 2021-03-05 DIAGNOSIS — C78.00 MALIGNANT NEOPLASM METASTATIC TO LUNG, UNSPECIFIED LATERALITY (HCC): ICD-10-CM

## 2021-03-05 DIAGNOSIS — C78.7 LIVER METASTASES (HCC): ICD-10-CM

## 2021-03-05 DIAGNOSIS — C18.2 PRIMARY ADENOCARCINOMA OF ASCENDING COLON (HCC): Primary | ICD-10-CM

## 2021-03-05 PROCEDURE — 99214 OFFICE O/P EST MOD 30 MIN: CPT | Performed by: PHYSICIAN ASSISTANT

## 2021-03-05 RX ORDER — POTASSIUM CHLORIDE 750 MG/1
TABLET, FILM COATED, EXTENDED RELEASE ORAL
COMMUNITY
Start: 2021-02-26

## 2021-03-05 NOTE — PROGRESS NOTES
Hematology/Oncology Outpatient Follow-up  Jonas Corky 50 y o  female 1965 [id number]    Date:  3/5/2021      Assessment and Plan:  1  Primary adenocarcinoma of ascending colon ([facility] [address] ), 2  Liver metastases ([facility] [address] ), 3  Malignant neoplasm metastatic to lung, unspecified laterality [facility])  50year old female presents for follow-up regarding history of metastatic adenocarcinoma of the colon with liver and lung metastases  Patient also falls at [facility]  She has had liver ablated procedures  She is having repeat imaging on 03/12/2021  She states she will be having an MRI of the abdomen as well as a PET/CT  She will then be having a follow-up appointment with IR physician who has done the liver lesions as well as her medical oncologist   She is advised to keep us up-to-date regarding further planning  She asked regarding immunotherapy  In regards to PDL1 based immunotherapy she is not a candidate secondary to MSI stable and PDL1 of 0% based on molecular studies completed in March 2020  She has questions regarding possible re-biopsy of lung lesions as they are worsening  Reviewed that once we have the PET/CT we can rediscuss  We also will be going under the direction of [facility]  She does have expected peripheral neuropathy of the hands and feet  This remains stable  She does not have a pain associated with this  She has some sensitivity to the touch for the 1st 3 days following chemotherapy but then this improved on her week off  No change in therapy at this time  Follow-up in 6-8 weeks        HPI:  Oncology History   Primary adenocarcinoma of ascending colon ([facility] [address] )   6/14/2018 Initial Diagnosis    Primary adenocarcinoma of ascending colon ([facility] [address] )     7/17/2018 - 8/1/2018 Chemotherapy    camptosar 180 mg/m2 day 1  5  mg/m2 day 1  5 FU 1200 mg/m2 day 1-2   Leucovorin 400 mg/m2     Venofer 100 mg (first 10 treatments) -- all every 2 weeks         7/17/2018 - 2/3/2020 Chemotherapy    palonosetron (ALOXI) injection 0 25 mg, 0 25 mg, Intravenous, Once, 8 of 16 cycles  Administration: 0 25 mg (11/21/2019), 0 25 mg (12/5/2019), 0 25 mg (12/19/2019), 0 25 mg (1/2/2020), 0 25 mg (1/16/2020), 0 25 mg (1/30/2020)  fluorouracil (ADRUCIL) injection 715 mg, 400 mg/m2, Intravenous, Once, 7 of 7 cycles  pegfilgrastim (NEULASTA ONPRO) subcutaneous injection kit 6 mg, 6 mg, Subcutaneous, Once, 20 of 28 cycles  Administration: 6 mg (5/22/2019), 6 mg (6/5/2019), 6 mg (6/22/2019), 6 mg (7/5/2019), 6 mg (7/20/2019), 6 mg (8/2/2019), 6 mg (8/17/2019), 6 mg (8/31/2019), 6 mg (9/14/2019), 6 mg (9/28/2019), 6 mg (10/12/2019), 6 mg (10/26/2019), 6 mg (11/9/2019), 6 mg (11/23/2019), 6 mg (12/7/2019), 6 mg (12/21/2019), 6 mg (1/4/2020), 6 mg (1/18/2020), 6 mg (2/1/2020)  fosaprepitant (EMEND) 150 mg in sodium chloride 0 9 % 250 mL IVPB, 150 mg, Intravenous, Once, 8 of 16 cycles  Administration: 150 mg (11/7/2019), 150 mg (11/21/2019), 150 mg (12/5/2019), 150 mg (12/19/2019), 150 mg (1/2/2020), 150 mg (1/16/2020), 150 mg (1/30/2020)  bevacizumab (AVASTIN) 355 mg in sodium chloride 0 9 % 100 mL IVPB, 5 mg/kg, Intravenous, Once, 27 of 35 cycles  Administration: 355 mg (5/20/2019), 355 mg (6/3/2019), 355 mg (6/20/2019), 355 mg (7/3/2019), 355 mg (7/18/2019), 355 mg (7/31/2019), 355 mg (8/15/2019), 355 mg (8/29/2019), 355 mg (9/12/2019), 355 mg (9/26/2019), 355 mg (10/10/2019), 355 mg (10/24/2019), 355 mg (11/7/2019), 355 mg (11/21/2019), 355 mg (12/5/2019), 355 mg (12/19/2019), 355 mg (1/2/2020), 355 mg (1/16/2020), 355 mg (1/30/2020)  irinotecan (CAMPTOSAR) 322 mg in sodium chloride 0 9 % 500 mL chemo infusion, 180 mg/m2, Intravenous, Once, 27 of 35 cycles  Dose modification: 150 mg/m2 (original dose 180 mg/m2, Cycle 20, Reason: Dose Not Tolerated)  Administration: 322 mg (5/20/2019), 322 mg (6/3/2019), 320 mg (6/20/2019), 320 mg (7/3/2019), 320 mg (7/18/2019), 320 mg (7/31/2019), 320 mg (8/15/2019), 320 mg (8/29/2019), 320 mg (9/12/2019), 320 mg (9/26/2019), 320 mg (10/10/2019), 320 mg (10/24/2019), 269 mg (11/7/2019), 269 mg (11/21/2019), 269 mg (12/5/2019), 269 mg (12/19/2019), 269 mg (1/2/2020), 269 mg (1/16/2020), 269 mg (1/30/2020)  leucovorin 716 mg in sodium chloride 0 9 % 250 mL IVPB, 400 mg/m2, Intravenous, Once, 27 of 35 cycles  Administration: 716 mg (5/20/2019), 716 mg (6/3/2019), 700 mg (6/20/2019), 700 mg (7/3/2019), 700 mg (7/18/2019), 700 mg (7/31/2019), 700 mg (8/15/2019), 700 mg (8/29/2019), 700 mg (9/12/2019), 700 mg (9/26/2019), 700 mg (10/10/2019), 716 mg (10/24/2019), 700 mg (11/7/2019), 700 mg (11/21/2019), 700 mg (12/5/2019), 700 mg (12/19/2019), 700 mg (1/2/2020), 700 mg (1/16/2020), 700 mg (1/30/2020)     8/1/2018 Adverse Reaction    Needed granix 300 mcg due to [address] of 1 01      8/14/2018 -  Chemotherapy    camptosar 180 mg/m2 day 1  5  mg/m2 day 1  5 FU 1200 mg/m2 day 1-2   Leucovorin 400 mg/m2  Avastin 5 mg/kg day 1   Venofer 100 mg (first 10 treatments)  Neulasta on Pro 6 mg day 3      -- every 2 weeks          3/3/2020 - 3/16/2020 Chemotherapy    pegfilgrastim (NEULASTA ONPRO) subcutaneous injection kit 6 mg, 6 mg, Subcutaneous, Once, 1 of 12 cycles  Administration: 6 mg (3/5/2020)  bevacizumab (AVASTIN) 357 5 mg in sodium chloride 0 9 % 100 mL IVPB, 5 mg/kg = 357 5 mg, Intravenous, Once, 1 of 12 cycles  Administration: 357 5 mg (3/3/2020)  leucovorin 700 mg in dextrose 5 % 250 mL IVPB, 720 mg, Intravenous, Once, 1 of 12 cycles  Administration: 700 mg (3/3/2020)  oxaliplatin (ELOXATIN) 150 mg in dextrose 5 % 250 mL chemo infusion, 153 mg, Intravenous, Once, 1 of 12 cycles  Administration: 150 mg (3/3/2020)     3/16/2020 - 4/5/2020 Chemotherapy    bevacizumab (AVASTIN) 560 mg in sodium chloride 0 9 % 100 mL IVPB, 7 5 mg/kg, Intravenous, Once, 0 of 5 cycles  oxaliplatin (ELOXATIN) 239 2 mg in dextrose 5 % 500 mL chemo infusion, 130 mg/m2 = 239 2 mg, Intravenous, Once, 1 of 6 cycles  Administration: 239 2 mg (3/16/2020)     4/6/2020 - 12/13/2020 Chemotherapy    fluorouracil (ADRUCIL) injection 730 mg, 400 mg/m2 = 730 mg, Intravenous, Once, 1 of 1 cycle  pegfilgrastim (NEULASTA ONPRO) subcutaneous injection kit 6 mg, 6 mg, Subcutaneous, Once, 14 of 20 cycles  Administration: 6 mg (4/8/2020), 6 mg (4/25/2020), 6 mg (5/8/2020), 6 mg (6/19/2020), 6 mg (7/2/2020), 6 mg (7/17/2020), 6 mg (7/31/2020), 6 mg (8/14/2020), 6 mg (8/28/2020), 6 mg (9/25/2020), 6 mg (10/15/2020), 6 mg (11/5/2020), 6 mg (11/18/2020), 6 mg (12/2/2020)  fosaprepitant (EMEND) 150 mg in sodium chloride 0 9 % 250 mL IVPB, 150 mg, Intravenous, Once, 14 of 20 cycles  Administration: 150 mg (4/6/2020), 150 mg (4/23/2020), 150 mg (5/6/2020), 150 mg (6/17/2020), 150 mg (6/30/2020), 150 mg (7/15/2020), 150 mg (7/29/2020), 150 mg (8/12/2020), 150 mg (8/26/2020), 150 mg (9/23/2020), 150 mg (10/13/2020), 150 mg (11/3/2020), 150 mg (11/16/2020), 150 mg (11/30/2020)  bevacizumab (AVASTIN) 372 5 mg in sodium chloride 0 9 % 100 mL IVPB, 5 mg/kg = 372 5 mg, Intravenous, Once, 10 of 16 cycles  Administration: 372 5 mg (4/6/2020), 372 5 mg (4/23/2020), 357 5 mg (6/30/2020), 357 5 mg (7/15/2020), 357 5 mg (7/29/2020), 357 5 mg (8/12/2020), 357 5 mg (8/26/2020), 357 5 mg (11/16/2020), 357 5 mg (11/30/2020)  leucovorin 750 mg in dextrose 5 % 250 mL IVPB, 732 mg, Intravenous, Once, 14 of 20 cycles  Administration: 750 mg (4/6/2020), 750 mg (4/23/2020), 750 mg (5/6/2020), 750 mg (6/17/2020), 700 mg (6/30/2020), 700 mg (7/15/2020), 700 mg (7/29/2020), 700 mg (8/12/2020), 700 mg (8/26/2020), 700 mg (9/23/2020), 700 mg (10/13/2020), 700 mg (11/3/2020), 700 mg (11/16/2020), 700 mg (11/30/2020)  oxaliplatin (ELOXATIN) 150 mg in dextrose 5 % 250 mL chemo infusion, 155 55 mg, Intravenous, Once, 14 of 20 cycles  Dose modification: 70 mg/m2 (original dose 85 mg/m2, Cycle 11, Reason: Dose Not Tolerated, Comment: thrombocytopenia), 65 mg/m2 (original dose 85 mg/m2, Cycle 14, Reason: Other (See Comments)), 65 mg/m2 (original dose 85 mg/m2, Cycle 15, Reason: Max Dose Reached)  Administration: 150 mg (4/6/2020), 150 mg (4/23/2020), 150 mg (5/6/2020), 150 mg (6/17/2020), 150 mg (6/30/2020), 150 mg (7/15/2020), 150 mg (7/29/2020), 150 mg (8/12/2020), 150 mg (8/26/2020), 150 mg (9/23/2020), 125 3 mg (10/13/2020), 125 3 mg (11/3/2020), 125 3 mg (11/16/2020), 116 35 mg (11/30/2020)     12/28/2020 -  Chemotherapy    fluorouracil (ADRUCIL) injection 710 mg, 400 mg/m2, Intravenous, Once, 0 of 4 cycles  pegfilgrastim (NEULASTA ONPRO) subcutaneous injection kit 6 mg, 6 mg, Subcutaneous, Once, 5 of 9 cycles  Administration: 6 mg (12/30/2020), 6 mg (1/14/2021), 6 mg (2/6/2021), 6 mg (2/20/2021)  fosaprepitant (EMEND) 150 mg in sodium chloride 0 9 % 250 mL IVPB, 150 mg, Intravenous, Once, 5 of 9 cycles  Administration: 150 mg (12/28/2020), 150 mg (1/12/2021), 150 mg (2/4/2021), 150 mg (2/18/2021), 150 mg (3/4/2021)  bevacizumab (AVASTIN) 347 5 mg in sodium chloride 0 9 % 100 mL IVPB, 5 mg/kg = 347 5 mg, Intravenous, Once, 4 of 8 cycles  Administration: 347 5 mg (12/28/2020), 347 5 mg (2/4/2021), 347 5 mg (2/18/2021), 347 5 mg (3/4/2021)  leucovorin 700 mg in dextrose 5 % 250 mL IVPB, 712 mg, Intravenous, Once, 5 of 9 cycles  Administration: 700 mg (12/28/2020), 700 mg (1/12/2021), 700 mg (2/4/2021), 700 mg (2/18/2021), 700 mg (3/4/2021)  oxaliplatin (ELOXATIN) 115 7 mg in dextrose 5 % 250 mL chemo infusion, 65 mg/m2 = 115 7 mg (76 5 % of original dose 85 mg/m2), Intravenous, Once, 5 of 9 cycles  Dose modification: 65 mg/m2 (original dose 85 mg/m2, Cycle 1, Reason: Dose Not Tolerated)  Administration: 115 7 mg (12/28/2020), 115 7 mg (1/12/2021), 115 7 mg (2/4/2021), 115 7 mg (2/18/2021), 115 7 mg (3/4/2021)     Liver metastases ([facility] [address] )   6/26/2018 Initial Diagnosis    Liver metastases ([facility] [address] )     7/17/2018 - 2/3/2020 Chemotherapy    palonosetron (ALOXI) injection 0 25 mg, 0 25 mg, Intravenous, Once, 8 of 16 cycles  Administration: 0 25 mg (11/21/2019), 0 25 mg (12/5/2019), 0 25 mg (12/19/2019), 0 25 mg (1/2/2020), 0 25 mg (1/16/2020), 0 25 mg (1/30/2020)  fluorouracil (ADRUCIL) injection 715 mg, 400 mg/m2, Intravenous, Once, 7 of 7 cycles  pegfilgrastim (NEULASTA ONPRO) subcutaneous injection kit 6 mg, 6 mg, Subcutaneous, Once, 20 of 28 cycles  Administration: 6 mg (5/22/2019), 6 mg (6/5/2019), 6 mg (6/22/2019), 6 mg (7/5/2019), 6 mg (7/20/2019), 6 mg (8/2/2019), 6 mg (8/17/2019), 6 mg (8/31/2019), 6 mg (9/14/2019), 6 mg (9/28/2019), 6 mg (10/12/2019), 6 mg (10/26/2019), 6 mg (11/9/2019), 6 mg (11/23/2019), 6 mg (12/7/2019), 6 mg (12/21/2019), 6 mg (1/4/2020), 6 mg (1/18/2020), 6 mg (2/1/2020)  fosaprepitant (EMEND) 150 mg in sodium chloride 0 9 % 250 mL IVPB, 150 mg, Intravenous, Once, 8 of 16 cycles  Administration: 150 mg (11/7/2019), 150 mg (11/21/2019), 150 mg (12/5/2019), 150 mg (12/19/2019), 150 mg (1/2/2020), 150 mg (1/16/2020), 150 mg (1/30/2020)  bevacizumab (AVASTIN) 355 mg in sodium chloride 0 9 % 100 mL IVPB, 5 mg/kg, Intravenous, Once, 27 of 35 cycles  Administration: 355 mg (5/20/2019), 355 mg (6/3/2019), 355 mg (6/20/2019), 355 mg (7/3/2019), 355 mg (7/18/2019), 355 mg (7/31/2019), 355 mg (8/15/2019), 355 mg (8/29/2019), 355 mg (9/12/2019), 355 mg (9/26/2019), 355 mg (10/10/2019), 355 mg (10/24/2019), 355 mg (11/7/2019), 355 mg (11/21/2019), 355 mg (12/5/2019), 355 mg (12/19/2019), 355 mg (1/2/2020), 355 mg (1/16/2020), 355 mg (1/30/2020)  irinotecan (CAMPTOSAR) 322 mg in sodium chloride 0 9 % 500 mL chemo infusion, 180 mg/m2, Intravenous, Once, 27 of 35 cycles  Dose modification: 150 mg/m2 (original dose 180 mg/m2, Cycle 20, Reason: Dose Not Tolerated)  Administration: 322 mg (5/20/2019), 322 mg (6/3/2019), 320 mg (6/20/2019), 320 mg (7/3/2019), 320 mg (7/18/2019), 320 mg (7/31/2019), 320 mg (8/15/2019), 320 mg (8/29/2019), 320 mg (9/12/2019), 320 mg (9/26/2019), 320 mg (10/10/2019), 320 mg (10/24/2019), 269 mg (11/7/2019), 269 mg (11/21/2019), 269 mg (12/5/2019), 269 mg (12/19/2019), 269 mg (1/2/2020), 269 mg (1/16/2020), 269 mg (1/30/2020)  leucovorin 716 mg in sodium chloride 0 9 % 250 mL IVPB, 400 mg/m2, Intravenous, Once, 27 of 35 cycles  Administration: 716 mg (5/20/2019), 716 mg (6/3/2019), 700 mg (6/20/2019), 700 mg (7/3/2019), 700 mg (7/18/2019), 700 mg (7/31/2019), 700 mg (8/15/2019), 700 mg (8/29/2019), 700 mg (9/12/2019), 700 mg (9/26/2019), 700 mg (10/10/2019), 716 mg (10/24/2019), 700 mg (11/7/2019), 700 mg (11/21/2019), 700 mg (12/5/2019), 700 mg (12/19/2019), 700 mg (1/2/2020), 700 mg (1/16/2020), 700 mg (1/30/2020)     3/3/2020 - 3/16/2020 Chemotherapy    pegfilgrastim (NEULASTA ONPRO) subcutaneous injection kit 6 mg, 6 mg, Subcutaneous, Once, 1 of 12 cycles  Administration: 6 mg (3/5/2020)  bevacizumab (AVASTIN) 357 5 mg in sodium chloride 0 9 % 100 mL IVPB, 5 mg/kg = 357 5 mg, Intravenous, Once, 1 of 12 cycles  Administration: 357 5 mg (3/3/2020)  leucovorin 700 mg in dextrose 5 % 250 mL IVPB, 720 mg, Intravenous, Once, 1 of 12 cycles  Administration: 700 mg (3/3/2020)  oxaliplatin (ELOXATIN) 150 mg in dextrose 5 % 250 mL chemo infusion, 153 mg, Intravenous, Once, 1 of 12 cycles  Administration: 150 mg (3/3/2020)     3/16/2020 - 4/5/2020 Chemotherapy    bevacizumab (AVASTIN) 560 mg in sodium chloride 0 9 % 100 mL IVPB, 7 5 mg/kg, Intravenous, Once, 0 of 5 cycles  oxaliplatin (ELOXATIN) 239 2 mg in dextrose 5 % 500 mL chemo infusion, 130 mg/m2 = 239 2 mg, Intravenous, Once, 1 of 6 cycles  Administration: 239 2 mg (3/16/2020)     4/6/2020 - 12/13/2020 Chemotherapy    fluorouracil (ADRUCIL) injection 730 mg, 400 mg/m2 = 730 mg, Intravenous, Once, 1 of 1 cycle  pegfilgrastim (NEULASTA ONPRO) subcutaneous injection kit 6 mg, 6 mg, Subcutaneous, Once, 14 of 20 cycles  Administration: 6 mg (4/8/2020), 6 mg (4/25/2020), 6 mg (5/8/2020), 6 mg (6/19/2020), 6 mg (7/2/2020), 6 mg (7/17/2020), 6 mg (7/31/2020), 6 mg (8/14/2020), 6 mg (8/28/2020), 6 mg (9/25/2020), 6 mg (10/15/2020), 6 mg (11/5/2020), 6 mg (11/18/2020), 6 mg (12/2/2020)  fosaprepitant (EMEND) 150 mg in sodium chloride 0 9 % 250 mL IVPB, 150 mg, Intravenous, Once, 14 of 20 cycles  Administration: 150 mg (4/6/2020), 150 mg (4/23/2020), 150 mg (5/6/2020), 150 mg (6/17/2020), 150 mg (6/30/2020), 150 mg (7/15/2020), 150 mg (7/29/2020), 150 mg (8/12/2020), 150 mg (8/26/2020), 150 mg (9/23/2020), 150 mg (10/13/2020), 150 mg (11/3/2020), 150 mg (11/16/2020), 150 mg (11/30/2020)  bevacizumab (AVASTIN) 372 5 mg in sodium chloride 0 9 % 100 mL IVPB, 5 mg/kg = 372 5 mg, Intravenous, Once, 10 of 16 cycles  Administration: 372 5 mg (4/6/2020), 372 5 mg (4/23/2020), 357 5 mg (6/30/2020), 357 5 mg (7/15/2020), 357 5 mg (7/29/2020), 357 5 mg (8/12/2020), 357 5 mg (8/26/2020), 357 5 mg (11/16/2020), 357 5 mg (11/30/2020)  leucovorin 750 mg in dextrose 5 % 250 mL IVPB, 732 mg, Intravenous, Once, 14 of 20 cycles  Administration: 750 mg (4/6/2020), 750 mg (4/23/2020), 750 mg (5/6/2020), 750 mg (6/17/2020), 700 mg (6/30/2020), 700 mg (7/15/2020), 700 mg (7/29/2020), 700 mg (8/12/2020), 700 mg (8/26/2020), 700 mg (9/23/2020), 700 mg (10/13/2020), 700 mg (11/3/2020), 700 mg (11/16/2020), 700 mg (11/30/2020)  oxaliplatin (ELOXATIN) 150 mg in dextrose 5 % 250 mL chemo infusion, 155 55 mg, Intravenous, Once, 14 of 20 cycles  Dose modification: 70 mg/m2 (original dose 85 mg/m2, Cycle 11, Reason: Dose Not Tolerated, Comment: thrombocytopenia), 65 mg/m2 (original dose 85 mg/m2, Cycle 14, Reason: Other (See Comments)), 65 mg/m2 (original dose 85 mg/m2, Cycle 15, Reason: Max Dose Reached)  Administration: 150 mg (4/6/2020), 150 mg (4/23/2020), 150 mg (5/6/2020), 150 mg (6/17/2020), 150 mg (6/30/2020), 150 mg (7/15/2020), 150 mg (7/29/2020), 150 mg (8/12/2020), 150 mg (8/26/2020), 150 mg (9/23/2020), 125 3 mg (10/13/2020), 125 3 mg (11/3/2020), 125 3 mg (11/16/2020), 116 35 mg (11/30/2020)     12/28/2020 -  Chemotherapy    fluorouracil (ADRUCIL) injection 710 mg, 400 mg/m2, Intravenous, Once, 0 of 4 cycles  pegfilgrastim (NEULASTA ONPRO) subcutaneous injection kit 6 mg, 6 mg, Subcutaneous, Once, 5 of 9 cycles  Administration: 6 mg (12/30/2020), 6 mg (1/14/2021), 6 mg (2/6/2021), 6 mg (2/20/2021)  fosaprepitant (EMEND) 150 mg in sodium chloride 0 9 % 250 mL IVPB, 150 mg, Intravenous, Once, 5 of 9 cycles  Administration: 150 mg (12/28/2020), 150 mg (1/12/2021), 150 mg (2/4/2021), 150 mg (2/18/2021), 150 mg (3/4/2021)  bevacizumab (AVASTIN) 347 5 mg in sodium chloride 0 9 % 100 mL IVPB, 5 mg/kg = 347 5 mg, Intravenous, Once, 4 of 8 cycles  Administration: 347 5 mg (12/28/2020), 347 5 mg (2/4/2021), 347 5 mg (2/18/2021), 347 5 mg (3/4/2021)  leucovorin 700 mg in dextrose 5 % 250 mL IVPB, 712 mg, Intravenous, Once, 5 of 9 cycles  Administration: 700 mg (12/28/2020), 700 mg (1/12/2021), 700 mg (2/4/2021), 700 mg (2/18/2021), 700 mg (3/4/2021)  oxaliplatin (ELOXATIN) 115 7 mg in dextrose 5 % 250 mL chemo infusion, 65 mg/m2 = 115 7 mg (76 5 % of original dose 85 mg/m2), Intravenous, Once, 5 of 9 cycles  Dose modification: 65 mg/m2 (original dose 85 mg/m2, Cycle 1, Reason: Dose Not Tolerated)  Administration: 115 7 mg (12/28/2020), 115 7 mg (1/12/2021), 115 7 mg (2/4/2021), 115 7 mg (2/18/2021), 115 7 mg (3/4/2021)     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)   6/26/2018 Initial Diagnosis    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[facility] [address] )     4/6/2020 - 12/13/2020 Chemotherapy    fluorouracil (ADRUCIL) injection 730 mg, 400 mg/m2 = 730 mg, Intravenous, Once, 1 of 1 cycle  pegfilgrastim (NEULASTA ONPRO) subcutaneous injection kit 6 mg, 6 mg, Subcutaneous, Once, 7 of 10 cycles  Administration: 6 mg (4/8/2020), 6 mg (4/25/2020), 6 mg (5/8/2020), 6 mg (6/19/2020), 6 mg (7/2/2020), 6 mg (7/17/2020), 6 mg (7/31/2020)  fosaprepitant (EMEND) 150 mg in sodium chloride 0 9 % 250 mL IVPB, 150 mg, Intravenous, Once, 7 of 10 cycles  Administration: 150 mg (4/6/2020), 150 mg (4/23/2020), 150 mg (5/6/2020), 150 mg (6/17/2020), 150 mg (6/30/2020), 150 mg (7/15/2020), 150 mg (7/29/2020)  bevacizumab (AVASTIN) 372 5 mg in sodium chloride 0 9 % 100 mL IVPB, 5 mg/kg = 372 5 mg, Intravenous, Once, 5 of 8 cycles  Administration: 372 5 mg (4/6/2020), 372 5 mg (4/23/2020), 357 5 mg (6/30/2020), 357 5 mg (7/15/2020), 357 5 mg (7/29/2020)  leucovorin 750 mg in dextrose 5 % 250 mL IVPB, 732 mg, Intravenous, Once, 7 of 10 cycles  Administration: 750 mg (4/6/2020), 750 mg (4/23/2020), 750 mg (5/6/2020), 750 mg (6/17/2020), 700 mg (6/30/2020), 700 mg (7/15/2020), 700 mg (7/29/2020)  oxaliplatin (ELOXATIN) 150 mg in dextrose 5 % 250 mL chemo infusion, 155 55 mg, Intravenous, Once, 7 of 10 cycles  Administration: 150 mg (4/6/2020), 150 mg (4/23/2020), 150 mg (5/6/2020), 150 mg (6/17/2020), 150 mg (6/30/2020), 150 mg (7/15/2020), 150 mg (7/29/2020)     12/28/2020 -  Chemotherapy    fluorouracil (ADRUCIL) injection 710 mg, 400 mg/m2, Intravenous, Once, 0 of 4 cycles  pegfilgrastim (NEULASTA ONPRO) subcutaneous injection kit 6 mg, 6 mg, Subcutaneous, Once, 5 of 9 cycles  Administration: 6 mg (12/30/2020), 6 mg (1/14/2021), 6 mg (2/6/2021), 6 mg (2/20/2021)  fosaprepitant (EMEND) 150 mg in sodium chloride 0 9 % 250 mL IVPB, 150 mg, Intravenous, Once, 5 of 9 cycles  Administration: 150 mg (12/28/2020), 150 mg (1/12/2021), 150 mg (2/4/2021), 150 mg (2/18/2021), 150 mg (3/4/2021)  bevacizumab (AVASTIN) 347 5 mg in sodium chloride 0 9 % 100 mL IVPB, 5 mg/kg = 347 5 mg, Intravenous, Once, 4 of 8 cycles  Administration: 347 5 mg (12/28/2020), 347 5 mg (2/4/2021), 347 5 mg (2/18/2021), 347 5 mg (3/4/2021)  leucovorin 700 mg in dextrose 5 % 250 mL IVPB, 712 mg, Intravenous, Once, 5 of 9 cycles  Administration: 700 mg (12/28/2020), 700 mg (1/12/2021), 700 mg (2/4/2021), 700 mg (2/18/2021), 700 mg (3/4/2021)  oxaliplatin (ELOXATIN) 115 7 mg in dextrose 5 % 250 mL chemo infusion, 65 mg/m2 = 115 7 mg (76 5 % of original dose 85 mg/m2), Intravenous, Once, 5 of 9 cycles  Dose modification: 65 mg/m2 (original dose 85 mg/m2, Cycle 1, Reason: Dose Not Tolerated)  Administration: 115 7 mg (12/28/2020), 115 7 mg (1/12/2021), 115 7 mg (2/4/2021), 115 7 mg (2/18/2021), 115 7 mg (3/4/2021)       Elkin Espinosa is a 48 y  o  female presents for follow up for metastatic colon cancer       with history of DVT right lower leg below the knee in May 2016 and that happened after taking hormonal therapy in April 2016  In 2016 she tested positive once for lupus anticoagulant and that was negative on repeat test     She stayed on Xarelto until end of September 2016  She had heavy periods in in January 2018 and she was in bed for 3 days and she developed a clot in the left lower leg distally below the knee        She had [facility]  Ovaries were left in   Pathology of bilateral fallopian tube showed invasive adenocarcinoma      She had a CT of the chest, abdomen and pelvis on 06/06/2018 which showed a possible lesion on the right abdomen,  Suspicious for underlying colon mass   Also, multiple hepatic metastases were noted  Gem Leonard, scattered retroperitoneal nodes also noted;   Largest measuring 1 1 x 0 9 cm      Patient brought to the OR on 06/14/2018  [facility] was found to have a proximal transverse colon tumor with peritoneal deposits pelvic and right diaphragm, palpable liver metastases    she had a right hemicolectomy with ileocolonic anastomosis      Final pathology showed stage IVC- pT3, pN1b, pM1c, G2     7/26/18 - molecular testing resulted  NRAS negative   BRAF negative   KRAS mutation positive      She was seen by colon surgeon at [facility] in Jan 2019  Due to her liver mets, no further surgery was recommended   She was advised to continue chemotherapy alone       July 2018 she was started on FOLFIRI plus Avastin       CT scan chest on 01/27/2020 showed multiple 5 mm and smaller bilateral nodules all new since June 2019 indicating metastatic disease   MRI of the pelvis on 01/27/2020 showed mostly stable liver lesions compared to July 2019  1 lesion in the liver in the right hepatic dome increased in size in showed more diffuse enhancement      MRI of the pelvis showed a new large complex cystic right adnexal mass measuring 7 4 x 6 3 x 9 8 cm and a new predominantly solid left adnexal mass measuring 4 1 x 2 3 x 2 9 cm highly suspicious for metastasis      Molecular testing with Caris on 3/19/20         Patient was seen by both Dr Vilma Dowd and Dr Barbara Fernando at [facility] [place]      Dr Julia Mcdonnell asked surgeons here to evaluation patient for palliative surgery  Surgery was not recommended by [facility]'s tumor board conference or [facility] doctors       Patient was recommended change in treatment plan, FOLFOX or XELOX,     She had 1 cycle of FOLFOX so that she could get started on treatment right away   She then had 1 cycle of Xelox   She wished to eliminate 5 FU pump for quality of life   However, Xelox was not well tolerated   She completed the cycle  She then had 3rd cycle of FOLFOX      Repeat imagining showed end of April/beginning of May 2020              MRI pelvis: right side pelvic mass increased to 10 5 x 9 1 cm from 6 3 x 5 6 cm, 9 4 x 5 4 cm, previously 4 3 x 3 cm (compared from Jan 2020)              MRI abdomen showed stable liver lesions              JL chest showed improving lung nodules     Due to above patient had right radical oophorectomy and left oophorectomy on 5/26/20       Biopsy showed metastatic disease in the ovaries as well as samples of the peritoneum and small bowel mesentery with metastatic colon cancer      She was resumed on FOLFOX on 06/17/2020  Avastin to be added later on secondary to recent surgery  Prior to surgery her most recent chemo was on 05/06/2020      She has had history of proteinuria    This had been less than 1 g on 24 hour urine      August 10, 2020 patient repeat imaging  Numerous small nodules in the lungs were noted to be unchanged; 1 May be new N1 maybe slightly larger in the left lung  Multiple liver lesions identified, some with increase in size  MRI of the abdomen on 08/17/2020 showed enlarging 2 4 x 2 4 cm right hepatic lesion, several other small liver metastases unchanged  MRI pelvis on 08/19/2020 showed expected postoperative change following removal of bilateral ovary metastases      Patient saw a physician [facility] regarding the above disease progression in liver  Dr Eagle Luna spoke with this doctor on 09/08/2020  The plan was to continue FOLFOX in try RFA or liver resection  Patient was planned to receive RFA but this could not be done for technical reasons according to IR at [facility]  Patient was then going to receive TACE or SIR-Spheres      Patient is not a candidate for Erbitux secondary to KRAS mutation  MSI stable therefore not candidate for Keytruda      Patient was seen in follow-up with Dr Eagle Luna on 10/09/2020  It is noted that patient was to receive liver-directed therapy on 10/30/2020 at [facility]      Chemotherapy on 10/07/2020 was delayed due to a low platelet count of 78020  Doses of oxaliplatin, 5 FU infusion were adjusted due to hematological toxicity  Neulasta was continued      After liver directed therapy Avastin was reinitiated       On 12/08/2020 patient presented to the ED due to right upper quadrant pain that was worsening with deep breaths  CTA of the chest abdomen pelvis was completed  This was negative for PE  This showed numerable pulmonary nodules varying in size diffuse bilaterally, increase in both size and number compared to prior  Liver metastases that were there previously showed no change, there is so suspected lesion in the right lobe  Small amount of ascites in the pelvis      Patient followed up with [facility] physicians regarding the above scan  They recommended continuation of FOLFOX  Recommended additional liver directed therapy  This will be completed on 02/01/2021  VIRY stated that she can receive Avastin following liver ablation procedures  This was re-added to chemo regimen  She has repeat imaging at [facility] [place] [place] on 3/12/21  ROS: Review of Systems   Constitutional: Positive for fatigue  Negative for appetite change (remains stable; fair; eats small meals during day, cannot eat big meal at night), chills, fever and unexpected weight change  HENT: Negative for trouble swallowing  Respiratory: Positive for cough (dry cough, always have at this time of year; tried Zyrtec without relief)  Negative for shortness of breath  Cardiovascular: Negative for leg swelling  Gastrointestinal: Negative for abdominal pain, constipation (bowel movement usually every day or every other day ), diarrhea, nausea and vomiting  Genitourinary: Negative for difficulty urinating, dysuria and hematuria  Musculoskeletal: Negative for arthralgias  Skin: Negative  Neurological: Positive for numbness (peripheral neuropathy of finger tips (rarely dropping fork only) and feet - notes that this is stable )  Negative for dizziness, light-headedness and headaches  Hematological: Negative  Psychiatric/Behavioral: Negative          Past Medical History:   Diagnosis Date    Cancer [facility])     Colon cancer ([facility] [address] ) 06/08/2018    DVT (deep venous thrombosis) ([facility] [address] )     History of chemotherapy 2019    Kidney disease     Menorrhalgia     RESOLVED 2008    Migraine     Postcoital bleeding     LAST ASSESSED [id number]       Past Surgical History:   Procedure Laterality Date    ABDOMINAL ADHESION SURGERY N/A 6/14/2018    Procedure: LYSIS ADHESIONS OF COLON;  Surgeon: Alyssa Calvo MD;  [facility];  Service: Colorectal    CYSTOSCOPY N/A 5/26/2020    Procedure: CYSTOSCOPY;  Surgeon: Chilango Young MD;  [facility];  Service: Gynecology Oncology    DILATION AND CURETTAGE OF UTERUS RESOLVED 2008    ENDOMETRIAL ABLATION      THERMAL     NOVASURE  RESOLVED 2008    ENDOMETRIAL BIOPSY      BY SUCTION   DONE 12/13/2008    HYSTERECTOMY  05/21/2018    OMENTECTOMY Right 6/14/2018    Procedure: PARTIAL OMENTECTOMY;  Surgeon: Carmencita Block MD;  [facility];  Service: Colorectal    OOPHORECTOMY N/A 5/26/2020    Procedure: RIGHT RADICAL OOPHORECTOMY, LEFT OOPHORECTOMY, PERITONEAL BIOPSIES, ATTEMPTED ROBOT CONVERTED TO OPEN LAPAROTOMY;  Surgeon: Jenny Ewing MD;  [facility];  Service: Gynecology Oncology    PORTACATH PLACEMENT      RCW    MI COLONOSCOPY FLX DX W/COLLJ [facility] WHEN PFRMD N/A 6/13/2018    Procedure: COLONOSCOPY;  Surgeon: Carmencita Block MD;  [facility]; Service: Colorectal    MI ESOPHAGOGASTRODUODENOSCOPY TRANSORAL DIAGNOSTIC N/A 6/13/2018    Procedure: ESOPHAGOGASTRODUODENOSCOPY (EGD); Surgeon: Natalie Merritt MD;  [facility];   Service: Gastroenterology    MI INSERT PICC W/ SUB-Q PORT Right 6/28/2018    Procedure: INSERTION VENOUS PORT (PORT-A-CATH) VIA RIGHT SUBCLAVIAN VEIN;  Surgeon: Carmencita Block MD;  [facility];  Service: Colorectal    MI LAP,DIAGNOSTIC ABDOMEN N/A 6/14/2018    Procedure: LAPAROSCOPY DIAGNOSTIC;  Surgeon: Carmencita Block MD;  [facility];  Service: Colorectal    MI LAP,SURG,COLECTOMY, PARTIAL, W/ANAST Right 6/14/2018    Procedure: LAPAROSCOPIC EXTENDED RIGHT HEMICOLECTOMY ; PERITONEAL BX;  Surgeon: Carmencita Block MD;  [facility];  Service: Colorectal    MI LAP,VAG HYST,UTERUS 250GMS/<,SALP-OOPH Bilateral 5/21/2018    Procedure: HYSTERECTOMY LAPAROSCOPIC ASSISTED VAGINAL (LAVH) , BILATERAL SALPINGECTOMY;  Surgeon: Jamee Waterman DO;  [facility];  Service: Gynecology    PROCTOSCOPY N/A 5/26/2020    Procedure: PROCTOSCOPY;  Surgeon: Jenny Ewing MD;  [facility];  Service: Gynecology Oncology    TRANSPERINEAL IMPLANT OF RADIATION SEEDS W/ ULTRASOUND  10/30/2020    Liver, @ [address] [facility] Marital status: /Civil Union     Spouse name: None    Number of children: 2    Years of education: None    Highest education level: None   Occupational History    None   Social Needs    Financial resource strain: None    Food insecurity     Worry: None     Inability: None    Transportation needs     Medical: None     Non-medical: None   Tobacco Use    Smoking status: Never Smoker    Smokeless tobacco: Never Used   Substance and Sexual Activity    Alcohol use: Never     Alcohol/week: 0 0 standard drinks     Frequency: Never     Drinks per session: Patient refused     Binge frequency: Never     Comment: 0    Drug use: No    Sexual activity: Yes     Partners: Male     Comment: PARTNER HAD VASECTOMY    Lifestyle    Physical activity     Days per week: None     Minutes per session: None    Stress: None   Relationships    Social connections     Talks on phone: None     Gets together: None     Attends Yazdanism service: None     Active member of club or organization: None     Attends meetings of clubs or organizations: None     Relationship status: None    Intimate partner violence     Fear of current or ex partner: None     Emotionally abused: None     Physically abused: None     Forced sexual activity: None   Other Topics Concern    None   Social History Narrative    ALWAYS USES [employer]     CAFFEINE USE     CURRENTLY WORKS FULL TIME     DAILY COFFEE CONSUMPTION    EXERCISE SPORADICALLY     FEELS SAFE AT HOME     LIVES WITH SPOUSE        Family History   Problem Relation Age of Onset   Attila Gonzalez Migraines Mother     Heart disease Mother     Anemia Father     Arthritis Father     Other Father         BLOOD TRANSFUSION    Migraines Daughter     Heart disease Family     No Known Problems Maternal Grandmother     No Known Problems Maternal Grandfather     No Known Problems Paternal Grandmother    Attila Gonzalez No Known Problems Paternal Grandfather        No Known Allergies      Current Outpatient Medications:     acetaminophen (TYLENOL) 500 mg tablet, Take 1,000 mg by mouth every 6 (six) hours as needed for mild pain, Disp: , Rfl:     amLODIPine (NORVASC) 5 mg tablet, Take 5 mg by mouth daily, Disp: , Rfl:     docusate sodium (COLACE) 100 mg capsule, Take 1 capsule (100 mg total) by mouth 2 (two) times a day (Patient taking differently: Take 100 mg by mouth 2 (two) times a day as needed ), Disp: 10 each, Rfl: 0    fluorouracil 3,205 mg in CADD infusion pump, Infuse 3,205 mg (900 mg/m2/day x 1 78 m2 (Treatment plan recorded BSA)) into a venous catheter over 46 hours for 2 days, Disp: 1 Device, Rfl: 0    furosemide (LASIX) 40 mg tablet, Take 40 mg by mouth daily, Disp: , Rfl:     LORazepam (ATIVAN) 1 mg tablet, Take 1 mg by mouth daily as needed for anxiety, Disp: , Rfl:     metoprolol succinate (TOPROL-XL) 50 mg 24 hr tablet, Take 50 mg by mouth 2 (two) times a day Pt was instructed by her PCP Dr Ralf Piper, advised pt to increased to BID, Disp: , Rfl:     omeprazole (PriLOSEC) 20 mg delayed release capsule, PLEASE SEE ATTACHED FOR DETAILED DIRECTIONS, Disp: , Rfl:     oxyCODONE (OXY-IR) 5 MG capsule, Take 1 capsule (5 mg total) by mouth every 6 (six) hours as needed for severe pain for up to 10 doses CAN SUBSTITUTE  OXYCODONE TABLETS FOR CAPSULESMax Daily Amount: 20 mg, Disp: 10 capsule, Rfl: 0    potassium chloride (K-DUR,KLOR-CON) 10 mEq tablet, Take 10 mEq by mouth daily, Disp: , Rfl:     Xarelto 10 MG tablet, TAKE 1 TABLET DAILY, Disp: 90 tablet, Rfl: 3    potassium chloride (Klor-Con) 10 mEq tablet, , Disp: , Rfl:     Physical Exam:  /80 (BP Location: Left arm, Patient Position: Sitting, Cuff Size: Adult)   Pulse 83   Temp 98 2 °F (36 8 °C) (Temporal)   Resp 14   Ht 5' 5 12" (1 654 m)   Wt 70 3 kg (155 lb)   LMP 05/16/2018   SpO2 99%   BMI 25 70 kg/m²     Physical Exam  Constitutional: General: She is not in acute distress  Appearance: She is well-developed  She is not ill-appearing  HENT:      Head: Normocephalic and atraumatic  Eyes:      General: No scleral icterus  Conjunctiva/sclera: Conjunctivae normal    Neck:      Musculoskeletal: Normal range of motion and neck supple  Cardiovascular:      Rate and Rhythm: Normal rate and regular rhythm  Heart sounds: Normal heart sounds  No murmur  Pulmonary:      Effort: Pulmonary effort is normal  No respiratory distress  Breath sounds: Normal breath sounds  Comments: Chemo pump connected to chest port   Abdominal:      Palpations: Abdomen is soft  Tenderness: There is no abdominal tenderness  Musculoskeletal: Normal range of motion  General: No tenderness  Right lower leg: No edema  Left lower leg: No edema  Lymphadenopathy:      Cervical: No cervical adenopathy  Skin:     General: Skin is warm and dry  Neurological:      Mental Status: She is alert and oriented to person, place, and time  Cranial Nerves: No cranial nerve deficit  Psychiatric:         Mood and Affect: Mood normal          Behavior: Behavior normal        Labs:  Lab Results   Component Value Date    WBC 13 34 (H) 03/03/2021    HGB 13 4 03/03/2021    HCT 42 1 03/03/2021     (H) 03/03/2021    PLT 95 (L) 03/03/2021     Lab Results   Component Value Date     03/18/2015    K 4 0 03/03/2021     (H) 03/03/2021    CO2 30 03/03/2021    ANIONGAP 9 03/18/2015    BUN 9 03/03/2021    CREATININE 0 79 03/03/2021    GLUCOSE 100 03/18/2015    GLUF 84 03/03/2021    CALCIUM 9 1 03/03/2021    CORRECTEDCA 9 8 03/03/2021    AST 58 (H) 03/03/2021    ALT 57 03/03/2021    ALKPHOS 466 (H) 03/03/2021    PROT 6 6 03/18/2015    BILITOT 0 65 03/18/2015    EGFR 86 03/03/2021     Patient voiced understanding and agreement in the above discussion  Aware to contact our office with questions/symptoms in the interim       This note has been generated by voice recognition software system  Therefore, there may be spelling, grammar, and or syntax errors  Please contact if questions arise

## 2021-03-06 ENCOUNTER — HOSPITAL ENCOUNTER (OUTPATIENT)
Dept: INFUSION CENTER | Facility: CLINIC | Age: 56
Discharge: HOME | End: 2021-03-06
Payer: COMMERCIAL

## 2021-03-06 DIAGNOSIS — C18.2 PRIMARY ADENOCARCINOMA OF ASCENDING COLON (HCC): ICD-10-CM

## 2021-03-06 DIAGNOSIS — D70.1 CHEMOTHERAPY INDUCED NEUTROPENIA (HCC): Primary | ICD-10-CM

## 2021-03-06 DIAGNOSIS — C78.7 LIVER METASTASES (HCC): ICD-10-CM

## 2021-03-06 DIAGNOSIS — C77.2 METASTASIS TO RETROPERITONEAL LYMPH NODE (HCC): ICD-10-CM

## 2021-03-06 DIAGNOSIS — T45.1X5A CHEMOTHERAPY INDUCED NEUTROPENIA (HCC): Primary | ICD-10-CM

## 2021-03-06 PROCEDURE — 96372 THER/PROPH/DIAG INJ SC/IM: CPT

## 2021-03-06 RX ADMIN — PEGFILGRASTIM 6 MG: KIT SUBCUTANEOUS at 12:35

## 2021-03-06 NOTE — PROGRESS NOTES
Patient here for CADD d/c and neulasta onpro, no c/o offered, no changes reported  She tolerated CADD d/c without incident and was discharged with neulasta onpro to [facility] for injection tomorrow at 1535  Patient familiar with injection and removal of onpro  She will RTO for her next cycle 3/18/21

## 2021-03-06 NOTE — PATIENT INSTRUCTIONS
March 2021 Sunday Monday Tuesday Wednesday Thursday Friday Saturday        1     2     3    LAB WALK IN   2:00 PM   (5 min )   AN WINDGP CHAIR 1   [facility] [place] 4    INF ONCOLOGY TX-TREATMENT PLAN   9:00 AM   (320 min )   AN INF CHAIR [facility] 5    FOLLOW UP PG   2:45 PM   (30 min )   Inna Pretty PA-C   [facility] [place] 6    INF ONCOLOGY TX-TREATMENT PLAN  12:00 PM   (30 min )   AN INF QUICK CHAIR 01   [facility]       Cycle 5, Day 1     7     8     9     10     11     12     13                14     15     16     17     18    INF ONCOLOGY TX-TREATMENT PLAN   9:00 AM   (330 min )   AN INF CHAIR [facility] 19     20    INF ONCOLOGY TX-TREATMENT PLAN  12:00 PM   (30 min )   AN INF QUICK CHAIR [facility]       Cycle 6, Day 1     21     22     23     24     25     26     27                28     29     30     31                                    Treatment Details       3/4/2021 - Cycle 5, Day 1      Chemotherapy: ONCBCN PROVIDER COMMUNICATION8, BEVACIZUMAB IVPB, OXALIPLATIN INFUSION, LEUCOVORIN IVPB      Take-Home Chemo: ONCBCN PROVIDER COMMUNICATION8, FLUOROURACIL AMBULATORY INFUSION    3/18/2021 - Cycle 6, Day 1      Chemotherapy: ONCBCN PROVIDER COMMUNICATION8, BEVACIZUMAB IVPB, OXALIPLATIN INFUSION, LEUCOVORIN IVPB, fluorouracil (ADRUCIL)      Take-Home Chemo: ONCBCN PROVIDER COMMUNICATION8, FLUOROURACIL AMBULATORY INFUSION

## 2021-03-09 DIAGNOSIS — T45.1X5A CHEMOTHERAPY INDUCED NEUTROPENIA (HCC): Primary | ICD-10-CM

## 2021-03-09 DIAGNOSIS — D70.1 CHEMOTHERAPY INDUCED NEUTROPENIA (HCC): Primary | ICD-10-CM

## 2021-03-09 DIAGNOSIS — C78.7 LIVER METASTASES (HCC): ICD-10-CM

## 2021-03-09 DIAGNOSIS — C18.2 PRIMARY ADENOCARCINOMA OF ASCENDING COLON (HCC): ICD-10-CM

## 2021-03-09 DIAGNOSIS — C77.2 METASTASIS TO RETROPERITONEAL LYMPH NODE (HCC): ICD-10-CM

## 2021-03-09 RX ORDER — DEXTROSE MONOHYDRATE 50 MG/ML
20 INJECTION, SOLUTION INTRAVENOUS ONCE
Status: CANCELLED | OUTPATIENT
Start: 2021-03-18

## 2021-03-09 RX ORDER — SODIUM CHLORIDE 9 MG/ML
20 INJECTION, SOLUTION INTRAVENOUS ONCE
Status: CANCELLED | OUTPATIENT
Start: 2021-03-18

## 2021-03-09 RX ORDER — FLUOROURACIL 50 MG/ML
400 INJECTION, SOLUTION INTRAVENOUS ONCE
Status: CANCELLED | OUTPATIENT
Start: 2021-03-18

## 2021-03-10 DIAGNOSIS — Z23 ENCOUNTER FOR IMMUNIZATION: ICD-10-CM

## 2021-03-15 ENCOUNTER — TELEPHONE (OUTPATIENT)
Dept: HEMATOLOGY ONCOLOGY | Facility: CLINIC | Age: 56
End: 2021-03-15

## 2021-03-15 NOTE — TELEPHONE ENCOUNTER
Patient is calling in indicating that she is experiencing pain in her rib cage down to her pelvis  She indicates that it started on Saturday after she had a Mri w contrast as well as a PET Scan at [facility] on Friday  She mentioned that she would be calling [facility] to inform of her symptoms but wanted to call in to let Qi Kenney know as well   She can be reached back at [phone]

## 2021-03-15 NOTE — TELEPHONE ENCOUNTER
Called patient to assess symptoms  Pain is intermittent on right side of the ribs radiating to right pelvis   + nonproductive cough from post nasal drip  Cough has been going on for 6-8 weeks  Denies fevers  Patient did call Joon Pastor to notify of above symptoms  She has a video visit with Joon Pastor on Wednesday  She wanted Dr Ghislaine Guzman to be aware of above symptoms post MRI and PET scan  No action required  Patient has treatment on 3/18/21 and is due for her COVID Booster on 3/17/21  She wanted to make sure that was ok with Dr Ghislaine Guzman? OK to retask to notify patient

## 2021-03-17 ENCOUNTER — DOCUMENTATION (OUTPATIENT)
Dept: HEMATOLOGY ONCOLOGY | Facility: CLINIC | Age: 56
End: 2021-03-17

## 2021-03-17 ENCOUNTER — APPOINTMENT (OUTPATIENT)
Dept: LAB | Facility: MEDICAL CENTER | Age: 56
End: 2021-03-17
Payer: COMMERCIAL

## 2021-03-17 ENCOUNTER — TELEPHONE (OUTPATIENT)
Dept: HEMATOLOGY ONCOLOGY | Facility: CLINIC | Age: 56
End: 2021-03-17

## 2021-03-17 DIAGNOSIS — C18.2 PRIMARY ADENOCARCINOMA OF ASCENDING COLON (HCC): ICD-10-CM

## 2021-03-17 DIAGNOSIS — T45.1X5A CHEMOTHERAPY INDUCED NEUTROPENIA (HCC): ICD-10-CM

## 2021-03-17 DIAGNOSIS — C77.2 METASTASIS TO RETROPERITONEAL LYMPH NODE (HCC): ICD-10-CM

## 2021-03-17 DIAGNOSIS — D70.1 CHEMOTHERAPY INDUCED NEUTROPENIA (HCC): ICD-10-CM

## 2021-03-17 DIAGNOSIS — C78.7 LIVER METASTASES (HCC): ICD-10-CM

## 2021-03-17 LAB
ALBUMIN SERPL BCP-MCNC: 3.2 G/DL (ref 3.5–5)
ALP SERPL-CCNC: 536 U/L (ref 46–116)
ALT SERPL W P-5'-P-CCNC: 76 U/L (ref 12–78)
ANION GAP SERPL CALCULATED.3IONS-SCNC: 2 MMOL/L (ref 4–13)
AST SERPL W P-5'-P-CCNC: 89 U/L (ref 5–45)
BASOPHILS # BLD AUTO: 0.06 THOUSANDS/ΜL (ref 0–0.1)
BASOPHILS NFR BLD AUTO: 0 % (ref 0–1)
BILIRUB SERPL-MCNC: 1.04 MG/DL (ref 0.2–1)
BUN SERPL-MCNC: 9 MG/DL (ref 5–25)
CALCIUM ALBUM COR SERPL-MCNC: 9.6 MG/DL (ref 8.3–10.1)
CALCIUM SERPL-MCNC: 9 MG/DL (ref 8.3–10.1)
CHLORIDE SERPL-SCNC: 108 MMOL/L (ref 100–108)
CO2 SERPL-SCNC: 31 MMOL/L (ref 21–32)
CREAT SERPL-MCNC: 0.87 MG/DL (ref 0.6–1.3)
EOSINOPHIL # BLD AUTO: 0.07 THOUSAND/ΜL (ref 0–0.61)
EOSINOPHIL NFR BLD AUTO: 1 % (ref 0–6)
ERYTHROCYTE [DISTWIDTH] IN BLOOD BY AUTOMATED COUNT: 17.2 % (ref 11.6–15.1)
GFR SERPL CREATININE-BSD FRML MDRD: 76 ML/MIN/1.73SQ M
GLUCOSE SERPL-MCNC: 85 MG/DL (ref 65–140)
HCT VFR BLD AUTO: 44.7 % (ref 34.8–46.1)
HGB BLD-MCNC: 14 G/DL (ref 11.5–15.4)
IMM GRANULOCYTES # BLD AUTO: 0.13 THOUSAND/UL (ref 0–0.2)
IMM GRANULOCYTES NFR BLD AUTO: 1 % (ref 0–2)
LYMPHOCYTES # BLD AUTO: 0.75 THOUSANDS/ΜL (ref 0.6–4.47)
LYMPHOCYTES NFR BLD AUTO: 6 % (ref 14–44)
MCH RBC QN AUTO: 35.5 PG (ref 26.8–34.3)
MCHC RBC AUTO-ENTMCNC: 31.3 G/DL (ref 31.4–37.4)
MCV RBC AUTO: 114 FL (ref 82–98)
MONOCYTES # BLD AUTO: 1.59 THOUSAND/ΜL (ref 0.17–1.22)
MONOCYTES NFR BLD AUTO: 12 % (ref 4–12)
NEUTROPHILS # BLD AUTO: 11.07 THOUSANDS/ΜL (ref 1.85–7.62)
NEUTS SEG NFR BLD AUTO: 80 % (ref 43–75)
NRBC BLD AUTO-RTO: 0 /100 WBCS
PLATELET # BLD AUTO: 122 THOUSANDS/UL (ref 149–390)
PMV BLD AUTO: 10.3 FL (ref 8.9–12.7)
POTASSIUM SERPL-SCNC: 3.5 MMOL/L (ref 3.5–5.3)
PROT SERPL-MCNC: 6.6 G/DL (ref 6.4–8.2)
RBC # BLD AUTO: 3.94 MILLION/UL (ref 3.81–5.12)
SODIUM SERPL-SCNC: 141 MMOL/L (ref 136–145)
WBC # BLD AUTO: 13.67 THOUSAND/UL (ref 4.31–10.16)

## 2021-03-17 PROCEDURE — 80053 COMPREHEN METABOLIC PANEL: CPT

## 2021-03-17 PROCEDURE — 85025 COMPLETE CBC W/AUTO DIFF WBC: CPT

## 2021-03-17 PROCEDURE — 36415 COLL VENOUS BLD VENIPUNCTURE: CPT

## 2021-03-18 ENCOUNTER — HOSPITAL ENCOUNTER (OUTPATIENT)
Dept: INFUSION CENTER | Facility: CLINIC | Age: 56
Discharge: HOME | End: 2021-03-18
Payer: COMMERCIAL

## 2021-03-18 VITALS
DIASTOLIC BLOOD PRESSURE: 78 MMHG | BODY MASS INDEX: 25.58 KG/M2 | OXYGEN SATURATION: 98 % | HEIGHT: 65 IN | TEMPERATURE: 97.4 F | SYSTOLIC BLOOD PRESSURE: 124 MMHG | RESPIRATION RATE: 16 BRPM | WEIGHT: 153.5 LBS | HEART RATE: 94 BPM

## 2021-03-18 DIAGNOSIS — D70.1 CHEMOTHERAPY INDUCED NEUTROPENIA (HCC): ICD-10-CM

## 2021-03-18 DIAGNOSIS — C78.7 LIVER METASTASES (HCC): ICD-10-CM

## 2021-03-18 DIAGNOSIS — T45.1X5A CHEMOTHERAPY INDUCED NEUTROPENIA (HCC): ICD-10-CM

## 2021-03-18 DIAGNOSIS — C77.2 METASTASIS TO RETROPERITONEAL LYMPH NODE (HCC): Primary | ICD-10-CM

## 2021-03-18 DIAGNOSIS — C18.2 PRIMARY ADENOCARCINOMA OF ASCENDING COLON (HCC): ICD-10-CM

## 2021-03-18 PROCEDURE — 96417 CHEMO IV INFUS EACH ADDL SEQ: CPT

## 2021-03-18 PROCEDURE — 96368 THER/DIAG CONCURRENT INF: CPT

## 2021-03-18 PROCEDURE — 96367 TX/PROPH/DG ADDL SEQ IV INF: CPT

## 2021-03-18 PROCEDURE — G0498 CHEMO EXTEND IV INFUS W/PUMP: HCPCS

## 2021-03-18 PROCEDURE — 96413 CHEMO IV INFUSION 1 HR: CPT

## 2021-03-18 PROCEDURE — 96415 CHEMO IV INFUSION ADDL HR: CPT

## 2021-03-18 RX ORDER — DEXTROSE MONOHYDRATE 50 MG/ML
20 INJECTION, SOLUTION INTRAVENOUS ONCE
Status: COMPLETED | OUTPATIENT
Start: 2021-03-18 | End: 2021-03-18

## 2021-03-18 RX ORDER — SODIUM CHLORIDE 9 MG/ML
20 INJECTION, SOLUTION INTRAVENOUS ONCE
Status: COMPLETED | OUTPATIENT
Start: 2021-03-18 | End: 2021-03-18

## 2021-03-18 RX ADMIN — OXALIPLATIN 115.7 MG: 5 INJECTION, SOLUTION INTRAVENOUS at 11:53

## 2021-03-18 RX ADMIN — FOSAPREPITANT 150 MG: 150 INJECTION, POWDER, LYOPHILIZED, FOR SOLUTION INTRAVENOUS at 10:22

## 2021-03-18 RX ADMIN — BEVACIZUMAB 347.5 MG: 400 INJECTION, SOLUTION INTRAVENOUS at 11:04

## 2021-03-18 RX ADMIN — LEUCOVORIN CALCIUM 700 MG: 10 INJECTION INTRAMUSCULAR; INTRAVENOUS at 11:47

## 2021-03-18 RX ADMIN — DEXTROSE 20 ML/HR: 5 SOLUTION INTRAVENOUS at 11:45

## 2021-03-18 RX ADMIN — DEXAMETHASONE SODIUM PHOSPHATE: 10 INJECTION, SOLUTION INTRAMUSCULAR; INTRAVENOUS at 10:00

## 2021-03-18 RX ADMIN — SODIUM CHLORIDE 20 ML/HR: 0.9 INJECTION, SOLUTION INTRAVENOUS at 10:01

## 2021-03-18 NOTE — PLAN OF CARE
Problem: Potential for Falls  Goal: Patient will remain free of falls  Description: INTERVENTIONS:  - Assess patient frequently for physical needs  -  Identify cognitive and physical deficits and behaviors that affect risk of falls    -  [place] fall precautions as indicated by assessment   - Educate patient/family on patient safety including physical limitations  - Instruct patient to call for assistance with activity based on assessment  - Modify environment to reduce risk of injury  - Consider OT/PT consult to assist with strengthening/mobility  Outcome: Progressing

## 2021-03-18 NOTE — PROGRESS NOTES
Patient tolerated treatment well, no complications  CADD pump infusing at time of discharge  Patient aware of when to return for CADD d/c   Declines AVS

## 2021-03-18 NOTE — PROGRESS NOTES
Pt to clinic for avastin, Leucovorin, oxaliplatin,5  fu pump, pt had 5 FU bolus on her plan,pt has not had bolus in two years   spoke with Kareem Stanford RN, had 5 FU bolus and leucovorin  removed from plan, , spoke with Marta Thakkar again to clarify orders,and leucovorin  added back on plan, pt offers no complaints at this time, will continue to monitor

## 2021-03-18 NOTE — PROGRESS NOTES
Call from patient that there is improvement in metastatic disease in the right lobe of the liver but there is more disease in the left lobe of the liver and outside the liver  Specialist at [facility] will take care of metastatic disease in the left lobe of the liver but she needs change in systemic therapy and I agreed  Patient is due to receive her next  systemic therapy tomorrow that she has been getting  I suggested not to have that because she will have side effects but probably no benefit and would need change in therapy  She wants to have therapy tomorrow like before  She has appointment with medical oncologist at [facility]  On Monday but she plans to call her today and take her opinion and decide  She may and may not come tomorrow for systemic therapy  I mentioned  Stivarga or Kim    Medical oncologist at [facility] will be making the decision about her systemic therapy

## 2021-03-19 ENCOUNTER — TELEPHONE (OUTPATIENT)
Dept: HEMATOLOGY ONCOLOGY | Facility: CLINIC | Age: 56
End: 2021-03-19

## 2021-03-19 NOTE — TELEPHONE ENCOUNTER
Dr Luz Fish office called to request Dr Juliette Arzate last office note and telephone call with treatment recommendations be faxed to their office  She has an appt there on Monday morning    Info faxed Ashley Alfaro at [phone]

## 2021-03-20 ENCOUNTER — HOSPITAL ENCOUNTER (OUTPATIENT)
Dept: INFUSION CENTER | Facility: CLINIC | Age: 56
Discharge: HOME | End: 2021-03-20
Payer: COMMERCIAL

## 2021-03-20 DIAGNOSIS — C78.7 LIVER METASTASES (HCC): ICD-10-CM

## 2021-03-20 DIAGNOSIS — D70.1 CHEMOTHERAPY INDUCED NEUTROPENIA (HCC): ICD-10-CM

## 2021-03-20 DIAGNOSIS — C77.2 METASTASIS TO RETROPERITONEAL LYMPH NODE (HCC): Primary | ICD-10-CM

## 2021-03-20 DIAGNOSIS — C18.2 PRIMARY ADENOCARCINOMA OF ASCENDING COLON (HCC): ICD-10-CM

## 2021-03-20 DIAGNOSIS — T45.1X5A CHEMOTHERAPY INDUCED NEUTROPENIA (HCC): ICD-10-CM

## 2021-03-20 PROCEDURE — 96372 THER/PROPH/DIAG INJ SC/IM: CPT

## 2021-03-20 RX ADMIN — PEGFILGRASTIM 6 MG: KIT SUBCUTANEOUS at 12:19

## 2021-03-20 NOTE — PROGRESS NOTES
Patient here for a CADD d/c and neulasta onpro  She offers no complaints at this time  CADD pump d/c'd per protocol with res volume of 0 mls  Neulasta onpro placed on right arm  Confirmed pump working with green light flashing  Patient verbalized understanding of how it works and when to remove it   Next appointment confirmed and she declined avs
No

## 2021-03-22 ENCOUNTER — TELEPHONE (OUTPATIENT)
Dept: HEMATOLOGY ONCOLOGY | Facility: CLINIC | Age: 56
End: 2021-03-22

## 2021-03-22 NOTE — TELEPHONE ENCOUNTER
Medical Records Request     Person calling in Gauri Waterman    Provider Brett Antonio   Fax Medical Records To [phone]   Facility call back number [phone]   Patient call back number                Gauri Waterman from [facility] is calling in requesting patient's most recent progress notes to be faxed over to [phone]   I have faxed over request

## 2021-03-22 NOTE — TELEPHONE ENCOUNTER
Patient called requesting [facility] call her regarding treatment plan from [facility]  Patient is requesting a call after 5:00pm so the patient's  can be involved   Patient can be reached at [phone]

## 2021-03-23 ENCOUNTER — DOCUMENTATION (OUTPATIENT)
Dept: HEMATOLOGY ONCOLOGY | Facility: CLINIC | Age: 56
End: 2021-03-23

## 2021-03-23 NOTE — PROGRESS NOTES
Patient called my office yesterday and left message that I should call her back after 5:00 p m   I called today and left message for her  in her voicemail and requested a phone call back  I left my name and office phone number

## 2021-03-24 ENCOUNTER — TELEPHONE (OUTPATIENT)
Dept: HEMATOLOGY ONCOLOGY | Facility: CLINIC | Age: 56
End: 2021-03-24

## 2021-03-24 NOTE — TELEPHONE ENCOUNTER
Patient is calling in requesting a call back from [place] [address], she can be reached back at [phone]

## 2021-03-24 NOTE — TELEPHONE ENCOUNTER
Dr Gaston Glover spoke with patient, she will come into our office on 3/31/21 at 3 PM per Dr Gaston Glover

## 2021-03-31 ENCOUNTER — OFFICE VISIT (OUTPATIENT)
Dept: HEMATOLOGY ONCOLOGY | Facility: CLINIC | Age: 56
End: 2021-03-31
Payer: COMMERCIAL

## 2021-03-31 ENCOUNTER — TELEPHONE (OUTPATIENT)
Dept: HEMATOLOGY ONCOLOGY | Facility: CLINIC | Age: 56
End: 2021-03-31

## 2021-03-31 VITALS
TEMPERATURE: 97.6 F | RESPIRATION RATE: 18 BRPM | BODY MASS INDEX: 26.33 KG/M2 | DIASTOLIC BLOOD PRESSURE: 84 MMHG | HEART RATE: 88 BPM | WEIGHT: 158 LBS | OXYGEN SATURATION: 98 % | HEIGHT: 65 IN | SYSTOLIC BLOOD PRESSURE: 142 MMHG

## 2021-03-31 DIAGNOSIS — D70.1 CHEMOTHERAPY INDUCED NEUTROPENIA (HCC): ICD-10-CM

## 2021-03-31 DIAGNOSIS — C77.2 METASTASIS TO RETROPERITONEAL LYMPH NODE (HCC): ICD-10-CM

## 2021-03-31 DIAGNOSIS — C78.7 LIVER METASTASES (HCC): ICD-10-CM

## 2021-03-31 DIAGNOSIS — C78.00 MALIGNANT NEOPLASM METASTATIC TO LUNG, UNSPECIFIED LATERALITY (HCC): ICD-10-CM

## 2021-03-31 DIAGNOSIS — C18.2 PRIMARY ADENOCARCINOMA OF ASCENDING COLON (HCC): Primary | ICD-10-CM

## 2021-03-31 DIAGNOSIS — T45.1X5A CHEMOTHERAPY INDUCED NEUTROPENIA (HCC): ICD-10-CM

## 2021-03-31 DIAGNOSIS — C78.6 PERITONEAL METASTASES (HCC): ICD-10-CM

## 2021-03-31 DIAGNOSIS — I82.531 CHRONIC DEEP VEIN THROMBOSIS (DVT) OF POPLITEAL VEIN OF RIGHT LOWER EXTREMITY (HCC): ICD-10-CM

## 2021-03-31 DIAGNOSIS — C79.60 MALIGNANT NEOPLASM METASTATIC TO OVARY, UNSPECIFIED LATERALITY (HCC): ICD-10-CM

## 2021-03-31 PROCEDURE — 99214 OFFICE O/P EST MOD 30 MIN: CPT | Performed by: INTERNAL MEDICINE

## 2021-03-31 NOTE — PATIENT INSTRUCTIONS
Starting Lonsurf at 25 milligram/meter squared  Needs informed consent and to send the prescription  Follow-up in 4 weeks  Blood work every 2 weeks

## 2021-03-31 NOTE — PROGRESS NOTES
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  Patient has been on FOLFOX plus Avastin for stage IV colon cancer with metastatic disease to liver and lungs and also abdominal carcinomatosis and had metastatic disease to the ovaries  She has received liver directed therapy at [facility] and   had MRI of liver and PET scan  at [facility]  There is disease progression  Doses of oxaliplatin and infusion 5 FU hwere reduced because of hematological toxicity   She also got Neulasta    Patient was not a candidate for Erbitux because of KRAS mutationW  ine   MSI came back stable   Not a candidate for Keytruda  In May 2018 patient underwent GALI and removal of fallopian tubes for uterine bleeding  and there were cancer cells in the fallopian tubes  Fay Mood probably had removal of tubes and not the ovaries   In June 2018 patient   underwent extended right hemicolectomy, partial omentectomy and biopsy of the peritoneal nodule   Peritoneal nodule came back  positive for metastatic adenocarcinoma from colon    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 and metastatic disease to liver    6 cm T3 G2 right colon cancer with positive margins, lymphovascular invasion and perineural invasion and positive 3 of 27 lymph nodes with extranodal extension and positive peritoneal nodule biopsy   Stage IV disease because of liver and peritoneal metastases   In July 2018 patient was started  on modified FOLFIRI plus Avastin   She had PPE in her hands and bolus 5 FU was discontinued  Dae Givens was seen by liver specialist at [facility] and was not felt to be a surgical candidate       Since April 2020 patient had been on FOLFOX plus Avastin   She had protein urea but that was less than 1 gram in 24 hour and was monitored       Prior to this she was on FOLFIRI plus Avastin but after initial response disease progressed   On 05/26/2020 patient had pelvic surgery, removal of ovaries and sample also included peritoneum and small bowel mesentery   Both ovaries and small bowel mesentery showed metastatic disease from colon cancer   Surgery was on 05/26/2020    FOLFOX chemotherapy was resumed on 06/17/20 and 2 weeks later  Avastin was added to FOLFOX         Patient has been on Xarelto 10 mg daily for history of DVT right lower extremity that happened in 2016     No bleeding or blood clot on Xarelto                       Current Outpatient Medications:     acetaminophen (TYLENOL) 500 mg tablet, Take 1,000 mg by mouth every 6 (six) hours as needed for mild pain, Disp: , Rfl:     amLODIPine (NORVASC) 5 mg tablet, Take 5 mg by mouth daily, Disp: , Rfl:     docusate sodium (COLACE) 100 mg capsule, Take 1 capsule (100 mg total) by mouth 2 (two) times a day (Patient taking differently: Take 100 mg by mouth 2 (two) times a day as needed ), Disp: 10 each, Rfl: 0    furosemide (LASIX) 40 mg tablet, Take 40 mg by mouth daily, Disp: , Rfl:     LORazepam (ATIVAN) 1 mg tablet, Take 1 mg by mouth daily as needed for anxiety, Disp: , Rfl:     metoprolol succinate (TOPROL-XL) 50 mg 24 hr tablet, Take 50 mg by mouth 2 (two) times a day Pt was instructed by her PCP Dr Rahul Farfan, advised pt to increased to BID, Disp: , Rfl:     omeprazole (PriLOSEC) 20 mg delayed release capsule, PLEASE SEE ATTACHED FOR DETAILED DIRECTIONS, Disp: , Rfl:     oxyCODONE (OXY-IR) 5 MG capsule, Take 1 capsule (5 mg total) by mouth every 6 (six) hours as needed for severe pain for up to 10 doses CAN SUBSTITUTE  OXYCODONE TABLETS FOR CAPSULESMax Daily Amount: 20 mg, Disp: 10 capsule, Rfl: 0    potassium chloride (K-DUR,KLOR-CON) 10 mEq tablet, Take 10 mEq by mouth daily, Disp: , Rfl:     potassium chloride (Klor-Con) 10 mEq tablet, , Disp: , Rfl:     Xarelto 10 MG tablet, TAKE 1 TABLET DAILY, Disp: 90 tablet, Rfl: 3    No Known Allergies    Oncology History   Primary adenocarcinoma of ascending colon ([facility] [address] )   6/14/2018 Initial Diagnosis    Primary adenocarcinoma of ascending colon ([facility] [address] )     7/17/2018 - 8/1/2018 Chemotherapy    camptosar 180 mg/m2 day 1  5  mg/m2 day 1  5 FU 1200 mg/m2 day 1-2   Leucovorin 400 mg/m2     Venofer 100 mg (first 10 treatments) -- all every 2 weeks         7/17/2018 - 2/3/2020 Chemotherapy    palonosetron (ALOXI) injection 0 25 mg, 0 25 mg, Intravenous, Once, 8 of 16 cycles  Administration: 0 25 mg (11/21/2019), 0 25 mg (12/5/2019), 0 25 mg (12/19/2019), 0 25 mg (1/2/2020), 0 25 mg (1/16/2020), 0 25 mg (1/30/2020)  fluorouracil (ADRUCIL) injection 715 mg, 400 mg/m2, Intravenous, Once, 7 of 7 cycles  pegfilgrastim (NEULASTA ONPRO) subcutaneous injection kit 6 mg, 6 mg, Subcutaneous, Once, 20 of 28 cycles  Administration: 6 mg (5/22/2019), 6 mg (6/5/2019), 6 mg (6/22/2019), 6 mg (7/5/2019), 6 mg (7/20/2019), 6 mg (8/2/2019), 6 mg (8/17/2019), 6 mg (8/31/2019), 6 mg (9/14/2019), 6 mg (9/28/2019), 6 mg (10/12/2019), 6 mg (10/26/2019), 6 mg (11/9/2019), 6 mg (11/23/2019), 6 mg (12/7/2019), 6 mg (12/21/2019), 6 mg (1/4/2020), 6 mg (1/18/2020), 6 mg (2/1/2020)  fosaprepitant (EMEND) 150 mg in sodium chloride 0 9 % 250 mL IVPB, 150 mg, Intravenous, Once, 8 of 16 cycles  Administration: 150 mg (11/7/2019), 150 mg (11/21/2019), 150 mg (12/5/2019), 150 mg (12/19/2019), 150 mg (1/2/2020), 150 mg (1/16/2020), 150 mg (1/30/2020)  bevacizumab (AVASTIN) 355 mg in sodium chloride 0 9 % 100 mL IVPB, 5 mg/kg, Intravenous, Once, 27 of 35 cycles  Administration: 355 mg (5/20/2019), 355 mg (6/3/2019), 355 mg (6/20/2019), 355 mg (7/3/2019), 355 mg (7/18/2019), 355 mg (7/31/2019), 355 mg (8/15/2019), 355 mg (8/29/2019), 355 mg (9/12/2019), 355 mg (9/26/2019), 355 mg (10/10/2019), 355 mg (10/24/2019), 355 mg (11/7/2019), 355 mg (11/21/2019), 355 mg (12/5/2019), 355 mg (12/19/2019), 355 mg (1/2/2020), 355 mg (1/16/2020), 355 mg (1/30/2020)  irinotecan (CAMPTOSAR) 322 mg in sodium chloride 0 9 % 500 mL chemo infusion, 180 mg/m2, Intravenous, Once, 27 of 35 cycles  Dose modification: 150 mg/m2 (original dose 180 mg/m2, Cycle 20, Reason: Dose Not Tolerated)  Administration: 322 mg (5/20/2019), 322 mg (6/3/2019), 320 mg (6/20/2019), 320 mg (7/3/2019), 320 mg (7/18/2019), 320 mg (7/31/2019), 320 mg (8/15/2019), 320 mg (8/29/2019), 320 mg (9/12/2019), 320 mg (9/26/2019), 320 mg (10/10/2019), 320 mg (10/24/2019), 269 mg (11/7/2019), 269 mg (11/21/2019), 269 mg (12/5/2019), 269 mg (12/19/2019), 269 mg (1/2/2020), 269 mg (1/16/2020), 269 mg (1/30/2020)  leucovorin 716 mg in sodium chloride 0 9 % 250 mL IVPB, 400 mg/m2, Intravenous, Once, 27 of 35 cycles  Administration: 716 mg (5/20/2019), 716 mg (6/3/2019), 700 mg (6/20/2019), 700 mg (7/3/2019), 700 mg (7/18/2019), 700 mg (7/31/2019), 700 mg (8/15/2019), 700 mg (8/29/2019), 700 mg (9/12/2019), 700 mg (9/26/2019), 700 mg (10/10/2019), 716 mg (10/24/2019), 700 mg (11/7/2019), 700 mg (11/21/2019), 700 mg (12/5/2019), 700 mg (12/19/2019), 700 mg (1/2/2020), 700 mg (1/16/2020), 700 mg (1/30/2020)     8/1/2018 Adverse Reaction    Needed granix 300 mcg due to [address] of 1 01      8/14/2018 -  Chemotherapy    camptosar 180 mg/m2 day 1  5  mg/m2 day 1  5 FU 1200 mg/m2 day 1-2   Leucovorin 400 mg/m2  Avastin 5 mg/kg day 1   Venofer 100 mg (first 10 treatments)  Neulasta on Pro 6 mg day 3      -- every 2 weeks          3/3/2020 - 3/16/2020 Chemotherapy    pegfilgrastim (NEULASTA ONPRO) subcutaneous injection kit 6 mg, 6 mg, Subcutaneous, Once, 1 of 12 cycles  Administration: 6 mg (3/5/2020)  bevacizumab (AVASTIN) 357 5 mg in sodium chloride 0 9 % 100 mL IVPB, 5 mg/kg = 357 5 mg, Intravenous, Once, 1 of 12 cycles  Administration: 357 5 mg (3/3/2020)  leucovorin 700 mg in dextrose 5 % 250 mL IVPB, 720 mg, Intravenous, Once, 1 of 12 cycles  Administration: 700 mg (3/3/2020)  oxaliplatin (ELOXATIN) 150 mg in dextrose 5 % 250 mL chemo infusion, 153 mg, Intravenous, Once, 1 of 12 cycles  Administration: 150 mg (3/3/2020)     3/16/2020 - 4/5/2020 Chemotherapy    bevacizumab (AVASTIN) 560 mg in sodium chloride 0 9 % 100 mL IVPB, 7 5 mg/kg, Intravenous, Once, 0 of 5 cycles  oxaliplatin (ELOXATIN) 239 2 mg in dextrose 5 % 500 mL chemo infusion, 130 mg/m2 = 239 2 mg, Intravenous, Once, 1 of 6 cycles  Administration: 239 2 mg (3/16/2020)     4/6/2020 - 12/13/2020 Chemotherapy    fluorouracil (ADRUCIL) injection 730 mg, 400 mg/m2 = 730 mg, Intravenous, Once, 1 of 1 cycle  pegfilgrastim (NEULASTA ONPRO) subcutaneous injection kit 6 mg, 6 mg, Subcutaneous, Once, 14 of 20 cycles  Administration: 6 mg (4/8/2020), 6 mg (4/25/2020), 6 mg (5/8/2020), 6 mg (6/19/2020), 6 mg (7/2/2020), 6 mg (7/17/2020), 6 mg (7/31/2020), 6 mg (8/14/2020), 6 mg (8/28/2020), 6 mg (9/25/2020), 6 mg (10/15/2020), 6 mg (11/5/2020), 6 mg (11/18/2020), 6 mg (12/2/2020)  fosaprepitant (EMEND) 150 mg in sodium chloride 0 9 % 250 mL IVPB, 150 mg, Intravenous, Once, 14 of 20 cycles  Administration: 150 mg (4/6/2020), 150 mg (4/23/2020), 150 mg (5/6/2020), 150 mg (6/17/2020), 150 mg (6/30/2020), 150 mg (7/15/2020), 150 mg (7/29/2020), 150 mg (8/12/2020), 150 mg (8/26/2020), 150 mg (9/23/2020), 150 mg (10/13/2020), 150 mg (11/3/2020), 150 mg (11/16/2020), 150 mg (11/30/2020)  bevacizumab (AVASTIN) 372 5 mg in sodium chloride 0 9 % 100 mL IVPB, 5 mg/kg = 372 5 mg, Intravenous, Once, 10 of 16 cycles  Administration: 372 5 mg (4/6/2020), 372 5 mg (4/23/2020), 357 5 mg (6/30/2020), 357 5 mg (7/15/2020), 357 5 mg (7/29/2020), 357 5 mg (8/12/2020), 357 5 mg (8/26/2020), 357 5 mg (11/16/2020), 357 5 mg (11/30/2020)  leucovorin 750 mg in dextrose 5 % 250 mL IVPB, 732 mg, Intravenous, Once, 14 of 20 cycles  Administration: 750 mg (4/6/2020), 750 mg (4/23/2020), 750 mg (5/6/2020), 750 mg (6/17/2020), 700 mg (6/30/2020), 700 mg (7/15/2020), 700 mg (7/29/2020), 700 mg (8/12/2020), 700 mg (8/26/2020), 700 mg (9/23/2020), 700 mg (10/13/2020), 700 mg (11/3/2020), 700 mg (11/16/2020), 700 mg (11/30/2020)  oxaliplatin (ELOXATIN) 150 mg in dextrose 5 % 250 mL chemo infusion, 155 55 mg, Intravenous, Once, 14 of 20 cycles  Dose modification: 70 mg/m2 (original dose 85 mg/m2, Cycle 11, Reason: Dose Not Tolerated, Comment: thrombocytopenia), 65 mg/m2 (original dose 85 mg/m2, Cycle 14, Reason: Other (See Comments)), 65 mg/m2 (original dose 85 mg/m2, Cycle 15, Reason: Max Dose Reached)  Administration: 150 mg (4/6/2020), 150 mg (4/23/2020), 150 mg (5/6/2020), 150 mg (6/17/2020), 150 mg (6/30/2020), 150 mg (7/15/2020), 150 mg (7/29/2020), 150 mg (8/12/2020), 150 mg (8/26/2020), 150 mg (9/23/2020), 125 3 mg (10/13/2020), 125 3 mg (11/3/2020), 125 3 mg (11/16/2020), 116 35 mg (11/30/2020)     12/28/2020 - 3/31/2021 Chemotherapy    pegfilgrastim (NEULASTA ONPRO) subcutaneous injection kit 6 mg, 6 mg, Subcutaneous, Once, 6 of 9 cycles  Administration: 6 mg (12/30/2020), 6 mg (1/14/2021), 6 mg (2/6/2021), 6 mg (2/20/2021), 6 mg (3/6/2021), 6 mg (3/20/2021)  fosaprepitant (EMEND) 150 mg in sodium chloride 0 9 % 250 mL IVPB, 150 mg, Intravenous, Once, 6 of 9 cycles  Administration: 150 mg (12/28/2020), 150 mg (1/12/2021), 150 mg (2/4/2021), 150 mg (2/18/2021), 150 mg (3/4/2021), 150 mg (3/18/2021)  bevacizumab (AVASTIN) 347 5 mg in sodium chloride 0 9 % 100 mL IVPB, 5 mg/kg = 347 5 mg, Intravenous, Once, 5 of 8 cycles  Administration: 347 5 mg (12/28/2020), 347 5 mg (2/4/2021), 347 5 mg (2/18/2021), 347 5 mg (3/4/2021), 347 5 mg (3/18/2021)  leucovorin 700 mg in dextrose 5 % 250 mL IVPB, 712 mg, Intravenous, Once, 6 of 9 cycles  Dose modification: 400 mg/m2 (original dose 400 mg/m2, Cycle 6, Reason: Other (See Comments), Comment: protocol)  Administration: 700 mg (12/28/2020), 700 mg (1/12/2021), 700 mg (2/4/2021), 700 mg (2/18/2021), 700 mg (3/4/2021), 700 mg (3/18/2021)  oxaliplatin (ELOXATIN) 115 7 mg in dextrose 5 % 250 mL chemo infusion, 65 mg/m2 = 115 7 mg (76 5 % of original dose 85 mg/m2), Intravenous, Once, 6 of 9 cycles  Dose modification: 65 mg/m2 (original dose 85 mg/m2, Cycle 1, Reason: Dose Not Tolerated)  Administration: 115 7 mg (12/28/2020), 115 7 mg (1/12/2021), 115 7 mg (2/4/2021), 115 7 mg (2/18/2021), 115 7 mg (3/4/2021), 115 7 mg (3/18/2021)     Liver metastases ([facility] [address] )   6/26/2018 Initial Diagnosis    Liver metastases ([facility] [address] )     7/17/2018 - 2/3/2020 Chemotherapy    palonosetron (ALOXI) injection 0 25 mg, 0 25 mg, Intravenous, Once, 8 of 16 cycles  Administration: 0 25 mg (11/21/2019), 0 25 mg (12/5/2019), 0 25 mg (12/19/2019), 0 25 mg (1/2/2020), 0 25 mg (1/16/2020), 0 25 mg (1/30/2020)  fluorouracil (ADRUCIL) injection 715 mg, 400 mg/m2, Intravenous, Once, 7 of 7 cycles  pegfilgrastim (NEULASTA ONPRO) subcutaneous injection kit 6 mg, 6 mg, Subcutaneous, Once, 20 of 28 cycles  Administration: 6 mg (5/22/2019), 6 mg (6/5/2019), 6 mg (6/22/2019), 6 mg (7/5/2019), 6 mg (7/20/2019), 6 mg (8/2/2019), 6 mg (8/17/2019), 6 mg (8/31/2019), 6 mg (9/14/2019), 6 mg (9/28/2019), 6 mg (10/12/2019), 6 mg (10/26/2019), 6 mg (11/9/2019), 6 mg (11/23/2019), 6 mg (12/7/2019), 6 mg (12/21/2019), 6 mg (1/4/2020), 6 mg (1/18/2020), 6 mg (2/1/2020)  fosaprepitant (EMEND) 150 mg in sodium chloride 0 9 % 250 mL IVPB, 150 mg, Intravenous, Once, 8 of 16 cycles  Administration: 150 mg (11/7/2019), 150 mg (11/21/2019), 150 mg (12/5/2019), 150 mg (12/19/2019), 150 mg (1/2/2020), 150 mg (1/16/2020), 150 mg (1/30/2020)  bevacizumab (AVASTIN) 355 mg in sodium chloride 0 9 % 100 mL IVPB, 5 mg/kg, Intravenous, Once, 27 of 35 cycles  Administration: 355 mg (5/20/2019), 355 mg (6/3/2019), 355 mg (6/20/2019), 355 mg (7/3/2019), 355 mg (7/18/2019), 355 mg (7/31/2019), 355 mg (8/15/2019), 355 mg (8/29/2019), 355 mg (9/12/2019), 355 mg (9/26/2019), 355 mg (10/10/2019), 355 mg (10/24/2019), 355 mg (11/7/2019), 355 mg (11/21/2019), 355 mg (12/5/2019), 355 mg (12/19/2019), 355 mg (1/2/2020), 355 mg (1/16/2020), 355 mg (1/30/2020)  irinotecan (CAMPTOSAR) 322 mg in sodium chloride 0 9 % 500 mL chemo infusion, 180 mg/m2, Intravenous, Once, 27 of 35 cycles  Dose modification: 150 mg/m2 (original dose 180 mg/m2, Cycle 20, Reason: Dose Not Tolerated)  Administration: 322 mg (5/20/2019), 322 mg (6/3/2019), 320 mg (6/20/2019), 320 mg (7/3/2019), 320 mg (7/18/2019), 320 mg (7/31/2019), 320 mg (8/15/2019), 320 mg (8/29/2019), 320 mg (9/12/2019), 320 mg (9/26/2019), 320 mg (10/10/2019), 320 mg (10/24/2019), 269 mg (11/7/2019), 269 mg (11/21/2019), 269 mg (12/5/2019), 269 mg (12/19/2019), 269 mg (1/2/2020), 269 mg (1/16/2020), 269 mg (1/30/2020)  leucovorin 716 mg in sodium chloride 0 9 % 250 mL IVPB, 400 mg/m2, Intravenous, Once, 27 of 35 cycles  Administration: 716 mg (5/20/2019), 716 mg (6/3/2019), 700 mg (6/20/2019), 700 mg (7/3/2019), 700 mg (7/18/2019), 700 mg (7/31/2019), 700 mg (8/15/2019), 700 mg (8/29/2019), 700 mg (9/12/2019), 700 mg (9/26/2019), 700 mg (10/10/2019), 716 mg (10/24/2019), 700 mg (11/7/2019), 700 mg (11/21/2019), 700 mg (12/5/2019), 700 mg (12/19/2019), 700 mg (1/2/2020), 700 mg (1/16/2020), 700 mg (1/30/2020)     3/3/2020 - 3/16/2020 Chemotherapy    pegfilgrastim (NEULASTA ONPRO) subcutaneous injection kit 6 mg, 6 mg, Subcutaneous, Once, 1 of 12 cycles  Administration: 6 mg (3/5/2020)  bevacizumab (AVASTIN) 357 5 mg in sodium chloride 0 9 % 100 mL IVPB, 5 mg/kg = 357 5 mg, Intravenous, Once, 1 of 12 cycles  Administration: 357 5 mg (3/3/2020)  leucovorin 700 mg in dextrose 5 % 250 mL IVPB, 720 mg, Intravenous, Once, 1 of 12 cycles  Administration: 700 mg (3/3/2020)  oxaliplatin (ELOXATIN) 150 mg in dextrose 5 % 250 mL chemo infusion, 153 mg, Intravenous, Once, 1 of 12 cycles  Administration: 150 mg (3/3/2020)     3/16/2020 - 4/5/2020 Chemotherapy    bevacizumab (AVASTIN) 560 mg in sodium chloride 0 9 % 100 mL IVPB, 7 5 mg/kg, Intravenous, Once, 0 of 5 cycles  oxaliplatin (ELOXATIN) 239 2 mg in dextrose 5 % 500 mL chemo infusion, 130 mg/m2 = 239 2 mg, Intravenous, Once, 1 of 6 cycles  Administration: 239 2 mg (3/16/2020)     4/6/2020 - 12/13/2020 Chemotherapy    fluorouracil (ADRUCIL) injection 730 mg, 400 mg/m2 = 730 mg, Intravenous, Once, 1 of 1 cycle  pegfilgrastim (NEULASTA ONPRO) subcutaneous injection kit 6 mg, 6 mg, Subcutaneous, Once, 14 of 20 cycles  Administration: 6 mg (4/8/2020), 6 mg (4/25/2020), 6 mg (5/8/2020), 6 mg (6/19/2020), 6 mg (7/2/2020), 6 mg (7/17/2020), 6 mg (7/31/2020), 6 mg (8/14/2020), 6 mg (8/28/2020), 6 mg (9/25/2020), 6 mg (10/15/2020), 6 mg (11/5/2020), 6 mg (11/18/2020), 6 mg (12/2/2020)  fosaprepitant (EMEND) 150 mg in sodium chloride 0 9 % 250 mL IVPB, 150 mg, Intravenous, Once, 14 of 20 cycles  Administration: 150 mg (4/6/2020), 150 mg (4/23/2020), 150 mg (5/6/2020), 150 mg (6/17/2020), 150 mg (6/30/2020), 150 mg (7/15/2020), 150 mg (7/29/2020), 150 mg (8/12/2020), 150 mg (8/26/2020), 150 mg (9/23/2020), 150 mg (10/13/2020), 150 mg (11/3/2020), 150 mg (11/16/2020), 150 mg (11/30/2020)  bevacizumab (AVASTIN) 372 5 mg in sodium chloride 0 9 % 100 mL IVPB, 5 mg/kg = 372 5 mg, Intravenous, Once, 10 of 16 cycles  Administration: 372 5 mg (4/6/2020), 372 5 mg (4/23/2020), 357 5 mg (6/30/2020), 357 5 mg (7/15/2020), 357 5 mg (7/29/2020), 357 5 mg (8/12/2020), 357 5 mg (8/26/2020), 357 5 mg (11/16/2020), 357 5 mg (11/30/2020)  leucovorin 750 mg in dextrose 5 % 250 mL IVPB, 732 mg, Intravenous, Once, 14 of 20 cycles  Administration: 750 mg (4/6/2020), 750 mg (4/23/2020), 750 mg (5/6/2020), 750 mg (6/17/2020), 700 mg (6/30/2020), 700 mg (7/15/2020), 700 mg (7/29/2020), 700 mg (8/12/2020), 700 mg (8/26/2020), 700 mg (9/23/2020), 700 mg (10/13/2020), 700 mg (11/3/2020), 700 mg (11/16/2020), 700 mg (11/30/2020)  oxaliplatin (ELOXATIN) 150 mg in dextrose 5 % 250 mL chemo infusion, 155 55 mg, Intravenous, Once, 14 of 20 cycles  Dose modification: 70 mg/m2 (original dose 85 mg/m2, Cycle 11, Reason: Dose Not Tolerated, Comment: thrombocytopenia), 65 mg/m2 (original dose 85 mg/m2, Cycle 14, Reason: Other (See Comments)), 65 mg/m2 (original dose 85 mg/m2, Cycle 15, Reason: Max Dose Reached)  Administration: 150 mg (4/6/2020), 150 mg (4/23/2020), 150 mg (5/6/2020), 150 mg (6/17/2020), 150 mg (6/30/2020), 150 mg (7/15/2020), 150 mg (7/29/2020), 150 mg (8/12/2020), 150 mg (8/26/2020), 150 mg (9/23/2020), 125 3 mg (10/13/2020), 125 3 mg (11/3/2020), 125 3 mg (11/16/2020), 116 35 mg (11/30/2020)     12/28/2020 - 3/31/2021 Chemotherapy    pegfilgrastim (NEULASTA ONPRO) subcutaneous injection kit 6 mg, 6 mg, Subcutaneous, Once, 6 of 9 cycles  Administration: 6 mg (12/30/2020), 6 mg (1/14/2021), 6 mg (2/6/2021), 6 mg (2/20/2021), 6 mg (3/6/2021), 6 mg (3/20/2021)  fosaprepitant (EMEND) 150 mg in sodium chloride 0 9 % 250 mL IVPB, 150 mg, Intravenous, Once, 6 of 9 cycles  Administration: 150 mg (12/28/2020), 150 mg (1/12/2021), 150 mg (2/4/2021), 150 mg (2/18/2021), 150 mg (3/4/2021), 150 mg (3/18/2021)  bevacizumab (AVASTIN) 347 5 mg in sodium chloride 0 9 % 100 mL IVPB, 5 mg/kg = 347 5 mg, Intravenous, Once, 5 of 8 cycles  Administration: 347 5 mg (12/28/2020), 347 5 mg (2/4/2021), 347 5 mg (2/18/2021), 347 5 mg (3/4/2021), 347 5 mg (3/18/2021)  leucovorin 700 mg in dextrose 5 % 250 mL IVPB, 712 mg, Intravenous, Once, 6 of 9 cycles  Dose modification: 400 mg/m2 (original dose 400 mg/m2, Cycle 6, Reason: Other (See Comments), Comment: protocol)  Administration: 700 mg (12/28/2020), 700 mg (1/12/2021), 700 mg (2/4/2021), 700 mg (2/18/2021), 700 mg (3/4/2021), 700 mg (3/18/2021)  oxaliplatin (ELOXATIN) 115 7 mg in dextrose 5 % 250 mL chemo infusion, 65 mg/m2 = 115 7 mg (76 5 % of original dose 85 mg/m2), Intravenous, Once, 6 of 9 cycles  Dose modification: 65 mg/m2 (original dose 85 mg/m2, Cycle 1, Reason: Dose Not Tolerated)  Administration: 115 7 mg (12/28/2020), 115 7 mg (1/12/2021), 115 7 mg (2/4/2021), 115 7 mg (2/18/2021), 115 7 mg (3/4/2021), 115 7 mg (3/18/2021)     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[facility] [address] )   6/26/2018 Initial Diagnosis    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[facility] [address] )     4/6/2020 - 12/13/2020 Chemotherapy    fluorouracil (ADRUCIL) injection 730 mg, 400 mg/m2 = 730 mg, Intravenous, Once, 1 of 1 cycle  pegfilgrastim (NEULASTA ONPRO) subcutaneous injection kit 6 mg, 6 mg, Subcutaneous, Once, 7 of 10 cycles  Administration: 6 mg (4/8/2020), 6 mg (4/25/2020), 6 mg (5/8/2020), 6 mg (6/19/2020), 6 mg (7/2/2020), 6 mg (7/17/2020), 6 mg (7/31/2020)  fosaprepitant (EMEND) 150 mg in sodium chloride 0 9 % 250 mL IVPB, 150 mg, Intravenous, Once, 7 of 10 cycles  Administration: 150 mg (4/6/2020), 150 mg (4/23/2020), 150 mg (5/6/2020), 150 mg (6/17/2020), 150 mg (6/30/2020), 150 mg (7/15/2020), 150 mg (7/29/2020)  bevacizumab (AVASTIN) 372 5 mg in sodium chloride 0 9 % 100 mL IVPB, 5 mg/kg = 372 5 mg, Intravenous, Once, 5 of 8 cycles  Administration: 372 5 mg (4/6/2020), 372 5 mg (4/23/2020), 357 5 mg (6/30/2020), 357 5 mg (7/15/2020), 357 5 mg (7/29/2020)  leucovorin 750 mg in dextrose 5 % 250 mL IVPB, 732 mg, Intravenous, Once, 7 of 10 cycles  Administration: 750 mg (4/6/2020), 750 mg (4/23/2020), 750 mg (5/6/2020), 750 mg (6/17/2020), 700 mg (6/30/2020), 700 mg (7/15/2020), 700 mg (7/29/2020)  oxaliplatin (ELOXATIN) 150 mg in dextrose 5 % 250 mL chemo infusion, 155 55 mg, Intravenous, Once, 7 of 10 cycles  Administration: 150 mg (4/6/2020), 150 mg (4/23/2020), 150 mg (5/6/2020), 150 mg (6/17/2020), 150 mg (6/30/2020), 150 mg (7/15/2020), 150 mg (7/29/2020)     12/28/2020 - 3/31/2021 Chemotherapy    pegfilgrastim (NEULASTA ONPRO) subcutaneous injection kit 6 mg, 6 mg, Subcutaneous, Once, 6 of 9 cycles  Administration: 6 mg (12/30/2020), 6 mg (1/14/2021), 6 mg (2/6/2021), 6 mg (2/20/2021), 6 mg (3/6/2021), 6 mg (3/20/2021)  fosaprepitant (EMEND) 150 mg in sodium chloride 0 9 % 250 mL IVPB, 150 mg, Intravenous, Once, 6 of 9 cycles  Administration: 150 mg (12/28/2020), 150 mg (1/12/2021), 150 mg (2/4/2021), 150 mg (2/18/2021), 150 mg (3/4/2021), 150 mg (3/18/2021)  bevacizumab (AVASTIN) 347 5 mg in sodium chloride 0 9 % 100 mL IVPB, 5 mg/kg = 347 5 mg, Intravenous, Once, 5 of 8 cycles  Administration: 347 5 mg (12/28/2020), 347 5 mg (2/4/2021), 347 5 mg (2/18/2021), 347 5 mg (3/4/2021), 347 5 mg (3/18/2021)  leucovorin 700 mg in dextrose 5 % 250 mL IVPB, 712 mg, Intravenous, Once, 6 of 9 cycles  Dose modification: 400 mg/m2 (original dose 400 mg/m2, Cycle 6, Reason: Other (See Comments), Comment: protocol)  Administration: 700 mg (12/28/2020), 700 mg (1/12/2021), 700 mg (2/4/2021), 700 mg (2/18/2021), 700 mg (3/4/2021), 700 mg (3/18/2021)  oxaliplatin (ELOXATIN) 115 7 mg in dextrose 5 % 250 mL chemo infusion, 65 mg/m2 = 115 7 mg (76 5 % of original dose 85 mg/m2), Intravenous, Once, 6 of 9 cycles  Dose modification: 65 mg/m2 (original dose 85 mg/m2, Cycle 1, Reason: Dose Not Tolerated)  Administration: 115 7 mg (12/28/2020), 115 7 mg (1/12/2021), 115 7 mg (2/4/2021), 115 7 mg (2/18/2021), 115 7 mg (3/4/2021), 115 7 mg (3/18/2021)         ROS:  03/31/21 Reviewed 13 systems: See symptoms in HPI  Presently no other neurological, cardiac, pulmonary, GI and  symptoms other than mentioned above  No other symptoms like  fever, chills, bleeding, bone pains, skin rash, weight loss, arthritic symptoms,   claudication and gait problem  No frequent infections    Not unusually sensitive to heat or cold  No swelling of the ankles  No swollen glands  Patient is anxious  /84 (BP Location: Right arm, Patient Position: Sitting, Cuff Size: Adult)   Pulse 88   Temp 97 6 °F (36 4 °C)   Resp 18   Ht 5' 5 12" (1 654 m)   Wt 71 7 kg (158 lb)   LMP 05/16/2018   SpO2 98%   BMI 26 20 kg/m²     Physical Exam:   My medical assistant Harley Busch was in the room during examination  Alert, oriented, not in distress , stable vitals, no icterus, no oral thrush, no palpable neck mass, clear lung fields, regular heart rate, abdomen  soft and non tender, no palpable abdominal mass, no ascites, no edema of ankles, no calf tenderness, no objective focal neurological deficit, no skin rash, no palpable lymphadenopathy in the neck and axillary areas, good arterial pulses, no clubbing  Patient  anxious  Performance status 1      IMAGING:      LABS:    Results for orders placed or performed in visit on 03/17/21   CBC and differential   Result Value Ref Range    WBC 13 67 (H) 4 31 - 10 16 Thousand/uL    RBC 3 94 3 81 - 5 12 Million/uL    Hemoglobin 14 0 11 5 - 15 4 g/dL    Hematocrit 44 7 34 8 - 46 1 %     (H) 82 - 98 fL    MCH 35 5 (H) 26 8 - 34 3 pg    MCHC 31 3 (L) 31 4 - 37 4 g/dL    RDW 17 2 (H) 11 6 - 15 1 %    MPV 10 3 8 9 - 12 7 fL    Platelets 271 (L) 080 - 390 Thousands/uL    nRBC 0 /100 WBCs    Neutrophils Relative 80 (H) 43 - 75 %    Immat GRANS % 1 0 - 2 %    Lymphocytes Relative 6 (L) 14 - 44 %    Monocytes Relative 12 4 - 12 %    Eosinophils Relative 1 0 - 6 %    Basophils Relative 0 0 - 1 %    Neutrophils Absolute 11 07 (H) 1 85 - 7 62 Thousands/µL    Immature Grans Absolute 0 13 0 00 - 0 20 Thousand/uL    Lymphocytes Absolute 0 75 0 60 - 4 47 Thousands/µL    Monocytes Absolute 1 59 (H) 0 17 - 1 22 Thousand/µL    Eosinophils Absolute 0 07 0 00 - 0 61 Thousand/µL    Basophils Absolute 0 06 0 00 - 0 10 Thousands/µL   Comprehensive metabolic panel   Result Value Ref Range    Sodium 141 136 - 145 mmol/L    Potassium 3 5 3 5 - 5 3 mmol/L    Chloride 108 100 - 108 mmol/L    CO2 31 21 - 32 mmol/L    ANION GAP 2 (L) 4 - 13 mmol/L    BUN 9 5 - 25 mg/dL    Creatinine 0 87 0 60 - 1 30 mg/dL    Glucose 85 65 - 140 mg/dL    Calcium 9 0 8 3 - 10 1 mg/dL    Corrected Calcium 9 6 8 3 - 10 1 mg/dL    AST 89 (H) 5 - 45 U/L    ALT 76 12 - 78 U/L    Alkaline Phosphatase 536 (H) 46 - 116 U/L    Total Protein 6 6 6 4 - 8 2 g/dL    Albumin 3 2 (L) 3 5 - 5 0 g/dL    Total Bilirubin 1 04 (H) 0 20 - 1 00 mg/dL    eGFR 76 ml/min/1 73sq m     *Note: Due to a large number of results and/or encounters for the requested time period, some results have not been displayed  A complete set of results can be found in Results Review  Labs, Imaging, & Other studies:   All pertinent labs and imaging studies were personally reviewed    Lab Results   Component Value Date     03/18/2015    K 3 5 03/17/2021     03/17/2021    CO2 31 03/17/2021    ANIONGAP 9 03/18/2015    BUN 9 03/17/2021    CREATININE 0 87 03/17/2021    GLUCOSE 100 03/18/2015    GLUF 84 03/03/2021    CALCIUM 9 0 03/17/2021    CORRECTEDCA 9 6 03/17/2021    AST 89 (H) 03/17/2021    ALT 76 03/17/2021    ALKPHOS 536 (H) 03/17/2021    PROT 6 6 03/18/2015    BILITOT 0 65 03/18/2015    EGFR 76 03/17/2021     Lab Results   Component Value Date    WBC 13 67 (H) 03/17/2021    HGB 14 0 03/17/2021    HCT 44 7 03/17/2021     (H) 03/17/2021     (L) 03/17/2021       Reviewed CBC, CMP and results of PET scan and MRI scan of  Abdomen done at [facility] and discussed with patient  Assessment and plan:    Patient has disease progression especially in the lungs and   From [facility] has suggested Lonsurf but dose to be reduced by 20-30%  Her dose will be between 25 and 28 mg per meter squared twice a day on days 1 through 5 and 8 through 12 of 28 day cycle  Patient has signed informed consent for Lonsurf after getting printed and verbal information    She will have blood counts and chemistry checked every 2 weeks  She has some tiredness, postnasal drip and nonproductive cough  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  Patient has been on FOLFOX plus Avastin for stage IV colon cancer with metastatic disease to liver and lungs and also abdominal carcinomatosis and had metastatic disease to the ovaries  She has received liver directed therapy at [facility] and   had MRI of liver and PET scan  at [facility]  There is disease progression  Doses of oxaliplatin and infusion 5 FU hwere reduced because of hematological toxicity   She also got Neulasta    Patient was not a candidate for Erbitux because of KRAS mutationW  ine   MSI came back stable   Not a candidate for Keytruda  In May 2018 patient underwent GALI and removal of fallopian tubes for uterine bleeding  and there were cancer cells in the fallopian tubes  Annettecorry Hayden probably had removal of tubes and not the ovaries   In June 2018 patient   underwent extended right hemicolectomy, partial omentectomy and biopsy of the peritoneal nodule   Peritoneal nodule came back  positive for metastatic adenocarcinoma from colon    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 and metastatic disease to liver    6 cm T3 G2 right colon cancer with positive margins, lymphovascular invasion and perineural invasion and positive 3 of 27 lymph nodes with extranodal extension and positive peritoneal nodule biopsy   Stage IV disease because of liver and peritoneal metastases   In July 2018 patient was started  on modified FOLFIRI plus Avastin   She had PPE in her hands and bolus 5 FU was discontinued   She was seen by liver specialist at [facility] and was not felt to be a surgical candidate       Since April 2020 patient had been on FOLFOX plus Avastin   She had protein urea but that was less than 1 gram in 24 hour and was monitored       Prior to this she was on FOLFIRI plus Avastin but after initial response disease progressed   On 05/26/2020 patient had pelvic surgery, removal of ovaries and sample also included peritoneum and small bowel mesentery   Both ovaries and small bowel mesentery showed metastatic disease from colon cancer   Surgery was on 05/26/2020    FOLFOX chemotherapy was resumed on 06/17/20 and 2 weeks later  Avastin was added to FOLFOX         Patient has been on Xarelto 10 mg daily for history of DVT right lower extremity that happened in 2016     No bleeding or blood clot on Xarelto                   Physical examination and test results  are as recorded and discussed  Reviewed CBC, CMP and results of PET scan and MRI scan of  Abdomen done at [facility] and discussed with Matthieu Gamboa is as above   Goal is prolongation of survival   Condition and plan discussed in detail   Questions answered  Discussed the importance of self-breast examination, eating healthy foods, staying active as tolerated and health screening test   Patient is capable of self-care  Discussed  precautions against Coronavirus       Patient will continue to follow with primary physician and other consultants  See diagnoses and orders below  1  Primary adenocarcinoma of ascending colon (HCC)    - CBC and differential; Standing  - Comprehensive metabolic panel; Standing  - CBC and differential  - Comprehensive metabolic panel    2  Liver metastases (HCC)    - CBC and differential; Standing  - Comprehensive metabolic panel; Standing  - CBC and differential  - Comprehensive metabolic panel    3  Malignant neoplasm metastatic to lung, unspecified laterality (HCC)    - CBC and differential; Standing  - Comprehensive metabolic panel; Standing  - CBC and differential  - Comprehensive metabolic panel    4  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, unspecified laterality ([address] )      5  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

## 2021-03-31 NOTE — TELEPHONE ENCOUNTER
Patient called to notify us that she may be running late to her 3pm fu with Dr Ziat Jon, Isabel Cunningham notified

## 2021-04-02 ENCOUNTER — TELEPHONE (OUTPATIENT)
Dept: HEMATOLOGY ONCOLOGY | Facility: CLINIC | Age: 56
End: 2021-04-02

## 2021-04-02 ENCOUNTER — DOCUMENTATION (OUTPATIENT)
Dept: HEMATOLOGY ONCOLOGY | Facility: CLINIC | Age: 56
End: 2021-04-02

## 2021-04-02 ENCOUNTER — APPOINTMENT (OUTPATIENT)
Dept: LAB | Facility: MEDICAL CENTER | Age: 56
End: 2021-04-02
Payer: COMMERCIAL

## 2021-04-02 LAB
ALBUMIN SERPL BCP-MCNC: 3.1 G/DL (ref 3.5–5)
ALP SERPL-CCNC: 534 U/L (ref 46–116)
ALT SERPL W P-5'-P-CCNC: 67 U/L (ref 12–78)
ANION GAP SERPL CALCULATED.3IONS-SCNC: 5 MMOL/L (ref 4–13)
AST SERPL W P-5'-P-CCNC: 83 U/L (ref 5–45)
BASOPHILS # BLD AUTO: 0.07 THOUSANDS/ΜL (ref 0–0.1)
BASOPHILS NFR BLD AUTO: 1 % (ref 0–1)
BILIRUB SERPL-MCNC: 0.95 MG/DL (ref 0.2–1)
BUN SERPL-MCNC: 8 MG/DL (ref 5–25)
CALCIUM ALBUM COR SERPL-MCNC: 9.5 MG/DL (ref 8.3–10.1)
CALCIUM SERPL-MCNC: 8.8 MG/DL (ref 8.3–10.1)
CHLORIDE SERPL-SCNC: 110 MMOL/L (ref 100–108)
CO2 SERPL-SCNC: 27 MMOL/L (ref 21–32)
CREAT SERPL-MCNC: 0.88 MG/DL (ref 0.6–1.3)
EOSINOPHIL # BLD AUTO: 0.07 THOUSAND/ΜL (ref 0–0.61)
EOSINOPHIL NFR BLD AUTO: 1 % (ref 0–6)
ERYTHROCYTE [DISTWIDTH] IN BLOOD BY AUTOMATED COUNT: 18.6 % (ref 11.6–15.1)
GFR SERPL CREATININE-BSD FRML MDRD: 75 ML/MIN/1.73SQ M
GLUCOSE P FAST SERPL-MCNC: 102 MG/DL (ref 65–99)
HCT VFR BLD AUTO: 43.7 % (ref 34.8–46.1)
HGB BLD-MCNC: 13.7 G/DL (ref 11.5–15.4)
IMM GRANULOCYTES # BLD AUTO: 0.13 THOUSAND/UL (ref 0–0.2)
IMM GRANULOCYTES NFR BLD AUTO: 1 % (ref 0–2)
LYMPHOCYTES # BLD AUTO: 1.03 THOUSANDS/ΜL (ref 0.6–4.47)
LYMPHOCYTES NFR BLD AUTO: 8 % (ref 14–44)
MCH RBC QN AUTO: 35.7 PG (ref 26.8–34.3)
MCHC RBC AUTO-ENTMCNC: 31.4 G/DL (ref 31.4–37.4)
MCV RBC AUTO: 114 FL (ref 82–98)
MONOCYTES # BLD AUTO: 1.61 THOUSAND/ΜL (ref 0.17–1.22)
MONOCYTES NFR BLD AUTO: 12 % (ref 4–12)
NEUTROPHILS # BLD AUTO: 10.07 THOUSANDS/ΜL (ref 1.85–7.62)
NEUTS SEG NFR BLD AUTO: 77 % (ref 43–75)
NRBC BLD AUTO-RTO: 0 /100 WBCS
PLATELET # BLD AUTO: 117 THOUSANDS/UL (ref 149–390)
PMV BLD AUTO: 10.5 FL (ref 8.9–12.7)
POTASSIUM SERPL-SCNC: 3.6 MMOL/L (ref 3.5–5.3)
PROT SERPL-MCNC: 6.6 G/DL (ref 6.4–8.2)
RBC # BLD AUTO: 3.84 MILLION/UL (ref 3.81–5.12)
SODIUM SERPL-SCNC: 142 MMOL/L (ref 136–145)
WBC # BLD AUTO: 12.98 THOUSAND/UL (ref 4.31–10.16)

## 2021-04-02 PROCEDURE — 80053 COMPREHEN METABOLIC PANEL: CPT | Performed by: INTERNAL MEDICINE

## 2021-04-02 PROCEDURE — 85025 COMPLETE CBC W/AUTO DIFF WBC: CPT | Performed by: INTERNAL MEDICINE

## 2021-04-02 PROCEDURE — 36415 COLL VENOUS BLD VENIPUNCTURE: CPT | Performed by: INTERNAL MEDICINE

## 2021-04-02 NOTE — PROGRESS NOTES
rcvd request from clinical for Lonsurf 45mg BID days 1-5 and 8-12 for a 28 day cycle  [facility]  -[id number]  [facility]-[id number]  PCN- [id number]    Submitted auth online via covermymeds case is pending    [id number] - PA Case ID: [id number]     4/5/21- request has been approved  [facility] will be filling  Clinical and Finance team aware

## 2021-04-02 NOTE — TELEPHONE ENCOUNTER
Per Dr Doe Araujo combining avastin and lonsurf has not been FDA approved, so he will not prescribe it for that reason  If patient wants avastin, per Dr Doe Araujo, she will have to talk to [facility]  Dr Doe Araujo will not prescribe avastin with lonsurf

## 2021-04-02 NOTE — TELEPHONE ENCOUNTER
Returned call to patient  Reviewed response from Dr Blade Ma regarding Avastin and Lorkonradta Jerrod  Patient verbalized understanding

## 2021-04-02 NOTE — TELEPHONE ENCOUNTER
Email was sent to [facility] [employer] on 3/31 regarding patient starting Lonsurf  Waiting for update on where to send script  Another email sent to [employer] to follow-up on this

## 2021-04-02 NOTE — TELEPHONE ENCOUNTER
Patient calling to check on the status of Lonsurf Rx  Patient saw Dr Oleksandr Akins on 3/31/21 - treatment change was made at that time  Will send a msg to RN and oncology finance    Patient is requesting a call back with an update as soon as it is available    Call back number for patient [phone]

## 2021-04-05 DIAGNOSIS — C18.2 PRIMARY ADENOCARCINOMA OF ASCENDING COLON (HCC): Primary | ICD-10-CM

## 2021-04-06 NOTE — TELEPHONE ENCOUNTER
Left VM for patient  Dr Amilcar Valentine sent script to specialty pharmacy  The pharmacy should be calling to set up shipment

## 2021-04-16 ENCOUNTER — TELEPHONE (OUTPATIENT)
Dept: HEMATOLOGY ONCOLOGY | Facility: CLINIC | Age: 56
End: 2021-04-16

## 2021-04-16 NOTE — TELEPHONE ENCOUNTER
Reviewed with Dr Nilda Ayala  Per Dr Nilda Ayala, no further recommendations at this time   Will wait to see if imodium helps

## 2021-04-16 NOTE — TELEPHONE ENCOUNTER
Patient calling to report that she is having diarrhea  She started Lonsurf on Saturday 4/10/21  Diarrhea and abdominal cramping started ever since she began medication  Patient reports she goes 2-3 times per day  She also reports abdominal bloating - her bells feels firm  Patient asking if ok for her to take Imodium - I reviewed she should use the PRN  Patient will call back if Imodium does not offer relief  Encouraged her to increase PO fluids      Patient verbalized understanding    Will forward to RN to update Dr Alfie Ornelas  Patient requesting a call back with any further recommendation    Call back number [phone]

## 2021-04-22 ENCOUNTER — TELEPHONE (OUTPATIENT)
Dept: HEMATOLOGY ONCOLOGY | Facility: CLINIC | Age: 56
End: 2021-04-22

## 2021-04-22 NOTE — TELEPHONE ENCOUNTER
Reviewed with Dr Barkley Staff  Per Dr Barkley Staff, the abdominal symptoms are related to the [facility]  We put her on the schedule for 3:00 tomorrow (4/23) at [facility]  Please update patient

## 2021-04-22 NOTE — TELEPHONE ENCOUNTER
Returned call to patient  Reviewed abdominal bloating likely from [facility]  Patient aware of follow up appointment tomorrow at 3pm

## 2021-04-22 NOTE — TELEPHONE ENCOUNTER
Patient calling to report that she is still experiencing abdominal bloating and firmness  Diarrhea and abdominal cramping have resolved  Patient has been using Imodium PRN   Patient is asking if Dr Gerda He thinks this is coming from [facility] - patient reports her abdomen looks like it did when she was swollen after surgery   She is scheduled to see Dr Gerda He next week on 4/30/21  She is asking if she can come in for a sooner appointment - possibly tomorrow?     Will send to RN to discuss with Dr Gerda He  Call back number for patient - [phone]

## 2021-04-23 ENCOUNTER — OFFICE VISIT (OUTPATIENT)
Dept: HEMATOLOGY ONCOLOGY | Facility: CLINIC | Age: 56
End: 2021-04-23
Payer: COMMERCIAL

## 2021-04-23 VITALS
HEART RATE: 85 BPM | WEIGHT: 156 LBS | HEIGHT: 65 IN | OXYGEN SATURATION: 97 % | TEMPERATURE: 97.3 F | RESPIRATION RATE: 16 BRPM | BODY MASS INDEX: 25.99 KG/M2 | SYSTOLIC BLOOD PRESSURE: 132 MMHG | DIASTOLIC BLOOD PRESSURE: 82 MMHG

## 2021-04-23 DIAGNOSIS — C78.6 PERITONEAL METASTASES (HCC): ICD-10-CM

## 2021-04-23 DIAGNOSIS — I82.531 CHRONIC DEEP VEIN THROMBOSIS (DVT) OF POPLITEAL VEIN OF RIGHT LOWER EXTREMITY (HCC): ICD-10-CM

## 2021-04-23 DIAGNOSIS — C77.2 METASTASIS TO RETROPERITONEAL LYMPH NODE (HCC): ICD-10-CM

## 2021-04-23 DIAGNOSIS — I82.4Y1 DEEP VEIN THROMBOSIS (DVT) OF PROXIMAL VEIN OF RIGHT LOWER EXTREMITY, UNSPECIFIED CHRONICITY (HCC): ICD-10-CM

## 2021-04-23 DIAGNOSIS — C79.60 MALIGNANT NEOPLASM METASTATIC TO OVARY, UNSPECIFIED LATERALITY (HCC): ICD-10-CM

## 2021-04-23 DIAGNOSIS — Z95.828 PORT-A-CATH IN PLACE: ICD-10-CM

## 2021-04-23 DIAGNOSIS — C78.7 LIVER METASTASES (HCC): ICD-10-CM

## 2021-04-23 DIAGNOSIS — C18.2 PRIMARY ADENOCARCINOMA OF ASCENDING COLON (HCC): Primary | ICD-10-CM

## 2021-04-23 DIAGNOSIS — C78.00 MALIGNANT NEOPLASM METASTATIC TO LUNG, UNSPECIFIED LATERALITY (HCC): ICD-10-CM

## 2021-04-23 PROCEDURE — 99214 OFFICE O/P EST MOD 30 MIN: CPT | Performed by: INTERNAL MEDICINE

## 2021-04-23 NOTE — PROGRESS NOTES
HPI:    Patient is here with her   Patient is on Lonsurf for colon cancer with metastasis  Lonsurf was started earlier this month in April 2021  For 1 week she had 1 or 2 loose bowel movements  No other symptoms secondary to Lonsurf  For the last 1-2 days she was having abdominal distension and  gurgling in the abdomen     She called for a visit  She passed gas last night and she is feeling somewhat better today and has less abdominal distension /bloated feeling today than yesterday  No nausea and vomiting  No abdominal pain but fullness  Has some weakness and tiredness  Patient had disease progression  recently especially in the lungs and [place] [facility] [place]f but dose to be reduced by 20-30%  She will have blood counts and chemistry checked every 2 weeks     This was discussed with patient at the time of our last visit and Lonsurf was started  She has some tiredness, postnasal drip and nonproductive cough     She states postnasal drip and cough happens every year around this time  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  Patient had been on FOLFOX plus Avastin for stage IV colon cancer with metastatic disease to liver and lungs and also abdominal carcinomatosis and had metastatic disease to the ovaries  [place] [facility] [place] and [place] [facility] [place]  There was disease progression  and treatment was changed  Doses of oxaliplatin and infusion 5 FU hwere reduced because of hematological toxicity   She also got Neulasta    Patient was not a candidate for Erbitux because of KRAS mutation   MSI came back stable   Not a candidate for Keytruda  In May 2018 patient underwent GALI and removal of fallopian tubes for uterine bleeding  and there were cancer cells in the fallopian tubes  Rain Harris probably had removal of tubes and not the ovaries   In June 2018 patient   underwent extended right hemicolectomy, partial omentectomy and biopsy of the peritoneal nodule   Peritoneal nodule came back  positive for metastatic adenocarcinoma from colon    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 and metastatic disease to liver    6 cm T3 G2 right colon cancer with positive margins, lymphovascular invasion and perineural invasion and positive 3 of 27 lymph nodes with extranodal extension and positive peritoneal nodule biopsy   Stage IV disease because of liver and peritoneal metastases   In July 2018 patient was started  on modified FOLFIRI plus Avastin   She had PPE in her hands and bolus 5 FU was discontinued   She was seen by liver specialist at [facility] and was not felt to be a surgical candidate       Since April 2020 patient had been on FOLFOX plus Avastin   She had protein urea but that was less than 1 gram in 24 hour and was monitored       Prior to that she was on FOLFIRI plus Avastin but after initial response disease progressed   On 05/26/2020 patient had pelvic surgery, removal of ovaries and sample also included peritoneum and small bowel mesentery   Both ovaries and small bowel mesentery showed metastatic disease from colon cancer   Surgery was on 05/26/2020    FOLFOX chemotherapy was resumed on 06/17/20 and 2 weeks later  Avastin was added to FOLFOX         Patient has been on Xarelto 10 mg daily for history of DVT right lower extremity that happened in 2016     No bleeding or blood clot on Xarelto                       Current Outpatient Medications:     acetaminophen (TYLENOL) 500 mg tablet, Take 1,000 mg by mouth every 6 (six) hours as needed for mild pain, Disp: , Rfl:     amLODIPine (NORVASC) 5 mg tablet, Take 5 mg by mouth daily, Disp: , Rfl:     docusate sodium (COLACE) 100 mg capsule, Take 1 capsule (100 mg total) by mouth 2 (two) times a day (Patient taking differently: Take 100 mg by mouth 2 (two) times a day as needed ), Disp: 10 each, Rfl: 0    furosemide (LASIX) 40 mg tablet, Take 40 mg by mouth daily, Disp: , Rfl:     LORazepam (ATIVAN) 1 mg tablet, Take 1 mg by mouth daily as needed for anxiety, Disp: , Rfl:     metoprolol succinate (TOPROL-XL) 50 mg 24 hr tablet, Take 50 mg by mouth 2 (two) times a day Pt was instructed by her PCP Dr Fabiano Mulligan, advised pt to increased to BID, Disp: , Rfl:     omeprazole (PriLOSEC) 20 mg delayed release capsule, PLEASE SEE ATTACHED FOR DETAILED DIRECTIONS, Disp: , Rfl:     oxyCODONE (OXY-IR) 5 MG capsule, Take 1 capsule (5 mg total) by mouth every 6 (six) hours as needed for severe pain for up to 10 doses CAN SUBSTITUTE  OXYCODONE TABLETS FOR CAPSULESMax Daily Amount: 20 mg, Disp: 10 capsule, Rfl: 0    potassium chloride (K-DUR,KLOR-CON) 10 mEq tablet, Take 10 mEq by mouth daily, Disp: , Rfl:     potassium chloride (Klor-Con) 10 mEq tablet, , Disp: , Rfl:     Trifluridine-Tipiracil (Lonsurf) 15-6 14 MG TABS, Take 3 tablets (45 mg) by mouth twice a day on days 1-5 and 8-12 of a 28 day cycle , Disp: 180 tablet, Rfl: 3    Xarelto 10 MG tablet, TAKE 1 TABLET DAILY, Disp: 90 tablet, Rfl: 3    No Known Allergies    Oncology History   [facility] )   6/14/2018 Initial Diagnosis    Primary adenocarcinoma of ascending colon ([facility] )     7/17/2018 - 8/1/2018 Chemotherapy    camptosar 180 mg/m2 day 1  5  mg/m2 day 1  5 FU 1200 mg/m2 day 1-2   Leucovorin 400 mg/m2     Venofer 100 mg (first 10 treatments) -- all every 2 weeks         7/17/2018 - 2/3/2020 Chemotherapy    palonosetron (ALOXI) injection 0 25 mg, 0 25 mg, Intravenous, Once, 8 of 16 cycles  Administration: 0 25 mg (11/21/2019), 0 25 mg (12/5/2019), 0 25 mg (12/19/2019), 0 25 mg (1/2/2020), 0 25 mg (1/16/2020), 0 25 mg (1/30/2020)  fluorouracil (ADRUCIL) injection 715 mg, 400 mg/m2, Intravenous, Once, 7 of 7 cycles  pegfilgrastim (NEULASTA ONPRO) subcutaneous injection kit 6 mg, 6 mg, Subcutaneous, Once, 20 of 28 cycles  Administration: 6 mg (5/22/2019), 6 mg (6/5/2019), 6 mg (6/22/2019), 6 mg (7/5/2019), 6 mg (7/20/2019), 6 mg (8/2/2019), 6 mg (8/17/2019), 6 mg (8/31/2019), 6 mg (9/14/2019), 6 mg (9/28/2019), 6 mg (10/12/2019), 6 mg (10/26/2019), 6 mg (11/9/2019), 6 mg (11/23/2019), 6 mg (12/7/2019), 6 mg (12/21/2019), 6 mg (1/4/2020), 6 mg (1/18/2020), 6 mg (2/1/2020)  fosaprepitant (EMEND) 150 mg in sodium chloride 0 9 % 250 mL IVPB, 150 mg, Intravenous, Once, 8 of 16 cycles  Administration: 150 mg (11/7/2019), 150 mg (11/21/2019), 150 mg (12/5/2019), 150 mg (12/19/2019), 150 mg (1/2/2020), 150 mg (1/16/2020), 150 mg (1/30/2020)  bevacizumab (AVASTIN) 355 mg in sodium chloride 0 9 % 100 mL IVPB, 5 mg/kg, Intravenous, Once, 27 of 35 cycles  Administration: 355 mg (5/20/2019), 355 mg (6/3/2019), 355 mg (6/20/2019), 355 mg (7/3/2019), 355 mg (7/18/2019), 355 mg (7/31/2019), 355 mg (8/15/2019), 355 mg (8/29/2019), 355 mg (9/12/2019), 355 mg (9/26/2019), 355 mg (10/10/2019), 355 mg (10/24/2019), 355 mg (11/7/2019), 355 mg (11/21/2019), 355 mg (12/5/2019), 355 mg (12/19/2019), 355 mg (1/2/2020), 355 mg (1/16/2020), 355 mg (1/30/2020)  irinotecan (CAMPTOSAR) 322 mg in sodium chloride 0 9 % 500 mL chemo infusion, 180 mg/m2, Intravenous, Once, 27 of 35 cycles  Dose modification: 150 mg/m2 (original dose 180 mg/m2, Cycle 20, Reason: Dose Not Tolerated)  Administration: 322 mg (5/20/2019), 322 mg (6/3/2019), 320 mg (6/20/2019), 320 mg (7/3/2019), 320 mg (7/18/2019), 320 mg (7/31/2019), 320 mg (8/15/2019), 320 mg (8/29/2019), 320 mg (9/12/2019), 320 mg (9/26/2019), 320 mg (10/10/2019), 320 mg (10/24/2019), 269 mg (11/7/2019), 269 mg (11/21/2019), 269 mg (12/5/2019), 269 mg (12/19/2019), 269 mg (1/2/2020), 269 mg (1/16/2020), 269 mg (1/30/2020)  leucovorin 716 mg in sodium chloride 0 9 % 250 mL IVPB, 400 mg/m2, Intravenous, Once, 27 of 35 cycles  Administration: 716 mg (5/20/2019), 716 mg (6/3/2019), 700 mg (6/20/2019), 700 mg (7/3/2019), 700 mg (7/18/2019), 700 mg (7/31/2019), 700 mg (8/15/2019), 700 mg (8/29/2019), 700 mg (9/12/2019), 700 mg (9/26/2019), 700 mg (10/10/2019), 716 mg (10/24/2019), 700 mg (11/7/2019), 700 mg (11/21/2019), 700 mg (12/5/2019), 700 mg (12/19/2019), 700 mg (1/2/2020), 700 mg (1/16/2020), 700 mg (1/30/2020)     8/1/2018 Adverse Reaction    Needed granix 300 mcg due to [address] of [address] 01      8/14/2018 -  Chemotherapy    camptosar 180 mg/m2 day 1  5  mg/m2 day 1  5 FU 1200 mg/m2 day 1-2   Leucovorin 400 mg/m2  Avastin 5 mg/kg day 1   Venofer 100 mg (first 10 treatments)  Neulasta on Pro 6 mg day 3      -- every 2 weeks          3/3/2020 - 3/16/2020 Chemotherapy    pegfilgrastim (NEULASTA ONPRO) subcutaneous injection kit 6 mg, 6 mg, Subcutaneous, Once, 1 of 12 cycles  Administration: 6 mg (3/5/2020)  bevacizumab (AVASTIN) 357 5 mg in sodium chloride 0 9 % 100 mL IVPB, 5 mg/kg = 357 5 mg, Intravenous, Once, 1 of 12 cycles  Administration: 357 5 mg (3/3/2020)  leucovorin 700 mg in dextrose 5 % 250 mL IVPB, 720 mg, Intravenous, Once, 1 of 12 cycles  Administration: 700 mg (3/3/2020)  oxaliplatin (ELOXATIN) 150 mg in dextrose 5 % 250 mL chemo infusion, 153 mg, Intravenous, Once, 1 of 12 cycles  Administration: 150 mg (3/3/2020)     3/16/2020 - 4/5/2020 Chemotherapy    bevacizumab (AVASTIN) 560 mg in sodium chloride 0 9 % 100 mL IVPB, 7 5 mg/kg, Intravenous, Once, 0 of 5 cycles  oxaliplatin (ELOXATIN) 239 2 mg in dextrose 5 % 500 mL chemo infusion, 130 mg/m2 = 239 2 mg, Intravenous, Once, 1 of 6 cycles  Administration: 239 2 mg (3/16/2020)     4/6/2020 - 12/13/2020 Chemotherapy    fluorouracil (ADRUCIL) injection 730 mg, 400 mg/m2 = 730 mg, Intravenous, Once, 1 of 1 cycle  pegfilgrastim (NEULASTA ONPRO) subcutaneous injection kit 6 mg, 6 mg, Subcutaneous, Once, 14 of 20 cycles  Administration: 6 mg (4/8/2020), 6 mg (4/25/2020), 6 mg (5/8/2020), 6 mg (6/19/2020), 6 mg (7/2/2020), 6 mg (7/17/2020), 6 mg (7/31/2020), 6 mg (8/14/2020), 6 mg (8/28/2020), 6 mg (9/25/2020), 6 mg (10/15/2020), 6 mg (11/5/2020), 6 mg (11/18/2020), 6 mg (12/2/2020)  fosaprepitant (EMEND) 150 mg in sodium chloride 0 9 % 250 mL IVPB, 150 mg, Intravenous, Once, 14 of 20 cycles  Administration: 150 mg (4/6/2020), 150 mg (4/23/2020), 150 mg (5/6/2020), 150 mg (6/17/2020), 150 mg (6/30/2020), 150 mg (7/15/2020), 150 mg (7/29/2020), 150 mg (8/12/2020), 150 mg (8/26/2020), 150 mg (9/23/2020), 150 mg (10/13/2020), 150 mg (11/3/2020), 150 mg (11/16/2020), 150 mg (11/30/2020)  bevacizumab (AVASTIN) 372 5 mg in sodium chloride 0 9 % 100 mL IVPB, 5 mg/kg = 372 5 mg, Intravenous, Once, 10 of 16 cycles  Administration: 372 5 mg (4/6/2020), 372 5 mg (4/23/2020), 357 5 mg (6/30/2020), 357 5 mg (7/15/2020), 357 5 mg (7/29/2020), 357 5 mg (8/12/2020), 357 5 mg (8/26/2020), 357 5 mg (11/16/2020), 357 5 mg (11/30/2020)  leucovorin 750 mg in dextrose 5 % 250 mL IVPB, 732 mg, Intravenous, Once, 14 of 20 cycles  Administration: 750 mg (4/6/2020), 750 mg (4/23/2020), 750 mg (5/6/2020), 750 mg (6/17/2020), 700 mg (6/30/2020), 700 mg (7/15/2020), 700 mg (7/29/2020), 700 mg (8/12/2020), 700 mg (8/26/2020), 700 mg (9/23/2020), 700 mg (10/13/2020), 700 mg (11/3/2020), 700 mg (11/16/2020), 700 mg (11/30/2020)  oxaliplatin (ELOXATIN) 150 mg in dextrose 5 % 250 mL chemo infusion, 155 55 mg, Intravenous, Once, 14 of 20 cycles  Dose modification: 70 mg/m2 (original dose 85 mg/m2, Cycle 11, Reason: Dose Not Tolerated, Comment: thrombocytopenia), 65 mg/m2 (original dose 85 mg/m2, Cycle 14, Reason: Other (See Comments)), 65 mg/m2 (original dose 85 mg/m2, Cycle 15, Reason: Max Dose Reached)  Administration: 150 mg (4/6/2020), 150 mg (4/23/2020), 150 mg (5/6/2020), 150 mg (6/17/2020), 150 mg (6/30/2020), 150 mg (7/15/2020), 150 mg (7/29/2020), 150 mg (8/12/2020), 150 mg (8/26/2020), 150 mg (9/23/2020), 125 3 mg (10/13/2020), 125 3 mg (11/3/2020), 125 3 mg (11/16/2020), 116 35 mg (11/30/2020)     12/28/2020 - 3/31/2021 Chemotherapy    pegfilgrastim (NEULASTA ONPRO) subcutaneous injection kit 6 mg, 6 mg, Subcutaneous, Once, 6 of 9 cycles  Administration: 6 mg (12/30/2020), 6 mg (1/14/2021), 6 mg (2/6/2021), 6 mg (2/20/2021), 6 mg (3/6/2021), 6 mg (3/20/2021)  fosaprepitant (EMEND) 150 mg in sodium chloride 0 9 % 250 mL IVPB, 150 mg, Intravenous, Once, 6 of 9 cycles  Administration: 150 mg (12/28/2020), 150 mg (1/12/2021), 150 mg (2/4/2021), 150 mg (2/18/2021), 150 mg (3/4/2021), 150 mg (3/18/2021)  bevacizumab (AVASTIN) 347 5 mg in sodium chloride 0 9 % 100 mL IVPB, 5 mg/kg = 347 5 mg, Intravenous, Once, 5 of 8 cycles  Administration: 347 5 mg (12/28/2020), 347 5 mg (2/4/2021), 347 5 mg (2/18/2021), 347 5 mg (3/4/2021), 347 5 mg (3/18/2021)  leucovorin 700 mg in dextrose 5 % 250 mL IVPB, 712 mg, Intravenous, Once, 6 of 9 cycles  Dose modification: 400 mg/m2 (original dose 400 mg/m2, Cycle 6, Reason: Other (See Comments), Comment: protocol)  Administration: 700 mg (12/28/2020), 700 mg (1/12/2021), 700 mg (2/4/2021), 700 mg (2/18/2021), 700 mg (3/4/2021), 700 mg (3/18/2021)  oxaliplatin (ELOXATIN) 115 7 mg in dextrose 5 % 250 mL chemo infusion, 65 mg/m2 = 115 7 mg (76 5 % of original dose 85 mg/m2), Intravenous, Once, 6 of 9 cycles  Dose modification: 65 mg/m2 (original dose 85 mg/m2, Cycle 1, Reason: Dose Not Tolerated)  Administration: 115 7 mg (12/28/2020), 115 7 mg (1/12/2021), 115 7 mg (2/4/2021), 115 7 mg (2/18/2021), 115 7 mg (3/4/2021), 115 7 mg (3/18/2021)     Liver metastases ([facility] )   6/26/2018 Initial Diagnosis    Liver metastases ([facility] )     7/17/2018 - 2/3/2020 Chemotherapy    palonosetron (ALOXI) injection 0 25 mg, 0 25 mg, Intravenous, Once, 8 of 16 cycles  Administration: 0 25 mg (11/21/2019), 0 25 mg (12/5/2019), 0 25 mg (12/19/2019), 0 25 mg (1/2/2020), 0 25 mg (1/16/2020), 0 25 mg (1/30/2020)  fluorouracil (ADRUCIL) injection 715 mg, 400 mg/m2, Intravenous, Once, 7 of 7 cycles  pegfilgrastim (NEULASTA ONPRO) subcutaneous injection kit 6 mg, 6 mg, Subcutaneous, Once, 20 of 28 cycles  Administration: 6 mg (5/22/2019), 6 mg (6/5/2019), 6 mg (6/22/2019), 6 mg (7/5/2019), 6 mg (7/20/2019), 6 mg (8/2/2019), 6 mg (8/17/2019), 6 mg (8/31/2019), 6 mg (9/14/2019), 6 mg (9/28/2019), 6 mg (10/12/2019), 6 mg (10/26/2019), 6 mg (11/9/2019), 6 mg (11/23/2019), 6 mg (12/7/2019), 6 mg (12/21/2019), 6 mg (1/4/2020), 6 mg (1/18/2020), 6 mg (2/1/2020)  fosaprepitant (EMEND) 150 mg in sodium chloride 0 9 % 250 mL IVPB, 150 mg, Intravenous, Once, 8 of 16 cycles  Administration: 150 mg (11/7/2019), 150 mg (11/21/2019), 150 mg (12/5/2019), 150 mg (12/19/2019), 150 mg (1/2/2020), 150 mg (1/16/2020), 150 mg (1/30/2020)  bevacizumab (AVASTIN) 355 mg in sodium chloride 0 9 % 100 mL IVPB, 5 mg/kg, Intravenous, Once, 27 of 35 cycles  Administration: 355 mg (5/20/2019), 355 mg (6/3/2019), 355 mg (6/20/2019), 355 mg (7/3/2019), 355 mg (7/18/2019), 355 mg (7/31/2019), 355 mg (8/15/2019), 355 mg (8/29/2019), 355 mg (9/12/2019), 355 mg (9/26/2019), 355 mg (10/10/2019), 355 mg (10/24/2019), 355 mg (11/7/2019), 355 mg (11/21/2019), 355 mg (12/5/2019), 355 mg (12/19/2019), 355 mg (1/2/2020), 355 mg (1/16/2020), 355 mg (1/30/2020)  irinotecan (CAMPTOSAR) 322 mg in sodium chloride 0 9 % 500 mL chemo infusion, 180 mg/m2, Intravenous, Once, 27 of 35 cycles  Dose modification: 150 mg/m2 (original dose 180 mg/m2, Cycle 20, Reason: Dose Not Tolerated)  Administration: 322 mg (5/20/2019), 322 mg (6/3/2019), 320 mg (6/20/2019), 320 mg (7/3/2019), 320 mg (7/18/2019), 320 mg (7/31/2019), 320 mg (8/15/2019), 320 mg (8/29/2019), 320 mg (9/12/2019), 320 mg (9/26/2019), 320 mg (10/10/2019), 320 mg (10/24/2019), 269 mg (11/7/2019), 269 mg (11/21/2019), 269 mg (12/5/2019), 269 mg (12/19/2019), 269 mg (1/2/2020), 269 mg (1/16/2020), 269 mg (1/30/2020)  leucovorin 716 mg in sodium chloride 0 9 % 250 mL IVPB, 400 mg/m2, Intravenous, Once, 27 of 35 cycles  Administration: 716 mg (5/20/2019), 716 mg (6/3/2019), 700 mg (6/20/2019), 700 mg (7/3/2019), 700 mg (7/18/2019), 700 mg (7/31/2019), 700 mg (8/15/2019), 700 mg (8/29/2019), 700 mg (9/12/2019), 700 mg (9/26/2019), 700 mg (10/10/2019), 716 mg (10/24/2019), 700 mg (11/7/2019), 700 mg (11/21/2019), 700 mg (12/5/2019), 700 mg (12/19/2019), 700 mg (1/2/2020), 700 mg (1/16/2020), 700 mg (1/30/2020)     3/3/2020 - 3/16/2020 Chemotherapy    pegfilgrastim (NEULASTA ONPRO) subcutaneous injection kit 6 mg, 6 mg, Subcutaneous, Once, 1 of 12 cycles  Administration: 6 mg (3/5/2020)  bevacizumab (AVASTIN) 357 5 mg in sodium chloride 0 9 % 100 mL IVPB, 5 mg/kg = 357 5 mg, Intravenous, Once, 1 of 12 cycles  Administration: 357 5 mg (3/3/2020)  leucovorin 700 mg in dextrose 5 % 250 mL IVPB, 720 mg, Intravenous, Once, 1 of 12 cycles  Administration: 700 mg (3/3/2020)  oxaliplatin (ELOXATIN) 150 mg in dextrose 5 % 250 mL chemo infusion, 153 mg, Intravenous, Once, 1 of 12 cycles  Administration: 150 mg (3/3/2020)     3/16/2020 - 4/5/2020 Chemotherapy    bevacizumab (AVASTIN) 560 mg in sodium chloride 0 9 % 100 mL IVPB, 7 5 mg/kg, Intravenous, Once, 0 of 5 cycles  oxaliplatin (ELOXATIN) 239 2 mg in dextrose 5 % 500 mL chemo infusion, 130 mg/m2 = 239 2 mg, Intravenous, Once, 1 of 6 cycles  Administration: 239 2 mg (3/16/2020)     4/6/2020 - 12/13/2020 Chemotherapy    fluorouracil (ADRUCIL) injection 730 mg, 400 mg/m2 = 730 mg, Intravenous, Once, 1 of 1 cycle  pegfilgrastim (NEULASTA ONPRO) subcutaneous injection kit 6 mg, 6 mg, Subcutaneous, Once, 14 of 20 cycles  Administration: 6 mg (4/8/2020), 6 mg (4/25/2020), 6 mg (5/8/2020), 6 mg (6/19/2020), 6 mg (7/2/2020), 6 mg (7/17/2020), 6 mg (7/31/2020), 6 mg (8/14/2020), 6 mg (8/28/2020), 6 mg (9/25/2020), 6 mg (10/15/2020), 6 mg (11/5/2020), 6 mg (11/18/2020), 6 mg (12/2/2020)  fosaprepitant (EMEND) 150 mg in sodium chloride 0 9 % 250 mL IVPB, 150 mg, Intravenous, Once, 14 of 20 cycles  Administration: 150 mg (4/6/2020), 150 mg (4/23/2020), 150 mg (5/6/2020), 150 mg (6/17/2020), 150 mg (6/30/2020), 150 mg (7/15/2020), 150 mg (7/29/2020), 150 mg (8/12/2020), 150 mg (8/26/2020), 150 mg (9/23/2020), 150 mg (10/13/2020), 150 mg (11/3/2020), 150 mg (11/16/2020), 150 mg (11/30/2020)  bevacizumab (AVASTIN) 372 5 mg in sodium chloride 0 9 % 100 mL IVPB, 5 mg/kg = 372 5 mg, Intravenous, Once, 10 of 16 cycles  Administration: 372 5 mg (4/6/2020), 372 5 mg (4/23/2020), 357 5 mg (6/30/2020), 357 5 mg (7/15/2020), 357 5 mg (7/29/2020), 357 5 mg (8/12/2020), 357 5 mg (8/26/2020), 357 5 mg (11/16/2020), 357 5 mg (11/30/2020)  leucovorin 750 mg in dextrose 5 % 250 mL IVPB, 732 mg, Intravenous, Once, 14 of 20 cycles  Administration: 750 mg (4/6/2020), 750 mg (4/23/2020), 750 mg (5/6/2020), 750 mg (6/17/2020), 700 mg (6/30/2020), 700 mg (7/15/2020), 700 mg (7/29/2020), 700 mg (8/12/2020), 700 mg (8/26/2020), 700 mg (9/23/2020), 700 mg (10/13/2020), 700 mg (11/3/2020), 700 mg (11/16/2020), 700 mg (11/30/2020)  oxaliplatin (ELOXATIN) 150 mg in dextrose 5 % 250 mL chemo infusion, 155 55 mg, Intravenous, Once, 14 of 20 cycles  Dose modification: 70 mg/m2 (original dose 85 mg/m2, Cycle 11, Reason: Dose Not Tolerated, Comment: thrombocytopenia), 65 mg/m2 (original dose 85 mg/m2, Cycle 14, Reason: Other (See Comments)), 65 mg/m2 (original dose 85 mg/m2, Cycle 15, Reason: Max Dose Reached)  Administration: 150 mg (4/6/2020), 150 mg (4/23/2020), 150 mg (5/6/2020), 150 mg (6/17/2020), 150 mg (6/30/2020), 150 mg (7/15/2020), 150 mg (7/29/2020), 150 mg (8/12/2020), 150 mg (8/26/2020), 150 mg (9/23/2020), 125 3 mg (10/13/2020), 125 3 mg (11/3/2020), 125 3 mg (11/16/2020), 116 35 mg (11/30/2020)     12/28/2020 - 3/31/2021 Chemotherapy    pegfilgrastim (NEULASTA ONPRO) subcutaneous injection kit 6 mg, 6 mg, Subcutaneous, Once, 6 of 9 cycles  Administration: 6 mg (12/30/2020), 6 mg (1/14/2021), 6 mg (2/6/2021), 6 mg (2/20/2021), 6 mg (3/6/2021), 6 mg (3/20/2021)  fosaprepitant (EMEND) 150 mg in sodium chloride 0 9 % 250 mL IVPB, 150 mg, Intravenous, Once, 6 of 9 cycles  Administration: 150 mg (12/28/2020), 150 mg (1/12/2021), 150 mg (2/4/2021), 150 mg (2/18/2021), 150 mg (3/4/2021), 150 mg (3/18/2021)  bevacizumab (AVASTIN) 347 5 mg in sodium chloride 0 9 % 100 mL IVPB, 5 mg/kg = 347 5 mg, Intravenous, Once, 5 of 8 cycles  Administration: 347 5 mg (12/28/2020), 347 5 mg (2/4/2021), 347 5 mg (2/18/2021), 347 5 mg (3/4/2021), 347 5 mg (3/18/2021)  leucovorin 700 mg in dextrose 5 % 250 mL IVPB, 712 mg, Intravenous, Once, 6 of 9 cycles  Dose modification: 400 mg/m2 (original dose 400 mg/m2, Cycle 6, Reason: Other (See Comments), Comment: protocol)  Administration: 700 mg (12/28/2020), 700 mg (1/12/2021), 700 mg (2/4/2021), 700 mg (2/18/2021), 700 mg (3/4/2021), 700 mg (3/18/2021)  oxaliplatin (ELOXATIN) 115 7 mg in dextrose 5 % 250 mL chemo infusion, 65 mg/m2 = 115 7 mg (76 5 % of original dose 85 mg/m2), Intravenous, Once, 6 of 9 cycles  Dose modification: 65 mg/m2 (original dose 85 mg/m2, Cycle 1, Reason: Dose Not Tolerated)  Administration: 115 7 mg (12/28/2020), 115 7 mg (1/12/2021), 115 7 mg (2/4/2021), 115 7 mg (2/18/2021), 115 7 mg (3/4/2021), 115 7 mg (3/18/2021)     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)   6/26/2018 Initial Diagnosis    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[address] )     4/6/2020 - 12/13/2020 Chemotherapy    fluorouracil (ADRUCIL) injection 730 mg, 400 mg/m2 = 730 mg, Intravenous, Once, 1 of 1 cycle  pegfilgrastim (NEULASTA ONPRO) subcutaneous injection kit 6 mg, 6 mg, Subcutaneous, Once, 7 of 10 cycles  Administration: 6 mg (4/8/2020), 6 mg (4/25/2020), 6 mg (5/8/2020), 6 mg (6/19/2020), 6 mg (7/2/2020), 6 mg (7/17/2020), 6 mg (7/31/2020)  fosaprepitant (EMEND) 150 mg in sodium chloride 0 9 % 250 mL IVPB, 150 mg, Intravenous, Once, 7 of 10 cycles  Administration: 150 mg (4/6/2020), 150 mg (4/23/2020), 150 mg (5/6/2020), 150 mg (6/17/2020), 150 mg (6/30/2020), 150 mg (7/15/2020), 150 mg (7/29/2020)  bevacizumab (AVASTIN) 372 5 mg in sodium chloride 0 9 % 100 mL IVPB, 5 mg/kg = 372 5 mg, Intravenous, Once, 5 of 8 cycles  Administration: 372 5 mg (4/6/2020), 372 5 mg (4/23/2020), 357 5 mg (6/30/2020), 357 5 mg (7/15/2020), 357 5 mg (7/29/2020)  leucovorin 750 mg in dextrose 5 % 250 mL IVPB, 732 mg, Intravenous, Once, 7 of 10 cycles  Administration: 750 mg (4/6/2020), 750 mg (4/23/2020), 750 mg (5/6/2020), 750 mg (6/17/2020), 700 mg (6/30/2020), 700 mg (7/15/2020), 700 mg (7/29/2020)  oxaliplatin (ELOXATIN) 150 mg in dextrose 5 % 250 mL chemo infusion, 155 55 mg, Intravenous, Once, 7 of 10 cycles  Administration: 150 mg (4/6/2020), 150 mg (4/23/2020), 150 mg (5/6/2020), 150 mg (6/17/2020), 150 mg (6/30/2020), 150 mg (7/15/2020), 150 mg (7/29/2020)     12/28/2020 - 3/31/2021 Chemotherapy    pegfilgrastim (NEULASTA ONPRO) subcutaneous injection kit 6 mg, 6 mg, Subcutaneous, Once, 6 of 9 cycles  Administration: 6 mg (12/30/2020), 6 mg (1/14/2021), 6 mg (2/6/2021), 6 mg (2/20/2021), 6 mg (3/6/2021), 6 mg (3/20/2021)  fosaprepitant (EMEND) 150 mg in sodium chloride 0 9 % 250 mL IVPB, 150 mg, Intravenous, Once, 6 of 9 cycles  Administration: 150 mg (12/28/2020), 150 mg (1/12/2021), 150 mg (2/4/2021), 150 mg (2/18/2021), 150 mg (3/4/2021), 150 mg (3/18/2021)  bevacizumab (AVASTIN) 347 5 mg in sodium chloride 0 9 % 100 mL IVPB, 5 mg/kg = 347 5 mg, Intravenous, Once, 5 of 8 cycles  Administration: 347 5 mg (12/28/2020), 347 5 mg (2/4/2021), 347 5 mg (2/18/2021), 347 5 mg (3/4/2021), 347 5 mg (3/18/2021)  leucovorin 700 mg in dextrose 5 % 250 mL IVPB, 712 mg, Intravenous, Once, 6 of 9 cycles  Dose modification: 400 mg/m2 (original dose 400 mg/m2, Cycle 6, Reason: Other (See Comments), Comment: protocol)  Administration: 700 mg (12/28/2020), 700 mg (1/12/2021), 700 mg (2/4/2021), 700 mg (2/18/2021), 700 mg (3/4/2021), 700 mg (3/18/2021)  oxaliplatin (ELOXATIN) 115 7 mg in dextrose 5 % 250 mL chemo infusion, 65 mg/m2 = 115 7 mg (76 5 % of original dose 85 mg/m2), Intravenous, Once, 6 of 9 cycles  Dose modification: 65 mg/m2 (original dose 85 mg/m2, Cycle 1, Reason: Dose Not Tolerated)  Administration: 115 7 mg (12/28/2020), 115 7 mg (1/12/2021), 115 7 mg (2/4/2021), 115 7 mg (2/18/2021), 115 7 mg (3/4/2021), 115 7 mg (3/18/2021)         ROS:  04/23/21 Reviewed 13 systems: See symptoms in HPI  Presently no other neurological, cardiac, pulmonary, GI and  symptoms other than mentioned above  No other symptoms like  fever, chills, bleeding, bone pains, skin rash, weight loss, arthritic symptoms,   claudication and gait problem  No frequent infections  Not unusually sensitive to heat or cold  No swelling of the ankles  No swollen glands  Patient is anxious  /82 (BP Location: Left arm, Patient Position: Sitting, Cuff Size: Adult)   Pulse 85   Temp (!) 97 3 °F (36 3 °C)   Resp 16   Ht 5' 5 12" (1 654 m)   Wt 70 8 kg (156 lb)   LMP 05/16/2018   SpO2 97%   BMI 25 86 kg/m²     Physical Exam:   Alert, oriented, not in distress , stable vitals, no icterus, no oral thrush, no palpable neck mass, clear lung fields, regular heart rate, abdomen  soft and non tender, no palpable abdominal mass, some distension, not sure of small ascites,, diminished bowel sounds,  no edema of ankles, no calf tenderness, no objective focal neurological deficit, no skin rash, no palpable lymphadenopathy in the neck and axillary areas,  no clubbing  Patient  anxious  Performance status 1      IMAGING:      LABS:    Results for orders placed or performed in visit on 03/31/21   CBC and differential   Result Value Ref Range    WBC 12 98 (H) 4 31 - 10 16 Thousand/uL    RBC 3 84 3 81 - 5 12 Million/uL    Hemoglobin 13 7 11 5 - 15 4 g/dL    Hematocrit 43 7 34 8 - 46 1 %     (H) 82 - 98 fL    MCH 35 7 (H) 26 8 - 34 3 pg    MCHC 31 4 31 4 - 37 4 g/dL    RDW 18 6 (H) 11 6 - 15 1 %    MPV 10 5 8 9 - 12 7 fL Platelets 759 (L) 781 - 390 Thousands/uL    nRBC 0 /100 WBCs    Neutrophils Relative 77 (H) 43 - 75 %    Immat GRANS % 1 0 - 2 %    Lymphocytes Relative 8 (L) 14 - 44 %    Monocytes Relative 12 4 - 12 %    Eosinophils Relative 1 0 - 6 %    Basophils Relative 1 0 - 1 %    Neutrophils Absolute 10 07 (H) 1 85 - 7 62 Thousands/µL    Immature Grans Absolute 0 13 0 00 - 0 20 Thousand/uL    Lymphocytes Absolute 1 03 0 60 - 4 47 Thousands/µL    Monocytes Absolute 1 61 (H) 0 17 - 1 22 Thousand/µL    Eosinophils Absolute 0 07 0 00 - 0 61 Thousand/µL    Basophils Absolute 0 07 0 00 - 0 10 Thousands/µL   Comprehensive metabolic panel   Result Value Ref Range    Sodium 142 136 - 145 mmol/L    Potassium 3 6 3 5 - 5 3 mmol/L    Chloride 110 (H) 100 - 108 mmol/L    CO2 27 21 - 32 mmol/L    ANION GAP 5 4 - 13 mmol/L    BUN 8 5 - 25 mg/dL    Creatinine 0 88 0 60 - 1 30 mg/dL    Glucose, Fasting 102 (H) 65 - 99 mg/dL    Calcium 8 8 8 3 - 10 1 mg/dL    Corrected Calcium 9 5 8 3 - 10 1 mg/dL    AST 83 (H) 5 - 45 U/L    ALT 67 12 - 78 U/L    Alkaline Phosphatase 534 (H) 46 - 116 U/L    Total Protein 6 6 6 4 - 8 2 g/dL    Albumin 3 1 (L) 3 5 - 5 0 g/dL    Total Bilirubin 0 95 0 20 - 1 00 mg/dL    eGFR 75 ml/min/1 73sq m     *Note: Due to a large number of results and/or encounters for the requested time period, some results have not been displayed  A complete set of results can be found in Results Review       Labs, Imaging, & Other studies:   All pertinent labs and imaging studies were personally reviewed    Lab Results   Component Value Date     03/18/2015    K 3 6 04/02/2021     (H) 04/02/2021    CO2 27 04/02/2021    ANIONGAP 9 03/18/2015    BUN 8 04/02/2021    CREATININE 0 88 04/02/2021    GLUCOSE 100 03/18/2015    GLUF 102 (H) 04/02/2021    CALCIUM 8 8 04/02/2021    CORRECTEDCA 9 5 04/02/2021    AST 83 (H) 04/02/2021    ALT 67 04/02/2021    ALKPHOS 534 (H) 04/02/2021    PROT 6 6 03/18/2015    BILITOT 0 65 03/18/2015 EGFR 75 04/02/2021     Lab Results   Component Value Date    WBC 12 98 (H) 04/02/2021    HGB 13 7 04/02/2021    HCT 43 7 04/02/2021     (H) 04/02/2021     (L) 04/02/2021       Reviewed CBC andCMP and results and discussed with patient  Assessment and plan:      Patient is here with her   Patient is on Lonsurf for colon cancer with metastasis  Lonsurf was started earlier this month in April 2021  For 1 week she had 1 or 2 loose bowel movements  No other symptoms secondary to Lonsurf  For the last 1-2 days she was having abdominal distension and  gurgling in the abdomen     She called for a visit  She passed gas last night and she is feeling somewhat better today and has less abdominal distension /bloated feeling today than yesterday  No nausea and vomiting  No abdominal pain but fullness  Has some weakness and tiredness  Patient had disease progression  recently especially in the lungs and Dr  From [facility]  suggested Lonsurf but dose to be reduced by 20-30%  She will have blood counts and chemistry checked every 2 weeks     This was discussed with patient at the time of our last visit and Lonsurf was started  She has some tiredness, postnasal drip and nonproductive cough     She states postnasal drip and cough happens every year around this time  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  Patient had been on FOLFOX plus Avastin for stage IV colon cancer with metastatic disease to liver and lungs and also abdominal carcinomatosis and had metastatic disease to the ovaries  She had received liver directed therapy at [facility] and   had MRI of liver and PET scan  at [facility]  There was disease progression  and treatment was changed  Doses of oxaliplatin and infusion 5 FU hwere reduced because of hematological toxicity   She also got Neulasta    Patient was not a candidate for Erbitux because of KRAS mutation   MSI came back stable   Not a candidate for Keytruda      In May 2018 patient underwent GALI and removal of fallopian tubes for uterine bleeding  and there were cancer cells in the fallopian tubes  Trisha Moy probably had removal of tubes and not the ovaries   In June 2018 patient   underwent extended right hemicolectomy, partial omentectomy and biopsy of the peritoneal nodule   Peritoneal nodule came back  positive for metastatic adenocarcinoma from colon    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 and metastatic disease to liver    6 cm T3 G2 right colon cancer with positive margins, lymphovascular invasion and perineural invasion and positive 3 of 27 lymph nodes with extranodal extension and positive peritoneal nodule biopsy   Stage IV disease because of liver and peritoneal metastases   In July 2018 patient was started  on modified FOLFIRI plus Avastin   She had PPE in her hands and bolus 5 FU was discontinued   She was seen by liver specialist at [facility] and was not felt to be a surgical candidate       Since April 2020 patient had been on FOLFOX plus Avastin   She had protein urea but that was less than 1 gram in 24 hour and was monitored       Prior to that she was on FOLFIRI plus Avastin but after initial response disease progressed   On 05/26/2020 patient had pelvic surgery, removal of ovaries and sample also included peritoneum and small bowel mesentery   Both ovaries and small bowel mesentery showed metastatic disease from colon cancer   Surgery was on 05/26/2020    FOLFOX chemotherapy was resumed on 06/17/20 and 2 weeks later  Avastin was added to FOLFOX         Patient has been on Xarelto 10 mg daily for history of DVT right lower extremity that happened in 2016     No bleeding or blood clot on Xarelto                  Physical examination and test results  are as recorded and discussed     Not sure of small ascites  Ordered ultrasound of abdomen and she will have that on 4/29 / 21 because she is going to be way until 4/28/21    She has instructions to go to the emergency room for worsening of symptoms and for other medical emergencies  Condition discussed and explained  Questions answered   Goal is prolongation of survival     Discussed the importance of self-breast examination, eating healthy foods, staying active as tolerated and health screening test   Patient is capable of self-care  Discussed  precautions against Coronavirus       Patient will continue to follow with primary physician and other consultants  See diagnoses , instructions and orders below  1  Primary adenocarcinoma of ascending colon ([facility] )    - US abdomen complete; Future    2  Liver metastases ([facility] [facility] )    - US abdomen complete; Future    3  Malignant neoplasm metastatic to lung, unspecified laterality ([facility] )      4  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, unspecified laterality ([facility] [facility] )      5  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 )      6  Chronic deep vein thrombosis (DVT) of popliteal vein of right lower extremity (HCC)      7  Peritoneal metastases ([facility] )    - US abdomen complete; Future    8  Port-A-Cath in place      9  Deep vein thrombosis (DVT) of proximal vein of right lower extremity, unspecified chronicity (HCC)    - rivaroxaban (Xarelto) 10 mg tablet; Take 1 tablet (10 mg total) by mouth daily  Dispense: 90 tablet; Refill: 3    No change in therapy  Ordered ultrasound abdomen for next week  Renewed Xarelto prescription  Follow-up in 4 weeks    Patient voiced understanding and agrees       Counseling / Coordination of Care        Provided counseling and support

## 2021-04-23 NOTE — PATIENT INSTRUCTIONS
No change in therapy  Ordered ultrasound abdomen for next week  Renewed Xarelto prescription    Follow-up in 4 weeks

## 2021-04-24 ENCOUNTER — APPOINTMENT (OUTPATIENT)
Dept: LAB | Facility: MEDICAL CENTER | Age: 56
End: 2021-04-24
Payer: COMMERCIAL

## 2021-04-29 ENCOUNTER — HOSPITAL ENCOUNTER (OUTPATIENT)
Dept: RADIOLOGY | Age: 56
Discharge: HOME | End: 2021-04-29
Payer: COMMERCIAL

## 2021-04-29 ENCOUNTER — TELEPHONE (OUTPATIENT)
Dept: HEMATOLOGY ONCOLOGY | Facility: CLINIC | Age: 56
End: 2021-04-29

## 2021-04-29 ENCOUNTER — DOCUMENTATION (OUTPATIENT)
Dept: HEMATOLOGY ONCOLOGY | Facility: CLINIC | Age: 56
End: 2021-04-29

## 2021-04-29 DIAGNOSIS — C78.6 PERITONEAL METASTASES (HCC): ICD-10-CM

## 2021-04-29 DIAGNOSIS — C18.2 PRIMARY ADENOCARCINOMA OF ASCENDING COLON (HCC): ICD-10-CM

## 2021-04-29 DIAGNOSIS — C78.7 LIVER METASTASES (HCC): ICD-10-CM

## 2021-04-29 PROCEDURE — 76700 US EXAM ABDOM COMPLETE: CPT

## 2021-04-29 NOTE — TELEPHONE ENCOUNTER
Patient calling to speak with the person who will be filling out her disability paperwork    Patient has not submitted paperwork yet but has questions and wants to update the person who will be filling them out  I explained the medical assistant with Dr Denise Oakley fill out these papers  Will forward msg to MA to return call to patient    [phone]

## 2021-04-29 NOTE — TELEPHONE ENCOUNTER
IMPRESSION:     New right moderate hydronephrosis  No perinephric fluid collection      New small volume ascites      Multiple hepatic lesions compatible with known metastatic disease  The largest lesion in the right dome of the liver slightly smaller since December 2020      Splenomegaly      The study was marked in EPIC for immediate notification         Will send Yan schmitt's RNs

## 2021-04-29 NOTE — PROGRESS NOTES
I left message for the patient to call me  yamileth   I wanted to discuss results of ultrasound of abdomen  I left my name and office phone number

## 2021-04-29 NOTE — TELEPHONE ENCOUNTER
Spoke with patient, she stated that she does not know when her last day of work will be  She stated she works at [employer] and will be filing for short term disability, she states she will be calling them next week and then they will forward paperwork to our office to complete  Patient also stated she had an US done this morning and would like Dr Amilcar Valentine to give her a call back with the results  Results printed and will be handed to Dr Amilcar Valentine to review

## 2021-04-30 ENCOUNTER — TELEPHONE (OUTPATIENT)
Dept: HEMATOLOGY ONCOLOGY | Facility: CLINIC | Age: 56
End: 2021-04-30

## 2021-04-30 DIAGNOSIS — N13.30 HYDRONEPHROSIS, UNSPECIFIED HYDRONEPHROSIS TYPE: Primary | ICD-10-CM

## 2021-05-03 ENCOUNTER — TELEPHONE (OUTPATIENT)
Dept: HEMATOLOGY ONCOLOGY | Facility: CLINIC | Age: 56
End: 2021-05-03

## 2021-05-03 ENCOUNTER — DOCUMENTATION (OUTPATIENT)
Dept: HEMATOLOGY ONCOLOGY | Facility: CLINIC | Age: 56
End: 2021-05-03

## 2021-05-03 ENCOUNTER — HOSPITAL ENCOUNTER (OUTPATIENT)
Dept: INFUSION CENTER | Facility: CLINIC | Age: 56
Discharge: HOME | End: 2021-05-03
Payer: COMMERCIAL

## 2021-05-03 VITALS — TEMPERATURE: 69.9 F

## 2021-05-03 DIAGNOSIS — I82.4Y1 DEEP VEIN THROMBOSIS (DVT) OF PROXIMAL VEIN OF RIGHT LOWER EXTREMITY, UNSPECIFIED CHRONICITY (HCC): ICD-10-CM

## 2021-05-03 DIAGNOSIS — Z95.828 PORT-A-CATH IN PLACE: Primary | ICD-10-CM

## 2021-05-03 PROCEDURE — 96523 IRRIG DRUG DELIVERY DEVICE: CPT

## 2021-05-03 NOTE — TELEPHONE ENCOUNTER
Patient calling to report that [facility]  Rx was sent on 4/23/21  Telephone number for [facility] if needed [phone]     Patient states she only has enough pills to get her through the end of the week    Patient will call back later this week to check if samples are available if needed     Will forward to oncology finance and RN to update

## 2021-05-03 NOTE — PROGRESS NOTES
Pt to clinic for port flush  Blood return noted, port flushed  Pt aware she needs to schedule next appointment   Denied AVS

## 2021-05-03 NOTE — TELEPHONE ENCOUNTER
Received email from [employer] stating that [facility] was approved  New script sent to pharmacy

## 2021-05-06 ENCOUNTER — APPOINTMENT (OUTPATIENT)
Dept: LAB | Facility: MEDICAL CENTER | Age: 56
End: 2021-05-06
Payer: COMMERCIAL

## 2021-05-06 DIAGNOSIS — C78.7 LIVER METASTASES (HCC): ICD-10-CM

## 2021-05-06 DIAGNOSIS — C78.00 MALIGNANT NEOPLASM METASTATIC TO LUNG, UNSPECIFIED LATERALITY (HCC): ICD-10-CM

## 2021-05-06 DIAGNOSIS — C18.2 PRIMARY ADENOCARCINOMA OF ASCENDING COLON (HCC): ICD-10-CM

## 2021-05-06 LAB
ALBUMIN SERPL BCP-MCNC: 2.8 G/DL (ref 3.5–5)
ALP SERPL-CCNC: 371 U/L (ref 46–116)
ALT SERPL W P-5'-P-CCNC: 52 U/L (ref 12–78)
ANION GAP SERPL CALCULATED.3IONS-SCNC: 6 MMOL/L (ref 4–13)
AST SERPL W P-5'-P-CCNC: 79 U/L (ref 5–45)
BASOPHILS # BLD AUTO: 0.02 THOUSANDS/ΜL (ref 0–0.1)
BASOPHILS NFR BLD AUTO: 1 % (ref 0–1)
BILIRUB SERPL-MCNC: 2.15 MG/DL (ref 0.2–1)
BUN SERPL-MCNC: 14 MG/DL (ref 5–25)
CALCIUM ALBUM COR SERPL-MCNC: 10.1 MG/DL (ref 8.3–10.1)
CALCIUM SERPL-MCNC: 9.1 MG/DL (ref 8.3–10.1)
CHLORIDE SERPL-SCNC: 108 MMOL/L (ref 100–108)
CO2 SERPL-SCNC: 28 MMOL/L (ref 21–32)
CREAT SERPL-MCNC: 1.11 MG/DL (ref 0.6–1.3)
EOSINOPHIL # BLD AUTO: 0.09 THOUSAND/ΜL (ref 0–0.61)
EOSINOPHIL NFR BLD AUTO: 4 % (ref 0–6)
ERYTHROCYTE [DISTWIDTH] IN BLOOD BY AUTOMATED COUNT: 18.6 % (ref 11.6–15.1)
GFR SERPL CREATININE-BSD FRML MDRD: 57 ML/MIN/1.73SQ M
GLUCOSE SERPL-MCNC: 106 MG/DL (ref 65–140)
HCT VFR BLD AUTO: 42.4 % (ref 34.8–46.1)
HGB BLD-MCNC: 13.7 G/DL (ref 11.5–15.4)
IMM GRANULOCYTES # BLD AUTO: 0.01 THOUSAND/UL (ref 0–0.2)
IMM GRANULOCYTES NFR BLD AUTO: 1 % (ref 0–2)
LYMPHOCYTES # BLD AUTO: 0.47 THOUSANDS/ΜL (ref 0.6–4.47)
LYMPHOCYTES NFR BLD AUTO: 23 % (ref 14–44)
MCH RBC QN AUTO: 38.6 PG (ref 26.8–34.3)
MCHC RBC AUTO-ENTMCNC: 32.3 G/DL (ref 31.4–37.4)
MCV RBC AUTO: 119 FL (ref 82–98)
MONOCYTES # BLD AUTO: 0.49 THOUSAND/ΜL (ref 0.17–1.22)
MONOCYTES NFR BLD AUTO: 24 % (ref 4–12)
NEUTROPHILS # BLD AUTO: 0.99 THOUSANDS/ΜL (ref 1.85–7.62)
NEUTS SEG NFR BLD AUTO: 47 % (ref 43–75)
NRBC BLD AUTO-RTO: 0 /100 WBCS
PLATELET # BLD AUTO: 70 THOUSANDS/UL (ref 149–390)
PMV BLD AUTO: 11.5 FL (ref 8.9–12.7)
POTASSIUM SERPL-SCNC: 3.7 MMOL/L (ref 3.5–5.3)
PROT SERPL-MCNC: 6.4 G/DL (ref 6.4–8.2)
RBC # BLD AUTO: 3.55 MILLION/UL (ref 3.81–5.12)
SODIUM SERPL-SCNC: 142 MMOL/L (ref 136–145)
WBC # BLD AUTO: 2.07 THOUSAND/UL (ref 4.31–10.16)

## 2021-05-06 PROCEDURE — 80053 COMPREHEN METABOLIC PANEL: CPT

## 2021-05-06 PROCEDURE — 85025 COMPLETE CBC W/AUTO DIFF WBC: CPT

## 2021-05-06 PROCEDURE — 36415 COLL VENOUS BLD VENIPUNCTURE: CPT

## 2021-05-12 NOTE — TELEPHONE ENCOUNTER
Patient called to check the status of disability paperwork  I informed patient we did not receive paperwork and relayed the below note from Abilio Henley  Patient will be contacting [employer] to have them fax paperwork to our office

## 2021-05-13 NOTE — TELEPHONE ENCOUNTER
Patient called to confirm form from [facility] was received,confirmed with [facility] have received it

## 2021-05-17 ENCOUNTER — TELEPHONE (OUTPATIENT)
Dept: HEMATOLOGY ONCOLOGY | Facility: CLINIC | Age: 56
End: 2021-05-17

## 2021-05-17 NOTE — TELEPHONE ENCOUNTER
Returned pt's call inquiring about the status on her disability paperwork  Left voice message explaining that Destinee Stratton MA gave paperwork to [address] [employer] [place]  [address] [place] is off today  I will call pt back after follow up with [address] [place] [address]

## 2021-05-19 NOTE — TELEPHONE ENCOUNTER
Called and informed patient that her disability paperwork was completed and faxed over on 5/13/21  I informed patient that I will re-faxed completed form to [phone] [employer]  I informed her that I will contact [employer] by the end of the business day on 5/19/21 to make sure that they received the forms and check if they need any other information from our office to process her disability, patient voiced understanding

## 2021-05-19 NOTE — TELEPHONE ENCOUNTER
Patient called regarding the disability paperwork , patient aware Tammy Portillo will follow up with Joaquín Ngo when she is back in the office  Best call back [phone]

## 2021-05-20 NOTE — TELEPHONE ENCOUNTER
[employer] received disability paperwork, I was informed that if additional information is needed our office will be contacted

## 2021-05-21 ENCOUNTER — OFFICE VISIT (OUTPATIENT)
Dept: HEMATOLOGY ONCOLOGY | Facility: CLINIC | Age: 56
End: 2021-05-21
Payer: COMMERCIAL

## 2021-05-21 VITALS
HEART RATE: 86 BPM | OXYGEN SATURATION: 99 % | BODY MASS INDEX: 23.49 KG/M2 | TEMPERATURE: 98.2 F | SYSTOLIC BLOOD PRESSURE: 132 MMHG | HEIGHT: 65 IN | WEIGHT: 141 LBS | DIASTOLIC BLOOD PRESSURE: 88 MMHG | RESPIRATION RATE: 18 BRPM

## 2021-05-21 DIAGNOSIS — C18.2 PRIMARY ADENOCARCINOMA OF ASCENDING COLON (HCC): Primary | ICD-10-CM

## 2021-05-21 DIAGNOSIS — C78.7 LIVER METASTASES (HCC): ICD-10-CM

## 2021-05-21 DIAGNOSIS — C78.00 MALIGNANT NEOPLASM METASTATIC TO LUNG, UNSPECIFIED LATERALITY (HCC): ICD-10-CM

## 2021-05-21 DIAGNOSIS — C78.6 PERITONEAL METASTASES (HCC): ICD-10-CM

## 2021-05-21 DIAGNOSIS — I82.531 CHRONIC DEEP VEIN THROMBOSIS (DVT) OF POPLITEAL VEIN OF RIGHT LOWER EXTREMITY (HCC): ICD-10-CM

## 2021-05-21 DIAGNOSIS — Z95.828 PORT-A-CATH IN PLACE: ICD-10-CM

## 2021-05-21 DIAGNOSIS — C77.2 METASTASIS TO RETROPERITONEAL LYMPH NODE (HCC): ICD-10-CM

## 2021-05-21 PROCEDURE — 99214 OFFICE O/P EST MOD 30 MIN: CPT | Performed by: INTERNAL MEDICINE

## 2021-05-21 RX ORDER — SPIRONOLACTONE 25 MG/1
TABLET ORAL
Status: ON HOLD | COMMUNITY
Start: 2021-04-30 | End: 2021-08-30

## 2021-05-21 NOTE — PATIENT INSTRUCTIONS
Patient has standing orders for blood tests  No change in chemotherapy at this time  CT scans as soon as possible  Patient will be contacted after CT scan results   Follow-up in 4 weeks

## 2021-05-21 NOTE — PROGRESS NOTES
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  Lonsurf was started  in April 2021  No  symptoms secondary to Lonsurf     Patient had disease progression  recently especially in the lungs and Dr  From [facility]  suggested Lonsurf but dose to be reduced by 20-30%  Patient had been on FOLFOX plus Avastin for stage IV colon cancer with metastatic disease to liver and lungs and also abdominal carcinomatosis and had metastatic disease to the ovaries  She had received liver directed therapy at [facility] and   had MRI of liver and PET scan  at [facility]  There was disease progression  and treatment was changed  Doses of oxaliplatin and infusion 5 FU hwere reduced because of hematological toxicity   She also got Neulasta    Patient was not a candidate for Erbitux because of KRAS mutation   MSI came back stable   Not a candidate for Keytruda  In May 2018 patient underwent GALI and removal of fallopian tubes for uterine bleeding  and there were cancer cells in the fallopian tubes  Orval Dina probably had removal of tubes and not the ovaries   In June 2018 patient   underwent extended right hemicolectomy, partial omentectomy and biopsy of the peritoneal nodule   Peritoneal nodule came back  positive for metastatic adenocarcinoma from colon    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 and metastatic disease to liver    6 cm T3 G2 right colon cancer with positive margins, lymphovascular invasion and perineural invasion and positive 3 of 27 lymph nodes with extranodal extension and positive peritoneal nodule biopsy   Stage IV disease because of liver and peritoneal metastases   In July 2018 patient was started  on modified FOLFIRI plus Avastin   She had PPE in her hands and bolus 5 FU was discontinued   She was seen by liver specialist at [facility] and was not felt to be a surgical candidate       Since April 2020 patient had been on FOLFOX plus Avastin   She had protein urea but that was less than 1 gram in 24 hour and was monitored       Prior to that she was on FOLFIRI plus Avastin but after initial response disease progressed   On 05/26/2020 patient had pelvic surgery, removal of ovaries and sample also included peritoneum and small bowel mesentery   Both ovaries and small bowel mesentery showed metastatic disease from colon cancer   Surgery was on 05/26/2020    FOLFOX chemotherapy was resumed on 06/17/20 and 2 weeks later  Avastin was added to FOLFOX         Patient has been on Xarelto 10 mg daily for history of DVT right lower extremity that happened in 2016     No bleeding or blood clot on Xarelto                       Current Outpatient Medications:     acetaminophen (TYLENOL) 500 mg tablet, Take 1,000 mg by mouth every 6 (six) hours as needed for mild pain, Disp: , Rfl:     amLODIPine (NORVASC) 5 mg tablet, Take 5 mg by mouth daily, Disp: , Rfl:     docusate sodium (COLACE) 100 mg capsule, Take 1 capsule (100 mg total) by mouth 2 (two) times a day (Patient taking differently: Take 100 mg by mouth 2 (two) times a day as needed ), Disp: 10 each, Rfl: 0    furosemide (LASIX) 40 mg tablet, Take 40 mg by mouth daily, Disp: , Rfl:     LORazepam (ATIVAN) 1 mg tablet, Take 1 mg by mouth daily as needed for anxiety, Disp: , Rfl:     metoprolol succinate (TOPROL-XL) 50 mg 24 hr tablet, Take 50 mg by mouth 2 (two) times a day Pt was instructed by her PCP Dr Anjum Hays, advised pt to increased to BID, Disp: , Rfl:     omeprazole (PriLOSEC) 20 mg delayed release capsule, PLEASE SEE ATTACHED FOR DETAILED DIRECTIONS, Disp: , Rfl:     oxyCODONE (OXY-IR) 5 MG capsule, Take 1 capsule (5 mg total) by mouth every 6 (six) hours as needed for severe pain for up to 10 doses CAN SUBSTITUTE  OXYCODONE TABLETS FOR CAPSULESMax Daily Amount: 20 mg, Disp: 10 capsule, Rfl: 0    potassium chloride (Klor-Con) 10 mEq tablet, , Disp: , Rfl:     rivaroxaban (Xarelto) 10 mg tablet, Take 1 tablet (10 mg total) by mouth daily, Disp: 90 tablet, Rfl: 3    spironolactone (ALDACTONE) 25 mg tablet, , Disp: , Rfl:     Trifluridine-Tipiracil (Lonsurf) 15-6 14 MG TABS, Take 3 tablets (45 mg) by mouth twice a day on days 1-5 and 8-12 of a 28 day cycle , Disp: 180 tablet, Rfl: 3    No Known Allergies    Oncology History   Primary adenocarcinoma of ascending colon ([facility] [address] )   6/14/2018 Initial Diagnosis    Primary adenocarcinoma of ascending colon (HCC)     7/17/2018 - 8/1/2018 Chemotherapy    camptosar 180 mg/m2 day 1  5  mg/m2 day 1  5 FU 1200 mg/m2 day 1-2   Leucovorin 400 mg/m2     Venofer 100 mg (first 10 treatments) -- all every 2 weeks         7/17/2018 - 2/3/2020 Chemotherapy    palonosetron (ALOXI) injection 0 25 mg, 0 25 mg, Intravenous, Once, 8 of 16 cycles  Administration: 0 25 mg (11/21/2019), 0 25 mg (12/5/2019), 0 25 mg (12/19/2019), 0 25 mg (1/2/2020), 0 25 mg (1/16/2020), 0 25 mg (1/30/2020)  fluorouracil (ADRUCIL) injection 715 mg, 400 mg/m2, Intravenous, Once, 7 of 7 cycles  pegfilgrastim (NEULASTA ONPRO) subcutaneous injection kit 6 mg, 6 mg, Subcutaneous, Once, 20 of 28 cycles  Administration: 6 mg (5/22/2019), 6 mg (6/5/2019), 6 mg (6/22/2019), 6 mg (7/5/2019), 6 mg (7/20/2019), 6 mg (8/2/2019), 6 mg (8/17/2019), 6 mg (8/31/2019), 6 mg (9/14/2019), 6 mg (9/28/2019), 6 mg (10/12/2019), 6 mg (10/26/2019), 6 mg (11/9/2019), 6 mg (11/23/2019), 6 mg (12/7/2019), 6 mg (12/21/2019), 6 mg (1/4/2020), 6 mg (1/18/2020), 6 mg (2/1/2020)  fosaprepitant (EMEND) 150 mg in sodium chloride 0 9 % 250 mL IVPB, 150 mg, Intravenous, Once, 8 of 16 cycles  Administration: 150 mg (11/7/2019), 150 mg (11/21/2019), 150 mg (12/5/2019), 150 mg (12/19/2019), 150 mg (1/2/2020), 150 mg (1/16/2020), 150 mg (1/30/2020)  bevacizumab (AVASTIN) 355 mg in sodium chloride 0 9 % 100 mL IVPB, 5 mg/kg, Intravenous, Once, 27 of 35 cycles  Administration: 355 mg (5/20/2019), 355 mg (6/3/2019), 355 mg (6/20/2019), 355 mg (7/3/2019), 355 mg (7/18/2019), 355 mg (7/31/2019), 355 mg (8/15/2019), 355 mg (8/29/2019), 355 mg (9/12/2019), 355 mg (9/26/2019), 355 mg (10/10/2019), 355 mg (10/24/2019), 355 mg (11/7/2019), 355 mg (11/21/2019), 355 mg (12/5/2019), 355 mg (12/19/2019), 355 mg (1/2/2020), 355 mg (1/16/2020), 355 mg (1/30/2020)  irinotecan (CAMPTOSAR) 322 mg in sodium chloride 0 9 % 500 mL chemo infusion, 180 mg/m2, Intravenous, Once, 27 of 35 cycles  Dose modification: 150 mg/m2 (original dose 180 mg/m2, Cycle 20, Reason: Dose Not Tolerated)  Administration: 322 mg (5/20/2019), 322 mg (6/3/2019), 320 mg (6/20/2019), 320 mg (7/3/2019), 320 mg (7/18/2019), 320 mg (7/31/2019), 320 mg (8/15/2019), 320 mg (8/29/2019), 320 mg (9/12/2019), 320 mg (9/26/2019), 320 mg (10/10/2019), 320 mg (10/24/2019), 269 mg (11/7/2019), 269 mg (11/21/2019), 269 mg (12/5/2019), 269 mg (12/19/2019), 269 mg (1/2/2020), 269 mg (1/16/2020), 269 mg (1/30/2020)  leucovorin 716 mg in sodium chloride 0 9 % 250 mL IVPB, 400 mg/m2, Intravenous, Once, 27 of 35 cycles  Administration: 716 mg (5/20/2019), 716 mg (6/3/2019), 700 mg (6/20/2019), 700 mg (7/3/2019), 700 mg (7/18/2019), 700 mg (7/31/2019), 700 mg (8/15/2019), 700 mg (8/29/2019), 700 mg (9/12/2019), 700 mg (9/26/2019), 700 mg (10/10/2019), 716 mg (10/24/2019), 700 mg (11/7/2019), 700 mg (11/21/2019), 700 mg (12/5/2019), 700 mg (12/19/2019), 700 mg (1/2/2020), 700 mg (1/16/2020), 700 mg (1/30/2020)     8/1/2018 Adverse Reaction    Needed granix 300 mcg due to [address] of 1 01 8/14/2018 -  Chemotherapy    camptosar 180 mg/m2 day 1  5  mg/m2 day 1  5 FU 1200 mg/m2 day 1-2   Leucovorin 400 mg/m2  Avastin 5 mg/kg day 1   Venofer 100 mg (first 10 treatments)  Neulasta on Pro 6 mg day 3      -- every 2 weeks          3/3/2020 - 3/16/2020 Chemotherapy    pegfilgrastim (NEULASTA ONPRO) subcutaneous injection kit 6 mg, 6 mg, Subcutaneous, Once, 1 of 12 cycles  Administration: 6 mg (3/5/2020)  bevacizumab (AVASTIN) 357 5 mg in sodium chloride 0 9 % 100 mL IVPB, 5 mg/kg = 357 5 mg, Intravenous, Once, 1 of 12 cycles  Administration: 357 5 mg (3/3/2020)  leucovorin 700 mg in dextrose 5 % 250 mL IVPB, 720 mg, Intravenous, Once, 1 of 12 cycles  Administration: 700 mg (3/3/2020)  oxaliplatin (ELOXATIN) 150 mg in dextrose 5 % 250 mL chemo infusion, 153 mg, Intravenous, Once, 1 of 12 cycles  Administration: 150 mg (3/3/2020)     3/16/2020 - 4/5/2020 Chemotherapy    bevacizumab (AVASTIN) 560 mg in sodium chloride 0 9 % 100 mL IVPB, 7 5 mg/kg, Intravenous, Once, 0 of 5 cycles  oxaliplatin (ELOXATIN) 239 2 mg in dextrose 5 % 500 mL chemo infusion, 130 mg/m2 = 239 2 mg, Intravenous, Once, 1 of 6 cycles  Administration: 239 2 mg (3/16/2020)     4/6/2020 - 12/13/2020 Chemotherapy    fluorouracil (ADRUCIL) injection 730 mg, 400 mg/m2 = 730 mg, Intravenous, Once, 1 of 1 cycle  pegfilgrastim (NEULASTA ONPRO) subcutaneous injection kit 6 mg, 6 mg, Subcutaneous, Once, 14 of 20 cycles  Administration: 6 mg (4/8/2020), 6 mg (4/25/2020), 6 mg (5/8/2020), 6 mg (6/19/2020), 6 mg (7/2/2020), 6 mg (7/17/2020), 6 mg (7/31/2020), 6 mg (8/14/2020), 6 mg (8/28/2020), 6 mg (9/25/2020), 6 mg (10/15/2020), 6 mg (11/5/2020), 6 mg (11/18/2020), 6 mg (12/2/2020)  fosaprepitant (EMEND) 150 mg in sodium chloride 0 9 % 250 mL IVPB, 150 mg, Intravenous, Once, 14 of 20 cycles  Administration: 150 mg (4/6/2020), 150 mg (4/23/2020), 150 mg (5/6/2020), 150 mg (6/17/2020), 150 mg (6/30/2020), 150 mg (7/15/2020), 150 mg (7/29/2020), 150 mg (8/12/2020), 150 mg (8/26/2020), 150 mg (9/23/2020), 150 mg (10/13/2020), 150 mg (11/3/2020), 150 mg (11/16/2020), 150 mg (11/30/2020)  bevacizumab (AVASTIN) 372 5 mg in sodium chloride 0 9 % 100 mL IVPB, 5 mg/kg = 372 5 mg, Intravenous, Once, 10 of 16 cycles  Administration: 372 5 mg (4/6/2020), 372 5 mg (4/23/2020), 357 5 mg (6/30/2020), 357 5 mg (7/15/2020), 357 5 mg (7/29/2020), 357 5 mg (8/12/2020), 357 5 mg (8/26/2020), 357 5 mg (11/16/2020), 357 5 mg (11/30/2020)  leucovorin 750 mg in dextrose 5 % 250 mL IVPB, 732 mg, Intravenous, Once, 14 of 20 cycles  Administration: 750 mg (4/6/2020), 750 mg (4/23/2020), 750 mg (5/6/2020), 750 mg (6/17/2020), 700 mg (6/30/2020), 700 mg (7/15/2020), 700 mg (7/29/2020), 700 mg (8/12/2020), 700 mg (8/26/2020), 700 mg (9/23/2020), 700 mg (10/13/2020), 700 mg (11/3/2020), 700 mg (11/16/2020), 700 mg (11/30/2020)  oxaliplatin (ELOXATIN) 150 mg in dextrose 5 % 250 mL chemo infusion, 155 55 mg, Intravenous, Once, 14 of 20 cycles  Dose modification: 70 mg/m2 (original dose 85 mg/m2, Cycle 11, Reason: Dose Not Tolerated, Comment: thrombocytopenia), 65 mg/m2 (original dose 85 mg/m2, Cycle 14, Reason: Other (See Comments)), 65 mg/m2 (original dose 85 mg/m2, Cycle 15, Reason: Max Dose Reached)  Administration: 150 mg (4/6/2020), 150 mg (4/23/2020), 150 mg (5/6/2020), 150 mg (6/17/2020), 150 mg (6/30/2020), 150 mg (7/15/2020), 150 mg (7/29/2020), 150 mg (8/12/2020), 150 mg (8/26/2020), 150 mg (9/23/2020), 125 3 mg (10/13/2020), 125 3 mg (11/3/2020), 125 3 mg (11/16/2020), 116 35 mg (11/30/2020)     12/28/2020 - 3/31/2021 Chemotherapy    pegfilgrastim (NEULASTA ONPRO) subcutaneous injection kit 6 mg, 6 mg, Subcutaneous, Once, 6 of 9 cycles  Administration: 6 mg (12/30/2020), 6 mg (1/14/2021), 6 mg (2/6/2021), 6 mg (2/20/2021), 6 mg (3/6/2021), 6 mg (3/20/2021)  fosaprepitant (EMEND) 150 mg in sodium chloride 0 9 % 250 mL IVPB, 150 mg, Intravenous, Once, 6 of 9 cycles  Administration: 150 mg (12/28/2020), 150 mg (1/12/2021), 150 mg (2/4/2021), 150 mg (2/18/2021), 150 mg (3/4/2021), 150 mg (3/18/2021)  bevacizumab (AVASTIN) 347 5 mg in sodium chloride 0 9 % 100 mL IVPB, 5 mg/kg = 347 5 mg, Intravenous, Once, 5 of 8 cycles  Administration: 347 5 mg (12/28/2020), 347 5 mg (2/4/2021), 347 5 mg (2/18/2021), 347 5 mg (3/4/2021), 347 5 mg (3/18/2021)  leucovorin 700 mg in dextrose 5 % 250 mL IVPB, 712 mg, Intravenous, Once, 6 of 9 cycles  Dose modification: 400 mg/m2 (original dose 400 mg/m2, Cycle 6, Reason: Other (See Comments), Comment: protocol)  Administration: 700 mg (12/28/2020), 700 mg (1/12/2021), 700 mg (2/4/2021), 700 mg (2/18/2021), 700 mg (3/4/2021), 700 mg (3/18/2021)  oxaliplatin (ELOXATIN) 115 7 mg in dextrose 5 % 250 mL chemo infusion, 65 mg/m2 = 115 7 mg (76 5 % of original dose 85 mg/m2), Intravenous, Once, 6 of 9 cycles  Dose modification: 65 mg/m2 (original dose 85 mg/m2, Cycle 1, Reason: Dose Not Tolerated)  Administration: 115 7 mg (12/28/2020), 115 7 mg (1/12/2021), 115 7 mg (2/4/2021), 115 7 mg (2/18/2021), 115 7 mg (3/4/2021), 115 7 mg (3/18/2021)     Liver metastases ([address] )   6/26/2018 Initial Diagnosis    Liver metastases ([facility] [address] )     7/17/2018 - 2/3/2020 Chemotherapy    palonosetron (ALOXI) injection 0 25 mg, 0 25 mg, Intravenous, Once, 8 of 16 cycles  Administration: 0 25 mg (11/21/2019), 0 25 mg (12/5/2019), 0 25 mg (12/19/2019), 0 25 mg (1/2/2020), 0 25 mg (1/16/2020), 0 25 mg (1/30/2020)  fluorouracil (ADRUCIL) injection 715 mg, 400 mg/m2, Intravenous, Once, 7 of 7 cycles  pegfilgrastim (NEULASTA ONPRO) subcutaneous injection kit 6 mg, 6 mg, Subcutaneous, Once, 20 of 28 cycles  Administration: 6 mg (5/22/2019), 6 mg (6/5/2019), 6 mg (6/22/2019), 6 mg (7/5/2019), 6 mg (7/20/2019), 6 mg (8/2/2019), 6 mg (8/17/2019), 6 mg (8/31/2019), 6 mg (9/14/2019), 6 mg (9/28/2019), 6 mg (10/12/2019), 6 mg (10/26/2019), 6 mg (11/9/2019), 6 mg (11/23/2019), 6 mg (12/7/2019), 6 mg (12/21/2019), 6 mg (1/4/2020), 6 mg (1/18/2020), 6 mg (2/1/2020)  fosaprepitant (EMEND) 150 mg in sodium chloride 0 9 % 250 mL IVPB, 150 mg, Intravenous, Once, 8 of 16 cycles  Administration: 150 mg (11/7/2019), 150 mg (11/21/2019), 150 mg (12/5/2019), 150 mg (12/19/2019), 150 mg (1/2/2020), 150 mg (1/16/2020), 150 mg (1/30/2020)  bevacizumab (AVASTIN) 355 mg in sodium chloride 0 9 % 100 mL IVPB, 5 mg/kg, Intravenous, Once, 27 of 35 cycles  Administration: 355 mg (5/20/2019), 355 mg (6/3/2019), 355 mg (6/20/2019), 355 mg (7/3/2019), 355 mg (7/18/2019), 355 mg (7/31/2019), 355 mg (8/15/2019), 355 mg (8/29/2019), 355 mg (9/12/2019), 355 mg (9/26/2019), 355 mg (10/10/2019), 355 mg (10/24/2019), 355 mg (11/7/2019), 355 mg (11/21/2019), 355 mg (12/5/2019), 355 mg (12/19/2019), 355 mg (1/2/2020), 355 mg (1/16/2020), 355 mg (1/30/2020)  irinotecan (CAMPTOSAR) 322 mg in sodium chloride 0 9 % 500 mL chemo infusion, 180 mg/m2, Intravenous, Once, 27 of 35 cycles  Dose modification: 150 mg/m2 (original dose 180 mg/m2, Cycle 20, Reason: Dose Not Tolerated)  Administration: 322 mg (5/20/2019), 322 mg (6/3/2019), 320 mg (6/20/2019), 320 mg (7/3/2019), 320 mg (7/18/2019), 320 mg (7/31/2019), 320 mg (8/15/2019), 320 mg (8/29/2019), 320 mg (9/12/2019), 320 mg (9/26/2019), 320 mg (10/10/2019), 320 mg (10/24/2019), 269 mg (11/7/2019), 269 mg (11/21/2019), 269 mg (12/5/2019), 269 mg (12/19/2019), 269 mg (1/2/2020), 269 mg (1/16/2020), 269 mg (1/30/2020)  leucovorin 716 mg in sodium chloride 0 9 % 250 mL IVPB, 400 mg/m2, Intravenous, Once, 27 of 35 cycles  Administration: 716 mg (5/20/2019), 716 mg (6/3/2019), 700 mg (6/20/2019), 700 mg (7/3/2019), 700 mg (7/18/2019), 700 mg (7/31/2019), 700 mg (8/15/2019), 700 mg (8/29/2019), 700 mg (9/12/2019), 700 mg (9/26/2019), 700 mg (10/10/2019), 716 mg (10/24/2019), 700 mg (11/7/2019), 700 mg (11/21/2019), 700 mg (12/5/2019), 700 mg (12/19/2019), 700 mg (1/2/2020), 700 mg (1/16/2020), 700 mg (1/30/2020)     3/3/2020 - 3/16/2020 Chemotherapy    pegfilgrastim (NEULASTA ONPRO) subcutaneous injection kit 6 mg, 6 mg, Subcutaneous, Once, 1 of 12 cycles  Administration: 6 mg (3/5/2020)  bevacizumab (AVASTIN) 357 5 mg in sodium chloride 0 9 % 100 mL IVPB, 5 mg/kg = 357 5 mg, Intravenous, Once, 1 of 12 cycles  Administration: 357 5 mg (3/3/2020)  leucovorin 700 mg in dextrose 5 % 250 mL IVPB, 720 mg, Intravenous, Once, 1 of 12 cycles  Administration: 700 mg (3/3/2020)  oxaliplatin (ELOXATIN) 150 mg in dextrose 5 % 250 mL chemo infusion, 153 mg, Intravenous, Once, 1 of 12 cycles  Administration: 150 mg (3/3/2020)     3/16/2020 - 4/5/2020 Chemotherapy    bevacizumab (AVASTIN) 560 mg in sodium chloride 0 9 % 100 mL IVPB, 7 5 mg/kg, Intravenous, Once, 0 of 5 cycles  oxaliplatin (ELOXATIN) 239 2 mg in dextrose 5 % 500 mL chemo infusion, 130 mg/m2 = 239 2 mg, Intravenous, Once, 1 of 6 cycles  Administration: 239 2 mg (3/16/2020)     4/6/2020 - 12/13/2020 Chemotherapy    fluorouracil (ADRUCIL) injection 730 mg, 400 mg/m2 = 730 mg, Intravenous, Once, 1 of 1 cycle  pegfilgrastim (NEULASTA ONPRO) subcutaneous injection kit 6 mg, 6 mg, Subcutaneous, Once, 14 of 20 cycles  Administration: 6 mg (4/8/2020), 6 mg (4/25/2020), 6 mg (5/8/2020), 6 mg (6/19/2020), 6 mg (7/2/2020), 6 mg (7/17/2020), 6 mg (7/31/2020), 6 mg (8/14/2020), 6 mg (8/28/2020), 6 mg (9/25/2020), 6 mg (10/15/2020), 6 mg (11/5/2020), 6 mg (11/18/2020), 6 mg (12/2/2020)  fosaprepitant (EMEND) 150 mg in sodium chloride 0 9 % 250 mL IVPB, 150 mg, Intravenous, Once, 14 of 20 cycles  Administration: 150 mg (4/6/2020), 150 mg (4/23/2020), 150 mg (5/6/2020), 150 mg (6/17/2020), 150 mg (6/30/2020), 150 mg (7/15/2020), 150 mg (7/29/2020), 150 mg (8/12/2020), 150 mg (8/26/2020), 150 mg (9/23/2020), 150 mg (10/13/2020), 150 mg (11/3/2020), 150 mg (11/16/2020), 150 mg (11/30/2020)  bevacizumab (AVASTIN) 372 5 mg in sodium chloride 0 9 % 100 mL IVPB, 5 mg/kg = 372 5 mg, Intravenous, Once, 10 of 16 cycles  Administration: 372 5 mg (4/6/2020), 372 5 mg (4/23/2020), 357 5 mg (6/30/2020), 357 5 mg (7/15/2020), 357 5 mg (7/29/2020), 357 5 mg (8/12/2020), 357 5 mg (8/26/2020), 357 5 mg (11/16/2020), 357 5 mg (11/30/2020)  leucovorin 750 mg in dextrose 5 % 250 mL IVPB, 732 mg, Intravenous, Once, 14 of 20 cycles  Administration: 750 mg (4/6/2020), 750 mg (4/23/2020), 750 mg (5/6/2020), 750 mg (6/17/2020), 700 mg (6/30/2020), 700 mg (7/15/2020), 700 mg (7/29/2020), 700 mg (8/12/2020), 700 mg (8/26/2020), 700 mg (9/23/2020), 700 mg (10/13/2020), 700 mg (11/3/2020), 700 mg (11/16/2020), 700 mg (11/30/2020)  oxaliplatin (ELOXATIN) 150 mg in dextrose 5 % 250 mL chemo infusion, 155 55 mg, Intravenous, Once, 14 of 20 cycles  Dose modification: 70 mg/m2 (original dose 85 mg/m2, Cycle 11, Reason: Dose Not Tolerated, Comment: thrombocytopenia), 65 mg/m2 (original dose 85 mg/m2, Cycle 14, Reason: Other (See Comments)), 65 mg/m2 (original dose 85 mg/m2, Cycle 15, Reason: Max Dose Reached)  Administration: 150 mg (4/6/2020), 150 mg (4/23/2020), 150 mg (5/6/2020), 150 mg (6/17/2020), 150 mg (6/30/2020), 150 mg (7/15/2020), 150 mg (7/29/2020), 150 mg (8/12/2020), 150 mg (8/26/2020), 150 mg (9/23/2020), 125 3 mg (10/13/2020), 125 3 mg (11/3/2020), 125 3 mg (11/16/2020), 116 35 mg (11/30/2020)     12/28/2020 - 3/31/2021 Chemotherapy    pegfilgrastim (NEULASTA ONPRO) subcutaneous injection kit 6 mg, 6 mg, Subcutaneous, Once, 6 of 9 cycles  Administration: 6 mg (12/30/2020), 6 mg (1/14/2021), 6 mg (2/6/2021), 6 mg (2/20/2021), 6 mg (3/6/2021), 6 mg (3/20/2021)  fosaprepitant (EMEND) 150 mg in sodium chloride 0 9 % 250 mL IVPB, 150 mg, Intravenous, Once, 6 of 9 cycles  Administration: 150 mg (12/28/2020), 150 mg (1/12/2021), 150 mg (2/4/2021), 150 mg (2/18/2021), 150 mg (3/4/2021), 150 mg (3/18/2021)  bevacizumab (AVASTIN) 347 5 mg in sodium chloride 0 9 % 100 mL IVPB, 5 mg/kg = 347 5 mg, Intravenous, Once, 5 of 8 cycles  Administration: 347 5 mg (12/28/2020), 347 5 mg (2/4/2021), 347 5 mg (2/18/2021), 347 5 mg (3/4/2021), 347 5 mg (3/18/2021)  leucovorin 700 mg in dextrose 5 % 250 mL IVPB, 712 mg, Intravenous, Once, 6 of 9 cycles  Dose modification: 400 mg/m2 (original dose 400 mg/m2, Cycle 6, Reason: Other (See Comments), Comment: protocol)  Administration: 700 mg (12/28/2020), 700 mg (1/12/2021), 700 mg (2/4/2021), 700 mg (2/18/2021), 700 mg (3/4/2021), 700 mg (3/18/2021)  oxaliplatin (ELOXATIN) 115 7 mg in dextrose 5 % 250 mL chemo infusion, 65 mg/m2 = 115 7 mg (76 5 % of original dose 85 mg/m2), Intravenous, Once, 6 of 9 cycles  Dose modification: 65 mg/m2 (original dose 85 mg/m2, Cycle 1, Reason: Dose Not Tolerated)  Administration: 115 7 mg (12/28/2020), 115 7 mg (1/12/2021), 115 7 mg (2/4/2021), 115 7 mg (2/18/2021), 115 7 mg (3/4/2021), 115 7 mg (3/18/2021)     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)   6/26/2018 Initial Diagnosis    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[facility] [address] )     4/6/2020 - 12/13/2020 Chemotherapy    fluorouracil (ADRUCIL) injection 730 mg, 400 mg/m2 = 730 mg, Intravenous, Once, 1 of 1 cycle  pegfilgrastim (NEULASTA ONPRO) subcutaneous injection kit 6 mg, 6 mg, Subcutaneous, Once, 7 of 10 cycles  Administration: 6 mg (4/8/2020), 6 mg (4/25/2020), 6 mg (5/8/2020), 6 mg (6/19/2020), 6 mg (7/2/2020), 6 mg (7/17/2020), 6 mg (7/31/2020)  fosaprepitant (EMEND) 150 mg in sodium chloride 0 9 % 250 mL IVPB, 150 mg, Intravenous, Once, 7 of 10 cycles  Administration: 150 mg (4/6/2020), 150 mg (4/23/2020), 150 mg (5/6/2020), 150 mg (6/17/2020), 150 mg (6/30/2020), 150 mg (7/15/2020), 150 mg (7/29/2020)  bevacizumab (AVASTIN) 372 5 mg in sodium chloride 0 9 % 100 mL IVPB, 5 mg/kg = 372 5 mg, Intravenous, Once, 5 of 8 cycles  Administration: 372 5 mg (4/6/2020), 372 5 mg (4/23/2020), 357 5 mg (6/30/2020), 357 5 mg (7/15/2020), 357 5 mg (7/29/2020)  leucovorin 750 mg in dextrose 5 % 250 mL IVPB, 732 mg, Intravenous, Once, 7 of 10 cycles  Administration: 750 mg (4/6/2020), 750 mg (4/23/2020), 750 mg (5/6/2020), 750 mg (6/17/2020), 700 mg (6/30/2020), 700 mg (7/15/2020), 700 mg (7/29/2020)  oxaliplatin (ELOXATIN) 150 mg in dextrose 5 % 250 mL chemo infusion, 155 55 mg, Intravenous, Once, 7 of 10 cycles  Administration: 150 mg (4/6/2020), 150 mg (4/23/2020), 150 mg (5/6/2020), 150 mg (6/17/2020), 150 mg (6/30/2020), 150 mg (7/15/2020), 150 mg (7/29/2020)     12/28/2020 - 3/31/2021 Chemotherapy    pegfilgrastim (NEULASTA ONPRO) subcutaneous injection kit 6 mg, 6 mg, Subcutaneous, Once, 6 of 9 cycles  Administration: 6 mg (12/30/2020), 6 mg (1/14/2021), 6 mg (2/6/2021), 6 mg (2/20/2021), 6 mg (3/6/2021), 6 mg (3/20/2021)  fosaprepitant (EMEND) 150 mg in sodium chloride 0 9 % 250 mL IVPB, 150 mg, Intravenous, Once, 6 of 9 cycles  Administration: 150 mg (12/28/2020), 150 mg (1/12/2021), 150 mg (2/4/2021), 150 mg (2/18/2021), 150 mg (3/4/2021), 150 mg (3/18/2021)  bevacizumab (AVASTIN) 347 5 mg in sodium chloride 0 9 % 100 mL IVPB, 5 mg/kg = 347 5 mg, Intravenous, Once, 5 of 8 cycles  Administration: 347 5 mg (12/28/2020), 347 5 mg (2/4/2021), 347 5 mg (2/18/2021), 347 5 mg (3/4/2021), 347 5 mg (3/18/2021)  leucovorin 700 mg in dextrose 5 % 250 mL IVPB, 712 mg, Intravenous, Once, 6 of 9 cycles  Dose modification: 400 mg/m2 (original dose 400 mg/m2, Cycle 6, Reason: Other (See Comments), Comment: protocol)  Administration: 700 mg (12/28/2020), 700 mg (1/12/2021), 700 mg (2/4/2021), 700 mg (2/18/2021), 700 mg (3/4/2021), 700 mg (3/18/2021)  oxaliplatin (ELOXATIN) 115 7 mg in dextrose 5 % 250 mL chemo infusion, 65 mg/m2 = 115 7 mg (76 5 % of original dose 85 mg/m2), Intravenous, Once, 6 of 9 cycles  Dose modification: 65 mg/m2 (original dose 85 mg/m2, Cycle 1, Reason: Dose Not Tolerated)  Administration: 115 7 mg (12/28/2020), 115 7 mg (1/12/2021), 115 7 mg (2/4/2021), 115 7 mg (2/18/2021), 115 7 mg (3/4/2021), 115 7 mg (3/18/2021)         ROS:  05/21/21 Reviewed 13 systems: See symptoms in HPI  Presently no other neurological, cardiac, pulmonary, GI and  symptoms other than mentioned above  in HPI  No other symptoms like  fever, chills, bleeding, bone pains, skin rash, weight loss, arthritic symptoms,   claudication and gait problem  No frequent infections  Not unusually sensitive to heat or cold  No swelling of the ankles  No swollen glands  Patient is anxious  /88 (BP Location: Left arm, Patient Position: Sitting, Cuff Size: Adult)   Pulse 86   Temp 98 2 °F (36 8 °C)   Resp 18   Ht 5' 5 25" (1 657 m)   Wt 64 kg (141 lb)   LMP 05/16/2018   SpO2 99%   BMI 23 28 kg/m²     Physical Exam:   Alert, oriented, not in distress , stable vitals, no icterus, no oral thrush, no palpable neck mass,  decreased breath sounds and dullness at lung bases, regular heart rate, abdomen  soft and non tender, distension of abdomen, no palpable abdominal mass,  not sure of small ascites,, diminished bowel sounds,  no edema of ankles, no calf tenderness, no objective focal neurological deficit, no skin rash, no palpable lymphadenopathy in the neck and axillary areas,  no clubbing  Patient  anxious  Performance status 2  IMAGING:  IMPRESSION:     New right moderate hydronephrosis  No perinephric fluid collection      New small volume ascites      Multiple hepatic lesions compatible with known metastatic disease   The largest lesion in the right dome of the liver slightly smaller since December 2020      Splenomegaly      The study was marked in [facility] for immediate notification       Workstation performed: [id number]      Imaging    US abdomen complete (Order: [id number]) - 4/29/2021      LABS:    Results for orders placed or performed in visit on 05/06/21   CBC and differential   Result Value Ref Range    WBC 2 07 (L) 4 31 - 10 16 Thousand/uL    RBC 3 55 (L) 3 81 - 5 12 Million/uL    Hemoglobin 13 7 11 5 - 15 4 g/dL    Hematocrit 42 4 34 8 - 46 1 %     (H) 82 - 98 fL    MCH 38 6 (H) 26 8 - 34 3 pg    MCHC 32 3 31 4 - 37 4 g/dL    RDW 18 6 (H) 11 6 - 15 1 %    MPV 11 5 8 9 - 12 7 fL    Platelets 70 (L) 109 - 390 Thousands/uL    nRBC 0 /100 WBCs    Neutrophils Relative 47 43 - 75 %    Immat GRANS % 1 0 - 2 %    Lymphocytes Relative 23 14 - 44 %    Monocytes Relative 24 (H) 4 - 12 %    Eosinophils Relative 4 0 - 6 %    Basophils Relative 1 0 - 1 %    Neutrophils Absolute 0 99 (L) 1 85 - 7 62 Thousands/µL    Immature Grans Absolute 0 01 0 00 - 0 20 Thousand/uL    Lymphocytes Absolute 0 47 (L) 0 60 - 4 47 Thousands/µL    Monocytes Absolute 0 49 0 17 - 1 22 Thousand/µL    Eosinophils Absolute 0 09 0 00 - 0 61 Thousand/µL    Basophils Absolute 0 02 0 00 - 0 10 Thousands/µL   Comprehensive metabolic panel   Result Value Ref Range    Sodium 142 136 - 145 mmol/L    Potassium 3 7 3 5 - 5 3 mmol/L    Chloride 108 100 - 108 mmol/L    CO2 28 21 - 32 mmol/L    ANION GAP 6 4 - 13 mmol/L    BUN 14 5 - 25 mg/dL    Creatinine 1 11 0 60 - 1 30 mg/dL    Glucose 106 65 - 140 mg/dL    Calcium 9 1 8 3 - 10 1 mg/dL    Corrected Calcium 10 1 8 3 - 10 1 mg/dL    AST 79 (H) 5 - 45 U/L    ALT 52 12 - 78 U/L    Alkaline Phosphatase 371 (H) 46 - 116 U/L    Total Protein 6 4 6 4 - 8 2 g/dL    Albumin 2 8 (L) 3 5 - 5 0 g/dL    Total Bilirubin 2 15 (H) 0 20 - 1 00 mg/dL    eGFR 57 ml/min/1 73sq m     *Note: Due to a large number of results and/or encounters for the requested time period, some results have not been displayed  A complete set of results can be found in Results Review       Labs, Imaging, & Other studies:   All pertinent labs and imaging studies were personally reviewed    Lab Results   Component Value Date     03/18/2015    K 3 7 05/06/2021     05/06/2021    CO2 28 05/06/2021    ANIONGAP 9 03/18/2015    BUN 14 05/06/2021    CREATININE 1 11 05/06/2021    GLUCOSE 100 03/18/2015    GLUF 102 (H) 04/02/2021    CALCIUM 9 1 05/06/2021    CORRECTEDCA 10 1 05/06/2021    AST 79 (H) 05/06/2021    ALT 52 05/06/2021    ALKPHOS 371 (H) 05/06/2021    PROT 6 6 03/18/2015    BILITOT 0 65 03/18/2015    EGFR 57 05/06/2021     Lab Results   Component Value Date    WBC 2 07 (L) 05/06/2021    HGB 13 7 05/06/2021    HCT 42 4 05/06/2021     (H) 05/06/2021    PLT 70 (L) 05/06/2021       Reviewed CBC,CMP and ultrasound of abdomen  and discussed with patient  Assessment and plan: Anita Jorge Patient is being treated for stage IV colon cancer with extensive metastatic disease and she is on 3rd line of systemic therapy, Lonsurf and has completed 2 cycles of Lonsurf  She has increased cough and exertional dyspnea and abdominal discomfort and distension  She has  postnasal drip and nonproductive cough     She states postnasal drip and cough happens every year around this time  Bowels are moving  No nausea and vomiting  Appetite is fair  No weight loss  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  Lonsurf was started  in April 2021  No  symptoms secondary to Lonsurf     Patient had disease progression  recently especially in the lungs and Dr  [place] [facility] [place]  suggested Lonsurf but dose to be reduced by 20-30%  Patient had been on FOLFOX plus Avastin for stage IV colon cancer with metastatic disease to liver and lungs and also abdominal carcinomatosis and had metastatic disease to the ovaries  She had received liver directed therapy at [facility] [place] and   had MRI of liver and PET scan  at [facility] [place]  There was disease progression  and treatment was changed  Doses of oxaliplatin and infusion 5 FU hwere reduced because of hematological toxicity   She also got Neulasta    Patient was not a candidate for Erbitux because of KRAS mutation   MSI came back stable   Not a candidate for Keytruda  In May 2018 patient underwent GALI and removal of fallopian tubes for uterine bleeding  and there were cancer cells in the fallopian tubes  Lulu Lav probably had removal of tubes and not the ovaries   In June 2018 patient   underwent extended right hemicolectomy, partial omentectomy and biopsy of the peritoneal nodule   Peritoneal nodule came back  positive for metastatic adenocarcinoma from colon    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 and metastatic disease to liver    6 cm T3 G2 right colon cancer with positive margins, lymphovascular invasion and perineural invasion and positive 3 of 27 lymph nodes with extranodal extension and positive peritoneal nodule biopsy   Stage IV disease because of liver and peritoneal metastases   In July 2018 patient was started  on modified FOLFIRI plus Avastin   She had PPE in her hands and bolus 5 FU was discontinued   She was seen by liver specialist at [facility] and was not felt to be a surgical candidate       Since April 2020 patient had been on FOLFOX plus Avastin   She had protein urea but that was less than 1 gram in 24 hour and was monitored       Prior to that she was on FOLFIRI plus Avastin but after initial response disease progressed   On 05/26/2020 patient had pelvic surgery, removal of ovaries and sample also included peritoneum and small bowel mesentery   Both ovaries and small bowel mesentery showed metastatic disease from colon cancer   Surgery was on 05/26/2020    FOLFOX chemotherapy was resumed on 06/17/20 and 2 weeks later  Avastin was added to FOLFOX         Patient has been on Xarelto 10 mg daily for history of DVT right lower extremity that happened in 2016     No bleeding or blood clot on Xarelto      Physical examination and test results  are as recorded and discussed     Suspecting disease progression  He will have CT scans tomorrow and results will be notified to the patient and discussed       She has instructions to go to the emergency room for worsening of symptoms and for other medical emergencies  Condition discussed and explained  Questions answered   Goal is prolongation of survival     Discussed the importance of self-breast examination, eating healthy foods, staying active as tolerated and health screening test   Patient is capable of self-care  Discussed  precautions against Coronavirus       Patient will continue to follow with primary physician and other consultants  See diagnoses , instructions and orders below  1  Primary adenocarcinoma of ascending colon ([address] )    - CT chest abdomen pelvis w contrast; Future    2  Peritoneal metastases ([address] )    - CT chest abdomen pelvis w contrast; Future    3  Liver metastases ([address] )    - CT chest abdomen pelvis w contrast; Future    4  Malignant neoplasm metastatic to lung, unspecified laterality ([address] )    - CT chest abdomen pelvis w contrast; Future    5  Chronic deep vein thrombosis (DVT) of popliteal vein of right lower extremity (HCC)      6  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

## 2021-05-22 ENCOUNTER — TELEPHONE (OUTPATIENT)
Dept: OTHER | Facility: OTHER | Age: 56
End: 2021-05-22

## 2021-05-22 ENCOUNTER — APPOINTMENT (OUTPATIENT)
Dept: LAB | Facility: MEDICAL CENTER | Age: 56
End: 2021-05-22
Payer: COMMERCIAL

## 2021-05-22 NOTE — TELEPHONE ENCOUNTER
Lab Result: WBC 1 82   Date/Time Drawn: 5/22 @ 11:15 am   Ordering Provider: Dr Мария Rucker Name: Kim Kee      Paged Dr Jorja Hamman

## 2021-05-24 ENCOUNTER — PREP FOR PROCEDURE (OUTPATIENT)
Dept: HEMATOLOGY ONCOLOGY | Facility: CLINIC | Age: 56
End: 2021-05-24

## 2021-05-24 ENCOUNTER — TELEPHONE (OUTPATIENT)
Dept: HEMATOLOGY ONCOLOGY | Facility: CLINIC | Age: 56
End: 2021-05-24

## 2021-05-24 ENCOUNTER — HOSPITAL ENCOUNTER (OUTPATIENT)
Dept: RADIOLOGY | Age: 56
Discharge: HOME | End: 2021-05-24
Payer: COMMERCIAL

## 2021-05-24 DIAGNOSIS — C18.2 PRIMARY ADENOCARCINOMA OF ASCENDING COLON (HCC): ICD-10-CM

## 2021-05-24 DIAGNOSIS — C78.6 PERITONEAL METASTASES (HCC): ICD-10-CM

## 2021-05-24 DIAGNOSIS — C77.2 METASTASIS TO RETROPERITONEAL LYMPH NODE (HCC): ICD-10-CM

## 2021-05-24 DIAGNOSIS — C78.00 MALIGNANT NEOPLASM METASTATIC TO LUNG, UNSPECIFIED LATERALITY (HCC): ICD-10-CM

## 2021-05-24 DIAGNOSIS — R18.8 OTHER ASCITES: ICD-10-CM

## 2021-05-24 DIAGNOSIS — C78.7 LIVER METASTASES (HCC): ICD-10-CM

## 2021-05-24 DIAGNOSIS — C78.6 PERITONEAL METASTASES (HCC): Primary | ICD-10-CM

## 2021-05-24 PROCEDURE — 71260 CT THORAX DX C+: CPT

## 2021-05-24 PROCEDURE — G1004 CDSM NDSC: HCPCS

## 2021-05-24 PROCEDURE — 74177 CT ABD & PELVIS W/CONTRAST: CPT

## 2021-05-24 RX ADMIN — IOHEXOL 100 ML: 350 INJECTION, SOLUTION INTRAVENOUS at 11:08

## 2021-05-24 NOTE — TELEPHONE ENCOUNTER
Discussed CT scan findings with patient and [place] [facility] [place]  [facility] [place] [facility] and she will discuss [place] [facility] [place]  Also suggested urological consultation for severe right hydronephrosis and paracenteses for ascites  Disease is progressing    My nurse is making appointments for her

## 2021-05-24 NOTE — TELEPHONE ENCOUNTER
Telephone call to IR scheduling spoke with [facility]  She is working on getting pt schedule for paracentesis

## 2021-05-24 NOTE — TELEPHONE ENCOUNTER
5/24/21 CT  IMPRESSION:     Significant interval worsening in size and number of pulmonary metastases      Mild interval increase in multiple small ill-defined metastatic lesions within the liver      New severe right-sided hydronephrosis and delayed nephrogram   Appears likely related to peritoneal implant disease along the dependent pelvic wall      New large volume ascites      The study was marked in [facility] for immediate notification       Workstation performed: [id number]

## 2021-05-24 NOTE — TELEPHONE ENCOUNTER
Telephone call left voice message stating that Dr Madeleine Hamilton has reviewed your CT scan results from today  He wants to fax the results to your doctor [place] [facility] [place], we need their fax number  Please call our office back  Awaiting return call

## 2021-05-24 NOTE — TELEPHONE ENCOUNTER
Pt returned telephone call  She reviewed CT scan results on my chart  Dr Elzbieta Dunn recommends urology referral which there is an order in system with possible stent placement  Pt stated no one ever called to set up an appt and that she is not having any problems urinating  She does not understand why the worsening pulmonary mets would not be treated  She states she has only had 2 cycles of the Lonsurf  She has multiple questions and would like to discuss with Dr Elzbieta Jackson office number is   I called her office and then faxed results attn Dr Mira Jackson at [phone]

## 2021-05-25 ENCOUNTER — TELEPHONE (OUTPATIENT)
Dept: HEMATOLOGY ONCOLOGY | Facility: CLINIC | Age: 56
End: 2021-05-25

## 2021-05-25 ENCOUNTER — TELEPHONE (OUTPATIENT)
Dept: UROLOGY | Facility: MEDICAL CENTER | Age: 56
End: 2021-05-25

## 2021-05-25 NOTE — TELEPHONE ENCOUNTER
Please Triage - [address] Patient-     What is the reason for the patients appointment? Dr Eleanor Encinas office called stating patient had a ct scan and has severe right-sided hydronephrosis   Patient can be call directly for appointment     Imaging/Lab Results:Ct scan epic       Do we accept the patient's insurance or is the patient Self-Pay? Provider & Plan: [facility]   Member ID#:       Has the patient had any previous urologist(s)?no       Have patient records been requested?in epic        Has the patient had any outside testing done?in [facility]      Does the patient have a personal history of cancer?colon ca      Patient can be reached at :[phone] (h) no

## 2021-05-25 NOTE — TELEPHONE ENCOUNTER
Felicity is calling in from [facility] requesting patient's most recent labs , scans and office notes   Patient has appointment tomorrow     Please fax to [phone]

## 2021-05-25 NOTE — TELEPHONE ENCOUNTER
Patient had CT scan per heme onc for C18 2: Malignant neoplasm of ascending colon  C78 6: Secondary malignant neoplasm of retroperitoneum and peritoneum  C78 7: Secondary malignant neoplasm of liver and intrahepatic bile duct  C78 00: Secondary malignant neoplasm of unspecified lung  C77 2: Secondary and unspecified malignant neoplasm of intra-abdominal lymph nodes  CT done 5/24/21:  IMPRESSION:     Significant interval worsening in size and number of pulmonary metastases      Mild interval increase in multiple small ill-defined metastatic lesions within the liver      New severe right-sided hydronephrosis and delayed nephrogram   Appears likely related to peritoneal implant disease along the dependent pelvic wall      New large volume ascites      The study was marked in [employer] for immediate notification       Please advise on appt timeframe and if AP or MD

## 2021-05-25 NOTE — TELEPHONE ENCOUNTER
I can see her and get her set up for either nephrostomy or a stent  I will review it with Dr Vincenzo Strange  or Juan Manuel Joseph  before I see her

## 2021-05-26 ENCOUNTER — TELEPHONE (OUTPATIENT)
Dept: HEMATOLOGY ONCOLOGY | Facility: CLINIC | Age: 56
End: 2021-05-26

## 2021-05-26 NOTE — TELEPHONE ENCOUNTER
Spoke to patient and informed her that we were able to schedule her office appt for 5/27/21 at 3:00 pm at the [place] office with Dr Pretty Carter  Patient voiced understanding of appt details

## 2021-05-26 NOTE — TELEPHONE ENCOUNTER
Patient calling in to schedule an appt with nurse to sign paperwork in order to change treatment   She can be reached [phone]

## 2021-05-27 ENCOUNTER — HOSPITAL ENCOUNTER (OUTPATIENT)
Dept: RADIOLOGY | Facility: HOSPITAL | Age: 56
Discharge: HOME | End: 2021-05-27
Admitting: RADIOLOGY
Payer: COMMERCIAL

## 2021-05-27 ENCOUNTER — TELEPHONE (OUTPATIENT)
Dept: OTHER | Facility: OTHER | Age: 56
End: 2021-05-27

## 2021-05-27 ENCOUNTER — OFFICE VISIT (OUTPATIENT)
Dept: HEMATOLOGY ONCOLOGY | Facility: CLINIC | Age: 56
End: 2021-05-27
Payer: COMMERCIAL

## 2021-05-27 VITALS
BODY MASS INDEX: 22.66 KG/M2 | OXYGEN SATURATION: 98 % | RESPIRATION RATE: 18 BRPM | SYSTOLIC BLOOD PRESSURE: 106 MMHG | DIASTOLIC BLOOD PRESSURE: 64 MMHG | HEART RATE: 77 BPM | HEIGHT: 66 IN | WEIGHT: 141 LBS | TEMPERATURE: 98.2 F

## 2021-05-27 VITALS
RESPIRATION RATE: 18 BRPM | OXYGEN SATURATION: 100 % | HEART RATE: 77 BPM | DIASTOLIC BLOOD PRESSURE: 63 MMHG | SYSTOLIC BLOOD PRESSURE: 106 MMHG

## 2021-05-27 DIAGNOSIS — C78.6 PERITONEAL METASTASES (HCC): ICD-10-CM

## 2021-05-27 DIAGNOSIS — C18.2 PRIMARY ADENOCARCINOMA OF ASCENDING COLON (HCC): Primary | ICD-10-CM

## 2021-05-27 DIAGNOSIS — C77.2 METASTASIS TO RETROPERITONEAL LYMPH NODE (HCC): ICD-10-CM

## 2021-05-27 DIAGNOSIS — I82.531 CHRONIC DEEP VEIN THROMBOSIS (DVT) OF POPLITEAL VEIN OF RIGHT LOWER EXTREMITY (HCC): ICD-10-CM

## 2021-05-27 DIAGNOSIS — C78.00 MALIGNANT NEOPLASM METASTATIC TO LUNG, UNSPECIFIED LATERALITY (HCC): ICD-10-CM

## 2021-05-27 DIAGNOSIS — R80.9 PROTEINURIA, UNSPECIFIED TYPE: ICD-10-CM

## 2021-05-27 DIAGNOSIS — R18.8 OTHER ASCITES: ICD-10-CM

## 2021-05-27 DIAGNOSIS — C79.60 MALIGNANT NEOPLASM METASTATIC TO OVARY, UNSPECIFIED LATERALITY (HCC): ICD-10-CM

## 2021-05-27 DIAGNOSIS — Z95.828 PORT-A-CATH IN PLACE: ICD-10-CM

## 2021-05-27 DIAGNOSIS — C78.7 LIVER METASTASES (HCC): ICD-10-CM

## 2021-05-27 LAB
ALBUMIN FLD-MCNC: 0.7 G/DL
BILIRUB FLD-MCNC: 0.63 MG/DL
EOSINOPHIL NFR FLD MANUAL: 2 %
HISTIOCYTES NFR FLD: 16 %
LDH FLD L TO P-CCNC: 62 U/L
LYMPHOCYTES NFR BLD AUTO: 38 %
MONO+MESO NFR FLD MANUAL: 2 %
MONOCYTES NFR BLD AUTO: 40 %
NEUTS SEG NFR BLD AUTO: 2 %
PROT FLD-MCNC: <2 G/DL
TOTAL CELLS COUNTED SPEC: 100
WBC # FLD MANUAL: 35 /UL

## 2021-05-27 PROCEDURE — 83615 LACTATE (LD) (LDH) ENZYME: CPT | Performed by: NURSE PRACTITIONER

## 2021-05-27 PROCEDURE — 88341 IMHCHEM/IMCYTCHM EA ADD ANTB: CPT | Performed by: PATHOLOGY

## 2021-05-27 PROCEDURE — 88112 CYTOPATH CELL ENHANCE TECH: CPT | Performed by: PATHOLOGY

## 2021-05-27 PROCEDURE — 88305 TISSUE EXAM BY PATHOLOGIST: CPT | Performed by: PATHOLOGY

## 2021-05-27 PROCEDURE — 88342 IMHCHEM/IMCYTCHM 1ST ANTB: CPT | Performed by: PATHOLOGY

## 2021-05-27 PROCEDURE — 82247 BILIRUBIN TOTAL: CPT | Performed by: NURSE PRACTITIONER

## 2021-05-27 PROCEDURE — 99215 OFFICE O/P EST HI 40 MIN: CPT | Performed by: INTERNAL MEDICINE

## 2021-05-27 PROCEDURE — 82042 OTHER SOURCE ALBUMIN QUAN EA: CPT | Performed by: NURSE PRACTITIONER

## 2021-05-27 PROCEDURE — 49083 ABD PARACENTESIS W/IMAGING: CPT

## 2021-05-27 PROCEDURE — 84157 ASSAY OF PROTEIN OTHER: CPT | Performed by: NURSE PRACTITIONER

## 2021-05-27 PROCEDURE — 89051 BODY FLUID CELL COUNT: CPT | Performed by: NURSE PRACTITIONER

## 2021-05-27 NOTE — PROGRESS NOTES
HPI:  Patient had paracentesis today and she states 3500 mL of ascitic fluid was removed  She does not have abdominal distension  and discomfort  There has been disease progression and I spoke with patient's oncologist specialist at [facility] yesterday and decision was to start her on Stivarga 80 mg dose once daily, 3 weeks on and 1 week  Patient wants to try that   Patient has also contacted [facility] and  Clinical trial nurse coordinator will be reviewing her record in the epic to see if she would be a candidate for clinical trial   She is not a candidate for clinical trial at [facility] per Dr Jameson Willingham   Patient is being treated for stage IV colon cancer with extensive metastatic disease and she was on 3rd line of systemic therapy, Lonsurf and  completed 2 cycles of Lonsurf  She has increased cough and exertional dyspnea and abdominal discomfort and distension  She has  postnasal drip and nonproductive cough     She states postnasal drip and cough happens every year around this time  Bowels are moving  No nausea and vomiting  Appetite is fair  No weight loss  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  On CT scans there is progression of disease primarily in the lungs and that could explain cough    Lonsurf was started  in April 2021 when disease had progressed  Patient had been on FOLFOX plus Avastin for stage IV colon cancer with metastatic disease to liver and lungs and also abdominal carcinomatosis and had metastatic disease to the ovaries  She had received liver directed therapy at [facility] and   had MRI of liver and PET scan  at [facility]  There was disease progression  and treatment was changed     Patient was not a candidate for Erbitux because of KRAS mutation   MSI came back stable   Not a candidate for Keytruda  In May 2018 patient underwent GALI and removal of fallopian tubes for uterine bleeding  and there were cancer cells in the fallopian tubes  [facility] probably had removal of tubes and not the ovaries   In June 2018 patient   underwent extended right hemicolectomy, partial omentectomy and biopsy of the peritoneal nodule   Peritoneal nodule came back  positive for metastatic adenocarcinoma from colon    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 and metastatic disease to liver    6 cm T3 G2 right colon cancer with positive margins, lymphovascular invasion and perineural invasion and positive 3 of 27 lymph nodes with extranodal extension and positive peritoneal nodule biopsy   Stage IV disease because of liver and peritoneal metastases   In July 2018 patient was started  on modified FOLFIRI plus Avastin   She had PPE in her hands and bolus 5 FU was discontinued   She was seen by liver specialist at [facility] and was not felt to be a surgical candidate       Since April 2020 patient had been on FOLFOX plus Avastin   She had protein urea but that was less than 1 gram in 24 hour and was monitored       Prior to that she was on FOLFIRI plus Avastin but after initial response disease progressed   On 05/26/2020 patient had pelvic surgery, removal of ovaries and sample also included peritoneum and small bowel mesentery   Both ovaries and small bowel mesentery showed metastatic disease from colon cancer   Surgery was on 05/26/2020    FOLFOX chemotherapy was resumed on 06/17/20 and 2 weeks later  Avastin was added to FOLFOX         Patient has been on Xarelto 10 mg daily for history of DVT right lower extremity that happened in 2016     No bleeding or blood clot on Xarelto                       Current Outpatient Medications:     acetaminophen (TYLENOL) 500 mg tablet, Take 1,000 mg by mouth every 6 (six) hours as needed for mild pain, Disp: , Rfl:     amLODIPine (NORVASC) 5 mg tablet, Take 5 mg by mouth daily, Disp: , Rfl:     docusate sodium (COLACE) 100 mg capsule, Take 1 capsule (100 mg total) by mouth 2 (two) times a day (Patient taking differently: Take 100 mg by mouth 2 (two) times a day as needed ), Disp: 10 each, Rfl: 0    furosemide (LASIX) 40 mg tablet, Take 40 mg by mouth daily, Disp: , Rfl:     LORazepam (ATIVAN) 1 mg tablet, Take 1 mg by mouth daily as needed for anxiety, Disp: , Rfl:     metoprolol succinate (TOPROL-XL) 50 mg 24 hr tablet, Take 50 mg by mouth 2 (two) times a day Pt was instructed by her PCP Dr Anjum Hays, advised pt to increased to BID, Disp: , Rfl:     omeprazole (PriLOSEC) 20 mg delayed release capsule, PLEASE SEE ATTACHED FOR DETAILED DIRECTIONS, Disp: , Rfl:     oxyCODONE (OXY-IR) 5 MG capsule, Take 1 capsule (5 mg total) by mouth every 6 (six) hours as needed for severe pain for up to 10 doses CAN SUBSTITUTE  OXYCODONE TABLETS FOR CAPSULESMax Daily Amount: 20 mg, Disp: 10 capsule, Rfl: 0    potassium chloride (Klor-Con) 10 mEq tablet, , Disp: , Rfl:     rivaroxaban (Xarelto) 10 mg tablet, Take 1 tablet (10 mg total) by mouth daily, Disp: 90 tablet, Rfl: 3    spironolactone (ALDACTONE) 25 mg tablet, , Disp: , Rfl:     Trifluridine-Tipiracil (Lonsurf) 15-6 14 MG TABS, Take 3 tablets (45 mg) by mouth twice a day on days 1-5 and 8-12 of a 28 day cycle , Disp: 180 tablet, Rfl: 3    No Known Allergies    Oncology History   Primary adenocarcinoma of ascending colon ([facility] [address] )   6/14/2018 Initial Diagnosis    Primary adenocarcinoma of ascending colon ([facility] [address] )     7/17/2018 - 8/1/2018 Chemotherapy    camptosar 180 mg/m2 day 1  5  mg/m2 day 1  5 FU 1200 mg/m2 day 1-2   Leucovorin 400 mg/m2     Venofer 100 mg (first 10 treatments) -- all every 2 weeks         7/17/2018 - 2/3/2020 Chemotherapy    palonosetron (ALOXI) injection 0 25 mg, 0 25 mg, Intravenous, Once, 8 of 16 cycles  Administration: 0 25 mg (11/21/2019), 0 25 mg (12/5/2019), 0 25 mg (12/19/2019), 0 25 mg (1/2/2020), 0 25 mg (1/16/2020), 0 25 mg (1/30/2020)  fluorouracil (ADRUCIL) injection 715 mg, 400 mg/m2, Intravenous, Once, 7 of 7 cycles  pegfilgrastim (NEULASTA ONPRO) subcutaneous injection kit 6 mg, 6 mg, Subcutaneous, Once, 20 of 28 cycles  Administration: 6 mg (5/22/2019), 6 mg (6/5/2019), 6 mg (6/22/2019), 6 mg (7/5/2019), 6 mg (7/20/2019), 6 mg (8/2/2019), 6 mg (8/17/2019), 6 mg (8/31/2019), 6 mg (9/14/2019), 6 mg (9/28/2019), 6 mg (10/12/2019), 6 mg (10/26/2019), 6 mg (11/9/2019), 6 mg (11/23/2019), 6 mg (12/7/2019), 6 mg (12/21/2019), 6 mg (1/4/2020), 6 mg (1/18/2020), 6 mg (2/1/2020)  fosaprepitant (EMEND) 150 mg in sodium chloride 0 9 % 250 mL IVPB, 150 mg, Intravenous, Once, 8 of 16 cycles  Administration: 150 mg (11/7/2019), 150 mg (11/21/2019), 150 mg (12/5/2019), 150 mg (12/19/2019), 150 mg (1/2/2020), 150 mg (1/16/2020), 150 mg (1/30/2020)  bevacizumab (AVASTIN) 355 mg in sodium chloride 0 9 % 100 mL IVPB, 5 mg/kg, Intravenous, Once, 27 of 35 cycles  Administration: 355 mg (5/20/2019), 355 mg (6/3/2019), 355 mg (6/20/2019), 355 mg (7/3/2019), 355 mg (7/18/2019), 355 mg (7/31/2019), 355 mg (8/15/2019), 355 mg (8/29/2019), 355 mg (9/12/2019), 355 mg (9/26/2019), 355 mg (10/10/2019), 355 mg (10/24/2019), 355 mg (11/7/2019), 355 mg (11/21/2019), 355 mg (12/5/2019), 355 mg (12/19/2019), 355 mg (1/2/2020), 355 mg (1/16/2020), 355 mg (1/30/2020)  irinotecan (CAMPTOSAR) 322 mg in sodium chloride 0 9 % 500 mL chemo infusion, 180 mg/m2, Intravenous, Once, 27 of 35 cycles  Dose modification: 150 mg/m2 (original dose 180 mg/m2, Cycle 20, Reason: Dose Not Tolerated)  Administration: 322 mg (5/20/2019), 322 mg (6/3/2019), 320 mg (6/20/2019), 320 mg (7/3/2019), 320 mg (7/18/2019), 320 mg (7/31/2019), 320 mg (8/15/2019), 320 mg (8/29/2019), 320 mg (9/12/2019), 320 mg (9/26/2019), 320 mg (10/10/2019), 320 mg (10/24/2019), 269 mg (11/7/2019), 269 mg (11/21/2019), 269 mg (12/5/2019), 269 mg (12/19/2019), 269 mg (1/2/2020), 269 mg (1/16/2020), 269 mg (1/30/2020)  leucovorin 716 mg in sodium chloride 0 9 % 250 mL IVPB, 400 mg/m2, Intravenous, Once, 27 of 35 cycles  Administration: 716 mg (5/20/2019), 716 mg (6/3/2019), 700 mg (6/20/2019), 700 mg (7/3/2019), 700 mg (7/18/2019), 700 mg (7/31/2019), 700 mg (8/15/2019), 700 mg (8/29/2019), 700 mg (9/12/2019), 700 mg (9/26/2019), 700 mg (10/10/2019), 716 mg (10/24/2019), 700 mg (11/7/2019), 700 mg (11/21/2019), 700 mg (12/5/2019), 700 mg (12/19/2019), 700 mg (1/2/2020), 700 mg (1/16/2020), 700 mg (1/30/2020)     8/1/2018 Adverse Reaction    Needed granix 300 mcg due to [address] of 1 01      8/14/2018 -  Chemotherapy    camptosar 180 mg/m2 day 1  5  mg/m2 day 1  5 FU 1200 mg/m2 day 1-2   Leucovorin 400 mg/m2  Avastin 5 mg/kg day 1   Venofer 100 mg (first 10 treatments)  Neulasta on Pro 6 mg day 3      -- every 2 weeks          3/3/2020 - 3/16/2020 Chemotherapy    pegfilgrastim (NEULASTA ONPRO) subcutaneous injection kit 6 mg, 6 mg, Subcutaneous, Once, 1 of 12 cycles  Administration: 6 mg (3/5/2020)  bevacizumab (AVASTIN) 357 5 mg in sodium chloride 0 9 % 100 mL IVPB, 5 mg/kg = 357 5 mg, Intravenous, Once, 1 of 12 cycles  Administration: 357 5 mg (3/3/2020)  leucovorin 700 mg in dextrose 5 % 250 mL IVPB, 720 mg, Intravenous, Once, 1 of 12 cycles  Administration: 700 mg (3/3/2020)  oxaliplatin (ELOXATIN) 150 mg in dextrose 5 % 250 mL chemo infusion, 153 mg, Intravenous, Once, 1 of 12 cycles  Administration: 150 mg (3/3/2020)     3/16/2020 - 4/5/2020 Chemotherapy    bevacizumab (AVASTIN) 560 mg in sodium chloride 0 9 % 100 mL IVPB, 7 5 mg/kg, Intravenous, Once, 0 of 5 cycles  oxaliplatin (ELOXATIN) 239 2 mg in dextrose 5 % 500 mL chemo infusion, 130 mg/m2 = 239 2 mg, Intravenous, Once, 1 of 6 cycles  Administration: 239 2 mg (3/16/2020)     4/6/2020 - 12/13/2020 Chemotherapy    fluorouracil (ADRUCIL) injection 730 mg, 400 mg/m2 = 730 mg, Intravenous, Once, 1 of 1 cycle  pegfilgrastim (NEULASTA ONPRO) subcutaneous injection kit 6 mg, 6 mg, Subcutaneous, Once, 14 of 20 cycles  Administration: 6 mg (4/8/2020), 6 mg (4/25/2020), 6 mg (5/8/2020), 6 mg (6/19/2020), 6 mg (7/2/2020), 6 mg (7/17/2020), 6 mg (7/31/2020), 6 mg (8/14/2020), 6 mg (8/28/2020), 6 mg (9/25/2020), 6 mg (10/15/2020), 6 mg (11/5/2020), 6 mg (11/18/2020), 6 mg (12/2/2020)  fosaprepitant (EMEND) 150 mg in sodium chloride 0 9 % 250 mL IVPB, 150 mg, Intravenous, Once, 14 of 20 cycles  Administration: 150 mg (4/6/2020), 150 mg (4/23/2020), 150 mg (5/6/2020), 150 mg (6/17/2020), 150 mg (6/30/2020), 150 mg (7/15/2020), 150 mg (7/29/2020), 150 mg (8/12/2020), 150 mg (8/26/2020), 150 mg (9/23/2020), 150 mg (10/13/2020), 150 mg (11/3/2020), 150 mg (11/16/2020), 150 mg (11/30/2020)  bevacizumab (AVASTIN) 372 5 mg in sodium chloride 0 9 % 100 mL IVPB, 5 mg/kg = 372 5 mg, Intravenous, Once, 10 of 16 cycles  Administration: 372 5 mg (4/6/2020), 372 5 mg (4/23/2020), 357 5 mg (6/30/2020), 357 5 mg (7/15/2020), 357 5 mg (7/29/2020), 357 5 mg (8/12/2020), 357 5 mg (8/26/2020), 357 5 mg (11/16/2020), 357 5 mg (11/30/2020)  leucovorin 750 mg in dextrose 5 % 250 mL IVPB, 732 mg, Intravenous, Once, 14 of 20 cycles  Administration: 750 mg (4/6/2020), 750 mg (4/23/2020), 750 mg (5/6/2020), 750 mg (6/17/2020), 700 mg (6/30/2020), 700 mg (7/15/2020), 700 mg (7/29/2020), 700 mg (8/12/2020), 700 mg (8/26/2020), 700 mg (9/23/2020), 700 mg (10/13/2020), 700 mg (11/3/2020), 700 mg (11/16/2020), 700 mg (11/30/2020)  oxaliplatin (ELOXATIN) 150 mg in dextrose 5 % 250 mL chemo infusion, 155 55 mg, Intravenous, Once, 14 of 20 cycles  Dose modification: 70 mg/m2 (original dose 85 mg/m2, Cycle 11, Reason: Dose Not Tolerated, Comment: thrombocytopenia), 65 mg/m2 (original dose 85 mg/m2, Cycle 14, Reason: Other (See Comments)), 65 mg/m2 (original dose 85 mg/m2, Cycle 15, Reason: Max Dose Reached)  Administration: 150 mg (4/6/2020), 150 mg (4/23/2020), 150 mg (5/6/2020), 150 mg (6/17/2020), 150 mg (6/30/2020), 150 mg (7/15/2020), 150 mg (7/29/2020), 150 mg (8/12/2020), 150 mg (8/26/2020), 150 mg (9/23/2020), 125 3 mg (10/13/2020), 125 3 mg (11/3/2020), 125 3 mg (11/16/2020), 116 35 mg (11/30/2020)     12/28/2020 - 3/31/2021 Chemotherapy    pegfilgrastim (NEULASTA ONPRO) subcutaneous injection kit 6 mg, 6 mg, Subcutaneous, Once, 6 of 9 cycles  Administration: 6 mg (12/30/2020), 6 mg (1/14/2021), 6 mg (2/6/2021), 6 mg (2/20/2021), 6 mg (3/6/2021), 6 mg (3/20/2021)  fosaprepitant (EMEND) 150 mg in sodium chloride 0 9 % 250 mL IVPB, 150 mg, Intravenous, Once, 6 of 9 cycles  Administration: 150 mg (12/28/2020), 150 mg (1/12/2021), 150 mg (2/4/2021), 150 mg (2/18/2021), 150 mg (3/4/2021), 150 mg (3/18/2021)  bevacizumab (AVASTIN) 347 5 mg in sodium chloride 0 9 % 100 mL IVPB, 5 mg/kg = 347 5 mg, Intravenous, Once, 5 of 8 cycles  Administration: 347 5 mg (12/28/2020), 347 5 mg (2/4/2021), 347 5 mg (2/18/2021), 347 5 mg (3/4/2021), 347 5 mg (3/18/2021)  leucovorin 700 mg in dextrose 5 % 250 mL IVPB, 712 mg, Intravenous, Once, 6 of 9 cycles  Dose modification: 400 mg/m2 (original dose 400 mg/m2, Cycle 6, Reason: Other (See Comments), Comment: protocol)  Administration: 700 mg (12/28/2020), 700 mg (1/12/2021), 700 mg (2/4/2021), 700 mg (2/18/2021), 700 mg (3/4/2021), 700 mg (3/18/2021)  oxaliplatin (ELOXATIN) 115 7 mg in dextrose 5 % 250 mL chemo infusion, 65 mg/m2 = 115 7 mg (76 5 % of original dose 85 mg/m2), Intravenous, Once, 6 of 9 cycles  Dose modification: 65 mg/m2 (original dose 85 mg/m2, Cycle 1, Reason: Dose Not Tolerated)  Administration: 115 7 mg (12/28/2020), 115 7 mg (1/12/2021), 115 7 mg (2/4/2021), 115 7 mg (2/18/2021), 115 7 mg (3/4/2021), 115 7 mg (3/18/2021)     Liver metastases ([address] )   6/26/2018 Initial Diagnosis    Liver metastases (HCC)     7/17/2018 - 2/3/2020 Chemotherapy    palonosetron (ALOXI) injection 0 25 mg, 0 25 mg, Intravenous, Once, 8 of 16 cycles  Administration: 0 25 mg (11/21/2019), 0 25 mg (12/5/2019), 0 25 mg (12/19/2019), 0 25 mg (1/2/2020), 0 25 mg (1/16/2020), 0 25 mg (1/30/2020)  fluorouracil (ADRUCIL) injection 715 mg, 400 mg/m2, Intravenous, Once, 7 of 7 cycles  pegfilgrastim (NEULASTA ONPRO) subcutaneous injection kit 6 mg, 6 mg, Subcutaneous, Once, 20 of 28 cycles  Administration: 6 mg (5/22/2019), 6 mg (6/5/2019), 6 mg (6/22/2019), 6 mg (7/5/2019), 6 mg (7/20/2019), 6 mg (8/2/2019), 6 mg (8/17/2019), 6 mg (8/31/2019), 6 mg (9/14/2019), 6 mg (9/28/2019), 6 mg (10/12/2019), 6 mg (10/26/2019), 6 mg (11/9/2019), 6 mg (11/23/2019), 6 mg (12/7/2019), 6 mg (12/21/2019), 6 mg (1/4/2020), 6 mg (1/18/2020), 6 mg (2/1/2020)  fosaprepitant (EMEND) 150 mg in sodium chloride 0 9 % 250 mL IVPB, 150 mg, Intravenous, Once, 8 of 16 cycles  Administration: 150 mg (11/7/2019), 150 mg (11/21/2019), 150 mg (12/5/2019), 150 mg (12/19/2019), 150 mg (1/2/2020), 150 mg (1/16/2020), 150 mg (1/30/2020)  bevacizumab (AVASTIN) 355 mg in sodium chloride 0 9 % 100 mL IVPB, 5 mg/kg, Intravenous, Once, 27 of 35 cycles  Administration: 355 mg (5/20/2019), 355 mg (6/3/2019), 355 mg (6/20/2019), 355 mg (7/3/2019), 355 mg (7/18/2019), 355 mg (7/31/2019), 355 mg (8/15/2019), 355 mg (8/29/2019), 355 mg (9/12/2019), 355 mg (9/26/2019), 355 mg (10/10/2019), 355 mg (10/24/2019), 355 mg (11/7/2019), 355 mg (11/21/2019), 355 mg (12/5/2019), 355 mg (12/19/2019), 355 mg (1/2/2020), 355 mg (1/16/2020), 355 mg (1/30/2020)  irinotecan (CAMPTOSAR) 322 mg in sodium chloride 0 9 % 500 mL chemo infusion, 180 mg/m2, Intravenous, Once, 27 of 35 cycles  Dose modification: 150 mg/m2 (original dose 180 mg/m2, Cycle 20, Reason: Dose Not Tolerated)  Administration: 322 mg (5/20/2019), 322 mg (6/3/2019), 320 mg (6/20/2019), 320 mg (7/3/2019), 320 mg (7/18/2019), 320 mg (7/31/2019), 320 mg (8/15/2019), 320 mg (8/29/2019), 320 mg (9/12/2019), 320 mg (9/26/2019), 320 mg (10/10/2019), 320 mg (10/24/2019), 269 mg (11/7/2019), 269 mg (11/21/2019), 269 mg (12/5/2019), 269 mg (12/19/2019), 269 mg (1/2/2020), 269 mg (1/16/2020), 269 mg (1/30/2020)  leucovorin 716 mg in sodium chloride 0 9 % 250 mL IVPB, 400 mg/m2, Intravenous, Once, 27 of 35 cycles  Administration: 716 mg (5/20/2019), 716 mg (6/3/2019), 700 mg (6/20/2019), 700 mg (7/3/2019), 700 mg (7/18/2019), 700 mg (7/31/2019), 700 mg (8/15/2019), 700 mg (8/29/2019), 700 mg (9/12/2019), 700 mg (9/26/2019), 700 mg (10/10/2019), 716 mg (10/24/2019), 700 mg (11/7/2019), 700 mg (11/21/2019), 700 mg (12/5/2019), 700 mg (12/19/2019), 700 mg (1/2/2020), 700 mg (1/16/2020), 700 mg (1/30/2020)     3/3/2020 - 3/16/2020 Chemotherapy    pegfilgrastim (NEULASTA ONPRO) subcutaneous injection kit 6 mg, 6 mg, Subcutaneous, Once, 1 of 12 cycles  Administration: 6 mg (3/5/2020)  bevacizumab (AVASTIN) 357 5 mg in sodium chloride 0 9 % 100 mL IVPB, 5 mg/kg = 357 5 mg, Intravenous, Once, 1 of 12 cycles  Administration: 357 5 mg (3/3/2020)  leucovorin 700 mg in dextrose 5 % 250 mL IVPB, 720 mg, Intravenous, Once, 1 of 12 cycles  Administration: 700 mg (3/3/2020)  oxaliplatin (ELOXATIN) 150 mg in dextrose 5 % 250 mL chemo infusion, 153 mg, Intravenous, Once, 1 of 12 cycles  Administration: 150 mg (3/3/2020)     3/16/2020 - 4/5/2020 Chemotherapy    bevacizumab (AVASTIN) 560 mg in sodium chloride 0 9 % 100 mL IVPB, 7 5 mg/kg, Intravenous, Once, 0 of 5 cycles  oxaliplatin (ELOXATIN) 239 2 mg in dextrose 5 % 500 mL chemo infusion, 130 mg/m2 = 239 2 mg, Intravenous, Once, 1 of 6 cycles  Administration: 239 2 mg (3/16/2020)     4/6/2020 - 12/13/2020 Chemotherapy    fluorouracil (ADRUCIL) injection 730 mg, 400 mg/m2 = 730 mg, Intravenous, Once, 1 of 1 cycle  pegfilgrastim (NEULASTA ONPRO) subcutaneous injection kit 6 mg, 6 mg, Subcutaneous, Once, 14 of 20 cycles  Administration: 6 mg (4/8/2020), 6 mg (4/25/2020), 6 mg (5/8/2020), 6 mg (6/19/2020), 6 mg (7/2/2020), 6 mg (7/17/2020), 6 mg (7/31/2020), 6 mg (8/14/2020), 6 mg (8/28/2020), 6 mg (9/25/2020), 6 mg (10/15/2020), 6 mg (11/5/2020), 6 mg (11/18/2020), 6 mg (12/2/2020)  fosaprepitant (EMEND) 150 mg in sodium chloride 0 9 % 250 mL IVPB, 150 mg, Intravenous, Once, 14 of 20 cycles  Administration: 150 mg (4/6/2020), 150 mg (4/23/2020), 150 mg (5/6/2020), 150 mg (6/17/2020), 150 mg (6/30/2020), 150 mg (7/15/2020), 150 mg (7/29/2020), 150 mg (8/12/2020), 150 mg (8/26/2020), 150 mg (9/23/2020), 150 mg (10/13/2020), 150 mg (11/3/2020), 150 mg (11/16/2020), 150 mg (11/30/2020)  bevacizumab (AVASTIN) 372 5 mg in sodium chloride 0 9 % 100 mL IVPB, 5 mg/kg = 372 5 mg, Intravenous, Once, 10 of 16 cycles  Administration: 372 5 mg (4/6/2020), 372 5 mg (4/23/2020), 357 5 mg (6/30/2020), 357 5 mg (7/15/2020), 357 5 mg (7/29/2020), 357 5 mg (8/12/2020), 357 5 mg (8/26/2020), 357 5 mg (11/16/2020), 357 5 mg (11/30/2020)  leucovorin 750 mg in dextrose 5 % 250 mL IVPB, 732 mg, Intravenous, Once, 14 of 20 cycles  Administration: 750 mg (4/6/2020), 750 mg (4/23/2020), 750 mg (5/6/2020), 750 mg (6/17/2020), 700 mg (6/30/2020), 700 mg (7/15/2020), 700 mg (7/29/2020), 700 mg (8/12/2020), 700 mg (8/26/2020), 700 mg (9/23/2020), 700 mg (10/13/2020), 700 mg (11/3/2020), 700 mg (11/16/2020), 700 mg (11/30/2020)  oxaliplatin (ELOXATIN) 150 mg in dextrose 5 % 250 mL chemo infusion, 155 55 mg, Intravenous, Once, 14 of 20 cycles  Dose modification: 70 mg/m2 (original dose 85 mg/m2, Cycle 11, Reason: Dose Not Tolerated, Comment: thrombocytopenia), 65 mg/m2 (original dose 85 mg/m2, Cycle 14, Reason: Other (See Comments)), 65 mg/m2 (original dose 85 mg/m2, Cycle 15, Reason: Max Dose Reached)  Administration: 150 mg (4/6/2020), 150 mg (4/23/2020), 150 mg (5/6/2020), 150 mg (6/17/2020), 150 mg (6/30/2020), 150 mg (7/15/2020), 150 mg (7/29/2020), 150 mg (8/12/2020), 150 mg (8/26/2020), 150 mg (9/23/2020), 125 3 mg (10/13/2020), 125 3 mg (11/3/2020), 125 3 mg (11/16/2020), 116 35 mg (11/30/2020)     12/28/2020 - 3/31/2021 Chemotherapy    pegfilgrastim (NEULASTA ONPRO) subcutaneous injection kit 6 mg, 6 mg, Subcutaneous, Once, 6 of 9 cycles  Administration: 6 mg (12/30/2020), 6 mg (1/14/2021), 6 mg (2/6/2021), 6 mg (2/20/2021), 6 mg (3/6/2021), 6 mg (3/20/2021)  fosaprepitant (EMEND) 150 mg in sodium chloride 0 9 % 250 mL IVPB, 150 mg, Intravenous, Once, 6 of 9 cycles  Administration: 150 mg (12/28/2020), 150 mg (1/12/2021), 150 mg (2/4/2021), 150 mg (2/18/2021), 150 mg (3/4/2021), 150 mg (3/18/2021)  bevacizumab (AVASTIN) 347 5 mg in sodium chloride 0 9 % 100 mL IVPB, 5 mg/kg = 347 5 mg, Intravenous, Once, 5 of 8 cycles  Administration: 347 5 mg (12/28/2020), 347 5 mg (2/4/2021), 347 5 mg (2/18/2021), 347 5 mg (3/4/2021), 347 5 mg (3/18/2021)  leucovorin 700 mg in dextrose 5 % 250 mL IVPB, 712 mg, Intravenous, Once, 6 of 9 cycles  Dose modification: 400 mg/m2 (original dose 400 mg/m2, Cycle 6, Reason: Other (See Comments), Comment: protocol)  Administration: 700 mg (12/28/2020), 700 mg (1/12/2021), 700 mg (2/4/2021), 700 mg (2/18/2021), 700 mg (3/4/2021), 700 mg (3/18/2021)  oxaliplatin (ELOXATIN) 115 7 mg in dextrose 5 % 250 mL chemo infusion, 65 mg/m2 = 115 7 mg (76 5 % of original dose 85 mg/m2), Intravenous, Once, 6 of 9 cycles  Dose modification: 65 mg/m2 (original dose 85 mg/m2, Cycle 1, Reason: Dose Not Tolerated)  Administration: 115 7 mg (12/28/2020), 115 7 mg (1/12/2021), 115 7 mg (2/4/2021), 115 7 mg (2/18/2021), 115 7 mg (3/4/2021), 115 7 mg (3/18/2021)     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)   6/26/2018 Initial Diagnosis    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[address] )     4/6/2020 - 12/13/2020 Chemotherapy    fluorouracil (ADRUCIL) injection 730 mg, 400 mg/m2 = 730 mg, Intravenous, Once, 1 of 1 cycle  pegfilgrastim (NEULASTA ONPRO) subcutaneous injection kit 6 mg, 6 mg, Subcutaneous, Once, 7 of 10 cycles  Administration: 6 mg (4/8/2020), 6 mg (4/25/2020), 6 mg (5/8/2020), 6 mg (6/19/2020), 6 mg (7/2/2020), 6 mg (7/17/2020), 6 mg (7/31/2020)  fosaprepitant (EMEND) 150 mg in sodium chloride 0 9 % 250 mL IVPB, 150 mg, Intravenous, Once, 7 of 10 cycles  Administration: 150 mg (4/6/2020), 150 mg (4/23/2020), 150 mg (5/6/2020), 150 mg (6/17/2020), 150 mg (6/30/2020), 150 mg (7/15/2020), 150 mg (7/29/2020)  bevacizumab (AVASTIN) 372 5 mg in sodium chloride 0 9 % 100 mL IVPB, 5 mg/kg = 372 5 mg, Intravenous, Once, 5 of 8 cycles  Administration: 372 5 mg (4/6/2020), 372 5 mg (4/23/2020), 357 5 mg (6/30/2020), 357 5 mg (7/15/2020), 357 5 mg (7/29/2020)  leucovorin 750 mg in dextrose 5 % 250 mL IVPB, 732 mg, Intravenous, Once, 7 of 10 cycles  Administration: 750 mg (4/6/2020), 750 mg (4/23/2020), 750 mg (5/6/2020), 750 mg (6/17/2020), 700 mg (6/30/2020), 700 mg (7/15/2020), 700 mg (7/29/2020)  oxaliplatin (ELOXATIN) 150 mg in dextrose 5 % 250 mL chemo infusion, 155 55 mg, Intravenous, Once, 7 of 10 cycles  Administration: 150 mg (4/6/2020), 150 mg (4/23/2020), 150 mg (5/6/2020), 150 mg (6/17/2020), 150 mg (6/30/2020), 150 mg (7/15/2020), 150 mg (7/29/2020)     12/28/2020 - 3/31/2021 Chemotherapy    pegfilgrastim (NEULASTA ONPRO) subcutaneous injection kit 6 mg, 6 mg, Subcutaneous, Once, 6 of 9 cycles  Administration: 6 mg (12/30/2020), 6 mg (1/14/2021), 6 mg (2/6/2021), 6 mg (2/20/2021), 6 mg (3/6/2021), 6 mg (3/20/2021)  fosaprepitant (EMEND) 150 mg in sodium chloride 0 9 % 250 mL IVPB, 150 mg, Intravenous, Once, 6 of 9 cycles  Administration: 150 mg (12/28/2020), 150 mg (1/12/2021), 150 mg (2/4/2021), 150 mg (2/18/2021), 150 mg (3/4/2021), 150 mg (3/18/2021)  bevacizumab (AVASTIN) 347 5 mg in sodium chloride 0 9 % 100 mL IVPB, 5 mg/kg = 347 5 mg, Intravenous, Once, 5 of 8 cycles  Administration: 347 5 mg (12/28/2020), 347 5 mg (2/4/2021), 347 5 mg (2/18/2021), 347 5 mg (3/4/2021), 347 5 mg (3/18/2021)  leucovorin 700 mg in dextrose 5 % 250 mL IVPB, 712 mg, Intravenous, Once, 6 of 9 cycles  Dose modification: 400 mg/m2 (original dose 400 mg/m2, Cycle 6, Reason: Other (See Comments), Comment: protocol)  Administration: 700 mg (12/28/2020), 700 mg (1/12/2021), 700 mg (2/4/2021), 700 mg (2/18/2021), 700 mg (3/4/2021), 700 mg (3/18/2021)  oxaliplatin (ELOXATIN) 115 7 mg in dextrose 5 % 250 mL chemo infusion, 65 mg/m2 = 115 7 mg (76 5 % of original dose 85 mg/m2), Intravenous, Once, 6 of 9 cycles  Dose modification: 65 mg/m2 (original dose 85 mg/m2, Cycle 1, Reason: Dose Not Tolerated)  Administration: 115 7 mg (12/28/2020), 115 7 mg (1/12/2021), 115 7 mg (2/4/2021), 115 7 mg (2/18/2021), 115 7 mg (3/4/2021), 115 7 mg (3/18/2021)         ROS:  05/27/21 Reviewed 13 systems: See symptoms in HPI  Presently no other neurological, cardiac, pulmonary, GI and  symptoms other than mentioned above  in HPI  No other symptoms like  fever, chills, bleeding, bone pains, skin rash, weight loss, arthritic symptoms,   claudication and gait problem  No frequent infections  Not unusually sensitive to heat or cold  No swelling of the ankles  No swollen glands  Patient is anxious     No new symptoms  since her last visit and much less abdominal discomfort post paracentesis  /64 (BP Location: Left arm, Patient Position: Sitting, Cuff Size: Adult)   Pulse 77   Temp 98 2 °F (36 8 °C)   Resp 18   Ht 5' 5 5" (1 664 m)   Wt 64 kg (141 lb)   LMP 05/16/2018   SpO2 98%   BMI 23 11 kg/m²     Physical Exam:   My nurse Champ Clarity was in the room with me during examination   Alert, oriented, not in distress , stable vitals, no icterus, no oral thrush, no palpable neck mass,  decreased breath sounds and dullness at lung bases, regular heart rate, abdomen  soft and non tender, distension of abdomen, no palpable abdominal mass,   small ascites,, diminished bowel sounds,  no edema of ankles, no calf tenderness, no objective focal neurological deficit, no skin rash, no palpable lymphadenopathy in the neck and axillary areas,  no clubbing  Patient  anxious  Performance status 2      IMAGING:    IMPRESSION:     Significant interval worsening in size and number of pulmonary metastases      Mild interval increase in multiple small ill-defined metastatic lesions within the liver      New severe right-sided hydronephrosis and delayed nephrogram   Appears likely related to peritoneal implant disease along the dependent pelvic wall      New large volume ascites      The study was marked in EPIC for immediate notification       Workstation performed: [id number]      Imaging    CT chest abdomen pelvis w contrast (Order: [id number]) - 5/24/2021      LABS:    Results for orders placed or performed during the hospital encounter of 05/27/21   Albumin, fluid   Result Value Ref Range    Albumin, Fluid 0 7 g/dL   Bilirubin, body fluid   Result Value Ref Range    Bilirubin, Fluid 0 63 mg/dL   LD (LDH), Body Fluid   Result Value Ref Range    LD, Fluid 62 U/L   Total Protein, Fluid   Result Value Ref Range    Protein, Fluid <2 0 g/dL   Body fluid white cell count with differential   Result Value Ref Range    Site      WBC, Fluid 35 /ul   Body Fluid Diff   Result Value Ref Range    Total Counted 100     Neutrophils % (Fluid) 2 %    Lymphs % (Fluid) 38 %    Eosinophils % (Fluid) 2 %    Mesothelial % (Fluid) 2 %    Histiocyte % (Fluid) 16 %    Monocytes % (Fluid) 40 %     *Note: Due to a large number of results and/or encounters for the requested time period, some results have not been displayed  A complete set of results can be found in Results Review       Labs, Imaging, & Other studies:   All pertinent labs and imaging studies were personally reviewed    Lab Results   Component Value Date     03/18/2015    K 4 0 05/22/2021     05/22/2021    CO2 26 05/22/2021    ANIONGAP 9 03/18/2015    BUN 16 05/22/2021    CREATININE 1 20 05/22/2021    GLUCOSE 100 03/18/2015    GLUF 102 (H) 04/02/2021    CALCIUM 8 8 05/22/2021    CORRECTEDCA 9 9 05/22/2021    AST 38 05/22/2021    ALT 28 05/22/2021    ALKPHOS 248 (H) 05/22/2021    PROT 6 6 03/18/2015    BILITOT 0 65 03/18/2015    EGFR 52 05/22/2021     Lab Results   Component Value Date    WBC 1 82 (LL) 05/22/2021    HGB 10 6 (L) 05/22/2021    HCT 32 2 (L) 05/22/2021     (H) 05/22/2021    PLT 64 (L) 05/22/2021       Reviewed CBC,CMP, ascitic fluid results and CT scans  and discussed with patient  Assessment and plan:     Patient had paracentesis today and she states 3500 mL of ascitic fluid was removed  She does not have abdominal distension  and discomfort  There has been disease progression and I spoke with patient's oncologist specialist at [facility] yesterday and decision was to start her on Stivarga 80 mg dose once daily, 3 weeks on and 1 week  Patient wants to try that   Patient has also contacted [facility] and  Clinical trial nurse coordinator will be reviewing her record in the epic to see if she would be a candidate for clinical trial   She is not a candidate for clinical trial at [facility] per Dr Eyal Moreno   Patient is being treated for stage IV colon cancer with extensive metastatic disease and she was on 3rd line of systemic therapy, Lonsurf and  completed 2 cycles of Lonsurf  She has increased cough and exertional dyspnea and abdominal discomfort and distension  She has  postnasal drip and nonproductive cough     She states postnasal drip and cough happens every year around this time  Bowels are moving  No nausea and vomiting  Appetite is fair  No weight loss  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  On CT scans there is progression of disease primarily in the lungs and that could explain cough    Lonsurf was started  in April 2021 when disease had progressed  Patient had been on FOLFOX plus Avastin for stage IV colon cancer with metastatic disease to liver and lungs and also abdominal carcinomatosis and had metastatic disease to the ovaries  She had received liver directed therapy at [facility] and   had MRI of liver and PET scan  at [facility]    There was disease progression  and treatment was changed     Patient was not a candidate for Erbitux because of KRAS mutation   MSI came back stable   Not a candidate for Keytruda  In May 2018 patient underwent GALI and removal of fallopian tubes for uterine bleeding  and there were cancer cells in the fallopian tubes  Orval Dina probably had removal of tubes and not the ovaries   In June 2018 patient   underwent extended right hemicolectomy, partial omentectomy and biopsy of the peritoneal nodule   Peritoneal nodule came back  positive for metastatic adenocarcinoma from colon    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 and metastatic disease to liver    6 cm T3 G2 right colon cancer with positive margins, lymphovascular invasion and perineural invasion and positive 3 of 27 lymph nodes with extranodal extension and positive peritoneal nodule biopsy   Stage IV disease because of liver and peritoneal metastases   In July 2018 patient was started  on modified FOLFIRI plus Avastin   She had PPE in her hands and bolus 5 FU was discontinued   She was seen by liver specialist at [facility] and was not felt to be a surgical candidate       Since April 2020 patient had been on FOLFOX plus Avastin   She had protein urea but that was less than 1 gram in 24 hour and was monitored       Prior to that she was on FOLFIRI plus Avastin but after initial response disease progressed   On 05/26/2020 patient had pelvic surgery, removal of ovaries and sample also included peritoneum and small bowel mesentery   Both ovaries and small bowel mesentery showed metastatic disease from colon cancer   Surgery was on 05/26/2020    FOLFOX chemotherapy was resumed on 06/17/20 and 2 weeks later  Avastin was added to FOLFOX         Patient has been on Xarelto 10 mg daily for history of DVT right lower extremity that happened in 2016     No bleeding or blood clot on Xarelto              Physical examination and test results  are as recorded and discussed     Disease has progressed     Patient has received detailed information on Stivarga verbally and in printed form and she has signed informed consent for Stivarga and will get started, 80 mg daily, 3 weeks on and 1 week to start with  She will be monitoring her blood pressure at home  Urine will be checked for protein urea and she has history of proteinuria when she was on Avastin with chemotherapy  Any side effects she will bring to our attention and in emergencies she will go to the emergency   All discussed in detail  Questions answered  Goal is prolongation of survival  Discussed the importance of self-breast examination, eating healthy foods, staying active as tolerated and health screening test   Patient is capable of self-care  Discussed  precautions against Coronavirus       Patient will continue to follow with primary physician and other consultants  See diagnoses , instructions and orders below  1  Primary adenocarcinoma of ascending colon ([address] )      2  Liver metastases ([address] )      3  Malignant neoplasm metastatic to lung, unspecified laterality ([address] )      4  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, unspecified laterality ([address] )      5  Chronic deep vein thrombosis (DVT) of popliteal vein of right lower extremity (HCC)      6  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

## 2021-05-27 NOTE — DISCHARGE INSTRUCTIONS
Abdominal Paracentesis     WHAT YOU NEED TO KNOW:   Abdominal paracentesis is a procedure to remove abnormal fluid buildup in your abdomen  Fluid builds up because of liver problems, such as swelling and scarring  Heart failure, kidney disease, a mass, or problems with your pancreas may also cause fluid buildup  DISCHARGE INSTRUCTIONS:     Follow up with your healthcare provider as directed: Write down your questions so you remember to ask them during your visits  Wound care: Remove dressing after 24 hours  Leave glue in place  Return to your normal activities    Contact Interventional Radiology at [phone] Gina PATIENTS: Contact Interventional Radiology at [phone]) Dolly Sanchez PATIENTS: Contact Interventional Radiology at [phone]) if:  · You have a fever and your wound is red and swollen  · You have yellow, green, or bad-smelling discharge coming from your wound  · You have pain or swelling in your abdomen  · You have an upset stomach or you vomit  · You have sudden, sharp pain in your abdomen  · You urinate very little or not at all  · You feel confused and more tired than usual    · Your arm or leg feels warm, tender, and painful  It may look swollen and red  · You suddenly feel lightheaded and have trouble breathing

## 2021-05-27 NOTE — BRIEF OP NOTE (RAD/CATH)
INTERVENTIONAL RADIOLOGY PROCEDURE NOTE    Date: 5/27/2021    Procedure: IR PARACENTESIS    Preoperative diagnosis:   1  Peritoneal metastases ([address] )    2  Other ascites         Postoperative diagnosis: Same  Surgeon: Cuco Espino MD     Assistant: None  No qualified resident was available  Blood loss:  none    Specimens:  None     Findings:  Left lower quadrant approach paracentesis performed and 3500 mL of clear yellow fluid aspirated  Complications: None immediate      Anesthesia: local

## 2021-05-27 NOTE — PATIENT INSTRUCTIONS
Informed consent for stivarga 80 mg daily to start with, 3 weeks on and 1 week off  Blood work every 2 weeks  Patient will monitor her blood pressure at home  Follow-up in 5 weeks    Port flush to continue

## 2021-05-28 ENCOUNTER — DOCUMENTATION (OUTPATIENT)
Dept: HEMATOLOGY ONCOLOGY | Facility: CLINIC | Age: 56
End: 2021-05-28

## 2021-05-28 NOTE — PROGRESS NOTES
Received request for patient to begin  Stivarga 80 mg 3 weeks on, one week off    [employer] replied asking me to do the PA like this    can you please attempt the PA for #84 tabs (56 day supply)? Ileana Schwab only comes available in an 84 count bottle (40mg tabs) and is to be dispensed in original container    Auth has been submitted via cover my meds and has been approved  PA help desk: [phone]  BIN:  [id number]  ID:  [id number]  GRP:  [id number]    DAVIS: [id number]    Clinical, Pharmacy and Finance aware

## 2021-05-28 NOTE — PROGRESS NOTES
On 5/27 provided patient with handout for [employer]  Reviewed handout with patient, including possible side effects  Reminded patient about how she will take the medication and when to have labs done  Patient verbalized understanding  Email to finance

## 2021-06-01 ENCOUNTER — PREP FOR PROCEDURE (OUTPATIENT)
Dept: INTERVENTIONAL RADIOLOGY/VASCULAR | Facility: CLINIC | Age: 56
End: 2021-06-01

## 2021-06-01 ENCOUNTER — TELEPHONE (OUTPATIENT)
Dept: HEMATOLOGY ONCOLOGY | Facility: CLINIC | Age: 56
End: 2021-06-01

## 2021-06-01 ENCOUNTER — CONSULT (OUTPATIENT)
Dept: UROLOGY | Facility: CLINIC | Age: 56
End: 2021-06-01
Payer: COMMERCIAL

## 2021-06-01 ENCOUNTER — DOCUMENTATION (OUTPATIENT)
Dept: HEMATOLOGY ONCOLOGY | Facility: CLINIC | Age: 56
End: 2021-06-01

## 2021-06-01 VITALS
SYSTOLIC BLOOD PRESSURE: 118 MMHG | DIASTOLIC BLOOD PRESSURE: 68 MMHG | BODY MASS INDEX: 21.86 KG/M2 | WEIGHT: 136 LBS | HEIGHT: 66 IN

## 2021-06-01 DIAGNOSIS — C18.2 PRIMARY ADENOCARCINOMA OF ASCENDING COLON (HCC): Primary | ICD-10-CM

## 2021-06-01 DIAGNOSIS — N13.30 HYDRONEPHROSIS, UNSPECIFIED HYDRONEPHROSIS TYPE: ICD-10-CM

## 2021-06-01 PROCEDURE — 99243 OFF/OP CNSLTJ NEW/EST LOW 30: CPT | Performed by: PHYSICIAN ASSISTANT

## 2021-06-01 RX ORDER — SODIUM CHLORIDE 9 MG/ML
75 INJECTION, SOLUTION INTRAVENOUS CONTINUOUS
Status: CANCELLED | OUTPATIENT
Start: 2021-06-01

## 2021-06-01 NOTE — PROGRESS NOTES
5-28-21  Received new oral chemo start  Auth is needed    6-1-21    Van Freeze [birth year]  ID#  [id number]  [facility]#  [id number]  PCN#  [id number]  BIN#  [id number]  Patient pays $0 00    Epic Noted Email to team,

## 2021-06-01 NOTE — TELEPHONE ENCOUNTER
Informed patient that per finance auth for Amauri Landis was obtained  Informed patient that script is being sent to [facility]  Patient verbalized understanding

## 2021-06-01 NOTE — TELEPHONE ENCOUNTER
Patient called asking about the authorization for Stivarga  Informed patient we are waiting to hear from [email or web address]  Sent email to finance to follow-up  Per Alexsander Gar was approved, awaiting to hear from the specialty pharmacy if they can definitely fill the prescription  denies pain/discomfort

## 2021-06-01 NOTE — PROGRESS NOTES
UROLOGY CONSULTATION NOTE     Patient Identifiers: Weston Jasso (MRN: [id number])  Service Requesting Consultation: Hakeem Perrin MD  Service Providing Consultation:  Urology, Shellie Mejias PA-C  Consults  Date of Service: 6/1/2021    Reason for Consultation: Right-sided hydronephrosis    History of Present Illness:     Weston Jasso is a 50 y o  Female with history metastatic colon cancer  She has extensive metastatic disease stage IV on 3rd line systemic therapy  She had recent increased cough and abdominal discomfort with distension  She had a paracentesis with Interventional Radiology on May 27th for 3 5 L  Of fluid  She was referred to Urology for evaluation of new right-sided hydronephrosis  She denies any flank pain or history of urinary tract infections  Creatinine is 1  11   CT shows new severe right-sided hydronephrosis and delayed nephrogram   This appears likely related to peritoneal implants disease along the pelvic wall  There was also new large volume ascites    She is starting a new oral chemotherapy ( Stivarga)  and is also contacting Karley Alvarado to discuss possibility of a new clinical trial     Past Medical, Past Surgical History:     Past Medical History:   Diagnosis Date    Cancer [facility])     [facility] cancer [facility] ) 06/08/2018    DVT (deep venous thrombosis) ([facility] [address] )     History of chemotherapy 2019    Kidney disease     Menorrhalgia     RESOLVED 2008    Migraine     Postcoital bleeding     LAST ASSESSED [id number]   :    Past Surgical History:   Procedure Laterality Date    ABDOMINAL ADHESION SURGERY N/A 6/14/2018    Procedure: LYSIS ADHESIONS OF COLON;  Surgeon: Jeri Ragsdale MD;  [facility];  Service: Colorectal    CYSTOSCOPY N/A 5/26/2020    Procedure: CYSTOSCOPY;  Surgeon: Kalina Lux MD;  [facility];  Service: Gynecology Oncology    DILATION AND CURETTAGE OF UTERUS      RESOLVED 2008    ENDOMETRIAL ABLATION      THERMAL     Lilia Galeano 2008  ENDOMETRIAL BIOPSY      BY SUCTION   DONE 12/13/2008    HYSTERECTOMY  05/21/2018    IR PARACENTESIS  5/27/2021    OMENTECTOMY Right 6/14/2018    Procedure: PARTIAL OMENTECTOMY;  Surgeon: Chloe Dey MD;  [facility];  Service: Colorectal    OOPHORECTOMY N/A 5/26/2020    Procedure: RIGHT RADICAL OOPHORECTOMY, LEFT OOPHORECTOMY, PERITONEAL BIOPSIES, ATTEMPTED ROBOT CONVERTED TO OPEN LAPAROTOMY;  Surgeon: Eryn Cotton MD;  [facility];  Service: Gynecology Oncology    PORTACATH PLACEMENT      RCW    RI COLONOSCOPY FLX DX W/COLLJ [facility] WHEN PFRMD N/A 6/13/2018    Procedure: COLONOSCOPY;  Surgeon: Chloe Dey MD;  [facility]; Service: Colorectal    RI ESOPHAGOGASTRODUODENOSCOPY TRANSORAL DIAGNOSTIC N/A 6/13/2018    Procedure: ESOPHAGOGASTRODUODENOSCOPY (EGD); Surgeon: Grace Bush MD;  [facility];   Service: Gastroenterology    RI INSERT PICC W/ SUB-Q PORT Right 6/28/2018    Procedure: INSERTION VENOUS PORT (PORT-A-CATH) VIA RIGHT SUBCLAVIAN VEIN;  Surgeon: Chloe Dey MD;  [facility];  Service: Colorectal    RI LAP,DIAGNOSTIC ABDOMEN N/A 6/14/2018    Procedure: LAPAROSCOPY DIAGNOSTIC;  Surgeon: Chloe Dey MD;  [facility];  Service: Colorectal    RI LAP,SURG,COLECTOMY, PARTIAL, W/ANAST Right 6/14/2018    Procedure: LAPAROSCOPIC EXTENDED RIGHT HEMICOLECTOMY ; PERITONEAL BX;  Surgeon: Chloe eDy MD;  [facility];  Service: Colorectal    RI LAP,VAG HYST,UTERUS 250GMS/<,SALP-OOPH Bilateral 5/21/2018    Procedure: HYSTERECTOMY LAPAROSCOPIC ASSISTED VAGINAL (LAVH) , BILATERAL SALPINGECTOMY;  Surgeon: Julio Cesar Landry DO;  [facility];  Service: Gynecology    PROCTOSCOPY N/A 5/26/2020    Procedure: PROCTOSCOPY;  Surgeon: Eryn Cotton MD;  [facility];  Service: Gynecology Oncology    TRANSPERINEAL IMPLANT OF RADIATION SEEDS W/ ULTRASOUND  10/30/2020    Liver, @ [employer] :    Medications, Allergies:     Current Outpatient Medications:     acetaminophen (TYLENOL) 500 mg tablet, Take 1,000 mg by mouth every 6 (six) hours as needed for mild pain, Disp: , Rfl:     amLODIPine (NORVASC) 5 mg tablet, Take 5 mg by mouth daily, Disp: , Rfl:     furosemide (LASIX) 40 mg tablet, Take 40 mg by mouth daily, Disp: , Rfl:     LORazepam (ATIVAN) 1 mg tablet, Take 1 mg by mouth daily as needed for anxiety, Disp: , Rfl:     metoprolol succinate (TOPROL-XL) 50 mg 24 hr tablet, Take 50 mg by mouth 2 (two) times a day Pt was instructed by her PCP Dr Anjum Hays, advised pt to increased to BID, Disp: , Rfl:     omeprazole (PriLOSEC) 20 mg delayed release capsule, PLEASE SEE ATTACHED FOR DETAILED DIRECTIONS, Disp: , Rfl:     potassium chloride (Klor-Con) 10 mEq tablet, , Disp: , Rfl:     rivaroxaban (Xarelto) 10 mg tablet, Take 1 tablet (10 mg total) by mouth daily, Disp: 90 tablet, Rfl: 3    spironolactone (ALDACTONE) 25 mg tablet, , Disp: , Rfl:     docusate sodium (COLACE) 100 mg capsule, Take 1 capsule (100 mg total) by mouth 2 (two) times a day (Patient not taking: Reported on 6/1/2021), Disp: 10 each, Rfl: 0    oxyCODONE (OXY-IR) 5 MG capsule, Take 1 capsule (5 mg total) by mouth every 6 (six) hours as needed for severe pain for up to 10 doses CAN SUBSTITUTE  OXYCODONE TABLETS FOR CAPSULESMax Daily Amount: 20 mg (Patient not taking: Reported on 6/1/2021), Disp: 10 capsule, Rfl: 0    Trifluridine-Tipiracil (Lonsurf) 15-6 14 MG TABS, Take 3 tablets (45 mg) by mouth twice a day on days 1-5 and 8-12 of a 28 day cycle   (Patient not taking: Reported on 6/1/2021), Disp: 180 tablet, Rfl: 3    Allergies:  No Known Allergies:    Social and Family History:   Social History:   Social History     Tobacco Use    Smoking status: Never Smoker    Smokeless tobacco: Never Used   Substance Use Topics    Alcohol use: Never     Alcohol/week: 0 0 standard drinks     Frequency: Never     Drinks per session: Patient refused     Binge frequency: Never     Comment: 0    Drug use: No        Social History     Tobacco Use   Smoking Status Never Smoker   Smokeless Tobacco Never Used       Family History:  Family History   Problem Relation Age of Onset   [facility] Migraines Mother     Heart disease Mother     Anemia Father     Arthritis Father     Other Father         BLOOD TRANSFUSION    Migraines Daughter     Heart disease Family     No Known Problems Maternal Grandmother     No Known Problems Maternal Grandfather     No Known Problems Paternal Grandmother     No Known Problems Paternal Grandfather    :     Review of Systems:     General: Fever, chills, or night sweats: negative  Cardiac: Negative for chest pain  Pulmonary: Negative for shortness of breath  Gastrointestinal: Abdominal pain negative  Nausea, vomiting, or diarrhea negative,  Genitourinary: See HPI above  Patient does not have hematuria  All other systems queried were negative  Physical Exam:   General: Patient is pleasant and in NAD  Awake and alert  /68 (BP Location: Left arm, Patient Position: Sitting, Cuff Size: Adult)   Ht 5' 5 5" (1 664 m)   Wt 61 7 kg (136 lb)   LMP 05/16/2018   BMI 22 29 kg/m²   HEENT:  Conjunctiva are clear  Constitutional:  pleasant and cooperative     no apparent distress  Cardiac: Peripheral edema: negative  Pulmonary: Non-labored breathing  Abdomen: Soft, non-tender, non-distended  No surgical scars  No masses, tenderness, hernias noted  Genitourinary: Negative CVA tenderness, negative suprapubic tenderness  Extremities: moves all extremities  Neurological:CNII-XII intact  No numbness or tingling   Essentially non focal neurologic exam  Psychiatric:mood affect and behavior normal        Labs:     Lab Results   Component Value Date    HGB 10 6 (L) 05/22/2021    HCT 32 2 (L) 05/22/2021    WBC 1 82 (LL) 05/22/2021    PLT 64 (L) 05/22/2021   ]    Lab Results   Component Value Date     03/18/2015    K 4 0 05/22/2021     05/22/2021    CO2 26 05/22/2021    BUN 16 05/22/2021    CREATININE 1 20 05/22/2021    CALCIUM 8 8 05/22/2021    GLUCOSE 100 03/18/2015   ]    Imaging:   I personally reviewed the images and report of the following studies, and reviewed them with the patient:  CT CHEST, ABDOMEN AND PELVIS WITH IV CONTRAST   IMPRESSION:     Significant interval worsening in size and number of pulmonary metastases  Mild interval increase in multiple small ill-defined metastatic lesions within the liver  New severe right-sided hydronephrosis and delayed nephrogram   Appears likely related to peritoneal implant disease along the dependent pelvic wall  New large volume ascites  ASSESSMENT:      1  Severe right-sided hydronephrosis   2  Metastatic stage IV colon cancer   3  Pancytopenia  PLAN:   - options of management reviewed with patient and her   - option a would be for no treatment and observation  - however if she is contemplating continued aggressive treatment then right nephrostomy tube would be best option with possible internalization at a later date  - I explained that cystoscopy and internal stenting does not always work and may not drain properly  - all questions were answered  - and ambulatory referral to Interventional Radiology was generated and advanced practitioner in Interventional Radiology notified  - further urology follow-up as needed      Thank you for allowing me to participate in this patients care  Please do not hesitate to call with any additional questions    Kassi Rose PA-C

## 2021-06-02 ENCOUNTER — TELEPHONE (OUTPATIENT)
Dept: HEMATOLOGY ONCOLOGY | Facility: CLINIC | Age: 56
End: 2021-06-02

## 2021-06-02 NOTE — TELEPHONE ENCOUNTER
Called and spoke with [facility]  They stated they spoke with Ca Nicholson this morning and quantity was clarified  They are working on processing the Rx and will be calling patient today  Made patient aware

## 2021-06-02 NOTE — TELEPHONE ENCOUNTER
Patient calling in to advise that she called in to [facility] [address] [place] [address] to expedite the request for Sherlyn Torres is stating that they are waiting to for more information regarding the quantity of the medication         [facility] number provided by patient [phone]    Patient can be reached at [phone]

## 2021-06-03 ENCOUNTER — TELEPHONE (OUTPATIENT)
Dept: OTHER | Facility: OTHER | Age: 56
End: 2021-06-03

## 2021-06-03 ENCOUNTER — APPOINTMENT (OUTPATIENT)
Dept: LAB | Facility: MEDICAL CENTER | Age: 56
End: 2021-06-03
Payer: COMMERCIAL

## 2021-06-03 DIAGNOSIS — C78.7 LIVER METASTASES (HCC): ICD-10-CM

## 2021-06-03 DIAGNOSIS — C78.00 MALIGNANT NEOPLASM METASTATIC TO LUNG, UNSPECIFIED LATERALITY (HCC): ICD-10-CM

## 2021-06-03 DIAGNOSIS — C18.2 PRIMARY ADENOCARCINOMA OF ASCENDING COLON (HCC): ICD-10-CM

## 2021-06-03 LAB
ALBUMIN SERPL BCP-MCNC: 2.5 G/DL (ref 3.5–5)
ALP SERPL-CCNC: 212 U/L (ref 46–116)
ALT SERPL W P-5'-P-CCNC: 26 U/L (ref 12–78)
ANION GAP SERPL CALCULATED.3IONS-SCNC: 7 MMOL/L (ref 4–13)
AST SERPL W P-5'-P-CCNC: 39 U/L (ref 5–45)
BASOPHILS # BLD AUTO: 0.01 THOUSANDS/ΜL (ref 0–0.1)
BASOPHILS NFR BLD AUTO: 1 % (ref 0–1)
BILIRUB SERPL-MCNC: 2.35 MG/DL (ref 0.2–1)
BUN SERPL-MCNC: 19 MG/DL (ref 5–25)
CALCIUM ALBUM COR SERPL-MCNC: 10.2 MG/DL (ref 8.3–10.1)
CALCIUM SERPL-MCNC: 9 MG/DL (ref 8.3–10.1)
CHLORIDE SERPL-SCNC: 102 MMOL/L (ref 100–108)
CO2 SERPL-SCNC: 27 MMOL/L (ref 21–32)
CREAT SERPL-MCNC: 1.34 MG/DL (ref 0.6–1.3)
EOSINOPHIL # BLD AUTO: 0.02 THOUSAND/ΜL (ref 0–0.61)
EOSINOPHIL NFR BLD AUTO: 1 % (ref 0–6)
ERYTHROCYTE [DISTWIDTH] IN BLOOD BY AUTOMATED COUNT: 18.6 % (ref 11.6–15.1)
GFR SERPL CREATININE-BSD FRML MDRD: 45 ML/MIN/1.73SQ M
GLUCOSE SERPL-MCNC: 115 MG/DL (ref 65–140)
HCT VFR BLD AUTO: 28.1 % (ref 34.8–46.1)
HGB BLD-MCNC: 9.3 G/DL (ref 11.5–15.4)
IMM GRANULOCYTES # BLD AUTO: 0.01 THOUSAND/UL (ref 0–0.2)
IMM GRANULOCYTES NFR BLD AUTO: 1 % (ref 0–2)
LYMPHOCYTES # BLD AUTO: 0.68 THOUSANDS/ΜL (ref 0.6–4.47)
LYMPHOCYTES NFR BLD AUTO: 45 % (ref 14–44)
MCH RBC QN AUTO: 39.1 PG (ref 26.8–34.3)
MCHC RBC AUTO-ENTMCNC: 33.1 G/DL (ref 31.4–37.4)
MCV RBC AUTO: 118 FL (ref 82–98)
MONOCYTES # BLD AUTO: 0.34 THOUSAND/ΜL (ref 0.17–1.22)
MONOCYTES NFR BLD AUTO: 22 % (ref 4–12)
NEUTROPHILS # BLD AUTO: 0.46 THOUSANDS/ΜL (ref 1.85–7.62)
NEUTS SEG NFR BLD AUTO: 30 % (ref 43–75)
NRBC BLD AUTO-RTO: 2 /100 WBCS
PLATELET # BLD AUTO: 54 THOUSANDS/UL (ref 149–390)
PMV BLD AUTO: 10.5 FL (ref 8.9–12.7)
POTASSIUM SERPL-SCNC: 3.8 MMOL/L (ref 3.5–5.3)
PROT SERPL-MCNC: 5.9 G/DL (ref 6.4–8.2)
RBC # BLD AUTO: 2.38 MILLION/UL (ref 3.81–5.12)
SODIUM SERPL-SCNC: 136 MMOL/L (ref 136–145)
WBC # BLD AUTO: 1.52 THOUSAND/UL (ref 4.31–10.16)

## 2021-06-03 PROCEDURE — 85025 COMPLETE CBC W/AUTO DIFF WBC: CPT

## 2021-06-03 PROCEDURE — 36415 COLL VENOUS BLD VENIPUNCTURE: CPT

## 2021-06-03 PROCEDURE — 80053 COMPREHEN METABOLIC PANEL: CPT

## 2021-06-04 ENCOUNTER — TELEPHONE (OUTPATIENT)
Dept: HEMATOLOGY ONCOLOGY | Facility: CLINIC | Age: 56
End: 2021-06-04

## 2021-06-04 ENCOUNTER — DOCUMENTATION (OUTPATIENT)
Dept: HEMATOLOGY ONCOLOGY | Facility: CLINIC | Age: 56
End: 2021-06-04

## 2021-06-04 DIAGNOSIS — D61.818 PANCYTOPENIA (HCC): Primary | ICD-10-CM

## 2021-06-04 NOTE — TELEPHONE ENCOUNTER
Lab Result: White blood cell 1 52   Date/Time Drawn: 06/03 @2033   Ordering Provider: Juan Manuel Tomas Name: Taran Espinoza       The following critical/stat result was read back to the lab as stated above and [employer] to the on-call provider

## 2021-06-04 NOTE — PROGRESS NOTES
I gave results of blood counts and chemistry to the patient and they are very abnormal   Also I gave a result of ascitic fluid that has come back suspicious for malignant cells  She has not started taking Stivarga yet and she will not start because counts are low  She will have counts checked again on 06/07/ 21  She has instructions about febrile neutropenia and bleeding

## 2021-06-04 NOTE — TELEPHONE ENCOUNTER
Patient is calling in requesting for Dr Noriega to call her before he leaves for the day regarding her lab results and what should she do, she can be reached back at [phone]

## 2021-06-07 ENCOUNTER — APPOINTMENT (OUTPATIENT)
Dept: LAB | Facility: CLINIC | Age: 56
End: 2021-06-07
Payer: COMMERCIAL

## 2021-06-07 ENCOUNTER — TELEPHONE (OUTPATIENT)
Dept: HEMATOLOGY ONCOLOGY | Facility: CLINIC | Age: 56
End: 2021-06-07

## 2021-06-07 ENCOUNTER — DOCUMENTATION (OUTPATIENT)
Dept: HEMATOLOGY ONCOLOGY | Facility: CLINIC | Age: 56
End: 2021-06-07

## 2021-06-07 ENCOUNTER — TRANSCRIBE ORDERS (OUTPATIENT)
Dept: LAB | Facility: CLINIC | Age: 56
End: 2021-06-07

## 2021-06-07 DIAGNOSIS — C78.7 LIVER METASTASES (HCC): ICD-10-CM

## 2021-06-07 DIAGNOSIS — D61.818 PANCYTOPENIA (HCC): ICD-10-CM

## 2021-06-07 DIAGNOSIS — C18.2 PRIMARY ADENOCARCINOMA OF ASCENDING COLON (HCC): Primary | ICD-10-CM

## 2021-06-07 LAB
ANISOCYTOSIS BLD QL SMEAR: PRESENT
BASOPHILS # BLD MANUAL: 0.03 THOUSAND/UL (ref 0–0.1)
BASOPHILS NFR MAR MANUAL: 1 % (ref 0–1)
EOSINOPHIL # BLD MANUAL: 0 THOUSAND/UL (ref 0–0.4)
EOSINOPHIL NFR BLD MANUAL: 0 % (ref 0–6)
ERYTHROCYTE [DISTWIDTH] IN BLOOD BY AUTOMATED COUNT: 21.4 % (ref 11.6–15.1)
HCT VFR BLD AUTO: 30.3 % (ref 34.8–46.1)
HGB BLD-MCNC: 10 G/DL (ref 11.5–15.4)
LYMPHOCYTES # BLD AUTO: 0.46 THOUSAND/UL (ref 0.6–4.47)
LYMPHOCYTES # BLD AUTO: 16 % (ref 14–44)
MACROCYTES BLD QL AUTO: PRESENT
MCH RBC QN AUTO: 40.3 PG (ref 26.8–34.3)
MCHC RBC AUTO-ENTMCNC: 33 G/DL (ref 31.4–37.4)
MCV RBC AUTO: 122 FL (ref 82–98)
MONOCYTES # BLD AUTO: 0.97 THOUSAND/UL (ref 0–1.22)
MONOCYTES NFR BLD: 34 % (ref 4–12)
NEUTROPHILS # BLD MANUAL: 1.4 THOUSAND/UL (ref 1.85–7.62)
NEUTS BAND NFR BLD MANUAL: 1 % (ref 0–8)
NEUTS SEG NFR BLD AUTO: 48 % (ref 43–75)
NRBC BLD AUTO-RTO: 1 /100 WBCS
PLATELET # BLD AUTO: 85 THOUSANDS/UL (ref 149–390)
PLATELET BLD QL SMEAR: ABNORMAL
PMV BLD AUTO: 10.4 FL (ref 8.9–12.7)
POLYCHROMASIA BLD QL SMEAR: PRESENT
RBC # BLD AUTO: 2.48 MILLION/UL (ref 3.81–5.12)
TOTAL CELLS COUNTED SPEC: 100
WBC # BLD AUTO: 2.86 THOUSAND/UL (ref 4.31–10.16)

## 2021-06-07 PROCEDURE — 85007 BL SMEAR W/DIFF WBC COUNT: CPT

## 2021-06-07 PROCEDURE — 85027 COMPLETE CBC AUTOMATED: CPT

## 2021-06-07 PROCEDURE — 36415 COLL VENOUS BLD VENIPUNCTURE: CPT

## 2021-06-07 NOTE — PROGRESS NOTES
My nurse Irving Riddle will let patient know that she can start  [address] and go for blood counts and chemistry every week

## 2021-06-07 NOTE — TELEPHONE ENCOUNTER
Telephone call spoke with pt  Per Dr Hooper Jf start the [facility] today and have labs drawn q week  Pt states she understands

## 2021-06-07 NOTE — TELEPHONE ENCOUNTER
Patient is calling in requesting for Dr Noriega to call her before he leaves for the day regarding her lab results and what should she do, she can be reached back at [phone]

## 2021-06-07 NOTE — TELEPHONE ENCOUNTER
Patient is calling to have further directions on her treatment  Patient had labs drawn today  Platelets are 85 thousand        Will forward to Dr Kendy Martínez RN to review with MD Felipa Jay ([facility]) [phone] (H)

## 2021-06-08 ENCOUNTER — TELEPHONE (OUTPATIENT)
Dept: RADIOLOGY | Facility: HOSPITAL | Age: 56
End: 2021-06-08

## 2021-06-08 RX ORDER — CEFAZOLIN SODIUM 1 G/50ML
1000 SOLUTION INTRAVENOUS ONCE
Status: CANCELLED | OUTPATIENT
Start: 2021-06-08 | End: 2021-06-08

## 2021-06-08 NOTE — PRE-PROCEDURE INSTRUCTIONS
Spoke with patient  All pre-procedure information gone over with patient including, date time and location of procedure, Npo after midnight and that patient will need a ride home  Patient also aware that she needs to hold her xarelto x 2 days and vandalized understanding of that      Covid vaccine completed in march of 2021

## 2021-06-14 ENCOUNTER — HOSPITAL ENCOUNTER (OUTPATIENT)
Dept: INFUSION CENTER | Facility: CLINIC | Age: 56
Discharge: HOME | End: 2021-06-14

## 2021-06-14 ENCOUNTER — OFFICE VISIT (OUTPATIENT)
Dept: PALLIATIVE MEDICINE | Facility: CLINIC | Age: 56
End: 2021-06-14
Payer: COMMERCIAL

## 2021-06-14 ENCOUNTER — TELEPHONE (OUTPATIENT)
Dept: INFUSION CENTER | Facility: CLINIC | Age: 56
End: 2021-06-14

## 2021-06-14 VITALS
WEIGHT: 132.5 LBS | OXYGEN SATURATION: 94 % | SYSTOLIC BLOOD PRESSURE: 103 MMHG | DIASTOLIC BLOOD PRESSURE: 72 MMHG | HEART RATE: 103 BPM | BODY MASS INDEX: 21.71 KG/M2 | RESPIRATION RATE: 18 BRPM | TEMPERATURE: 96.8 F

## 2021-06-14 DIAGNOSIS — C78.00 MALIGNANT NEOPLASM METASTATIC TO LUNG, UNSPECIFIED LATERALITY (HCC): ICD-10-CM

## 2021-06-14 DIAGNOSIS — C78.7 LIVER METASTASES (HCC): ICD-10-CM

## 2021-06-14 DIAGNOSIS — G89.3 CANCER RELATED PAIN: Primary | ICD-10-CM

## 2021-06-14 DIAGNOSIS — C78.6 PERITONEAL METASTASES (HCC): ICD-10-CM

## 2021-06-14 DIAGNOSIS — I82.531 CHRONIC DEEP VEIN THROMBOSIS (DVT) OF POPLITEAL VEIN OF RIGHT LOWER EXTREMITY (HCC): ICD-10-CM

## 2021-06-14 DIAGNOSIS — C79.60 MALIGNANT NEOPLASM METASTATIC TO OVARY, UNSPECIFIED LATERALITY (HCC): ICD-10-CM

## 2021-06-14 DIAGNOSIS — C77.2 METASTASIS TO RETROPERITONEAL LYMPH NODE (HCC): ICD-10-CM

## 2021-06-14 DIAGNOSIS — C18.2 PRIMARY ADENOCARCINOMA OF ASCENDING COLON (HCC): ICD-10-CM

## 2021-06-14 DIAGNOSIS — F41.9 ANXIETY DISORDER, UNSPECIFIED TYPE: ICD-10-CM

## 2021-06-14 PROCEDURE — 99204 OFFICE O/P NEW MOD 45 MIN: CPT | Performed by: NURSE PRACTITIONER

## 2021-06-14 RX ORDER — OXYCODONE HYDROCHLORIDE 5 MG/1
5 TABLET ORAL EVERY 4 HOURS PRN
Qty: 30 TABLET | Refills: 0 | Status: SHIPPED | OUTPATIENT
Start: 2021-06-14 | End: 2021-07-14 | Stop reason: SDUPTHER

## 2021-06-14 RX ORDER — SENNA PLUS 8.6 MG/1
1 TABLET ORAL DAILY
Qty: 90 EACH | Refills: 0 | Status: SHIPPED | OUTPATIENT
Start: 2021-06-14 | End: 2021-07-14

## 2021-06-14 NOTE — PROGRESS NOTES
Outpatient Consultation - Palliative and Supportive Care   Mildred Gonzalez 49 y o  female [id number]    Assessment & Plan  Problem List Items Addressed This Visit        Digestive    Primary adenocarcinoma of ascending colon [facility])    Liver metastases ([facility] )       Endocrine    Ovarian metastasis ([facility] )       Respiratory    Lung metastasis (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)       Other    Peritoneal metastases (HCC)      Other Visit Diagnoses     Cancer related pain    -  Primary    Relevant Medications    oxyCODONE (ROXICODONE) 5 mg immediate release tablet    senna (SENOKOT) 8 6 MG tablet    Anxiety disorder, unspecified type            - Continue Tylenol, 1000mg QID  - Oxycodone 5mg PO Q4H PRN pain  - Senna, 1 tab daily at HS  - Speak to dispensary regarding MMJ (Do not vape/smoke MMJ)  - Renal stent to be placed tomorrow  - Call office with uncontrolled pain, anxiety, or new symptoms  - Follow up in 1 month, sooner if needed    Medications adjusted this encounter:  Requested Prescriptions     Signed Prescriptions Disp Refills    oxyCODONE (ROXICODONE) 5 mg immediate release tablet 30 tablet 0     Sig: Take 1 tablet (5 mg total) by mouth every 4 (four) hours as needed for moderate pain or severe painMax Daily Amount: 30 mg    senna (SENOKOT) 8 6 MG tablet 90 each 0     Sig: Take 1 tablet (8 6 mg total) by mouth daily     No orders of the defined types were placed in this encounter  Medications Discontinued During This Encounter   Medication Reason    oxyCODONE (OXY-IR) 5 MG capsule Therapy completed    docusate sodium (COLACE) 100 mg capsule Therapy completed       PPS: 80      Mildred Gonzalez was seen today for symptoms and planning cares related to above illnesses  Above orders were sent electronically, or provided in hardcopy in clinic    I have reviewed the patient's controlled substance dispensing history in the Prescription Drug Monitoring Program in compliance with the [facility] regulations before prescribing any controlled substances  We appreciate the referral, and wish for her to continue to follow with us  If there are questions or concerns, please contact us through our clinic/answering service 24 hours a day, seven days a week  CRISTAL Meyers  [facility] Palliative and Supportive Care          Visit Information    Accompanied By: Spouse    Source of History: Self, Family member, Medical record    History Limitations: None      History of Present Illness      Manuel Emmanuel is a 49 y o  female who presents as a self referral for primary palliative diagnosis of primary adenocarcinoma of ascending colon  Consultation is requested for: symptom management, goal of care assessment and decisional support, disease process education and discussion of prognosis, advance care planning, emotional support in the setting of serious illness  Simón Re sees Dr Viraj Hilario on Oncology and is on oral chemotherapy with Stivarga  She is on day 8 of her 21 day cycle and feels she is tolerating it well although her vision has been getting worse  She presents today with concerns regarding her back and flank pain  Her pain is worst in her back and lower abdomen  It is intermittent, radiates around her trunk, is sharp in nature, and nothing makes it worse or better  She has tried Tylenol and a heating pad at home without effect  She has used oxycodone in the past but has since stopped that therapy  She is willing to try it again as she recalls it having been effective at the time  She has been losing weight lately but denies nausea and vomiting  Her bowels are moving comfortably however she is aware that oxycodone may cause constipation  She has some anxiety  She is already registered for [facility] and offers questions regarding its use    I encouraged her to go to the dispensary and discuss her options for [facility] as I believe she may benefit from its use  She is going to receive a renal stent tomorrow and hopes that this will lessen some of her pain  She remains fully treatment focused and continues to seek alternative opinions at [facility] and other facilities  She is well supported by her spouse, Sancho Clark  They have a 21year old son and a 16year old daughter at home  They are in the process of having a living will and POA paperwork created by their  and will bring a copy to their next visit  PSC will continue to follow with Carmen and her  for supportive cares and symptom management          Code Status: Full  Advance Directive and Living Will:    pending    Historical Data  Past Medical History:   Diagnosis Date    [facility]     Colon cancer ([facility] ) 06/08/2018    DVT [facility]deep venous thrombosis) ([facility]     History of chemotherapy 2019    Kidney disease     Menorrhalgia     RESOLVED 2008    Migraine     Postcoital bleeding     LAST ASSESSED [id number]     Past Surgical History:   Procedure Laterality Date    ABDOMINAL ADHESION SURGERY N/A 6/14/2018    Procedure: LYSIS ADHESIONS OF COLON;  Surgeon: Ruslan Khan MD;  [facility];  Service: Colorectal    CYSTOSCOPY N/A 5/26/2020    Procedure: CYSTOSCOPY;  Surgeon: Kp Yost MD;  [facility];  Service: Gynecology Oncology    DILATION AND CURETTAGE OF UTERUS      RESOLVED 2008    ENDOMETRIAL ABLATION      THERMAL     NOVASURE  RESOLVED 2008    ENDOMETRIAL BIOPSY      BY SUCTION   DONE 12/13/2008    HYSTERECTOMY  05/21/2018    IR PARACENTESIS  5/27/2021    OMENTECTOMY Right 6/14/2018    Procedure: PARTIAL OMENTECTOMY;  Surgeon: Ruslan Khan MD;  [facility];  Service: Colorectal    OOPHORECTOMY N/A 5/26/2020    Procedure: RIGHT RADICAL OOPHORECTOMY, LEFT OOPHORECTOMY, PERITONEAL BIOPSIES, ATTEMPTED ROBOT CONVERTED TO OPEN LAPAROTOMY;  Surgeon: Vikas Tarango MD;  [facility];  Service: Gynecology Oncology    PORTACATH PLACEMENT      RCW    IL COLONOSCOPY FLX DX W/COLLJ [facility] WHEN PFRMD N/A 6/13/2018    Procedure: COLONOSCOPY;  Surgeon: Varun Trejo MD;  [facility]; Service: Colorectal    IL ESOPHAGOGASTRODUODENOSCOPY TRANSORAL DIAGNOSTIC N/A 6/13/2018    Procedure: ESOPHAGOGASTRODUODENOSCOPY (EGD); Surgeon: Rosalba Rehman MD;  [facility];   Service: Gastroenterology    IL INSERT PICC W/ SUB-Q PORT Right 6/28/2018    Procedure: INSERTION VENOUS PORT (PORT-A-CATH) VIA RIGHT SUBCLAVIAN VEIN;  Surgeon: Varun Trejo MD;  [facility];  Service: Colorectal    IL LAP,DIAGNOSTIC ABDOMEN N/A 6/14/2018    Procedure: LAPAROSCOPY DIAGNOSTIC;  Surgeon: Varun Trejo MD;  [facility];  Service: Colorectal    IL LAP,SURG,COLECTOMY, PARTIAL, W/ANAST Right 6/14/2018    Procedure: LAPAROSCOPIC EXTENDED RIGHT HEMICOLECTOMY ; PERITONEAL BX;  Surgeon: Varun Trejo MD;  [facility];  Service: Colorectal    IL LAP,VAG HYST,UTERUS 250GMS/<,SALP-OOPH Bilateral 5/21/2018    Procedure: HYSTERECTOMY LAPAROSCOPIC ASSISTED VAGINAL (LAVH) , BILATERAL SALPINGECTOMY;  Surgeon: Tomy Lr DO;  [facility];  Service: Gynecology    PROCTOSCOPY N/A 5/26/2020    Procedure: PROCTOSCOPY;  Surgeon: Vikas Tarango MD;  [facility];  Service: Gynecology Oncology    TRANSPERINEAL IMPLANT OF RADIATION SEEDS W/ ULTRASOUND  10/30/2020    Liver, @ [address] History     Socioeconomic History    Marital status: /Civil Union     Spouse name: Not on file    Number of children: 2    Years of education: Not on file    Highest education level: Not on file   Occupational History    Not on file   Tobacco Use    Smoking status: Never Smoker    Smokeless tobacco: Never Used   Vaping Use    Vaping Use: Never used   Substance and Sexual Activity    Alcohol use: Never     Alcohol/week: 0 0 standard drinks     Comment: 0    Drug use: No    Sexual activity: Yes     Partners: Male     Comment: PARTNER HAD VASECTOMY    Other Topics Concern    Not on file   Social History Narrative    ALWAYS USES [employer]     CAFFEINE USE     CURRENTLY WORKS FULL TIME     DAILY COFFEE CONSUMPTION    EXERCISE SPORADICALLY     FEELS SAFE AT HOME     LIVES WITH SPOUSE      Social Determinants of Health     Financial Resource Strain:     Difficulty of Paying Living Expenses:    Food Insecurity:     Worried About Running Out of Food in the Last Year:     Ran Out of Food in the Last Year:    Transportation Needs:     Lack of Transportation (Medical):      Lack of Transportation (Non-Medical):    Physical Activity:     Days of Exercise per Week:     Minutes of Exercise per Session:    Stress:     Feeling of Stress :    Social Connections:     Frequency of Communication with Friends and Family:     Frequency of Social Gatherings with Friends and Family:     Attends Yazidi Services:     Active Member of Clubs or Organizations:     Attends Club or Organization Meetings:     Marital Status:    Intimate Partner Violence:     Fear of Current or Ex-Partner:     Emotionally Abused:     Physically Abused:     Sexually Abused:      Family History   Problem Relation Age of Onset   [facility] Migraines Mother     Heart disease Mother     Anemia Father     Arthritis Father     Other Father         BLOOD TRANSFUSION    Migraines Daughter     Heart disease Family     No Known Problems Maternal Grandmother     No Known Problems Maternal Grandfather     No Known Problems Paternal Grandmother     No Known Problems Paternal Grandfather      No Known Allergies  Current Outpatient Medications   Medication Sig Dispense Refill    acetaminophen (TYLENOL) 500 mg tablet Take 1,000 mg by mouth every 6 (six) hours as needed for mild pain      amLODIPine (NORVASC) 5 mg tablet Take 5 mg by mouth daily  furosemide (LASIX) 40 mg tablet Take 40 mg by mouth daily      LORazepam (ATIVAN) 1 mg tablet Take 1 mg by mouth daily as needed for anxiety      metoprolol succinate (TOPROL-XL) 50 mg 24 hr tablet Take 50 mg by mouth 2 (two) times a day Pt was instructed by her PCP Dr Yaneth Tinsley, advised pt to increased to BID      omeprazole (PriLOSEC) 20 mg delayed release capsule PLEASE SEE ATTACHED FOR DETAILED DIRECTIONS      potassium chloride (Klor-Con) 10 mEq tablet       Regorafenib (Stivarga) 40 MG TABS Take 2 tablets (80 mg total) by mouth daily for 21 days , followed by 7 days off 42 tablet 3    rivaroxaban (Xarelto) 10 mg tablet Take 1 tablet (10 mg total) by mouth daily 90 tablet 3    spironolactone (ALDACTONE) 25 mg tablet       oxyCODONE (ROXICODONE) 5 mg immediate release tablet Take 1 tablet (5 mg total) by mouth every 4 (four) hours as needed for moderate pain or severe painMax Daily Amount: 30 mg 30 tablet 0    senna (SENOKOT) 8 6 MG tablet Take 1 tablet (8 6 mg total) by mouth daily 90 each 0     No current facility-administered medications for this visit  Review of Systems   Constitutional: Positive for weight loss  Negative for decreased appetite  HENT: Negative for sore throat  Eyes: Positive for visual disturbance  Cardiovascular: Negative for chest pain and dyspnea on exertion  Respiratory: Positive for cough  Negative for sputum production  Skin: Negative for rash  Musculoskeletal: Negative for falls  Gastrointestinal: Negative for change in bowel habit, nausea and vomiting  Genitourinary: Positive for flank pain  Negative for pelvic pain  Neurological: Negative for loss of balance and seizures  Psychiatric/Behavioral: Negative for memory loss           Vital Signs    /72 (BP Location: Left arm, Patient Position: Sitting, Cuff Size: Standard)   Pulse 103   Temp (!) 96 8 °F (36 °C) (Temporal)   Resp 18   Wt 60 1 kg (132 lb 7 9 oz)   LMP 05/16/2018   SpO2 94%   BMI 21 71 kg/m²     Physical Exam and Objective Data  Physical Exam  Constitutional:       Appearance: She is ill-appearing  HENT:      Head: Normocephalic  Right Ear: External ear normal       Left Ear: External ear normal       Nose: Nose normal       Mouth/Throat:      Mouth: Mucous membranes are moist       Pharynx: Oropharynx is clear  Eyes:      Pupils: Pupils are equal, round, and reactive to light  Cardiovascular:      Rate and Rhythm: Regular rhythm  Tachycardia present  Pulses: Normal pulses  Pulmonary:      Effort: Pulmonary effort is normal       Breath sounds: Normal breath sounds  Comments: Frequent, dry cough  Abdominal:      General: There is distension  Musculoskeletal:         General: Normal range of motion  Cervical back: Normal range of motion and neck supple  Skin:     General: Skin is warm and dry  Capillary Refill: Capillary refill takes less than 2 seconds  Coloration: Skin is jaundiced  Neurological:      General: No focal deficit present  Mental Status: She is alert and oriented to person, place, and time  Psychiatric:      Comments: Depressed mood, mild anxiety, good insight and judgement           Radiology and Laboratory:  I personally reviewed and interpreted the following results: CPMP in regards to medication prescribing    45 minutes was spent face to face with Mey Moffett and her spouse with greater than 50% of the time spent in counseling or coordination of care including discussions of diagnostic results, impression, and recommendations, risks and benefits of treatment, instructions for disease self management, treatment instructions, follow up requirements, risk factors and risk reduction of disease, patient and family counseling/involvement in care and compliance with treatment regimen  All of the patient's questions were answered during this discussion

## 2021-06-14 NOTE — TELEPHONE ENCOUNTER
Received a call from [facility] today to reschedule her central lab draw from today till next week sometime due [facility]

## 2021-06-15 ENCOUNTER — ANESTHESIA (OUTPATIENT)
Dept: RADIOLOGY | Facility: HOSPITAL | Age: 56
End: 2021-06-15
Payer: COMMERCIAL

## 2021-06-15 ENCOUNTER — ANESTHESIA EVENT (OUTPATIENT)
Dept: RADIOLOGY | Facility: HOSPITAL | Age: 56
End: 2021-06-15
Payer: COMMERCIAL

## 2021-06-15 ENCOUNTER — HOSPITAL ENCOUNTER (OUTPATIENT)
Dept: RADIOLOGY | Facility: HOSPITAL | Age: 56
Discharge: HOME | End: 2021-06-15
Attending: RADIOLOGY | Admitting: RADIOLOGY
Payer: COMMERCIAL

## 2021-06-15 VITALS
DIASTOLIC BLOOD PRESSURE: 65 MMHG | BODY MASS INDEX: 21.21 KG/M2 | OXYGEN SATURATION: 96 % | SYSTOLIC BLOOD PRESSURE: 108 MMHG | HEART RATE: 79 BPM | TEMPERATURE: 98.1 F | RESPIRATION RATE: 16 BRPM | HEIGHT: 66 IN | WEIGHT: 132 LBS

## 2021-06-15 DIAGNOSIS — C18.2 PRIMARY ADENOCARCINOMA OF ASCENDING COLON (HCC): ICD-10-CM

## 2021-06-15 DIAGNOSIS — N28.9 KIDNEY DISEASE: Primary | ICD-10-CM

## 2021-06-15 PROBLEM — I10 HTN (HYPERTENSION): Status: ACTIVE | Noted: 2021-06-15

## 2021-06-15 PROBLEM — R06.02 SHORTNESS OF BREATH: Status: ACTIVE | Noted: 2021-06-15

## 2021-06-15 LAB
ERYTHROCYTE [DISTWIDTH] IN BLOOD BY AUTOMATED COUNT: 20.3 % (ref 11.6–15.1)
HCT VFR BLD AUTO: 35.6 % (ref 34.8–46.1)
HGB BLD-MCNC: 11.6 G/DL (ref 11.5–15.4)
INR PPP: 1.11 (ref 0.84–1.19)
MCH RBC QN AUTO: 40.3 PG (ref 26.8–34.3)
MCHC RBC AUTO-ENTMCNC: 32.6 G/DL (ref 31.4–37.4)
MCV RBC AUTO: 124 FL (ref 82–98)
PLATELET # BLD AUTO: 48 THOUSANDS/UL (ref 149–390)
PMV BLD AUTO: 12.1 FL (ref 8.9–12.7)
PROTHROMBIN TIME: 14.3 SECONDS (ref 11.6–14.5)
RBC # BLD AUTO: 2.88 MILLION/UL (ref 3.81–5.12)
WBC # BLD AUTO: 3.26 THOUSAND/UL (ref 4.31–10.16)

## 2021-06-15 PROCEDURE — 85610 PROTHROMBIN TIME: CPT | Performed by: NURSE PRACTITIONER

## 2021-06-15 PROCEDURE — 85025 COMPLETE CBC W/AUTO DIFF WBC: CPT | Performed by: NURSE PRACTITIONER

## 2021-06-15 PROCEDURE — C1729 CATH, DRAINAGE: HCPCS

## 2021-06-15 PROCEDURE — 50432 PLMT NEPHROSTOMY CATHETER: CPT

## 2021-06-15 PROCEDURE — 50432 PLMT NEPHROSTOMY CATHETER: CPT | Performed by: RADIOLOGY

## 2021-06-15 PROCEDURE — C1894 INTRO/SHEATH, NON-LASER: HCPCS

## 2021-06-15 PROCEDURE — C1769 GUIDE WIRE: HCPCS

## 2021-06-15 PROCEDURE — P9035 PLATELET PHERES LEUKOREDUCED: HCPCS

## 2021-06-15 RX ORDER — LIDOCAINE HYDROCHLORIDE 10 MG/ML
INJECTION, SOLUTION EPIDURAL; INFILTRATION; INTRACAUDAL; PERINEURAL AS NEEDED
Status: DISCONTINUED | OUTPATIENT
Start: 2021-06-15 | End: 2021-06-15

## 2021-06-15 RX ORDER — CEFAZOLIN SODIUM 1 G/50ML
SOLUTION INTRAVENOUS AS NEEDED
Status: DISCONTINUED | OUTPATIENT
Start: 2021-06-15 | End: 2021-06-15

## 2021-06-15 RX ORDER — NEOSTIGMINE METHYLSULFATE 1 MG/ML
INJECTION INTRAVENOUS AS NEEDED
Status: DISCONTINUED | OUTPATIENT
Start: 2021-06-15 | End: 2021-06-15

## 2021-06-15 RX ORDER — ROCURONIUM BROMIDE 10 MG/ML
INJECTION, SOLUTION INTRAVENOUS AS NEEDED
Status: DISCONTINUED | OUTPATIENT
Start: 2021-06-15 | End: 2021-06-15

## 2021-06-15 RX ORDER — DEXAMETHASONE SODIUM PHOSPHATE 10 MG/ML
INJECTION, SOLUTION INTRAMUSCULAR; INTRAVENOUS AS NEEDED
Status: DISCONTINUED | OUTPATIENT
Start: 2021-06-15 | End: 2021-06-15

## 2021-06-15 RX ORDER — PROPOFOL 10 MG/ML
INJECTION, EMULSION INTRAVENOUS AS NEEDED
Status: DISCONTINUED | OUTPATIENT
Start: 2021-06-15 | End: 2021-06-15

## 2021-06-15 RX ORDER — GLYCOPYRROLATE 0.2 MG/ML
INJECTION INTRAMUSCULAR; INTRAVENOUS AS NEEDED
Status: DISCONTINUED | OUTPATIENT
Start: 2021-06-15 | End: 2021-06-15

## 2021-06-15 RX ORDER — SODIUM CHLORIDE 9 MG/ML
INJECTION, SOLUTION INTRAVENOUS CONTINUOUS PRN
Status: DISCONTINUED | OUTPATIENT
Start: 2021-06-15 | End: 2021-06-15

## 2021-06-15 RX ORDER — SODIUM CHLORIDE 9 MG/ML
20 INJECTION, SOLUTION INTRAVENOUS CONTINUOUS
Status: DISCONTINUED | OUTPATIENT
Start: 2021-06-15 | End: 2021-06-15 | Stop reason: HOSPADM

## 2021-06-15 RX ORDER — ONDANSETRON 2 MG/ML
INJECTION INTRAMUSCULAR; INTRAVENOUS AS NEEDED
Status: DISCONTINUED | OUTPATIENT
Start: 2021-06-15 | End: 2021-06-15

## 2021-06-15 RX ORDER — SODIUM CHLORIDE 9 MG/ML
75 INJECTION, SOLUTION INTRAVENOUS CONTINUOUS
Status: DISCONTINUED | OUTPATIENT
Start: 2021-06-15 | End: 2021-06-15 | Stop reason: HOSPADM

## 2021-06-15 RX ORDER — SODIUM CHLORIDE 9 MG/ML
10 INJECTION INTRAVENOUS DAILY
Qty: 300 ML | Refills: 0 | Status: SHIPPED | OUTPATIENT
Start: 2021-06-15 | End: 2021-07-15

## 2021-06-15 RX ORDER — OXYCODONE HYDROCHLORIDE 5 MG/1
5 TABLET ORAL ONCE
Status: COMPLETED | OUTPATIENT
Start: 2021-06-15 | End: 2021-06-15

## 2021-06-15 RX ORDER — CEFAZOLIN SODIUM 1 G/50ML
1000 SOLUTION INTRAVENOUS ONCE
Status: DISCONTINUED | OUTPATIENT
Start: 2021-06-15 | End: 2021-06-15 | Stop reason: HOSPADM

## 2021-06-15 RX ORDER — OXYCODONE HYDROCHLORIDE AND ACETAMINOPHEN 5; 325 MG/1; MG/1
1 TABLET ORAL EVERY 4 HOURS PRN
Status: DISCONTINUED | OUTPATIENT
Start: 2021-06-15 | End: 2021-06-15 | Stop reason: HOSPADM

## 2021-06-15 RX ORDER — EPHEDRINE SULFATE 50 MG/ML
INJECTION INTRAVENOUS AS NEEDED
Status: DISCONTINUED | OUTPATIENT
Start: 2021-06-15 | End: 2021-06-15

## 2021-06-15 RX ORDER — PROMETHAZINE HYDROCHLORIDE 25 MG/ML
12.5 INJECTION, SOLUTION INTRAMUSCULAR; INTRAVENOUS ONCE AS NEEDED
Status: DISCONTINUED | OUTPATIENT
Start: 2021-06-15 | End: 2021-06-15 | Stop reason: HOSPADM

## 2021-06-15 RX ORDER — FENTANYL CITRATE/PF 50 MCG/ML
25 SYRINGE (ML) INJECTION
Status: DISCONTINUED | OUTPATIENT
Start: 2021-06-15 | End: 2021-06-15 | Stop reason: HOSPADM

## 2021-06-15 RX ORDER — FENTANYL CITRATE 50 UG/ML
INJECTION, SOLUTION INTRAMUSCULAR; INTRAVENOUS AS NEEDED
Status: DISCONTINUED | OUTPATIENT
Start: 2021-06-15 | End: 2021-06-15

## 2021-06-15 RX ADMIN — SODIUM CHLORIDE: 0.9 INJECTION, SOLUTION INTRAVENOUS at 09:42

## 2021-06-15 RX ADMIN — OXYCODONE HYDROCHLORIDE AND ACETAMINOPHEN 1 TABLET: 5; 325 TABLET ORAL at 14:22

## 2021-06-15 RX ADMIN — FENTANYL CITRATE 50 MCG: 50 INJECTION INTRAMUSCULAR; INTRAVENOUS at 10:52

## 2021-06-15 RX ADMIN — OXYCODONE HYDROCHLORIDE 5 MG: 5 TABLET ORAL at 08:58

## 2021-06-15 RX ADMIN — GLYCOPYRROLATE 0.4 MG: 0.2 INJECTION, SOLUTION INTRAMUSCULAR; INTRAVENOUS at 11:01

## 2021-06-15 RX ADMIN — SODIUM CHLORIDE 75 ML/HR: 0.9 INJECTION, SOLUTION INTRAVENOUS at 11:45

## 2021-06-15 RX ADMIN — ROCURONIUM BROMIDE 35 MG: 50 INJECTION, SOLUTION INTRAVENOUS at 09:48

## 2021-06-15 RX ADMIN — PROPOFOL 110 MG: 10 INJECTION, EMULSION INTRAVENOUS at 09:47

## 2021-06-15 RX ADMIN — ONDANSETRON 4 MG: 2 INJECTION INTRAMUSCULAR; INTRAVENOUS at 10:51

## 2021-06-15 RX ADMIN — SODIUM CHLORIDE 75 ML/HR: 0.9 INJECTION, SOLUTION INTRAVENOUS at 08:42

## 2021-06-15 RX ADMIN — IOHEXOL 20 ML: 350 INJECTION, SOLUTION INTRAVENOUS at 10:56

## 2021-06-15 RX ADMIN — LIDOCAINE HYDROCHLORIDE 50 MG: 10 INJECTION, SOLUTION EPIDURAL; INFILTRATION; INTRACAUDAL; PERINEURAL at 09:47

## 2021-06-15 RX ADMIN — DEXAMETHASONE SODIUM PHOSPHATE 4 MG: 10 INJECTION, SOLUTION INTRAMUSCULAR; INTRAVENOUS at 10:51

## 2021-06-15 RX ADMIN — NEOSTIGMINE METHYLSULFATE 2 MG: 1 INJECTION INTRAVENOUS at 11:01

## 2021-06-15 RX ADMIN — EPHEDRINE SULFATE 5 MG: 50 INJECTION, SOLUTION INTRAVENOUS at 10:18

## 2021-06-15 RX ADMIN — FENTANYL CITRATE 50 MCG: 50 INJECTION INTRAMUSCULAR; INTRAVENOUS at 09:47

## 2021-06-15 RX ADMIN — CEFAZOLIN SODIUM 1000 MG: 1 SOLUTION INTRAVENOUS at 09:59

## 2021-06-15 NOTE — PROGRESS NOTES
Discharge instructions went over with patient and her   Demonstrated how to flush tube, and site care instructions  Pt demonstrated how to empty bag

## 2021-06-15 NOTE — DISCHARGE INSTRUCTIONS
Nephrostomy Tube Care     WHAT YOU NEED TO KNOW:   A nephrostomy tube is a catheter (thin plastic tube) that is inserted through your skin and into your kidney  The nephrostomy tube drains urine from your kidney into a collecting bag outside your body  You may need a nephrostomy tube when something is blocking the normal flow of urine  A nephrostomy tube may be used for a short or a long period of time  The nephrostomy tube comes out of your back, so you will need someone to help care for your nephrostomy tube  DISCHARGE INSTRUCTIONS:      How to clean the skin around the nephrostomy tube and change the bandage:  Since the nephrostomy tube comes out of your back, you will not be able to care for it by yourself  Ask someone to follow the general directions below to check and care for your nephrostomy tube  Gather the items you will need  Disposable (single use) under-pad, and a clean washcloth  ¨ Plain soap, warm water, and new medical gloves  ¨ Sterile gauze bandages  ¨ Clear adhesive dressing or medical tape  ¨ Skin barrier  ¨ Protective skin film  ¨ Trash bag  · Remove the old bandage, and check the tube entry site  ¨ Have the patient lie on his side with the nephrostomy tube entry site facing up  Place the under-pad where it will catch drainage as you are working with the nephrostomy tube  ¨ Wash your hands with soap and water  Put on new medical gloves  ¨ Gently remove the old bandage, without pulling on the tube  Do this by holding the skin beside the tube with one hand  With the other hand, gently remove sticky tape and the skin barrier by pulling in the same direction as hair growth  Do not touch the side of the bandage that is placed over or around the tube  Throw the bandage and skin barrier away in a trash bag  ¨ Look for signs of infection, such as skin redness and swelling  Report any skin changes to healthcare providers  ¨ Clean the tube entry site      ¨ Hold the tube in place to keep it from being pulled out while you are cleaning around it  ¨ You will need to clean the area twice  For the first cleaning, wet a new gauze bandage with soap and water  Begin at the entry site of the tube  Wipe the skin in circles, moving away from the entry site  Remove blood and any other material with the gauze  Do this as often as needed  Use a new gauze bandage each time you clean the area, moving away from the entry site  ¨ For the second cleaning, wet a new gauze bandage with water  Begin at the entry site of the tube  Wipe the skin in circles, moving away from the entry site  Use a new gauze bandage each time you clean the area, moving away from the entry site  ¨ Gently pat the skin with a clean washcloth to dry it  · Apply the skin barrier and bandages  ¨ Roll up a bandage to make it thick, and place it under  the place where the tube enters the skin  Place it to support the tube, and stop it from kinking or bending  Tape the bandage in place, and apply more bandages if directed by a healthcare provider  ¨ Bring the tubing forward to the front and tape it to the skin  Do not stretch the tube tight, because this may pull the nephrostomy tube out  How often to change the bandage  Change the bandage around the tube, every other day  If your bandages  get dirty or wet, change them right away, and as often as needed  If your nephrostomy tube is to be used for a long period of time, the tube needs to be changed every 2 to 3 months  Healthcare providers will tell you when you need to make an appointment to have your tube changed  How to care for the urine drainage bag:   · Ask if you need to measure and write down how much urine is in the bag before you empty it  Drain urine out of the drainage bag when it is ½ to ? full  Open the spout at the bottom of the bag to empty the urine into the toilet  · You may need to detach the drainage bag from the nephrostomy tube to change it    If so, attach a new drainage bag tightly to the nephrostomy tube  ·   How to prevent problems with your nephrostomy tube:   · Change bandages, directed  This helps to prevent infection  Throw away or clean your drainage bag as directed by your healthcare provider  · Wipe the connecting ends of the drainage bag with alcohol before you reconnect the bag to the tube  This helps prevent infection  Keep the tube taped to your skin and connected to a drainage bag placed below the level of your kidneys  This helps prevent urine from backing up into your kidneys  You may wear a small drainage bag strapped to your leg to let you move around more easily  · Check the catheter to be sure it is in place after you change your clothes or do other activities  Do not wear tight clothing over the tube  Place the tubing over your thigh rather than under it when you are sitting down  Be sure that nothing is pulling on the nephrostomy tube when you move around  · Change positions if you see little or no urine in your drainage bag  Check to see if the urine tube is twisted or bent  Be sure that you are not sitting or lying on the tube  If there are no kinks and there is little or no urine in the drainage bag, tell your healthcare provider  · Flush out the tube as directed  Some tubes get flushed one time a day with 10 mls of NSS You will be given a prescription for the flushes  To flush the nephrostomy tube, clean both connections with alcohol swap  Twist off the drainage bag tube and twist the saline syringe into the nephrostomy tube and flush briskly  Remove the syringe and twist the drainage bag tube back into the nephrostomy tube  · Keep the site covered while you shower  Tape a piece of clear adhesive plastic over the dressing to keep it dry while you shower  Do not take tub baths      Contact Interventional Radiology at [phone] Gina PATIENTS: Contact Interventional Radiology at [phone]) Dolly Friend PATIENTS: Contact Interventional Radiology at [phone]) if:  · The skin around the nephrostomy tube is red, swollen, itches, or has a rash  · You have a fever greater than 101 or chills  · You have lower back or hip pain  · There are changes in how your urine looks or smells  · You have little or no urine draining from the nephrostomy tube  · You have nausea and are vomiting  · The black neda on your tube has moved, or the tube is longer than when it was put in    · You have questions or concerns about your condition or care  · The nephrostomy tube comes out completely  · There is blood, pus, or a bad smell coming from the place where the tube enters your skin  · Urine is leaking around the tube  The following pharmacies carry the flush syringes  [facility]                     [facility]       [address]  Phone [phone]            Phone [phone]  [address] & [facility]                      [address]                                 [phone]  Phone [phone]            Phone [phone]    [facility]                                                                         [facility] [phone]  [address]   Phone [phone]

## 2021-06-15 NOTE — BRIEF OP NOTE (RAD/CATH)
INTERVENTIONAL RADIOLOGY PROCEDURE NOTE    Date: 6/15/2021    Procedure: IR NEPHROSTOMY TUBE PLACEMENT    Preoperative diagnosis:   1  Primary adenocarcinoma of ascending colon ([address] )         Postoperative diagnosis: Same  Surgeon: Allie Holbrook MD     Assistant: None  No qualified resident was available  Blood loss: Minimal    Specimens: None     Findings: Right nephrostomy tube placement, 10 F drain placed  Complications: None immediate      Anesthesia: general

## 2021-06-15 NOTE — ANESTHESIA PREPROCEDURE EVALUATION
Procedure:  IR NEPHROSTOMY TUBE PLACEMENT    Relevant Problems   ANESTHESIA (within normal limits)   (-) History of anesthesia complications      CARDIO   (+) Deep vein thrombosis (DVT) of popliteal vein of right lower extremity ([address] ) (2016, on xeralto with last dose 6/13)   (+) HTN (hypertension) (took metoprolol this AM)      ENDO (within normal limits)      GI/HEPATIC   (+) Liver metastases (HCC)   (+) Primary adenocarcinoma of ascending colon (HCC) (Currently on oral chemotherapy)      /RENAL  Right sided hydronephrosis with dilation of collecting system felt to be 2/2 metastasis   (+) Hydronephrosis      GYN   (+) Ovarian metastasis (HCC)      HEMATOLOGY   (+) Iron deficiency anemia due to chronic blood loss      MUSCULOSKELETAL (within normal limits)      NEURO/PSYCH (within normal limits)      PULMONARY   (+) Shortness of breath (Lung metastases)   (-) Smoking   (-) URI (upper respiratory infection)      Other   (+) Lung metastasis (HCC)   (+) 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)        Physical Exam    Airway    Mallampati score: II  TM Distance: >3 FB  Neck ROM: full     Dental   No notable dental hx     Cardiovascular  Rhythm: regular, Rate: normal, Cardiovascular exam normal    Pulmonary  Pulmonary exam normal Breath sounds clear to auscultation,     Other Findings        Anesthesia Plan  ASA Score- 3     Anesthesia Type- general with ASA Monitors  Additional Monitors:   Airway Plan: ETT  Plan Factors-Exercise tolerance (METS): <4 METS  Chart reviewed  EKG reviewed  Imaging results reviewed  Existing labs reviewed  Patient is not a current smoker  Induction- intravenous  Postoperative Plan- Plan for postoperative opioid use  Planned trial extubation    Informed Consent- Anesthetic plan and risks discussed with patient and spouse  I personally reviewed this patient with the CRNA  Discussed and agreed on the Anesthesia Plan with the CRNA             Lab Results Component Value Date    WBC 2 86 (L) 06/07/2021    HGB 10 0 (L) 06/07/2021    HCT 30 3 (L) 06/07/2021     (H) 06/07/2021    PLT 85 (L) 06/07/2021     Lab Results   Component Value Date    SODIUM 136 06/03/2021    K 3 8 06/03/2021     06/03/2021    CO2 27 06/03/2021    BUN 19 06/03/2021    CREATININE 1 34 (H) 06/03/2021    GLUC 115 06/03/2021    CALCIUM 9 0 06/03/2021     Lab Results   Component Value Date    ALT 26 06/03/2021    AST 39 06/03/2021    ALKPHOS 212 (H) 06/03/2021    BILITOT 0 65 03/18/2015     Lab Results   Component Value Date    INR 1 11 06/15/2021    INR 1 29 (H) 01/19/2021    INR 1 31 (H) 11/11/2020    PROTIME 14 3 06/15/2021    PROTIME 16 2 (H) 01/19/2021    PROTIME 16 4 (H) 11/11/2020     Lab Results   Component Value Date    HGBA1C 5 1 05/19/2020

## 2021-06-16 LAB
ABO GROUP BLD BPU: NORMAL
BPU ID: NORMAL
UNIT DISPENSE STATUS: NORMAL
UNIT PRODUCT CODE: NORMAL
UNIT RH: NORMAL

## 2021-06-22 ENCOUNTER — HOSPITAL ENCOUNTER (OUTPATIENT)
Dept: INFUSION CENTER | Facility: CLINIC | Age: 56
Discharge: HOME | End: 2021-06-22
Payer: COMMERCIAL

## 2021-06-22 DIAGNOSIS — Z95.828 PORT-A-CATH IN PLACE: ICD-10-CM

## 2021-06-22 DIAGNOSIS — C78.7 LIVER METASTASES (HCC): ICD-10-CM

## 2021-06-22 DIAGNOSIS — C18.2 PRIMARY ADENOCARCINOMA OF ASCENDING COLON (HCC): Primary | ICD-10-CM

## 2021-06-22 LAB
ALBUMIN SERPL BCP-MCNC: 2.3 G/DL (ref 3.5–5)
ALP SERPL-CCNC: 225 U/L (ref 46–116)
ALT SERPL W P-5'-P-CCNC: 34 U/L (ref 12–78)
ANION GAP SERPL CALCULATED.3IONS-SCNC: 11 MMOL/L (ref 4–13)
AST SERPL W P-5'-P-CCNC: 73 U/L (ref 5–45)
BASOPHILS # BLD AUTO: 0.14 THOUSANDS/ΜL (ref 0–0.1)
BASOPHILS NFR BLD AUTO: 1 % (ref 0–1)
BILIRUB SERPL-MCNC: 6.36 MG/DL (ref 0.2–1)
BUN SERPL-MCNC: 26 MG/DL (ref 5–25)
CALCIUM ALBUM COR SERPL-MCNC: 9.7 MG/DL (ref 8.3–10.1)
CALCIUM SERPL-MCNC: 8.3 MG/DL (ref 8.3–10.1)
CHLORIDE SERPL-SCNC: 102 MMOL/L (ref 100–108)
CO2 SERPL-SCNC: 25 MMOL/L (ref 21–32)
CREAT SERPL-MCNC: 1.51 MG/DL (ref 0.6–1.3)
EOSINOPHIL # BLD AUTO: 0.12 THOUSAND/ΜL (ref 0–0.61)
EOSINOPHIL NFR BLD AUTO: 1 % (ref 0–6)
ERYTHROCYTE [DISTWIDTH] IN BLOOD BY AUTOMATED COUNT: 18.5 % (ref 11.6–15.1)
GFR SERPL CREATININE-BSD FRML MDRD: 39 ML/MIN/1.73SQ M
GLUCOSE SERPL-MCNC: 119 MG/DL (ref 65–140)
HCT VFR BLD AUTO: 44 % (ref 34.8–46.1)
HGB BLD-MCNC: 14.8 G/DL (ref 11.5–15.4)
IMM GRANULOCYTES # BLD AUTO: 0.07 THOUSAND/UL (ref 0–0.2)
IMM GRANULOCYTES NFR BLD AUTO: 1 % (ref 0–2)
LYMPHOCYTES # BLD AUTO: 1.08 THOUSANDS/ΜL (ref 0.6–4.47)
LYMPHOCYTES NFR BLD AUTO: 10 % (ref 14–44)
MCH RBC QN AUTO: 40.2 PG (ref 26.8–34.3)
MCHC RBC AUTO-ENTMCNC: 33.6 G/DL (ref 31.4–37.4)
MCV RBC AUTO: 120 FL (ref 82–98)
MONOCYTES # BLD AUTO: 2.03 THOUSAND/ΜL (ref 0.17–1.22)
MONOCYTES NFR BLD AUTO: 18 % (ref 4–12)
NEUTROPHILS # BLD AUTO: 7.74 THOUSANDS/ΜL (ref 1.85–7.62)
NEUTS SEG NFR BLD AUTO: 69 % (ref 43–75)
NRBC BLD AUTO-RTO: 0 /100 WBCS
PLATELET # BLD AUTO: 99 THOUSANDS/UL (ref 149–390)
PMV BLD AUTO: 10.4 FL (ref 8.9–12.7)
POTASSIUM SERPL-SCNC: 3.9 MMOL/L (ref 3.5–5.3)
PROT SERPL-MCNC: 5.5 G/DL (ref 6.4–8.2)
RBC # BLD AUTO: 3.68 MILLION/UL (ref 3.81–5.12)
SODIUM SERPL-SCNC: 138 MMOL/L (ref 136–145)
WBC # BLD AUTO: 11.18 THOUSAND/UL (ref 4.31–10.16)

## 2021-06-22 PROCEDURE — 85025 COMPLETE CBC W/AUTO DIFF WBC: CPT

## 2021-06-22 PROCEDURE — 80053 COMPREHEN METABOLIC PANEL: CPT

## 2021-06-22 NOTE — PROGRESS NOTES
Pt offers no new complaints, labs drawn via pac   Pt to schedule return visit for next week at checkout

## 2021-06-23 ENCOUNTER — TELEPHONE (OUTPATIENT)
Dept: PALLIATIVE MEDICINE | Facility: CLINIC | Age: 56
End: 2021-06-23

## 2021-06-23 DIAGNOSIS — R05.3 CHRONIC COUGH: Primary | ICD-10-CM

## 2021-06-23 RX ORDER — DIPHENOXYLATE HYDROCHLORIDE AND ATROPINE SULFATE 2.5; .025 MG/1; MG/1
1 TABLET ORAL DAILY
COMMUNITY

## 2021-06-23 RX ORDER — BENZONATATE 200 MG/1
200 CAPSULE ORAL 3 TIMES DAILY PRN
Qty: 20 CAPSULE | Refills: 0 | Status: SHIPPED | OUTPATIENT
Start: 2021-06-23 | End: 2021-07-14 | Stop reason: SDUPTHER

## 2021-06-23 NOTE — TELEPHONE ENCOUNTER
Pt called office c/o constipation  Pt was asked the following questions to better understand the severity of pt's condition  HOW long since last BM? Approximately 4 days ago    Are you passing gas? Yes    WHAT have you tried to relieve constipation? Senna (without any real relief) and Colace  *Review med list to see if prescribed and ask if/how pt is taking*    Pt has been advised of the following: We encourage you to consume prune juice, prunes, and increase your fiber intake as much as tolerable  Be sure to drink adequate fluids  Pt would also like to discuss possibly taking vitamins/medication to improve energy levels 
Spoke with patient via telephone  Her pain is well-controlled and she has been using oxycodone about once per day  She has been feeling constipated and does not feel the senna is working  She also asks if vitamins may help her energy level  I have reviewed her kidney function labs from yesterday and do not feel that daily multivitamins would be harmful  She asks if there is any medication that can be used to lessen her cough  She has spoken to oncology and was told that the cough is originating in her lungs  She is taking OTC Delsym without effect        I have advised Carmen of the following recommendations which have been discussed thoroughly with her and she has agreed:    - Tessalon Pearls, 200mg PO TID  - Increase Senna to 2 tabs daily  - Miralax BID until comfortable BMs are produced, then PRN daily as needed  - Slowly increase fiber intake, eat prunes, increase fluid intake  - Visit ED if unable to pass gas or sudden onset of nausea, vomiting, or increased stomach pain  - Call our office if no BM achieved in 2 days  - May take daily OTC multivitamin
Self

## 2021-07-01 ENCOUNTER — APPOINTMENT (OUTPATIENT)
Dept: LAB | Facility: MEDICAL CENTER | Age: 56
End: 2021-07-01
Payer: COMMERCIAL

## 2021-07-01 LAB
ALBUMIN SERPL BCP-MCNC: 2 G/DL (ref 3.5–5)
ALP SERPL-CCNC: 290 U/L (ref 46–116)
ALT SERPL W P-5'-P-CCNC: 45 U/L (ref 12–78)
ANION GAP SERPL CALCULATED.3IONS-SCNC: 7 MMOL/L (ref 4–13)
AST SERPL W P-5'-P-CCNC: 97 U/L (ref 5–45)
BASOPHILS # BLD AUTO: 0.13 THOUSANDS/ΜL (ref 0–0.1)
BASOPHILS NFR BLD AUTO: 1 % (ref 0–1)
BILIRUB SERPL-MCNC: 7.45 MG/DL (ref 0.2–1)
BUN SERPL-MCNC: 19 MG/DL (ref 5–25)
CALCIUM ALBUM COR SERPL-MCNC: 10 MG/DL (ref 8.3–10.1)
CALCIUM SERPL-MCNC: 8.4 MG/DL (ref 8.3–10.1)
CHLORIDE SERPL-SCNC: 105 MMOL/L (ref 100–108)
CO2 SERPL-SCNC: 26 MMOL/L (ref 21–32)
CREAT SERPL-MCNC: 1.38 MG/DL (ref 0.6–1.3)
EOSINOPHIL # BLD AUTO: 0.31 THOUSAND/ΜL (ref 0–0.61)
EOSINOPHIL NFR BLD AUTO: 3 % (ref 0–6)
ERYTHROCYTE [DISTWIDTH] IN BLOOD BY AUTOMATED COUNT: 16.8 % (ref 11.6–15.1)
GFR SERPL CREATININE-BSD FRML MDRD: 43 ML/MIN/1.73SQ M
GLUCOSE SERPL-MCNC: 141 MG/DL (ref 65–140)
HCT VFR BLD AUTO: 45.1 % (ref 34.8–46.1)
HGB BLD-MCNC: 14.7 G/DL (ref 11.5–15.4)
IMM GRANULOCYTES # BLD AUTO: 0.03 THOUSAND/UL (ref 0–0.2)
IMM GRANULOCYTES NFR BLD AUTO: 0 % (ref 0–2)
LYMPHOCYTES # BLD AUTO: 1.25 THOUSANDS/ΜL (ref 0.6–4.47)
LYMPHOCYTES NFR BLD AUTO: 14 % (ref 14–44)
MCH RBC QN AUTO: 39.6 PG (ref 26.8–34.3)
MCHC RBC AUTO-ENTMCNC: 32.6 G/DL (ref 31.4–37.4)
MCV RBC AUTO: 122 FL (ref 82–98)
MONOCYTES # BLD AUTO: 1.22 THOUSAND/ΜL (ref 0.17–1.22)
MONOCYTES NFR BLD AUTO: 14 % (ref 4–12)
NEUTROPHILS # BLD AUTO: 6.09 THOUSANDS/ΜL (ref 1.85–7.62)
NEUTS SEG NFR BLD AUTO: 68 % (ref 43–75)
NRBC BLD AUTO-RTO: 0 /100 WBCS
PLATELET # BLD AUTO: 129 THOUSANDS/UL (ref 149–390)
PMV BLD AUTO: 10.6 FL (ref 8.9–12.7)
POTASSIUM SERPL-SCNC: 4.2 MMOL/L (ref 3.5–5.3)
PROT SERPL-MCNC: 5.5 G/DL (ref 6.4–8.2)
RBC # BLD AUTO: 3.71 MILLION/UL (ref 3.81–5.12)
SODIUM SERPL-SCNC: 138 MMOL/L (ref 136–145)
WBC # BLD AUTO: 9.03 THOUSAND/UL (ref 4.31–10.16)

## 2021-07-01 PROCEDURE — 80053 COMPREHEN METABOLIC PANEL: CPT | Performed by: INTERNAL MEDICINE

## 2021-07-01 PROCEDURE — 36415 COLL VENOUS BLD VENIPUNCTURE: CPT | Performed by: INTERNAL MEDICINE

## 2021-07-01 PROCEDURE — 85025 COMPLETE CBC W/AUTO DIFF WBC: CPT | Performed by: INTERNAL MEDICINE

## 2021-07-02 ENCOUNTER — OFFICE VISIT (OUTPATIENT)
Dept: HEMATOLOGY ONCOLOGY | Facility: CLINIC | Age: 56
End: 2021-07-02
Payer: COMMERCIAL

## 2021-07-02 ENCOUNTER — TELEPHONE (OUTPATIENT)
Dept: HEMATOLOGY ONCOLOGY | Facility: CLINIC | Age: 56
End: 2021-07-02

## 2021-07-02 ENCOUNTER — HOSPITAL ENCOUNTER (OUTPATIENT)
Dept: RADIOLOGY | Age: 56
Discharge: HOME | End: 2021-07-02
Payer: COMMERCIAL

## 2021-07-02 VITALS
RESPIRATION RATE: 16 BRPM | OXYGEN SATURATION: 95 % | BODY MASS INDEX: 21.21 KG/M2 | SYSTOLIC BLOOD PRESSURE: 100 MMHG | HEART RATE: 80 BPM | TEMPERATURE: 97.4 F | WEIGHT: 132 LBS | DIASTOLIC BLOOD PRESSURE: 60 MMHG | HEIGHT: 66 IN

## 2021-07-02 DIAGNOSIS — C78.6 PERITONEAL METASTASES (HCC): ICD-10-CM

## 2021-07-02 DIAGNOSIS — C18.2 PRIMARY ADENOCARCINOMA OF ASCENDING COLON (HCC): ICD-10-CM

## 2021-07-02 DIAGNOSIS — R17 JAUNDICE: ICD-10-CM

## 2021-07-02 DIAGNOSIS — R18.8 OTHER ASCITES: ICD-10-CM

## 2021-07-02 DIAGNOSIS — C77.2 METASTASIS TO RETROPERITONEAL LYMPH NODE (HCC): ICD-10-CM

## 2021-07-02 DIAGNOSIS — C78.7 LIVER METASTASES (HCC): Primary | ICD-10-CM

## 2021-07-02 DIAGNOSIS — C78.7 LIVER METASTASES (HCC): ICD-10-CM

## 2021-07-02 DIAGNOSIS — C78.00 MALIGNANT NEOPLASM METASTATIC TO LUNG, UNSPECIFIED LATERALITY (HCC): ICD-10-CM

## 2021-07-02 DIAGNOSIS — C79.60 MALIGNANT NEOPLASM METASTATIC TO OVARY, UNSPECIFIED LATERALITY (HCC): ICD-10-CM

## 2021-07-02 DIAGNOSIS — Z93.6 NEPHROSTOMY STATUS (HCC): ICD-10-CM

## 2021-07-02 PROBLEM — E80.6 HYPERBILIRUBINEMIA: Status: ACTIVE | Noted: 2021-07-02

## 2021-07-02 PROCEDURE — 99215 OFFICE O/P EST HI 40 MIN: CPT | Performed by: INTERNAL MEDICINE

## 2021-07-02 PROCEDURE — 76700 US EXAM ABDOM COMPLETE: CPT

## 2021-07-02 NOTE — PROGRESS NOTES
HPI:   Patient is here with her   She has progressive disease from stage IV colon cancer and most recent treatment has been 1 cycle of Stivarga and that will be on hold now because of jaundice  She checked with specialist at [facility] and [facility] and they do not have an open clinical trial for her and also no recommendations   She will not qualify for any trial because of jaundice  She will try to make an in person appointment with specialist at [facility]  Patient understands the her condition is not good and options are very limited  She has increased cough and shortness of breath  She has less distention of abdomen because of paracentesis  She has generalized weakness and tiredness and decreased appetite  Patient is being treated for stage IV colon cancer with extensive metastatic disease and she is on 4th line of systemic therapy , Stivarga, 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  On CT scans there is progression of disease primarily in the lungs and that could explain cough and shortness of breath       Patient had been on FOLFOX plus Avastin for stage IV colon cancer with metastatic disease to liver and lungs and also abdominal carcinomatosis and had metastatic disease to the ovaries  She had received liver directed therapy at [facility] and   had MRI of liver and PET scan  at [facility]  There was disease progression  and treatment was changed     Patient was not a candidate for Erbitux because of KRAS mutation   MSI came back stable   Not a candidate for Keytruda  In May 2018 patient underwent GALI and removal of fallopian tubes for uterine bleeding  and there were cancer cells in the fallopian tubes  Juanis Robert probably had removal of tubes and not the ovaries   In June 2018 patient   underwent extended right hemicolectomy, partial omentectomy and biopsy of the peritoneal nodule   Peritoneal nodule came back  positive for metastatic adenocarcinoma from colon    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 and metastatic disease to liver    6 cm T3 G2 right colon cancer with positive margins, lymphovascular invasion and perineural invasion and positive 3 of 27 lymph nodes with extranodal extension and positive peritoneal nodule biopsy   Stage IV disease because of liver and peritoneal metastases   In July 2018 patient was started  on modified FOLFIRI plus Avastin   She had PPE in her hands and bolus 5 FU was discontinued   She was seen by liver specialist at [facility] and was not felt to be a surgical candidate         In April 2020 patient  Was started on FOLFOX plus Avastin   She had protein urea but that was less than 1 gram in 24 hour and was monitored       Prior to that she was on FOLFIRI plus Avastin but after initial response disease progressed   On 05/26/2020 patient had pelvic surgery, removal of ovaries and sample also included peritoneum and small bowel mesentery   Both ovaries and small bowel mesentery showed metastatic disease from colon cancer   Surgery was on 05/26/2020    FOLFOX chemotherapy was resumed on 06/17/20 and 2 weeks later  Avastin was added to FOLFOX  Diego Pritchard for started in April 2021  There was disease progression  Stivarga was started in June 2021       Patient has been on Xarelto 10 mg daily for history of DVT right lower extremity that happened in 2016     No bleeding or blood clot on Xarelto                       Current Outpatient Medications:     acetaminophen (TYLENOL) 500 mg tablet, Take 1,000 mg by mouth every 6 (six) hours as needed for mild pain, Disp: , Rfl:     amLODIPine (NORVASC) 5 mg tablet, Take 5 mg by mouth daily, Disp: , Rfl:     benzonatate (TESSALON) 200 MG capsule, Take 1 capsule (200 mg total) by mouth 3 (three) times a day as needed for cough, Disp: 20 capsule, Rfl: 0    furosemide (LASIX) 40 mg tablet, Take 40 mg by mouth daily, Disp: , Rfl:     LORazepam (ATIVAN) 1 mg tablet, Take 1 mg by mouth daily as needed for anxiety, Disp: , Rfl:     metoprolol succinate (TOPROL-XL) 50 mg 24 hr tablet, Take 50 mg by mouth 2 (two) times a day Pt was instructed by her PCP Dr Dale Meza, advised pt to increased to BID, Disp: , Rfl:     multivitamin (THERAGRAN) TABS, Take 1 tablet by mouth daily, Disp: , Rfl:     oxyCODONE (ROXICODONE) 5 mg immediate release tablet, Take 1 tablet (5 mg total) by mouth every 4 (four) hours as needed for moderate pain or severe painMax Daily Amount: 30 mg, Disp: 30 tablet, Rfl: 0    potassium chloride (Klor-Con) 10 mEq tablet, , Disp: , Rfl:     rivaroxaban (Xarelto) 10 mg tablet, Take 1 tablet (10 mg total) by mouth daily, Disp: 90 tablet, Rfl: 3    senna (SENOKOT) 8 6 MG tablet, Take 1 tablet (8 6 mg total) by mouth daily, Disp: 90 each, Rfl: 0    sodium chloride, PF, 0 9 %, 10 mL by Intracatheter route daily Intracatheter flushing daily, Disp: 300 mL, Rfl: 0    spironolactone (ALDACTONE) 25 mg tablet, , Disp: , Rfl:     Regorafenib (Stivarga) 40 MG TABS, Take 2 tablets (80 mg total) by mouth daily for 21 days , followed by 7 days off, Disp: 42 tablet, Rfl: 3    No Known Allergies    Oncology History   Primary adenocarcinoma of ascending colon ([facility] [address] )   6/14/2018 Initial Diagnosis    Primary adenocarcinoma of ascending colon ([facility] [address] )     7/17/2018 - 8/1/2018 Chemotherapy    camptosar 180 mg/m2 day 1  5  mg/m2 day 1  5 FU 1200 mg/m2 day 1-2   Leucovorin 400 mg/m2     Venofer 100 mg (first 10 treatments) -- all every 2 weeks         7/17/2018 - 2/3/2020 Chemotherapy    palonosetron (ALOXI) injection 0 25 mg, 0 25 mg, Intravenous, Once, 8 of 16 cycles  Administration: 0 25 mg (11/21/2019), 0 25 mg (12/5/2019), 0 25 mg (12/19/2019), 0 25 mg (1/2/2020), 0 25 mg (1/16/2020), 0 25 mg (1/30/2020)  fluorouracil (ADRUCIL) injection 715 mg, 400 mg/m2, Intravenous, Once, 7 of 7 cycles  pegfilgrastim (NEULASTA ONPRO) subcutaneous injection kit 6 mg, 6 mg, Subcutaneous, Once, 20 of 28 cycles  Administration: 6 mg (5/22/2019), 6 mg (6/5/2019), 6 mg (6/22/2019), 6 mg (7/5/2019), 6 mg (7/20/2019), 6 mg (8/2/2019), 6 mg (8/17/2019), 6 mg (8/31/2019), 6 mg (9/14/2019), 6 mg (9/28/2019), 6 mg (10/12/2019), 6 mg (10/26/2019), 6 mg (11/9/2019), 6 mg (11/23/2019), 6 mg (12/7/2019), 6 mg (12/21/2019), 6 mg (1/4/2020), 6 mg (1/18/2020), 6 mg (2/1/2020)  fosaprepitant (EMEND) 150 mg in sodium chloride 0 9 % 250 mL IVPB, 150 mg, Intravenous, Once, 8 of 16 cycles  Administration: 150 mg (11/7/2019), 150 mg (11/21/2019), 150 mg (12/5/2019), 150 mg (12/19/2019), 150 mg (1/2/2020), 150 mg (1/16/2020), 150 mg (1/30/2020)  bevacizumab (AVASTIN) 355 mg in sodium chloride 0 9 % 100 mL IVPB, 5 mg/kg, Intravenous, Once, 27 of 35 cycles  Administration: 355 mg (5/20/2019), 355 mg (6/3/2019), 355 mg (6/20/2019), 355 mg (7/3/2019), 355 mg (7/18/2019), 355 mg (7/31/2019), 355 mg (8/15/2019), 355 mg (8/29/2019), 355 mg (9/12/2019), 355 mg (9/26/2019), 355 mg (10/10/2019), 355 mg (10/24/2019), 355 mg (11/7/2019), 355 mg (11/21/2019), 355 mg (12/5/2019), 355 mg (12/19/2019), 355 mg (1/2/2020), 355 mg (1/16/2020), 355 mg (1/30/2020)  irinotecan (CAMPTOSAR) 322 mg in sodium chloride 0 9 % 500 mL chemo infusion, 180 mg/m2, Intravenous, Once, 27 of 35 cycles  Dose modification: 150 mg/m2 (original dose 180 mg/m2, Cycle 20, Reason: Dose Not Tolerated)  Administration: 322 mg (5/20/2019), 322 mg (6/3/2019), 320 mg (6/20/2019), 320 mg (7/3/2019), 320 mg (7/18/2019), 320 mg (7/31/2019), 320 mg (8/15/2019), 320 mg (8/29/2019), 320 mg (9/12/2019), 320 mg (9/26/2019), 320 mg (10/10/2019), 320 mg (10/24/2019), 269 mg (11/7/2019), 269 mg (11/21/2019), 269 mg (12/5/2019), 269 mg (12/19/2019), 269 mg (1/2/2020), 269 mg (1/16/2020), 269 mg (1/30/2020)  leucovorin 716 mg in sodium chloride 0 9 % 250 mL IVPB, 400 mg/m2, Intravenous, Once, 27 of 35 cycles  Administration: 716 mg (5/20/2019), 716 mg (6/3/2019), 700 mg (6/20/2019), 700 mg (7/3/2019), 700 mg (7/18/2019), 700 mg (7/31/2019), 700 mg (8/15/2019), 700 mg (8/29/2019), 700 mg (9/12/2019), 700 mg (9/26/2019), 700 mg (10/10/2019), 716 mg (10/24/2019), 700 mg (11/7/2019), 700 mg (11/21/2019), 700 mg (12/5/2019), 700 mg (12/19/2019), 700 mg (1/2/2020), 700 mg (1/16/2020), 700 mg (1/30/2020)     8/1/2018 Adverse Reaction    Needed granix 300 mcg due to [address] of 1 01      8/14/2018 -  Chemotherapy    camptosar 180 mg/m2 day 1  5  mg/m2 day 1  5 FU 1200 mg/m2 day 1-2   Leucovorin 400 mg/m2  Avastin 5 mg/kg day 1   Venofer 100 mg (first 10 treatments)  Neulasta on Pro 6 mg day 3      -- every 2 weeks          3/3/2020 - 3/16/2020 Chemotherapy    pegfilgrastim (NEULASTA ONPRO) subcutaneous injection kit 6 mg, 6 mg, Subcutaneous, Once, 1 of 12 cycles  Administration: 6 mg (3/5/2020)  bevacizumab (AVASTIN) 357 5 mg in sodium chloride 0 9 % 100 mL IVPB, 5 mg/kg = 357 5 mg, Intravenous, Once, 1 of 12 cycles  Administration: 357 5 mg (3/3/2020)  leucovorin 700 mg in dextrose 5 % 250 mL IVPB, 720 mg, Intravenous, Once, 1 of 12 cycles  Administration: 700 mg (3/3/2020)  oxaliplatin (ELOXATIN) 150 mg in dextrose 5 % 250 mL chemo infusion, 153 mg, Intravenous, Once, 1 of 12 cycles  Administration: 150 mg (3/3/2020)     3/16/2020 - 4/5/2020 Chemotherapy    bevacizumab (AVASTIN) 560 mg in sodium chloride 0 9 % 100 mL IVPB, 7 5 mg/kg, Intravenous, Once, 0 of 5 cycles  oxaliplatin (ELOXATIN) 239 2 mg in dextrose 5 % 500 mL chemo infusion, 130 mg/m2 = 239 2 mg, Intravenous, Once, 1 of 6 cycles  Administration: 239 2 mg (3/16/2020)     4/6/2020 - 12/13/2020 Chemotherapy    fluorouracil (ADRUCIL) injection 730 mg, 400 mg/m2 = 730 mg, Intravenous, Once, 1 of 1 cycle  pegfilgrastim (NEULASTA ONPRO) subcutaneous injection kit 6 mg, 6 mg, Subcutaneous, Once, 14 of 20 cycles  Administration: 6 mg (4/8/2020), 6 mg (4/25/2020), 6 mg (5/8/2020), 6 mg (6/19/2020), 6 mg (7/2/2020), 6 mg (7/17/2020), 6 mg (7/31/2020), 6 mg (8/14/2020), 6 mg (8/28/2020), 6 mg (9/25/2020), 6 mg (10/15/2020), 6 mg (11/5/2020), 6 mg (11/18/2020), 6 mg (12/2/2020)  fosaprepitant (EMEND) 150 mg in sodium chloride 0 9 % 250 mL IVPB, 150 mg, Intravenous, Once, 14 of 20 cycles  Administration: 150 mg (4/6/2020), 150 mg (4/23/2020), 150 mg (5/6/2020), 150 mg (6/17/2020), 150 mg (6/30/2020), 150 mg (7/15/2020), 150 mg (7/29/2020), 150 mg (8/12/2020), 150 mg (8/26/2020), 150 mg (9/23/2020), 150 mg (10/13/2020), 150 mg (11/3/2020), 150 mg (11/16/2020), 150 mg (11/30/2020)  bevacizumab (AVASTIN) 372 5 mg in sodium chloride 0 9 % 100 mL IVPB, 5 mg/kg = 372 5 mg, Intravenous, Once, 10 of 16 cycles  Administration: 372 5 mg (4/6/2020), 372 5 mg (4/23/2020), 357 5 mg (6/30/2020), 357 5 mg (7/15/2020), 357 5 mg (7/29/2020), 357 5 mg (8/12/2020), 357 5 mg (8/26/2020), 357 5 mg (11/16/2020), 357 5 mg (11/30/2020)  leucovorin 750 mg in dextrose 5 % 250 mL IVPB, 732 mg, Intravenous, Once, 14 of 20 cycles  Administration: 750 mg (4/6/2020), 750 mg (4/23/2020), 750 mg (5/6/2020), 750 mg (6/17/2020), 700 mg (6/30/2020), 700 mg (7/15/2020), 700 mg (7/29/2020), 700 mg (8/12/2020), 700 mg (8/26/2020), 700 mg (9/23/2020), 700 mg (10/13/2020), 700 mg (11/3/2020), 700 mg (11/16/2020), 700 mg (11/30/2020)  oxaliplatin (ELOXATIN) 150 mg in dextrose 5 % 250 mL chemo infusion, 155 55 mg, Intravenous, Once, 14 of 20 cycles  Dose modification: 70 mg/m2 (original dose 85 mg/m2, Cycle 11, Reason: Dose Not Tolerated, Comment: thrombocytopenia), 65 mg/m2 (original dose 85 mg/m2, Cycle 14, Reason: Other (See Comments)), 65 mg/m2 (original dose 85 mg/m2, Cycle 15, Reason: Max Dose Reached)  Administration: 150 mg (4/6/2020), 150 mg (4/23/2020), 150 mg (5/6/2020), 150 mg (6/17/2020), 150 mg (6/30/2020), 150 mg (7/15/2020), 150 mg (7/29/2020), 150 mg (8/12/2020), 150 mg (8/26/2020), 150 mg (9/23/2020), 125 3 mg (10/13/2020), 125 3 mg (11/3/2020), 125 3 mg (11/16/2020), 116 35 mg (11/30/2020)     12/28/2020 - 3/31/2021 Chemotherapy    pegfilgrastim (NEULASTA ONPRO) subcutaneous injection kit 6 mg, 6 mg, Subcutaneous, Once, 6 of 9 cycles  Administration: 6 mg (12/30/2020), 6 mg (1/14/2021), 6 mg (2/6/2021), 6 mg (2/20/2021), 6 mg (3/6/2021), 6 mg (3/20/2021)  fosaprepitant (EMEND) 150 mg in sodium chloride 0 9 % 250 mL IVPB, 150 mg, Intravenous, Once, 6 of 9 cycles  Administration: 150 mg (12/28/2020), 150 mg (1/12/2021), 150 mg (2/4/2021), 150 mg (2/18/2021), 150 mg (3/4/2021), 150 mg (3/18/2021)  bevacizumab (AVASTIN) 347 5 mg in sodium chloride 0 9 % 100 mL IVPB, 5 mg/kg = 347 5 mg, Intravenous, Once, 5 of 8 cycles  Administration: 347 5 mg (12/28/2020), 347 5 mg (2/4/2021), 347 5 mg (2/18/2021), 347 5 mg (3/4/2021), 347 5 mg (3/18/2021)  leucovorin 700 mg in dextrose 5 % 250 mL IVPB, 712 mg, Intravenous, Once, 6 of 9 cycles  Dose modification: 400 mg/m2 (original dose 400 mg/m2, Cycle 6, Reason: Other (See Comments), Comment: protocol)  Administration: 700 mg (12/28/2020), 700 mg (1/12/2021), 700 mg (2/4/2021), 700 mg (2/18/2021), 700 mg (3/4/2021), 700 mg (3/18/2021)  oxaliplatin (ELOXATIN) 115 7 mg in dextrose 5 % 250 mL chemo infusion, 65 mg/m2 = 115 7 mg (76 5 % of original dose 85 mg/m2), Intravenous, Once, 6 of 9 cycles  Dose modification: 65 mg/m2 (original dose 85 mg/m2, Cycle 1, Reason: Dose Not Tolerated)  Administration: 115 7 mg (12/28/2020), 115 7 mg (1/12/2021), 115 7 mg (2/4/2021), 115 7 mg (2/18/2021), 115 7 mg (3/4/2021), 115 7 mg (3/18/2021)     Liver metastases ([facility] [address] )   6/26/2018 Initial Diagnosis    Liver metastases ([facility] [address] )     7/17/2018 - 2/3/2020 Chemotherapy    palonosetron (ALOXI) injection 0 25 mg, 0 25 mg, Intravenous, Once, 8 of 16 cycles  Administration: 0 25 mg (11/21/2019), 0 25 mg (12/5/2019), 0 25 mg (12/19/2019), 0 25 mg (1/2/2020), 0 25 mg (1/16/2020), 0 25 mg (1/30/2020)  fluorouracil (ADRUCIL) injection 715 mg, 400 mg/m2, Intravenous, Once, 7 of 7 cycles  pegfilgrastim ([employer]) subcutaneous injection kit 6 mg, 6 mg, Subcutaneous, Once, 20 of 28 cycles  Administration: 6 mg (5/22/2019), 6 mg (6/5/2019), 6 mg (6/22/2019), 6 mg (7/5/2019), 6 mg (7/20/2019), 6 mg (8/2/2019), 6 mg (8/17/2019), 6 mg (8/31/2019), 6 mg (9/14/2019), 6 mg (9/28/2019), 6 mg (10/12/2019), 6 mg (10/26/2019), 6 mg (11/9/2019), 6 mg (11/23/2019), 6 mg (12/7/2019), 6 mg (12/21/2019), 6 mg (1/4/2020), 6 mg (1/18/2020), 6 mg (2/1/2020)  fosaprepitant (EMEND) 150 mg in sodium chloride 0 9 % 250 mL IVPB, 150 mg, Intravenous, Once, 8 of 16 cycles  Administration: 150 mg (11/7/2019), 150 mg (11/21/2019), 150 mg (12/5/2019), 150 mg (12/19/2019), 150 mg (1/2/2020), 150 mg (1/16/2020), 150 mg (1/30/2020)  bevacizumab (AVASTIN) 355 mg in sodium chloride 0 9 % 100 mL IVPB, 5 mg/kg, Intravenous, Once, 27 of 35 cycles  Administration: 355 mg (5/20/2019), 355 mg (6/3/2019), 355 mg (6/20/2019), 355 mg (7/3/2019), 355 mg (7/18/2019), 355 mg (7/31/2019), 355 mg (8/15/2019), 355 mg (8/29/2019), 355 mg (9/12/2019), 355 mg (9/26/2019), 355 mg (10/10/2019), 355 mg (10/24/2019), 355 mg (11/7/2019), 355 mg (11/21/2019), 355 mg (12/5/2019), 355 mg (12/19/2019), 355 mg (1/2/2020), 355 mg (1/16/2020), 355 mg (1/30/2020)  irinotecan (CAMPTOSAR) 322 mg in sodium chloride 0 9 % 500 mL chemo infusion, 180 mg/m2, Intravenous, Once, 27 of 35 cycles  Dose modification: 150 mg/m2 (original dose 180 mg/m2, Cycle 20, Reason: Dose Not Tolerated)  Administration: 322 mg (5/20/2019), 322 mg (6/3/2019), 320 mg (6/20/2019), 320 mg (7/3/2019), 320 mg (7/18/2019), 320 mg (7/31/2019), 320 mg (8/15/2019), 320 mg (8/29/2019), 320 mg (9/12/2019), 320 mg (9/26/2019), 320 mg (10/10/2019), 320 mg (10/24/2019), 269 mg (11/7/2019), 269 mg (11/21/2019), 269 mg (12/5/2019), 269 mg (12/19/2019), 269 mg (1/2/2020), 269 mg (1/16/2020), 269 mg (1/30/2020)  leucovorin 716 mg in sodium chloride 0 9 % 250 mL IVPB, 400 mg/m2, Intravenous, Once, 27 of 35 cycles  Administration: 666 mg (5/20/2019), 716 mg (6/3/2019), 700 mg (6/20/2019), 700 mg (7/3/2019), 700 mg (7/18/2019), 700 mg (7/31/2019), 700 mg (8/15/2019), 700 mg (8/29/2019), 700 mg (9/12/2019), 700 mg (9/26/2019), 700 mg (10/10/2019), 716 mg (10/24/2019), 700 mg (11/7/2019), 700 mg (11/21/2019), 700 mg (12/5/2019), 700 mg (12/19/2019), 700 mg (1/2/2020), 700 mg (1/16/2020), 700 mg (1/30/2020)     3/3/2020 - 3/16/2020 Chemotherapy    pegfilgrastim (NEULASTA ONPRO) subcutaneous injection kit 6 mg, 6 mg, Subcutaneous, Once, 1 of 12 cycles  Administration: 6 mg (3/5/2020)  bevacizumab (AVASTIN) 357 5 mg in sodium chloride 0 9 % 100 mL IVPB, 5 mg/kg = 357 5 mg, Intravenous, Once, 1 of 12 cycles  Administration: 357 5 mg (3/3/2020)  leucovorin 700 mg in dextrose 5 % 250 mL IVPB, 720 mg, Intravenous, Once, 1 of 12 cycles  Administration: 700 mg (3/3/2020)  oxaliplatin (ELOXATIN) 150 mg in dextrose 5 % 250 mL chemo infusion, 153 mg, Intravenous, Once, 1 of 12 cycles  Administration: 150 mg (3/3/2020)     3/16/2020 - 4/5/2020 Chemotherapy    bevacizumab (AVASTIN) 560 mg in sodium chloride 0 9 % 100 mL IVPB, 7 5 mg/kg, Intravenous, Once, 0 of 5 cycles  oxaliplatin (ELOXATIN) 239 2 mg in dextrose 5 % 500 mL chemo infusion, 130 mg/m2 = 239 2 mg, Intravenous, Once, 1 of 6 cycles  Administration: 239 2 mg (3/16/2020)     4/6/2020 - 12/13/2020 Chemotherapy    fluorouracil (ADRUCIL) injection 730 mg, 400 mg/m2 = 730 mg, Intravenous, Once, 1 of 1 cycle  pegfilgrastim (NEULASTA ONPRO) subcutaneous injection kit 6 mg, 6 mg, Subcutaneous, Once, 14 of 20 cycles  Administration: 6 mg (4/8/2020), 6 mg (4/25/2020), 6 mg (5/8/2020), 6 mg (6/19/2020), 6 mg (7/2/2020), 6 mg (7/17/2020), 6 mg (7/31/2020), 6 mg (8/14/2020), 6 mg (8/28/2020), 6 mg (9/25/2020), 6 mg (10/15/2020), 6 mg (11/5/2020), 6 mg (11/18/2020), 6 mg (12/2/2020)  fosaprepitant (EMEND) 150 mg in sodium chloride 0 9 % 250 mL IVPB, 150 mg, Intravenous, Once, 14 of 20 cycles  Administration: 150 mg (4/6/2020), 150 mg (4/23/2020), 150 mg (5/6/2020), 150 mg (6/17/2020), 150 mg (6/30/2020), 150 mg (7/15/2020), 150 mg (7/29/2020), 150 mg (8/12/2020), 150 mg (8/26/2020), 150 mg (9/23/2020), 150 mg (10/13/2020), 150 mg (11/3/2020), 150 mg (11/16/2020), 150 mg (11/30/2020)  bevacizumab (AVASTIN) 372 5 mg in sodium chloride 0 9 % 100 mL IVPB, 5 mg/kg = 372 5 mg, Intravenous, Once, 10 of 16 cycles  Administration: 372 5 mg (4/6/2020), 372 5 mg (4/23/2020), 357 5 mg (6/30/2020), 357 5 mg (7/15/2020), 357 5 mg (7/29/2020), 357 5 mg (8/12/2020), 357 5 mg (8/26/2020), 357 5 mg (11/16/2020), 357 5 mg (11/30/2020)  leucovorin 750 mg in dextrose 5 % 250 mL IVPB, 732 mg, Intravenous, Once, 14 of 20 cycles  Administration: 750 mg (4/6/2020), 750 mg (4/23/2020), 750 mg (5/6/2020), 750 mg (6/17/2020), 700 mg (6/30/2020), 700 mg (7/15/2020), 700 mg (7/29/2020), 700 mg (8/12/2020), 700 mg (8/26/2020), 700 mg (9/23/2020), 700 mg (10/13/2020), 700 mg (11/3/2020), 700 mg (11/16/2020), 700 mg (11/30/2020)  oxaliplatin (ELOXATIN) 150 mg in dextrose 5 % 250 mL chemo infusion, 155 55 mg, Intravenous, Once, 14 of 20 cycles  Dose modification: 70 mg/m2 (original dose 85 mg/m2, Cycle 11, Reason: Dose Not Tolerated, Comment: thrombocytopenia), 65 mg/m2 (original dose 85 mg/m2, Cycle 14, Reason: Other (See Comments)), 65 mg/m2 (original dose 85 mg/m2, Cycle 15, Reason: Max Dose Reached)  Administration: 150 mg (4/6/2020), 150 mg (4/23/2020), 150 mg (5/6/2020), 150 mg (6/17/2020), 150 mg (6/30/2020), 150 mg (7/15/2020), 150 mg (7/29/2020), 150 mg (8/12/2020), 150 mg (8/26/2020), 150 mg (9/23/2020), 125 3 mg (10/13/2020), 125 3 mg (11/3/2020), 125 3 mg (11/16/2020), 116 35 mg (11/30/2020)     12/28/2020 - 3/31/2021 Chemotherapy    pegfilgrastim (NEULASTA ONPRO) subcutaneous injection kit 6 mg, 6 mg, Subcutaneous, Once, 6 of 9 cycles  Administration: 6 mg (12/30/2020), 6 mg (1/14/2021), 6 mg (2/6/2021), 6 mg (2/20/2021), 6 mg (3/6/2021), 6 mg (3/20/2021)  fosaprepitant (EMEND) 150 mg in sodium chloride 0 9 % 250 mL IVPB, 150 mg, Intravenous, Once, 6 of 9 cycles  Administration: 150 mg (12/28/2020), 150 mg (1/12/2021), 150 mg (2/4/2021), 150 mg (2/18/2021), 150 mg (3/4/2021), 150 mg (3/18/2021)  bevacizumab (AVASTIN) 347 5 mg in sodium chloride 0 9 % 100 mL IVPB, 5 mg/kg = 347 5 mg, Intravenous, Once, 5 of 8 cycles  Administration: 347 5 mg (12/28/2020), 347 5 mg (2/4/2021), 347 5 mg (2/18/2021), 347 5 mg (3/4/2021), 347 5 mg (3/18/2021)  leucovorin 700 mg in dextrose 5 % 250 mL IVPB, 712 mg, Intravenous, Once, 6 of 9 cycles  Dose modification: 400 mg/m2 (original dose 400 mg/m2, Cycle 6, Reason: Other (See Comments), Comment: protocol)  Administration: 700 mg (12/28/2020), 700 mg (1/12/2021), 700 mg (2/4/2021), 700 mg (2/18/2021), 700 mg (3/4/2021), 700 mg (3/18/2021)  oxaliplatin (ELOXATIN) 115 7 mg in dextrose 5 % 250 mL chemo infusion, 65 mg/m2 = 115 7 mg (76 5 % of original dose 85 mg/m2), Intravenous, Once, 6 of 9 cycles  Dose modification: 65 mg/m2 (original dose 85 mg/m2, Cycle 1, Reason: Dose Not Tolerated)  Administration: 115 7 mg (12/28/2020), 115 7 mg (1/12/2021), 115 7 mg (2/4/2021), 115 7 mg (2/18/2021), 115 7 mg (3/4/2021), 115 7 mg (3/18/2021)     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)   6/26/2018 Initial Diagnosis    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[facility] [address] )     4/6/2020 - 12/13/2020 Chemotherapy    fluorouracil (ADRUCIL) injection 730 mg, 400 mg/m2 = 730 mg, Intravenous, Once, 1 of 1 cycle  pegfilgrastim (NEULASTA ONPRO) subcutaneous injection kit 6 mg, 6 mg, Subcutaneous, Once, 7 of 10 cycles  Administration: 6 mg (4/8/2020), 6 mg (4/25/2020), 6 mg (5/8/2020), 6 mg (6/19/2020), 6 mg (7/2/2020), 6 mg (7/17/2020), 6 mg (7/31/2020)  fosaprepitant (EMEND) 150 mg in sodium chloride 0 9 % 250 mL IVPB, 150 mg, Intravenous, Once, 7 of 10 cycles  Administration: 150 mg (4/6/2020), 150 mg (4/23/2020), 150 mg (5/6/2020), 150 mg (6/17/2020), 150 mg (6/30/2020), 150 mg (7/15/2020), 150 mg (7/29/2020)  bevacizumab (AVASTIN) 372 5 mg in sodium chloride 0 9 % 100 mL IVPB, 5 mg/kg = 372 5 mg, Intravenous, Once, 5 of 8 cycles  Administration: 372 5 mg (4/6/2020), 372 5 mg (4/23/2020), 357 5 mg (6/30/2020), 357 5 mg (7/15/2020), 357 5 mg (7/29/2020)  leucovorin 750 mg in dextrose 5 % 250 mL IVPB, 732 mg, Intravenous, Once, 7 of 10 cycles  Administration: 750 mg (4/6/2020), 750 mg (4/23/2020), 750 mg (5/6/2020), 750 mg (6/17/2020), 700 mg (6/30/2020), 700 mg (7/15/2020), 700 mg (7/29/2020)  oxaliplatin (ELOXATIN) 150 mg in dextrose 5 % 250 mL chemo infusion, 155 55 mg, Intravenous, Once, 7 of 10 cycles  Administration: 150 mg (4/6/2020), 150 mg (4/23/2020), 150 mg (5/6/2020), 150 mg (6/17/2020), 150 mg (6/30/2020), 150 mg (7/15/2020), 150 mg (7/29/2020)     12/28/2020 - 3/31/2021 Chemotherapy    pegfilgrastim (NEULASTA ONPRO) subcutaneous injection kit 6 mg, 6 mg, Subcutaneous, Once, 6 of 9 cycles  Administration: 6 mg (12/30/2020), 6 mg (1/14/2021), 6 mg (2/6/2021), 6 mg (2/20/2021), 6 mg (3/6/2021), 6 mg (3/20/2021)  fosaprepitant (EMEND) 150 mg in sodium chloride 0 9 % 250 mL IVPB, 150 mg, Intravenous, Once, 6 of 9 cycles  Administration: 150 mg (12/28/2020), 150 mg (1/12/2021), 150 mg (2/4/2021), 150 mg (2/18/2021), 150 mg (3/4/2021), 150 mg (3/18/2021)  bevacizumab (AVASTIN) 347 5 mg in sodium chloride 0 9 % 100 mL IVPB, 5 mg/kg = 347 5 mg, Intravenous, Once, 5 of 8 cycles  Administration: 347 5 mg (12/28/2020), 347 5 mg (2/4/2021), 347 5 mg (2/18/2021), 347 5 mg (3/4/2021), 347 5 mg (3/18/2021)  leucovorin 700 mg in dextrose 5 % 250 mL IVPB, 712 mg, Intravenous, Once, 6 of 9 cycles  Dose modification: 400 mg/m2 (original dose 400 mg/m2, Cycle 6, Reason: Other (See Comments), Comment: protocol)  Administration: 700 mg (12/28/2020), 700 mg (1/12/2021), 700 mg (2/4/2021), 700 mg (2/18/2021), 700 mg (3/4/2021), 700 mg (3/18/2021)  oxaliplatin (ELOXATIN) 115 7 mg in dextrose 5 % 250 mL chemo infusion, 65 mg/m2 = 115 7 mg (76 5 % of original dose 85 mg/m2), Intravenous, Once, 6 of 9 cycles  Dose modification: 65 mg/m2 (original dose 85 mg/m2, Cycle 1, Reason: Dose Not Tolerated)  Administration: 115 7 mg (12/28/2020), 115 7 mg (1/12/2021), 115 7 mg (2/4/2021), 115 7 mg (2/18/2021), 115 7 mg (3/4/2021), 115 7 mg (3/18/2021)         ROS:  07/02/21 Reviewed 12 systems: See symptoms in HPI  Presently no other neurological, cardiac, pulmonary, GI and  symptoms other than mentioned above  in HPI  No other symptoms like  fever, chills, bleeding, bone pains, skin rash,  arthritic symptoms,   claudication and gait problem  No frequent infections  Not unusually sensitive to heat or cold  No swelling of the ankles  No swollen glands  Patient is anxious     No new symptoms  since her last visit , much less abdominal discomfort post paracentesis but more cough and shortness of breath      /60 (BP Location: Left arm, Cuff Size: Adult)   Pulse 80   Temp (!) 97 4 °F (36 3 °C) (Temporal)   Resp 16   Ht 5' 5 5" (1 664 m)   Wt 59 9 kg (132 lb)   LMP 05/16/2018   SpO2 95%   BMI 21 63 kg/m²     Physical Exam:      Medical resident Jennifer Gold was in the room with me the whole time  Alert, oriented, not in distress , stable vitals  except low blood pressure,  has icterus, no oral thrush, no palpable neck mass,  decreased breath sounds and dullness at lung bases, regular heart rate, abdomen  soft and non tender, less distension of abdomen, no palpable abdominal mass,   small ascites,,  bowel sounds present,  no edema of ankles, no calf tenderness, no objective focal neurological deficit, no skin rash, no palpable lymphadenopathy in the neck and axillary areas,  no clubbing  Patient  anxious    Performance status 2   Percutaneous nephrostomy on the right    IMAGING:    IMPRESSION:     Significant interval worsening in size and number of pulmonary metastases      Mild interval increase in multiple small ill-defined metastatic lesions within the liver      New severe right-sided hydronephrosis and delayed nephrogram   Appears likely related to peritoneal implant disease along the dependent pelvic wall      New large volume ascites      The study was marked in EPIC for immediate notification       Workstation performed: [id number]      Imaging    CT chest abdomen pelvis w contrast (Order: [id number]) - 5/24/2021      LABS:    Results for orders placed or performed during the hospital encounter of 06/22/21   CBC and differential   Result Value Ref Range    WBC 11 18 (H) 4 31 - 10 16 Thousand/uL    RBC 3 68 (L) 3 81 - 5 12 Million/uL    Hemoglobin 14 8 11 5 - 15 4 g/dL    Hematocrit 44 0 34 8 - 46 1 %     (H) 82 - 98 fL    MCH 40 2 (H) 26 8 - 34 3 pg    MCHC 33 6 31 4 - 37 4 g/dL    RDW 18 5 (H) 11 6 - 15 1 %    MPV 10 4 8 9 - 12 7 fL    Platelets 99 (L) 893 - 390 Thousands/uL    nRBC 0 /100 WBCs    Neutrophils Relative 69 43 - 75 %    Immat GRANS % 1 0 - 2 %    Lymphocytes Relative 10 (L) 14 - 44 %    Monocytes Relative 18 (H) 4 - 12 %    Eosinophils Relative 1 0 - 6 %    Basophils Relative 1 0 - 1 %    Neutrophils Absolute 7 74 (H) 1 85 - 7 62 Thousands/µL    Immature Grans Absolute 0 07 0 00 - 0 20 Thousand/uL    Lymphocytes Absolute 1 08 0 60 - 4 47 Thousands/µL    Monocytes Absolute 2 03 (H) 0 17 - 1 22 Thousand/µL    Eosinophils Absolute 0 12 0 00 - 0 61 Thousand/µL    Basophils Absolute 0 14 (H) 0 00 - 0 10 Thousands/µL   Comprehensive metabolic panel   Result Value Ref Range    Sodium 138 136 - 145 mmol/L    Potassium 3 9 3 5 - 5 3 mmol/L    Chloride 102 100 - 108 mmol/L    CO2 25 21 - 32 mmol/L    ANION GAP 11 4 - 13 mmol/L    BUN 26 (H) 5 - 25 mg/dL    Creatinine 1 51 (H) 0 60 - 1 30 mg/dL    Glucose 119 65 - 140 mg/dL    Calcium 8 3 8 3 - 10 1 mg/dL    Corrected Calcium 9 7 8 3 - 10 1 mg/dL    AST 73 (H) 5 - 45 U/L    ALT 34 12 - 78 U/L Alkaline Phosphatase 225 (H) 46 - 116 U/L    Total Protein 5 5 (L) 6 4 - 8 2 g/dL    Albumin 2 3 (L) 3 5 - 5 0 g/dL    Total Bilirubin 6 36 (H) 0 20 - 1 00 mg/dL    eGFR 39 ml/min/1 73sq m     *Note: Due to a large number of results and/or encounters for the requested time period, some results have not been displayed  A complete set of results can be found in Results Review  Labs, Imaging, & Other studies:   All pertinent labs and imaging studies were personally reviewed      Reviewed CBC,CMP,  and CT scan  and discussed with patient and her     Assessment and plan: Yennifer Atkins Patient is here with her   She has progressive disease from stage IV colon cancer and most recent treatment has been 1 cycle of Stivarga and that will be on hold now because of jaundice  She [place] [facility] [place] [facility] and they do not have an open clinical trial for her and also no recommendations   She will not qualify for any trial because of jaundice  She will try to make an in person appointment with specialist [place] [facility]  Patient understands the her condition is not good and options are very limited  She has increased cough and shortness of breath  She has less distention of abdomen because of paracentesis  She has generalized weakness and tiredness and decreased appetite  Patient is being treated for stage IV colon cancer with extensive metastatic disease and she is on 4th line of systemic therapy , Stivarga, 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  On CT scans there is progression of disease primarily in the lungs and that could explain cough and shortness of breath       Patient had been on FOLFOX plus Avastin for stage IV colon cancer with metastatic disease to liver and lungs and also abdominal carcinomatosis and had metastatic disease to the ovaries  She had received liver directed [place] [facility] and   had MRI of liver and PET scan  [place] [facility]  There was disease progression  and treatment was changed   Patient was not a candidate for Erbitux because of KRAS mutation   MSI came back stable   Not a candidate for Keytruda  In May 2018 patient underwent GALI and removal of fallopian tubes for uterine bleeding  and there were cancer cells in the fallopian tubes  Juanis Jones probably had removal of tubes and not the ovaries   In June 2018 patient   underwent extended right hemicolectomy, partial omentectomy and biopsy of the peritoneal nodule   Peritoneal nodule came back  positive for metastatic adenocarcinoma from colon    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 and metastatic disease to liver    6 cm T3 G2 right colon cancer with positive margins, lymphovascular invasion and perineural invasion and positive 3 of 27 lymph nodes with extranodal extension and positive peritoneal nodule biopsy   Stage IV disease because of liver and peritoneal metastases   In July 2018 patient was started  on modified FOLFIRI plus Avastin   She had PPE in her hands and bolus 5 FU was discontinued   She was seen by liver specialist at [facility] and was not felt to be a surgical candidate         In April 2020 patient  Was started on FOLFOX plus Avastin   She had protein urea but that was less than 1 gram in 24 hour and was monitored       Prior to that she was on FOLFIRI plus Avastin but after initial response disease progressed   On 05/26/2020 patient had pelvic surgery, removal of ovaries and sample also included peritoneum and small bowel mesentery   Both ovaries and small bowel mesentery showed metastatic disease from colon cancer   Surgery was on 05/26/2020    FOLFOX chemotherapy was resumed on 06/17/20 and 2 weeks later  Avastin was added to FOLFOX  Diego Pritchard for started in April 2021  There was disease progression  Stivarga was started in June 2021       Patient has been on Xarelto 10 mg daily for history of DVT right lower extremity that happened in 2016     No bleeding or blood clot on Xarelto    Physical examination and test results  are as recorded and discussed     Patient has developed jaundice and if obstructive she could have stent or drain and if not obstructed and problem could be in the liver  She will have ultrasound of liver and bile system and abdomen stat and patient will be notified of the results  Manda Clause will be on hold in the meantime because of jaundice  Face to face visit with [facility] specialist   Limited options   Poor prognosis  Patient has been aware of her condition  All discussed in detail  Questions answered  Goal is prolongation of survival  and that might change to care for comforts     Discussed   nutritional supplements and activities   Patient is capable of self-care but that could change  Discussed  precautions against Coronavirus       Patient will continue to follow with primary physician and other consultants  See diagnoses , instructions and orders below  1  Liver metastases ([address] )    - US abdomen complete; Future    2  Primary adenocarcinoma of ascending colon ([address] )    - US abdomen complete; Future    3  Malignant neoplasm metastatic to lung, unspecified laterality ([address] )      4  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UP IN 1 WEEK     Addendum:  Ultrasound of abdomen did not show obstructive jaundice  There could be cirrhosis of liver  I explained all this to patient on the phone and she will be referred to GI /liver service soon after the long weekend      Patient voiced understanding and agrees       Counseling / Coordination of Care        Provided counseling and support

## 2021-07-06 ENCOUNTER — CONSULT (OUTPATIENT)
Dept: GASTROENTEROLOGY | Facility: MEDICAL CENTER | Age: 56
End: 2021-07-06
Payer: COMMERCIAL

## 2021-07-06 ENCOUNTER — TELEPHONE (OUTPATIENT)
Dept: HEMATOLOGY ONCOLOGY | Facility: CLINIC | Age: 56
End: 2021-07-06

## 2021-07-06 VITALS
HEART RATE: 58 BPM | BODY MASS INDEX: 21.86 KG/M2 | DIASTOLIC BLOOD PRESSURE: 78 MMHG | TEMPERATURE: 98.7 F | SYSTOLIC BLOOD PRESSURE: 120 MMHG | WEIGHT: 133.4 LBS

## 2021-07-06 DIAGNOSIS — C18.2 PRIMARY ADENOCARCINOMA OF ASCENDING COLON (HCC): ICD-10-CM

## 2021-07-06 DIAGNOSIS — C78.6 PERITONEAL METASTASES (HCC): ICD-10-CM

## 2021-07-06 DIAGNOSIS — C78.7 LIVER METASTASES (HCC): ICD-10-CM

## 2021-07-06 DIAGNOSIS — C78.00 MALIGNANT NEOPLASM METASTATIC TO LUNG, UNSPECIFIED LATERALITY (HCC): ICD-10-CM

## 2021-07-06 DIAGNOSIS — R17 JAUNDICE: ICD-10-CM

## 2021-07-06 PROCEDURE — 99244 OFF/OP CNSLTJ NEW/EST MOD 40: CPT | Performed by: INTERNAL MEDICINE

## 2021-07-06 NOTE — PROGRESS NOTES
[facility] - Outpatient Consultation  Som Barnhart 49 y o  female MRN: [id number]  Encounter: [id number]          ASSESSMENT AND PLAN:      1  Liver metastases ([address] )  2  Primary adenocarcinoma of ascending colon ([address] )  3  Malignant neoplasm metastatic to lung, unspecified laterality ([address] )  4  Per[facility] )  5  Jaundice  6  hyperbilirubinemia  CT on 6/15 showed  Numerous small ill-defined subcentimeter hypodensities within the liver, more right than left, representing increasing metastatic disease  Hyperbilirubinemia likely secondary to metastatic disease  US showed no signs of CBD obstruction by malignancy  It is unlikely she will benefit form stent placement at this point  I will discuss with Dr Charli Christianson regarding her condition  prognosis is poor  She is following with palliative care      ______________________________________________________________________    HPI:      70-year-old female with stage IV colon cancer referred for evaluation of hyperbilirubinemia   Patient is here with her   She has progressive disease from stage IV colon cancer and most recent treatment has been 1 cycle of Stivarga and that will be on hold now because of worsening hyperbilirubinemia  She checked with specialist at [facility] and [facility] and they do not have an open clinical trial for her and also no recommendations   She will not qualify for any trial due to hyperbilirubinemia  Her bilirubin level recently melody from 2 to 7  She appears to be jaundiced  She denies itchiness  Repeat CT showed     Significant interval worsening in size and number of pulmonary metastases      Mild interval increase in multiple small ill-defined metastatic lesions within the liver      New severe right-sided hydronephrosis and delayed nephrogram   Appears likely related to peritoneal implant disease along the dependent pelvic wall      New large volume ascites  US showed no CBD obstruction  She was referred to GI for evaluation of hyperbilirubinemia  REVIEW OF SYSTEMS:    CONSTITUTIONAL: Denies any fever, chills, rigors, and weight loss  HEENT: No earache or tinnitus  Denies hearing loss or visual disturbances  CARDIOVASCULAR: No chest pain or palpitations  RESPIRATORY: Denies any cough, hemoptysis, shortness of breath or dyspnea on exertion  GASTROINTESTINAL: As noted in the History of Present Illness  GENITOURINARY: No problems with urination  Denies any hematuria or dysuria  NEUROLOGIC: No dizziness or vertigo, denies headaches  MUSCULOSKELETAL: Denies any muscle or joint pain  SKIN: Denies skin rashes or itching  ENDOCRINE: Denies excessive thirst  Denies intolerance to heat or cold  PSYCHOSOCIAL: Denies depression or anxiety  Denies any recent memory loss         Historical Information   Past Medical History:   Diagnosis Date    [facility]     [facility] 06[facility]08/2018    DVT [facility]deep venous thrombosis) ([facility] )     History of chemotherapy 2019    Kidney disease     Menorrhalgia     RESOLVED 2008    Migraine     Postcoital bleeding     LAST ASSESSED [id number]     Past Surgical History:   Procedure Laterality Date    ABDOMINAL ADHESION SURGERY N/A 6/14/2018    Procedure: LYSIS ADHESIONS OF COLON;  Surgeon: Karol Faulkner MD;  [facility];  Service: Colorectal    CYSTOSCOPY N/A 5/26/2020    Procedure: CYSTOSCOPY;  Surgeon: Dev Cesar MD;  [facility];  Service: Gynecology Oncology    DILATION AND CURETTAGE OF UTERUS      RESOLVED 2008    ENDOMETRIAL ABLATION      THERMAL     NOVASURE  RESOLVED 2008    ENDOMETRIAL BIOPSY      BY SUCTION   DONE 12/13/2008    HYSTERECTOMY  05/21/2018    IR NEPHROSTOMY TUBE PLACEMENT  6/15/2021    IR PARACENTESIS  5/27/2021    OMENTECTOMY Right 6/14/2018    Procedure: PARTIAL OMENTECTOMY;  Surgeon: Karol Faulkner MD;  [facility];  Service: Colorectal    OOPHORECTOMY N/A 5/26/2020 Procedure: RIGHT RADICAL OOPHORECTOMY, LEFT OOPHORECTOMY, PERITONEAL BIOPSIES, ATTEMPTED ROBOT CONVERTED TO OPEN LAPAROTOMY;  Surgeon: Lin Hunter MD;  [facility];  Service: Gynecology Oncology    PORTACATH PLACEMENT      RCW    RI COLONOSCOPY FLX DX W/COLLJ [address] [facility] [address] WHEN PFRMD N/A 6/13/2018    Procedure: COLONOSCOPY;  Surgeon: Ankush Davis MD;  [facility]; Service: Colorectal    RI ESOPHAGOGASTRODUODENOSCOPY TRANSORAL DIAGNOSTIC N/A 6/13/2018    Procedure: ESOPHAGOGASTRODUODENOSCOPY (EGD); Surgeon: Vipin Starkey MD;  [facility];   Service: Gastroenterology    RI INSERT PICC W/ SUB-Q PORT Right 6/28/2018    Procedure: INSERTION VENOUS PORT (PORT-A-CATH) VIA RIGHT SUBCLAVIAN VEIN;  Surgeon: Ankush Davis MD;  [facility];  Service: Colorectal    RI LAP,DIAGNOSTIC ABDOMEN N/A 6/14/2018    Procedure: LAPAROSCOPY DIAGNOSTIC;  Surgeon: Ankush Davis MD;  [facility];  Service: Colorectal    RI LAP,SURG,COLECTOMY, PARTIAL, W/ANAST Right 6/14/2018    Procedure: LAPAROSCOPIC EXTENDED RIGHT HEMICOLECTOMY ; PERITONEAL BX;  Surgeon: Ankush Davis MD;  [facility];  Service: Colorectal    RI LAP,VAG HYST,UTERUS 250GMS/<,SALP-OOPH Bilateral 5/21/2018    Procedure: HYSTERECTOMY LAPAROSCOPIC ASSISTED VAGINAL (LAVH) , BILATERAL SALPINGECTOMY;  Surgeon: Yamilet Hampton DO;  [facility];  Service: Gynecology    PROCTOSCOPY N/A 5/26/2020    Procedure: PROCTOSCOPY;  Surgeon: Lin Hunter MD;  [facility];  Service: Gynecology Oncology    TRANSPERINEAL IMPLANT OF RADIATION SEEDS W/ ULTRASOUND  10/30/2020    Liver, @ [address] History   Social History     Substance and Sexual Activity   Alcohol Use Never    Alcohol/week: 0 0 standard drinks    Comment: 0     Social History     Substance and Sexual Activity   Drug Use No     Social History     Tobacco Use   Smoking Status Never Smoker   Smokeless Tobacco Never Used     Family History   Problem Relation Age of Onset   David Madiha Migraines Mother     Heart disease Mother     Anemia Father     Arthritis Father     Other Father         BLOOD TRANSFUSION    Migraines Daughter     Heart disease Family     No Known Problems Maternal Grandmother     No Known Problems Maternal Grandfather     No Known Problems Paternal Grandmother     No Known Problems Paternal Grandfather        Meds/Allergies       Current Outpatient Medications:     acetaminophen (TYLENOL) 500 mg tablet    amLODIPine (NORVASC) 5 mg tablet    benzonatate (TESSALON) 200 MG capsule    furosemide (LASIX) 40 mg tablet    LORazepam (ATIVAN) 1 mg tablet    metoprolol succinate (TOPROL-XL) 50 mg 24 hr tablet    multivitamin (THERAGRAN) TABS    oxyCODONE (ROXICODONE) 5 mg immediate release tablet    potassium chloride (Klor-Con) 10 mEq tablet    rivaroxaban (Xarelto) 10 mg tablet    senna (SENOKOT) 8 6 MG tablet    sodium chloride, PF, 0 9 %    spironolactone (ALDACTONE) 25 mg tablet    Regorafenib (Stivarga) 40 MG TABS    No Known Allergies        Objective     Blood pressure 120/78, pulse 58, temperature 98 7 °F (37 1 °C), weight 60 5 kg (133 lb 6 4 oz), last menstrual period 05/16/2018, not currently breastfeeding  Body mass index is 21 86 kg/m²  PHYSICAL EXAM:      General Appearance:   Alert, cooperative, no distress, cachectic   HEENT:   Normocephalic, atraumatic, jaundiced   Neck:  Supple, symmetrical, trachea midline   Lungs:   Clear to auscultation bilaterally; no rales, rhonchi or wheezing; respirations unlabored    Heart[de-identified]   Regular rate and rhythm; no murmur, rub, or gallop     Abdomen:   Soft, non-tender, non-distended; normal bowel sounds; no masses, no organomegaly    Genitalia:   Deferred    Rectal:   Deferred    Extremities:  No cyanosis, clubbing or edema    Pulses:  2+ and symmetric    Skin:  No jaundice, rashes, or lesions    Lymph nodes:  No palpable cervical lymphadenopathy        Lab Results:   No visits with results within 1 Day(s) from this visit  Latest known visit with results is:   Hospital Outpatient Visit on 06/22/2021   Component Date Value    WBC 06/22/2021 11 18*    RBC 06/22/2021 3 68*    Hemoglobin 06/22/2021 14 8     Hematocrit 06/22/2021 44 0     MCV 06/22/2021 120*    MCH 06/22/2021 40 2*    MCHC 06/22/2021 33 6     RDW 06/22/2021 18 5*    MPV 06/22/2021 10 4     Platelets 01/38/0786 99*    nRBC 06/22/2021 0     Neutrophils Relative 06/22/2021 69     Immat GRANS % 06/22/2021 1     Lymphocytes Relative 06/22/2021 10*    Monocytes Relative 06/22/2021 18*    Eosinophils Relative 06/22/2021 1     Basophils Relative 06/22/2021 1     Neutrophils Absolute 06/22/2021 7 74*    Immature Grans Absolute 06/22/2021 0 07     Lymphocytes Absolute 06/22/2021 1 08     Monocytes Absolute 06/22/2021 2 03*    Eosinophils Absolute 06/22/2021 0 12     Basophils Absolute 06/22/2021 0 14*    Sodium 06/22/2021 138     Potassium 06/22/2021 3 9     Chloride 06/22/2021 102     CO2 06/22/2021 25     ANION GAP 06/22/2021 11     BUN 06/22/2021 26*    Creatinine 06/22/2021 1 51*    Glucose 06/22/2021 119     Calcium 06/22/2021 8 3     Corrected Calcium 06/22/2021 9 7     AST 06/22/2021 73*    ALT 06/22/2021 34     Alkaline Phosphatase 06/22/2021 225*    Total Protein 06/22/2021 5 5*    Albumin 06/22/2021 2 3*    Total Bilirubin 06/22/2021 6 36*    eGFR 06/22/2021 39          Radiology Results:   US abdomen complete    Result Date: 7/2/2021  Narrative: ABDOMEN ULTRASOUND, COMPLETE INDICATION:   C78 7: Secondary malignant neoplasm of liver and intrahepatic bile duct C18 2: Malignant neoplasm of ascending colon C78 6: Secondary malignant neoplasm of retroperitoneum and peritoneum R17: Unspecified jaundice R18 8: Other ascites  COMPARISON: CT dated May 24, 2021  Prior ultrasound dated April 29, 2021   TECHNIQUE:   Real-time ultrasound of the abdomen was performed with a curvilinear transducer with both volumetric sweeps and still imaging techniques  FINDINGS: PANCREAS:  Visualized portions of the pancreas are within normal limits  Pancreatic tail obscured by bowel gas  Pancreatic duct is top normal  AORTA AND IVC:  Visualized portions are normal for patient age  LIVER: Size:  Within normal range  The liver measures 13 cm in the midclavicular line  Contour:  Surface contour is nodular  Parenchyma: There is diffuse coarsened heterogeneous echotexture suggesting underlying cirrhotic changes  Septated cyst right hepatic lobe measuring 1 7 cm  Suspected liver metastases are not visible by ultrasound  Limited imaging of the main portal vein shows it to be patent and hepatopetal  BILIARY: The gallbladder is adequately distended No wall thickening or pericholecystic fluid  Layering echogenic thickened bile  No convincing stones  No sonographic Nicole's sign  No intrahepatic biliary dilatation  CBD measures 5 mm  No choledocholithiasis  KIDNEY: Right kidney measures 9 1 cm  Nephrostomy tube in place lower pole collecting system  No hydronephrosis  Left kidney measures 10 2 cm  Within normal limits  SPLEEN: Enlarged measuring 14 x 14 x 5 cm  ASCITES:  Small to moderate volume ascites all 4 quadrants     Impression: Pancreatic tail obscured by bowel gas  Pancreatic Duct is top normal  Liver appears mildly cirrhotic  Known liver metastases are not seen on this exam   Small complex cyst right hepatic lobe again noted  No intrahepatic or extrahepatic biliary ductal dilatation  Layering echogenic bile in the gallbladder without evidence of cholecystitis  Nephrostomy tube lower pole right kidney without hydronephrosis  Enlarged spleen  Workstation performed: [id number]     IR nephrostomy tube placement    Result Date: 6/17/2021  Narrative: PROCEDURE: Image-guided percutaneous right nephrostomy placement STAFF: SVEN Thomas   CONTRAST: 20 mL Omnipaque into collecting system  FLUOROSCOPY TIME: 3 minutes  NUMBER OF IMAGES: Multiple  COMPLICATIONS: None  Sedation was provided by the anesthesia service, please see their dedicated note for full details  INDICATION: Right hydronephrosis in the setting of colon cancer PROCEDURE: The patient was placed prone on the fluoroscopic table  The right back was prepped and draped in the usual sterile fashion  A timeout was performed per protocol  1% lidocaine was used for local anesthesia  Using ultrasound guidance, a 22 gauge needle was inserted into a right posterior peripheral calyx, and an antegrade pyelogram was performed  A Michael set was then advanced into the renal pelvis  Over the wire, a 10 Irish nephrostomy catheter was placed  in the renal pelvis  Post placement nephrostogram was performed demonstrating appropriate position of the nephrostomy catheter  The pigtail locking mechanism was engaged and a single suture was placed to secure the catheter to the skin  A sterile dressing was applied  FINDINGS: 1  Initial ultrasound showed the renal collecting system to be dilated  2   Final fluoroscopic image showed good positioning of the nephrostomy catheter within the right renal pelvis  Impression: Right nephrostomy catheter placement   Workstation performed: [id number]

## 2021-07-06 NOTE — LETTER
July 6, 2021     [facility] [id number]    Patient: Yue Hernandez   YOB: 1965   Date of Visit: 7/6/2021       Dear Dr Patrice Ayers:    Thank you for referring Imelda Darby to me for evaluation  Below are my notes for this consultation  If you have questions, please do not hesitate to call me  I look forward to following your patient along with you  Sincerely,        Briana Castañeda MD        CC: No Recipients  Briana Castañeda MD  7/6/2021 11:30 PM  Sign when Signing Visit  [facility] - Outpatient Consultation  Yue Hernandez 49 y o  female MRN: [id number]  Encounter: [id number]          ASSESSMENT AND PLAN:      1  Liver metastases ([facility] [address] )  2  Primary adenocarcinoma of ascending colon ([facility] )  3  Malignant neoplasm metastatic to lung, unspecified laterality ([facility] )  4  Peritoneal metastases ([facility] [address] )  5  Jaundice  6  hyperbilirubinemia  CT on 6/15 showed  Numerous small ill-defined subcentimeter hypodensities within the liver, more right than left, representing increasing metastatic disease  Hyperbilirubinemia likely secondary to metastatic disease  US showed no signs of CBD obstruction by malignancy  It is unlikely she will benefit form stent placement at this point  I will discuss with Dr Jacob Vences regarding her condition  prognosis is poor  She is following with palliative care      ______________________________________________________________________    HPI:      60-year-old female with stage IV colon cancer referred for evaluation of hyperbilirubinemia   Patient is here with her   She has progressive disease from stage IV colon cancer and most recent treatment has been 1 cycle of Stivarga and that will be on hold now because of worsening hyperbilirubinemia  She checked with specialist at [facility] and [facility] and they do not have an open clinical trial for her and also no recommendations    She will not qualify for any trial due to hyperbilirubinemia  Her bilirubin level recently melody from 2 to 7  She appears to be jaundiced  She denies itchiness  Repeat CT showed     Significant interval worsening in size and number of pulmonary metastases      Mild interval increase in multiple small ill-defined metastatic lesions within the liver      New severe right-sided hydronephrosis and delayed nephrogram   Appears likely related to peritoneal implant disease along the dependent pelvic wall      New large volume ascites  US showed no CBD obstruction  She was referred to GI for evaluation of hyperbilirubinemia  REVIEW OF SYSTEMS:    CONSTITUTIONAL: Denies any fever, chills, rigors, and weight loss  HEENT: No earache or tinnitus  Denies hearing loss or visual disturbances  CARDIOVASCULAR: No chest pain or palpitations  RESPIRATORY: Denies any cough, hemoptysis, shortness of breath or dyspnea on exertion  GASTROINTESTINAL: As noted in the History of Present Illness  GENITOURINARY: No problems with urination  Denies any hematuria or dysuria  NEUROLOGIC: No dizziness or vertigo, denies headaches  MUSCULOSKELETAL: Denies any muscle or joint pain  SKIN: Denies skin rashes or itching  ENDOCRINE: Denies excessive thirst  Denies intolerance to heat or cold  PSYCHOSOCIAL: Denies depression or anxiety  Denies any recent memory loss         Historical Information   Past Medical History:   Diagnosis Date    Cancer [facility])     Colon cancer ([facility] ) 06/08/2018    DVT (deep venous thrombosis) ([facility] )     History of chemotherapy 2019    Kidney disease     Menorrhalgia     RESOLVED 2008    Migraine     Postcoital bleeding     LAST ASSESSED [id number]     Past Surgical History:   Procedure Laterality Date    ABDOMINAL ADHESION SURGERY N/A 6/14/2018    Procedure: LYSIS ADHESIONS OF COLON;  Surgeon: Dionne Burkitt, MD;  [facility];  Service: Colorectal    CYSTOSCOPY N/A 5/26/2020    Procedure: Gui Norton;  Surgeon: Rosealee Dubin Patti Trejo MD;  [facility];  Service: Gynecology Oncology    DILATION AND CURETTAGE OF UTERUS      RESOLVED 2008    ENDOMETRIAL ABLATION      THERMAL     NOVASURE  RESOLVED 2008    ENDOMETRIAL BIOPSY      BY SUCTION   DONE 12/13/2008    HYSTERECTOMY  05/21/2018    IR NEPHROSTOMY TUBE PLACEMENT  6/15/2021    IR PARACENTESIS  5/27/2021    OMENTECTOMY Right 6/14/2018    Procedure: PARTIAL OMENTECTOMY;  Surgeon: Elli Linder MD;  [facility];  Service: Colorectal    OOPHORECTOMY N/A 5/26/2020    Procedure: RIGHT RADICAL OOPHORECTOMY, LEFT OOPHORECTOMY, PERITONEAL BIOPSIES, ATTEMPTED ROBOT CONVERTED TO OPEN LAPAROTOMY;  Surgeon: Ada Gill MD;  [facility];  Service: Gynecology Oncology    PORTACATH PLACEMENT      RCW    [facility] N/A 6/13/2018    Procedure: COLONOSCOPY;  Surgeon: Elli Linder MD;  [facility]; Service: Colorectal    IL ESOPHAGOGASTRODUODENOSCOPY TRANSORAL DIAGNOSTIC N/A 6/13/2018    Procedure: ESOPHAGOGASTRODUODENOSCOPY (EGD); Surgeon: Mohsen Mattson MD;  [facility];   Service: Gastroenterology    IL INSERT PICC W/ SUB-Q PORT Right 6/28/2018    Procedure: INSERTION VENOUS PORT (PORT-A-CATH) VIA RIGHT SUBCLAVIAN VEIN;  Surgeon: Elli Linder MD;  [facility];  Service: Colorectal    IL LAP,DIAGNOSTIC ABDOMEN N/A 6/14/2018    Procedure: LAPAROSCOPY DIAGNOSTIC;  Surgeon: Elli Linder MD;  [facility];  Service: Colorectal    IL LAP,SURG,COLECTOMY, PARTIAL, W/ANAST Right 6/14/2018    Procedure: LAPAROSCOPIC EXTENDED RIGHT HEMICOLECTOMY ; PERITONEAL BX;  Surgeon: Elli Linder MD;  [facility];  Service: Colorectal    IL LAP,VAG HYST,UTERUS 250GMS/<,SALP-OOPH Bilateral 5/21/2018    Procedure: HYSTERECTOMY LAPAROSCOPIC ASSISTED VAGINAL (LAVH) , BILATERAL SALPINGECTOMY;  Surgeon: Jorge Alberto Larson DO;  [facility];  Service: Gynecology   [facility] PROCTOSCOPY N/A 5/26/2020    Procedure: PROCTOSCOPY;  Surgeon: Sofia Mckeon MD;  [facility];  Service: Gynecology Oncology    TRANSPERINEAL IMPLANT OF RADIATION SEEDS W/ ULTRASOUND  10/30/2020    Liver, @ [address] History   Social History     Substance and Sexual Activity   Alcohol Use Never    Alcohol/week: 0 0 standard drinks    Comment: 0     Social History     Substance and Sexual Activity   Drug Use No     Social History     Tobacco Use   Smoking Status Never Smoker   Smokeless Tobacco Never Used     Family History   Problem Relation Age of Onset    Migraines Mother     Heart disease Mother     Anemia Father     Arthritis Father     Other Father         BLOOD TRANSFUSION    Migraines Daughter     Heart disease Family     No Known Problems Maternal Grandmother     No Known Problems Maternal Grandfather     No Known Problems Paternal Grandmother     No Known Problems Paternal Grandfather        Meds/Allergies       Current Outpatient Medications:     acetaminophen (TYLENOL) 500 mg tablet    amLODIPine (NORVASC) 5 mg tablet    benzonatate (TESSALON) 200 MG capsule    furosemide (LASIX) 40 mg tablet    LORazepam (ATIVAN) 1 mg tablet    metoprolol succinate (TOPROL-XL) 50 mg 24 hr tablet    multivitamin (THERAGRAN) TABS    oxyCODONE (ROXICODONE) 5 mg immediate release tablet    potassium chloride (Klor-Con) 10 mEq tablet    rivaroxaban (Xarelto) 10 mg tablet    senna (SENOKOT) 8 6 MG tablet    sodium chloride, PF, 0 9 %    spironolactone (ALDACTONE) 25 mg tablet    Regorafenib (Stivarga) 40 MG TABS    No Known Allergies        Objective     Blood pressure 120/78, pulse 58, temperature 98 7 °F (37 1 °C), weight 60 5 kg (133 lb 6 4 oz), last menstrual period 05/16/2018, not currently breastfeeding  Body mass index is 21 86 kg/m²          PHYSICAL EXAM:      General Appearance:   Alert, cooperative, no distress   HEENT:   Normocephalic, atraumatic, anicteric    Neck:  Supple, symmetrical, trachea midline   Lungs:   Clear to auscultation bilaterally; no rales, rhonchi or wheezing; respirations unlabored    Heart[de-identified]   Regular rate and rhythm; no murmur, rub, or gallop  Abdomen:   Soft, non-tender, non-distended; normal bowel sounds; no masses, no organomegaly    Genitalia:   Deferred    Rectal:   Deferred    Extremities:  No cyanosis, clubbing or edema    Pulses:  2+ and symmetric    Skin:  No jaundice, rashes, or lesions    Lymph nodes:  No palpable cervical lymphadenopathy        Lab Results:   No visits with results within 1 Day(s) from this visit     Latest known visit with results is:   Hospital Outpatient Visit on 06/22/2021   Component Date Value    WBC 06/22/2021 11 18*    RBC 06/22/2021 3 68*    Hemoglobin 06/22/2021 14 8     Hematocrit 06/22/2021 44 0     MCV 06/22/2021 120*    MCH 06/22/2021 40 2*    MCHC 06/22/2021 33 6     RDW 06/22/2021 18 5*    MPV 06/22/2021 10 4     Platelets 86/83/5736 99*    nRBC 06/22/2021 0     Neutrophils Relative 06/22/2021 69     Immat GRANS % 06/22/2021 1     Lymphocytes Relative 06/22/2021 10*    Monocytes Relative 06/22/2021 18*    Eosinophils Relative 06/22/2021 1     Basophils Relative 06/22/2021 1     Neutrophils Absolute 06/22/2021 7 74*    Immature Grans Absolute 06/22/2021 0 07     Lymphocytes Absolute 06/22/2021 1 08     Monocytes Absolute 06/22/2021 2 03*    Eosinophils Absolute 06/22/2021 0 12     Basophils Absolute 06/22/2021 0 14*    Sodium 06/22/2021 138     Potassium 06/22/2021 3 9     Chloride 06/22/2021 102     CO2 06/22/2021 25     ANION GAP 06/22/2021 11     BUN 06/22/2021 26*    Creatinine 06/22/2021 1 51*    Glucose 06/22/2021 119     Calcium 06/22/2021 8 3     Corrected Calcium 06/22/2021 9 7     AST 06/22/2021 73*    ALT 06/22/2021 34     Alkaline Phosphatase 06/22/2021 225*    Total Protein 06/22/2021 5 5*    Albumin 06/22/2021 2 3*    Total Bilirubin 06/22/2021 6 36*  eGFR 06/22/2021 39          Radiology Results:   US abdomen complete    Result Date: 7/2/2021  Narrative: ABDOMEN ULTRASOUND, COMPLETE INDICATION:   C78 7: Secondary malignant neoplasm of liver and intrahepatic bile duct C18 2: Malignant neoplasm of ascending colon C78 6: Secondary malignant neoplasm of retroperitoneum and peritoneum R17: Unspecified jaundice R18 8: Other ascites  COMPARISON: CT dated May 24, 2021  Prior ultrasound dated April 29, 2021  TECHNIQUE:   Real-time ultrasound of the abdomen was performed with a curvilinear transducer with both volumetric sweeps and still imaging techniques  FINDINGS: PANCREAS:  Visualized portions of the pancreas are within normal limits  Pancreatic tail obscured by bowel gas  Pancreatic duct is top normal  AORTA AND IVC:  Visualized portions are normal for patient age  LIVER: Size:  Within normal range  The liver measures 13 cm in the midclavicular line  Contour:  Surface contour is nodular  Parenchyma: There is diffuse coarsened heterogeneous echotexture suggesting underlying cirrhotic changes  Septated cyst right hepatic lobe measuring 1 7 cm  Suspected liver metastases are not visible by ultrasound  Limited imaging of the main portal vein shows it to be patent and hepatopetal  BILIARY: The gallbladder is adequately distended No wall thickening or pericholecystic fluid  Layering echogenic thickened bile  No convincing stones  No sonographic Nicole's sign  No intrahepatic biliary dilatation  CBD measures 5 mm  No choledocholithiasis  KIDNEY: Right kidney measures 9 1 cm  Nephrostomy tube in place lower pole collecting system  No hydronephrosis  Left kidney measures 10 2 cm  Within normal limits  SPLEEN: Enlarged measuring 14 x 14 x 5 cm  ASCITES:  Small to moderate volume ascites all 4 quadrants     Impression: Pancreatic tail obscured by bowel gas  Pancreatic Duct is top normal  Liver appears mildly cirrhotic    Known liver metastases are not seen on this exam   Small complex cyst right hepatic lobe again noted  No intrahepatic or extrahepatic biliary ductal dilatation  Layering echogenic bile in the gallbladder without evidence of cholecystitis  Nephrostomy tube lower pole right kidney without hydronephrosis  Enlarged spleen  Workstation performed: [id number]     IR nephrostomy tube placement    Result Date: 6/17/2021  Narrative: PROCEDURE: Image-guided percutaneous right nephrostomy placement STAFF: SVEN Juarez  CONTRAST: 20 mL Omnipaque into collecting system  FLUOROSCOPY TIME: 3 minutes  NUMBER OF IMAGES: Multiple  COMPLICATIONS: None  Sedation was provided by the anesthesia service, please see their dedicated note for full details  INDICATION: Right hydronephrosis in the setting of colon cancer PROCEDURE: The patient was placed prone on the fluoroscopic table  The right back was prepped and draped in the usual sterile fashion  A timeout was performed per protocol  1% lidocaine was used for local anesthesia  Using ultrasound guidance, a 22 gauge needle was inserted into a right posterior peripheral calyx, and an antegrade pyelogram was performed  A Michael set was then advanced into the renal pelvis  Over the wire, a 10 [place] nephrostomy catheter was placed  in the renal pelvis  Post placement nephrostogram was performed demonstrating appropriate position of the nephrostomy catheter  The pigtail locking mechanism was engaged and a single suture was placed to secure the catheter to the skin  A sterile dressing was applied  FINDINGS: 1  Initial ultrasound showed the renal collecting system to be dilated  2   Final fluoroscopic image showed good positioning of the nephrostomy catheter within the right renal pelvis  Impression: Right nephrostomy catheter placement   Workstation performed: [id number]

## 2021-07-09 ENCOUNTER — TELEPHONE (OUTPATIENT)
Dept: HEMATOLOGY ONCOLOGY | Facility: CLINIC | Age: 56
End: 2021-07-09

## 2021-07-09 ENCOUNTER — OFFICE VISIT (OUTPATIENT)
Dept: HEMATOLOGY ONCOLOGY | Facility: CLINIC | Age: 56
End: 2021-07-09
Payer: COMMERCIAL

## 2021-07-09 ENCOUNTER — APPOINTMENT (OUTPATIENT)
Dept: LAB | Facility: MEDICAL CENTER | Age: 56
End: 2021-07-09
Payer: COMMERCIAL

## 2021-07-09 VITALS
OXYGEN SATURATION: 100 % | TEMPERATURE: 97.9 F | BODY MASS INDEX: 21.53 KG/M2 | DIASTOLIC BLOOD PRESSURE: 60 MMHG | HEART RATE: 118 BPM | WEIGHT: 134 LBS | RESPIRATION RATE: 18 BRPM | HEIGHT: 66 IN | SYSTOLIC BLOOD PRESSURE: 118 MMHG

## 2021-07-09 DIAGNOSIS — R06.02 SHORTNESS OF BREATH: ICD-10-CM

## 2021-07-09 DIAGNOSIS — I82.531 CHRONIC DEEP VEIN THROMBOSIS (DVT) OF POPLITEAL VEIN OF RIGHT LOWER EXTREMITY (HCC): ICD-10-CM

## 2021-07-09 DIAGNOSIS — R18.8 OTHER ASCITES: ICD-10-CM

## 2021-07-09 DIAGNOSIS — C79.9 ADENOCARCINOMA, METASTATIC (HCC): ICD-10-CM

## 2021-07-09 DIAGNOSIS — C78.7 LIVER METASTASES (HCC): Primary | ICD-10-CM

## 2021-07-09 DIAGNOSIS — C78.00 MALIGNANT NEOPLASM METASTATIC TO LUNG, UNSPECIFIED LATERALITY (HCC): ICD-10-CM

## 2021-07-09 DIAGNOSIS — R17 JAUNDICE: ICD-10-CM

## 2021-07-09 DIAGNOSIS — Z93.6 NEPHROSTOMY STATUS (HCC): ICD-10-CM

## 2021-07-09 DIAGNOSIS — C78.7 LIVER METASTASES (HCC): ICD-10-CM

## 2021-07-09 DIAGNOSIS — C77.2 METASTASIS TO RETROPERITONEAL LYMPH NODE (HCC): ICD-10-CM

## 2021-07-09 DIAGNOSIS — Z95.828 PORT-A-CATH IN PLACE: ICD-10-CM

## 2021-07-09 DIAGNOSIS — C18.2 PRIMARY ADENOCARCINOMA OF ASCENDING COLON (HCC): ICD-10-CM

## 2021-07-09 DIAGNOSIS — C79.60 MALIGNANT NEOPLASM METASTATIC TO OVARY, UNSPECIFIED LATERALITY (HCC): ICD-10-CM

## 2021-07-09 DIAGNOSIS — C78.6 PERITONEAL METASTASES (HCC): ICD-10-CM

## 2021-07-09 LAB
APTT PPP: 44 SECONDS (ref 23–37)
FIBRINOGEN PPP-MCNC: 369 MG/DL (ref 227–495)
INR PPP: 2.89 (ref 0.84–1.19)
PROTHROMBIN TIME: 30 SECONDS (ref 11.6–14.5)

## 2021-07-09 PROCEDURE — 85610 PROTHROMBIN TIME: CPT

## 2021-07-09 PROCEDURE — 99214 OFFICE O/P EST MOD 30 MIN: CPT | Performed by: INTERNAL MEDICINE

## 2021-07-09 PROCEDURE — 85730 THROMBOPLASTIN TIME PARTIAL: CPT

## 2021-07-09 PROCEDURE — 85384 FIBRINOGEN ACTIVITY: CPT

## 2021-07-09 NOTE — PROGRESS NOTES
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 to liver    6 cm T3 G2 right colon cancer with positive margins, lymphovascular invasion and perineural invasion and positive 3 of 27 lymph nodes with extranodal extension and positive peritoneal nodule biopsy   Stage IV disease because of liver and peritoneal metastases   In July 2018 patient was started  on modified FOLFIRI plus Avastin   She had PPE in her hands and bolus 5 FU was discontinued   She was seen by liver specialist at [facility] and was not felt to be a surgical candidate         In April 2020 patient  Was started on FOLFOX plus Avastin   She had protein urea but that was less than 1 gram in 24 hour and was monitored       Prior to that she was on FOLFIRI plus Avastin but after initial response disease progressed   On 05/26/2020 patient had pelvic surgery, removal of ovaries and sample also included peritoneum and small bowel mesentery   Both ovaries and small bowel mesentery showed metastatic disease from colon cancer   Surgery was on 05/26/2020    FOLFOX chemotherapy was resumed on 06/17/20 and 2 weeks later  Avastin was added to FOLFOX  Sayra Bowen for started in April 2021  There was disease progression  Stivarga was started in June 2021       Patient has been on Xarelto 10 mg daily for history of DVT right lower extremity that happened in 2016     No bleeding or blood clot on Xarelto   Will be checking PT, PTT and fibrinogen and if very abnormal Xarelto will be discontinued because of bleeding risk                       Current Outpatient Medications:     acetaminophen (TYLENOL) 500 mg tablet, Take 1,000 mg by mouth every 6 (six) hours as needed for mild pain, Disp: , Rfl:     amLODIPine (NORVASC) 5 mg tablet, Take 5 mg by mouth daily, Disp: , Rfl:     benzonatate (TESSALON) 200 MG capsule, Take 1 capsule (200 mg total) by mouth 3 (three) times a day as needed for cough, Disp: 20 capsule, Rfl: 0    furosemide (LASIX) 40 mg tablet, Take 40 mg by mouth daily, Disp: , Rfl:     LORazepam (ATIVAN) 1 mg tablet, Take 1 mg by mouth daily as needed for anxiety, Disp: , Rfl:     metoprolol succinate (TOPROL-XL) 50 mg 24 hr tablet, Take 50 mg by mouth 2 (two) times a day Pt was instructed by her PCP Dr Radha Duncan, advised pt to increased to BID, Disp: , Rfl:     multivitamin (THERAGRAN) TABS, Take 1 tablet by mouth daily, Disp: , Rfl:     oxyCODONE (ROXICODONE) 5 mg immediate release tablet, Take 1 tablet (5 mg total) by mouth every 4 (four) hours as needed for moderate pain or severe painMax Daily Amount: 30 mg, Disp: 30 tablet, Rfl: 0    potassium chloride (Klor-Con) 10 mEq tablet, , Disp: , Rfl:     Regorafenib (Stivarga) 40 MG TABS, Take 2 tablets (80 mg total) by mouth daily for 21 days , followed by 7 days off, Disp: 42 tablet, Rfl: 3    rivaroxaban (Xarelto) 10 mg tablet, Take 1 tablet (10 mg total) by mouth daily, Disp: 90 tablet, Rfl: 3    senna (SENOKOT) 8 6 MG tablet, Take 1 tablet (8 6 mg total) by mouth daily, Disp: 90 each, Rfl: 0    sodium chloride, PF, 0 9 %, 10 mL by Intracatheter route daily Intracatheter flushing daily, Disp: 300 mL, Rfl: 0    spironolactone (ALDACTONE) 25 mg tablet, , Disp: , Rfl:     No Known Allergies    Oncology History   Primary adenocarcinoma of ascending colon ([facility] [address] )   6/14/2018 Initial Diagnosis    Primary adenocarcinoma of ascending colon ([facility] [address] )     7/17/2018 - 8/1/2018 Chemotherapy    camptosar 180 mg/m2 day 1  5  mg/m2 day 1  5 FU 1200 mg/m2 day 1-2   Leucovorin 400 mg/m2     Venofer 100 mg (first 10 treatments) -- all every 2 weeks 7/17/2018 - 2/3/2020 Chemotherapy    palonosetron (ALOXI) injection 0 25 mg, 0 25 mg, Intravenous, Once, 8 of 16 cycles  Administration: 0 25 mg (11/21/2019), 0 25 mg (12/5/2019), 0 25 mg (12/19/2019), 0 25 mg (1/2/2020), 0 25 mg (1/16/2020), 0 25 mg (1/30/2020)  fluorouracil (ADRUCIL) injection 715 mg, 400 mg/m2, Intravenous, Once, 7 of 7 cycles  pegfilgrastim (NEULASTA ONPRO) subcutaneous injection kit 6 mg, 6 mg, Subcutaneous, Once, 20 of 28 cycles  Administration: 6 mg (5/22/2019), 6 mg (6/5/2019), 6 mg (6/22/2019), 6 mg (7/5/2019), 6 mg (7/20/2019), 6 mg (8/2/2019), 6 mg (8/17/2019), 6 mg (8/31/2019), 6 mg (9/14/2019), 6 mg (9/28/2019), 6 mg (10/12/2019), 6 mg (10/26/2019), 6 mg (11/9/2019), 6 mg (11/23/2019), 6 mg (12/7/2019), 6 mg (12/21/2019), 6 mg (1/4/2020), 6 mg (1/18/2020), 6 mg (2/1/2020)  fosaprepitant (EMEND) 150 mg in sodium chloride 0 9 % 250 mL IVPB, 150 mg, Intravenous, Once, 8 of 16 cycles  Administration: 150 mg (11/7/2019), 150 mg (11/21/2019), 150 mg (12/5/2019), 150 mg (12/19/2019), 150 mg (1/2/2020), 150 mg (1/16/2020), 150 mg (1/30/2020)  bevacizumab (AVASTIN) 355 mg in sodium chloride 0 9 % 100 mL IVPB, 5 mg/kg, Intravenous, Once, 27 of 35 cycles  Administration: 355 mg (5/20/2019), 355 mg (6/3/2019), 355 mg (6/20/2019), 355 mg (7/3/2019), 355 mg (7/18/2019), 355 mg (7/31/2019), 355 mg (8/15/2019), 355 mg (8/29/2019), 355 mg (9/12/2019), 355 mg (9/26/2019), 355 mg (10/10/2019), 355 mg (10/24/2019), 355 mg (11/7/2019), 355 mg (11/21/2019), 355 mg (12/5/2019), 355 mg (12/19/2019), 355 mg (1/2/2020), 355 mg (1/16/2020), 355 mg (1/30/2020)  irinotecan (CAMPTOSAR) 322 mg in sodium chloride 0 9 % 500 mL chemo infusion, 180 mg/m2, Intravenous, Once, 27 of 35 cycles  Dose modification: 150 mg/m2 (original dose 180 mg/m2, Cycle 20, Reason: Dose Not Tolerated)  Administration: 322 mg (5/20/2019), 322 mg (6/3/2019), 320 mg (6/20/2019), 320 mg (7/3/2019), 320 mg (7/18/2019), 320 mg (7/31/2019), 320 mg (8/15/2019), 320 mg (8/29/2019), 320 mg (9/12/2019), 320 mg (9/26/2019), 320 mg (10/10/2019), 320 mg (10/24/2019), 269 mg (11/7/2019), 269 mg (11/21/2019), 269 mg (12/5/2019), 269 mg (12/19/2019), 269 mg (1/2/2020), 269 mg (1/16/2020), 269 mg (1/30/2020)  leucovorin 716 mg in sodium chloride 0 9 % 250 mL IVPB, 400 mg/m2, Intravenous, Once, 27 of 35 cycles  Administration: 716 mg (5/20/2019), 716 mg (6/3/2019), 700 mg (6/20/2019), 700 mg (7/3/2019), 700 mg (7/18/2019), 700 mg (7/31/2019), 700 mg (8/15/2019), 700 mg (8/29/2019), 700 mg (9/12/2019), 700 mg (9/26/2019), 700 mg (10/10/2019), 716 mg (10/24/2019), 700 mg (11/7/2019), 700 mg (11/21/2019), 700 mg (12/5/2019), 700 mg (12/19/2019), 700 mg (1/2/2020), 700 mg (1/16/2020), 700 mg (1/30/2020)     8/1/2018 Adverse Reaction    Needed granix 300 mcg due to [address] of [address]      8/14/2018 -  Chemotherapy    camptosar 180 mg/m2 day 1  5  mg/m2 day 1  5 FU 1200 mg/m2 day 1-2   Leucovorin 400 mg/m2  Avastin 5 mg/kg day 1   Venofer 100 mg (first 10 treatments)  Neulasta on Pro 6 mg day 3      -- every 2 weeks          3/3/2020 - 3/16/2020 Chemotherapy    pegfilgrastim (NEULASTA ONPRO) subcutaneous injection kit 6 mg, 6 mg, Subcutaneous, Once, 1 of 12 cycles  Administration: 6 mg (3/5/2020)  bevacizumab (AVASTIN) 357 5 mg in sodium chloride 0 9 % 100 mL IVPB, 5 mg/kg = 357 5 mg, Intravenous, Once, 1 of 12 cycles  Administration: 357 5 mg (3/3/2020)  leucovorin 700 mg in dextrose 5 % 250 mL IVPB, 720 mg, Intravenous, Once, 1 of 12 cycles  Administration: 700 mg (3/3/2020)  oxaliplatin (ELOXATIN) 150 mg in dextrose 5 % 250 mL chemo infusion, 153 mg, Intravenous, Once, 1 of 12 cycles  Administration: 150 mg (3/3/2020)     3/16/2020 - 4/5/2020 Chemotherapy    bevacizumab (AVASTIN) 560 mg in sodium chloride 0 9 % 100 mL IVPB, 7 5 mg/kg, Intravenous, Once, 0 of 5 cycles  oxaliplatin (ELOXATIN) 239 2 mg in dextrose 5 % 500 mL chemo infusion, 130 mg/m2 = 239 2 mg, Intravenous, Once, 1 of 6 cycles  Administration: 239 2 mg (3/16/2020)     4/6/2020 - 12/13/2020 Chemotherapy    fluorouracil (ADRUCIL) injection 730 mg, 400 mg/m2 = 730 mg, Intravenous, Once, 1 of 1 cycle  pegfilgrastim (NEULASTA ONPRO) subcutaneous injection kit 6 mg, 6 mg, Subcutaneous, Once, 14 of 20 cycles  Administration: 6 mg (4/8/2020), 6 mg (4/25/2020), 6 mg (5/8/2020), 6 mg (6/19/2020), 6 mg (7/2/2020), 6 mg (7/17/2020), 6 mg (7/31/2020), 6 mg (8/14/2020), 6 mg (8/28/2020), 6 mg (9/25/2020), 6 mg (10/15/2020), 6 mg (11/5/2020), 6 mg (11/18/2020), 6 mg (12/2/2020)  fosaprepitant (EMEND) 150 mg in sodium chloride 0 9 % 250 mL IVPB, 150 mg, Intravenous, Once, 14 of 20 cycles  Administration: 150 mg (4/6/2020), 150 mg (4/23/2020), 150 mg (5/6/2020), 150 mg (6/17/2020), 150 mg (6/30/2020), 150 mg (7/15/2020), 150 mg (7/29/2020), 150 mg (8/12/2020), 150 mg (8/26/2020), 150 mg (9/23/2020), 150 mg (10/13/2020), 150 mg (11/3/2020), 150 mg (11/16/2020), 150 mg (11/30/2020)  bevacizumab (AVASTIN) 372 5 mg in sodium chloride 0 9 % 100 mL IVPB, 5 mg/kg = 372 5 mg, Intravenous, Once, 10 of 16 cycles  Administration: 372 5 mg (4/6/2020), 372 5 mg (4/23/2020), 357 5 mg (6/30/2020), 357 5 mg (7/15/2020), 357 5 mg (7/29/2020), 357 5 mg (8/12/2020), 357 5 mg (8/26/2020), 357 5 mg (11/16/2020), 357 5 mg (11/30/2020)  leucovorin 750 mg in dextrose 5 % 250 mL IVPB, 732 mg, Intravenous, Once, 14 of 20 cycles  Administration: 750 mg (4/6/2020), 750 mg (4/23/2020), 750 mg (5/6/2020), 750 mg (6/17/2020), 700 mg (6/30/2020), 700 mg (7/15/2020), 700 mg (7/29/2020), 700 mg (8/12/2020), 700 mg (8/26/2020), 700 mg (9/23/2020), 700 mg (10/13/2020), 700 mg (11/3/2020), 700 mg (11/16/2020), 700 mg (11/30/2020)  oxaliplatin (ELOXATIN) 150 mg in dextrose 5 % 250 mL chemo infusion, 155 55 mg, Intravenous, Once, 14 of 20 cycles  Dose modification: 70 mg/m2 (original dose 85 mg/m2, Cycle 11, Reason: Dose Not Tolerated, Comment: thrombocytopenia), 65 mg/m2 (original dose 85 mg/m2, Cycle 14, Reason: Other (See Comments)), 65 mg/m2 (original dose 85 mg/m2, Cycle 15, Reason: Max Dose Reached)  Administration: 150 mg (4/6/2020), 150 mg (4/23/2020), 150 mg (5/6/2020), 150 mg (6/17/2020), 150 mg (6/30/2020), 150 mg (7/15/2020), 150 mg (7/29/2020), 150 mg (8/12/2020), 150 mg (8/26/2020), 150 mg (9/23/2020), 125 3 mg (10/13/2020), 125 3 mg (11/3/2020), 125 3 mg (11/16/2020), 116 35 mg (11/30/2020)     12/28/2020 - 3/31/2021 Chemotherapy    pegfilgrastim (NEULASTA ONPRO) subcutaneous injection kit 6 mg, 6 mg, Subcutaneous, Once, 6 of 9 cycles  Administration: 6 mg (12/30/2020), 6 mg (1/14/2021), 6 mg (2/6/2021), 6 mg (2/20/2021), 6 mg (3/6/2021), 6 mg (3/20/2021)  fosaprepitant (EMEND) 150 mg in sodium chloride 0 9 % 250 mL IVPB, 150 mg, Intravenous, Once, 6 of 9 cycles  Administration: 150 mg (12/28/2020), 150 mg (1/12/2021), 150 mg (2/4/2021), 150 mg (2/18/2021), 150 mg (3/4/2021), 150 mg (3/18/2021)  bevacizumab (AVASTIN) 347 5 mg in sodium chloride 0 9 % 100 mL IVPB, 5 mg/kg = 347 5 mg, Intravenous, Once, 5 of 8 cycles  Administration: 347 5 mg (12/28/2020), 347 5 mg (2/4/2021), 347 5 mg (2/18/2021), 347 5 mg (3/4/2021), 347 5 mg (3/18/2021)  leucovorin 700 mg in dextrose 5 % 250 mL IVPB, 712 mg, Intravenous, Once, 6 of 9 cycles  Dose modification: 400 mg/m2 (original dose 400 mg/m2, Cycle 6, Reason: Other (See Comments), Comment: protocol)  Administration: 700 mg (12/28/2020), 700 mg (1/12/2021), 700 mg (2/4/2021), 700 mg (2/18/2021), 700 mg (3/4/2021), 700 mg (3/18/2021)  oxaliplatin (ELOXATIN) 115 7 mg in dextrose 5 % 250 mL chemo infusion, 65 mg/m2 = 115 7 mg (76 5 % of original dose 85 mg/m2), Intravenous, Once, 6 of 9 cycles  Dose modification: 65 mg/m2 (original dose 85 mg/m2, Cycle 1, Reason: Dose Not Tolerated)  Administration: 115 7 mg (12/28/2020), 115 7 mg (1/12/2021), 115 7 mg (2/4/2021), 115 7 mg (2/18/2021), 115 7 mg (3/4/2021), 115 7 mg (3/18/2021) Liver metastases ([facility] [address] )   6/26/2018 Initial Diagnosis    Liver metastases ([facility] [address] )     7/17/2018 - 2/3/2020 Chemotherapy    palonosetron (ALOXI) injection 0 25 mg, 0 25 mg, Intravenous, Once, 8 of 16 cycles  Administration: 0 25 mg (11/21/2019), 0 25 mg (12/5/2019), 0 25 mg (12/19/2019), 0 25 mg (1/2/2020), 0 25 mg (1/16/2020), 0 25 mg (1/30/2020)  fluorouracil (ADRUCIL) injection 715 mg, 400 mg/m2, Intravenous, Once, 7 of 7 cycles  pegfilgrastim (NEULASTA ONPRO) subcutaneous injection kit 6 mg, 6 mg, Subcutaneous, Once, 20 of 28 cycles  Administration: 6 mg (5/22/2019), 6 mg (6/5/2019), 6 mg (6/22/2019), 6 mg (7/5/2019), 6 mg (7/20/2019), 6 mg (8/2/2019), 6 mg (8/17/2019), 6 mg (8/31/2019), 6 mg (9/14/2019), 6 mg (9/28/2019), 6 mg (10/12/2019), 6 mg (10/26/2019), 6 mg (11/9/2019), 6 mg (11/23/2019), 6 mg (12/7/2019), 6 mg (12/21/2019), 6 mg (1/4/2020), 6 mg (1/18/2020), 6 mg (2/1/2020)  fosaprepitant (EMEND) 150 mg in sodium chloride 0 9 % 250 mL IVPB, 150 mg, Intravenous, Once, 8 of 16 cycles  Administration: 150 mg (11/7/2019), 150 mg (11/21/2019), 150 mg (12/5/2019), 150 mg (12/19/2019), 150 mg (1/2/2020), 150 mg (1/16/2020), 150 mg (1/30/2020)  bevacizumab (AVASTIN) 355 mg in sodium chloride 0 9 % 100 mL IVPB, 5 mg/kg, Intravenous, Once, 27 of 35 cycles  Administration: 355 mg (5/20/2019), 355 mg (6/3/2019), 355 mg (6/20/2019), 355 mg (7/3/2019), 355 mg (7/18/2019), 355 mg (7/31/2019), 355 mg (8/15/2019), 355 mg (8/29/2019), 355 mg (9/12/2019), 355 mg (9/26/2019), 355 mg (10/10/2019), 355 mg (10/24/2019), 355 mg (11/7/2019), 355 mg (11/21/2019), 355 mg (12/5/2019), 355 mg (12/19/2019), 355 mg (1/2/2020), 355 mg (1/16/2020), 355 mg (1/30/2020)  irinotecan (CAMPTOSAR) 322 mg in sodium chloride 0 9 % 500 mL chemo infusion, 180 mg/m2, Intravenous, Once, 27 of 35 cycles  Dose modification: 150 mg/m2 (original dose 180 mg/m2, Cycle 20, Reason: Dose Not Tolerated)  Administration: 322 mg (5/20/2019), 322 mg (6/3/2019), 320 mg (6/20/2019), 320 mg (7/3/2019), 320 mg (7/18/2019), 320 mg (7/31/2019), 320 mg (8/15/2019), 320 mg (8/29/2019), 320 mg (9/12/2019), 320 mg (9/26/2019), 320 mg (10/10/2019), 320 mg (10/24/2019), 269 mg (11/7/2019), 269 mg (11/21/2019), 269 mg (12/5/2019), 269 mg (12/19/2019), 269 mg (1/2/2020), 269 mg (1/16/2020), 269 mg (1/30/2020)  leucovorin 716 mg in sodium chloride 0 9 % 250 mL IVPB, 400 mg/m2, Intravenous, Once, 27 of 35 cycles  Administration: 716 mg (5/20/2019), 716 mg (6/3/2019), 700 mg (6/20/2019), 700 mg (7/3/2019), 700 mg (7/18/2019), 700 mg (7/31/2019), 700 mg (8/15/2019), 700 mg (8/29/2019), 700 mg (9/12/2019), 700 mg (9/26/2019), 700 mg (10/10/2019), 716 mg (10/24/2019), 700 mg (11/7/2019), 700 mg (11/21/2019), 700 mg (12/5/2019), 700 mg (12/19/2019), 700 mg (1/2/2020), 700 mg (1/16/2020), 700 mg (1/30/2020)     3/3/2020 - 3/16/2020 Chemotherapy    pegfilgrastim (NEULASTA ONPRO) subcutaneous injection kit 6 mg, 6 mg, Subcutaneous, Once, 1 of 12 cycles  Administration: 6 mg (3/5/2020)  bevacizumab (AVASTIN) 357 5 mg in sodium chloride 0 9 % 100 mL IVPB, 5 mg/kg = 357 5 mg, Intravenous, Once, 1 of 12 cycles  Administration: 357 5 mg (3/3/2020)  leucovorin 700 mg in dextrose 5 % 250 mL IVPB, 720 mg, Intravenous, Once, 1 of 12 cycles  Administration: 700 mg (3/3/2020)  oxaliplatin (ELOXATIN) 150 mg in dextrose 5 % 250 mL chemo infusion, 153 mg, Intravenous, Once, 1 of 12 cycles  Administration: 150 mg (3/3/2020)     3/16/2020 - 4/5/2020 Chemotherapy    bevacizumab (AVASTIN) 560 mg in sodium chloride 0 9 % 100 mL IVPB, 7 5 mg/kg, Intravenous, Once, 0 of 5 cycles  oxaliplatin (ELOXATIN) 239 2 mg in dextrose 5 % 500 mL chemo infusion, 130 mg/m2 = 239 2 mg, Intravenous, Once, 1 of 6 cycles  Administration: 239 2 mg (3/16/2020)     4/6/2020 - 12/13/2020 Chemotherapy    fluorouracil (ADRUCIL) injection 730 mg, 400 mg/m2 = 730 mg, Intravenous, Once, 1 of 1 cycle  pegfilgrastim (NEULASTA ONPRO) subcutaneous injection kit 6 mg, 6 mg, Subcutaneous, Once, 14 of 20 cycles  Administration: 6 mg (4/8/2020), 6 mg (4/25/2020), 6 mg (5/8/2020), 6 mg (6/19/2020), 6 mg (7/2/2020), 6 mg (7/17/2020), 6 mg (7/31/2020), 6 mg (8/14/2020), 6 mg (8/28/2020), 6 mg (9/25/2020), 6 mg (10/15/2020), 6 mg (11/5/2020), 6 mg (11/18/2020), 6 mg (12/2/2020)  fosaprepitant (EMEND) 150 mg in sodium chloride 0 9 % 250 mL IVPB, 150 mg, Intravenous, Once, 14 of 20 cycles  Administration: 150 mg (4/6/2020), 150 mg (4/23/2020), 150 mg (5/6/2020), 150 mg (6/17/2020), 150 mg (6/30/2020), 150 mg (7/15/2020), 150 mg (7/29/2020), 150 mg (8/12/2020), 150 mg (8/26/2020), 150 mg (9/23/2020), 150 mg (10/13/2020), 150 mg (11/3/2020), 150 mg (11/16/2020), 150 mg (11/30/2020)  bevacizumab (AVASTIN) 372 5 mg in sodium chloride 0 9 % 100 mL IVPB, 5 mg/kg = 372 5 mg, Intravenous, Once, 10 of 16 cycles  Administration: 372 5 mg (4/6/2020), 372 5 mg (4/23/2020), 357 5 mg (6/30/2020), 357 5 mg (7/15/2020), 357 5 mg (7/29/2020), 357 5 mg (8/12/2020), 357 5 mg (8/26/2020), 357 5 mg (11/16/2020), 357 5 mg (11/30/2020)  leucovorin 750 mg in dextrose 5 % 250 mL IVPB, 732 mg, Intravenous, Once, 14 of 20 cycles  Administration: 750 mg (4/6/2020), 750 mg (4/23/2020), 750 mg (5/6/2020), 750 mg (6/17/2020), 700 mg (6/30/2020), 700 mg (7/15/2020), 700 mg (7/29/2020), 700 mg (8/12/2020), 700 mg (8/26/2020), 700 mg (9/23/2020), 700 mg (10/13/2020), 700 mg (11/3/2020), 700 mg (11/16/2020), 700 mg (11/30/2020)  oxaliplatin (ELOXATIN) 150 mg in dextrose 5 % 250 mL chemo infusion, 155 55 mg, Intravenous, Once, 14 of 20 cycles  Dose modification: 70 mg/m2 (original dose 85 mg/m2, Cycle 11, Reason: Dose Not Tolerated, Comment: thrombocytopenia), 65 mg/m2 (original dose 85 mg/m2, Cycle 14, Reason: Other (See Comments)), 65 mg/m2 (original dose 85 mg/m2, Cycle 15, Reason: Max Dose Reached)  Administration: 150 mg (4/6/2020), 150 mg (4/23/2020), 150 mg (5/6/2020), 150 mg (6/17/2020), 150 mg (6/30/2020), 150 mg (7/15/2020), 150 mg (7/29/2020), 150 mg (8/12/2020), 150 mg (8/26/2020), 150 mg (9/23/2020), 125 3 mg (10/13/2020), 125 3 mg (11/3/2020), 125 3 mg (11/16/2020), 116 35 mg (11/30/2020)     12/28/2020 - 3/31/2021 Chemotherapy    pegfilgrastim (NEULASTA ONPRO) subcutaneous injection kit 6 mg, 6 mg, Subcutaneous, Once, 6 of 9 cycles  Administration: 6 mg (12/30/2020), 6 mg (1/14/2021), 6 mg (2/6/2021), 6 mg (2/20/2021), 6 mg (3/6/2021), 6 mg (3/20/2021)  fosaprepitant (EMEND) 150 mg in sodium chloride 0 9 % 250 mL IVPB, 150 mg, Intravenous, Once, 6 of 9 cycles  Administration: 150 mg (12/28/2020), 150 mg (1/12/2021), 150 mg (2/4/2021), 150 mg (2/18/2021), 150 mg (3/4/2021), 150 mg (3/18/2021)  bevacizumab (AVASTIN) 347 5 mg in sodium chloride 0 9 % 100 mL IVPB, 5 mg/kg = 347 5 mg, Intravenous, Once, 5 of 8 cycles  Administration: 347 5 mg (12/28/2020), 347 5 mg (2/4/2021), 347 5 mg (2/18/2021), 347 5 mg (3/4/2021), 347 5 mg (3/18/2021)  leucovorin 700 mg in dextrose 5 % 250 mL IVPB, 712 mg, Intravenous, Once, 6 of 9 cycles  Dose modification: 400 mg/m2 (original dose 400 mg/m2, Cycle 6, Reason: Other (See Comments), Comment: protocol)  Administration: 700 mg (12/28/2020), 700 mg (1/12/2021), 700 mg (2/4/2021), 700 mg (2/18/2021), 700 mg (3/4/2021), 700 mg (3/18/2021)  oxaliplatin (ELOXATIN) 115 7 mg in dextrose 5 % 250 mL chemo infusion, 65 mg/m2 = 115 7 mg (76 5 % of original dose 85 mg/m2), Intravenous, Once, 6 of 9 cycles  Dose modification: 65 mg/m2 (original dose 85 mg/m2, Cycle 1, Reason: Dose Not Tolerated)  Administration: 115 7 mg (12/28/2020), 115 7 mg (1/12/2021), 115 7 mg (2/4/2021), 115 7 mg (2/18/2021), 115 7 mg (3/4/2021), 115 7 mg (3/18/2021)     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[address] )   6/26/2018 Initial Diagnosis    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[address] )     4/6/2020 - 12/13/2020 Chemotherapy    fluorouracil (ADRUCIL) injection 730 mg, 400 mg/m2 = 730 mg, Intravenous, Once, 1 of 1 cycle  pegfilgrastim (NEULASTA ONPRO) subcutaneous injection kit 6 mg, 6 mg, Subcutaneous, Once, 7 of 10 cycles  Administration: 6 mg (4/8/2020), 6 mg (4/25/2020), 6 mg (5/8/2020), 6 mg (6/19/2020), 6 mg (7/2/2020), 6 mg (7/17/2020), 6 mg (7/31/2020)  fosaprepitant (EMEND) 150 mg in sodium chloride 0 9 % 250 mL IVPB, 150 mg, Intravenous, Once, 7 of 10 cycles  Administration: 150 mg (4/6/2020), 150 mg (4/23/2020), 150 mg (5/6/2020), 150 mg (6/17/2020), 150 mg (6/30/2020), 150 mg (7/15/2020), 150 mg (7/29/2020)  bevacizumab (AVASTIN) 372 5 mg in sodium chloride 0 9 % 100 mL IVPB, 5 mg/kg = 372 5 mg, Intravenous, Once, 5 of 8 cycles  Administration: 372 5 mg (4/6/2020), 372 5 mg (4/23/2020), 357 5 mg (6/30/2020), 357 5 mg (7/15/2020), 357 5 mg (7/29/2020)  leucovorin 750 mg in dextrose 5 % 250 mL IVPB, 732 mg, Intravenous, Once, 7 of 10 cycles  Administration: 750 mg (4/6/2020), 750 mg (4/23/2020), 750 mg (5/6/2020), 750 mg (6/17/2020), 700 mg (6/30/2020), 700 mg (7/15/2020), 700 mg (7/29/2020)  oxaliplatin (ELOXATIN) 150 mg in dextrose 5 % 250 mL chemo infusion, 155 55 mg, Intravenous, Once, 7 of 10 cycles  Administration: 150 mg (4/6/2020), 150 mg (4/23/2020), 150 mg (5/6/2020), 150 mg (6/17/2020), 150 mg (6/30/2020), 150 mg (7/15/2020), 150 mg (7/29/2020)     12/28/2020 - 3/31/2021 Chemotherapy    pegfilgrastim (NEULASTA ONPRO) subcutaneous injection kit 6 mg, 6 mg, Subcutaneous, Once, 6 of 9 cycles  Administration: 6 mg (12/30/2020), 6 mg (1/14/2021), 6 mg (2/6/2021), 6 mg (2/20/2021), 6 mg (3/6/2021), 6 mg (3/20/2021)  fosaprepitant (EMEND) 150 mg in sodium chloride 0 9 % 250 mL IVPB, 150 mg, Intravenous, Once, 6 of 9 cycles  Administration: 150 mg (12/28/2020), 150 mg (1/12/2021), 150 mg (2/4/2021), 150 mg (2/18/2021), 150 mg (3/4/2021), 150 mg (3/18/2021)  bevacizumab (AVASTIN) 347 5 mg in sodium chloride 0 9 % 100 mL IVPB, 5 mg/kg = 347 5 mg, Intravenous, Once, 5 of 8 cycles  Administration: 347 5 mg (12/28/2020), 347 5 mg (2/4/2021), 347 5 mg (2/18/2021), 347 5 mg (3/4/2021), 347 5 mg (3/18/2021)  leucovorin 700 mg in dextrose 5 % 250 mL IVPB, 712 mg, Intravenous, Once, 6 of 9 cycles  Dose modification: 400 mg/m2 (original dose 400 mg/m2, Cycle 6, Reason: Other (See Comments), Comment: protocol)  Administration: 700 mg (12/28/2020), 700 mg (1/12/2021), 700 mg (2/4/2021), 700 mg (2/18/2021), 700 mg (3/4/2021), 700 mg (3/18/2021)  oxaliplatin (ELOXATIN) 115 7 mg in dextrose 5 % 250 mL chemo infusion, 65 mg/m2 = 115 7 mg (76 5 % of original dose 85 mg/m2), Intravenous, Once, 6 of 9 cycles  Dose modification: 65 mg/m2 (original dose 85 mg/m2, Cycle 1, Reason: Dose Not Tolerated)  Administration: 115 7 mg (12/28/2020), 115 7 mg (1/12/2021), 115 7 mg (2/4/2021), 115 7 mg (2/18/2021), 115 7 mg (3/4/2021), 115 7 mg (3/18/2021)         ROS:  07/09/21 Reviewed 12 systems: See symptoms in HPI  Presently no other neurological, cardiac, pulmonary, GI and  symptoms other than mentioned above  in HPI  No other symptoms like  fever, chills, bleeding, bone pains, skin rash,  arthritic symptoms,   claudication and gait problem  No frequent infections  Not unusually sensitive to heat or cold  No swelling of the ankles  No swollen glands  Patient is anxious  /60 (BP Location: Left arm, Patient Position: Sitting, Cuff Size: Adult)   Pulse (!) 118   Temp 97 9 °F (36 6 °C) (Temporal)   Resp 18   Ht 5' 5 5" (1 664 m)   Wt 60 8 kg (134 lb)   LMP 05/16/2018   SpO2 100%   BMI 21 96 kg/m²     Physical Exam:      Medical resident Sade Thompson was in the room with me the whole time     Alert, oriented, not in distress , stable vitals  except low blood pressure,  has icterus, no oral thrush, no palpable neck mass,  decreased breath sounds and dullness at lung bases, regular heart rate, abdomen  soft and non tender,   some distension of abdomen, no palpable abdominal mass,   has ascites,,  bowel sounds present,  no edema of ankles, no calf tenderness, no objective focal neurological deficit, no skin rash, no palpable lymphadenopathy in the neck and axillary areas,  no clubbing  Patient  anxious    Performance status 2   Percutaneous nephrostomy on the right    IMAGING:    IMPRESSION:     Significant interval worsening in size and number of pulmonary metastases      Mild interval increase in multiple small ill-defined metastatic lesions within the liver      New severe right-sided hydronephrosis and delayed nephrogram   Appears likely related to peritoneal implant disease along the dependent pelvic wall      New large volume ascites      The study was marked in EPIC for immediate notification       Workstation performed: [id number]      Imaging    CT chest abdomen pelvis w contrast (Order: [id number]) - 5/24/2021      LABS:    Results for orders placed or performed during the hospital encounter of 06/22/21   CBC and differential   Result Value Ref Range    WBC 11 18 (H) 4 31 - 10 16 Thousand/uL    RBC 3 68 (L) 3 81 - 5 12 Million/uL    Hemoglobin 14 8 11 5 - 15 4 g/dL    Hematocrit 44 0 34 8 - 46 1 %     (H) 82 - 98 fL    MCH 40 2 (H) 26 8 - 34 3 pg    MCHC 33 6 31 4 - 37 4 g/dL    RDW 18 5 (H) 11 6 - 15 1 %    MPV 10 4 8 9 - 12 7 fL    Platelets 99 (L) 479 - 390 Thousands/uL    nRBC 0 /100 WBCs    Neutrophils Relative 69 43 - 75 %    Immat GRANS % 1 0 - 2 %    Lymphocytes Relative 10 (L) 14 - 44 %    Monocytes Relative 18 (H) 4 - 12 %    Eosinophils Relative 1 0 - 6 %    Basophils Relative 1 0 - 1 %    Neutrophils Absolute 7 74 (H) 1 85 - 7 62 Thousands/µL    Immature Grans Absolute 0 07 0 00 - 0 20 Thousand/uL    Lymphocytes Absolute 1 08 0 60 - 4 47 Thousands/µL    Monocytes Absolute 2 03 (H) 0 17 - 1 22 Thousand/µL    Eosinophils Absolute 0 12 0 00 - 0 61 Thousand/µL    Basophils Absolute 0 14 (H) 0 00 - 0 10 Thousands/µL   Comprehensive metabolic panel   Result Value Ref Range    Sodium 138 136 - 145 mmol/L    Potassium 3 9 3 5 - 5 3 mmol/L    Chloride 102 100 - 108 mmol/L    CO2 25 21 - 32 mmol/L    ANION GAP 11 4 - 13 mmol/L    BUN 26 (H) 5 - 25 mg/dL    Creatinine 1 51 (H) 0 60 - 1 30 mg/dL    Glucose 119 65 - 140 mg/dL    Calcium 8 3 8 3 - 10 1 mg/dL    Corrected Calcium 9 7 8 3 - 10 1 mg/dL    AST 73 (H) 5 - 45 U/L    ALT 34 12 - 78 U/L    Alkaline Phosphatase 225 (H) 46 - 116 U/L    Total Protein 5 5 (L) 6 4 - 8 2 g/dL    Albumin 2 3 (L) 3 5 - 5 0 g/dL    Total Bilirubin 6 36 (H) 0 20 - 1 00 mg/dL    eGFR 39 ml/min/1 73sq m     *Note: Due to a large number of results and/or encounters for the requested time period, some results have not been displayed  A complete set of results can be found in Results Review  Labs, Imaging, & Other studies:   All pertinent labs and imaging studies were personally reviewed      Reviewed previous CBC,CMP,  and CT scan  and discussed with patient     Assessment and plan: Bogataaj Rosales Patient looks more jaundiced  She was seen by GI service and because there is no obstruction in the biliary system and problem is secondary to metastatic disease in the liver no stenting will be required  Dequan Stall was stopped because of high bilirubin  She has progressive disease from stage IV colon cancer and most recent treatment has been 1 cycle of Stivarga and that  has been stopped  She checked with specialist at [facility] and [facility] and they do not have an open clinical trial for her and also no recommendations   She will not qualify for any trial because of jaundice  She will try to make an in person appointment with specialist at 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  On CT scans there is progression of disease primarily in the lungs and that could explain cough and shortness of breath       Patient had been on FOLFOX plus Avastin for stage IV colon cancer with metastatic disease to liver and lungs and also abdominal carcinomatosis and had metastatic disease to the ovaries  She had received liver directed therapy at [facility] and   had MRI of liver and PET scan  at [facility]  There was disease progression  and treatment was changed     Patient was not a candidate for Erbitux because of KRAS mutation   MSI came back stable   Not a candidate for Keytruda  In May 2018 patient underwent GALI and removal of fallopian tubes for uterine bleeding  and there were cancer cells in the fallopian tubes  Cristina Zoila probably had removal of tubes and not the ovaries   In June 2018 patient   underwent extended right hemicolectomy, partial omentectomy and biopsy of the peritoneal nodule   Peritoneal nodule came back  positive for metastatic adenocarcinoma from colon    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 and metastatic disease to liver    6 cm T3 G2 right colon cancer with positive margins, lymphovascular invasion and perineural invasion and positive 3 of 27 lymph nodes with extranodal extension and positive peritoneal nodule biopsy   Stage IV disease because of liver and peritoneal metastases   In July 2018 patient was started  on modified FOLFIRI plus Avastin   She had PPE in her hands and bolus 5 FU was discontinued   She was seen by liver specialist at [facility] and was not felt to be a surgical candidate         In April 2020 patient  Was started on FOLFOX plus Avastin   She had protein urea but that was less than 1 gram in 24 hour and was monitored       Prior to that she was on FOLFIRI plus Avastin but after initial response disease progressed   On 05/26/2020 patient had pelvic surgery, removal of ovaries and sample also included peritoneum and small bowel mesentery   Both ovaries and small bowel mesentery showed metastatic disease from colon cancer   Surgery was on 05/26/2020    FOLFOX chemotherapy was resumed on 06/17/20 and 2 weeks later  Avastin was added to FOLFOX  Tasneem Roberts for started in April 2021  There was disease progression  Stivarga was started in June 2021       Patient has been on Xarelto 10 mg daily for history of DVT right lower extremity that happened in 2016     No bleeding or blood clot on Xarelto  Will be checking PT, PTT and fibrinogen and if very abnormal Xarelto will be discontinued because of bleeding risk    Physical examination and test results  are as recorded and discussed     Patient has developed jaundice that is increasing  No more Stivarga  Face to face visit with [facility] specialist   If not  Face-to-face then at least virtual visit  Poor prognosis  Patient has been aware of her condition  All discussed in detail  Questions answered  Goal is probably care for comforts  Paracentesis    Alfredo Randall is capable of self-care but that could change     Nutritional supplements  Activities as tolerated  Discussed  precautions against Coronavirus       Patient will continue to follow with primary physician and other consultants  See diagnoses , instructions and orders below  1  Liver metastases ([address] )      2  Primary adenocarcinoma of ascending colon ([address] )      3  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, unspecified laterality ([address] )      4  Malignant neoplasm metastatic to lung, unspecified laterality ([address] )      5  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[address] )      6  Peritoneal metastases ([address] )    7  Other ascites      8  Jaundice      9  Nephrostomy status ([address] )      10  Shortness of breath      11   Port-A-Cath in place      12  Adenocarcinoma, metastatic [facility])      Patient will go for blood test today     Added PT/ PTT and fibrinogen She has the paper for that  Paracenteses p r n  and she calls Radiology Department directly for that  Port-A-Cath p r n  Manjinder Mcdonnell She will call office of Dr Binu Lyon for appointment as soon as possible  Follow-up in 2-3 weeks      Manjinder Mcdonnell Patient voiced understanding and agrees       Counseling / Coordination of Care        Provided counseling and support

## 2021-07-09 NOTE — PATIENT INSTRUCTIONS
Patient will go for blood test today     Added PT/ PTT and fibrinogen She has the paper for that  Paracenteses p r n  and she calls Radiology Department directly for that  Port-A-Cath p r n  Latonia Meyer She will call office of Dr Andres Milton for appointment as soon as possible    Follow-up in 2-3 weeks

## 2021-07-11 ENCOUNTER — DOCUMENTATION (OUTPATIENT)
Dept: HEMATOLOGY ONCOLOGY | Facility: CLINIC | Age: 56
End: 2021-07-11

## 2021-07-12 ENCOUNTER — TELEPHONE (OUTPATIENT)
Dept: HEMATOLOGY ONCOLOGY | Facility: CLINIC | Age: 56
End: 2021-07-12

## 2021-07-12 NOTE — TELEPHONE ENCOUNTER
Dr Perez Heads spoke with patient informing her she had an abnormal PT and PTT test from Friday, she is at high risk for bleeding  Patient voiced understanding

## 2021-07-13 ENCOUNTER — TELEPHONE (OUTPATIENT)
Dept: GASTROENTEROLOGY | Facility: CLINIC | Age: 56
End: 2021-07-13

## 2021-07-13 NOTE — TELEPHONE ENCOUNTER
Patients GI provider:  Dr Stanley Certain    Number to return call: [phone]    Reason for call: Pt calling stating she is unable to get an MRI appointment  Pt would like to know if GI can intervene and placer a STAT order?     Scheduled procedure/appointment date if applicable: N/A

## 2021-07-14 ENCOUNTER — OFFICE VISIT (OUTPATIENT)
Dept: PALLIATIVE MEDICINE | Facility: CLINIC | Age: 56
End: 2021-07-14
Payer: COMMERCIAL

## 2021-07-14 VITALS
BODY MASS INDEX: 22.44 KG/M2 | TEMPERATURE: 96.9 F | OXYGEN SATURATION: 95 % | HEART RATE: 104 BPM | DIASTOLIC BLOOD PRESSURE: 80 MMHG | SYSTOLIC BLOOD PRESSURE: 120 MMHG | WEIGHT: 136.91 LBS

## 2021-07-14 DIAGNOSIS — K59.03 CONSTIPATION DUE TO OPIOID THERAPY: ICD-10-CM

## 2021-07-14 DIAGNOSIS — R05.3 CHRONIC COUGH: ICD-10-CM

## 2021-07-14 DIAGNOSIS — C18.2 PRIMARY ADENOCARCINOMA OF ASCENDING COLON (HCC): ICD-10-CM

## 2021-07-14 DIAGNOSIS — T40.2X5A CONSTIPATION DUE TO OPIOID THERAPY: ICD-10-CM

## 2021-07-14 DIAGNOSIS — G89.3 CANCER RELATED PAIN: Primary | ICD-10-CM

## 2021-07-14 DIAGNOSIS — Z51.5 PALLIATIVE CARE PATIENT: ICD-10-CM

## 2021-07-14 DIAGNOSIS — E80.6 HYPERBILIRUBINEMIA: ICD-10-CM

## 2021-07-14 PROCEDURE — 99214 OFFICE O/P EST MOD 30 MIN: CPT | Performed by: INTERNAL MEDICINE

## 2021-07-14 RX ORDER — BENZONATATE 200 MG/1
200 CAPSULE ORAL 3 TIMES DAILY PRN
Qty: 20 CAPSULE | Refills: 0 | Status: SHIPPED | OUTPATIENT
Start: 2021-07-14 | End: 2021-08-25 | Stop reason: SDUPTHER

## 2021-07-14 RX ORDER — OXYCODONE HYDROCHLORIDE 5 MG/1
5 TABLET ORAL EVERY 4 HOURS PRN
Qty: 60 TABLET | Refills: 0 | Status: SHIPPED | OUTPATIENT
Start: 2021-07-14 | End: 2021-08-25 | Stop reason: SDUPTHER

## 2021-07-14 RX ORDER — SENNA AND DOCUSATE SODIUM 50; 8.6 MG/1; MG/1
1 TABLET, FILM COATED ORAL DAILY
Qty: 30 TABLET | Refills: 2 | Status: SHIPPED | OUTPATIENT
Start: 2021-07-14

## 2021-07-14 NOTE — TELEPHONE ENCOUNTER
Called pt and made aware MRI was ordered as routine and to complete at earliest convenience  Advised to make MRI appt and call office if has concerns about date of scheduled study   Pt verbalized understanding no further questions at this time daily weight  blood glucose monitoring as per protocol

## 2021-07-14 NOTE — PROGRESS NOTES
Palliative and Supportive Care   Destinee Mercer 49 y o  female [id number]    Assessment/Plan:  1  Cancer related pain    2  Chronic cough    3  Constipation due to opioid therapy    4  Primary adenocarcinoma of ascending colon ([address] )    5  Hyperbilirubinemia    6  Palliative care patient      Requested Prescriptions     Signed Prescriptions Disp Refills    oxyCODONE (ROXICODONE) 5 mg immediate release tablet 60 tablet 0     Sig: Take 1 tablet (5 mg total) by mouth every 4 (four) hours as needed for moderate pain or severe painMax Daily Amount: 30 mg    benzonatate (TESSALON) 200 MG capsule 20 capsule 0     Sig: Take 1 capsule (200 mg total) by mouth 3 (three) times a day as needed for cough    senna-docusate sodium (SENOKOT-S) 8 6-50 mg per tablet 30 tablet 2     Sig: Take 1 tablet by mouth daily     Medications Discontinued During This Encounter   Medication Reason    senna (SENOKOT) 8 6 MG tablet     oxyCODONE (ROXICODONE) 5 mg immediate release tablet Reorder    benzonatate (TESSALON) 200 MG capsule Reorder       PLAN:  - refills of oxycodone and Tessalon perles sent  - change senna to senna-s, new script provided  - provided letter for international travel with opioids  - supportive listening provided    ACP: did not discuss    Representatives have queried the patient's controlled substance dispensing history in the Prescription Drug Monitoring Program in compliance with regulations before I have prescribed any controlled substances  The prescription history is consistent with prescribed therapy and our practice policies  No follow-ups on file  Subjective: In attendance at this visit: Destinee Mrecer  Chief Complaint  Follow up visit for:  symptom management, pain, neoplasm related, depression or anxiety, assessment of goals of care, disease process education and discussion of prognosis  HPI     Destinee Mercer is a 49 y o  female with metastatic colon cancer and hyperbilirubinemia   Patient has recently seen by oncology and currently, no treatment is being offered because of her high Tbili, she confirmed this with Angelo Nageotte and [facility], there are no clinical trials  Patient very tearful and states that she's not ready and wants to continue to pursue treatments  Discussed rationale of no therapy with intrinsic liver failure  She has a plan to go to [facility] next week but is thinking of canceling to pursue ongoing imaging and therapies  Encouraged her to go and time spent discussing quality of life  The following portions of the medical history were reviewed: past medical history, problem list, medication list, and social history      Current Outpatient Medications:     acetaminophen (TYLENOL) 500 mg tablet, Take 1,000 mg by mouth every 6 (six) hours as needed for mild pain, Disp: , Rfl:     benzonatate (TESSALON) 200 MG capsule, Take 1 capsule (200 mg total) by mouth 3 (three) times a day as needed for cough, Disp: 20 capsule, Rfl: 0    furosemide (LASIX) 40 mg tablet, Take 40 mg by mouth daily, Disp: , Rfl:     LORazepam (ATIVAN) 1 mg tablet, Take 1 mg by mouth daily as needed for anxiety, Disp: , Rfl:     multivitamin (THERAGRAN) TABS, Take 1 tablet by mouth daily, Disp: , Rfl:     oxyCODONE (ROXICODONE) 5 mg immediate release tablet, Take 1 tablet (5 mg total) by mouth every 4 (four) hours as needed for moderate pain or severe painMax Daily Amount: 30 mg, Disp: 60 tablet, Rfl: 0    potassium chloride (Klor-Con) 10 mEq tablet, , Disp: , Rfl:     sodium chloride, PF, 0 9 %, 10 mL by Intracatheter route daily Intracatheter flushing daily, Disp: 300 mL, Rfl: 0    spironolactone (ALDACTONE) 25 mg tablet, , Disp: , Rfl:     amLODIPine (NORVASC) 5 mg tablet, Take 5 mg by mouth daily (Patient not taking: Reported on 7/14/2021), Disp: , Rfl:     metoprolol succinate (TOPROL-XL) 50 mg 24 hr tablet, Take 50 mg by mouth 2 (two) times a day Pt was instructed by her PCP Dr Sarah Horton, advised pt to increased to BID (Patient not taking: Reported on 7/14/2021), Disp: , Rfl:     Regorafenib (Stivarga) 40 MG TABS, Take 2 tablets (80 mg total) by mouth daily for 21 days , followed by 7 days off, Disp: 42 tablet, Rfl: 3    rivaroxaban (Xarelto) 10 mg tablet, Take 1 tablet (10 mg total) by mouth daily (Patient not taking: Reported on 7/14/2021), Disp: 90 tablet, Rfl: 3    senna-docusate sodium (SENOKOT-S) 8 6-50 mg per tablet, Take 1 tablet by mouth daily, Disp: 30 tablet, Rfl: 2  Review of Systems   Gastrointestinal: Positive for abdominal pain and constipation  Musculoskeletal: Positive for back pain  Psychiatric/Behavioral: The patient is nervous/anxious  All other systems reviewed and are negative  All other systems negative    Objective:  Vital Signs  /80 (BP Location: Left arm, Cuff Size: Standard)   Pulse 104   Temp (!) 96 9 °F (36 1 °C) (Temporal)   Wt 62 1 kg (136 lb 14 5 oz)   LMP 05/16/2018   SpO2 95%   BMI 22 44 kg/m²    Physical Exam    Constitutional: Appears chronically ill   In no acute physical or emotional distress  Head: Normocephalic and atraumatic  Eyes: EOM are normal  No ocular discharge  +scleral icterus  Neck: No visible adenopathy or masses  Cardiovascular: Regular rate and rhythm, palpable distal pulses   Respiratory: Effort normal  No stridor  No respiratory distress  Gastrointestinal: No abdominal distension  Abdomen is soft  Musculoskeletal: No edema  Neurological: Alert, oriented and appropriately conversant  Skin: No diaphoresis, dry and warm to touch, no rashes seen on exposed areas of skin  +jaundice  Psychiatric: Displays a normal mood and affect   Behavior, judgement and thought content appear normal

## 2021-07-14 NOTE — H&P (VIEW-ONLY)
Palliative and Supportive Care   Amada Cohen 49 y o  female [id number]    Assessment/Plan:  1  Cancer related pain    2  Chronic cough    3  Constipation due to opioid therapy    4  Primary adenocarcinoma of ascending colon ([address] )    5  Hyperbilirubinemia    6  Palliative care patient      Requested Prescriptions     Signed Prescriptions Disp Refills    oxyCODONE (ROXICODONE) 5 mg immediate release tablet 60 tablet 0     Sig: Take 1 tablet (5 mg total) by mouth every 4 (four) hours as needed for moderate pain or severe painMax Daily Amount: 30 mg    benzonatate (TESSALON) 200 MG capsule 20 capsule 0     Sig: Take 1 capsule (200 mg total) by mouth 3 (three) times a day as needed for cough    senna-docusate sodium (SENOKOT-S) 8 6-50 mg per tablet 30 tablet 2     Sig: Take 1 tablet by mouth daily     Medications Discontinued During This Encounter   Medication Reason    senna (SENOKOT) 8 6 MG tablet     oxyCODONE (ROXICODONE) 5 mg immediate release tablet Reorder    benzonatate (TESSALON) 200 MG capsule Reorder       PLAN:  - refills of oxycodone and Tessalon perles sent  - change senna to senna-s, new script provided  - provided letter for international travel with opioids  - supportive listening provided    ACP: did not discuss    Representatives have queried the patient's controlled substance dispensing history in the Prescription Drug Monitoring Program in compliance with regulations before I have prescribed any controlled substances  The prescription history is consistent with prescribed therapy and our practice policies  No follow-ups on file  Subjective: In attendance at this visit: Amada Cohen  Chief Complaint  Follow up visit for:  symptom management, pain, neoplasm related, depression or anxiety, assessment of goals of care, disease process education and discussion of prognosis  HPI     Amada Cohen is a 49 y o  female with metastatic colon cancer and hyperbilirubinemia   Patient has recently seen by oncology and currently, no treatment is being offered because of her high Tbili, she confirmed this with Jody Neff and [facility], there are no clinical trials  Patient very tearful and states that she's not ready and wants to continue to pursue treatments  Discussed rationale of no therapy with intrinsic liver failure  She has a plan to go to [facility] next week but is thinking of canceling to pursue ongoing imaging and therapies  Encouraged her to go and time spent discussing quality of life  The following portions of the medical history were reviewed: past medical history, problem list, medication list, and social history      Current Outpatient Medications:     acetaminophen (TYLENOL) 500 mg tablet, Take 1,000 mg by mouth every 6 (six) hours as needed for mild pain, Disp: , Rfl:     benzonatate (TESSALON) 200 MG capsule, Take 1 capsule (200 mg total) by mouth 3 (three) times a day as needed for cough, Disp: 20 capsule, Rfl: 0    furosemide (LASIX) 40 mg tablet, Take 40 mg by mouth daily, Disp: , Rfl:     LORazepam (ATIVAN) 1 mg tablet, Take 1 mg by mouth daily as needed for anxiety, Disp: , Rfl:     multivitamin (THERAGRAN) TABS, Take 1 tablet by mouth daily, Disp: , Rfl:     oxyCODONE (ROXICODONE) 5 mg immediate release tablet, Take 1 tablet (5 mg total) by mouth every 4 (four) hours as needed for moderate pain or severe painMax Daily Amount: 30 mg, Disp: 60 tablet, Rfl: 0    potassium chloride (Klor-Con) 10 mEq tablet, , Disp: , Rfl:     sodium chloride, PF, 0 9 %, 10 mL by Intracatheter route daily Intracatheter flushing daily, Disp: 300 mL, Rfl: 0    spironolactone (ALDACTONE) 25 mg tablet, , Disp: , Rfl:     amLODIPine (NORVASC) 5 mg tablet, Take 5 mg by mouth daily (Patient not taking: Reported on 7/14/2021), Disp: , Rfl:     metoprolol succinate (TOPROL-XL) 50 mg 24 hr tablet, Take 50 mg by mouth 2 (two) times a day Pt was instructed by her PCP Dr Binh Key, advised pt to increased to BID (Patient not taking: Reported on 7/14/2021), Disp: , Rfl:     Regorafenib (Stivarga) 40 MG TABS, Take 2 tablets (80 mg total) by mouth daily for 21 days , followed by 7 days off, Disp: 42 tablet, Rfl: 3    rivaroxaban (Xarelto) 10 mg tablet, Take 1 tablet (10 mg total) by mouth daily (Patient not taking: Reported on 7/14/2021), Disp: 90 tablet, Rfl: 3    senna-docusate sodium (SENOKOT-S) 8 6-50 mg per tablet, Take 1 tablet by mouth daily, Disp: 30 tablet, Rfl: 2  Review of Systems   Gastrointestinal: Positive for abdominal pain and constipation  Musculoskeletal: Positive for back pain  Psychiatric/Behavioral: The patient is nervous/anxious  All other systems reviewed and are negative  All other systems negative    Objective:  Vital Signs  /80 (BP Location: Left arm, Cuff Size: Standard)   Pulse 104   Temp (!) 96 9 °F (36 1 °C) (Temporal)   Wt 62 1 kg (136 lb 14 5 oz)   LMP 05/16/2018   SpO2 95%   BMI 22 44 kg/m²    Physical Exam    Constitutional: Appears chronically ill   In no acute physical or emotional distress  Head: Normocephalic and atraumatic  Eyes: EOM are normal  No ocular discharge  +scleral icterus  Neck: No visible adenopathy or masses  Cardiovascular: Regular rate and rhythm, palpable distal pulses   Respiratory: Effort normal  No stridor  No respiratory distress  Gastrointestinal: No abdominal distension  Abdomen is soft  Musculoskeletal: No edema  Neurological: Alert, oriented and appropriately conversant  Skin: No diaphoresis, dry and warm to touch, no rashes seen on exposed areas of skin  +jaundice  Psychiatric: Displays a normal mood and affect   Behavior, judgement and thought content appear normal

## 2021-07-16 ENCOUNTER — TELEPHONE (OUTPATIENT)
Dept: HEMATOLOGY ONCOLOGY | Facility: CLINIC | Age: 56
End: 2021-07-16

## 2021-07-16 ENCOUNTER — HOSPITAL ENCOUNTER (OUTPATIENT)
Dept: RADIOLOGY | Facility: HOSPITAL | Age: 56
Discharge: HOME | End: 2021-07-16
Payer: COMMERCIAL

## 2021-07-16 ENCOUNTER — APPOINTMENT (OUTPATIENT)
Dept: LAB | Facility: MEDICAL CENTER | Age: 56
End: 2021-07-16
Payer: COMMERCIAL

## 2021-07-16 VITALS — HEART RATE: 96 BPM | DIASTOLIC BLOOD PRESSURE: 82 MMHG | SYSTOLIC BLOOD PRESSURE: 135 MMHG

## 2021-07-16 DIAGNOSIS — R18.8 OTHER ASCITES: ICD-10-CM

## 2021-07-16 DIAGNOSIS — C78.6 PERITONEAL METASTASES (HCC): ICD-10-CM

## 2021-07-16 PROCEDURE — 49083 ABD PARACENTESIS W/IMAGING: CPT | Performed by: RADIOLOGY

## 2021-07-16 PROCEDURE — 49083 ABD PARACENTESIS W/IMAGING: CPT

## 2021-07-16 NOTE — BRIEF OP NOTE (RAD/CATH)
INTERVENTIONAL RADIOLOGY PROCEDURE NOTE    Date: 7/16/2021    Procedure: IR PARACENTESIS    Preoperative diagnosis:   1  Peritoneal metastases ([facility])    2  Other ascites         Postoperative diagnosis: Same  Surgeon: Carolyn Garza MD     Assistant: None  No qualified resident was available  Blood loss: None    Specimens: None     Findings: Ascites, paracentesis performed    Complications: None immediate      Anesthesia: local

## 2021-07-16 NOTE — DISCHARGE INSTRUCTIONS
paaraAbdominal Paracentesis     WHAT YOU NEED TO KNOW:   Abdominal paracentesis is a procedure to remove abnormal fluid buildup in your abdomen  Fluid builds up because of liver problems, such as swelling and scarring  Heart failure, kidney disease, a mass, or problems with your pancreas may also cause fluid buildup  DISCHARGE INSTRUCTIONS:     Follow up with your healthcare provider as directed: Write down your questions so you remember to ask them during your visits  Wound care: Remove dressing after 24 hours  Leave glue in place  Return to your normal activities    Contact Interventional Radiology at [phone] Gina PATIENTS: Contact Interventional Radiology at [phone]) Davon Hernandez PATIENTS: Contact Interventional Radiology at [phone]) if:  · You have a fever and your wound is red and swollen  · You have yellow, green, or bad-smelling discharge coming from your wound  · You have pain or swelling in your abdomen  · You have an upset stomach or you vomit  · You have sudden, sharp pain in your abdomen  · You urinate very little or not at all  · You feel confused and more tired than usual    · Your arm or leg feels warm, tender, and painful  It may look swollen and red  · You suddenly feel lightheaded and have trouble breathing

## 2021-07-16 NOTE — INTERVAL H&P NOTE
Update: (This section must be completed if the H&P was completed greater than 24 hrs to procedure or admission)    H&P reviewed  After examining the patient, I find no changed to the H&P since it had been written  Patient re-evaluated   Accept as history and physical     Macrina Schaeffer MD/July 16, 2021/9:36 AM

## 2021-07-26 ENCOUNTER — APPOINTMENT (OUTPATIENT)
Dept: LAB | Facility: MEDICAL CENTER | Age: 56
End: 2021-07-26
Payer: COMMERCIAL

## 2021-07-29 ENCOUNTER — DOCUMENTATION (OUTPATIENT)
Dept: PALLIATIVE MEDICINE | Facility: HOSPITAL | Age: 56
End: 2021-07-29

## 2021-07-29 ENCOUNTER — OFFICE VISIT (OUTPATIENT)
Dept: HEMATOLOGY ONCOLOGY | Facility: CLINIC | Age: 56
End: 2021-07-29
Payer: COMMERCIAL

## 2021-07-29 ENCOUNTER — OFFICE VISIT (OUTPATIENT)
Dept: PALLIATIVE MEDICINE | Facility: CLINIC | Age: 56
End: 2021-07-29
Payer: COMMERCIAL

## 2021-07-29 VITALS
WEIGHT: 130.07 LBS | TEMPERATURE: 97 F | HEART RATE: 110 BPM | BODY MASS INDEX: 21.32 KG/M2 | DIASTOLIC BLOOD PRESSURE: 75 MMHG | OXYGEN SATURATION: 98 % | SYSTOLIC BLOOD PRESSURE: 110 MMHG | RESPIRATION RATE: 18 BRPM

## 2021-07-29 VITALS
DIASTOLIC BLOOD PRESSURE: 70 MMHG | WEIGHT: 130 LBS | BODY MASS INDEX: 20.89 KG/M2 | HEART RATE: 111 BPM | SYSTOLIC BLOOD PRESSURE: 114 MMHG | TEMPERATURE: 98.2 F | RESPIRATION RATE: 18 BRPM | OXYGEN SATURATION: 97 % | HEIGHT: 66 IN

## 2021-07-29 DIAGNOSIS — Z93.6 NEPHROSTOMY STATUS (HCC): ICD-10-CM

## 2021-07-29 DIAGNOSIS — C77.2 METASTASIS TO RETROPERITONEAL LYMPH NODE (HCC): ICD-10-CM

## 2021-07-29 DIAGNOSIS — C18.2 PRIMARY ADENOCARCINOMA OF ASCENDING COLON (HCC): Primary | ICD-10-CM

## 2021-07-29 DIAGNOSIS — C78.6 PERITONEAL METASTASES (HCC): ICD-10-CM

## 2021-07-29 DIAGNOSIS — E43 SEVERE PROTEIN-CALORIE MALNUTRITION (GOMEZ: LESS THAN 60% OF STANDARD WEIGHT) (HCC): ICD-10-CM

## 2021-07-29 DIAGNOSIS — F32.9 REACTIVE DEPRESSION: Primary | ICD-10-CM

## 2021-07-29 DIAGNOSIS — Z51.5 PALLIATIVE CARE PATIENT: ICD-10-CM

## 2021-07-29 DIAGNOSIS — R17 JAUNDICE: ICD-10-CM

## 2021-07-29 DIAGNOSIS — C78.7 LIVER METASTASES (HCC): ICD-10-CM

## 2021-07-29 DIAGNOSIS — C18.2 PRIMARY ADENOCARCINOMA OF ASCENDING COLON (HCC): ICD-10-CM

## 2021-07-29 DIAGNOSIS — C78.00 MALIGNANT NEOPLASM METASTATIC TO LUNG, UNSPECIFIED LATERALITY (HCC): ICD-10-CM

## 2021-07-29 DIAGNOSIS — G89.3 CANCER RELATED PAIN: ICD-10-CM

## 2021-07-29 PROCEDURE — 99214 OFFICE O/P EST MOD 30 MIN: CPT | Performed by: INTERNAL MEDICINE

## 2021-07-29 PROCEDURE — 99214 OFFICE O/P EST MOD 30 MIN: CPT | Performed by: PHYSICIAN ASSISTANT

## 2021-07-29 RX ORDER — MIRTAZAPINE 7.5 MG/1
7.5 TABLET, FILM COATED ORAL
Qty: 30 TABLET | Refills: 0 | Status: SHIPPED | OUTPATIENT
Start: 2021-07-29

## 2021-07-29 NOTE — PROGRESS NOTES
Palliative and Supportive Care   Claudean Robertson 49 y o  female [id number]    Assessment/Plan:  1  Reactive depression    2  Severe protein-calorie malnutrition Ermalinda Ala: less than 60% of standard weight) ([facility] [address] )    3  Primary adenocarcinoma of ascending colon ([email or web address] )    4  Liver metastases ([facility] )    5  Palliative care patient    6  Nephrostomy status (HCC)    7  Jaundice    8  Cancer related pain      Requested Prescriptions     Signed Prescriptions Disp Refills    mirtazapine (REMERON) 7 5 MG tablet 30 tablet 0     Sig: Take 1 tablet (7 5 mg total) by mouth daily at bedtime     There are no discontinued medications  PLAN:  - start low dose Remeron for appetite and depression  - continue OxyIR  - Long time spent with patient and spouse discussing the "what ifs" and making future plans if there are no further antineoplastic treatments available  Discussed legacy work for her children  Reviewed ways to process  Spouse is currently looking to hire a caregiver  - Plan for MRI next Thursday and will follow up shortly after   - prognosis guarded    ACP: does not have, did not address    Representatives have queried the patient's controlled substance dispensing history in the Prescription Drug Monitoring Program in compliance with regulations before I have prescribed any controlled substances  The prescription history is consistent with prescribed therapy and our practice policies  No follow-ups on file  Subjective: In attendance at this visit: Claudean Robertson and her spouse  Chief Complaint  Follow up visit for:  symptom management, pain, neoplasm related, depression or anxiety, fatigue, assessment of goals of care, disease process education and discussion of prognosis, advance care planning, emotional support in setting of serious illness  HPI     Claudean Robertson is a 49 y o  female with metastatic colon cancer and hyperbilirubinemia   Patient has recently seen by oncology and currently, no treatment is being offered because of her high Tbili, she confirmed this with Kingston Gardner and VIRY, there are no clinical trials  Patient here today with her spouse, they had just come from her oncology appointment  Her TBili continues to be to high for any cancer treatments  She is going for a MRI next week  Began discussions on the "what ifs" and discussed beginning to plan for how she will want the end of life for her to "look like"  She has not talked much about this and does note she processes better through writing  Discussed legacy work  She also notes poor appetite and discussed addition of a stimulant  The following portions of the medical history were reviewed: past medical history, problem list, medication list, and social history      Current Outpatient Medications:     acetaminophen (TYLENOL) 500 mg tablet, Take 1,000 mg by mouth every 6 (six) hours as needed for mild pain, Disp: , Rfl:     benzonatate (TESSALON) 200 MG capsule, Take 1 capsule (200 mg total) by mouth 3 (three) times a day as needed for cough, Disp: 20 capsule, Rfl: 0    furosemide (LASIX) 40 mg tablet, Take 40 mg by mouth daily, Disp: , Rfl:     LORazepam (ATIVAN) 1 mg tablet, Take 1 mg by mouth daily as needed for anxiety, Disp: , Rfl:     multivitamin (THERAGRAN) TABS, Take 1 tablet by mouth daily, Disp: , Rfl:     oxyCODONE (ROXICODONE) 5 mg immediate release tablet, Take 1 tablet (5 mg total) by mouth every 4 (four) hours as needed for moderate pain or severe painMax Daily Amount: 30 mg, Disp: 60 tablet, Rfl: 0    potassium chloride (Klor-Con) 10 mEq tablet, , Disp: , Rfl:     senna-docusate sodium (SENOKOT-S) 8 6-50 mg per tablet, Take 1 tablet by mouth daily, Disp: 30 tablet, Rfl: 2    spironolactone (ALDACTONE) 25 mg tablet, , Disp: , Rfl:     amLODIPine (NORVASC) 5 mg tablet, Take 5 mg by mouth daily (Patient not taking: Reported on 7/14/2021), Disp: , Rfl:     metoprolol succinate (TOPROL-XL) 50 mg 24 hr tablet, Take 50 mg by mouth 2 (two) times a day Pt was instructed by her PCP Dr Shahida Soliman, advised pt to increased to BID (Patient not taking: Reported on 7/14/2021), Disp: , Rfl:     mirtazapine (REMERON) 7 5 MG tablet, Take 1 tablet (7 5 mg total) by mouth daily at bedtime, Disp: 30 tablet, Rfl: 0    Regorafenib (Stivarga) 40 MG TABS, Take 2 tablets (80 mg total) by mouth daily for 21 days , followed by 7 days off, Disp: 42 tablet, Rfl: 3    rivaroxaban (Xarelto) 10 mg tablet, Take 1 tablet (10 mg total) by mouth daily (Patient not taking: Reported on 7/14/2021), Disp: 90 tablet, Rfl: 3    sodium chloride, PF, 0 9 %, 10 mL by Intracatheter route daily Intracatheter flushing daily, Disp: 300 mL, Rfl: 0  Review of Systems   Constitutional: Positive for activity change, appetite change and fatigue  Respiratory: Positive for cough  Gastrointestinal: Positive for abdominal distention and abdominal pain  Skin: Positive for color change and pallor  Neurological: Positive for weakness  Psychiatric/Behavioral: The patient is nervous/anxious  All other systems reviewed and are negative  All other systems negative    Objective:  Vital Signs  /75 (BP Location: Right arm, Patient Position: Sitting, Cuff Size: Standard)   Pulse (!) 110   Temp (!) 97 °F (36 1 °C) (Tympanic)   Resp 18   Wt 59 kg (130 lb 1 1 oz)   LMP 05/16/2018   SpO2 98%   BMI 21 32 kg/m²    Physical Exam    Constitutional: Appears chronically ill   In no acute physical or emotional distress  Head: Normocephalic and atraumatic  Eyes: EOM are normal  No ocular discharge  +scleral icterus  Neck: No visible adenopathy or masses  Cardiovascular: Regular rate and rhythm, palpable distal pulses   Respiratory: Effort normal  No stridor  No respiratory distress  Gastrointestinal: No abdominal distension  Abdomen is soft  Musculoskeletal: No edema  Neurological: Alert, oriented and appropriately conversant     Skin: No diaphoresis, dry and warm to touch, no rashes seen on exposed areas of skin  Severe jaundice  Psychiatric: Displays a normal mood and affect   Behavior, judgement and thought content appear normal

## 2021-07-29 NOTE — PROGRESS NOTES
Hematology/Oncology Outpatient Follow-up  Katie Rascon 49 y o  female [id number]    Date:  7/29/2021      Assessment and Plan:  61-year-old female presents for follow-up regarding advanced stage colon cancer  Patient has hyperbilirubinemia  She has been evaluated by Gastroenterology  She has an MRI scheduled for next week  I reviewed with she and her  that I do not suspect that there is a blockage calling her causing her hyperbilirubinemia this is likely due to her diffuse metastatic disease within the liver  Will review this MRI scan next week and contact patient  Her  asked questions regarding directed radiation therapy to the liver  Reviewed that this is not indicated in this case with such diffuse disease  We are also not able to offer chemotherapy with such high bilirubin levels  She has previously sought opinions at [facility] and [facility] for possible clinical trials however she does not qualify  Patient has an appointment follow-up with palliative care this afternoon as well  It is recommended that patient should continue to follow-up palliative care for symptom management  Specifically reviewed with patient that possibly medication to help her appetite  She also continues with palliative paracenteses  She states that last time they took off 4 L  They spoke to her about albumin  Reviewed that we are unaware of this amount of fluid  She is scheduled at IR tomorrow  Will reach out to IR regarding possible scheduling of albumin if needed based on the amount of fluid taken off  She has scheduled every 2 week appointments  Reviewed that if she feels she needs some sooner than 2 weeks that she should schedule at a weekly interval     No follow up appt has been arranged at this time  Will review MRI abdomen and contact patient next week       HPI:  Oncology History   Primary adenocarcinoma of ascending colon ([facility] [address] )   6/14/2018 Initial Diagnosis Primary adenocarcinoma of ascending colon ([facility] [address] )     7/17/2018 - 8/1/2018 Chemotherapy    camptosar 180 mg/m2 day 1  5  mg/m2 day 1  5 FU 1200 mg/m2 day 1-2   Leucovorin 400 mg/m2     Venofer 100 mg (first 10 treatments) -- all every 2 weeks         7/17/2018 - 2/3/2020 Chemotherapy    palonosetron (ALOXI) injection 0 25 mg, 0 25 mg, Intravenous, Once, 8 of 16 cycles  Administration: 0 25 mg (11/21/2019), 0 25 mg (12/5/2019), 0 25 mg (12/19/2019), 0 25 mg (1/2/2020), 0 25 mg (1/16/2020), 0 25 mg (1/30/2020)  fluorouracil (ADRUCIL) injection 715 mg, 400 mg/m2, Intravenous, Once, 7 of 7 cycles  pegfilgrastim (NEULASTA ONPRO) subcutaneous injection kit 6 mg, 6 mg, Subcutaneous, Once, 20 of 28 cycles  Administration: 6 mg (5/22/2019), 6 mg (6/5/2019), 6 mg (6/22/2019), 6 mg (7/5/2019), 6 mg (7/20/2019), 6 mg (8/2/2019), 6 mg (8/17/2019), 6 mg (8/31/2019), 6 mg (9/14/2019), 6 mg (9/28/2019), 6 mg (10/12/2019), 6 mg (10/26/2019), 6 mg (11/9/2019), 6 mg (11/23/2019), 6 mg (12/7/2019), 6 mg (12/21/2019), 6 mg (1/4/2020), 6 mg (1/18/2020), 6 mg (2/1/2020)  fosaprepitant (EMEND) 150 mg in sodium chloride 0 9 % 250 mL IVPB, 150 mg, Intravenous, Once, 8 of 16 cycles  Administration: 150 mg (11/7/2019), 150 mg (11/21/2019), 150 mg (12/5/2019), 150 mg (12/19/2019), 150 mg (1/2/2020), 150 mg (1/16/2020), 150 mg (1/30/2020)  bevacizumab (AVASTIN) 355 mg in sodium chloride 0 9 % 100 mL IVPB, 5 mg/kg, Intravenous, Once, 27 of 35 cycles  Administration: 355 mg (5/20/2019), 355 mg (6/3/2019), 355 mg (6/20/2019), 355 mg (7/3/2019), 355 mg (7/18/2019), 355 mg (7/31/2019), 355 mg (8/15/2019), 355 mg (8/29/2019), 355 mg (9/12/2019), 355 mg (9/26/2019), 355 mg (10/10/2019), 355 mg (10/24/2019), 355 mg (11/7/2019), 355 mg (11/21/2019), 355 mg (12/5/2019), 355 mg (12/19/2019), 355 mg (1/2/2020), 355 mg (1/16/2020), 355 mg (1/30/2020)  irinotecan (CAMPTOSAR) 322 mg in sodium chloride 0 9 % 500 mL chemo infusion, 180 mg/m2, Intravenous, Once, 27 of 35 cycles  Dose modification: 150 mg/m2 (original dose 180 mg/m2, Cycle 20, Reason: Dose Not Tolerated)  Administration: 322 mg (5/20/2019), 322 mg (6/3/2019), 320 mg (6/20/2019), 320 mg (7/3/2019), 320 mg (7/18/2019), 320 mg (7/31/2019), 320 mg (8/15/2019), 320 mg (8/29/2019), 320 mg (9/12/2019), 320 mg (9/26/2019), 320 mg (10/10/2019), 320 mg (10/24/2019), 269 mg (11/7/2019), 269 mg (11/21/2019), 269 mg (12/5/2019), 269 mg (12/19/2019), 269 mg (1/2/2020), 269 mg (1/16/2020), 269 mg (1/30/2020)  leucovorin 716 mg in sodium chloride 0 9 % 250 mL IVPB, 400 mg/m2, Intravenous, Once, 27 of 35 cycles  Administration: 716 mg (5/20/2019), 716 mg (6/3/2019), 700 mg (6/20/2019), 700 mg (7/3/2019), 700 mg (7/18/2019), 700 mg (7/31/2019), 700 mg (8/15/2019), 700 mg (8/29/2019), 700 mg (9/12/2019), 700 mg (9/26/2019), 700 mg (10/10/2019), 716 mg (10/24/2019), 700 mg (11/7/2019), 700 mg (11/21/2019), 700 mg (12/5/2019), 700 mg (12/19/2019), 700 mg (1/2/2020), 700 mg (1/16/2020), 700 mg (1/30/2020)     8/1/2018 Adverse Reaction    Needed granix 300 mcg due to [address] of 1 01      8/14/2018 -  Chemotherapy    camptosar 180 mg/m2 day 1  5  mg/m2 day 1  5 FU 1200 mg/m2 day 1-2   Leucovorin 400 mg/m2  Avastin 5 mg/kg day 1   Venofer 100 mg (first 10 treatments)  Neulasta on Pro 6 mg day 3      -- every 2 weeks          3/3/2020 - 3/16/2020 Chemotherapy    pegfilgrastim (NEULASTA ONPRO) subcutaneous injection kit 6 mg, 6 mg, Subcutaneous, Once, 1 of 12 cycles  Administration: 6 mg (3/5/2020)  bevacizumab (AVASTIN) 357 5 mg in sodium chloride 0 9 % 100 mL IVPB, 5 mg/kg = 357 5 mg, Intravenous, Once, 1 of 12 cycles  Administration: 357 5 mg (3/3/2020)  leucovorin 700 mg in dextrose 5 % 250 mL IVPB, 720 mg, Intravenous, Once, 1 of 12 cycles  Administration: 700 mg (3/3/2020)  oxaliplatin (ELOXATIN) 150 mg in dextrose 5 % 250 mL chemo infusion, 153 mg, Intravenous, Once, 1 of 12 cycles  Administration: 150 mg (3/3/2020) 3/16/2020 - 4/5/2020 Chemotherapy    bevacizumab (AVASTIN) 560 mg in sodium chloride 0 9 % 100 mL IVPB, 7 5 mg/kg, Intravenous, Once, 0 of 5 cycles  oxaliplatin (ELOXATIN) 239 2 mg in dextrose 5 % 500 mL chemo infusion, 130 mg/m2 = 239 2 mg, Intravenous, Once, 1 of 6 cycles  Administration: 239 2 mg (3/16/2020)     4/6/2020 - 12/13/2020 Chemotherapy    fluorouracil (ADRUCIL) injection 730 mg, 400 mg/m2 = 730 mg, Intravenous, Once, 1 of 1 cycle  pegfilgrastim (NEULASTA ONPRO) subcutaneous injection kit 6 mg, 6 mg, Subcutaneous, Once, 14 of 20 cycles  Administration: 6 mg (4/8/2020), 6 mg (4/25/2020), 6 mg (5/8/2020), 6 mg (6/19/2020), 6 mg (7/2/2020), 6 mg (7/17/2020), 6 mg (7/31/2020), 6 mg (8/14/2020), 6 mg (8/28/2020), 6 mg (9/25/2020), 6 mg (10/15/2020), 6 mg (11/5/2020), 6 mg (11/18/2020), 6 mg (12/2/2020)  fosaprepitant (EMEND) 150 mg in sodium chloride 0 9 % 250 mL IVPB, 150 mg, Intravenous, Once, 14 of 20 cycles  Administration: 150 mg (4/6/2020), 150 mg (4/23/2020), 150 mg (5/6/2020), 150 mg (6/17/2020), 150 mg (6/30/2020), 150 mg (7/15/2020), 150 mg (7/29/2020), 150 mg (8/12/2020), 150 mg (8/26/2020), 150 mg (9/23/2020), 150 mg (10/13/2020), 150 mg (11/3/2020), 150 mg (11/16/2020), 150 mg (11/30/2020)  bevacizumab (AVASTIN) 372 5 mg in sodium chloride 0 9 % 100 mL IVPB, 5 mg/kg = 372 5 mg, Intravenous, Once, 10 of 16 cycles  Administration: 372 5 mg (4/6/2020), 372 5 mg (4/23/2020), 357 5 mg (6/30/2020), 357 5 mg (7/15/2020), 357 5 mg (7/29/2020), 357 5 mg (8/12/2020), 357 5 mg (8/26/2020), 357 5 mg (11/16/2020), 357 5 mg (11/30/2020)  leucovorin 750 mg in dextrose 5 % 250 mL IVPB, 732 mg, Intravenous, Once, 14 of 20 cycles  Administration: 750 mg (4/6/2020), 750 mg (4/23/2020), 750 mg (5/6/2020), 750 mg (6/17/2020), 700 mg (6/30/2020), 700 mg (7/15/2020), 700 mg (7/29/2020), 700 mg (8/12/2020), 700 mg (8/26/2020), 700 mg (9/23/2020), 700 mg (10/13/2020), 700 mg (11/3/2020), 700 mg (11/16/2020), 700 mg (11/30/2020)  oxaliplatin (ELOXATIN) 150 mg in dextrose 5 % 250 mL chemo infusion, 155 55 mg, Intravenous, Once, 14 of 20 cycles  Dose modification: 70 mg/m2 (original dose 85 mg/m2, Cycle 11, Reason: Dose Not Tolerated, Comment: thrombocytopenia), 65 mg/m2 (original dose 85 mg/m2, Cycle 14, Reason: Other (See Comments)), 65 mg/m2 (original dose 85 mg/m2, Cycle 15, Reason: Max Dose Reached)  Administration: 150 mg (4/6/2020), 150 mg (4/23/2020), 150 mg (5/6/2020), 150 mg (6/17/2020), 150 mg (6/30/2020), 150 mg (7/15/2020), 150 mg (7/29/2020), 150 mg (8/12/2020), 150 mg (8/26/2020), 150 mg (9/23/2020), 125 3 mg (10/13/2020), 125 3 mg (11/3/2020), 125 3 mg (11/16/2020), 116 35 mg (11/30/2020)     12/28/2020 - 3/31/2021 Chemotherapy    pegfilgrastim (NEULASTA ONPRO) subcutaneous injection kit 6 mg, 6 mg, Subcutaneous, Once, 6 of 9 cycles  Administration: 6 mg (12/30/2020), 6 mg (1/14/2021), 6 mg (2/6/2021), 6 mg (2/20/2021), 6 mg (3/6/2021), 6 mg (3/20/2021)  fosaprepitant (EMEND) 150 mg in sodium chloride 0 9 % 250 mL IVPB, 150 mg, Intravenous, Once, 6 of 9 cycles  Administration: 150 mg (12/28/2020), 150 mg (1/12/2021), 150 mg (2/4/2021), 150 mg (2/18/2021), 150 mg (3/4/2021), 150 mg (3/18/2021)  bevacizumab (AVASTIN) 347 5 mg in sodium chloride 0 9 % 100 mL IVPB, 5 mg/kg = 347 5 mg, Intravenous, Once, 5 of 8 cycles  Administration: 347 5 mg (12/28/2020), 347 5 mg (2/4/2021), 347 5 mg (2/18/2021), 347 5 mg (3/4/2021), 347 5 mg (3/18/2021)  leucovorin 700 mg in dextrose 5 % 250 mL IVPB, 712 mg, Intravenous, Once, 6 of 9 cycles  Dose modification: 400 mg/m2 (original dose 400 mg/m2, Cycle 6, Reason: Other (See Comments), Comment: protocol)  Administration: 700 mg (12/28/2020), 700 mg (1/12/2021), 700 mg (2/4/2021), 700 mg (2/18/2021), 700 mg (3/4/2021), 700 mg (3/18/2021)  oxaliplatin (ELOXATIN) 115 7 mg in dextrose 5 % 250 mL chemo infusion, 65 mg/m2 = 115 7 mg (76 5 % of original dose 85 mg/m2), Intravenous, Once, 6 of 9 cycles  Dose modification: 65 mg/m2 (original dose 85 mg/m2, Cycle 1, Reason: Dose Not Tolerated)  Administration: 115 7 mg (12/28/2020), 115 7 mg (1/12/2021), 115 7 mg (2/4/2021), 115 7 mg (2/18/2021), 115 7 mg (3/4/2021), 115 7 mg (3/18/2021)     Liver metastases ([facility] [address] )   6/26/2018 Initial Diagnosis    Liver metastases ([facility] [address] )     7/17/2018 - 2/3/2020 Chemotherapy    palonosetron (ALOXI) injection 0 25 mg, 0 25 mg, Intravenous, Once, 8 of 16 cycles  Administration: 0 25 mg (11/21/2019), 0 25 mg (12/5/2019), 0 25 mg (12/19/2019), 0 25 mg (1/2/2020), 0 25 mg (1/16/2020), 0 25 mg (1/30/2020)  fluorouracil (ADRUCIL) injection 715 mg, 400 mg/m2, Intravenous, Once, 7 of 7 cycles  pegfilgrastim (NEULASTA ONPRO) subcutaneous injection kit 6 mg, 6 mg, Subcutaneous, Once, 20 of 28 cycles  Administration: 6 mg (5/22/2019), 6 mg (6/5/2019), 6 mg (6/22/2019), 6 mg (7/5/2019), 6 mg (7/20/2019), 6 mg (8/2/2019), 6 mg (8/17/2019), 6 mg (8/31/2019), 6 mg (9/14/2019), 6 mg (9/28/2019), 6 mg (10/12/2019), 6 mg (10/26/2019), 6 mg (11/9/2019), 6 mg (11/23/2019), 6 mg (12/7/2019), 6 mg (12/21/2019), 6 mg (1/4/2020), 6 mg (1/18/2020), 6 mg (2/1/2020)  fosaprepitant (EMEND) 150 mg in sodium chloride 0 9 % 250 mL IVPB, 150 mg, Intravenous, Once, 8 of 16 cycles  Administration: 150 mg (11/7/2019), 150 mg (11/21/2019), 150 mg (12/5/2019), 150 mg (12/19/2019), 150 mg (1/2/2020), 150 mg (1/16/2020), 150 mg (1/30/2020)  bevacizumab (AVASTIN) 355 mg in sodium chloride 0 9 % 100 mL IVPB, 5 mg/kg, Intravenous, Once, 27 of 35 cycles  Administration: 355 mg (5/20/2019), 355 mg (6/3/2019), 355 mg (6/20/2019), 355 mg (7/3/2019), 355 mg (7/18/2019), 355 mg (7/31/2019), 355 mg (8/15/2019), 355 mg (8/29/2019), 355 mg (9/12/2019), 355 mg (9/26/2019), 355 mg (10/10/2019), 355 mg (10/24/2019), 355 mg (11/7/2019), 355 mg (11/21/2019), 355 mg (12/5/2019), 355 mg (12/19/2019), 355 mg (1/2/2020), 355 mg (1/16/2020), 355 mg (1/30/2020)  irinotecan (Brennen Filbert) 322 mg in sodium chloride 0 9 % 500 mL chemo infusion, 180 mg/m2, Intravenous, Once, 27 of 35 cycles  Dose modification: 150 mg/m2 (original dose 180 mg/m2, Cycle 20, Reason: Dose Not Tolerated)  Administration: 322 mg (5/20/2019), 322 mg (6/3/2019), 320 mg (6/20/2019), 320 mg (7/3/2019), 320 mg (7/18/2019), 320 mg (7/31/2019), 320 mg (8/15/2019), 320 mg (8/29/2019), 320 mg (9/12/2019), 320 mg (9/26/2019), 320 mg (10/10/2019), 320 mg (10/24/2019), 269 mg (11/7/2019), 269 mg (11/21/2019), 269 mg (12/5/2019), 269 mg (12/19/2019), 269 mg (1/2/2020), 269 mg (1/16/2020), 269 mg (1/30/2020)  leucovorin 716 mg in sodium chloride 0 9 % 250 mL IVPB, 400 mg/m2, Intravenous, Once, 27 of 35 cycles  Administration: 716 mg (5/20/2019), 716 mg (6/3/2019), 700 mg (6/20/2019), 700 mg (7/3/2019), 700 mg (7/18/2019), 700 mg (7/31/2019), 700 mg (8/15/2019), 700 mg (8/29/2019), 700 mg (9/12/2019), 700 mg (9/26/2019), 700 mg (10/10/2019), 716 mg (10/24/2019), 700 mg (11/7/2019), 700 mg (11/21/2019), 700 mg (12/5/2019), 700 mg (12/19/2019), 700 mg (1/2/2020), 700 mg (1/16/2020), 700 mg (1/30/2020)     3/3/2020 - 3/16/2020 Chemotherapy    pegfilgrastim (NEULASTA ONPRO) subcutaneous injection kit 6 mg, 6 mg, Subcutaneous, Once, 1 of 12 cycles  Administration: 6 mg (3/5/2020)  bevacizumab (AVASTIN) 357 5 mg in sodium chloride 0 9 % 100 mL IVPB, 5 mg/kg = 357 5 mg, Intravenous, Once, 1 of 12 cycles  Administration: 357 5 mg (3/3/2020)  leucovorin 700 mg in dextrose 5 % 250 mL IVPB, 720 mg, Intravenous, Once, 1 of 12 cycles  Administration: 700 mg (3/3/2020)  oxaliplatin (ELOXATIN) 150 mg in dextrose 5 % 250 mL chemo infusion, 153 mg, Intravenous, Once, 1 of 12 cycles  Administration: 150 mg (3/3/2020)     3/16/2020 - 4/5/2020 Chemotherapy    bevacizumab (AVASTIN) 560 mg in sodium chloride 0 9 % 100 mL IVPB, 7 5 mg/kg, Intravenous, Once, 0 of 5 cycles  oxaliplatin (ELOXATIN) 239 2 mg in dextrose 5 % 500 mL chemo infusion, 130 mg/m2 = 239 2 mg, Intravenous, Once, 1 of 6 cycles  Administration: 239 2 mg (3/16/2020)     4/6/2020 - 12/13/2020 Chemotherapy    fluorouracil (ADRUCIL) injection 730 mg, 400 mg/m2 = 730 mg, Intravenous, Once, 1 of 1 cycle  pegfilgrastim (NEULASTA ONPRO) subcutaneous injection kit 6 mg, 6 mg, Subcutaneous, Once, 14 of 20 cycles  Administration: 6 mg (4/8/2020), 6 mg (4/25/2020), 6 mg (5/8/2020), 6 mg (6/19/2020), 6 mg (7/2/2020), 6 mg (7/17/2020), 6 mg (7/31/2020), 6 mg (8/14/2020), 6 mg (8/28/2020), 6 mg (9/25/2020), 6 mg (10/15/2020), 6 mg (11/5/2020), 6 mg (11/18/2020), 6 mg (12/2/2020)  fosaprepitant (EMEND) 150 mg in sodium chloride 0 9 % 250 mL IVPB, 150 mg, Intravenous, Once, 14 of 20 cycles  Administration: 150 mg (4/6/2020), 150 mg (4/23/2020), 150 mg (5/6/2020), 150 mg (6/17/2020), 150 mg (6/30/2020), 150 mg (7/15/2020), 150 mg (7/29/2020), 150 mg (8/12/2020), 150 mg (8/26/2020), 150 mg (9/23/2020), 150 mg (10/13/2020), 150 mg (11/3/2020), 150 mg (11/16/2020), 150 mg (11/30/2020)  bevacizumab (AVASTIN) 372 5 mg in sodium chloride 0 9 % 100 mL IVPB, 5 mg/kg = 372 5 mg, Intravenous, Once, 10 of 16 cycles  Administration: 372 5 mg (4/6/2020), 372 5 mg (4/23/2020), 357 5 mg (6/30/2020), 357 5 mg (7/15/2020), 357 5 mg (7/29/2020), 357 5 mg (8/12/2020), 357 5 mg (8/26/2020), 357 5 mg (11/16/2020), 357 5 mg (11/30/2020)  leucovorin 750 mg in dextrose 5 % 250 mL IVPB, 732 mg, Intravenous, Once, 14 of 20 cycles  Administration: 750 mg (4/6/2020), 750 mg (4/23/2020), 750 mg (5/6/2020), 750 mg (6/17/2020), 700 mg (6/30/2020), 700 mg (7/15/2020), 700 mg (7/29/2020), 700 mg (8/12/2020), 700 mg (8/26/2020), 700 mg (9/23/2020), 700 mg (10/13/2020), 700 mg (11/3/2020), 700 mg (11/16/2020), 700 mg (11/30/2020)  oxaliplatin (ELOXATIN) 150 mg in dextrose 5 % 250 mL chemo infusion, 155 55 mg, Intravenous, Once, 14 of 20 cycles  Dose modification: 70 mg/m2 (original dose 85 mg/m2, Cycle 11, Reason: Dose Not Tolerated, Comment: thrombocytopenia), 65 mg/m2 (original dose 85 mg/m2, Cycle 14, Reason: Other (See Comments)), 65 mg/m2 (original dose 85 mg/m2, Cycle 15, Reason: Max Dose Reached)  Administration: 150 mg (4/6/2020), 150 mg (4/23/2020), 150 mg (5/6/2020), 150 mg (6/17/2020), 150 mg (6/30/2020), 150 mg (7/15/2020), 150 mg (7/29/2020), 150 mg (8/12/2020), 150 mg (8/26/2020), 150 mg (9/23/2020), 125 3 mg (10/13/2020), 125 3 mg (11/3/2020), 125 3 mg (11/16/2020), 116 35 mg (11/30/2020)     12/28/2020 - 3/31/2021 Chemotherapy    pegfilgrastim (NEULASTA ONPRO) subcutaneous injection kit 6 mg, 6 mg, Subcutaneous, Once, 6 of 9 cycles  Administration: 6 mg (12/30/2020), 6 mg (1/14/2021), 6 mg (2/6/2021), 6 mg (2/20/2021), 6 mg (3/6/2021), 6 mg (3/20/2021)  fosaprepitant (EMEND) 150 mg in sodium chloride 0 9 % 250 mL IVPB, 150 mg, Intravenous, Once, 6 of 9 cycles  Administration: 150 mg (12/28/2020), 150 mg (1/12/2021), 150 mg (2/4/2021), 150 mg (2/18/2021), 150 mg (3/4/2021), 150 mg (3/18/2021)  bevacizumab (AVASTIN) 347 5 mg in sodium chloride 0 9 % 100 mL IVPB, 5 mg/kg = 347 5 mg, Intravenous, Once, 5 of 8 cycles  Administration: 347 5 mg (12/28/2020), 347 5 mg (2/4/2021), 347 5 mg (2/18/2021), 347 5 mg (3/4/2021), 347 5 mg (3/18/2021)  leucovorin 700 mg in dextrose 5 % 250 mL IVPB, 712 mg, Intravenous, Once, 6 of 9 cycles  Dose modification: 400 mg/m2 (original dose 400 mg/m2, Cycle 6, Reason: Other (See Comments), Comment: protocol)  Administration: 700 mg (12/28/2020), 700 mg (1/12/2021), 700 mg (2/4/2021), 700 mg (2/18/2021), 700 mg (3/4/2021), 700 mg (3/18/2021)  oxaliplatin (ELOXATIN) 115 7 mg in dextrose 5 % 250 mL chemo infusion, 65 mg/m2 = 115 7 mg (76 5 % of original dose 85 mg/m2), Intravenous, Once, 6 of 9 cycles  Dose modification: 65 mg/m2 (original dose 85 mg/m2, Cycle 1, Reason: Dose Not Tolerated)  Administration: 115 7 mg (12/28/2020), 115 7 mg (1/12/2021), 115 7 mg (2/4/2021), 115 7 mg (2/18/2021), 115 7 mg (3/4/2021), 115 7 mg (3/18/2021)     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 [address] )     4/6/2020 - 12/13/2020 Chemotherapy    fluorouracil (ADRUCIL) injection 730 mg, 400 mg/m2 = 730 mg, Intravenous, Once, 1 of 1 cycle  pegfilgrastim (NEULASTA ONPRO) subcutaneous injection kit 6 mg, 6 mg, Subcutaneous, Once, 7 of 10 cycles  Administration: 6 mg (4/8/2020), 6 mg (4/25/2020), 6 mg (5/8/2020), 6 mg (6/19/2020), 6 mg (7/2/2020), 6 mg (7/17/2020), 6 mg (7/31/2020)  fosaprepitant (EMEND) 150 mg in sodium chloride 0 9 % 250 mL IVPB, 150 mg, Intravenous, Once, 7 of 10 cycles  Administration: 150 mg (4/6/2020), 150 mg (4/23/2020), 150 mg (5/6/2020), 150 mg (6/17/2020), 150 mg (6/30/2020), 150 mg (7/15/2020), 150 mg (7/29/2020)  bevacizumab (AVASTIN) 372 5 mg in sodium chloride 0 9 % 100 mL IVPB, 5 mg/kg = 372 5 mg, Intravenous, Once, 5 of 8 cycles  Administration: 372 5 mg (4/6/2020), 372 5 mg (4/23/2020), 357 5 mg (6/30/2020), 357 5 mg (7/15/2020), 357 5 mg (7/29/2020)  leucovorin 750 mg in dextrose 5 % 250 mL IVPB, 732 mg, Intravenous, Once, 7 of 10 cycles  Administration: 750 mg (4/6/2020), 750 mg (4/23/2020), 750 mg (5/6/2020), 750 mg (6/17/2020), 700 mg (6/30/2020), 700 mg (7/15/2020), 700 mg (7/29/2020)  oxaliplatin (ELOXATIN) 150 mg in dextrose 5 % 250 mL chemo infusion, 155 55 mg, Intravenous, Once, 7 of 10 cycles  Administration: 150 mg (4/6/2020), 150 mg (4/23/2020), 150 mg (5/6/2020), 150 mg (6/17/2020), 150 mg (6/30/2020), 150 mg (7/15/2020), 150 mg (7/29/2020)     12/28/2020 - 3/31/2021 Chemotherapy    pegfilgrastim (NEULASTA ONPRO) subcutaneous injection kit 6 mg, 6 mg, Subcutaneous, Once, 6 of 9 cycles  Administration: 6 mg (12/30/2020), 6 mg (1/14/2021), 6 mg (2/6/2021), 6 mg (2/20/2021), 6 mg (3/6/2021), 6 mg (3/20/2021)  fosaprepitant (EMEND) 150 mg in sodium chloride 0 9 % 250 mL IVPB, 150 mg, Intravenous, Once, 6 of 9 cycles  Administration: 150 mg (12/28/2020), 150 mg (1/12/2021), 150 mg (2/4/2021), 150 mg (2/18/2021), 150 mg (3/4/2021), 150 mg (3/18/2021)  bevacizumab (AVASTIN) 347 5 mg in sodium chloride 0 9 % 100 mL IVPB, 5 mg/kg = 347 5 mg, Intravenous, Once, 5 of 8 cycles  Administration: 347 5 mg (12/28/2020), 347 5 mg (2/4/2021), 347 5 mg (2/18/2021), 347 5 mg (3/4/2021), 347 5 mg (3/18/2021)  leucovorin 700 mg in dextrose 5 % 250 mL IVPB, 712 mg, Intravenous, Once, 6 of 9 cycles  Dose modification: 400 mg/m2 (original dose 400 mg/m2, Cycle 6, Reason: Other (See Comments), Comment: protocol)  Administration: 700 mg (12/28/2020), 700 mg (1/12/2021), 700 mg (2/4/2021), 700 mg (2/18/2021), 700 mg (3/4/2021), 700 mg (3/18/2021)  oxaliplatin (ELOXATIN) 115 7 mg in dextrose 5 % 250 mL chemo infusion, 65 mg/m2 = 115 7 mg (76 5 % of original dose 85 mg/m2), Intravenous, Once, 6 of 9 cycles  Dose modification: 65 mg/m2 (original dose 85 mg/m2, Cycle 1, Reason: Dose Not Tolerated)  Administration: 115 7 mg (12/28/2020), 115 7 mg (1/12/2021), 115 7 mg (2/4/2021), 115 7 mg (2/18/2021), 115 7 mg (3/4/2021), 115 7 mg (3/18/2021)       Liz Espinosa is a 48 y  o  female presents for follow up for metastatic colon cancer       with history of DVT right lower leg below the knee in May 2016 and that happened after taking hormonal therapy in April 2016  In 2016 she tested positive once for lupus anticoagulant and that was negative on repeat test     She stayed on Xarelto until end of September 2016  She had heavy periods in in January 2018 and she was in bed for 3 days and she developed a clot in the left lower leg distally below the knee        [facility]   Ovaries were left in   Pathology of bilateral fallopian tube showed invasive adenocarcinoma      She had a CT of the chest, abdomen and pelvis on 06/06/2018 which showed a possible lesion on the right abdomen,  Suspicious for underlying colon mass   Also, multiple hepatic metastases were noted  Preston Nino, scattered retroperitoneal nodes also noted;   Largest measuring 1 1 x 0 9 cm      Patient brought to the OR on 06/14/2018  Izabela Woods was found to have a proximal transverse colon tumor with peritoneal deposits pelvic and right diaphragm, palpable liver metastases    she had a right hemicolectomy with ileocolonic anastomosis      Final pathology showed stage IVC- pT3, pN1b, pM1c, G2     7/26/18 - molecular testing resulted  NRAS negative   BRAF negative   KRAS mutation positive      She was seen by colon surgeon at [facility] in Jan 2019  Due to her liver mets, no further surgery was recommended  She was advised to continue chemotherapy alone       July 2018 she was started on FOLFIRI plus Avastin       CT scan chest on 01/27/2020 showed multiple 5 mm and smaller bilateral nodules all new since June 2019 indicating metastatic disease   MRI of the pelvis on 01/27/2020 showed mostly stable liver lesions compared to July 2019  1 lesion in the liver in the right hepatic dome increased in size in showed more diffuse enhancement      MRI of the pelvis showed a new large complex cystic right adnexal mass measuring 7 4 x 6 3 x 9 8 cm and a new predominantly solid left adnexal mass measuring 4 1 x 2 3 x 2 9 cm highly suspicious for metastasis      Molecular testing with Caris on 3/19/20         Patient was seen by both Dr Nelida Swanson and Dr Liborio Newsome at [facility]      Dr Rosangela Mejia asked surgeons here to evaluation patient for palliative surgery  Surgery was not recommended by Replaced by [facility]'s tumor board conference or [facility] doctors       Patient was recommended change in treatment plan, FOLFOX or XELOX,     She had 1 cycle of FOLFOX so that she could get started on treatment right away   She then had 1 cycle of Xelox   She wished to eliminate 5 FU pump for quality of life   However, Xelox was not well tolerated   She completed the cycle   She then had 3rd cycle of FOLFOX      Repeat imagining showed end of April/beginning of May 2020              MRI pelvis: right side pelvic mass increased to 10 5 x 9 1 cm from 6 3 x 5 6 cm, 9 4 x 5 4 cm, previously 4 3 x 3 cm (compared from Jan 2020)              MRI abdomen showed stable liver lesions                 OP chest showed improving lung nodules     Due to above patient had right radical oophorectomy and left oophorectomy on 5/26/20       Biopsy showed metastatic disease in the ovaries as well as samples of the peritoneum and small bowel mesentery with metastatic colon cancer      She was resumed on FOLFOX on 06/17/2020   Avastin to be added later on secondary to recent surgery  Margaretann Goldberg to surgery her most recent chemo was on 05/06/2020      She has had history of proteinuria   This had been less than 1 g on 24 hour urine      August 10, 2020 patient repeat imaging   Numerous small nodules in the lungs were noted to be unchanged; 1 May be new N1 maybe slightly larger in the left lung   Multiple liver lesions identified, some with increase in size   MRI of the abdomen on 08/17/2020 showed enlarging 2 4 x 2 4 cm right hepatic lesion, several other small liver metastases unchanged   MRI pelvis on 08/19/2020 showed expected postoperative change following removal of bilateral ovary metastases      Patient saw a physician At [facility] regarding the above disease progression in liver   Dr Blaise Morales spoke with this doctor on 09/08/2020  Pattie Fine was to continue FOLFOX in try RFA or liver resection   Patient was planned to receive RFA but this could not be done for technical reasons according to IR at [facility]  Soo Benitez was then going to receive TACE or SIR-Spheres      Patient is not a candidate for Erbitux secondary to KRAS mutation   MSI stable therefore not candidate for Keytruda      Patient was seen in follow-up with Dr Blaise Morales on 10/09/2020  Raul Mullins is noted that patient was to receive liver-directed therapy on 10/30/2020 at [facility]      Chemotherapy on 10/07/2020 was delayed due to a low platelet count [place] 35185   Doses of oxaliplatin, 5 FU infusion were adjusted due to hematological toxicity  Ledezma Kos was continued      After liver directed therapy Avastin was reinitiated       On 12/08/2020 patient presented to the ED due to right upper quadrant pain that was worsening with deep breaths   CTA of the chest abdomen pelvis was completed   This was negative for PE   This showed numerable pulmonary nodules varying in size diffuse bilaterally, increase in both size and number compared to prior   Liver metastases that were there previously showed no change, there is so suspected lesion in the right lobe   Small amount of ascites in the pelvis      Patient followed up with [facility] physicians regarding the above scan   They recommended continuation of FOLFOX   Recommended additional liver directed therapy   This will be completed on 02/01/2021      [facility] stated that she can receive Avastin following liver ablation procedures  This was re-added to chemo regimen       She had repeat imaging at [facility] on 3/12/21  Imaging showed improvement in metastatic disease in the right lobe of liver but more disease in the left lobe and outside of the liver  In March 2017 patient was still undergoing treatment with [facility] for disease in the left lobe with liver and was recommended change in systemic therapy  It is noted that patient had disease progression in her lungs  Patient's physician from [facility] recommended [address] with dose reduction by 20-30%  She was given Lonsurf 45 mg b i d  day 1 through 5 and 8 through 12 every 28 days    She began Lonsurf due in early April 2021  She had loose bowel movements with this  She was seen in the office on 04/23/2021 due to worsening abdominal distension  Ultrasound of the abdomen was recommended  This showed new right moderate hydronephrosis, new small volume ascites,  Metastatic lesions in the liver were present    Largest lesion in the right dome was slightly smaller compared imaging in December 2020  Patient sent to Urology for moderate hydronephrosis  She was seen in follow-up in the office on 05/21/2021  She completed 2 cycles of Lonsurf  She complained of worsening cough and exertional dyspnea as well as abdominal discomfort and distention  She had a CT scan on 05/24/2021  This showed significant interval worsening in size and number of pulmonary metastasis, increase in multiple small ill-defined lesions within the liver, new right-sided hydronephrosis secondary to peritoneal implant, new large volume ascites  Dr Gianna Bassett spoke with patient this day of scan  Patient recommended to change systemic therapy due to disease progression  Dr Gianna Bassett spoke with [facility] physician and then saw patient on 5/27/21  She was recommended to switch therapy to Stivarga 80 mg day 1-21 every 28 days  She had paracentesis on 5/27/21 with 3 5 L removed  She was seen in follow up on 7/2/21  She had begun taking Stivarga  This was then held due to elevated bilirubin  Bilirubin from April to early June 2021 was in the 2 mg/dL range  On 06/22/2021 this increased to 6 3, then 7 45 on 07/01, 8 09 on 07/09, 10 77 on 07/16, 9 14 on 07/26/2021  Medication has been on hold due to elevated bilirubin  She saw GI due to this  Previous imaging saw no obstruction  She was recommended MRI of abdomen which is scheduled for next week  She is seeing palliative care, Dr Radha Dye  She is taking pain meds and tessalon pearles  She did not go on vacation with her family bc she felt that it would be too much on her  ROS: Review of Systems   Constitutional: Positive for appetite change (decreased), fatigue and unexpected weight change  HENT: Negative for trouble swallowing  Respiratory: Positive for cough and shortness of breath (minimal on exertion )  Cardiovascular: Negative for leg swelling     Gastrointestinal: Positive for abdominal pain (lower abdominal area )  Negative for constipation, diarrhea, nausea and vomiting  Genitourinary: Negative for difficulty urinating and dysuria  Musculoskeletal: Negative for arthralgias  Neurological: Positive for numbness (continued peripheral neuropathy from chemo )  Negative for dizziness, light-headedness and headaches  Hematological: Negative  Psychiatric/Behavioral: Negative  Past Medical History:   Diagnosis Date    Cancer [facility])     [facility] [address] ) 06/08/2018    [facility]     History of chemotherapy 2019    Kidney disease     Menorrhalgia     RESOLVED 2008    Migraine     Postcoital bleeding     LAST ASSESSED [id number]       Past Surgical History:   Procedure Laterality Date    ABDOMINAL ADHESION SURGERY N/A 6/14/2018    Procedure: LYSIS ADHESIONS OF COLON;  Surgeon: Jose Angel Newell MD;  [facility];  Service: Colorectal    CYSTOSCOPY N/A 5/26/2020    Procedure: CYSTOSCOPY;  Surgeon: Melony Mc MD;  [facility];  Service: Gynecology Oncology    DILATION AND CURETTAGE OF UTERUS      RESOLVED 2008    ENDOMETRIAL ABLATION      THERMAL     NOVASURE  RESOLVED 2008    ENDOMETRIAL BIOPSY      BY SUCTION   DONE 12/13/2008    HYSTERECTOMY  05/21/2018    IR NEPHROSTOMY TUBE PLACEMENT  6/15/2021    IR PARACENTESIS  5/27/2021    IR PARACENTESIS  7/16/2021    OMENTECTOMY Right 6/14/2018    Procedure: PARTIAL OMENTECTOMY;  Surgeon: Jose Angel Newell MD;  [facility];  Service: Colorectal    OOPHORECTOMY N/A 5/26/2020    Procedure: RIGHT RADICAL OOPHORECTOMY, LEFT OOPHORECTOMY, PERITONEAL BIOPSIES, ATTEMPTED ROBOT CONVERTED TO OPEN LAPAROTOMY;  Surgeon: Melony Mc MD;  [facility];  Service: Gynecology Oncology    PORTACATH PLACEMENT      RCW    NC COLONOSCOPY FLX DX W/COLLJ LT[facility], [facility] WHEN PFRMD N/A 6/13/2018    Procedure: COLONOSCOPY;  Surgeon: Jose Angel Newell MD;  [facility];   Service: Colorectal    AR ESOPHAGOGASTRODUODENOSCOPY TRANSORAL DIAGNOSTIC N/A 6/13/2018    Procedure: ESOPHAGOGASTRODUODENOSCOPY (EGD); Surgeon: Mohsen Mattson MD;  [facility];   Service: Gastroenterology    AR INSERT PICC W/ SUB-Q PORT Right 6/28/2018    Procedure: INSERTION VENOUS PORT (PORT-A-CATH) VIA RIGHT SUBCLAVIAN VEIN;  Surgeon: Lb Cedillo MD;  [facility];  Service: Colorectal    AR LAP,DIAGNOSTIC ABDOMEN N/A 6/14/2018    Procedure: LAPAROSCOPY DIAGNOSTIC;  Surgeon: Lb Cedillo MD;  [facility];  Service: Colorectal    AR LAP,SURG,COLECTOMY, PARTIAL, W/ANAST Right 6/14/2018    Procedure: LAPAROSCOPIC EXTENDED RIGHT HEMICOLECTOMY ; PERITONEAL BX;  Surgeon: Lb Cedillo MD;  [facility];  Service: Colorectal    AR LAP,VAG HYST,UTERUS 250GMS/<,SALP-OOPH Bilateral 5/21/2018    Procedure: HYSTERECTOMY LAPAROSCOPIC ASSISTED VAGINAL (LAVH) , BILATERAL SALPINGECTOMY;  Surgeon: Jorge Alberto Larson DO;  [facility];  Service: Gynecology    PROCTOSCOPY N/A 5/26/2020    Procedure: PROCTOSCOPY;  Surgeon: Ada Gill MD;  [facility];  Service: Gynecology Oncology    TRANSPERINEAL IMPLANT OF RADIATION SEEDS W/ ULTRASOUND  10/30/2020    Liver, @ [address] [facility] History     Socioeconomic History    Marital status: /Civil Union     Spouse name: None    Number of children: 2    Years of education: None    Highest education level: None   Occupational History    None   Tobacco Use    Smoking status: Never Smoker    Smokeless tobacco: Never Used   Vaping Use    Vaping Use: Never used   Substance and Sexual Activity    Alcohol use: Never     Alcohol/week: 0 0 standard drinks     Comment: 0    Drug use: No    Sexual activity: Yes     Partners: Male     Comment: PARTNER HAD VASECTOMY    Other Topics Concern    None   Social History Narrative    ALWAYS USES [employer]     CAFFEINE USE     CURRENTLY WORKS FULL TIME     DAILY COFFEE CONSUMPTION    EXERCISE SPORADICALLY     FEELS SAFE AT HOME     LIVES WITH SPOUSE      Social Determinants of Health     Financial Resource Strain:     Difficulty of Paying Living Expenses:    Food Insecurity:     Worried About Running Out of Food in the Last Year:     [address] in the Last Year:    Transportation Needs:     Lack of Transportation (Medical):      Lack of Transportation (Non-Medical):    Physical Activity:     Days of Exercise per Week:     Minutes of Exercise per Session:    Stress:     Feeling of Stress :    Social Connections:     Frequency of Communication with Friends and Family:     Frequency of Social Gatherings with Friends and Family:     Attends Spiritism Services:     Active Member of Clubs or Organizations:     Attends Club or Organization Meetings:     Marital Status:    Intimate Partner Violence:     Fear of Current or Ex-Partner:     Emotionally Abused:     Physically Abused:     Sexually Abused:        Family History   Problem Relation Age of Onset    Migraines Mother     Heart disease Mother     Anemia Father     Arthritis Father     Other Father         BLOOD TRANSFUSION    Migraines Daughter     Heart disease Family     No Known Problems Maternal Grandmother     No Known Problems Maternal Grandfather     No Known Problems Paternal Grandmother     No Known Problems Paternal Grandfather        No Known Allergies      Current Outpatient Medications:     acetaminophen (TYLENOL) 500 mg tablet, Take 1,000 mg by mouth every 6 (six) hours as needed for mild pain, Disp: , Rfl:     benzonatate (TESSALON) 200 MG capsule, Take 1 capsule (200 mg total) by mouth 3 (three) times a day as needed for cough, Disp: 20 capsule, Rfl: 0    furosemide (LASIX) 40 mg tablet, Take 40 mg by mouth daily, Disp: , Rfl:     LORazepam (ATIVAN) 1 mg tablet, Take 1 mg by mouth daily as needed for anxiety, Disp: , Rfl:     multivitamin (THERAGRAN) TABS, Take 1 tablet by mouth daily, Disp: , Rfl:     oxyCODONE (ROXICODONE) 5 mg immediate release tablet, Take 1 tablet (5 mg total) by mouth every 4 (four) hours as needed for moderate pain or severe painMax Daily Amount: 30 mg, Disp: 60 tablet, Rfl: 0    potassium chloride (Klor-Con) 10 mEq tablet, , Disp: , Rfl:     senna-docusate sodium (SENOKOT-S) 8 6-50 mg per tablet, Take 1 tablet by mouth daily, Disp: 30 tablet, Rfl: 2    spironolactone (ALDACTONE) 25 mg tablet, , Disp: , Rfl:     amLODIPine (NORVASC) 5 mg tablet, Take 5 mg by mouth daily (Patient not taking: Reported on 7/14/2021), Disp: , Rfl:     metoprolol succinate (TOPROL-XL) 50 mg 24 hr tablet, Take 50 mg by mouth 2 (two) times a day Pt was instructed by her PCP Dr Darling Davis, advised pt to increased to BID (Patient not taking: Reported on 7/14/2021), Disp: , Rfl:     Regorafenib (Stivarga) 40 MG TABS, Take 2 tablets (80 mg total) by mouth daily for 21 days , followed by 7 days off, Disp: 42 tablet, Rfl: 3    rivaroxaban (Xarelto) 10 mg tablet, Take 1 tablet (10 mg total) by mouth daily (Patient not taking: Reported on 7/14/2021), Disp: 90 tablet, Rfl: 3    sodium chloride, PF, 0 9 %, 10 mL by Intracatheter route daily Intracatheter flushing daily, Disp: 300 mL, Rfl: 0      Physical Exam:  Resp 18   Ht 5' 5 5" (1 664 m)   Wt 59 kg (130 lb)   LMP 05/16/2018   BMI 21 30 kg/m²     Physical Exam  Constitutional:       General: She is not in acute distress  Appearance: She is well-developed  She is ill-appearing  HENT:      Head: Normocephalic and atraumatic  Eyes:      General: Scleral icterus present  Conjunctiva/sclera: Conjunctivae normal    Cardiovascular:      Rate and Rhythm: Normal rate and regular rhythm  Heart sounds: Normal heart sounds  No murmur heard  Pulmonary:      Effort: Pulmonary effort is normal  No respiratory distress  Breath sounds: Normal breath sounds        Comments: Patient coughing frequently during the encounter Abdominal:      General: There is distension  Palpations: Abdomen is soft  Tenderness: There is no abdominal tenderness  Comments: Ascites    Musculoskeletal:         General: No tenderness  Normal range of motion  Cervical back: Normal range of motion and neck supple  Right lower leg: No edema  Left lower leg: No edema  Lymphadenopathy:      Cervical: No cervical adenopathy  Skin:     General: Skin is warm and dry  Coloration: Skin is jaundiced  Neurological:      Mental Status: She is alert and oriented to person, place, and time  Cranial Nerves: No cranial nerve deficit  Psychiatric:         Mood and Affect: Mood normal          Behavior: Behavior normal        Labs:  Lab Results   Component Value Date    WBC 11 06 (H) 07/26/2021    HGB 12 1 07/26/2021    HCT 38 0 07/26/2021     (H) 07/26/2021     07/26/2021     Lab Results   Component Value Date     03/18/2015    K 4 3 07/26/2021    CL 97 (L) 07/26/2021    CO2 27 07/26/2021    ANIONGAP 9 03/18/2015    BUN 16 07/26/2021    CREATININE 0 92 07/26/2021    GLUCOSE 100 03/18/2015    GLUF 103 (H) 07/16/2021    CALCIUM 8 6 07/26/2021    CORRECTEDCA 10 4 (H) 07/26/2021    AST 80 (H) 07/26/2021    ALT 43 07/26/2021    ALKPHOS 348 (H) 07/26/2021    PROT 6 6 03/18/2015    BILITOT 0 65 03/18/2015    EGFR 71 07/26/2021     Patient voiced understanding and agreement in the above discussion  Aware to contact our office with questions/symptoms in the interim  This note has been generated by voice recognition software system  Therefore, there may be spelling, grammar, and or syntax errors  Please contact if questions arise

## 2021-07-29 NOTE — PROGRESS NOTES
MSW accompanied Dr Janeth Heller to meet with patient and her , Shaheed Mcgrath  Pt was seen by oncology today and unfortunately at this time, due to her high bilirubin, no further treatment options are offered  Pt has also spoken with Palak Caro and VIRY and they felt the same  Pt is still hopeful and states that she will reach out to Palak Caro again, because she feels like they did not discuss in great detail what her options may be if her bilirubin were to come down  Dr Janeth Heller discussed what she thought she would decide to do if there are no other options left  Pt stated that she has not thought about this  Comfort was discussed, whether being at home, in the hospital, or at a nursing home  Pt and her  became very tearful  I asked pt if she has discussed anything with their children  Pt states no she has not discussed  I asked how the kids were coping and she stated they are just going about their normal day  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 and discuss with her children  Even writing things down may help, as it appears that she struggles putting her feelings into words  Pt did mention that she may type her thoughts on the ipad  The conversation was very emotional and pt was struggling, therefore Dr Janeth Heller focused on her medications and symptoms  Pts  is in the process of looking into hiring a caregiver for a few hours a day  He owns his own business and is able to be there most of the time if he needs  Pt is scheduled to receive a MRI next week and will follow up with Dr Janeth Heller to review the results and discuss further what her goals are  MSW will continue to follow and provide support

## 2021-07-30 ENCOUNTER — HOSPITAL ENCOUNTER (OUTPATIENT)
Dept: RADIOLOGY | Facility: HOSPITAL | Age: 56
Discharge: HOME | End: 2021-07-30
Payer: COMMERCIAL

## 2021-07-30 VITALS
RESPIRATION RATE: 18 BRPM | DIASTOLIC BLOOD PRESSURE: 66 MMHG | SYSTOLIC BLOOD PRESSURE: 105 MMHG | HEART RATE: 97 BPM | OXYGEN SATURATION: 99 %

## 2021-07-30 DIAGNOSIS — C78.6 PERITONEAL METASTASES (HCC): ICD-10-CM

## 2021-07-30 DIAGNOSIS — R18.8 OTHER ASCITES: ICD-10-CM

## 2021-07-30 PROCEDURE — 49083 ABD PARACENTESIS W/IMAGING: CPT

## 2021-07-30 PROCEDURE — 49083 ABD PARACENTESIS W/IMAGING: CPT | Performed by: NURSE PRACTITIONER

## 2021-07-30 RX ORDER — ALBUMIN (HUMAN) 12.5 G/50ML
50 SOLUTION INTRAVENOUS ONCE
Status: COMPLETED | OUTPATIENT
Start: 2021-07-30 | End: 2021-07-30

## 2021-07-30 RX ADMIN — ALBUMIN (HUMAN) 50 G: 0.25 INJECTION, SOLUTION INTRAVENOUS at 12:13

## 2021-07-30 NOTE — BRIEF OP NOTE (RAD/CATH)
IR PARACENTESIS Procedure Note    PATIENT NAME: Criss Cespedes  : 1965  MRN: [id number]    Pre-op Diagnosis:   1  Peritoneal metastases ([address] )    2  Other ascites      Post-op Diagnosis:   1   Peritoneal metastases ([facility] [address] )    2  Other ascites        Provider:   CRISTAL Rowland  Assistants:     No qualified resident was available, Resident is only observing    Estimated Blood Loss: none  Findings: 5200 mL clear yellow ascites, LLQ    Specimens: none    Complications:  none    Anesthesia: local    CRISTAL Rowland     Date: 2021  Time: 11:15 AM

## 2021-07-30 NOTE — SEDATION DOCUMENTATION
Patient complaining of SOB after paracentesis  Niraj Lemus NP aware and new orders received  IV placed and albumin given  At this time patient denies SOB and vss remained stable  Patient assisted in wheelchair to car  Patient tolerated well

## 2021-07-30 NOTE — DISCHARGE INSTRUCTIONS
Abdominal Paracentesis     WHAT YOU NEED TO KNOW:   Abdominal paracentesis is a procedure to remove abnormal fluid buildup in your abdomen  Fluid builds up because of liver problems, such as swelling and scarring  Heart failure, kidney disease, a mass, or problems with your pancreas may also cause fluid buildup  DISCHARGE INSTRUCTIONS:     Follow up with your healthcare provider as directed: Write down your questions so you remember to ask them during your visits  Wound care: Remove dressing after 24 hours  Leave glue in place  Return to your normal activities    Contact Interventional Radiology at [phone] Gina PATIENTS: Contact Interventional Radiology at [phone]) Tracey Pinto PATIENTS: Contact Interventional Radiology at [phone]) if:  · You have a fever and your wound is red and swollen  · You have yellow, green, or bad-smelling discharge coming from your wound  · You have pain or swelling in your abdomen  · You have an upset stomach or you vomit  · You have sudden, sharp pain in your abdomen  · You urinate very little or not at all  · You feel confused and more tired than usual    · Your arm or leg feels warm, tender, and painful  It may look swollen and red  · You suddenly feel lightheaded and have trouble breathing

## 2021-08-03 ENCOUNTER — HOSPITAL ENCOUNTER (OUTPATIENT)
Dept: INFUSION CENTER | Facility: CLINIC | Age: 56
End: 2021-08-03

## 2021-08-05 ENCOUNTER — TELEPHONE (OUTPATIENT)
Dept: HEMATOLOGY ONCOLOGY | Facility: CLINIC | Age: 56
End: 2021-08-05

## 2021-08-05 ENCOUNTER — HOSPITAL ENCOUNTER (OUTPATIENT)
Dept: RADIOLOGY | Facility: HOSPITAL | Age: 56
Discharge: HOME | End: 2021-08-05
Attending: INTERNAL MEDICINE
Payer: COMMERCIAL

## 2021-08-05 ENCOUNTER — HOSPITAL ENCOUNTER (OUTPATIENT)
Dept: INFUSION CENTER | Facility: CLINIC | Age: 56
Discharge: HOME | End: 2021-08-05
Payer: COMMERCIAL

## 2021-08-05 DIAGNOSIS — C78.7 LIVER METASTASES (HCC): ICD-10-CM

## 2021-08-05 DIAGNOSIS — E80.6 HYPERBILIRUBINEMIA: ICD-10-CM

## 2021-08-05 DIAGNOSIS — Z95.828 PORT-A-CATH IN PLACE: ICD-10-CM

## 2021-08-05 DIAGNOSIS — C18.2 PRIMARY ADENOCARCINOMA OF ASCENDING COLON (HCC): Primary | ICD-10-CM

## 2021-08-05 DIAGNOSIS — C78.00 MALIGNANT NEOPLASM METASTATIC TO LUNG, UNSPECIFIED LATERALITY (HCC): ICD-10-CM

## 2021-08-05 LAB
ALBUMIN SERPL BCP-MCNC: 2.2 G/DL (ref 3.5–5)
ALP SERPL-CCNC: 322 U/L (ref 46–116)
ALT SERPL W P-5'-P-CCNC: 34 U/L (ref 12–78)
ANION GAP SERPL CALCULATED.3IONS-SCNC: 12 MMOL/L (ref 4–13)
AST SERPL W P-5'-P-CCNC: 85 U/L (ref 5–45)
BASOPHILS # BLD AUTO: 0.07 THOUSANDS/ΜL (ref 0–0.1)
BASOPHILS NFR BLD AUTO: 1 % (ref 0–1)
BILIRUB SERPL-MCNC: 9.23 MG/DL (ref 0.2–1)
BUN SERPL-MCNC: 27 MG/DL (ref 5–25)
CALCIUM ALBUM COR SERPL-MCNC: 10.2 MG/DL (ref 8.3–10.1)
CALCIUM SERPL-MCNC: 8.8 MG/DL (ref 8.3–10.1)
CHLORIDE SERPL-SCNC: 95 MMOL/L (ref 100–108)
CO2 SERPL-SCNC: 22 MMOL/L (ref 21–32)
CREAT SERPL-MCNC: 1.27 MG/DL (ref 0.6–1.3)
EOSINOPHIL # BLD AUTO: 0.06 THOUSAND/ΜL (ref 0–0.61)
EOSINOPHIL NFR BLD AUTO: 0 % (ref 0–6)
ERYTHROCYTE [DISTWIDTH] IN BLOOD BY AUTOMATED COUNT: 13.9 % (ref 11.6–15.1)
GFR SERPL CREATININE-BSD FRML MDRD: 48 ML/MIN/1.73SQ M
GLUCOSE SERPL-MCNC: 119 MG/DL (ref 65–140)
HCT VFR BLD AUTO: 37.9 % (ref 34.8–46.1)
HGB BLD-MCNC: 12.8 G/DL (ref 11.5–15.4)
IMM GRANULOCYTES # BLD AUTO: 0.19 THOUSAND/UL (ref 0–0.2)
IMM GRANULOCYTES NFR BLD AUTO: 1 % (ref 0–2)
LYMPHOCYTES # BLD AUTO: 0.95 THOUSANDS/ΜL (ref 0.6–4.47)
LYMPHOCYTES NFR BLD AUTO: 7 % (ref 14–44)
MCH RBC QN AUTO: 38.1 PG (ref 26.8–34.3)
MCHC RBC AUTO-ENTMCNC: 33.8 G/DL (ref 31.4–37.4)
MCV RBC AUTO: 113 FL (ref 82–98)
MONOCYTES # BLD AUTO: 1.65 THOUSAND/ΜL (ref 0.17–1.22)
MONOCYTES NFR BLD AUTO: 12 % (ref 4–12)
NEUTROPHILS # BLD AUTO: 10.51 THOUSANDS/ΜL (ref 1.85–7.62)
NEUTS SEG NFR BLD AUTO: 79 % (ref 43–75)
NRBC BLD AUTO-RTO: 0 /100 WBCS
PLATELET # BLD AUTO: 241 THOUSANDS/UL (ref 149–390)
PMV BLD AUTO: 8.9 FL (ref 8.9–12.7)
POTASSIUM SERPL-SCNC: 4.5 MMOL/L (ref 3.5–5.3)
PROT SERPL-MCNC: 6.3 G/DL (ref 6.4–8.2)
RBC # BLD AUTO: 3.36 MILLION/UL (ref 3.81–5.12)
SODIUM SERPL-SCNC: 129 MMOL/L (ref 136–145)
WBC # BLD AUTO: 13.43 THOUSAND/UL (ref 4.31–10.16)

## 2021-08-05 PROCEDURE — G1004 CDSM NDSC: HCPCS

## 2021-08-05 PROCEDURE — 74181 MRI ABDOMEN W/O CONTRAST: CPT

## 2021-08-05 PROCEDURE — 85025 COMPLETE CBC W/AUTO DIFF WBC: CPT

## 2021-08-05 PROCEDURE — 80053 COMPREHEN METABOLIC PANEL: CPT

## 2021-08-05 NOTE — TELEPHONE ENCOUNTER
Suze Kang is scheduled for a paracentesis [facility] 8/13/21 and Shirley Hernandez PA-C would like the patient to receive albumin prior to the treatment, Both [facility] have no availability  A voice message was left for Suze Kang stating such and asking what other infusion I can call to schedule for her to receive the albumin, she was asked to call [phone] to let me know  5pm 8/5/21 another attempt was made to contact Suze Kang regarding receiving the albumin, left another voice message

## 2021-08-06 ENCOUNTER — TELEPHONE (OUTPATIENT)
Dept: HEMATOLOGY ONCOLOGY | Facility: CLINIC | Age: 56
End: 2021-08-06

## 2021-08-06 NOTE — TELEPHONE ENCOUNTER
Another message was left for Tomer Skelton to call the office, [phone] to notify the office of possible infusion centers she can go to receive albumin prior to her 8/13/21 paracentesis  [facility] have no openings

## 2021-08-11 ENCOUNTER — DOCUMENTATION (OUTPATIENT)
Dept: HEMATOLOGY ONCOLOGY | Facility: CLINIC | Age: 56
End: 2021-08-11

## 2021-08-11 NOTE — PROGRESS NOTES
I gave report of MRI of abdomen to the patient that showed more disease in the abdomen, liver and lungs and no bile obstruction to  Insert stent  Bilirubin remains high  Not planning any treatment under the circumstances based on her clinical picture  She was aware of that

## 2021-08-13 ENCOUNTER — HOSPITAL ENCOUNTER (OUTPATIENT)
Dept: RADIOLOGY | Facility: HOSPITAL | Age: 56
Discharge: HOME | End: 2021-08-13
Payer: COMMERCIAL

## 2021-08-13 ENCOUNTER — HOSPITAL ENCOUNTER (OUTPATIENT)
Dept: INFUSION CENTER | Facility: CLINIC | Age: 56
Discharge: HOME | End: 2021-08-13
Payer: COMMERCIAL

## 2021-08-13 VITALS
SYSTOLIC BLOOD PRESSURE: 110 MMHG | HEART RATE: 98 BPM | DIASTOLIC BLOOD PRESSURE: 71 MMHG | OXYGEN SATURATION: 99 % | RESPIRATION RATE: 16 BRPM

## 2021-08-13 VITALS
RESPIRATION RATE: 18 BRPM | OXYGEN SATURATION: 98 % | DIASTOLIC BLOOD PRESSURE: 50 MMHG | SYSTOLIC BLOOD PRESSURE: 90 MMHG | HEART RATE: 100 BPM | TEMPERATURE: 98.1 F

## 2021-08-13 DIAGNOSIS — R18.8 OTHER ASCITES: Primary | ICD-10-CM

## 2021-08-13 DIAGNOSIS — R18.8 OTHER ASCITES: ICD-10-CM

## 2021-08-13 DIAGNOSIS — C78.6 PERITONEAL METASTASES (HCC): ICD-10-CM

## 2021-08-13 PROCEDURE — 49083 ABD PARACENTESIS W/IMAGING: CPT | Performed by: RADIOLOGY

## 2021-08-13 PROCEDURE — 49083 ABD PARACENTESIS W/IMAGING: CPT

## 2021-08-13 RX ORDER — ALBUMIN (HUMAN) 12.5 G/50ML
75 SOLUTION INTRAVENOUS ONCE AS NEEDED
Status: CANCELLED | OUTPATIENT
Start: 2021-08-13

## 2021-08-13 RX ORDER — ALBUMIN (HUMAN) 12.5 G/50ML
75 SOLUTION INTRAVENOUS ONCE AS NEEDED
Status: DISCONTINUED | OUTPATIENT
Start: 2021-08-13 | End: 2021-08-16 | Stop reason: HOSPADM

## 2021-08-13 RX ORDER — SODIUM CHLORIDE 9 MG/ML
20 INJECTION, SOLUTION INTRAVENOUS ONCE
Status: CANCELLED | OUTPATIENT
Start: 2021-08-27

## 2021-08-13 RX ORDER — SODIUM CHLORIDE 9 MG/ML
20 INJECTION, SOLUTION INTRAVENOUS ONCE
Status: COMPLETED | OUTPATIENT
Start: 2021-08-13 | End: 2021-08-13

## 2021-08-13 RX ORDER — ALBUMIN (HUMAN) 12.5 G/50ML
75 SOLUTION INTRAVENOUS ONCE AS NEEDED
Status: CANCELLED | OUTPATIENT
Start: 2021-08-27

## 2021-08-13 RX ADMIN — ALBUMIN (HUMAN) 75 G: 0.25 INJECTION, SOLUTION INTRAVENOUS at 13:48

## 2021-08-13 RX ADMIN — SODIUM CHLORIDE 20 ML/HR: 0.9 INJECTION, SOLUTION INTRAVENOUS at 13:51

## 2021-08-13 NOTE — BRIEF OP NOTE (RAD/CATH)
INTERVENTIONAL RADIOLOGY PROCEDURE NOTE    Date: 8/13/2021    Procedure: IR PARACENTESIS    Preoperative diagnosis:   1  Peritoneal metastases ([address] )    2  Other ascites         Postoperative diagnosis: Same  Surgeon: Neo Lizama MD     Assistant: None  No qualified resident was available  Blood loss: None    Specimens: 5700 mL jossie ascites fluid removed     Findings: Successful paracentesis    Complications: None immediate      Anesthesia: local

## 2021-08-13 NOTE — H&P (VIEW-ONLY)
Interventional Radiology  History and Physical 8/13/2021     Merle Wright   1965   [id number]    Assessment/Plan:  53-year-old female with metastatic colon cancer and recurrent ascites returns for paracentesis  Problem List Items Addressed This Visit        Other    Peritoneal metastases ([facility] [address] )    Relevant Orders    IR paracentesis    Other ascites    Relevant Orders    IR paracentesis             Subjective:     Patient ID: Merle Wright is a 49 y o  female  History of Present Illness  Patient with metastatic colon cancer and recurrent ascites returns for paracentesis  Review of Systems   Constitutional: Negative for fever  Respiratory: Negative for shortness of breath            Past Medical History:   Diagnosis Date    Cancer [facility])     Colon cancer ([facility] [address] ) 06/08/2018    [facility]     History of chemotherapy 2019    Kidney disease     Menorrhalgia     RESOLVED 2008    Migraine     Postcoital bleeding     LAST ASSESSED [id number]        Past Surgical History:   Procedure Laterality Date    ABDOMINAL ADHESION SURGERY N/A 6/14/2018    Procedure: LYSIS ADHESIONS OF COLON;  Surgeon: Akbar Castro MD;  [facility];  Service: Colorectal    CYSTOSCOPY N/A 5/26/2020    Procedure: CYSTOSCOPY;  Surgeon: Sofia Mckeon MD;  [facility];  Service: Gynecology Oncology    DILATION AND CURETTAGE OF UTERUS      RESOLVED 2008    ENDOMETRIAL ABLATION      THERMAL     NOVASURE  RESOLVED 2008    ENDOMETRIAL BIOPSY      BY SUCTION   DONE 12/13/2008    HYSTERECTOMY  05/21/2018    IR NEPHROSTOMY TUBE PLACEMENT  6/15/2021    IR PARACENTESIS  5/27/2021    IR PARACENTESIS  7/16/2021    IR PARACENTESIS  7/30/2021    OMENTECTOMY Right 6/14/2018    Procedure: PARTIAL OMENTECTOMY;  Surgeon: Akbar Castro MD;  [facility];  Service: Colorectal    OOPHORECTOMY N/A 5/26/2020    Procedure: RIGHT RADICAL OOPHORECTOMY, LEFT OOPHORECTOMY, PERITONEAL BIOPSIES, ATTEMPTED ROBOT CONVERTED TO OPEN LAPAROTOMY;  Surgeon: Neela Carbajal MD;  [facility];  Service: Gynecology Oncology    PORTACATH PLACEMENT      RCW    AK COLONOSCOPY FLX DX W/COLLJ [address] WHEN PFRMD N/A 6/13/2018    Procedure: COLONOSCOPY;  Surgeon: Dayo Engel MD;  [facility]; Service: Colorectal    AK ESOPHAGOGASTRODUODENOSCOPY TRANSORAL DIAGNOSTIC N/A 6/13/2018    Procedure: ESOPHAGOGASTRODUODENOSCOPY (EGD); Surgeon: Nanda Ospina MD;  [facility];   Service: Gastroenterology    AK INSERT PICC W/ SUB-Q PORT Right 6/28/2018    Procedure: INSERTION VENOUS PORT (PORT-A-CATH) VIA RIGHT SUBCLAVIAN VEIN;  Surgeon: Dayo Engel MD;  [facility];  Service: Colorectal    AK LAP,DIAGNOSTIC ABDOMEN N/A 6/14/2018    Procedure: LAPAROSCOPY DIAGNOSTIC;  Surgeon: Dayo Engel MD;  [facility]: [facility];  Service: Colorectal    AK LAP,SURG,COLECTOMY, PARTIAL, W/ANAST Right 6/14/2018    Procedure: LAPAROSCOPIC EXTENDED RIGHT HEMICOLECTOMY ; PERITONEAL BX;  Surgeon: Dayo Engel MD;  [facility];  Service: Colorectal    AK LAP,VAG HYST,UTERUS 250GMS/<,SALP-OOPH Bilateral 5/21/2018    Procedure: HYSTERECTOMY LAPAROSCOPIC ASSISTED VAGINAL (LAVH) , BILATERAL SALPINGECTOMY;  Surgeon: Mary Berrios DO;  [facility];  Service: Gynecology    PROCTOSCOPY N/A 5/26/2020    Procedure: PROCTOSCOPY;  Surgeon: Neela Carbajal MD;  [facility];  Service: Gynecology Oncology    TRANSPERINEAL IMPLANT OF RADIATION SEEDS W/ ULTRASOUND  10/30/2020    Liver, @ [address] History     Tobacco Use   Smoking Status Never Smoker   Smokeless Tobacco Never Used        Social History     Substance and Sexual Activity   Alcohol Use Never    Alcohol/week: 0 0 standard drinks    Comment: 0        Social History     Substance and Sexual Activity   Drug Use No        No Known Allergies    Current Outpatient Medications   Medication Sig Dispense Refill    acetaminophen (TYLENOL) 500 mg tablet Take 1,000 mg by mouth every 6 (six) hours as needed for mild pain      amLODIPine (NORVASC) 5 mg tablet Take 5 mg by mouth daily (Patient not taking: Reported on 7/14/2021)      benzonatate (TESSALON) 200 MG capsule Take 1 capsule (200 mg total) by mouth 3 (three) times a day as needed for cough 20 capsule 0    furosemide (LASIX) 40 mg tablet Take 40 mg by mouth daily      LORazepam (ATIVAN) 1 mg tablet Take 1 mg by mouth daily as needed for anxiety      metoprolol succinate (TOPROL-XL) 50 mg 24 hr tablet Take 50 mg by mouth 2 (two) times a day Pt was instructed by her PCP Dr Haven Cobb, advised pt to increased to BID (Patient not taking: Reported on 7/14/2021)      mirtazapine (REMERON) 7 5 MG tablet Take 1 tablet (7 5 mg total) by mouth daily at bedtime 30 tablet 0    multivitamin (THERAGRAN) TABS Take 1 tablet by mouth daily      oxyCODONE (ROXICODONE) 5 mg immediate release tablet Take 1 tablet (5 mg total) by mouth every 4 (four) hours as needed for moderate pain or severe painMax Daily Amount: 30 mg 60 tablet 0    potassium chloride (Klor-Con) 10 mEq tablet       Regorafenib (Stivarga) 40 MG TABS Take 2 tablets (80 mg total) by mouth daily for 21 days , followed by 7 days off 42 tablet 3    rivaroxaban (Xarelto) 10 mg tablet Take 1 tablet (10 mg total) by mouth daily (Patient not taking: Reported on 7/14/2021) 90 tablet 3    senna-docusate sodium (SENOKOT-S) 8 6-50 mg per tablet Take 1 tablet by mouth daily 30 tablet 2    sodium chloride, PF, 0 9 % 10 mL by Intracatheter route daily Intracatheter flushing daily 300 mL 0    spironolactone (ALDACTONE) 25 mg tablet        No current facility-administered medications for this encounter  Objective: There were no vitals filed for this visit  Physical Exam  Constitutional:       Appearance: Normal appearance     Pulmonary:      Effort: Pulmonary effort is normal    Abdominal: General: There is distension  Skin:     General: Skin is dry  No results found for: BNP   Lab Results   Component Value Date    WBC 13 43 (H) 08/05/2021    HGB 12 8 08/05/2021    HCT 37 9 08/05/2021     (H) 08/05/2021     08/05/2021     Lab Results   Component Value Date    INR 2 89 (H) 07/09/2021    INR 1 11 06/15/2021    INR 1 29 (H) 01/19/2021    PROTIME 30 0 (H) 07/09/2021    PROTIME 14 3 06/15/2021    PROTIME 16 2 (H) 01/19/2021     Lab Results   Component Value Date    PTT 44 (H) 07/09/2021         I have personally reviewed pertinent imaging and laboratory results  Code Status: Prior  Advance Directive and Living Will:      Power of :    POLST:      This text is generated with voice recognition software  There may be translation, syntax,  or grammatical errors  If you have any questions, please contact the dictating provider

## 2021-08-13 NOTE — SEDATION DOCUMENTATION
5700 mL dark yellow ascites from ultrasound guided paracentesis  Patient sent to infusion center for albumin

## 2021-08-13 NOTE — H&P
Interventional Radiology  History and Physical 8/13/2021     Yue Hernandez   1965   [id number]    Assessment/Plan:  63-year-old female with metastatic colon cancer and recurrent ascites returns for paracentesis  Problem List Items Addressed This Visit        Other    Peritoneal metastases ([facility] [address] )    Relevant Orders    IR paracentesis    Other ascites    Relevant Orders    IR paracentesis             Subjective:     Patient ID: Yue Hernandez is a 49 y o  female  History of Present Illness  Patient with metastatic colon cancer and recurrent ascites returns for paracentesis  Review of Systems   Constitutional: Negative for fever  Respiratory: Negative for shortness of breath            Past Medical History:   Diagnosis Date    Cancer [facility])     Colon cancer ([facility] [address] ) 06/08/2018    DVT (deep venous thrombosis) ([facility] [address] )     History of chemotherapy 2019    Kidney disease     Menorrhalgia     RESOLVED 2008    Migraine     Postcoital bleeding     LAST ASSESSED [id number]        Past Surgical History:   Procedure Laterality Date    ABDOMINAL ADHESION SURGERY N/A 6/14/2018    Procedure: LYSIS ADHESIONS OF COLON;  Surgeon: Kaleigh Rodriguez MD;  [facility];  Service: Colorectal    CYSTOSCOPY N/A 5/26/2020    Procedure: CYSTOSCOPY;  Surgeon: Adrianne Live MD;  [facility]: [facility];  Service: Gynecology Oncology    DILATION AND CURETTAGE OF UTERUS      RESOLVED 2008    ENDOMETRIAL ABLATION      THERMAL     NOVASURE  RESOLVED 2008    ENDOMETRIAL BIOPSY      BY SUCTION   DONE 12/13/2008    HYSTERECTOMY  05/21/2018    IR NEPHROSTOMY TUBE PLACEMENT  6/15/2021    IR PARACENTESIS  5/27/2021    IR PARACENTESIS  7/16/2021    IR PARACENTESIS  7/30/2021    OMENTECTOMY Right 6/14/2018    Procedure: PARTIAL OMENTECTOMY;  Surgeon: Kaleigh Rodriguez MD;  [facility];  Service: Colorectal    OOPHORECTOMY N/A 5/26/2020    Procedure: RIGHT RADICAL OOPHORECTOMY, LEFT OOPHORECTOMY, PERITONEAL BIOPSIES, ATTEMPTED ROBOT CONVERTED TO OPEN LAPAROTOMY;  Surgeon: Neela Carbajal MD;  [facility]: [facility];  Service: Gynecology Oncology    PORTACATH PLACEMENT      RCW    [place] COLONOSCOPY FLX DX W/COLLJ [facility] WHEN PFRMD N/A 6/13/2018    Procedure: COLONOSCOPY;  Surgeon: Dayo Engel MD;  [facility]; Service: Colorectal    WA ESOPHAGOGASTRODUODENOSCOPY TRANSORAL DIAGNOSTIC N/A 6/13/2018    Procedure: ESOPHAGOGASTRODUODENOSCOPY (EGD); Surgeon: Nanda Ospina MD;  [facility];   Service: Gastroenterology    WA INSERT PICC W/ SUB-Q PORT Right 6/28/2018    Procedure: INSERTION VENOUS PORT (PORT-A-CATH) VIA RIGHT SUBCLAVIAN VEIN;  Surgeon: Dayo Engel MD;  [facility];  Service: Colorectal    WA LAP,DIAGNOSTIC ABDOMEN N/A 6/14/2018    Procedure: LAPAROSCOPY DIAGNOSTIC;  Surgeon: Dayo Engel MD;  [facility];  Service: Colorectal    WA LAP,SURG,COLECTOMY, PARTIAL, W/ANAST Right 6/14/2018    Procedure: LAPAROSCOPIC EXTENDED RIGHT HEMICOLECTOMY ; PERITONEAL BX;  Surgeon: Dayo Engel MD;  [facility];  Service: Colorectal    WA LAP,VAG HYST,UTERUS 250GMS/<,SALP-OOPH Bilateral 5/21/2018    Procedure: HYSTERECTOMY LAPAROSCOPIC ASSISTED VAGINAL (LAVH) , BILATERAL SALPINGECTOMY;  Surgeon: Mary Berrios DO;  [facility];  Service: Gynecology    PROCTOSCOPY N/A 5/26/2020    Procedure: PROCTOSCOPY;  Surgeon: Neela Carbajal MD;  [facility];  Service: Gynecology Oncology    TRANSPERINEAL IMPLANT OF RADIATION SEEDS W/ ULTRASOUND  10/30/2020    Liver, @ [address] History     Tobacco Use   Smoking Status Never Smoker   Smokeless Tobacco Never Used        Social History     Substance and Sexual Activity   Alcohol Use Never    Alcohol/week: 0 0 standard drinks    Comment: 0        Social History     Substance and Sexual Activity   Drug Use No        No Known Allergies    Current Outpatient Medications   Medication Sig Dispense Refill    acetaminophen (TYLENOL) 500 mg tablet Take 1,000 mg by mouth every 6 (six) hours as needed for mild pain      amLODIPine (NORVASC) 5 mg tablet Take 5 mg by mouth daily (Patient not taking: Reported on 7/14/2021)      benzonatate (TESSALON) 200 MG capsule Take 1 capsule (200 mg total) by mouth 3 (three) times a day as needed for cough 20 capsule 0    furosemide (LASIX) 40 mg tablet Take 40 mg by mouth daily      LORazepam (ATIVAN) 1 mg tablet Take 1 mg by mouth daily as needed for anxiety      metoprolol succinate (TOPROL-XL) 50 mg 24 hr tablet Take 50 mg by mouth 2 (two) times a day Pt was instructed by her PCP Dr Sarah Horton, advised pt to increased to BID (Patient not taking: Reported on 7/14/2021)      mirtazapine (REMERON) 7 5 MG tablet Take 1 tablet (7 5 mg total) by mouth daily at bedtime 30 tablet 0    multivitamin (THERAGRAN) TABS Take 1 tablet by mouth daily      oxyCODONE (ROXICODONE) 5 mg immediate release tablet Take 1 tablet (5 mg total) by mouth every 4 (four) hours as needed for moderate pain or severe painMax Daily Amount: 30 mg 60 tablet 0    potassium chloride (Klor-Con) 10 mEq tablet       Regorafenib (Stivarga) 40 MG TABS Take 2 tablets (80 mg total) by mouth daily for 21 days , followed by 7 days off 42 tablet 3    rivaroxaban (Xarelto) 10 mg tablet Take 1 tablet (10 mg total) by mouth daily (Patient not taking: Reported on 7/14/2021) 90 tablet 3    senna-docusate sodium (SENOKOT-S) 8 6-50 mg per tablet Take 1 tablet by mouth daily 30 tablet 2    sodium chloride, PF, 0 9 % 10 mL by Intracatheter route daily Intracatheter flushing daily 300 mL 0    spironolactone (ALDACTONE) 25 mg tablet        No current facility-administered medications for this encounter  Objective: There were no vitals filed for this visit  Physical Exam  Constitutional:       Appearance: Normal appearance     Pulmonary:      Effort: Pulmonary effort is normal    Abdominal: General: There is distension  Skin:     General: Skin is dry  No results found for: BNP   Lab Results   Component Value Date    WBC 13 43 (H) 08/05/2021    HGB 12 8 08/05/2021    HCT 37 9 08/05/2021     (H) 08/05/2021     08/05/2021     Lab Results   Component Value Date    INR 2 89 (H) 07/09/2021    INR 1 11 06/15/2021    INR 1 29 (H) 01/19/2021    PROTIME 30 0 (H) 07/09/2021    PROTIME 14 3 06/15/2021    PROTIME 16 2 (H) 01/19/2021     Lab Results   Component Value Date    PTT 44 (H) 07/09/2021         I have personally reviewed pertinent imaging and laboratory results  Code Status: Prior  Advance Directive and Living Will:      Power of :    POLST:      This text is generated with voice recognition software  There may be translation, syntax,  or grammatical errors  If you have any questions, please contact the dictating provider

## 2021-08-13 NOTE — PROGRESS NOTES
Patient presents today for albumin infusion post paracentesis  Per IR report 5700ml removed  Port accessed without incident with excellent blood return noted

## 2021-08-18 ENCOUNTER — TELEPHONE (OUTPATIENT)
Dept: PALLIATIVE MEDICINE | Facility: CLINIC | Age: 56
End: 2021-08-18

## 2021-08-18 NOTE — TELEPHONE ENCOUNTER
Patient called office to cancel her appointment patient stated "every time I go to your office I cry a lot and get very sad, I thought Palliative care was for something else, there is subjects that my  and I are not ready to talk about "    Patient did not want to re schedule today, per pt she will call back to re schedule  Patient is getting control substances from Palliative care

## 2021-08-23 ENCOUNTER — HOSPITAL ENCOUNTER (OUTPATIENT)
Dept: INFUSION CENTER | Facility: HOSPITAL | Age: 56
Discharge: HOME | End: 2021-08-23
Attending: INTERNAL MEDICINE

## 2021-08-23 ENCOUNTER — TELEPHONE (OUTPATIENT)
Dept: INFUSION CENTER | Facility: CLINIC | Age: 56
End: 2021-08-23

## 2021-08-23 ENCOUNTER — HOSPITAL ENCOUNTER (OUTPATIENT)
Dept: RADIOLOGY | Facility: HOSPITAL | Age: 56
Discharge: HOME | End: 2021-08-23
Admitting: RADIOLOGY
Payer: COMMERCIAL

## 2021-08-23 VITALS
DIASTOLIC BLOOD PRESSURE: 67 MMHG | RESPIRATION RATE: 20 BRPM | HEART RATE: 110 BPM | SYSTOLIC BLOOD PRESSURE: 99 MMHG | OXYGEN SATURATION: 98 %

## 2021-08-23 DIAGNOSIS — R18.8 OTHER ASCITES: ICD-10-CM

## 2021-08-23 DIAGNOSIS — C78.6 PERITONEAL METASTASES (HCC): ICD-10-CM

## 2021-08-23 PROCEDURE — 49083 ABD PARACENTESIS W/IMAGING: CPT

## 2021-08-23 PROCEDURE — 49083 ABD PARACENTESIS W/IMAGING: CPT | Performed by: RADIOLOGY

## 2021-08-23 NOTE — DISCHARGE INSTRUCTIONS
Abdominal Paracentesis     WHAT YOU NEED TO KNOW:   Abdominal paracentesis is a procedure to remove abnormal fluid buildup in your abdomen  Fluid builds up because of liver problems, such as swelling and scarring  Heart failure, kidney disease, a mass, or problems with your pancreas may also cause fluid buildup  DISCHARGE INSTRUCTIONS:     Follow up with your healthcare provider as directed: Write down your questions so you remember to ask them during your visits  Wound care: Remove dressing after 24 hours  Leave glue in place  Return to your normal activities    Contact Interventional Radiology at [phone] Gina PATIENTS: Contact Interventional Radiology at [phone]) Jeremy Duncan PATIENTS: Contact Interventional Radiology at [phone]) if:  · You have a fever and your wound is red and swollen  · You have yellow, green, or bad-smelling discharge coming from your wound  · You have pain or swelling in your abdomen  · You have an upset stomach or you vomit  · You have sudden, sharp pain in your abdomen  · You urinate very little or not at all  · You feel confused and more tired than usual    · Your arm or leg feels warm, tender, and painful  It may look swollen and red  · You suddenly feel lightheaded and have trouble breathing

## 2021-08-23 NOTE — TELEPHONE ENCOUNTER
I received a phone call from Maddock with IR scheduling at this time  Per Jason, ok to cancel all of patient's future appointments here at the [facility] to accommodate the new change in tx  Pt to be coming every 7 days instead of every 14  Made Van aware that we still do not have new orders to schedule with at this time

## 2021-08-24 ENCOUNTER — DOCUMENTATION (OUTPATIENT)
Dept: HEMATOLOGY ONCOLOGY | Facility: CLINIC | Age: 56
End: 2021-08-24

## 2021-08-24 NOTE — PROGRESS NOTES
I spoke with patient's   He said she is not doing well  He is arranging nursing care at home  She still goes for paracentesis  We discussed hospice  He said he will let us know when time came  He will get a phone call from palliative care also  I sent message to Dr Rachel Ramon to call me 1st and then call patient's   There was some misunderstanding yesterday  Myself and Dr Rachel Ramon were waiting in the office for in person visit and discussion  at 4:00 p m  He was under the impression that was a virtual visit

## 2021-08-25 DIAGNOSIS — G89.3 CANCER RELATED PAIN: ICD-10-CM

## 2021-08-25 DIAGNOSIS — Z51.5 PALLIATIVE CARE PATIENT: ICD-10-CM

## 2021-08-25 DIAGNOSIS — C78.7 LIVER METASTASES (HCC): Primary | ICD-10-CM

## 2021-08-25 DIAGNOSIS — R05.3 CHRONIC COUGH: ICD-10-CM

## 2021-08-25 RX ORDER — OXYCODONE HYDROCHLORIDE 5 MG/1
5 TABLET ORAL EVERY 4 HOURS PRN
Qty: 60 TABLET | Refills: 0 | Status: SHIPPED | OUTPATIENT
Start: 2021-08-25 | End: 2021-08-31

## 2021-08-25 RX ORDER — BENZONATATE 200 MG/1
200 CAPSULE ORAL 3 TIMES DAILY PRN
Qty: 20 CAPSULE | Refills: 0 | Status: SHIPPED | OUTPATIENT
Start: 2021-08-25 | End: 2021-08-31 | Stop reason: SDUPTHER

## 2021-08-25 NOTE — TELEPHONE ENCOUNTER
Primary palliative medicine provider: Dr Nuris Roldan     Medication requested: Oxycodone 5 mg   and Tessalon 200 mg  Also patient's  will like to talk more about hospice  If for pain, how has the patient been taking their pain medicine? Last appointment:    Next scheduled appointment:    PDMP review:      07/14/2021 1 07/14/2021   OXYCODONE HCL 5 MG TABLET  60 0 10 ER YUAN   [id number]  PENNS ([id number]) 0 45  0 [place]ME Comm Ins [place]  06/14/2021 1 06/14/2021   OXYCODONE HCL 5 MG TABLET  30 0 5 ST BAT   [id number]  PENNS ([id number]) 0 45  0 MME Comm Ins [place]

## 2021-08-27 ENCOUNTER — TELEPHONE (OUTPATIENT)
Dept: RADIOLOGY | Facility: HOSPITAL | Age: 56
End: 2021-08-27

## 2021-08-27 ENCOUNTER — TELEPHONE (OUTPATIENT)
Dept: PALLIATIVE MEDICINE | Facility: CLINIC | Age: 56
End: 2021-08-27

## 2021-08-27 DIAGNOSIS — C18.2 PRIMARY ADENOCARCINOMA OF ASCENDING COLON (HCC): Primary | ICD-10-CM

## 2021-08-27 RX ORDER — SODIUM CHLORIDE 9 MG/ML
75 INJECTION, SOLUTION INTRAVENOUS CONTINUOUS
Status: CANCELLED | OUTPATIENT
Start: 2021-08-27

## 2021-08-27 NOTE — PRE-PROCEDURE INSTRUCTIONS
Pre-procedure Instructions for Interventional Radiology  Gardnervillesanjiv Bloomdeanne  [facility] [address] [phone]    You are scheduled for a/an Tenckhoff catheter placement  On Monday 8/30/21  Your tentative arrival time is 1200  Short stay will notify you the day before your procedure with the exact arrival time and the location to arrive  To prepare for your procedure:  1  Please arrange for someone to drive you home after the procedure and stay with you until the next morning if you are instructed to do so  This is typically for patients receiving some type of sedative or anesthetic for the procedure  2  DO NOT EAT OR DRINK ANYTHING after midnight on the evening before your procedure including candy & gum   3  ONLY SIPS OF WATER with your medications are allowed on the morning of your procedure  4  TAKE ALL OF YOUR REGULAR MEDICATIONS THE MORNING OF YOUR PROCEDURE with sips of water! We may call you to stop some of your blood sugar, blood pressure and blood thinning medications depending on the procedure  Please take all of these medications unless we instruct you to stop them  5  If you have an allergy to x-ray dye, please contact Interventional Radiology for an x-ray dye preparation which usually consists of an oral steroid and Benadryl  The day of your procedure:  1  Bring a list of the medications you take at home  2  Bring medications you take for breathing problems (such as inhalers), medications for chest pain, or both  3  Bring a case for your glasses or contacts  4  Bring your insurance card and a form of photo ID   5  Please leave all valuables such as credit cards and jewelry at home  6  Report to the registration desk in the main lobby at the [facility]  Ask to be directed to [facility]    7  While your procedure is being performed, your family may wait in the Radiology Waiting Room on the 1st floor in Radiology  if they need to leave, they may provide a number to be called following the procedure  8  Be prepared to stay overnight just in case  Sometimes procedures will indicate the need for further observation or treatment  9  If you are scheduled for a follow-up visit with the Interventional Radiologist after your procedure, you will be called with a date and time  Special Instructions (Medications to stop taking before your procedure etc ): Hold AM lasix    above reviewed care giver and left message on 's cell phone

## 2021-08-30 ENCOUNTER — HOSPITAL ENCOUNTER (OUTPATIENT)
Dept: RADIOLOGY | Facility: HOSPITAL | Age: 56
Discharge: HOME | End: 2021-08-30
Attending: RADIOLOGY | Admitting: RADIOLOGY
Payer: COMMERCIAL

## 2021-08-30 ENCOUNTER — HOSPITAL ENCOUNTER (OUTPATIENT)
Dept: INFUSION CENTER | Facility: HOSPITAL | Age: 56
End: 2021-08-30

## 2021-08-30 ENCOUNTER — APPOINTMENT (OUTPATIENT)
Dept: RADIOLOGY | Facility: HOSPITAL | Age: 56
End: 2021-08-30
Payer: COMMERCIAL

## 2021-08-30 VITALS
DIASTOLIC BLOOD PRESSURE: 65 MMHG | SYSTOLIC BLOOD PRESSURE: 101 MMHG | HEART RATE: 106 BPM | OXYGEN SATURATION: 96 % | TEMPERATURE: 97.5 F | RESPIRATION RATE: 16 BRPM

## 2021-08-30 DIAGNOSIS — C18.2 PRIMARY ADENOCARCINOMA OF ASCENDING COLON (HCC): ICD-10-CM

## 2021-08-30 PROCEDURE — 49418 INSERT TUN IP CATH PERC: CPT | Performed by: RADIOLOGY

## 2021-08-30 PROCEDURE — C1750 CATH, HEMODIALYSIS,LONG-TERM: HCPCS

## 2021-08-30 PROCEDURE — C1894 INTRO/SHEATH, NON-LASER: HCPCS

## 2021-08-30 PROCEDURE — 99152 MOD SED SAME PHYS/QHP 5/>YRS: CPT

## 2021-08-30 PROCEDURE — 99152 MOD SED SAME PHYS/QHP 5/>YRS: CPT | Performed by: RADIOLOGY

## 2021-08-30 PROCEDURE — 49418 INSERT TUN IP CATH PERC: CPT

## 2021-08-30 RX ORDER — FENTANYL CITRATE 50 UG/ML
INJECTION, SOLUTION INTRAMUSCULAR; INTRAVENOUS CODE/TRAUMA/SEDATION MEDICATION
Status: COMPLETED | OUTPATIENT
Start: 2021-08-30 | End: 2021-08-30

## 2021-08-30 RX ORDER — MIDAZOLAM HYDROCHLORIDE 2 MG/2ML
INJECTION, SOLUTION INTRAMUSCULAR; INTRAVENOUS CODE/TRAUMA/SEDATION MEDICATION
Status: COMPLETED | OUTPATIENT
Start: 2021-08-30 | End: 2021-08-30

## 2021-08-30 RX ORDER — OXYCODONE HYDROCHLORIDE 5 MG/1
5 TABLET ORAL ONCE AS NEEDED
Status: DISCONTINUED | OUTPATIENT
Start: 2021-08-30 | End: 2021-08-30 | Stop reason: HOSPADM

## 2021-08-30 RX ORDER — SODIUM CHLORIDE 9 MG/ML
75 INJECTION, SOLUTION INTRAVENOUS CONTINUOUS
Status: DISCONTINUED | OUTPATIENT
Start: 2021-08-30 | End: 2021-08-30 | Stop reason: HOSPADM

## 2021-08-30 RX ORDER — CEFAZOLIN SODIUM 1 G/3ML
INJECTION, POWDER, FOR SOLUTION INTRAMUSCULAR; INTRAVENOUS CODE/TRAUMA/SEDATION MEDICATION
Status: COMPLETED | OUTPATIENT
Start: 2021-08-30 | End: 2021-08-30

## 2021-08-30 RX ADMIN — MIDAZOLAM 1 MG: 1 INJECTION INTRAMUSCULAR; INTRAVENOUS at 14:12

## 2021-08-30 RX ADMIN — FENTANYL CITRATE 50 MCG: 50 INJECTION INTRAMUSCULAR; INTRAVENOUS at 14:12

## 2021-08-30 RX ADMIN — CEFAZOLIN 1000 MG: 1 INJECTION, POWDER, FOR SOLUTION INTRAMUSCULAR; INTRAVENOUS at 14:15

## 2021-08-30 RX ADMIN — MIDAZOLAM 1 MG: 1 INJECTION INTRAMUSCULAR; INTRAVENOUS at 14:25

## 2021-08-30 RX ADMIN — SODIUM CHLORIDE 75 ML/HR: 0.9 INJECTION, SOLUTION INTRAVENOUS at 12:20

## 2021-08-30 RX ADMIN — FENTANYL CITRATE 50 MCG: 50 INJECTION INTRAMUSCULAR; INTRAVENOUS at 14:25

## 2021-08-30 NOTE — DISCHARGE INSTRUCTIONS
How to Care for [facility]  Abdominal Catheter                                    Post  Catheter Placement                 WHAT YOU NEED TO KNOW:                 A chest catheter helps remove fluid from your abdomen  One end of the catheter sits inside your abdomen, the other end sits outside of your body  Your healthcare provider will show you or your caregiver how to drain fluid through the catheter  DISCHARGE INSTRUCTIONS:   How often you should drain fluid:   After the initial insertion drain every other day x 3 days then for comfort)  Resume your normal diet  Small sips of flat soda will help with mild nausea  Resume taking your medications  You may have some initial  discomfort at the insertion site  You can take your normal pain medication  Sophie Pulido and Brett  Patients Contact Interventional Radiology at [phone] PATIENTS: Contact Interventional Radiology at [phone])       KIANNA PATIENTS: Contact Interventional Radiology at [phone]) if any of the following occur:                              Contact your healthcare provider if:   · Your symptoms, such as pain or shortness of breath, do not get better after you drain fluid  · You have redness, pain, or pus where the catheter is located  · You have a fever  · You have persistent nausea or vomiting  · You cannot drain fluid  · You see a change in the color of fluid that you drain  · You see fluid leaking from around your catheter  · You drain foul-smelling fluid or bloody fluid from your catheter  · You have questions or concerns about your condition or care  ·   How often you should drain fluid:  Your healthcare provider will tell you how often to drain fluid  He may tell you to follow a schedule, such as draining once a day or once every other day   He may instead tell you to drain fluid when you have symptoms such as  pain  Before you drain fluid from your catheter:   Wash your hands  Remove all rings  Use soap and water or an alcohol-based hand rub  This will help prevent an infection  · Gently remove the old bandage  Do not pull on the drain when you remove the bandage  Throw the bandage away  Wash your hands a second time  Use soap and water or an alcohol-based hand rub  · Clean the end of the catheter  Use 1 alcohol pad  Wipe around the end of the catheter in circles  Do not put anything into the end of the catheter to clean it  This could damage the catheter  How to drain fluid from your catheter:   Twist off the cap and drain into a container  Do not loose the cap  Keep the cap in a clean container while draining your tube  When you are finished draining replace the cap  Other important information:   · If your catheter comes out, cover the opening in your skin with sterile gauze and a bandage  Do not take a bath or swim in hot tubs or pools  This can cause an infection  · You can shower or take a sponge bath  Make sure your bandage covers your catheter before you bathe  The edges of the bandage should make contact with your skin on all sides  This will prevent water from getting into your drain  If your bandage gets wet, remove the bandage  Clean your skin around the catheter and replace the bandage as directed  Follow up with your healthcare provider as directed:  Write down your questions so you remember to ask them during your visits  © 2017 [address] [employer] [employer] is for End User's use only and may not be sold, redistributed or otherwise used for commercial purposes  All illustrations and images included in CareNotes® are the copyrighted property of [employer] M [employer]  or Angelito Kerr  The above information is an  only  It is not intended as medical advice for individual conditions or treatments   Talk to your doctor, nurse or pharmacist before following any medical regimen to see if it is safe and effective for you  Procedural Sedation   WHAT YOU NEED TO KNOW:   Procedural sedation is medicine used during procedures to help you feel relaxed and calm  You will remember little to none of the procedure  After sedation you may feel tired, weak, or unsteady on your feet  You may also have trouble concentrating or short-term memory loss  These symptoms should go away in 24 hours or less  DISCHARGE INSTRUCTIONS:     Call 911 or have someone else call for any of the following:   · You have sudden trouble breathing      · You cannot be woken  Contact Interventional Radiology at [phone]   Gina PATIENTS: Contact Interventional Radiology at [phone]) Chris Parker PATIENTS: Contact Interventional Radiology at [phone]) if any of the following occur:     · You have a severe headache or dizziness      · Your heart is beating faster than usual     · You have a fever or chills      · Your skin is itchy, swollen, or you have a rash      · You have nausea or are vomiting for more than 8 hours after the procedure       · You have questions or concerns about your condition or care  Self-care:   · Have someone stay with you for 24 hours  This person can drive you to errands and help you do things around the house  This person can also watch for problems       · Rest and do quiet activities for 24 hours  Do not exercise, ride a bike, or play sports  Stand up slowly to prevent dizziness and falls  Take short walks around the house with another person  Slowly return to your usual activities the next day       · Do not drive or use dangerous machines or tools for 24 hours  You may injure yourself or others  Examples include a lawnmower, saw, or drill  Do not return to work for 24 hours if you use dangerous machines or tools for work       · Do not make important decisions for 24 hours   For example, do not sign important papers or invest money       · Drink liquids as directed  Liquids help flush the sedation medicine out of your body  Ask how much liquid to drink each day and which liquids are best for you       · Eat small, frequent meals to prevent nausea and vomiting  Start with clear liquids such as juice or broth  If you do not vomit after clear liquids, you can eat your usual foods       · Do not drink alcohol or take medicines that make you drowsy  This includes medicines that help you sleep and anxiety medicines  Ask your healthcare provider if it is safe for you to take pain medicine  Follow up with your healthcare provider as directed: Write down your questions so you remember to ask them during your visits

## 2021-08-30 NOTE — INTERVAL H&P NOTE
The history and physical were reviewed, along with progress notes, and the patient was examined  Patient has had interval progression of disease, and is now decided to go to hospice care  There are no changes in the physical exam since it was written      BP 90/67 (BP Location: Right arm)   Pulse (!) 114   Temp (!) 97 4 °F (36 3 °C) (Oral)   Resp 18   LMP 05/16/2020   SpO2 96%        Urszula Livingston MD/August 30, 2021/1:53 PM

## 2021-08-30 NOTE — SEDATION DOCUMENTATION
Abdominal tunneled/double cuffed  Tenckhoff Catheter Placed by Dr Ochoa Mail  No immediate complications  VSS  Denies pain or nausea at this time  Drain patent 2,700 ml clear dark yellow ascites drained  Report given to Linda Gandhi RN Short Stay RN

## 2021-08-30 NOTE — BRIEF OP NOTE (RAD/CATH)
INTERVENTIONAL RADIOLOGY PROCEDURE NOTE    Date: 8/30/2021    Procedure: IR PERITONEAL ASCITES LONG-TERM CATHETER PLACEMENT     Preoperative diagnosis:   1  Primary adenocarcinoma of ascending colon ([address] )         Postoperative diagnosis: Same  Surgeon: Amada White MD     Assistant: None  No qualified resident was available  Blood loss:  1 mL    Specimens:  None    Findings:  Tenckhoff catheter placed into abdominal ascites via right lower quadrant approach under ultrasound guidance  Approximately 3 L of jossie ascites were removed after the catheter was placed  Complications: None immediate      Anesthesia: conscious sedation

## 2021-08-31 DIAGNOSIS — Z51.5 HOSPICE CARE PATIENT: Primary | ICD-10-CM

## 2021-08-31 DIAGNOSIS — C18.2 PRIMARY ADENOCARCINOMA OF ASCENDING COLON (HCC): ICD-10-CM

## 2021-08-31 DIAGNOSIS — R05.3 CHRONIC COUGH: ICD-10-CM

## 2021-08-31 DIAGNOSIS — G89.3 CANCER RELATED PAIN: ICD-10-CM

## 2021-08-31 RX ORDER — MORPHINE SULFATE 100 MG/5ML
5 SOLUTION, CONCENTRATE ORAL EVERY 2 HOUR PRN
Qty: 30 ML | Refills: 0 | Status: SHIPPED | OUTPATIENT
Start: 2021-08-31

## 2021-08-31 RX ORDER — OXYCODONE HYDROCHLORIDE 5 MG/1
TABLET ORAL
Qty: 30 TABLET | Refills: 0 | Status: SHIPPED | OUTPATIENT
Start: 2021-08-31

## 2021-08-31 RX ORDER — LORAZEPAM 2 MG/ML
0.5 CONCENTRATE ORAL EVERY 4 HOURS PRN
Qty: 30 ML | Refills: 0 | Status: SHIPPED | OUTPATIENT
Start: 2021-08-31 | End: 2021-08-31 | Stop reason: CLARIF

## 2021-08-31 RX ORDER — DEXAMETHASONE 2 MG/1
2 TABLET ORAL
Qty: 60 TABLET | Refills: 0 | Status: SHIPPED | OUTPATIENT
Start: 2021-08-31

## 2021-08-31 RX ORDER — OXYCODONE HYDROCHLORIDE 5 MG/1
TABLET ORAL
Qty: 30 TABLET | Refills: 0 | Status: SHIPPED | OUTPATIENT
Start: 2021-08-31 | End: 2021-08-31 | Stop reason: CLARIF

## 2021-08-31 RX ORDER — BENZONATATE 200 MG/1
200 CAPSULE ORAL 3 TIMES DAILY PRN
Qty: 30 CAPSULE | Refills: 1 | Status: SHIPPED | OUTPATIENT
Start: 2021-08-31

## 2021-08-31 RX ORDER — MORPHINE SULFATE 100 MG/5ML
5 SOLUTION, CONCENTRATE ORAL EVERY 2 HOUR PRN
Qty: 30 ML | Refills: 0 | Status: SHIPPED | OUTPATIENT
Start: 2021-08-31 | End: 2021-08-31 | Stop reason: CLARIF

## 2021-08-31 RX ORDER — LORAZEPAM 2 MG/ML
0.5 CONCENTRATE ORAL EVERY 4 HOURS PRN
Qty: 30 ML | Refills: 0 | Status: SHIPPED | OUTPATIENT
Start: 2021-08-31

## 2021-08-31 NOTE — PROGRESS NOTES
2021 3:03 PM  [facility] CBC patient requests SOC medications  Filled electronically via Epic as per [place] State Law  Requested Prescriptions     Signed Prescriptions Disp Refills    benzonatate (TESSALON) 200 MG capsule 30 capsule 1     Sig: Take 1 capsule (200 mg total) by mouth 3 (three) times a day as needed for cough    oxyCODONE (ROXICODONE) 5 mg immediate release tablet 30 tablet 0     Si tab for moderate pain or 2 tabs for severe pain every 4 hours as needed    dexamethasone (DECADRON) 2 mg tablet 60 tablet 0     Sig: Take 1 tablet (2 mg total) by mouth 2 (two) times a day before breakfast and lunch    Morphine Sulfate, Concentrate, 20 mg/mL concentrated solution 30 mL 0     Sig: Take 0 25 mL (5 mg total) by mouth every 2 (two) hours as needed (pain or shortness of breath) Comfort Medication Place in refrigerator  Medication for use only at the direction of hospice staffMax Daily Amount: 60 mg    LORazepam (ATIVAN) 2 mg/mL concentrated solution 30 mL 0     Sig: Take 0 25 mL (0 5 mg total) by mouth every 4 (four) hours as needed for anxiety (or shortness of breath) Comfort Medication Place in refrigerator  Medication for use only at the direction of hospice staff       Eric Andersen [address] Answering Service: [phone]  You can find me on [employer]!

## 2022-01-11 NOTE — TELEPHONE ENCOUNTER
I called patient to order urine culture but I did not leave message  I will try can tomorrow    Ordered urine culture done

## 2023-01-09 NOTE — TELEPHONE ENCOUNTER
A&O.  Medicated for pain in buttocks, uncomfortable in chair. Up to BR. Assessment complete. Safety measures in place. VSS. Call from patient inquiring about status of lonsurf  Patient also inquiring about avastin with lonsurf as patient has seen a study on the internet regarding combining the two drugs  Will send to Dr Noriega's RNs to update patient on status     Patient aware of plan

## 2023-05-24 NOTE — PROGRESS NOTES
Patient tolerated treatment today without issues  Patient connected to CADD pump for 46 hour infusion of 5FU  All connections secured with tape  Green indicator light verified flashing before d/c  Patient aware to return on Saturday at 1300 for disconnect  AVS declined 
Yes

## 2024-05-17 NOTE — PLAN OF CARE
Problem: Knowledge Deficit  Goal: Patient/family/caregiver demonstrates understanding of disease process, treatment plan, medications, and discharge instructions  Complete learning assessment and assess knowledge base    Interventions:  - Provide teaching at level of understanding  - Provide teaching via preferred learning methods  Outcome: Progressing
urinary symptoms

## 2025-03-07 NOTE — PROGRESS NOTES
Acetate Template Statement (Will Not Render If Left Blank): The acetate template will be used to fabricate a custom lead on skin shielding device to allow for lead on skin collimation. This type of shielding will best limit beam penumbra and define the field. Brief Postoperative Note      Patient: Nasir Hatfield  YOB: 1949  MRN: [id number]    Date of Procedure: 3/7/2025    Pre-Op Diagnosis: Left foot wound    Post-Op Diagnosis: Same       Proc:  Left foot debridement with resection L 5th MT head    Surgeon(s):  Harris Lorenzo MD    Assistant:  First Assistant: Bethanie Ryan RN    Anesthesia: Monitor Anesthesia Care    Estimated Blood Loss (mL): Minimal    Complications: None    Specimens:   ID Type Source Tests Collected by Time Destination   1 :  Tissue Toe SURGICAL PATHOLOGY Harris Lorenzo MD 3/7/2025 1410        Findings:      Electronically signed by Harris Lorenzo MD on 3/7/2025 at 2:40 PM   Per Dr Nilda Ayala, he reviewed protocol that we can continue to proceed with her Avastin until protein is 2+  Added Avastin back to Cycle #6 and all future cycles per Dr Nilda Ayala and he reviewed patient will completed a 24hr urine Q 2 weeks (orders placed)  Energy In Mev: 6 Custom Bolus Type: custom mixed, molded, and shaped Pathology: Use Selected EMA Impression Intro Statement (Will Not Render If Left Blank): A new radiation electron beam field was designed to include the gross lesion (GTV) with additional margin that accounted for both potential subclinical microscopic extension (CTV), as well as for the beam characteristics of superficial electrons (PTV). This field took into account the changes in the volume of the tumor that have occurred during radiation therapy. Care was taken in designing the field near adjacent normal tissue structures. The target volume was drawn on the skin surface with a permanent marker and then the design with reference marks was carefully traced onto an acetate template. Digital photographs were taken. Isodose: 90 Bolus Thickness In Cm: 0.6 Position: supine Detail Level: Simple Closing Statement (Will Not Render If Left Blank): The patient tolerated the complex electron beam simulation well and will continue on radiotherapy using the modified field. The patient will return for a field verification simulation before radiation is delivered to confirm patient positioning and block fabrication parameters.

## (undated) DEVICE — MAYO STAND COVER: Brand: CONVERTORS

## (undated) DEVICE — POOLE SUCTION HANDLE: Brand: CARDINAL HEALTH

## (undated) DEVICE — UNIVERSAL GYN LAPAROSCOPY,KIT: Brand: CARDINAL HEALTH

## (undated) DEVICE — WOUND RETRACTOR AND PROTECTOR: Brand: ALEXIS O WOUND PROTECTOR-RETRACTOR

## (undated) DEVICE — 3000CC GUARDIAN II: Brand: GUARDIAN

## (undated) DEVICE — INTENDED FOR TISSUE SEPARATION, AND OTHER PROCEDURES THAT REQUIRE A SHARP SURGICAL BLADE TO PUNCTURE OR CUT.: Brand: BARD-PARKER SAFETY BLADES SIZE 11, STERILE

## (undated) DEVICE — ARM DRAPE

## (undated) DEVICE — GLOVE INDICATOR PI UNDERGLOVE SZ 7 BLUE

## (undated) DEVICE — ENDOPATH XCEL BLUNT TIP TROCARS WITH SMOOTH SLEEVES: Brand: ENDOPATH XCEL

## (undated) DEVICE — SUT MONOCRYL 4-0 PS-2 18 IN Y496G

## (undated) DEVICE — COLUMN DRAPE

## (undated) DEVICE — TROCARS: Brand: KII® BALLOON BLUNT TIP SYSTEM

## (undated) DEVICE — 3M™ TEGADERM™ TRANSPARENT FILM DRESSING FRAME STYLE, 1628, 6 IN X 8 IN (15 CM X 20 CM), 10/CT 8CT/CASE: Brand: 3M™ TEGADERM™

## (undated) DEVICE — CHLORHEXIDINE 4PCT 4 OZ

## (undated) DEVICE — PACK PBDS STERILE LAP LITHOTOMY RF

## (undated) DEVICE — GLOVE INDICATOR PI UNDERGLOVE SZ 8.5 BLUE

## (undated) DEVICE — X-RAY DETECTABLE SPONGES,16 PLY: Brand: VISTEC

## (undated) DEVICE — FENESTRATED BIPOLAR FORCEPS: Brand: ENDOWRIST

## (undated) DEVICE — PROGRASP FORCEPS: Brand: ENDOWRIST

## (undated) DEVICE — BLADELESS OBTURATOR: Brand: WECK VISTA

## (undated) DEVICE — ENDOPATH XCEL BLADELESS TROCARS WITH STABILITY SLEEVES: Brand: ENDOPATH XCEL

## (undated) DEVICE — SUT VICRYL 0 UR-6 27 IN J603H

## (undated) DEVICE — PREMIUM DRY TRAY LF: Brand: MEDLINE INDUSTRIES, INC.

## (undated) DEVICE — GLOVE SRG BIOGEL 7.5

## (undated) DEVICE — INSUFFLATION TUBING PRIMFLO

## (undated) DEVICE — ELECTRODE BLADE MOD  E-Z CLEAN 6.5IN -0014M

## (undated) DEVICE — CANNULA SEAL

## (undated) DEVICE — BULB SYRINGE,IRRIGATION WITH PROTECTIVE CAP: Brand: DOVER

## (undated) DEVICE — SUT MONOCRYL 4-0 PS-2 27 IN Y426H

## (undated) DEVICE — CHLORAPREP HI-LITE 26ML ORANGE

## (undated) DEVICE — ENDOPATH XCEL UNIVERSAL TROCAR STABLILITY SLEEVES: Brand: ENDOPATH XCEL

## (undated) DEVICE — ANTIBACTERIAL VIOLET BRAIDED (POLYGLACTIN 910), SYNTHETIC ABSORBABLE SUTURE: Brand: COATED VICRYL

## (undated) DEVICE — MONOPOLAR CURVED SCISSORS: Brand: ENDOWRIST

## (undated) DEVICE — CYSTO TUBING SINGLE IRRIGATION

## (undated) DEVICE — 3M™ IOBAN™ 2 ANTIMICROBIAL INCISE DRAPE 6640EZ: Brand: IOBAN™ 2

## (undated) DEVICE — TELFA NON-ADHERENT ABSORBENT DRESSING: Brand: TELFA

## (undated) DEVICE — ADHESIVE SKIN HIGH VISCOSITY EXOFIN 1ML

## (undated) DEVICE — SUT VICRYL 0 CT-1 CR/8 27 IN JJ41G

## (undated) DEVICE — LUBRICANT SURGILUBE TUBE 4 OZ  FLIP TOP

## (undated) DEVICE — REM POLYHESIVE ADULT PATIENT RETURN ELECTRODE: Brand: VALLEYLAB

## (undated) DEVICE — HEMOSTATIC MATRIX SURGIFLO 8ML W/THROMBIN

## (undated) DEVICE — INSUFLATION TUBING INSUFLOW (LEXION)

## (undated) DEVICE — SUT PROLENE 2-0 SH 30 IN 8833H

## (undated) DEVICE — MEDI-VAC YANK SUCT HNDL W/TPRD BULBOUS TIP: Brand: CARDINAL HEALTH

## (undated) DEVICE — ADHESIVE SKN CLSR HISTOACRYL FLEX 0.5ML LF

## (undated) DEVICE — LUBRICANT INST ELECTROLUBE ANTISTK WO PAD

## (undated) DEVICE — STERILE ACCESS CATHETER PACK: Brand: CARDINAL HEALTH

## (undated) DEVICE — STRL COTTON TIP APPLCTR 6IN PK: Brand: CARDINAL HEALTH

## (undated) DEVICE — BETHLEHEM MAJOR GENERAL PACK: Brand: CARDINAL HEALTH

## (undated) DEVICE — STERILE CYSTO PACK: Brand: CARDINAL HEALTH

## (undated) DEVICE — SUT VICRYL 3-0 SH 27 IN J416H

## (undated) DEVICE — BUTTON SWITCH PENCIL HOLSTER: Brand: VALLEYLAB

## (undated) DEVICE — MICROPUNCTURE 501

## (undated) DEVICE — TELFA ADHESIVE ISLAND DRESSING: Brand: TELFA

## (undated) DEVICE — DRAPE PROBE NEO-PROBE/ULTRASOUND

## (undated) DEVICE — SUT PDS II 1 CTX 36 IN Z371T

## (undated) DEVICE — 1840 FOAM BLOCK NEEDLE COUNTER: Brand: DEVON

## (undated) DEVICE — NEEDLE 25GA X 1 IN SAFETY GLIDE

## (undated) DEVICE — PROXIMATE RELOADABLE LINEAR STAPLER, 90MM: Brand: PROXIMATE

## (undated) DEVICE — 3M™ STERI-STRIP™ REINFORCED ADHESIVE SKIN CLOSURES, R1547, 1/2 IN X 4 IN (12 MM X 100 MM), 6 STRIPS/ENVELOPE: Brand: 3M™ STERI-STRIP™

## (undated) DEVICE — 1820 FOAM BLOCK NEEDLE COUNTER: Brand: DEVON

## (undated) DEVICE — INTENDED FOR TISSUE SEPARATION, AND OTHER PROCEDURES THAT REQUIRE A SHARP SURGICAL BLADE TO PUNCTURE OR CUT.: Brand: BARD-PARKER SAFETY BLADES SIZE 15, STERILE

## (undated) DEVICE — IRRIG ENDO FLO TUBING

## (undated) DEVICE — TRAY FOLEY 16FR URIMETER SILICONE SURESTEP

## (undated) DEVICE — 3M™ IOBAN™ 2 ANTIMICROBIAL INCISE DRAPE 6650EZ: Brand: IOBAN™ 2

## (undated) DEVICE — GLOVE INDICATOR PI UNDERGLOVE SZ 8 BLUE

## (undated) DEVICE — VISUALIZATION SYSTEM: Brand: CLEARIFY

## (undated) DEVICE — KIT, BETHLEHEM ROBOTIC PROST: Brand: CARDINAL HEALTH

## (undated) DEVICE — ENDOPATH 5MM CURVED SCISSORS WITH MONOPOLAR CAUTERY: Brand: ENDOPATH

## (undated) DEVICE — LAPAROSCOPIC TROCAR SLEEVE/SINGLE USE: Brand: KII® OPTICAL ACCESS SYSTEM

## (undated) DEVICE — PROXIMATE RELOADABLE LINEAR CUTTER WITH SAFETY LOCK-OUT, 100MM: Brand: PROXIMATE

## (undated) DEVICE — TRAY FOLEY 16FR URIMETER SURESTEP

## (undated) DEVICE — ENSEAL LAPAROSCOPIC TISSUE SEALER G2 CURVED JAW FOR USE WITH G2 GENERATOR 5MM DIAMETER 35CM SHAFT LENGTH: Brand: ENSEAL

## (undated) DEVICE — RADIFOCUS GLIDEWIRE: Brand: GLIDEWIRE

## (undated) DEVICE — SUT VICRYL 0 REEL 54 IN J287G

## (undated) DEVICE — ANTI-FOG SOLUTION WITH FOAM PAD: Brand: DEVON

## (undated) DEVICE — ASTOUND STANDARD SURGICAL GOWN, XL: Brand: CONVERTORS

## (undated) DEVICE — SUT STRATAFIX SPIRAL 2-0 CT-1 30 CM SXPP1B410

## (undated) DEVICE — TUBING SUCTION 5MM X 12 FT

## (undated) DEVICE — BAG DECANTER

## (undated) DEVICE — GAUZE SPONGES,16 PLY: Brand: CURITY

## (undated) DEVICE — GLOVE PI ULTRA TOUCH SZ.7.5

## (undated) DEVICE — MEDI-VAC YANKAUER SUCTION HANDLE W/STRAIGHT TIP & CONTROL VENT: Brand: CARDINAL HEALTH

## (undated) DEVICE — 3M™ TEGADERM™ TRANSPARENT FILM DRESSING FRAME STYLE, 1626W, 4 IN X 4-3/4 IN (10 CM X 12 CM), 50/CT 4CT/CASE: Brand: 3M™ TEGADERM™

## (undated) DEVICE — TIP COVER ACCESSORY

## (undated) DEVICE — LIGAMAX 5 MM ENDOSCOPIC MULTIPLE CLIP APPLIER: Brand: LIGAMAX

## (undated) DEVICE — SUT VICRYL 2-0 SH 27 IN UNDYED J417H

## (undated) DEVICE — GLOVE SRG BIOGEL 8

## (undated) DEVICE — DRAPE SHEET THREE QUARTER

## (undated) DEVICE — PLUMEPEN PRO 10FT

## (undated) DEVICE — TOWEL SURG XR DETECT GREEN STRL RFD

## (undated) DEVICE — SYRINGE 10ML LL

## (undated) DEVICE — SCD SEQUENTIAL COMPRESSION COMFORT SLEEVE MEDIUM KNEE LENGTH: Brand: KENDALL SCD